# Patient Record
Sex: FEMALE | Race: WHITE | NOT HISPANIC OR LATINO | Employment: OTHER | URBAN - METROPOLITAN AREA
[De-identification: names, ages, dates, MRNs, and addresses within clinical notes are randomized per-mention and may not be internally consistent; named-entity substitution may affect disease eponyms.]

---

## 2023-03-17 ENCOUNTER — LAB (OUTPATIENT)
Dept: LAB | Facility: LAB | Age: 73
End: 2023-03-17
Payer: MEDICARE

## 2023-03-17 DIAGNOSIS — R39.9 URINARY SYMPTOM OR SIGN: ICD-10-CM

## 2023-03-17 DIAGNOSIS — N39.0 RECURRENT UTI: Primary | ICD-10-CM

## 2023-03-17 LAB
APPEARANCE, URINE: ABNORMAL
BILIRUBIN, URINE: NEGATIVE
BLOOD, URINE: ABNORMAL
COLOR, URINE: ABNORMAL
GLUCOSE, URINE: NEGATIVE MG/DL
KETONES, URINE: NEGATIVE MG/DL
LEUKOCYTE ESTERASE, URINE: ABNORMAL
NITRITE, URINE: NEGATIVE
PH, URINE: 6 (ref 5–8)
PROTEIN, URINE: NEGATIVE MG/DL
RBC, URINE: ABNORMAL /HPF (ref 0–5)
SPECIFIC GRAVITY, URINE: 1 (ref 1–1.03)
SQUAMOUS EPITHELIAL CELLS, URINE: ABNORMAL /HPF
UROBILINOGEN, URINE: <2 MG/DL (ref 0–1.9)
WBC CLUMPS, URINE: ABNORMAL /HPF
WBC, URINE: >100 /HPF (ref 0–5)

## 2023-03-17 PROCEDURE — 81001 URINALYSIS AUTO W/SCOPE: CPT

## 2023-03-17 RX ORDER — AMOXICILLIN AND CLAVULANATE POTASSIUM 875; 125 MG/1; MG/1
875 TABLET, FILM COATED ORAL 2 TIMES DAILY
Qty: 10 TABLET | Refills: 0 | Status: SHIPPED | OUTPATIENT
Start: 2023-03-17 | End: 2023-03-20 | Stop reason: SDUPTHER

## 2023-03-17 NOTE — PROGRESS NOTES
Pt called this afternoon with symptoms of a UTI and would like medication if possible.   She would also like to know why she keeps getting current UTIs?

## 2023-03-20 DIAGNOSIS — N39.0 RECURRENT UTI: ICD-10-CM

## 2023-03-20 RX ORDER — AMOXICILLIN AND CLAVULANATE POTASSIUM 875; 125 MG/1; MG/1
875 TABLET, FILM COATED ORAL 2 TIMES DAILY
Qty: 10 TABLET | Refills: 0 | Status: SHIPPED | OUTPATIENT
Start: 2023-03-20 | End: 2023-03-20 | Stop reason: SDUPTHER

## 2023-03-20 RX ORDER — AMOXICILLIN AND CLAVULANATE POTASSIUM 875; 125 MG/1; MG/1
875 TABLET, FILM COATED ORAL 2 TIMES DAILY
Qty: 10 TABLET | Refills: 0 | Status: SHIPPED | OUTPATIENT
Start: 2023-03-20 | End: 2023-03-25

## 2023-04-28 ENCOUNTER — LAB (OUTPATIENT)
Dept: LAB | Facility: LAB | Age: 73
End: 2023-04-28
Payer: MEDICARE

## 2023-04-28 DIAGNOSIS — N39.0 URINARY TRACT INFECTION WITHOUT HEMATURIA, SITE UNSPECIFIED: ICD-10-CM

## 2023-04-28 LAB
APPEARANCE, URINE: ABNORMAL
BILIRUBIN, URINE: NEGATIVE
BLOOD, URINE: ABNORMAL
COLOR, URINE: YELLOW
GLUCOSE, URINE: NEGATIVE MG/DL
KETONES, URINE: NEGATIVE MG/DL
LEUKOCYTE ESTERASE, URINE: ABNORMAL
NITRITE, URINE: NEGATIVE
PH, URINE: 8 (ref 5–8)
PROTEIN, URINE: NEGATIVE MG/DL
RBC, URINE: ABNORMAL /HPF (ref 0–5)
RENAL EPITHELIAL CELLS, URINE: ABNORMAL /HPF
SPECIFIC GRAVITY, URINE: 1 (ref 1–1.03)
SQUAMOUS EPITHELIAL CELLS, URINE: ABNORMAL /HPF
UROBILINOGEN, URINE: <2 MG/DL (ref 0–1.9)
WBC, URINE: >100 /HPF (ref 0–5)

## 2023-04-28 PROCEDURE — 81001 URINALYSIS AUTO W/SCOPE: CPT

## 2023-04-28 PROCEDURE — 87086 URINE CULTURE/COLONY COUNT: CPT

## 2023-04-30 LAB — URINE CULTURE: NORMAL

## 2023-05-01 DIAGNOSIS — N30.01 ACUTE CYSTITIS WITH HEMATURIA: Primary | ICD-10-CM

## 2023-05-01 RX ORDER — NITROFURANTOIN 25; 75 MG/1; MG/1
100 CAPSULE ORAL 2 TIMES DAILY
Qty: 10 CAPSULE | Refills: 0 | Status: SHIPPED | OUTPATIENT
Start: 2023-05-01 | End: 2023-05-06

## 2023-05-04 PROBLEM — R79.9 ABNORMAL BLOOD CHEMISTRY: Status: ACTIVE | Noted: 2023-05-04

## 2023-05-04 PROBLEM — R30.0 DYSURIA: Status: ACTIVE | Noted: 2023-05-04

## 2023-05-04 PROBLEM — M79.606 LEG PAIN: Status: ACTIVE | Noted: 2023-05-04

## 2023-05-04 PROBLEM — K59.09 CHRONIC CONSTIPATION: Status: ACTIVE | Noted: 2023-05-04

## 2023-05-04 PROBLEM — K21.9 GERD (GASTROESOPHAGEAL REFLUX DISEASE): Status: ACTIVE | Noted: 2023-05-04

## 2023-05-04 PROBLEM — I50.33 ACUTE ON CHRONIC DIASTOLIC CONGESTIVE HEART FAILURE (MULTI): Status: ACTIVE | Noted: 2021-05-05

## 2023-05-04 PROBLEM — R10.11 RIGHT UPPER QUADRANT ABDOMINAL PAIN: Status: ACTIVE | Noted: 2018-11-16

## 2023-05-04 PROBLEM — R10.9 ABDOMINAL PAIN: Status: ACTIVE | Noted: 2018-11-16

## 2023-05-04 PROBLEM — K13.79 ORAL MASS: Status: ACTIVE | Noted: 2023-05-04

## 2023-05-04 PROBLEM — B37.9 CANDIDIASIS: Status: ACTIVE | Noted: 2023-05-04

## 2023-05-04 PROBLEM — Z85.42 HISTORY OF UTERINE CANCER: Status: ACTIVE | Noted: 2023-05-04

## 2023-05-04 PROBLEM — B02.9 SHINGLES: Status: RESOLVED | Noted: 2023-05-04 | Resolved: 2023-05-04

## 2023-05-04 PROBLEM — T81.30XA ABDOMINAL WOUND DEHISCENCE: Status: RESOLVED | Noted: 2021-05-05 | Resolved: 2023-05-04

## 2023-05-04 PROBLEM — R19.8 PERFORATED VISCUS: Status: ACTIVE | Noted: 2023-05-04

## 2023-05-04 PROBLEM — Z85.41 HISTORY OF CERVICAL CANCER: Status: ACTIVE | Noted: 2023-05-04

## 2023-05-04 PROBLEM — M79.89 LEFT LEG SWELLING: Status: ACTIVE | Noted: 2023-05-04

## 2023-05-04 PROBLEM — E78.1 HYPERTRIGLYCERIDEMIA: Status: ACTIVE | Noted: 2023-05-04

## 2023-05-04 PROBLEM — I49.9 IRREGULAR HEART RHYTHM: Status: ACTIVE | Noted: 2023-05-04

## 2023-05-04 PROBLEM — T81.321A ABDOMINAL WOUND DEHISCENCE: Status: RESOLVED | Noted: 2021-05-05 | Resolved: 2023-05-04

## 2023-05-04 PROBLEM — I44.7 LBBB (LEFT BUNDLE BRANCH BLOCK): Status: ACTIVE | Noted: 2023-05-04

## 2023-05-04 PROBLEM — I77.810 ASCENDING AORTA DILATATION (CMS-HCC): Status: ACTIVE | Noted: 2023-05-04

## 2023-05-04 PROBLEM — R11.0 NAUSEA: Status: RESOLVED | Noted: 2018-11-16 | Resolved: 2023-05-04

## 2023-05-04 PROBLEM — D18.09 HEMANGIOMA OF OTHER SITES: Status: ACTIVE | Noted: 2023-05-04

## 2023-05-04 PROBLEM — R14.0 ABDOMINAL BLOATING: Status: RESOLVED | Noted: 2023-05-04 | Resolved: 2023-05-04

## 2023-05-04 PROBLEM — I71.40 AAA (ABDOMINAL AORTIC ANEURYSM) (CMS-HCC): Status: ACTIVE | Noted: 2023-05-04

## 2023-05-04 PROBLEM — N39.0 UTI (URINARY TRACT INFECTION): Status: ACTIVE | Noted: 2023-05-04

## 2023-05-04 PROBLEM — R13.10 DYSPHAGIA: Status: ACTIVE | Noted: 2023-05-04

## 2023-05-04 PROBLEM — H93.19 TINNITUS: Status: RESOLVED | Noted: 2023-05-04 | Resolved: 2023-05-04

## 2023-05-04 PROBLEM — E55.9 VITAMIN D DEFICIENCY: Status: ACTIVE | Noted: 2023-05-04

## 2023-05-04 PROBLEM — F41.9 ANXIETY: Status: ACTIVE | Noted: 2023-05-04

## 2023-05-04 PROBLEM — J45.909 ASTHMATIC BRONCHITIS (HHS-HCC): Status: ACTIVE | Noted: 2023-05-04

## 2023-05-04 PROBLEM — I50.9 CHF EXACERBATION (MULTI): Status: ACTIVE | Noted: 2021-05-01

## 2023-05-04 PROBLEM — R74.8 ELEVATED LIVER ENZYMES: Status: ACTIVE | Noted: 2018-11-16

## 2023-05-04 PROBLEM — I87.8 POOR VENOUS ACCESS: Status: ACTIVE | Noted: 2023-05-04

## 2023-05-04 PROBLEM — R07.9 CHEST PAIN: Status: ACTIVE | Noted: 2022-12-01

## 2023-05-04 PROBLEM — R73.09 ELEVATED GLUCOSE: Status: ACTIVE | Noted: 2023-05-04

## 2023-05-04 PROBLEM — J31.2 CHRONIC PHARYNGITIS: Status: ACTIVE | Noted: 2023-05-04

## 2023-05-05 DIAGNOSIS — B37.9 YEAST INFECTION: ICD-10-CM

## 2023-05-05 RX ORDER — FLUCONAZOLE 150 MG/1
150 TABLET ORAL ONCE
Qty: 1 TABLET | Refills: 1 | Status: SHIPPED | OUTPATIENT
Start: 2023-05-05 | End: 2023-05-05

## 2023-06-20 DIAGNOSIS — I10 HYPERTENSION, UNSPECIFIED TYPE: ICD-10-CM

## 2023-06-20 RX ORDER — HYDROXYZINE HYDROCHLORIDE 25 MG/1
25 TABLET, FILM COATED ORAL 3 TIMES DAILY
Qty: 90 TABLET | Refills: 1 | Status: SHIPPED | OUTPATIENT
Start: 2023-06-20 | End: 2023-06-22

## 2023-06-22 DIAGNOSIS — I10 HYPERTENSION, UNSPECIFIED TYPE: ICD-10-CM

## 2023-06-22 RX ORDER — HYDROXYZINE HYDROCHLORIDE 25 MG/1
TABLET, FILM COATED ORAL
Qty: 270 TABLET | Refills: 1 | Status: ON HOLD | OUTPATIENT
Start: 2023-06-22 | End: 2023-12-07 | Stop reason: ALTCHOICE

## 2023-07-31 DIAGNOSIS — F41.9 ANXIETY: ICD-10-CM

## 2023-07-31 RX ORDER — LORAZEPAM 0.5 MG/1
0.5 TABLET ORAL DAILY PRN
Qty: 30 TABLET | Refills: 3 | Status: ON HOLD | OUTPATIENT
Start: 2023-07-31 | End: 2023-12-08

## 2023-07-31 NOTE — TELEPHONE ENCOUNTER
Medication: Lorazepam  -CSA: 8/17/22   -UDS: NA  -Last appt: 2/15  -Next appt date:    
Statement Selected

## 2023-08-02 LAB
CREATININE (MG/DL) IN SER/PLAS: 1 MG/DL (ref 0.5–1.05)
GFR FEMALE: 59 ML/MIN/1.73M2
UREA NITROGEN (MG/DL) IN SER/PLAS: 17 MG/DL (ref 6–23)

## 2023-08-11 RX ORDER — ESTRADIOL 0.1 MG/G
1 CREAM VAGINAL 2 TIMES WEEKLY
COMMUNITY
Start: 2023-05-04 | End: 2023-12-19 | Stop reason: HOSPADM

## 2023-08-11 NOTE — PROGRESS NOTES
" Trinity Hernandez is a 73 y.o. female who presents for Medicare Annual Wellness Visit Subsequent (Herniated small bowel loop, doesn't think she could handle surgery, Shin Cuevas suggested laparoscopic surgery;).    Small bowel perforation, abdominal hernias, epiploic appendagitis: She has been recommended laparoscopic surgery but she is hoping to avoid surgery.      Anxiety: lorazepam prn is helpful. She is having a lot more anxiety lately because of her 's health. He has an aneurysm that is over 5cm and he is in renal failure and he recently had a severe GI bleed. She is taking hydroxyzine but that makes her sleepy so she can't take it during the day.      HTN, aortic aneurysm: Reasonable control on metoprolol, losartan, and lasix. Following with cardioloy. Recently had some discomfort and got a zio patch done. She is awaiting results. She is going to get a stress test soon.     HLD: Off meds, trying to watch her diet.      COPD/sarcoidosis: On Brovana, spiriva, and budesonide. Using oxygen and prednisone prn, she has an albuterol inhaler. her breathing is a little bit bad today. She has been more short of breath. She is seeing Dr. Ford for pulm. She is going in for testing in April.      Hypothyroidism: On replacement, no concerns.     GERD: Off pantoprazole, she is doing much better since taking the enzymes.     All other systems have been reviewed and are negative for complaint     Objective   Ht 1.702 m (5' 7\")   Wt 77.6 kg (171 lb)   BMI 26.78 kg/m²     Physical Exam  Gen: No acute distress, alert and oriented x3, pleasant   HEENT: moist mucous membranes, b/l external auditory canals are clear of debris, TMs within normal limits, no oropharyngeal lesions, eomi, perrla   Neck: thyroid within normal limits, no lymphadenopathy   CV: RRR, normal S1/S2, no murmur   Resp: Clear to auscultation bilaterally, no wheezes or rhonchi appreciated, breathing comfortably on supplemental oxygen  Abd: soft, " moderate RLQ tenderness noted, moderately distended, no guarding/rigidity, bowel sounds present  Extr: no edema, no calf tenderness  Derm: Skin is warm and dry, no rashes appreciated  Psych: mood is good, affect is congruent, good hygiene, normal speech and eye contact  Neuro: cranial nerves grossly intact, normal gait     Assessment/Plan     #Small bowel perforation  #Ventral hernia  Following with GI  Currently planning to avoid surgery     #Anxiety:  CSA signed  Using ativan prn, refilled     #Shingles:  she has valtrex rx at home  Hasn't needed it much     #Poor venous access  port in place     #HTN  Controlled on losartan, furosemide, and metoprolol  seeing cardiology ([place])     #HLD  Stable, not on meds, monitoring     #COPD/Sarcoidosis  Following with pulm, stable, on inhalers, supplemental O2, nebulizers  testing with pulm in April     #Hypothyroidism  Controlled on replacement     HCM:  s/p COVID vaccine  Mammogram Sept

## 2023-08-16 ENCOUNTER — OFFICE VISIT (OUTPATIENT)
Dept: PRIMARY CARE | Facility: CLINIC | Age: 73
End: 2023-08-16
Payer: MEDICARE

## 2023-08-16 VITALS
DIASTOLIC BLOOD PRESSURE: 80 MMHG | WEIGHT: 171 LBS | HEART RATE: 74 BPM | BODY MASS INDEX: 26.84 KG/M2 | SYSTOLIC BLOOD PRESSURE: 133 MMHG | HEIGHT: 67 IN

## 2023-08-16 DIAGNOSIS — R73.09 ELEVATED GLUCOSE: Primary | ICD-10-CM

## 2023-08-16 DIAGNOSIS — E03.9 HYPOTHYROIDISM, UNSPECIFIED TYPE: ICD-10-CM

## 2023-08-16 DIAGNOSIS — Z13.220 SCREENING FOR HYPERLIPIDEMIA: ICD-10-CM

## 2023-08-16 DIAGNOSIS — Z12.31 ENCOUNTER FOR SCREENING MAMMOGRAM FOR MALIGNANT NEOPLASM OF BREAST: ICD-10-CM

## 2023-08-16 PROCEDURE — 1036F TOBACCO NON-USER: CPT | Performed by: FAMILY MEDICINE

## 2023-08-16 PROCEDURE — 1170F FXNL STATUS ASSESSED: CPT | Performed by: FAMILY MEDICINE

## 2023-08-16 PROCEDURE — 3079F DIAST BP 80-89 MM HG: CPT | Performed by: FAMILY MEDICINE

## 2023-08-16 PROCEDURE — 3075F SYST BP GE 130 - 139MM HG: CPT | Performed by: FAMILY MEDICINE

## 2023-08-16 PROCEDURE — G0439 PPPS, SUBSEQ VISIT: HCPCS | Performed by: FAMILY MEDICINE

## 2023-08-16 ASSESSMENT — ACTIVITIES OF DAILY LIVING (ADL)
BATHING: INDEPENDENT
DRESSING: INDEPENDENT
TAKING_MEDICATION: INDEPENDENT
MANAGING_FINANCES: INDEPENDENT
DOING_HOUSEWORK: INDEPENDENT
GROCERY_SHOPPING: INDEPENDENT

## 2023-08-16 ASSESSMENT — PATIENT HEALTH QUESTIONNAIRE - PHQ9
SUM OF ALL RESPONSES TO PHQ9 QUESTIONS 1 AND 2: 2
2. FEELING DOWN, DEPRESSED OR HOPELESS: SEVERAL DAYS
10. IF YOU CHECKED OFF ANY PROBLEMS, HOW DIFFICULT HAVE THESE PROBLEMS MADE IT FOR YOU TO DO YOUR WORK, TAKE CARE OF THINGS AT HOME, OR GET ALONG WITH OTHER PEOPLE: NOT DIFFICULT AT ALL
1. LITTLE INTEREST OR PLEASURE IN DOING THINGS: SEVERAL DAYS

## 2023-10-02 ENCOUNTER — PATIENT OUTREACH (OUTPATIENT)
Dept: PRIMARY CARE | Facility: CLINIC | Age: 73
End: 2023-10-02
Payer: MEDICARE

## 2023-10-02 NOTE — PROGRESS NOTES
Discharge Facility:  [facility]  Discharge Diagnosis:  chest pain  Admission Date:  9/28/23  Discharge Date:  9/29/23    PCP Appointment Date:  10/9/23  Specialist Appointment Date: having PCI 10/4 at [facility] Encounter and Summary: Linked   See discharge assessment below for further details     Engagement  Call Start Time: 0952 (10/2/2023  9:53 AM)    Medications  Medications reviewed with patient/caregiver?: Yes (10/2/2023  9:53 AM)  Is the patient having any side effects they believe may be caused by any medication additions or changes?: No (10/2/2023  9:53 AM)  Does the patient have all medications ordered at discharge?: Yes (10/2/2023  9:53 AM)  Is the patient taking all medications as directed (includes completed medication regime)?: No (10/2/2023  9:53 AM)  What is preventing the patient from taking all medications as directed?: Other (Comment) (bruises easily.  has  plavix and zetia.  spoke with cardiology regarding concerns, per rocky, office is calling her back after talking with dr about concerns.) (10/2/2023  9:53 AM)  Medication Comments: reviewed new, changed, and discontinued meds.  plavix and zetia are new.  rocky waiting to hear back from cardiology before taking the new meds due to easily bruising. (10/2/2023  9:53 AM)    Appointments  Does the patient have a primary care provider?: Yes (10/2/2023  9:53 AM)  Care Management Interventions: -- (appt made 10/9) (10/2/2023  9:53 AM)  Has the patient kept scheduled appointments due by today?: Yes (10/2/2023  9:53 AM)  Care Management Interventions: Advised to schedule with specialist (10/2/2023  9:53 AM)    Self Management  Has home health visited the patient within 72 hours of discharge?: Not applicable (10/2/2023  9:53 AM)    Patient Teaching  Does the patient have access to their discharge instructions?: Yes (10/2/2023  9:53 AM)  Care Management Interventions: Reviewed instructions with patient (10/2/2023  9:53 AM)  What is the patient's  perception of their health status since discharge?: Same (10/2/2023  9:53 AM)  Is the patient/caregiver able to teach back the hierarchy of who to call/visit for symptoms/problems? PCP, Specialist, Home Health nurse, Urgent Care, ED, 911: Yes (10/2/2023  9:53 AM)  Patient/Caregiver Education Comments: spoke with rocky.  states feels the same.  waiting to hear from cardio regarding new meds.  she wants to be sure he is aware of her easily bruising. she is having a stent placed later this week.   left call back number with rocky to call this nurse with questions or concerns. (10/2/2023  9:53 AM)

## 2023-10-03 NOTE — PROGRESS NOTES
Trinity Hernandez is a 73 y.o. female who presents for No chief complaint on file.    Small bowel perforation, abdominal hernias, epiploic appendagitis: She has been recommended laparoscopic surgery but she is hoping to avoid surgery.      Anxiety: lorazepam prn is helpful. She is having a lot more anxiety lately because of her 's health. He has an aneurysm that is over 5cm and he is in renal failure and he recently had a severe GI bleed. She is taking hydroxyzine but that makes her sleepy so she can't take it during the day.      HTN, aortic aneurysm: Reasonable control on metoprolol, losartan, and lasix. Following with cardioloy. Recently had some discomfort and got a zio patch done. She is awaiting results. She is going to get a stress test soon.     HLD: Off meds, trying to watch her diet.      COPD/sarcoidosis: On Brovana, spiriva, and budesonide. Using oxygen and prednisone prn, she has an albuterol inhaler. her breathing is a little bit bad today. She has been more short of breath. She is seeing Dr. Ford for pulm. She is going in for testing in April.      Hypothyroidism: On replacement, no concerns.     GERD: Off pantoprazole, she is doing much better since taking the enzymes.     All other systems have been reviewed and are negative for complaint    Objective   There were no vitals taken for this visit.    Gen: No acute distress, alert and oriented x3, pleasant   HEENT: moist mucous membranes, b/l external auditory canals are clear of debris, TMs within normal limits, no oropharyngeal lesions, eomi, perrla   Neck: thyroid within normal limits, no lymphadenopathy   CV: RRR, normal S1/S2, no murmur   Resp: Clear to auscultation bilaterally, no wheezes or rhonchi appreciated  Abd: soft, nontender, non-distended, no guarding/rigidity, bowel sounds present  Extr: no edema, no calf tenderness  Derm: Skin is warm and dry, no rashes appreciated  Psych: mood is good, affect is congruent, good hygiene,  normal speech and eye contact  Neuro: cranial nerves grossly intact, normal gait    Assessment/Plan     #Small bowel perforation  #Ventral hernia  Following with GI  Currently planning to avoid surgery     #Anxiety:  CSA signed  Using ativan prn, refilled     #Shingles:  she has valtrex rx at home  Hasn't needed it much     #Poor venous access  port in place     #HTN  Controlled on losartan, furosemide, and metoprolol  seeing cardiology ([place])     #HLD  Stable, not on meds, monitoring     #COPD/Sarcoidosis  Following with pulm, stable, on inhalers, supplemental O2, nebulizers  testing with pulm in April     #Hypothyroidism  Controlled on replacement     HCM:  s/p COVID vaccine  Mammogram Sept

## 2023-10-05 ENCOUNTER — APPOINTMENT (OUTPATIENT)
Dept: RADIOLOGY | Facility: HOSPITAL | Age: 73
End: 2023-10-05
Payer: MEDICARE

## 2023-10-05 ENCOUNTER — HOSPITAL ENCOUNTER (EMERGENCY)
Facility: HOSPITAL | Age: 73
Discharge: ANOTHER HOSPITAL | End: 2023-10-07
Attending: EMERGENCY MEDICINE | Admitting: EMERGENCY MEDICINE
Payer: MEDICARE

## 2023-10-05 DIAGNOSIS — R61 DIAPHORESIS: ICD-10-CM

## 2023-10-05 DIAGNOSIS — R79.89 ELEVATED TROPONIN I LEVEL: Primary | ICD-10-CM

## 2023-10-05 LAB
ALBUMIN SERPL BCP-MCNC: 4.1 G/DL (ref 3.4–5)
ALP SERPL-CCNC: 66 U/L (ref 33–136)
ALT SERPL W P-5'-P-CCNC: 10 U/L (ref 7–45)
ANION GAP SERPL CALC-SCNC: 13 MMOL/L (ref 10–20)
AST SERPL W P-5'-P-CCNC: 16 U/L (ref 9–39)
BASOPHILS # BLD AUTO: 0.06 X10*3/UL (ref 0–0.1)
BASOPHILS NFR BLD AUTO: 0.6 %
BILIRUB SERPL-MCNC: 0.4 MG/DL (ref 0–1.2)
BUN SERPL-MCNC: 17 MG/DL (ref 6–23)
CALCIUM SERPL-MCNC: 9.2 MG/DL (ref 8.6–10.3)
CARDIAC TROPONIN I PNL SERPL HS: 48 NG/L (ref 0–13)
CARDIAC TROPONIN I PNL SERPL HS: 53 NG/L (ref 0–13)
CARDIAC TROPONIN I PNL SERPL HS: 55 NG/L (ref 0–13)
CHLORIDE SERPL-SCNC: 99 MMOL/L (ref 98–107)
CO2 SERPL-SCNC: 26 MMOL/L (ref 21–32)
CREAT SERPL-MCNC: 0.99 MG/DL (ref 0.5–1.05)
D DIMER PPP FEU-MCNC: 1183 NG/ML FEU
EOSINOPHIL # BLD AUTO: 0.36 X10*3/UL (ref 0–0.4)
EOSINOPHIL NFR BLD AUTO: 3.8 %
ERYTHROCYTE [DISTWIDTH] IN BLOOD BY AUTOMATED COUNT: 12.6 % (ref 11.5–14.5)
GFR SERPL CREATININE-BSD FRML MDRD: 60 ML/MIN/1.73M*2
GLUCOSE BLD MANUAL STRIP-MCNC: 198 MG/DL (ref 74–99)
GLUCOSE SERPL-MCNC: 121 MG/DL (ref 74–99)
HCT VFR BLD AUTO: 38.4 % (ref 36–46)
HGB BLD-MCNC: 12.5 G/DL (ref 12–16)
IMM GRANULOCYTES # BLD AUTO: 0.03 X10*3/UL (ref 0–0.5)
IMM GRANULOCYTES NFR BLD AUTO: 0.3 % (ref 0–0.9)
LYMPHOCYTES # BLD AUTO: 2.52 X10*3/UL (ref 0.8–3)
LYMPHOCYTES NFR BLD AUTO: 26.6 %
MCH RBC QN AUTO: 30.1 PG (ref 26–34)
MCHC RBC AUTO-ENTMCNC: 32.6 G/DL (ref 32–36)
MCV RBC AUTO: 93 FL (ref 80–100)
MONOCYTES # BLD AUTO: 0.73 X10*3/UL (ref 0.05–0.8)
MONOCYTES NFR BLD AUTO: 7.7 %
NEUTROPHILS # BLD AUTO: 5.78 X10*3/UL (ref 1.6–5.5)
NEUTROPHILS NFR BLD AUTO: 61 %
NRBC BLD-RTO: 0 /100 WBCS (ref 0–0)
PLATELET # BLD AUTO: 274 X10*3/UL (ref 150–450)
PMV BLD AUTO: 9.7 FL (ref 7.5–11.5)
POTASSIUM SERPL-SCNC: 4.4 MMOL/L (ref 3.5–5.3)
PROT SERPL-MCNC: 7.4 G/DL (ref 6.4–8.2)
RBC # BLD AUTO: 4.15 X10*6/UL (ref 4–5.2)
SARS-COV-2 RNA RESP QL NAA+PROBE: NOT DETECTED
SODIUM SERPL-SCNC: 134 MMOL/L (ref 136–145)
WBC # BLD AUTO: 9.5 X10*3/UL (ref 4.4–11.3)

## 2023-10-05 PROCEDURE — 2500000002 HC RX 250 W HCPCS SELF ADMINISTERED DRUGS (ALT 637 FOR MEDICARE OP, ALT 636 FOR OP/ED)

## 2023-10-05 PROCEDURE — 99285 EMERGENCY DEPT VISIT HI MDM: CPT | Mod: 25 | Performed by: EMERGENCY MEDICINE

## 2023-10-05 PROCEDURE — 94760 N-INVAS EAR/PLS OXIMETRY 1: CPT

## 2023-10-05 PROCEDURE — 87635 SARS-COV-2 COVID-19 AMP PRB: CPT | Performed by: EMERGENCY MEDICINE

## 2023-10-05 PROCEDURE — 94640 AIRWAY INHALATION TREATMENT: CPT

## 2023-10-05 PROCEDURE — 84484 ASSAY OF TROPONIN QUANT: CPT | Mod: 91 | Performed by: EMERGENCY MEDICINE

## 2023-10-05 PROCEDURE — 94664 DEMO&/EVAL PT USE INHALER: CPT

## 2023-10-05 PROCEDURE — 2500000001 HC RX 250 WO HCPCS SELF ADMINISTERED DRUGS (ALT 637 FOR MEDICARE OP): Performed by: EMERGENCY MEDICINE

## 2023-10-05 PROCEDURE — 71045 X-RAY EXAM CHEST 1 VIEW: CPT | Performed by: RADIOLOGY

## 2023-10-05 PROCEDURE — 80053 COMPREHEN METABOLIC PANEL: CPT | Performed by: EMERGENCY MEDICINE

## 2023-10-05 PROCEDURE — 84484 ASSAY OF TROPONIN QUANT: CPT | Performed by: EMERGENCY MEDICINE

## 2023-10-05 PROCEDURE — 82947 ASSAY GLUCOSE BLOOD QUANT: CPT

## 2023-10-05 PROCEDURE — 2500000002 HC RX 250 W HCPCS SELF ADMINISTERED DRUGS (ALT 637 FOR MEDICARE OP, ALT 636 FOR OP/ED): Performed by: EMERGENCY MEDICINE

## 2023-10-05 PROCEDURE — 71275 CT ANGIOGRAPHY CHEST: CPT | Mod: FR,MG

## 2023-10-05 PROCEDURE — 71045 X-RAY EXAM CHEST 1 VIEW: CPT | Mod: FY

## 2023-10-05 PROCEDURE — 85379 FIBRIN DEGRADATION QUANT: CPT | Performed by: EMERGENCY MEDICINE

## 2023-10-05 PROCEDURE — 85025 COMPLETE CBC W/AUTO DIFF WBC: CPT | Performed by: EMERGENCY MEDICINE

## 2023-10-05 PROCEDURE — 71275 CT ANGIOGRAPHY CHEST: CPT | Mod: FOREIGN READ | Performed by: RADIOLOGY

## 2023-10-05 RX ORDER — GLUCOSAMINE HCL 500 MG
1 TABLET ORAL DAILY
Status: ON HOLD | COMMUNITY
Start: 2023-02-15 | End: 2023-12-07 | Stop reason: ALTCHOICE

## 2023-10-05 RX ORDER — NITROGLYCERIN 0.4 MG/1
0.4 TABLET SUBLINGUAL EVERY 5 MIN PRN
COMMUNITY
Start: 2023-10-05

## 2023-10-05 RX ORDER — BUDESONIDE 0.5 MG/2ML
0.5 INHALANT ORAL
Status: DISCONTINUED | OUTPATIENT
Start: 2023-10-05 | End: 2023-10-08 | Stop reason: HOSPADM

## 2023-10-05 RX ORDER — IPRATROPIUM BROMIDE AND ALBUTEROL SULFATE 2.5; .5 MG/3ML; MG/3ML
3 SOLUTION RESPIRATORY (INHALATION)
Status: DISCONTINUED | OUTPATIENT
Start: 2023-10-05 | End: 2023-10-05

## 2023-10-05 RX ORDER — NAPROXEN SODIUM 220 MG/1
324 TABLET, FILM COATED ORAL ONCE
Status: DISCONTINUED | OUTPATIENT
Start: 2023-10-05 | End: 2023-10-05

## 2023-10-05 RX ORDER — BUDESONIDE 0.5 MG/2ML
0.5 INHALANT ORAL
Status: DISCONTINUED | OUTPATIENT
Start: 2023-10-06 | End: 2023-10-05

## 2023-10-05 RX ORDER — MELATONIN 10 MG
CAPSULE ORAL
COMMUNITY
End: 2023-12-24 | Stop reason: WASHOUT

## 2023-10-05 RX ORDER — CLOPIDOGREL BISULFATE 75 MG/1
75 TABLET ORAL
COMMUNITY
Start: 2023-09-29 | End: 2023-10-29

## 2023-10-05 RX ORDER — BUDESONIDE 0.5 MG/2ML
INHALANT ORAL
Status: COMPLETED
Start: 2023-10-05 | End: 2023-10-05

## 2023-10-05 RX ORDER — IPRATROPIUM BROMIDE 0.5 MG/2.5ML
SOLUTION RESPIRATORY (INHALATION)
COMMUNITY
End: 2023-12-19 | Stop reason: HOSPADM

## 2023-10-05 RX ADMIN — IPRATROPIUM BROMIDE AND ALBUTEROL SULFATE 3 ML: .5; 3 SOLUTION RESPIRATORY (INHALATION) at 21:30

## 2023-10-05 RX ADMIN — IOHEXOL 50 ML: 350 INJECTION, SOLUTION INTRAVENOUS at 23:55

## 2023-10-05 RX ADMIN — NITROGLYCERIN 0.5 INCH: 20 OINTMENT TOPICAL at 17:23

## 2023-10-05 RX ADMIN — BUDESONIDE 0.5 MG: 0.5 INHALANT ORAL at 21:31

## 2023-10-05 RX ADMIN — BUDESONIDE 0.5 MG: 0.5 INHALANT RESPIRATORY (INHALATION) at 21:31

## 2023-10-05 ASSESSMENT — COLUMBIA-SUICIDE SEVERITY RATING SCALE - C-SSRS
2. HAVE YOU ACTUALLY HAD ANY THOUGHTS OF KILLING YOURSELF?: NO
1. IN THE PAST MONTH, HAVE YOU WISHED YOU WERE DEAD OR WISHED YOU COULD GO TO SLEEP AND NOT WAKE UP?: NO
6. HAVE YOU EVER DONE ANYTHING, STARTED TO DO ANYTHING, OR PREPARED TO DO ANYTHING TO END YOUR LIFE?: NO

## 2023-10-05 ASSESSMENT — PAIN SCALES - GENERAL
PAINLEVEL_OUTOF10: 0 - NO PAIN

## 2023-10-05 ASSESSMENT — PAIN - FUNCTIONAL ASSESSMENT: PAIN_FUNCTIONAL_ASSESSMENT: 0-10

## 2023-10-05 NOTE — ED PROVIDER NOTES
Department of Emergency Medicine   ED  Provider Note  Admit Date/RoomTime: 10/5/2023  4:49 PM  ED Room: [address]              History of Present Illness:   Trinity Hernandez is a 73 y.o. female presenting to the ED for Generalized weakness, nausea and diaphoresis,beginning 1 hour after arriving home from [facility] where she had a cardiac stent placed 2 days ago..  The complaint has been intermittent, severe in severity, and worsened by nothing.  Patient had a 5-minute episode of diaphoresis and generalized weakness after arriving home.  She did not have chest pain or shortness of breath.  She did not actually vomit but was quite nauseous.  She had no pain in her neck or arms or jaw.  She had a cardiac stent done a few days ago at [facility].  She has a port in her right chest.  She has a history of DVT.      Review of Systems:   Pertinent positives and review of systems as noted above.  Remaining 10 review of systems is negative or noncontributory to today's episode of care.        --------------------------------------------- PAST HISTORY ---------------------------------------------  Past Medical History:  has a past medical history of Abdominal bloating (05/04/2023), Diverticulitis of small intestine without perforation or abscess without bleeding, Essential (primary) hypertension (02/08/2017), Influenza B (05/08/2015), Nausea (11/16/2018), Personal history of other endocrine, nutritional and metabolic disease (02/08/2017), and Personal history of other venous thrombosis and embolism.    Past Surgical History:  has a past surgical history that includes Hysterectomy (10/03/2013); Hernia repair (05/16/2017); Other surgical history (05/16/2017); Abdominal adhesion surgery (05/16/2017); Colectomy partial / total (05/16/2017); Cholecystectomy (05/16/2017); Cervical fusion (05/16/2017); Other surgical history (04/15/2019); MR angio head wo IV contrast (05/21/2020); MR angio neck wo IV contrast  (05/21/2020); CT angio neck w and wo IV contrast (05/21/2020); CT angio head w and wo IV contrast (05/21/2020); and Coronary angioplasty with stent.    Social History:  reports that she has never smoked. She has never been exposed to tobacco smoke. She has never used smokeless tobacco.    Family History: family history is not on file. Unless otherwise noted, family history is non contributory    The patient’s home medications have been reviewed.    Allergies: Hydrocodone-acetaminophen, Montelukast, Morphine, Nitrofurantoin monohyd/m-cryst, Sulfa (sulfonamide antibiotics), Atorvastatin, Dicyclomine, Fluticasone propion-salmeterol, Levofloxacin, Lisinopril, Nitroimidazoles, Omeprazole, Salmeterol, Simvastatin, Sucralfate, and Umeclidinium    -------------------------------------------------- RESULTS -------------------------------------------------  All laboratory and radiology results have been personally reviewed by myself   LABS:  Labs Reviewed   D-DIMER, VTE EXCLUSION - Abnormal       Result Value    D-Dimer, Quantitative VTE Exclusion 1,183 (*)     Narrative:     The VTE Exclusion D-Dimer assay is reported in ng/mL Fibrinogen Equivalent Units (FEU).    Per 's instructions for use, a value of less than 500 ng/mL (FEU) may help to exclude DVT or PE in outpatients when the assay is used with a clinical pretest probability assessment.([facility] must utilize and document eCalc 'Wells Score Deep Vein Thrombosis Risk' for DVT exclusion only. Emergency Department should utilize  Guidelines for Emergency Department Use of the VTE Exclusion D-Dimer and Clinical Pretest probability assessment model for DVT or PE exclusion.)   COMPREHENSIVE METABOLIC PANEL - Abnormal    Glucose 121 (*)     Sodium 134 (*)     Potassium 4.4      Chloride 99      Bicarbonate 26      Anion Gap 13      Urea Nitrogen 17      Creatinine 0.99      eGFR 60 (*)     Calcium 9.2      Albumin 4.1      Alkaline Phosphatase 66      Total Protein  7.4      AST 16      Bilirubin, Total 0.4      ALT 10     CBC WITH AUTO DIFFERENTIAL - Abnormal    WBC 9.5      nRBC 0.0      RBC 4.15      Hemoglobin 12.5      Hematocrit 38.4      MCV 93      MCH 30.1      MCHC 32.6      RDW 12.6      Platelets 274      MPV 9.7      Neutrophils % 61.0      Immature Granulocytes %, Automated 0.3      Lymphocytes % 26.6      Monocytes % 7.7      Eosinophils % 3.8      Basophils % 0.6      Neutrophils Absolute 5.78 (*)     Immature Granulocytes Absolute, Automated 0.03      Lymphocytes Absolute 2.52      Monocytes Absolute 0.73      Eosinophils Absolute 0.36      Basophils Absolute 0.06     SERIAL TROPONIN-INITIAL - Abnormal    Troponin I, High Sensitivity 53 (*)     Narrative:     Less than 99th percentile of normal range cutoff-  Female and children under 18 years old <14 ng/L; Male <21 ng/L: Negative  Repeat testing should be performed if clinically indicated.     Female and children under 18 years old 14-50 ng/L; Male 21-50 ng/L:  Consistent with possible cardiac damage and possible increased clinical   risk. Serial measurements may help to assess extent of myocardial damage.     >50 ng/L: Consistent with cardiac damage, increased clinical risk and  myocardial infarction. Serial measurements may help assess extent of   myocardial damage.      NOTE: Children less than 1 year old may have higher baseline troponin   levels and results should be interpreted in conjunction with the overall   clinical context.     NOTE: Troponin I testing is performed using a different   testing methodology at [facility] than at other   [facility] hospitals. Direct result comparisons should only   be made within the same method.   TROPONIN SERIES- (INITIAL, 1 HR)    Narrative:     The following orders were created for panel order Troponin I Series, High Sensitivity (0, 1 HR).  Procedure                               Abnormality         Status                     ---------                       "         -----------         ------                     Troponin I, High Sensiti...[[id number]]  Abnormal            Final result               Troponin, High Sensitivi...[[id number]]                      In process                   Please view results for these tests on the individual orders.   SERIAL TROPONIN, 1 HOUR   GRAY TOP         RADIOLOGY:  Interpreted by Radiologist.  XR chest 1 view   Final Result   Hyperinflated lungs with flattening of the diaphragms suggesting   COPD. Streaky bibasilar opacities suggestive of atelectasis in the   absence of clinical signs of pneumonia.        Signed by: Brant Garcia 10/5/2023 5:20 PM   Dictation workstation:   [id number]          1653 EKG shows a sinus rhythm with left bundle branch block pattern no acute ST elevations,  1804 EKG shows a sinus rhythm at 71 bpm, left bundle branch block is present, no significant changes from the previous EKG.    No results found for this or any previous visit (from the past 4464 hour(s)).  ------------------------- NURSING NOTES AND VITALS REVIEWED ---------------------------   The nursing notes within the ED encounter and vital signs as below have been reviewed.   Pulse 73   Temp 36.2 °C (97.2 °F) (Tympanic)   Resp 18   Ht 1.702 m (5' 7\")   Wt 77.5 kg (170 lb 13.7 oz)   SpO2 95%   BMI 26.76 kg/m²   Oxygen Saturation Interpretation: Normal      ---------------------------------------------------PHYSICAL EXAM--------------------------------------    Constitutional/General: Alert and oriented x3, well appearing, non toxic in NAD  Head: Normocephalic and atraumatic  Eyes: PERRL, EOMI, conjunctiva normal, sclera non icteric  Mouth: Oropharynx clear, handling secretions, no trismus, no asymmetry of the posterior oropharynx or uvular edema  Neck: Supple, full ROM, non tender to palpation in the midline, no stridor, no crepitus, no meningeal signs  Respiratory: Lungs clear to auscultation bilaterally, no wheezes, rales, or rhonchi. " Not in respiratory distress  Cardiovascular:  Regular rate. Regular rhythm. No murmurs, gallops, or rubs. 2+ distal pulses  Chest: No chest wall tenderness  GI:  Abdomen Soft, Non tender, Non distended.  +BS. No organomegaly, no palpable masses,  No rebound, guarding, or rigidity.   Musculoskeletal: Moves all extremities x 4. Warm and well perfused, no clubbing, cyanosis, or edema. Capillary refill <3 seconds  Integument: skin warm and dry. No rashes.   Lymphatic: no lymphadenopathy noted  Neurologic: GCS 15, no focal deficits, symmetric strength 5/5 in the upper and lower extremities bilaterally  Psychiatric: Normal Affect    Procedures    ------------------------------ ED COURSE/MEDICAL DECISION MAKING----------------------    Medical Decision Making:   Patient request care at [facility].  I discussed this case with Dr. Frank and .   [facility].  She is pending bed placement at this time. Patient was signed out to Dr. Matthew at change of shift.  Please refer to this doctor's note for final diagnosis and disposition.  She is pending bed placement.  Diagnoses as of 10/05/23 2038   Elevated troponin I level   Diaphoresis      Counseling:   The emergency provider has spoken with the patient and discussed today’s results, in addition to providing specific details for the plan of care and counseling regarding the diagnosis and prognosis.  Questions are answered at this time and they are agreeable with the plan.      --------------------------------- IMPRESSION AND DISPOSITION ---------------------------------        IMPRESSION  1. Elevated troponin I level    2. Diaphoresis        DISPOSITION  Disposition: Transfer to [facility] to request hospital  Patient condition is fair      Billing Provider Critical Care Time: 0 minutes     Joce Mejia MD  10/05/23 2040

## 2023-10-05 NOTE — ED TRIAGE NOTES
Pt arrived with c/o generalized weakness. Just had stent placed yesterday at [place]. Pt denies pain

## 2023-10-06 PROCEDURE — 2550000001 HC RX 255 CONTRASTS: Performed by: EMERGENCY MEDICINE

## 2023-10-06 PROCEDURE — 2500000004 HC RX 250 GENERAL PHARMACY W/ HCPCS (ALT 636 FOR OP/ED): Performed by: EMERGENCY MEDICINE

## 2023-10-06 PROCEDURE — 2500000001 HC RX 250 WO HCPCS SELF ADMINISTERED DRUGS (ALT 637 FOR MEDICARE OP)

## 2023-10-06 RX ORDER — HEPARIN 100 UNIT/ML
500 SYRINGE INTRAVENOUS AS NEEDED
Status: CANCELLED | OUTPATIENT
Start: 2023-10-11

## 2023-10-06 RX ORDER — ALBUTEROL SULFATE 0.83 MG/ML
2.5 SOLUTION RESPIRATORY (INHALATION) ONCE
Status: DISCONTINUED | OUTPATIENT
Start: 2023-10-06 | End: 2023-10-06

## 2023-10-06 RX ORDER — NAPROXEN SODIUM 220 MG/1
TABLET, FILM COATED ORAL
Status: COMPLETED
Start: 2023-10-06 | End: 2023-10-06

## 2023-10-06 RX ORDER — IPRATROPIUM BROMIDE AND ALBUTEROL SULFATE 2.5; .5 MG/3ML; MG/3ML
3 SOLUTION RESPIRATORY (INHALATION) ONCE
Status: DISCONTINUED | OUTPATIENT
Start: 2023-10-06 | End: 2023-10-08 | Stop reason: HOSPADM

## 2023-10-06 RX ORDER — IPRATROPIUM BROMIDE 0.5 MG/2.5ML
0.5 SOLUTION RESPIRATORY (INHALATION) ONCE
Status: DISCONTINUED | OUTPATIENT
Start: 2023-10-06 | End: 2023-10-06

## 2023-10-06 RX ORDER — HEPARIN SODIUM,PORCINE/PF 10 UNIT/ML
50 SYRINGE (ML) INTRAVENOUS AS NEEDED
Status: CANCELLED | OUTPATIENT
Start: 2023-10-11

## 2023-10-06 RX ORDER — POLYETHYLENE GLYCOL 3350 17 G/17G
17 POWDER, FOR SOLUTION ORAL
Status: DISCONTINUED | OUTPATIENT
Start: 2023-10-06 | End: 2023-10-08 | Stop reason: HOSPADM

## 2023-10-06 RX ORDER — IPRATROPIUM BROMIDE AND ALBUTEROL SULFATE 2.5; .5 MG/3ML; MG/3ML
SOLUTION RESPIRATORY (INHALATION)
Status: DISCONTINUED
Start: 2023-10-06 | End: 2023-10-06

## 2023-10-06 RX ORDER — CLOPIDOGREL BISULFATE 75 MG/1
TABLET ORAL
Status: COMPLETED
Start: 2023-10-06 | End: 2023-10-06

## 2023-10-06 RX ORDER — NAPROXEN SODIUM 220 MG/1
81 TABLET, FILM COATED ORAL ONCE
Status: COMPLETED | OUTPATIENT
Start: 2023-10-06 | End: 2023-10-06

## 2023-10-06 RX ORDER — CLOPIDOGREL BISULFATE 75 MG/1
75 TABLET ORAL ONCE
Status: COMPLETED | OUTPATIENT
Start: 2023-10-06 | End: 2023-10-06

## 2023-10-06 RX ADMIN — POLYETHYLENE GLYCOL 3350 17 G: 17 POWDER, FOR SOLUTION ORAL at 18:00

## 2023-10-06 RX ADMIN — CLOPIDOGREL BISULFATE 75 MG: 75 TABLET ORAL at 07:15

## 2023-10-06 RX ADMIN — ASPIRIN 81 MG CHEWABLE TABLET 81 MG: 81 TABLET CHEWABLE at 07:15

## 2023-10-06 RX ADMIN — NAPROXEN SODIUM 81 MG: 220 TABLET, FILM COATED ORAL at 07:15

## 2023-10-06 NOTE — PROGRESS NOTES
"Trinity Hernandez is a 73 y.o. female on day 0 of admission presenting with No Principal Problem: There is no principal problem currently on the Problem List. Please update the Problem List and refresh..    Subjective   Patient seen yesterday with chest pain.  Had mildly elevated troponin.  Troponins are trending down.  Patient has been accepted in transfer to [facility], hopefully today.  No chest pain.  Patient's home meds were restarted.  Patient is resting comfortably at this time.  Patient denies any chest pain or diaphoresis at this time.  No shortness of breath.       Objective     Physical Exam  Patient is alert and oriented x3.  Heart is regular rate and rhythm no murmur click gallop or rub.  Lungs are clear to auscultation no rales rhonchi or wheeze.  Abdomen is soft and nontender no masses guarding rebound.  No unilateral leg swelling or pain.    Last Recorded Vitals  Blood pressure 114/78, pulse 98, temperature 36.2 °C (97.2 °F), temperature source Tympanic, resp. rate 21, height 1.702 m (5' 7\"), weight 77.5 kg (170 lb 13.7 oz), SpO2 93 %.  Intake/Output last 3 Shifts:  No intake/output data recorded.    Relevant Results            Scheduled medications  budesonide, 0.5 mg, nebulization, Daily  nitroglycerin, 0.5 inch, transdermal, q6h during day  tiotropium, 2 puff, inhalation, Daily      Results for orders placed or performed during the hospital encounter of 10/05/23   D-Dimer, VTE Exclusion   Result Value Ref Range    D-Dimer, Quantitative VTE Exclusion 1,183 (H) <=500 ng/mL FEU   Comprehensive Metabolic Panel   Result Value Ref Range    Glucose 121 (H) 74 - 99 mg/dL    Sodium 134 (L) 136 - 145 mmol/L    Potassium 4.4 3.5 - 5.3 mmol/L    Chloride 99 98 - 107 mmol/L    Bicarbonate 26 21 - 32 mmol/L    Anion Gap 13 10 - 20 mmol/L    Urea Nitrogen 17 6 - 23 mg/dL    Creatinine 0.99 0.50 - 1.05 mg/dL    eGFR 60 (L) >60 mL/min/1.73m*2    Calcium 9.2 8.6 - 10.3 mg/dL    Albumin 4.1 3.4 - 5.0 g/dL    " Alkaline Phosphatase 66 33 - 136 U/L    Total Protein 7.4 6.4 - 8.2 g/dL    AST 16 9 - 39 U/L    Bilirubin, Total 0.4 0.0 - 1.2 mg/dL    ALT 10 7 - 45 U/L   CBC and Auto Differential   Result Value Ref Range    WBC 9.5 4.4 - 11.3 x10*3/uL    nRBC 0.0 0.0 - 0.0 /100 WBCs    RBC 4.15 4.00 - 5.20 x10*6/uL    Hemoglobin 12.5 12.0 - 16.0 g/dL    Hematocrit 38.4 36.0 - 46.0 %    MCV 93 80 - 100 fL    MCH 30.1 26.0 - 34.0 pg    MCHC 32.6 32.0 - 36.0 g/dL    RDW 12.6 11.5 - 14.5 %    Platelets 274 150 - 450 x10*3/uL    MPV 9.7 7.5 - 11.5 fL    Neutrophils % 61.0 40.0 - 80.0 %    Immature Granulocytes %, Automated 0.3 0.0 - 0.9 %    Lymphocytes % 26.6 13.0 - 44.0 %    Monocytes % 7.7 2.0 - 10.0 %    Eosinophils % 3.8 0.0 - 6.0 %    Basophils % 0.6 0.0 - 2.0 %    Neutrophils Absolute 5.78 (H) 1.60 - 5.50 x10*3/uL    Immature Granulocytes Absolute, Automated 0.03 0.00 - 0.50 x10*3/uL    Lymphocytes Absolute 2.52 0.80 - 3.00 x10*3/uL    Monocytes Absolute 0.73 0.05 - 0.80 x10*3/uL    Eosinophils Absolute 0.36 0.00 - 0.40 x10*3/uL    Basophils Absolute 0.06 0.00 - 0.10 x10*3/uL   Troponin I, High Sensitivity, Initial   Result Value Ref Range    Troponin I, High Sensitivity 53 (HH) 0 - 13 ng/L   Troponin, High Sensitivity, 1 Hour   Result Value Ref Range    Troponin I, High Sensitivity 55 (HH) 0 - 13 ng/L   Troponin I, High Sensitivity   Result Value Ref Range    Troponin I, High Sensitivity 48 (H) 0 - 13 ng/L   Sars-CoV-2 PCR, Symptomatic   Result Value Ref Range    Coronavirus 2019, PCR Not Detected Not Detected   POCT GLUCOSE   Result Value Ref Range    POCT Glucose 198 (H) 74 - 99 mg/dL                        Assessment/Plan   Active Problems:    Elevated troponin I level    Diaphoresis    Will continue to monitor closely.  Awaiting transfer to [facility]       I spent 10 minutes in the professional and overall care of this patient.      Yusuf Cherry, DO

## 2023-10-06 NOTE — PROGRESS NOTES
"Trinity Hernandez is a 73 y.o. female on day 0 of admission presenting with No Principal Problem: There is no principal problem currently on the Problem List. Please update the Problem List and refresh..    This patient was signed out to me at the end of my colleague shift at 8 PM.  At the time of signout, patient had already been accepted to be transferred to [facility] for further evaluation management of her current presentation complaining of being lightheaded, feeling like she is going to pass out as well as complaining of having some tingling to her fingers in light of her recent cardiac stenting.  History is limited given that she is a poor historian and has poor understanding of her recent admission as well as hospital course.      Patient troponins had downtrend at this point.  EKG read  Patient had a repeat EKG obtained at 2117 and read by me at 2117 shows normal sinus rhythm  Rhythm: Sinus rhythm  Axis: Within normal limits  Rate: 77  Intervals: Shows a left bundle branch block with a QRS of 138 ms, DC within normal limits, QTc slightly prolonged at 482 ms  ST segments: Nonspecific ST segment changes are noted along with a left bundle branch block to be expected but negative for Sgar[place]ossa.  T wave: Nonspecific T wave changes no signs of acute STEMI or ischemia.    Last Recorded Vitals  Blood pressure 121/63, pulse 62, temperature 36.2 °C (97.2 °F), temperature source Tympanic, resp. rate 17, height 1.702 m (5' 7\"), weight 77.5 kg (170 lb 13.7 oz), SpO2 97 %.  Intake/Output last 3 Shifts:  No intake/output data recorded.    Relevant Results              Patient did not have any acute events overnight.    The patient at this point still waiting to be accepted to [facility] [place] for further evaluation and management of her presentation.  Diaphoresis elevated troponin as well as complaining of feeling lightheaded and paresthesias.                 Assessment/Plan   Elevated troponin  Diaphoresis  3.   Near " syncope  4.   Paresthesias             Stephen Daniel, DO

## 2023-10-07 ENCOUNTER — HOSPITAL ENCOUNTER (OUTPATIENT)
Facility: HOSPITAL | Age: 73
Setting detail: OBSERVATION
End: 2023-10-07
Attending: STUDENT IN AN ORGANIZED HEALTH CARE EDUCATION/TRAINING PROGRAM | Admitting: STUDENT IN AN ORGANIZED HEALTH CARE EDUCATION/TRAINING PROGRAM
Payer: MEDICARE

## 2023-10-07 VITALS
HEART RATE: 78 BPM | BODY MASS INDEX: 26.82 KG/M2 | TEMPERATURE: 97.2 F | RESPIRATION RATE: 18 BRPM | HEIGHT: 67 IN | WEIGHT: 170.86 LBS | OXYGEN SATURATION: 96 % | SYSTOLIC BLOOD PRESSURE: 122 MMHG | DIASTOLIC BLOOD PRESSURE: 88 MMHG

## 2023-10-07 LAB
ALBUMIN SERPL BCP-MCNC: 4.4 G/DL (ref 3.4–5)
ALP SERPL-CCNC: 65 U/L (ref 33–136)
ALT SERPL W P-5'-P-CCNC: 11 U/L (ref 7–45)
ANION GAP SERPL CALC-SCNC: 11 MMOL/L (ref 10–20)
AST SERPL W P-5'-P-CCNC: 17 U/L (ref 9–39)
BILIRUB DIRECT SERPL-MCNC: 0.1 MG/DL (ref 0–0.3)
BILIRUB SERPL-MCNC: 0.4 MG/DL (ref 0–1.2)
BUN SERPL-MCNC: 11 MG/DL (ref 6–23)
CALCIUM SERPL-MCNC: 9.6 MG/DL (ref 8.6–10.3)
CARDIAC TROPONIN I PNL SERPL HS: 29 NG/L (ref 0–13)
CHLORIDE SERPL-SCNC: 100 MMOL/L (ref 98–107)
CO2 SERPL-SCNC: 30 MMOL/L (ref 21–32)
CREAT SERPL-MCNC: 0.95 MG/DL (ref 0.5–1.05)
ERYTHROCYTE [DISTWIDTH] IN BLOOD BY AUTOMATED COUNT: 12.9 % (ref 11.5–14.5)
GFR SERPL CREATININE-BSD FRML MDRD: 63 ML/MIN/1.73M*2
GLUCOSE SERPL-MCNC: 133 MG/DL (ref 74–99)
HCT VFR BLD AUTO: 40 % (ref 36–46)
HGB BLD-MCNC: 12.9 G/DL (ref 12–16)
MCH RBC QN AUTO: 30 PG (ref 26–34)
MCHC RBC AUTO-ENTMCNC: 32.3 G/DL (ref 32–36)
MCV RBC AUTO: 93 FL (ref 80–100)
NRBC BLD-RTO: 0 /100 WBCS (ref 0–0)
PLATELET # BLD AUTO: 260 X10*3/UL (ref 150–450)
PMV BLD AUTO: 9.4 FL (ref 7.5–11.5)
POTASSIUM SERPL-SCNC: 4.2 MMOL/L (ref 3.5–5.3)
PROT SERPL-MCNC: 7.8 G/DL (ref 6.4–8.2)
RBC # BLD AUTO: 4.3 X10*6/UL (ref 4–5.2)
SODIUM SERPL-SCNC: 137 MMOL/L (ref 136–145)
WBC # BLD AUTO: 8.5 X10*3/UL (ref 4.4–11.3)

## 2023-10-07 PROCEDURE — 2500000001 HC RX 250 WO HCPCS SELF ADMINISTERED DRUGS (ALT 637 FOR MEDICARE OP): Performed by: FAMILY MEDICINE

## 2023-10-07 PROCEDURE — 84075 ASSAY ALKALINE PHOSPHATASE: CPT | Performed by: FAMILY MEDICINE

## 2023-10-07 PROCEDURE — 2500000001 HC RX 250 WO HCPCS SELF ADMINISTERED DRUGS (ALT 637 FOR MEDICARE OP): Performed by: EMERGENCY MEDICINE

## 2023-10-07 PROCEDURE — 84484 ASSAY OF TROPONIN QUANT: CPT | Performed by: EMERGENCY MEDICINE

## 2023-10-07 PROCEDURE — 2500000004 HC RX 250 GENERAL PHARMACY W/ HCPCS (ALT 636 FOR OP/ED): Performed by: EMERGENCY MEDICINE

## 2023-10-07 PROCEDURE — 85027 COMPLETE CBC AUTOMATED: CPT | Mod: 59 | Performed by: EMERGENCY MEDICINE

## 2023-10-07 PROCEDURE — 80053 COMPREHEN METABOLIC PANEL: CPT | Performed by: EMERGENCY MEDICINE

## 2023-10-07 PROCEDURE — 82248 BILIRUBIN DIRECT: CPT | Performed by: FAMILY MEDICINE

## 2023-10-07 RX ORDER — LEVOTHYROXINE SODIUM 75 UG/1
75 TABLET ORAL
Status: DISCONTINUED | OUTPATIENT
Start: 2023-10-07 | End: 2023-10-08 | Stop reason: HOSPADM

## 2023-10-07 RX ORDER — CLOPIDOGREL BISULFATE 75 MG/1
75 TABLET ORAL ONCE
Status: COMPLETED | OUTPATIENT
Start: 2023-10-07 | End: 2023-10-07

## 2023-10-07 RX ADMIN — CLOPIDOGREL BISULFATE 75 MG: 75 TABLET ORAL at 15:24

## 2023-10-07 RX ADMIN — POLYETHYLENE GLYCOL 3350 17 G: 17 POWDER, FOR SOLUTION ORAL at 09:17

## 2023-10-07 RX ADMIN — LEVOTHYROXINE SODIUM 75 MCG: 75 TABLET ORAL at 07:57

## 2023-10-07 ASSESSMENT — PAIN - FUNCTIONAL ASSESSMENT: PAIN_FUNCTIONAL_ASSESSMENT: CRIES (CRYING REQUIRES OXYGEN INCREASED VITAL SIGNS EXPRESSION SLEEP)

## 2023-10-07 NOTE — ED PROVIDER NOTES
HPI   Chief Complaint   Patient presents with    Weakness, Gen     Pt states has generalized weakness. Denies cp despite emt states cp was pt's complaint. Pt denies any cp or other pain. Just had generalized weakness       HPI                    No data recorded                Patient History   Past Medical History:   Diagnosis Date    Abdominal bloating 05/04/2023    Diverticulitis of small intestine without perforation or abscess without bleeding     Diverticulitis, intestine, small    Essential (primary) hypertension 02/08/2017    HTN (hypertension), benign    Influenza B 05/08/2015    Formatting of this note might be different from the original. Completed Tamiflu Continue droplet precautions.    Nausea 11/16/2018    Personal history of other endocrine, nutritional and metabolic disease 02/08/2017    History of hypothyroidism    Personal history of other venous thrombosis and embolism     History of deep venous thrombosis     Past Surgical History:   Procedure Laterality Date    ABDOMINAL ADHESION SURGERY  05/16/2017    Laparoscopic Lysis Of Intestinal Adhesions    CERVICAL FUSION  05/16/2017    Cervical Vertebral Fusion    CHOLECYSTECTOMY  05/16/2017    Cholecystectomy    COLECTOMY PARTIAL / TOTAL  05/16/2017    Partial Colectomy - Sigmoid    CORONARY ANGIOPLASTY WITH STENT PLACEMENT      CT HEAD ANGIO W AND WO IV CONTRAST  05/21/2020    CT HEAD ANGIO W AND WO IV CONTRAST 5/21/2020 [facility]    CT NECK ANGIO W AND WO IV CONTRAST  05/21/2020    CT NECK ANGIO W AND WO IV CONTRAST 5/21/2020 [facility]    HERNIA REPAIR  05/16/2017    Hernia Repair    HYSTERECTOMY  10/03/2013    Hysterectomy    MR HEAD ANGIO WO IV CONTRAST  05/21/2020    MR HEAD ANGIO WO IV CONTRAST 5/21/2020 [facility]    MR NECK ANGIO WO IV CONTRAST  05/21/2020    MR NECK ANGIO WO IV CONTRAST 5/21/2020 [facility]    OTHER SURGICAL HISTORY  05/16/2017    Thoracoscopy Of Lungs And Pleural Space With Biopsy Of  Lung Nodules    OTHER SURGICAL HISTORY  04/15/2019    Esophagogastroduodenoscopy     No family history on file.  Social History     Tobacco Use    Smoking status: Never     Passive exposure: Never    Smokeless tobacco: Never   Substance Use Topics    Alcohol use: Not on file    Drug use: Not on file       Physical Exam   ED Triage Vitals   Temp Heart Rate Resp BP   10/05/23 1655 10/05/23 1655 10/05/23 1655 10/05/23 1815   36.2 °C (97.2 °F) 73 18 136/68      SpO2 Temp Source Heart Rate Source Patient Position   10/05/23 1655 10/05/23 1655 -- 10/05/23 1655   95 % Tympanic  Sitting      BP Location FiO2 (%)     10/05/23 1655 10/05/23 2130     Right arm 28 %       Physical Exam    ED Course & MDM   Diagnoses as of 10/07/23 1518   Elevated troponin I level   Diaphoresis       Medical Decision Making      Procedure  Procedures     Stephen Daniel DO  01/05/24 2090

## 2023-10-07 NOTE — ED NOTES
Patient took her own aerosol medications. BBS clear diminshed . Pt took Arformoterol and pulmicort aerosols.     Bella Espinosa, RRT  10/06/23 2006

## 2023-10-09 ENCOUNTER — APPOINTMENT (OUTPATIENT)
Dept: PRIMARY CARE | Facility: CLINIC | Age: 73
End: 2023-10-09
Payer: MEDICARE

## 2023-10-10 ENCOUNTER — PATIENT OUTREACH (OUTPATIENT)
Dept: PRIMARY CARE | Facility: CLINIC | Age: 73
End: 2023-10-10
Payer: MEDICARE

## 2023-10-12 ENCOUNTER — APPOINTMENT (OUTPATIENT)
Dept: HEMATOLOGY/ONCOLOGY | Facility: HOSPITAL | Age: 73
End: 2023-10-12
Payer: MEDICARE

## 2023-10-16 ENCOUNTER — PATIENT OUTREACH (OUTPATIENT)
Dept: PRIMARY CARE | Facility: CLINIC | Age: 73
End: 2023-10-16
Payer: MEDICARE

## 2023-10-16 NOTE — PROGRESS NOTES
Discharge Facility: [facility]   Discharge Diagnosis: Elevated Troponin   Admission Date: 10-8-23   Discharge Date: 10-13-23     PCP Appointment Date: 10-18-23  Specialist Appointment Date: n/a   See discharge assessment below for further details.  Engagement  Call Start Time: 1105 (10/16/2023 11:09 AM)    Medications  Medications reviewed with patient/caregiver?: Yes (10/16/2023 11:09 AM)  Is the patient having any side effects they believe may be caused by any medication additions or changes?: No (10/16/2023 11:09 AM)  Does the patient have all medications ordered at discharge?: Yes (10/16/2023 11:09 AM)  Care Management Interventions: No intervention needed (10/16/2023 11:09 AM)  Is the patient taking all medications as directed (includes completed medication regime)?: Yes (10/16/2023 11:09 AM)  What is preventing the patient from taking all medications as directed?: Other (Comment) (bruises easily.  has  plavix and zetia.  spoke with cardiology regarding concerns, per rocky, office is calling her back after talking with dr about concerns.) (10/2/2023  9:53 AM)  Medication Comments: reviewed new, changed, and discontinued meds.  plavix and zetia are new.  rocky waiting to hear back from cardiology before taking the new meds due to easily bruising. (10/2/2023  9:53 AM)    Appointments  Does the patient have a primary care provider?: Yes (10/16/2023 11:09 AM)  Care Management Interventions: Verified appointment date/time/provider (10/16/2023 11:09 AM)  Has the patient kept scheduled appointments due by today?: Yes (10/16/2023 11:09 AM)  Care Management Interventions: Advised patient to keep appointment (10/16/2023 11:09 AM)    Self Management  What is the home health agency?: Unsure (10/16/2023 11:09 AM)  Has home health visited the patient within 72 hours of discharge?: Yes (10/16/2023 11:09 AM)    Patient Teaching  Does the patient have access to their discharge instructions?: Yes (10/16/2023 11:09 AM)  Care  Management Interventions: Reviewed instructions with patient (10/16/2023 11:09 AM)  What is the patient's perception of their health status since discharge?: Improving (10/16/2023 11:09 AM)  Is the patient/caregiver able to teach back the hierarchy of who to call/visit for symptoms/problems? PCP, Specialist, Home Health nurse, Urgent Care, ED, 911: Yes (10/16/2023 11:09 AM)  Patient/Caregiver Education Comments: spoke with rocky.  states feels the same.  waiting to hear from cardio regarding new meds.  she wants to be sure he is aware of her easily bruising. she is having a stent placed later this week.   left call back number with rocky to call this nurse with questions or concerns. (10/2/2023  9:53 AM)      Molly Stuart LPN

## 2023-10-17 NOTE — PROGRESS NOTES
Trinity Hernandez is a 73 y.o. female who presents for Hospital Follow-up (Had stent placed on 10/7, went home the next day and was home for about an hour when she suddenly felt really sick to her stomach and felt weak, clammy and called 911 and was taken to [facility] and had to wait 2 days for a bed at [place] where she had the stent placed; new medications are very expensive, was only able to get half month supply)    Discharge Facility: [facility]   Discharge Diagnosis: Elevated Troponin   Admission Date: 10-8-23   Discharge Date: 10-13-23     CAD: She had a stent placed and the next day she got nausea, weak, and sweaty. She went to [facility] and then transferred to [facility] after 2 days. She went for a walk at the hospital and had the same symptoms. Dr. Romero is on vacation. Prior to this happening the cardiologist had set her up with a defibrillator vest. She has been home for about 5 days. Since getting home she is feeling depressed, frustrated, and angry.     Small bowel perforation, abdominal hernias, epiploic appendagitis: She has been recommended laparoscopic surgery but she is hoping to avoid surgery.      Anxiety: lorazepam prn is helpful. She is having a lot more anxiety lately because of her 's health. He has an aneurysm that is over 5cm and he is in renal failure and he recently had a severe GI bleed. She is taking hydroxyzine but that makes her sleepy so she can't take it during the day.      HTN, aortic aneurysm: Reasonable control on metoprolol, losartan, and lasix. Following with cardioloy. Recently had some discomfort and got a zio patch done. She is awaiting results. She is going to get a stress test soon.     HLD: Off meds, trying to watch her diet.      COPD/sarcoidosis: On Brovana, spiriva, and budesonide. Using oxygen and prednisone prn, she has an albuterol inhaler. her breathing is a little bit bad today. She has been more short of breath. She is seeing Dr. Ford  "for pulm. She is going in for testing in April.      Hypothyroidism: On replacement, no concerns.     GERD: Off pantoprazole, she is doing much better since taking the enzymes.     All other systems have been reviewed and are negative for complaint     Objective   Ht 1.702 m (5' 7\")   Wt 75.3 kg (166 lb)   BMI 26.00 kg/m²     Gen: No acute distress, alert and oriented x3, pleasant   HEENT: moist mucous membranes, b/l external auditory canals are clear of debris, TMs within normal limits, no oropharyngeal lesions, eomi, perrla   Neck: thyroid within normal limits, no lymphadenopathy   CV: RRR, normal S1/S2, no murmur   Resp: Clear to auscultation bilaterally, no wheezes or rhonchi appreciated  Abd: soft, nontender, non-distended, no guarding/rigidity, bowel sounds present  Extr: no edema, no calf tenderness  Derm: Skin is warm and dry, no rashes appreciated  Psych: mood is good, affect is congruent, good hygiene, normal speech and eye contact  Neuro: cranial nerves grossly intact, normal gait    Assessment/Plan     #CAD  S/p stent   On plavix and ezetimibe  Has followup with cardio  She has midodrine for prn use    #Small bowel perforation  #Ventral hernia  Following with GI  Currently planning to avoid surgery     #Anxiety:  CSA signed  Using ativan prn, refilled  Add lexapro     #Shingles:  she has valtrex rx at home  Hasn't needed it much     #Poor venous access  port in place     #HTN  Controlled on losartan, furosemide, and metoprolol  seeing cardiology ([place])     #HLD  Stable, not on meds, monitoring     #COPD/Sarcoidosis  Following with pulm, stable, on inhalers, supplemental O2, nebulizers  testing with pulm in April     #Hypothyroidism  Controlled on replacement     HCM:  s/p COVID vaccine  Mammogram Sept   "

## 2023-10-18 ENCOUNTER — OFFICE VISIT (OUTPATIENT)
Dept: PRIMARY CARE | Facility: CLINIC | Age: 73
End: 2023-10-18
Payer: MEDICARE

## 2023-10-18 VITALS
BODY MASS INDEX: 26.06 KG/M2 | DIASTOLIC BLOOD PRESSURE: 80 MMHG | HEART RATE: 89 BPM | SYSTOLIC BLOOD PRESSURE: 137 MMHG | WEIGHT: 166 LBS | HEIGHT: 67 IN

## 2023-10-18 DIAGNOSIS — D64.9 ANEMIA, UNSPECIFIED TYPE: ICD-10-CM

## 2023-10-18 DIAGNOSIS — E78.1 HYPERTRIGLYCERIDEMIA: ICD-10-CM

## 2023-10-18 DIAGNOSIS — F41.9 ANXIETY: ICD-10-CM

## 2023-10-18 DIAGNOSIS — R20.2 PARESTHESIA: Primary | ICD-10-CM

## 2023-10-18 PROCEDURE — 1036F TOBACCO NON-USER: CPT | Performed by: FAMILY MEDICINE

## 2023-10-18 PROCEDURE — 3079F DIAST BP 80-89 MM HG: CPT | Performed by: FAMILY MEDICINE

## 2023-10-18 PROCEDURE — 1159F MED LIST DOCD IN RCRD: CPT | Performed by: FAMILY MEDICINE

## 2023-10-18 PROCEDURE — 99214 OFFICE O/P EST MOD 30 MIN: CPT | Performed by: FAMILY MEDICINE

## 2023-10-18 PROCEDURE — 1126F AMNT PAIN NOTED NONE PRSNT: CPT | Performed by: FAMILY MEDICINE

## 2023-10-18 PROCEDURE — 3075F SYST BP GE 130 - 139MM HG: CPT | Performed by: FAMILY MEDICINE

## 2023-10-18 RX ORDER — MIDODRINE HYDROCHLORIDE 5 MG/1
5 TABLET ORAL 3 TIMES DAILY
Status: ON HOLD | COMMUNITY
End: 2023-12-06 | Stop reason: ALTCHOICE

## 2023-10-18 RX ORDER — EZETIMIBE 10 MG/1
10 TABLET ORAL
COMMUNITY
Start: 2023-09-29 | End: 2023-10-18 | Stop reason: SDUPTHER

## 2023-10-18 RX ORDER — ESCITALOPRAM OXALATE 10 MG/1
10 TABLET ORAL DAILY
Qty: 30 TABLET | Refills: 5 | Status: SHIPPED | OUTPATIENT
Start: 2023-10-18 | End: 2023-10-23

## 2023-10-18 RX ORDER — EZETIMIBE 10 MG/1
10 TABLET ORAL
Qty: 30 TABLET | Refills: 3 | Status: SHIPPED | OUTPATIENT
Start: 2023-10-18 | End: 2023-12-19 | Stop reason: HOSPADM

## 2023-10-23 DIAGNOSIS — F41.9 ANXIETY: ICD-10-CM

## 2023-10-23 RX ORDER — ESCITALOPRAM OXALATE 10 MG/1
10 TABLET ORAL DAILY
Qty: 90 TABLET | Refills: 2 | Status: ON HOLD | OUTPATIENT
Start: 2023-10-23 | End: 2023-12-07 | Stop reason: ALTCHOICE

## 2023-10-24 ENCOUNTER — LAB (OUTPATIENT)
Dept: HEMATOLOGY/ONCOLOGY | Facility: HOSPITAL | Age: 73
End: 2023-10-24
Payer: MEDICARE

## 2023-10-24 VITALS
RESPIRATION RATE: 18 BRPM | OXYGEN SATURATION: 97 % | HEART RATE: 88 BPM | BODY MASS INDEX: 25.88 KG/M2 | SYSTOLIC BLOOD PRESSURE: 149 MMHG | DIASTOLIC BLOOD PRESSURE: 82 MMHG | TEMPERATURE: 96.8 F | WEIGHT: 165.24 LBS

## 2023-10-24 DIAGNOSIS — I87.8 POOR VENOUS ACCESS: ICD-10-CM

## 2023-10-24 DIAGNOSIS — D64.9 ANEMIA, UNSPECIFIED TYPE: ICD-10-CM

## 2023-10-24 DIAGNOSIS — R20.2 PARESTHESIA: ICD-10-CM

## 2023-10-24 LAB
ALBUMIN SERPL BCP-MCNC: 4.3 G/DL (ref 3.4–5)
ALP SERPL-CCNC: 57 U/L (ref 33–136)
ALT SERPL W P-5'-P-CCNC: 11 U/L (ref 7–45)
ANION GAP SERPL CALC-SCNC: 9 MMOL/L (ref 10–20)
AST SERPL W P-5'-P-CCNC: 15 U/L (ref 9–39)
BASOPHILS # BLD AUTO: 0.07 X10*3/UL (ref 0–0.1)
BASOPHILS NFR BLD AUTO: 0.9 %
BILIRUB SERPL-MCNC: 0.4 MG/DL (ref 0–1.2)
BUN SERPL-MCNC: 16 MG/DL (ref 6–23)
CALCIUM SERPL-MCNC: 9.6 MG/DL (ref 8.6–10.3)
CHLORIDE SERPL-SCNC: 102 MMOL/L (ref 98–107)
CO2 SERPL-SCNC: 30 MMOL/L (ref 21–32)
CREAT SERPL-MCNC: 0.89 MG/DL (ref 0.5–1.05)
EOSINOPHIL # BLD AUTO: 0.39 X10*3/UL (ref 0–0.4)
EOSINOPHIL NFR BLD AUTO: 5.3 %
ERYTHROCYTE [DISTWIDTH] IN BLOOD BY AUTOMATED COUNT: 12.6 % (ref 11.5–14.5)
GFR SERPL CREATININE-BSD FRML MDRD: 68 ML/MIN/1.73M*2
GLUCOSE SERPL-MCNC: 97 MG/DL (ref 74–99)
HCT VFR BLD AUTO: 38.5 % (ref 36–46)
HGB BLD-MCNC: 12.2 G/DL (ref 12–16)
IMM GRANULOCYTES # BLD AUTO: 0.02 X10*3/UL (ref 0–0.5)
IMM GRANULOCYTES NFR BLD AUTO: 0.3 % (ref 0–0.9)
IRON SERPL-MCNC: 56 UG/DL (ref 35–150)
LYMPHOCYTES # BLD AUTO: 1.79 X10*3/UL (ref 0.8–3)
LYMPHOCYTES NFR BLD AUTO: 24.2 %
MAGNESIUM SERPL-MCNC: 2.09 MG/DL (ref 1.6–2.4)
MCH RBC QN AUTO: 29.8 PG (ref 26–34)
MCHC RBC AUTO-ENTMCNC: 31.7 G/DL (ref 32–36)
MCV RBC AUTO: 94 FL (ref 80–100)
MONOCYTES # BLD AUTO: 0.6 X10*3/UL (ref 0.05–0.8)
MONOCYTES NFR BLD AUTO: 8.1 %
NEUTROPHILS # BLD AUTO: 4.52 X10*3/UL (ref 1.6–5.5)
NEUTROPHILS NFR BLD AUTO: 61.2 %
NRBC BLD-RTO: 0 /100 WBCS (ref 0–0)
PLATELET # BLD AUTO: 242 X10*3/UL (ref 150–450)
PMV BLD AUTO: 9.5 FL (ref 7.5–11.5)
POTASSIUM SERPL-SCNC: 4.2 MMOL/L (ref 3.5–5.3)
PROT SERPL-MCNC: 7.6 G/DL (ref 6.4–8.2)
RBC # BLD AUTO: 4.1 X10*6/UL (ref 4–5.2)
SODIUM SERPL-SCNC: 137 MMOL/L (ref 136–145)
WBC # BLD AUTO: 7.4 X10*3/UL (ref 4.4–11.3)

## 2023-10-24 PROCEDURE — 83540 ASSAY OF IRON: CPT | Performed by: FAMILY MEDICINE

## 2023-10-24 PROCEDURE — 36591 DRAW BLOOD OFF VENOUS DEVICE: CPT

## 2023-10-24 PROCEDURE — 80053 COMPREHEN METABOLIC PANEL: CPT | Performed by: FAMILY MEDICINE

## 2023-10-24 PROCEDURE — 2500000004 HC RX 250 GENERAL PHARMACY W/ HCPCS (ALT 636 FOR OP/ED): Performed by: FAMILY MEDICINE

## 2023-10-24 PROCEDURE — 83735 ASSAY OF MAGNESIUM: CPT | Performed by: FAMILY MEDICINE

## 2023-10-24 PROCEDURE — 85025 COMPLETE CBC W/AUTO DIFF WBC: CPT | Performed by: FAMILY MEDICINE

## 2023-10-24 PROCEDURE — 82607 VITAMIN B-12: CPT | Mod: CONLAB | Performed by: FAMILY MEDICINE

## 2023-10-24 RX ORDER — HEPARIN 100 UNIT/ML
500 SYRINGE INTRAVENOUS AS NEEDED
Status: CANCELLED | OUTPATIENT
Start: 2023-10-24

## 2023-10-24 RX ORDER — SODIUM CHLORIDE 9 MG/ML
10 INJECTION, SOLUTION INTRAMUSCULAR; INTRAVENOUS; SUBCUTANEOUS EVERY 8 HOURS SCHEDULED
Qty: 900 ML | Refills: 0 | Status: SHIPPED | OUTPATIENT
Start: 2023-10-24 | End: 2023-11-16 | Stop reason: SDUPTHER

## 2023-10-24 RX ORDER — HEPARIN SODIUM,PORCINE/PF 10 UNIT/ML
50 SYRINGE (ML) INTRAVENOUS AS NEEDED
Status: CANCELLED | OUTPATIENT
Start: 2023-10-24

## 2023-10-24 RX ORDER — HEPARIN 100 UNIT/ML
500 SYRINGE INTRAVENOUS AS NEEDED
Status: DISCONTINUED | OUTPATIENT
Start: 2023-10-24 | End: 2023-10-24 | Stop reason: HOSPADM

## 2023-10-24 RX ADMIN — Medication 500 UNITS: at 13:00

## 2023-10-24 ASSESSMENT — PAIN SCALES - GENERAL: PAINLEVEL: 3

## 2023-10-25 LAB — VIT B12 SERPL-MCNC: 525 PG/ML (ref 211–911)

## 2023-10-30 ENCOUNTER — TELEPHONE (OUTPATIENT)
Dept: HEMATOLOGY/ONCOLOGY | Facility: HOSPITAL | Age: 73
End: 2023-10-30
Payer: MEDICARE

## 2023-10-30 NOTE — TELEPHONE ENCOUNTER
Patient called to confirm when her next [place] [facility] appointment was scheduled for. Confirmed with patient that her next [place] [facility] is scheduled for 11/30/2023. Patient verbalized and agreed.

## 2023-10-31 ENCOUNTER — PATIENT OUTREACH (OUTPATIENT)
Dept: PRIMARY CARE | Facility: CLINIC | Age: 73
End: 2023-10-31
Payer: MEDICARE

## 2023-10-31 NOTE — PROGRESS NOTES
Unable to reach patient for call back after patient's follow up appointment with PCP.   LVM with call back number for patient to call if needed   If no voicemail available call attempts x 2 were made to contact the patient to assist with any questions or concerns patient may have.    Molly Stuart LPN

## 2023-11-01 ENCOUNTER — APPOINTMENT (OUTPATIENT)
Dept: HEMATOLOGY/ONCOLOGY | Facility: HOSPITAL | Age: 73
End: 2023-11-01
Payer: MEDICARE

## 2023-11-06 DIAGNOSIS — E03.9 HYPOTHYROIDISM, UNSPECIFIED TYPE: Primary | ICD-10-CM

## 2023-11-06 RX ORDER — LEVOTHYROXINE SODIUM 50 UG/1
50 TABLET ORAL DAILY
Qty: 90 TABLET | Refills: 3 | Status: SHIPPED | OUTPATIENT
Start: 2023-11-06 | End: 2024-03-28 | Stop reason: HOSPADM

## 2023-11-10 PROBLEM — J96.11 CHRONIC RESPIRATORY FAILURE WITH HYPOXIA, ON HOME O2 THERAPY (MULTI): Status: ACTIVE | Noted: 2023-07-13

## 2023-11-10 PROBLEM — N39.0 UTI (URINARY TRACT INFECTION): Status: RESOLVED | Noted: 2023-05-04 | Resolved: 2023-11-10

## 2023-11-10 PROBLEM — M79.89 LEFT LEG SWELLING: Status: RESOLVED | Noted: 2023-05-04 | Resolved: 2023-11-10

## 2023-11-10 PROBLEM — I48.91 ATRIAL FIBRILLATION (MULTI): Status: RESOLVED | Noted: 2023-07-13 | Resolved: 2023-11-10

## 2023-11-10 PROBLEM — R10.9 ABDOMINAL PAIN: Status: RESOLVED | Noted: 2018-11-16 | Resolved: 2023-11-10

## 2023-11-10 PROBLEM — I47.29 NSVT (NONSUSTAINED VENTRICULAR TACHYCARDIA) (MULTI): Status: ACTIVE | Noted: 2023-10-09

## 2023-11-10 PROBLEM — R79.9 ABNORMAL BLOOD CHEMISTRY: Status: RESOLVED | Noted: 2023-05-04 | Resolved: 2023-11-10

## 2023-11-10 PROBLEM — R07.9 CHEST PAIN: Status: RESOLVED | Noted: 2022-12-01 | Resolved: 2023-11-10

## 2023-11-10 PROBLEM — R30.0 DYSURIA: Status: RESOLVED | Noted: 2023-05-04 | Resolved: 2023-11-10

## 2023-11-10 PROBLEM — M79.606 LEG PAIN: Status: RESOLVED | Noted: 2023-05-04 | Resolved: 2023-11-10

## 2023-11-10 PROBLEM — K43.2 RECURRENT VENTRAL INCISIONAL HERNIA: Status: ACTIVE | Noted: 2023-11-10

## 2023-11-10 PROBLEM — I50.33 ACUTE ON CHRONIC DIASTOLIC CONGESTIVE HEART FAILURE (MULTI): Status: RESOLVED | Noted: 2021-05-05 | Resolved: 2023-11-10

## 2023-11-10 PROBLEM — Z99.81 CHRONIC RESPIRATORY FAILURE WITH HYPOXIA, ON HOME O2 THERAPY (MULTI): Status: ACTIVE | Noted: 2023-07-13

## 2023-11-10 PROBLEM — B37.9 CANDIDIASIS: Status: RESOLVED | Noted: 2023-05-04 | Resolved: 2023-11-10

## 2023-11-10 NOTE — PROGRESS NOTES
Trinity Hernandez is a 73 y.o. female who presents for Follow-up (3 months; UTD for vaccines; current UTI, taking atb for it; woke up in the night the other night and felt like her whole body was tingling, not feeling well or getting much sleep, last night same thing happened and also felt her heart racing, spoke with cardiologist yesterday and he told her infection can cause her heart to race; he wants her to wear another heart monitor; she almost went to ER last night)    UTI: Not feeling well. A couple nights ago she woke up with more intense left sided tinnitus and her whole body was tingling. Very sick feeling. She checked her urine and it was cloudy. She completed a urine test and she started macrobid last night, took another this morning. Had another episode last night but with heart racing. She notified cardiologist and he told her that he feels it is likely heart racing from an infection although they may increase her metoprolol or check a zio patch. She is considering going to the ER for eval.     CAD, s/p stent: Following with Dr. Romero.     Small bowel perforation, abdominal hernias, epiploic appendagitis: She has been recommended laparoscopic surgery but she is hoping to avoid surgery.      Anxiety: lorazepam prn is helpful. She is having a lot more anxiety lately because of her 's health. He has an aneurysm that is over 5cm and he is in renal failure and he recently had a severe GI bleed. She is taking hydroxyzine but that makes her sleepy so she can't take it during the day.      HTN, aortic aneurysm: Reasonable control on metoprolol, losartan, and lasix. Following with cardioloy. Recently had some discomfort and got a zio patch done. She is awaiting results. She is going to get a stress test soon.     HLD: Off meds, trying to watch her diet.      COPD/sarcoidosis: On Brovana, spiriva, and budesonide. Using oxygen and prednisone prn, she has an albuterol inhaler. her breathing is a little  "bit bad today. She has been more short of breath. She is seeing Dr. Ford for pulm. She is going in for testing in April.      Hypothyroidism: On replacement, no concerns.     GERD: Off pantoprazole, she is doing much better since taking the enzymes.     All other systems have been reviewed and are negative for complaint     Objective   /84 (BP Location: Left arm, Patient Position: Sitting, BP Cuff Size: Adult)   Pulse 80   Ht 1.702 m (5' 7\")   Wt 74.8 kg (165 lb)   BMI 25.84 kg/m²     Gen: Mild to moderate distress, weak appearing/fatigued, pale. alert and oriented x3, pleasant   HEENT: moist mucous membranes, b/l external auditory canals are clear of debris, TMs within normal limits, no oropharyngeal lesions, eomi, perrla   Neck: thyroid within normal limits, no lymphadenopathy   CV: RRR, normal S1/S2, no murmur   Resp: Clear to auscultation bilaterally, no wheezes or rhonchi appreciated  Abd: soft, nontender, non-distended, no guarding/rigidity, bowel sounds present  Extr: no edema, no calf tenderness  Derm: Skin is warm and dry, no rashes appreciated  Psych: mood is good, affect is congruent, good hygiene, normal speech and eye contact  Neuro: cranial nerves grossly intact, normal gait    Assessment/Plan     #Weakness  #Fatigue  Likely UTI, on macrobid  Recent labs normal  Recommended ER eval  Recommend troponin and EKG    #CAD  S/p stent   On plavix and ezetimibe  Has followup with cardio  She has midodrine for prn use     #Small bowel perforation  #Ventral hernia  Following with GI  Currently planning to avoid surgery     #Anxiety:  CSA signed  Using ativan prn, refilled  Add lexapro     #Shingles:  she has valtrex rx at home  Hasn't needed it much     #Poor venous access  port in place     #HTN  Controlled on losartan, furosemide, and metoprolol  seeing cardiology ([place])     #HLD  Stable, not on meds, monitoring     #COPD/Sarcoidosis  Following with pulm, stable, on inhalers, supplemental O2, " nebulizers  testing with pulm in April     #Hypothyroidism  Controlled on replacement     HCM:  s/p COVID vaccine  Mammogram Sept

## 2023-11-16 ENCOUNTER — INFUSION (OUTPATIENT)
Dept: HEMATOLOGY/ONCOLOGY | Facility: HOSPITAL | Age: 73
End: 2023-11-16
Payer: MEDICARE

## 2023-11-16 ENCOUNTER — LAB (OUTPATIENT)
Dept: LAB | Facility: LAB | Age: 73
End: 2023-11-16
Payer: MEDICARE

## 2023-11-16 VITALS
HEART RATE: 77 BPM | SYSTOLIC BLOOD PRESSURE: 135 MMHG | DIASTOLIC BLOOD PRESSURE: 67 MMHG | OXYGEN SATURATION: 98 % | RESPIRATION RATE: 20 BRPM | TEMPERATURE: 97.5 F

## 2023-11-16 DIAGNOSIS — Z13.220 SCREENING FOR HYPERLIPIDEMIA: ICD-10-CM

## 2023-11-16 DIAGNOSIS — N39.0 URINARY TRACT INFECTION WITHOUT HEMATURIA, SITE UNSPECIFIED: ICD-10-CM

## 2023-11-16 DIAGNOSIS — R73.09 ELEVATED GLUCOSE: ICD-10-CM

## 2023-11-16 DIAGNOSIS — E03.9 HYPOTHYROIDISM, UNSPECIFIED TYPE: ICD-10-CM

## 2023-11-16 DIAGNOSIS — I87.8 POOR VENOUS ACCESS: ICD-10-CM

## 2023-11-16 LAB
ALBUMIN SERPL BCP-MCNC: 3.9 G/DL (ref 3.4–5)
ALP SERPL-CCNC: 66 U/L (ref 33–136)
ALT SERPL W P-5'-P-CCNC: 10 U/L (ref 7–45)
ANION GAP SERPL CALC-SCNC: 11 MMOL/L (ref 10–20)
APPEARANCE UR: ABNORMAL
AST SERPL W P-5'-P-CCNC: 14 U/L (ref 9–39)
BACTERIA #/AREA URNS AUTO: ABNORMAL /HPF
BASOPHILS # BLD AUTO: 0.06 X10*3/UL (ref 0–0.1)
BASOPHILS NFR BLD AUTO: 0.9 %
BILIRUB SERPL-MCNC: 0.4 MG/DL (ref 0–1.2)
BILIRUB UR STRIP.AUTO-MCNC: NEGATIVE MG/DL
BUN SERPL-MCNC: 16 MG/DL (ref 6–23)
CALCIUM SERPL-MCNC: 9.3 MG/DL (ref 8.6–10.3)
CHLORIDE SERPL-SCNC: 101 MMOL/L (ref 98–107)
CHOLEST SERPL-MCNC: 181 MG/DL (ref 0–199)
CHOLESTEROL/HDL RATIO: 4.9
CO2 SERPL-SCNC: 32 MMOL/L (ref 21–32)
COLOR UR: YELLOW
CREAT SERPL-MCNC: 0.9 MG/DL (ref 0.5–1.05)
EOSINOPHIL # BLD AUTO: 0.4 X10*3/UL (ref 0–0.4)
EOSINOPHIL NFR BLD AUTO: 5.7 %
ERYTHROCYTE [DISTWIDTH] IN BLOOD BY AUTOMATED COUNT: 12.2 % (ref 11.5–14.5)
GFR SERPL CREATININE-BSD FRML MDRD: 67 ML/MIN/1.73M*2
GLUCOSE SERPL-MCNC: 94 MG/DL (ref 74–99)
GLUCOSE UR STRIP.AUTO-MCNC: NEGATIVE MG/DL
HCT VFR BLD AUTO: 38.1 % (ref 36–46)
HDLC SERPL-MCNC: 36.7 MG/DL
HGB BLD-MCNC: 12.2 G/DL (ref 12–16)
IMM GRANULOCYTES # BLD AUTO: 0.01 X10*3/UL (ref 0–0.5)
IMM GRANULOCYTES NFR BLD AUTO: 0.1 % (ref 0–0.9)
KETONES UR STRIP.AUTO-MCNC: NEGATIVE MG/DL
LDLC SERPL CALC-MCNC: 110 MG/DL
LEUKOCYTE ESTERASE UR QL STRIP.AUTO: ABNORMAL
LYMPHOCYTES # BLD AUTO: 1.77 X10*3/UL (ref 0.8–3)
LYMPHOCYTES NFR BLD AUTO: 25.3 %
MCH RBC QN AUTO: 29.8 PG (ref 26–34)
MCHC RBC AUTO-ENTMCNC: 32 G/DL (ref 32–36)
MCV RBC AUTO: 93 FL (ref 80–100)
MONOCYTES # BLD AUTO: 0.61 X10*3/UL (ref 0.05–0.8)
MONOCYTES NFR BLD AUTO: 8.7 %
NEUTROPHILS # BLD AUTO: 4.14 X10*3/UL (ref 1.6–5.5)
NEUTROPHILS NFR BLD AUTO: 59.3 %
NITRITE UR QL STRIP.AUTO: NEGATIVE
NON HDL CHOLESTEROL: 144 MG/DL (ref 0–149)
NRBC BLD-RTO: 0 /100 WBCS (ref 0–0)
PH UR STRIP.AUTO: 8 [PH]
PLATELET # BLD AUTO: 225 X10*3/UL (ref 150–450)
POTASSIUM SERPL-SCNC: 4.1 MMOL/L (ref 3.5–5.3)
PROT SERPL-MCNC: 6.9 G/DL (ref 6.4–8.2)
PROT UR STRIP.AUTO-MCNC: NEGATIVE MG/DL
RBC # BLD AUTO: 4.09 X10*6/UL (ref 4–5.2)
RBC # UR STRIP.AUTO: NEGATIVE /UL
RBC #/AREA URNS AUTO: ABNORMAL /HPF
SODIUM SERPL-SCNC: 140 MMOL/L (ref 136–145)
SP GR UR STRIP.AUTO: 1.01
SQUAMOUS #/AREA URNS AUTO: ABNORMAL /HPF
TRIGL SERPL-MCNC: 174 MG/DL (ref 0–149)
TSH SERPL-ACNC: 1.84 MIU/L (ref 0.44–3.98)
UROBILINOGEN UR STRIP.AUTO-MCNC: <2 MG/DL
VLDL: 35 MG/DL (ref 0–40)
WBC # BLD AUTO: 7 X10*3/UL (ref 4.4–11.3)
WBC #/AREA URNS AUTO: >50 /HPF
WBC CLUMPS #/AREA URNS AUTO: ABNORMAL /HPF

## 2023-11-16 PROCEDURE — 81001 URINALYSIS AUTO W/SCOPE: CPT

## 2023-11-16 PROCEDURE — 80053 COMPREHEN METABOLIC PANEL: CPT

## 2023-11-16 PROCEDURE — 36591 DRAW BLOOD OFF VENOUS DEVICE: CPT

## 2023-11-16 PROCEDURE — 83036 HEMOGLOBIN GLYCOSYLATED A1C: CPT

## 2023-11-16 PROCEDURE — 2500000004 HC RX 250 GENERAL PHARMACY W/ HCPCS (ALT 636 FOR OP/ED): Performed by: FAMILY MEDICINE

## 2023-11-16 PROCEDURE — 87086 URINE CULTURE/COLONY COUNT: CPT

## 2023-11-16 PROCEDURE — 85025 COMPLETE CBC W/AUTO DIFF WBC: CPT

## 2023-11-16 PROCEDURE — 84443 ASSAY THYROID STIM HORMONE: CPT

## 2023-11-16 PROCEDURE — 80061 LIPID PANEL: CPT

## 2023-11-16 PROCEDURE — 87186 SC STD MICRODIL/AGAR DIL: CPT

## 2023-11-16 RX ORDER — HEPARIN SODIUM,PORCINE/PF 10 UNIT/ML
50 SYRINGE (ML) INTRAVENOUS AS NEEDED
Status: CANCELLED | OUTPATIENT
Start: 2023-11-16

## 2023-11-16 RX ORDER — HEPARIN 100 UNIT/ML
500 SYRINGE INTRAVENOUS AS NEEDED
Status: DISCONTINUED | OUTPATIENT
Start: 2023-11-16 | End: 2023-11-16 | Stop reason: HOSPADM

## 2023-11-16 RX ORDER — HEPARIN 100 UNIT/ML
500 SYRINGE INTRAVENOUS AS NEEDED
Status: CANCELLED | OUTPATIENT
Start: 2023-11-16

## 2023-11-16 RX ORDER — SODIUM CHLORIDE 9 MG/ML
10 INJECTION, SOLUTION INTRAMUSCULAR; INTRAVENOUS; SUBCUTANEOUS EVERY 8 HOURS SCHEDULED
Qty: 900 ML | Refills: 0 | Status: ON HOLD | OUTPATIENT
Start: 2023-11-16 | End: 2023-12-06 | Stop reason: ENTERED-IN-ERROR

## 2023-11-16 RX ADMIN — Medication 500 UNITS: at 10:46

## 2023-11-16 ASSESSMENT — PAIN SCALES - GENERAL: PAINLEVEL: 2

## 2023-11-17 ENCOUNTER — OFFICE VISIT (OUTPATIENT)
Dept: PRIMARY CARE | Facility: CLINIC | Age: 73
End: 2023-11-17
Payer: MEDICARE

## 2023-11-17 ENCOUNTER — HOSPITAL ENCOUNTER (EMERGENCY)
Facility: HOSPITAL | Age: 73
Discharge: ANOTHER HOSPITAL | End: 2023-11-17
Attending: FAMILY MEDICINE
Payer: MEDICARE

## 2023-11-17 ENCOUNTER — APPOINTMENT (OUTPATIENT)
Dept: RADIOLOGY | Facility: HOSPITAL | Age: 73
End: 2023-11-17
Payer: MEDICARE

## 2023-11-17 VITALS
WEIGHT: 165 LBS | HEART RATE: 80 BPM | SYSTOLIC BLOOD PRESSURE: 153 MMHG | BODY MASS INDEX: 25.9 KG/M2 | DIASTOLIC BLOOD PRESSURE: 84 MMHG | HEIGHT: 67 IN

## 2023-11-17 VITALS
WEIGHT: 166 LBS | OXYGEN SATURATION: 96 % | RESPIRATION RATE: 22 BRPM | HEART RATE: 68 BPM | SYSTOLIC BLOOD PRESSURE: 130 MMHG | BODY MASS INDEX: 26.06 KG/M2 | HEIGHT: 67 IN | TEMPERATURE: 97.5 F | DIASTOLIC BLOOD PRESSURE: 70 MMHG

## 2023-11-17 DIAGNOSIS — I47.29 NONSUSTAINED VENTRICULAR TACHYCARDIA (MULTI): Primary | ICD-10-CM

## 2023-11-17 DIAGNOSIS — N39.0 RECURRENT UTI: Primary | ICD-10-CM

## 2023-11-17 DIAGNOSIS — N39.0 URINARY TRACT INFECTION WITHOUT HEMATURIA, SITE UNSPECIFIED: ICD-10-CM

## 2023-11-17 LAB
CARDIAC TROPONIN I PNL SERPL HS: 7 NG/L (ref 0–13)
EST. AVERAGE GLUCOSE BLD GHB EST-MCNC: 103 MG/DL
HBA1C MFR BLD: 5.2 %
HOLD SPECIMEN: NORMAL
LACTATE SERPL-SCNC: 0.8 MMOL/L (ref 0.4–2)
TSH SERPL-ACNC: 1.35 MIU/L (ref 0.44–3.98)

## 2023-11-17 PROCEDURE — 1125F AMNT PAIN NOTED PAIN PRSNT: CPT | Performed by: FAMILY MEDICINE

## 2023-11-17 PROCEDURE — 99285 EMERGENCY DEPT VISIT HI MDM: CPT | Mod: 25 | Performed by: FAMILY MEDICINE

## 2023-11-17 PROCEDURE — 1036F TOBACCO NON-USER: CPT | Performed by: FAMILY MEDICINE

## 2023-11-17 PROCEDURE — 1159F MED LIST DOCD IN RCRD: CPT | Performed by: FAMILY MEDICINE

## 2023-11-17 PROCEDURE — 71045 X-RAY EXAM CHEST 1 VIEW: CPT | Performed by: RADIOLOGY

## 2023-11-17 PROCEDURE — 84443 ASSAY THYROID STIM HORMONE: CPT | Performed by: FAMILY MEDICINE

## 2023-11-17 PROCEDURE — 99283 EMERGENCY DEPT VISIT LOW MDM: CPT | Mod: 25

## 2023-11-17 PROCEDURE — 3077F SYST BP >= 140 MM HG: CPT | Performed by: FAMILY MEDICINE

## 2023-11-17 PROCEDURE — 71045 X-RAY EXAM CHEST 1 VIEW: CPT

## 2023-11-17 PROCEDURE — 3079F DIAST BP 80-89 MM HG: CPT | Performed by: FAMILY MEDICINE

## 2023-11-17 PROCEDURE — 94762 N-INVAS EAR/PLS OXIMTRY CONT: CPT

## 2023-11-17 PROCEDURE — 83605 ASSAY OF LACTIC ACID: CPT | Performed by: FAMILY MEDICINE

## 2023-11-17 PROCEDURE — 84484 ASSAY OF TROPONIN QUANT: CPT | Performed by: FAMILY MEDICINE

## 2023-11-17 PROCEDURE — 99214 OFFICE O/P EST MOD 30 MIN: CPT | Performed by: FAMILY MEDICINE

## 2023-11-17 RX ORDER — LANOLIN ALCOHOL/MO/W.PET/CERES
400 CREAM (GRAM) TOPICAL
Status: ON HOLD | COMMUNITY
Start: 2023-11-16 | End: 2024-03-28 | Stop reason: SDUPTHER

## 2023-11-17 RX ORDER — CLOPIDOGREL BISULFATE 75 MG/1
75 TABLET ORAL DAILY
COMMUNITY
Start: 2023-11-07 | End: 2024-03-28 | Stop reason: HOSPADM

## 2023-11-17 ASSESSMENT — PAIN SCALES - GENERAL
PAINLEVEL_OUTOF10: 0 - NO PAIN

## 2023-11-17 ASSESSMENT — PAIN - FUNCTIONAL ASSESSMENT
PAIN_FUNCTIONAL_ASSESSMENT: 0-10
PAIN_FUNCTIONAL_ASSESSMENT: 0-10

## 2023-11-17 NOTE — ED TRIAGE NOTES
Pt ambulatory to ED c/o recurrent episodes of racing heart rate.  Pt states she was seen at her PCP Dr Samaniego's office today and told to go to [facility] where her cardiologist Dr Orellana (sp?) is located.  Pt states they then told her to come here first.  Pt state she had a cardiac stent placed a month ago and is on antibiotics for a UTI.

## 2023-11-17 NOTE — ED PROVIDER NOTES
HPI   Chief Complaint   Patient presents with    Rapid Heart Rate       73-year-old female with history of CAD, CHF, COPD, cardiac stents, hypertension, palpitations, and hyperlipidemia comes to the ED from her primary care doctor's office after recently being diagnosed with a UTI and overall not feeling well.  Patient also reported that she had some bouts of intermittent tachycardia last night and earlier today which she contacted her cardiologist office and was advised to go to the ED for evaluation.  Patient is also advised by her primary care doctor to seek evaluation in the ED after being seen today in the office.  Patient reports that currently her heart rate feels like back to normal and does not have the sensation of palpitations but still feels rundown and has dysuria.  Patient reports he started her medications today for her UTI.  Patient in the ED is alert, cooperative, appears anxious, uncomfortable, but in no distress.  Patient currently denies headache, neck pain, chest pain, back pain, abdominal pain, shortness of breath, fevers, falls, recent travel, sick contacts, nausea/vomiting/diarrhea, hematuria, dizziness, and weakness.      History provided by:  Patient   used: No                        [place] Coma Scale Score: 15                  Patient History   Past Medical History:   Diagnosis Date    Abdominal bloating 05/04/2023    Diverticulitis of small intestine without perforation or abscess without bleeding     Diverticulitis, intestine, small    Essential (primary) hypertension 02/08/2017    HTN (hypertension), benign    Influenza B 05/08/2015    Formatting of this note might be different from the original. Completed Tamiflu Continue droplet precautions.    Nausea 11/16/2018    Personal history of other endocrine, nutritional and metabolic disease 02/08/2017    History of hypothyroidism    Personal history of other venous thrombosis and embolism     History of deep venous  thrombosis     Past Surgical History:   Procedure Laterality Date    ABDOMINAL ADHESION SURGERY  05/16/2017    Laparoscopic Lysis Of Intestinal Adhesions    CERVICAL FUSION  05/16/2017    Cervical Vertebral Fusion    CHOLECYSTECTOMY  05/16/2017    Cholecystectomy    COLECTOMY PARTIAL / TOTAL  05/16/2017    Partial Colectomy - Sigmoid    CORONARY ANGIOPLASTY WITH STENT PLACEMENT      CT ANGIO NECK  05/21/2020    CT NECK ANGIO W AND WO IV CONTRAST 5/21/2020 [facility]    CT HEAD ANGIO W AND WO IV CONTRAST  05/21/2020    CT HEAD ANGIO W AND WO IV CONTRAST 5/21/2020 [facility]    HERNIA REPAIR  05/16/2017    Hernia Repair    HYSTERECTOMY  10/03/2013    Hysterectomy    MR HEAD ANGIO WO IV CONTRAST  05/21/2020    MR HEAD ANGIO WO IV CONTRAST 5/21/2020 [facility]    MR NECK ANGIO WO IV CONTRAST  05/21/2020    MR NECK ANGIO WO IV CONTRAST 5/21/2020 [facility]    OTHER SURGICAL HISTORY  05/16/2017    Thoracoscopy Of Lungs And Pleural Space With Biopsy Of Lung Nodules    OTHER SURGICAL HISTORY  04/15/2019    Esophagogastroduodenoscopy     No family history on file.  Social History     Tobacco Use    Smoking status: Former     Types: Cigarettes     Passive exposure: Never    Smokeless tobacco: Never   Substance Use Topics    Alcohol use: Not on file    Drug use: Not on file       Physical Exam   ED Triage Vitals   Temp Heart Rate Resp BP   11/17/23 1349 11/17/23 1346 11/17/23 1346 11/17/23 1346   36.4 °C (97.5 °F) 68 20 132/79      SpO2 Temp Source Heart Rate Source Patient Position   11/17/23 1346 11/17/23 1349 -- 11/17/23 1346   94 % Skin  Lying      BP Location FiO2 (%)     11/17/23 1346 --     Left arm        Physical Exam  Vitals and nursing note reviewed.   Constitutional:       General: She is not in acute distress.     Appearance: She is well-developed.   HENT:      Head: Normocephalic and atraumatic.   Eyes:      Conjunctiva/sclera: Conjunctivae normal.   Cardiovascular:       Rate and Rhythm: Normal rate and regular rhythm.      Pulses: Normal pulses.      Heart sounds: Normal heart sounds, S1 normal and S2 normal. No murmur heard.  Pulmonary:      Effort: Pulmonary effort is normal. No respiratory distress.      Breath sounds: Normal breath sounds.   Abdominal:      Palpations: Abdomen is soft.      Tenderness: There is no abdominal tenderness.   Musculoskeletal:         General: No swelling.      Cervical back: Neck supple.   Skin:     General: Skin is warm and dry.      Capillary Refill: Capillary refill takes less than 2 seconds.   Neurological:      Mental Status: She is alert.   Psychiatric:         Mood and Affect: Mood normal.         ED Course & MDM   Diagnoses as of 11/17/23 1635   Nonsustained ventricular tachycardia (CMS/HCC)   Urinary tract infection without hematuria, site unspecified     Labs Reviewed   TROPONIN I, HIGH SENSITIVITY - Normal       Result Value    Troponin I, High Sensitivity 7      Narrative:     Less than 99th percentile of normal range cutoff-  Female and children under 18 years old <14 ng/L; Male <21 ng/L: Negative  Repeat testing should be performed if clinically indicated.     Female and children under 18 years old 14-50 ng/L; Male 21-50 ng/L:  Consistent with possible cardiac damage and possible increased clinical   risk. Serial measurements may help to assess extent of myocardial damage.     >50 ng/L: Consistent with cardiac damage, increased clinical risk and  myocardial infarction. Serial measurements may help assess extent of   myocardial damage.      NOTE: Children less than 1 year old may have higher baseline troponin   levels and results should be interpreted in conjunction with the overall   clinical context.     NOTE: Troponin I testing is performed using a different   testing methodology at [facility] than at other   [facility]. Direct result comparisons should only   be made within the same method.   TSH - Normal     Thyroid Stimulating Hormone 1.35      Narrative:     TSH testing is performed using different testing methodology at [facility] than at other [facility]. Direct result comparisons should only be made within the same method.     LACTATE - Normal    Lactate 0.8      Narrative:     Venipuncture immediately after or during the administration of Metamizole may lead to falsely low results. Testing should be performed immediately  prior to Metamizole dosing.     XR chest 1 view   Final Result   Stable changes.        MACRO:   None        Signed by: Michelle Ellis 11/17/2023 3:29 PM   Dictation workstation:   [id number]        1506 -- NSR rate 70.  Normal axis.  Normal intervals.  No ST-T changes or signs of ischemia.  Left bundle branch block.  Normal EKG with no findings of STEMI. Unchanged compared to old EKG      Medical Decision Making  Patient upon arrival to the ED appeared to be anxious and uncomfortable but in no distress stable vital signs.  Discussed with patient presenting complaints and clinical findings.  Reviewed patient her epic chart and counseled patient on palpitations and appropriate approach to management/treatments.  After assessment and evaluation IV fluids started, labs sent, imaging ordered, EKG reviewed, placed on cardiac monitor, and observed.  Patient's recent work-up done yesterday was evaluated and reviewed with the patient.  At this time call was placed to patient's primary care doctor as well as her on-call cardiologist.  Case discussed with both regarding their concerns and they advised at this time to have the patient mated for further observation and assessment for need for temporary ICD placement.  At this time patient's final results in the ED during today stay were reviewed/discussed with her and call was placed to transfer center.  Case discussed with on-call internal medicine at [facility] and after discussion it was agreed patient required mission/transfer to their  facility with consultation to cardiology.  Patient stable and transferred.    Amount and/or Complexity of Data Reviewed  External Data Reviewed: labs, radiology, ECG and notes.  Labs: ordered. Decision-making details documented in ED Course.  Radiology: ordered. Decision-making details documented in ED Course.  ECG/medicine tests: ordered and independent interpretation performed. Decision-making details documented in ED Course.    Risk  Decision regarding hospitalization.        Procedure  Procedures     Grant Jaime MD  11/17/23 2530

## 2023-11-19 LAB
BACTERIA UR CULT: ABNORMAL
BACTERIA UR CULT: ABNORMAL

## 2023-11-21 ENCOUNTER — PATIENT OUTREACH (OUTPATIENT)
Dept: PRIMARY CARE | Facility: CLINIC | Age: 73
End: 2023-11-21
Payer: MEDICARE

## 2023-11-24 ENCOUNTER — PATIENT OUTREACH (OUTPATIENT)
Dept: PRIMARY CARE | Facility: CLINIC | Age: 73
End: 2023-11-24
Payer: MEDICARE

## 2023-11-24 NOTE — PROGRESS NOTES
Discharge Facility: [facility]   Discharge Diagnosis: PALPITATIONS   Admission Date: 11-20-23   Discharge Date: 11-22-23     PCP Appointment Date: 12-1-23   Specialist Appointment Date:   Hospital Encounter and Summary:  not available at this time  See discharge assessment below for further details  Engagement  Call Start Time: 1415 (11/24/2023  2:17 PM)    Medications  Medications reviewed with patient/caregiver?: Yes (11/24/2023  2:17 PM)  Is the patient having any side effects they believe may be caused by any medication additions or changes?: No (11/24/2023  2:17 PM)  Does the patient have all medications ordered at discharge?: Yes (11/24/2023  2:17 PM)  Care Management Interventions: No intervention needed (11/24/2023  2:17 PM)  Is the patient taking all medications as directed (includes completed medication regime)?: No (11/24/2023  2:17 PM)  What is preventing the patient from taking all medications as directed?: Side effects (11/24/2023  2:17 PM)  Care Management Interventions: Provided patient education (11/24/2023  2:17 PM)    Appointments  Does the patient have a primary care provider?: Yes (11/24/2023  2:17 PM)  Care Management Interventions: Verified appointment date/time/provider (11/24/2023  2:17 PM)  Has the patient kept scheduled appointments due by today?: Yes (11/24/2023  2:17 PM)  Care Management Interventions: Advised patient to keep appointment (11/24/2023  2:17 PM)    Self Management  Has home health visited the patient within 72 hours of discharge?: Yes (11/24/2023  2:17 PM)    Patient Teaching  Does the patient have access to their discharge instructions?: Yes (11/24/2023  2:17 PM)  Care Management Interventions: Reviewed instructions with patient (11/24/2023  2:17 PM)  What is the patient's perception of their health status since discharge?: Improving (11/24/2023  2:17 PM)  Is the patient/caregiver able to teach back the hierarchy of who to call/visit for symptoms/problems? PCP, Specialist, Home  Health nurse, Urgent Care, ED, 911: Yes (11/24/2023  2:17 PM)      Molly Stuart LPN

## 2023-11-28 PROBLEM — I50.22 HEART FAILURE WITH MILDLY REDUCED EJECTION FRACTION (MULTI): Status: ACTIVE | Noted: 2023-11-18

## 2023-11-28 PROBLEM — R00.2 PALPITATIONS: Status: ACTIVE | Noted: 2023-11-17

## 2023-11-28 PROBLEM — R42 DIZZY: Status: RESOLVED | Noted: 2023-11-20 | Resolved: 2023-11-28

## 2023-11-28 RX ORDER — CEPHALEXIN 500 MG/1
CAPSULE ORAL
Status: ON HOLD | COMMUNITY
Start: 2023-11-22 | End: 2023-12-07 | Stop reason: ALTCHOICE

## 2023-11-28 NOTE — PROGRESS NOTES
Nurse Note:  - HFU, palpitations  - UTD flu, PNA, cov  - ACP    Trinity Hernandez is a 73 y.o. female who presents for No chief complaint on file.    UTI: Not feeling well. A couple nights ago she woke up with more intense left sided tinnitus and her whole body was tingling. Very sick feeling. She checked her urine and it was cloudy. She completed a urine test and she started macrobid last night, took another this morning. Had another episode last night but with heart racing. She notified cardiologist and he told her that he feels it is likely heart racing from an infection although they may increase her metoprolol or check a zio patch. She is considering going to the ER for eval.      CAD, s/p stent: Following with Dr. Romero.     Small bowel perforation, abdominal hernias, epiploic appendagitis: She has been recommended laparoscopic surgery but she is hoping to avoid surgery.      Anxiety: lorazepam prn is helpful. She is having a lot more anxiety lately because of her 's health. He has an aneurysm that is over 5cm and he is in renal failure and he recently had a severe GI bleed. She is taking hydroxyzine but that makes her sleepy so she can't take it during the day.      HTN, aortic aneurysm: Reasonable control on metoprolol, losartan, and lasix. Following with cardioloy. Recently had some discomfort and got a zio patch done. She is awaiting results. She is going to get a stress test soon.     HLD: Off meds, trying to watch her diet.      COPD/sarcoidosis: On Brovana, spiriva, and budesonide. Using oxygen and prednisone prn, she has an albuterol inhaler. her breathing is a little bit bad today. She has been more short of breath. She is seeing Dr. Ford for pulm. She is going in for testing in April.      Hypothyroidism: On replacement, no concerns.     GERD: Off pantoprazole, she is doing much better since taking the enzymes.     All other systems have been reviewed and are negative for complaint      Objective   There were no vitals taken for this visit.    Gen: No acute distress, alert and oriented x3, pleasant   HEENT: moist mucous membranes, b/l external auditory canals are clear of debris, TMs within normal limits, no oropharyngeal lesions, eomi, perrla   Neck: thyroid within normal limits, no lymphadenopathy   CV: RRR, normal S1/S2, no murmur   Resp: Clear to auscultation bilaterally, no wheezes or rhonchi appreciated  Abd: soft, nontender, non-distended, no guarding/rigidity, bowel sounds present  Extr: no edema, no calf tenderness  Derm: Skin is warm and dry, no rashes appreciated  Psych: mood is good, affect is congruent, good hygiene, normal speech and eye contact  Neuro: cranial nerves grossly intact, normal gait    Assessment/Plan     #Weakness  #Fatigue  Likely UTI, on macrobid  Recent labs normal  Recommended ER eval  Recommend troponin and EKG     #CAD  S/p stent   On plavix and ezetimibe  Has followup with cardio  She has midodrine for prn use     #Small bowel perforation  #Ventral hernia  Following with GI  Currently planning to avoid surgery     #Anxiety:  CSA signed  Using ativan prn, refilled  Add lexapro     #Shingles:  she has valtrex rx at home  Hasn't needed it much     #Poor venous access  port in place     #HTN  Controlled on losartan, furosemide, and metoprolol  seeing cardiology (Nick)     #HLD  Stable, not on meds, monitoring     #COPD/Sarcoidosis  Following with pulm, stable, on inhalers, supplemental O2, nebulizers  testing with pulm in April     #Hypothyroidism  Controlled on replacement     HCM:  s/p COVID vaccine  Mammogram Sept

## 2023-11-29 ENCOUNTER — TELEPHONE (OUTPATIENT)
Dept: PRIMARY CARE | Facility: CLINIC | Age: 73
End: 2023-11-29
Payer: MEDICARE

## 2023-11-29 DIAGNOSIS — J44.9 COPD, SEVERE (MULTI): Primary | ICD-10-CM

## 2023-11-29 DIAGNOSIS — J44.1 COPD EXACERBATION (MULTI): Primary | ICD-10-CM

## 2023-11-29 RX ORDER — AMOXICILLIN 500 MG/1
500 CAPSULE ORAL EVERY 12 HOURS SCHEDULED
Qty: 20 CAPSULE | Refills: 0 | Status: ON HOLD | OUTPATIENT
Start: 2023-11-29 | End: 2023-12-06

## 2023-11-29 NOTE — TELEPHONE ENCOUNTER
Pt states she does not believe she needs a chest xray and can not do a steroid but is okay with just simple amoxicillin if you are ok with you that.

## 2023-11-30 ENCOUNTER — HOSPITAL ENCOUNTER (OUTPATIENT)
Dept: CARDIOLOGY | Facility: HOSPITAL | Age: 73
Discharge: HOME | End: 2023-11-30
Payer: MEDICARE

## 2023-11-30 ENCOUNTER — APPOINTMENT (OUTPATIENT)
Dept: HEMATOLOGY/ONCOLOGY | Facility: HOSPITAL | Age: 73
End: 2023-11-30
Payer: MEDICARE

## 2023-11-30 PROCEDURE — 93005 ELECTROCARDIOGRAM TRACING: CPT | Mod: MUE

## 2023-11-30 PROCEDURE — 93005 ELECTROCARDIOGRAM TRACING: CPT

## 2023-12-01 ENCOUNTER — APPOINTMENT (OUTPATIENT)
Dept: PRIMARY CARE | Facility: CLINIC | Age: 73
End: 2023-12-01
Payer: MEDICARE

## 2023-12-04 ENCOUNTER — APPOINTMENT (OUTPATIENT)
Dept: PRIMARY CARE | Facility: CLINIC | Age: 73
End: 2023-12-04
Payer: MEDICARE

## 2023-12-05 ENCOUNTER — HOSPITAL ENCOUNTER (INPATIENT)
Facility: HOSPITAL | Age: 73
LOS: 3 days | Discharge: HOSPICE | DRG: 190 | End: 2023-12-08
Attending: EMERGENCY MEDICINE | Admitting: STUDENT IN AN ORGANIZED HEALTH CARE EDUCATION/TRAINING PROGRAM
Payer: MEDICARE

## 2023-12-05 ENCOUNTER — PATIENT OUTREACH (OUTPATIENT)
Dept: PRIMARY CARE | Facility: CLINIC | Age: 73
End: 2023-12-05
Payer: MEDICARE

## 2023-12-05 ENCOUNTER — TELEPHONE (OUTPATIENT)
Dept: PRIMARY CARE | Facility: CLINIC | Age: 73
End: 2023-12-05
Payer: MEDICARE

## 2023-12-05 ENCOUNTER — APPOINTMENT (OUTPATIENT)
Dept: CARDIOLOGY | Facility: HOSPITAL | Age: 73
DRG: 190 | End: 2023-12-05
Payer: MEDICARE

## 2023-12-05 ENCOUNTER — APPOINTMENT (OUTPATIENT)
Dept: RADIOLOGY | Facility: HOSPITAL | Age: 73
DRG: 190 | End: 2023-12-05
Payer: MEDICARE

## 2023-12-05 DIAGNOSIS — R09.02 HYPOXIA: ICD-10-CM

## 2023-12-05 DIAGNOSIS — J45.901 ACUTE EXACERBATION OF COPD WITH ASTHMA (MULTI): ICD-10-CM

## 2023-12-05 DIAGNOSIS — J44.0 COPD WITH ACUTE LOWER RESPIRATORY INFECTION (MULTI): Primary | ICD-10-CM

## 2023-12-05 DIAGNOSIS — J96.11 CHRONIC RESPIRATORY FAILURE WITH HYPOXIA, ON HOME O2 THERAPY (MULTI): ICD-10-CM

## 2023-12-05 DIAGNOSIS — Z99.81 CHRONIC RESPIRATORY FAILURE WITH HYPOXIA, ON HOME O2 THERAPY (MULTI): ICD-10-CM

## 2023-12-05 DIAGNOSIS — E86.0 DEHYDRATION: ICD-10-CM

## 2023-12-05 DIAGNOSIS — J18.9 COMMUNITY ACQUIRED PNEUMONIA, UNSPECIFIED LATERALITY: ICD-10-CM

## 2023-12-05 DIAGNOSIS — J44.1 ACUTE EXACERBATION OF COPD WITH ASTHMA (MULTI): ICD-10-CM

## 2023-12-05 LAB
ALBUMIN SERPL BCP-MCNC: 4.3 G/DL (ref 3.4–5)
ALP SERPL-CCNC: 49 U/L (ref 33–136)
ALT SERPL W P-5'-P-CCNC: 26 U/L (ref 7–45)
ANION GAP BLDV CALCULATED.4IONS-SCNC: 7 MMOL/L (ref 10–25)
ANION GAP SERPL CALC-SCNC: 13 MMOL/L (ref 10–20)
AST SERPL W P-5'-P-CCNC: 24 U/L (ref 9–39)
BASE EXCESS BLDV CALC-SCNC: 5.4 MMOL/L (ref -2–3)
BASOPHILS # BLD AUTO: 0.07 X10*3/UL (ref 0–0.1)
BASOPHILS NFR BLD AUTO: 0.5 %
BILIRUB DIRECT SERPL-MCNC: 0 MG/DL (ref 0–0.3)
BILIRUB SERPL-MCNC: 0.4 MG/DL (ref 0–1.2)
BNP SERPL-MCNC: 38 PG/ML (ref 0–99)
BODY TEMPERATURE: ABNORMAL
BUN SERPL-MCNC: 21 MG/DL (ref 6–23)
CA-I BLDV-SCNC: 1.21 MMOL/L (ref 1.1–1.33)
CALCIUM SERPL-MCNC: 10 MG/DL (ref 8.6–10.3)
CARDIAC TROPONIN I PNL SERPL HS: 8 NG/L (ref 0–13)
CHLORIDE BLDV-SCNC: 102 MMOL/L (ref 98–107)
CHLORIDE SERPL-SCNC: 100 MMOL/L (ref 98–107)
CO2 SERPL-SCNC: 29 MMOL/L (ref 21–32)
CREAT SERPL-MCNC: 0.94 MG/DL (ref 0.5–1.05)
EOSINOPHIL # BLD AUTO: 0.37 X10*3/UL (ref 0–0.4)
EOSINOPHIL NFR BLD AUTO: 2.8 %
ERYTHROCYTE [DISTWIDTH] IN BLOOD BY AUTOMATED COUNT: 12.6 % (ref 11.5–14.5)
FLUAV RNA RESP QL NAA+PROBE: NOT DETECTED
FLUBV RNA RESP QL NAA+PROBE: NOT DETECTED
GFR SERPL CREATININE-BSD FRML MDRD: 64 ML/MIN/1.73M*2
GLUCOSE BLDV-MCNC: 147 MG/DL (ref 74–99)
GLUCOSE SERPL-MCNC: 138 MG/DL (ref 74–99)
HCO3 BLDV-SCNC: 30.5 MMOL/L (ref 22–26)
HCT VFR BLD AUTO: 38.3 % (ref 36–46)
HCT VFR BLD EST: 37 % (ref 36–46)
HGB BLD-MCNC: 12 G/DL (ref 12–16)
HGB BLDV-MCNC: 12.2 G/DL (ref 12–16)
IMM GRANULOCYTES # BLD AUTO: 0.16 X10*3/UL (ref 0–0.5)
IMM GRANULOCYTES NFR BLD AUTO: 1.2 % (ref 0–0.9)
INHALED O2 CONCENTRATION: 28 %
INR PPP: 1 (ref 0.9–1.1)
LACTATE BLDV-SCNC: 1.8 MMOL/L (ref 0.4–2)
LACTATE SERPL-SCNC: 1.7 MMOL/L (ref 0.4–2)
LYMPHOCYTES # BLD AUTO: 1.21 X10*3/UL (ref 0.8–3)
LYMPHOCYTES NFR BLD AUTO: 9 %
MCH RBC QN AUTO: 29.5 PG (ref 26–34)
MCHC RBC AUTO-ENTMCNC: 31.3 G/DL (ref 32–36)
MCV RBC AUTO: 94 FL (ref 80–100)
MONOCYTES # BLD AUTO: 0.43 X10*3/UL (ref 0.05–0.8)
MONOCYTES NFR BLD AUTO: 3.2 %
NEUTROPHILS # BLD AUTO: 11.18 X10*3/UL (ref 1.6–5.5)
NEUTROPHILS NFR BLD AUTO: 83.3 %
NRBC BLD-RTO: 0 /100 WBCS (ref 0–0)
OXYHGB MFR BLDV: 71.4 % (ref 45–75)
PCO2 BLDV: 46 MM HG (ref 41–51)
PH BLDV: 7.43 PH (ref 7.33–7.43)
PLATELET # BLD AUTO: 255 X10*3/UL (ref 150–450)
PO2 BLDV: 42 MM HG (ref 35–45)
POTASSIUM BLDV-SCNC: 4.6 MMOL/L (ref 3.5–5.3)
POTASSIUM SERPL-SCNC: 4.1 MMOL/L (ref 3.5–5.3)
PROT SERPL-MCNC: 7.6 G/DL (ref 6.4–8.2)
PROTHROMBIN TIME: 11.3 SECONDS (ref 9.8–12.8)
RBC # BLD AUTO: 4.07 X10*6/UL (ref 4–5.2)
SAO2 % BLDV: 73 % (ref 45–75)
SARS-COV-2 RNA RESP QL NAA+PROBE: NOT DETECTED
SODIUM BLDV-SCNC: 135 MMOL/L (ref 136–145)
SODIUM SERPL-SCNC: 138 MMOL/L (ref 136–145)
WBC # BLD AUTO: 13.4 X10*3/UL (ref 4.4–11.3)

## 2023-12-05 PROCEDURE — 85025 COMPLETE CBC W/AUTO DIFF WBC: CPT | Performed by: EMERGENCY MEDICINE

## 2023-12-05 PROCEDURE — 9420000001 HC RT PATIENT EDUCATION 5 MIN

## 2023-12-05 PROCEDURE — 84443 ASSAY THYROID STIM HORMONE: CPT | Mod: IPSPLIT | Performed by: STUDENT IN AN ORGANIZED HEALTH CARE EDUCATION/TRAINING PROGRAM

## 2023-12-05 PROCEDURE — 94640 AIRWAY INHALATION TREATMENT: CPT | Mod: IPSPLIT

## 2023-12-05 PROCEDURE — 2500000001 HC RX 250 WO HCPCS SELF ADMINISTERED DRUGS (ALT 637 FOR MEDICARE OP): Mod: IPSPLIT | Performed by: STUDENT IN AN ORGANIZED HEALTH CARE EDUCATION/TRAINING PROGRAM

## 2023-12-05 PROCEDURE — 96375 TX/PRO/DX INJ NEW DRUG ADDON: CPT

## 2023-12-05 PROCEDURE — 93005 ELECTROCARDIOGRAM TRACING: CPT

## 2023-12-05 PROCEDURE — 94664 DEMO&/EVAL PT USE INHALER: CPT

## 2023-12-05 PROCEDURE — 82248 BILIRUBIN DIRECT: CPT | Performed by: EMERGENCY MEDICINE

## 2023-12-05 PROCEDURE — 96365 THER/PROPH/DIAG IV INF INIT: CPT

## 2023-12-05 PROCEDURE — 2500000004 HC RX 250 GENERAL PHARMACY W/ HCPCS (ALT 636 FOR OP/ED): Performed by: EMERGENCY MEDICINE

## 2023-12-05 PROCEDURE — 87636 SARSCOV2 & INF A&B AMP PRB: CPT | Performed by: EMERGENCY MEDICINE

## 2023-12-05 PROCEDURE — 96367 TX/PROPH/DG ADDL SEQ IV INF: CPT

## 2023-12-05 PROCEDURE — 9420000001 HC RT PATIENT EDUCATION 5 MIN: Mod: IPSPLIT

## 2023-12-05 PROCEDURE — 2500000004 HC RX 250 GENERAL PHARMACY W/ HCPCS (ALT 636 FOR OP/ED): Mod: IPSPLIT | Performed by: STUDENT IN AN ORGANIZED HEALTH CARE EDUCATION/TRAINING PROGRAM

## 2023-12-05 PROCEDURE — 83605 ASSAY OF LACTIC ACID: CPT | Performed by: EMERGENCY MEDICINE

## 2023-12-05 PROCEDURE — 71045 X-RAY EXAM CHEST 1 VIEW: CPT | Mod: FR,IPSPLIT

## 2023-12-05 PROCEDURE — 94640 AIRWAY INHALATION TREATMENT: CPT

## 2023-12-05 PROCEDURE — 96366 THER/PROPH/DIAG IV INF ADDON: CPT

## 2023-12-05 PROCEDURE — 2500000002 HC RX 250 W HCPCS SELF ADMINISTERED DRUGS (ALT 637 FOR MEDICARE OP, ALT 636 FOR OP/ED): Performed by: EMERGENCY MEDICINE

## 2023-12-05 PROCEDURE — 99285 EMERGENCY DEPT VISIT HI MDM: CPT | Performed by: EMERGENCY MEDICINE

## 2023-12-05 PROCEDURE — 85610 PROTHROMBIN TIME: CPT | Performed by: EMERGENCY MEDICINE

## 2023-12-05 PROCEDURE — 2500000002 HC RX 250 W HCPCS SELF ADMINISTERED DRUGS (ALT 637 FOR MEDICARE OP, ALT 636 FOR OP/ED): Mod: IPSPLIT | Performed by: STUDENT IN AN ORGANIZED HEALTH CARE EDUCATION/TRAINING PROGRAM

## 2023-12-05 PROCEDURE — 71045 X-RAY EXAM CHEST 1 VIEW: CPT | Mod: FOREIGN READ | Performed by: RADIOLOGY

## 2023-12-05 PROCEDURE — 84484 ASSAY OF TROPONIN QUANT: CPT | Performed by: EMERGENCY MEDICINE

## 2023-12-05 PROCEDURE — 94760 N-INVAS EAR/PLS OXIMETRY 1: CPT

## 2023-12-05 PROCEDURE — 80053 COMPREHEN METABOLIC PANEL: CPT | Performed by: EMERGENCY MEDICINE

## 2023-12-05 PROCEDURE — 83880 ASSAY OF NATRIURETIC PEPTIDE: CPT | Performed by: EMERGENCY MEDICINE

## 2023-12-05 PROCEDURE — 82947 ASSAY GLUCOSE BLOOD QUANT: CPT | Mod: 59 | Performed by: EMERGENCY MEDICINE

## 2023-12-05 PROCEDURE — 84439 ASSAY OF FREE THYROXINE: CPT | Mod: IPSPLIT | Performed by: STUDENT IN AN ORGANIZED HEALTH CARE EDUCATION/TRAINING PROGRAM

## 2023-12-05 PROCEDURE — 1200000002 HC GENERAL ROOM WITH TELEMETRY DAILY: Mod: IPSPLIT

## 2023-12-05 RX ORDER — CEFTRIAXONE 2 G/50ML
2 INJECTION, SOLUTION INTRAVENOUS EVERY 24 HOURS
Status: DISCONTINUED | OUTPATIENT
Start: 2023-12-06 | End: 2023-12-06

## 2023-12-05 RX ORDER — ARFORMOTEROL TARTRATE 15 UG/2ML
15 SOLUTION RESPIRATORY (INHALATION)
Status: DISCONTINUED | OUTPATIENT
Start: 2023-12-05 | End: 2023-12-08 | Stop reason: HOSPADM

## 2023-12-05 RX ORDER — IPRATROPIUM BROMIDE AND ALBUTEROL SULFATE 2.5; .5 MG/3ML; MG/3ML
3 SOLUTION RESPIRATORY (INHALATION) EVERY 2 HOUR PRN
Status: DISCONTINUED | OUTPATIENT
Start: 2023-12-05 | End: 2023-12-05

## 2023-12-05 RX ORDER — POLYETHYLENE GLYCOL 3350 17 G/17G
17 POWDER, FOR SOLUTION ORAL DAILY
Status: DISCONTINUED | OUTPATIENT
Start: 2023-12-05 | End: 2023-12-06

## 2023-12-05 RX ORDER — ENOXAPARIN SODIUM 100 MG/ML
40 INJECTION SUBCUTANEOUS EVERY 24 HOURS
Status: DISCONTINUED | OUTPATIENT
Start: 2023-12-06 | End: 2023-12-08 | Stop reason: HOSPADM

## 2023-12-05 RX ORDER — LORAZEPAM 0.5 MG/1
0.5 TABLET ORAL DAILY PRN
Status: DISCONTINUED | OUTPATIENT
Start: 2023-12-05 | End: 2023-12-08 | Stop reason: HOSPADM

## 2023-12-05 RX ORDER — BUDESONIDE 0.5 MG/2ML
0.5 INHALANT ORAL
Status: DISCONTINUED | OUTPATIENT
Start: 2023-12-05 | End: 2023-12-08 | Stop reason: HOSPADM

## 2023-12-05 RX ORDER — AZITHROMYCIN 250 MG/1
500 TABLET, FILM COATED ORAL
Status: DISCONTINUED | OUTPATIENT
Start: 2023-12-06 | End: 2023-12-06

## 2023-12-05 RX ORDER — MAGNESIUM SULFATE HEPTAHYDRATE 40 MG/ML
2 INJECTION, SOLUTION INTRAVENOUS ONCE
Status: COMPLETED | OUTPATIENT
Start: 2023-12-05 | End: 2023-12-05

## 2023-12-05 RX ORDER — IPRATROPIUM BROMIDE AND ALBUTEROL SULFATE 2.5; .5 MG/3ML; MG/3ML
3 SOLUTION RESPIRATORY (INHALATION)
Status: DISCONTINUED | OUTPATIENT
Start: 2023-12-05 | End: 2023-12-05

## 2023-12-05 RX ORDER — LOSARTAN POTASSIUM 50 MG/1
50 TABLET ORAL 2 TIMES DAILY
Status: DISCONTINUED | OUTPATIENT
Start: 2023-12-06 | End: 2023-12-08 | Stop reason: HOSPADM

## 2023-12-05 RX ORDER — IPRATROPIUM BROMIDE AND ALBUTEROL SULFATE 2.5; .5 MG/3ML; MG/3ML
3 SOLUTION RESPIRATORY (INHALATION) 3 TIMES DAILY
Status: DISCONTINUED | OUTPATIENT
Start: 2023-12-05 | End: 2023-12-05

## 2023-12-05 RX ORDER — LEVOTHYROXINE SODIUM 50 UG/1
50 TABLET ORAL
Status: DISCONTINUED | OUTPATIENT
Start: 2023-12-06 | End: 2023-12-08 | Stop reason: HOSPADM

## 2023-12-05 RX ORDER — ARFORMOTEROL TARTRATE 15 UG/2ML
15 SOLUTION RESPIRATORY (INHALATION) ONCE
Status: DISCONTINUED | OUTPATIENT
Start: 2023-12-05 | End: 2023-12-06

## 2023-12-05 RX ORDER — MAGNESIUM SULFATE HEPTAHYDRATE 40 MG/ML
2 INJECTION, SOLUTION INTRAVENOUS ONCE
Status: DISCONTINUED | OUTPATIENT
Start: 2023-12-05 | End: 2023-12-05

## 2023-12-05 RX ORDER — GUAIFENESIN 600 MG/1
600 TABLET, EXTENDED RELEASE ORAL 2 TIMES DAILY PRN
Status: DISCONTINUED | OUTPATIENT
Start: 2023-12-05 | End: 2023-12-08

## 2023-12-05 RX ORDER — LANOLIN ALCOHOL/MO/W.PET/CERES
400 CREAM (GRAM) TOPICAL
Status: DISCONTINUED | OUTPATIENT
Start: 2023-12-05 | End: 2023-12-07

## 2023-12-05 RX ORDER — ONDANSETRON HYDROCHLORIDE 2 MG/ML
4 INJECTION, SOLUTION INTRAVENOUS EVERY 8 HOURS PRN
Status: DISCONTINUED | OUTPATIENT
Start: 2023-12-05 | End: 2023-12-08 | Stop reason: HOSPADM

## 2023-12-05 RX ORDER — ASPIRIN 81 MG/1
81 TABLET ORAL
Status: DISCONTINUED | OUTPATIENT
Start: 2023-12-05 | End: 2023-12-07

## 2023-12-05 RX ORDER — PREDNISONE 20 MG/1
TABLET ORAL
Qty: 18 TABLET | Refills: 0 | Status: ON HOLD | OUTPATIENT
Start: 2023-12-05 | End: 2023-12-07 | Stop reason: ALTCHOICE

## 2023-12-05 RX ORDER — ACETAMINOPHEN 325 MG/1
650 TABLET ORAL EVERY 4 HOURS PRN
Status: DISCONTINUED | OUTPATIENT
Start: 2023-12-05 | End: 2023-12-08 | Stop reason: HOSPADM

## 2023-12-05 RX ORDER — CEFTRIAXONE 2 G/50ML
2 INJECTION, SOLUTION INTRAVENOUS ONCE
Status: COMPLETED | OUTPATIENT
Start: 2023-12-05 | End: 2023-12-05

## 2023-12-05 RX ORDER — AZITHROMYCIN 250 MG/1
500 TABLET, FILM COATED ORAL
Status: DISCONTINUED | OUTPATIENT
Start: 2023-12-05 | End: 2023-12-05

## 2023-12-05 RX ORDER — CLOPIDOGREL BISULFATE 75 MG/1
75 TABLET ORAL
Status: DISCONTINUED | OUTPATIENT
Start: 2023-12-05 | End: 2023-12-07

## 2023-12-05 RX ORDER — AZITHROMYCIN 100 MG/ML
INJECTION, POWDER, LYOPHILIZED, FOR SOLUTION INTRAVENOUS
Status: DISPENSED
Start: 2023-12-05 | End: 2023-12-06

## 2023-12-05 RX ORDER — TALC
6 POWDER (GRAM) TOPICAL NIGHTLY
Status: DISCONTINUED | OUTPATIENT
Start: 2023-12-05 | End: 2023-12-07

## 2023-12-05 RX ADMIN — BUDESONIDE INHALATION 0.5 MG: 0.5 SUSPENSION RESPIRATORY (INHALATION) at 19:00

## 2023-12-05 RX ADMIN — SODIUM CHLORIDE, POTASSIUM CHLORIDE, SODIUM LACTATE AND CALCIUM CHLORIDE 1000 ML: 600; 310; 30; 20 INJECTION, SOLUTION INTRAVENOUS at 15:46

## 2023-12-05 RX ADMIN — CEFTRIAXONE 2 G: 2 INJECTION, SOLUTION INTRAVENOUS at 17:14

## 2023-12-05 RX ADMIN — MAGNESIUM OXIDE TAB 400 MG (241.3 MG ELEMENTAL MG) 400 MG: 400 (241.3 MG) TAB at 21:03

## 2023-12-05 RX ADMIN — IPRATROPIUM BROMIDE AND ALBUTEROL SULFATE 3 ML: 2.5; .5 SOLUTION RESPIRATORY (INHALATION) at 14:13

## 2023-12-05 RX ADMIN — GUAIFENESIN 600 MG: 600 TABLET, EXTENDED RELEASE ORAL at 21:04

## 2023-12-05 RX ADMIN — AZITHROMYCIN MONOHYDRATE 500 MG: 500 INJECTION, POWDER, LYOPHILIZED, FOR SOLUTION INTRAVENOUS at 15:45

## 2023-12-05 RX ADMIN — MAGNESIUM SULFATE HEPTAHYDRATE 2 G: 40 INJECTION, SOLUTION INTRAVENOUS at 14:13

## 2023-12-05 RX ADMIN — METHYLPREDNISOLONE SODIUM SUCCINATE 40 MG: 40 INJECTION, POWDER, FOR SOLUTION INTRAMUSCULAR; INTRAVENOUS at 21:05

## 2023-12-05 RX ADMIN — POLYETHYLENE GLYCOL 3350 17 G: 17 POWDER, FOR SOLUTION ORAL at 21:03

## 2023-12-05 RX ADMIN — LORAZEPAM 0.5 MG: 0.5 TABLET ORAL at 21:08

## 2023-12-05 RX ADMIN — ARFORMOTEROL TARTRATE 15 MCG: 15 SOLUTION RESPIRATORY (INHALATION) at 19:49

## 2023-12-05 SDOH — SOCIAL STABILITY: SOCIAL INSECURITY: DO YOU FEEL UNSAFE GOING BACK TO THE PLACE WHERE YOU ARE LIVING?: NO

## 2023-12-05 SDOH — SOCIAL STABILITY: SOCIAL INSECURITY: DOES ANYONE TRY TO KEEP YOU FROM HAVING/CONTACTING OTHER FRIENDS OR DOING THINGS OUTSIDE YOUR HOME?: NO

## 2023-12-05 SDOH — SOCIAL STABILITY: SOCIAL INSECURITY: HAVE YOU HAD THOUGHTS OF HARMING ANYONE ELSE?: NO

## 2023-12-05 SDOH — SOCIAL STABILITY: SOCIAL INSECURITY: ABUSE: ADULT

## 2023-12-05 SDOH — SOCIAL STABILITY: SOCIAL INSECURITY: ARE YOU OR HAVE YOU BEEN THREATENED OR ABUSED PHYSICALLY, EMOTIONALLY, OR SEXUALLY BY ANYONE?: NO

## 2023-12-05 SDOH — SOCIAL STABILITY: SOCIAL INSECURITY: DO YOU FEEL ANYONE HAS EXPLOITED OR TAKEN ADVANTAGE OF YOU FINANCIALLY OR OF YOUR PERSONAL PROPERTY?: NO

## 2023-12-05 SDOH — SOCIAL STABILITY: SOCIAL INSECURITY: HAS ANYONE EVER THREATENED TO HURT YOUR FAMILY OR YOUR PETS?: NO

## 2023-12-05 SDOH — SOCIAL STABILITY: SOCIAL INSECURITY: ARE THERE ANY APPARENT SIGNS OF INJURIES/BEHAVIORS THAT COULD BE RELATED TO ABUSE/NEGLECT?: NO

## 2023-12-05 ASSESSMENT — ACTIVITIES OF DAILY LIVING (ADL)
TOILETING: NEEDS ASSISTANCE
BATHING: NEEDS ASSISTANCE
ADEQUATE_TO_COMPLETE_ADL: YES
DRESSING YOURSELF: INDEPENDENT
WALKS IN HOME: NEEDS ASSISTANCE
HEARING - LEFT EAR: FUNCTIONAL
LACK_OF_TRANSPORTATION: NO
JUDGMENT_ADEQUATE_SAFELY_COMPLETE_DAILY_ACTIVITIES: YES
FEEDING YOURSELF: INDEPENDENT
PATIENT'S MEMORY ADEQUATE TO SAFELY COMPLETE DAILY ACTIVITIES?: YES
GROOMING: INDEPENDENT
HEARING - RIGHT EAR: FUNCTIONAL

## 2023-12-05 ASSESSMENT — COGNITIVE AND FUNCTIONAL STATUS - GENERAL
MOBILITY SCORE: 20
WALKING IN HOSPITAL ROOM: A LITTLE
STANDING UP FROM CHAIR USING ARMS: A LITTLE
CLIMB 3 TO 5 STEPS WITH RAILING: A LITTLE
PATIENT BASELINE BEDBOUND: NO
MOVING TO AND FROM BED TO CHAIR: A LITTLE
DAILY ACTIVITIY SCORE: 22
HELP NEEDED FOR BATHING: A LITTLE
TOILETING: A LITTLE

## 2023-12-05 ASSESSMENT — PAIN SCALES - GENERAL
PAINLEVEL_OUTOF10: 0 - NO PAIN
PAINLEVEL_OUTOF10: 0 - NO PAIN

## 2023-12-05 ASSESSMENT — PATIENT HEALTH QUESTIONNAIRE - PHQ9
1. LITTLE INTEREST OR PLEASURE IN DOING THINGS: NOT AT ALL
2. FEELING DOWN, DEPRESSED OR HOPELESS: NOT AT ALL
SUM OF ALL RESPONSES TO PHQ9 QUESTIONS 1 & 2: 0

## 2023-12-05 ASSESSMENT — LIFESTYLE VARIABLES
HOW OFTEN DO YOU HAVE A DRINK CONTAINING ALCOHOL: NEVER
AUDIT-C TOTAL SCORE: 0
HOW OFTEN DO YOU HAVE 6 OR MORE DRINKS ON ONE OCCASION: NEVER
SKIP TO QUESTIONS 9-10: 1
AUDIT-C TOTAL SCORE: 0
HOW MANY STANDARD DRINKS CONTAINING ALCOHOL DO YOU HAVE ON A TYPICAL DAY: PATIENT DOES NOT DRINK

## 2023-12-05 ASSESSMENT — COLUMBIA-SUICIDE SEVERITY RATING SCALE - C-SSRS
1. IN THE PAST MONTH, HAVE YOU WISHED YOU WERE DEAD OR WISHED YOU COULD GO TO SLEEP AND NOT WAKE UP?: NO
2. HAVE YOU ACTUALLY HAD ANY THOUGHTS OF KILLING YOURSELF?: NO
6. HAVE YOU EVER DONE ANYTHING, STARTED TO DO ANYTHING, OR PREPARED TO DO ANYTHING TO END YOUR LIFE?: NO

## 2023-12-05 ASSESSMENT — PAIN - FUNCTIONAL ASSESSMENT: PAIN_FUNCTIONAL_ASSESSMENT: 0-10

## 2023-12-05 NOTE — PROGRESS NOTES
Discharge Facility: [facility]   Discharge Diagnosis: Pneumonia   Admission Date: 11-30-23   Discharge Date: 12-4-23     PCP Appointment Date: 12-14-23   Specialist Appointment Date:   Hospital Encounter and Summary: Linked  See discharge assessment below for further details    PtElaine Sounds SOB and is stating she believes she was discharged from the hospital too soon.    Engagement  Call Start Time: 1016 (12/5/2023 10:17 AM)    Medications  Medications reviewed with patient/caregiver?: Yes (12/5/2023 10:17 AM)  Is the patient having any side effects they believe may be caused by any medication additions or changes?: No (12/5/2023 10:17 AM)  Does the patient have all medications ordered at discharge?: Yes (12/5/2023 10:17 AM)  Care Management Interventions: No intervention needed (12/5/2023 10:17 AM)  Is the patient taking all medications as directed (includes completed medication regime)?: Yes (12/5/2023 10:17 AM)  What is preventing the patient from taking all medications as directed?: Side effects (11/24/2023  2:17 PM)  Care Management Interventions: Provided patient education (11/24/2023  2:17 PM)    Appointments  Does the patient have a primary care provider?: Yes (12/5/2023 10:17 AM)  Care Management Interventions: Advised patient to make appointment (12/5/2023 10:17 AM)  Has the patient kept scheduled appointments due by today?: Yes (11/24/2023  2:17 PM)  Care Management Interventions: Advised patient to keep appointment (11/24/2023  2:17 PM)    Self Management  Has home health visited the patient within 72 hours of discharge?: Yes (12/5/2023 10:17 AM)    Patient Teaching  Does the patient have access to their discharge instructions?: Yes (12/5/2023 10:17 AM)  Care Management Interventions: Reviewed instructions with patient (12/5/2023 10:17 AM)  What is the patient's perception of their health status since discharge?: Worsening (12/5/2023 10:17 AM)  Is the patient/caregiver able to teach back the hierarchy of who to  call/visit for symptoms/problems? PCP, Specialist, Home Health nurse, Urgent Care, ED, 911: Yes (12/5/2023 10:17 AM)      Molly Stuart LPN

## 2023-12-05 NOTE — TELEPHONE ENCOUNTER
[facility] home health called asking if you would by comfortable with order more prednisone for pt she was only sent home with 20 mg 2 tablets for 2 days. Sent home yesterday. And aid said she is not sounding good. She is having trouble getting the mucous up. It is very thick she is taking her breathing treatments. Her vital signs are doing well but she is having some drops with the coughing fits and they are concerned.

## 2023-12-05 NOTE — ED PROVIDER NOTES
Department of Emergency Medicine   ED  Provider Note  Admit Date/RoomTime: 12/5/2023 12:54 PM  ED Room: AC02/AC02                  History of Present Illness:   Trinity Hernandez is a 73 y.o. female presenting to the ED for having some fever productive cough shortness of breath progressively getting worse about a week duration.  Patient states she was just recently hospitalized discharged and she has gotten much more short of breath since her discharge.  Patient denies any chest pain or pressure.  Patient denies any nausea vomiting diarrhea.  Patient states she was a former smoker not smoking anymore.      Review of Systems:   Pertinent positives and review of systems as noted above.  Remaining 10 review of systems is negative or noncontributory to today's episode of care.        --------------------------------------------- PAST HISTORY ---------------------------------------------  Past Medical History:  has a past medical history of Abdominal bloating (05/04/2023), Diverticulitis of small intestine without perforation or abscess without bleeding, Dizzy (11/20/2023), Essential (primary) hypertension (02/08/2017), Influenza B (05/08/2015), Nausea (11/16/2018), Personal history of other endocrine, nutritional and metabolic disease (02/08/2017), and Personal history of other venous thrombosis and embolism.    Past Surgical History:  has a past surgical history that includes Hysterectomy (10/03/2013); Hernia repair (05/16/2017); Other surgical history (05/16/2017); Abdominal adhesion surgery (05/16/2017); Colectomy partial / total (05/16/2017); Cholecystectomy (05/16/2017); Cervical fusion (05/16/2017); Other surgical history (04/15/2019); MR angio head wo IV contrast (05/21/2020); MR angio neck wo IV contrast (05/21/2020); CT angio neck (05/21/2020); CT angio head w and wo IV contrast (05/21/2020); and Coronary angioplasty with stent.    Social History:  reports that she has quit smoking. Her smoking use included  cigarettes. She has never been exposed to tobacco smoke. She has never used smokeless tobacco.    Family History: family history is not on file. Unless otherwise noted, family history is non contributory    The patient’s home medications have been reviewed.    Allergies: Hydrocodone-acetaminophen, Montelukast, Morphine, Nitrofurantoin monohyd/m-cryst, Sulfa (sulfonamide antibiotics), Atorvastatin, Dicyclomine, Fluticasone propion-salmeterol, Levofloxacin, Lisinopril, Nitroimidazoles, Omeprazole, Salmeterol, Simvastatin, Sucralfate, and Umeclidinium    -------------------------------------------------- RESULTS -------------------------------------------------  All laboratory and radiology results have been personally reviewed by myself   LABS:  Labs Reviewed   CBC WITH AUTO DIFFERENTIAL - Abnormal       Result Value    WBC 13.4 (*)     nRBC 0.0      RBC 4.07      Hemoglobin 12.0      Hematocrit 38.3      MCV 94      MCH 29.5      MCHC 31.3 (*)     RDW 12.6      Platelets 255      Neutrophils % 83.3      Immature Granulocytes %, Automated 1.2 (*)     Lymphocytes % 9.0      Monocytes % 3.2      Eosinophils % 2.8      Basophils % 0.5      Neutrophils Absolute 11.18 (*)     Immature Granulocytes Absolute, Automated 0.16      Lymphocytes Absolute 1.21      Monocytes Absolute 0.43      Eosinophils Absolute 0.37      Basophils Absolute 0.07     BASIC METABOLIC PANEL - Abnormal    Glucose 138 (*)     Sodium 138      Potassium 4.1      Chloride 100      Bicarbonate 29      Anion Gap 13      Urea Nitrogen 21      Creatinine 0.94      eGFR 64      Calcium 10.0     BLOOD GAS VENOUS FULL PANEL - Abnormal    POCT pH, Venous 7.43      POCT pCO2, Venous 46      POCT pO2, Venous 42      POCT SO2, Venous 73      POCT Oxy Hemoglobin, Venous 71.4      POCT Hematocrit Calculated, Venous 37.0      POCT Sodium, Venous 135 (*)     POCT Potassium, Venous 4.6      POCT Chloride, Venous 102      POCT Ionized Calicum, Venous 1.21      POCT  Glucose, Venous 147 (*)     POCT Lactate, Venous 1.8      POCT Base Excess, Venous 5.4 (*)     POCT HCO3 Calculated, Venous 30.5 (*)     POCT Hemoglobin, Venous 12.2      POCT Anion Gap, Venous 7.0 (*)     Patient Temperature        FiO2 28     HEPATIC FUNCTION PANEL - Normal    Albumin 4.3      Bilirubin, Total 0.4      Bilirubin, Direct 0.0      Alkaline Phosphatase 49      ALT 26      AST 24      Total Protein 7.6     LACTATE - Normal    Lactate 1.7      Narrative:     Venipuncture immediately after or during the administration of Metamizole may lead to falsely low results. Testing should be performed immediately  prior to Metamizole dosing.   PROTIME-INR - Normal    Protime 11.3      INR 1.0     TROPONIN I, HIGH SENSITIVITY - Normal    Troponin I, High Sensitivity 8      Narrative:     Less than 99th percentile of normal range cutoff-  Female and children under 18 years old <14 ng/L; Male <21 ng/L: Negative  Repeat testing should be performed if clinically indicated.     Female and children under 18 years old 14-50 ng/L; Male 21-50 ng/L:  Consistent with possible cardiac damage and possible increased clinical   risk. Serial measurements may help to assess extent of myocardial damage.     >50 ng/L: Consistent with cardiac damage, increased clinical risk and  myocardial infarction. Serial measurements may help assess extent of   myocardial damage.      NOTE: Children less than 1 year old may have higher baseline troponin   levels and results should be interpreted in conjunction with the overall   clinical context.     NOTE: Troponin I testing is performed using a different   testing methodology at [facility] than at other   [facility]. Direct result comparisons should only   be made within the same method.   B-TYPE NATRIURETIC PEPTIDE - Normal    BNP 38      Narrative:        <100 pg/mL - Heart failure unlikely  100-299 pg/mL - Intermediate probability of acute heart                  failure  exacerbation. Correlate with clinical                  context and patient history.    >=300 pg/mL - Heart Failure likely. Correlate with clinical                  context and patient history.    BNP testing is performed using different testing methodology at [facility] than at other [facility]. Direct result comparisons should only be made within the same method.      SARS-COV-2 AND INFLUENZA A/B PCR - Normal    Flu A Result Not Detected      Flu B Result Not Detected      Coronavirus 2019, PCR Not Detected      Narrative:     This assay has received FDA Emergency Use Authorization (EUA) and  is only authorized for the duration of time that circumstances exist to justify the authorization of the emergency use of in vitro diagnostic tests for the detection of SARS-CoV-2 virus and/or diagnosis of COVID-19 infection under section 564(b)(1) of the Act, 21 U.S.C. 360bbb-3(b)(1). Testing for SARS-CoV-2 is only recommended for patients who meet current clinical and/or epidemiological criteria as defined by federal, state, or local public health directives. This assay is an in vitro diagnostic nucleic acid amplification test for the qualitative detection of SARS-CoV-2, Influenza A, and Influenza B from nasopharyngeal specimens and has been validated for use at [facility]. Negative results do not preclude COVID-19 infections or Influenza A/B infections, and should not be used as the sole basis for diagnosis, treatment, or other management decisions. If Influenza A/B and RSV PCR results are negative, testing for Parainfluenza virus, Adenovirus and Metapneumovirus is routinely performed for [facility] pediatric oncology and intensive care inpatients, and is available on other patients by placing an add-on request.          RADIOLOGY:  Interpreted by Radiologist.  XR chest 1 view   Final Result   Chronic blunting right costophrenic angle with hazy right lower lobe   airspace opacity likely  "represents chronic parenchymal changes.  No   definitive regions of acute airspace consolidation.   Dilatation of the ascending thoracic aorta is unchanged.   Signed by Duke Sneed MD          Encounter Date: 12/05/23   ECG 12 lead   Result Value    Ventricular Rate 84    Atrial Rate 84    WI Interval 130    QRS Duration 136    QT Interval 406    QTC Calculation(Bazett) 479    P Axis 55    R Axis 66    T Axis 98    QRS Count 14    Q Onset 214    P Onset 149    P Offset 194    T Offset 417    QTC Fredericia 454    Narrative    Normal sinus rhythm  Left bundle branch block  Abnormal ECG  When compared with ECG of 17-NOV-2023 15:06, (unconfirmed)  No significant change was found     ------------------------- NURSING NOTES AND VITALS REVIEWED ---------------------------   The nursing notes within the ED encounter and vital signs as below have been reviewed.   BP (!) 171/96 (BP Location: Right arm, Patient Position: Sitting)   Pulse 99   Temp 36.8 °C (98.3 °F) (Temporal)   Resp 18   Ht 1.702 m (5' 7\")   Wt 73.5 kg (162 lb)   SpO2 97%   BMI 25.37 kg/m²   Oxygen Saturation Interpretation: Normal      ---------------------------------------------------PHYSICAL EXAM--------------------------------------    Constitutional/General: Alert and oriented x3, well appearing, non toxic in moderate respite distress with a decreased bibasilar breath sounds  Head: Normocephalic and atraumatic  Eyes: PERRL, EOMI, conjunctiva normal, sclera non icteric  Mouth: Oropharynx clear, handling secretions, no trismus, no asymmetry of the posterior oropharynx or uvular edema  Neck: Supple, full ROM, non tender to palpation in the midline, no stridor, no crepitus, no meningeal signs  Respiratory: Diminished bibasilar breath sounds bilaterally, patient does not have any stridor, mild to moderate work of breathing, tachypneic with shallow rapid breathing  Cardiovascular:  Regular rate. Regular rhythm. No murmurs, gallops, or rubs. 2+ distal " pulses  Chest: No chest wall tenderness  GI:  Abdomen Soft, Non tender, Non distended.  +BS. No organomegaly, no palpable masses,  No rebound, guarding, or rigidity.   Musculoskeletal: Moves all extremities x 4. Warm and well perfused, no clubbing, cyanosis, or edema. Capillary refill <3 seconds  Integument: skin warm and dry. No rashes.   Lymphatic: no lymphadenopathy noted  Neurologic: GCS 15, no focal deficits, symmetric strength 5/5 in the upper and lower extremities bilaterally    Procedures  EKG read: Obtained at 1421 read by me shows normal sinus rhythm at a rate of 84  Axis: Within normal limits  Intervals:: Remarkable for a QRS duration of 136 oh and evidence of left bundle branch block, IN and QTc intervals are roughly within normal limits.  ST-T was within normal limits.  No signs of acute ischemia.  No signs of subendocardial infarct.  No arrhythmia    ------------------------------ ED COURSE/MEDICAL DECISION MAKING----------------------    Patient was seen and examined in the ER.  Patient vital signs otherwise within normal limits.  Patient was Very tachypneic as well as with exertion she does desat to 87%.  Patient also becomes more tachycardic when she is ambulating.  Patient was given treatments IV fluids she does clinically appears to be moderately dehydrated.  Lites within normal limits her BMP does not show signs of electrolytic derangements.  Creatinine slightly higher than her baseline however not significantly much different.  Outpatient LFTs within normal limits COVID influenza negative NT-proBNP of 38.  Lactate 1.8, troponin of 8.  PT/INR within normal limits.  CBC shows an elevated white count with a left shift otherwise her H&H is roughly within her normal baseline.  Patient chest x-ray does show some blunting of costophrenic angle right greater than the left and given her clinical presentation with having shortness of breath productive cough as well as hypoxia, is most consistent with  having pneumonia.  Patient was provided with antibiotic including Rocephin as well as azithromycin.  Patient is given pretreatment Solu-Medrol magnesium.  Patient at this point is being admitted for further evaluation management of her mild hypoxia associated with pneumonia white count left shift as well as a given that she does have multiple comorbidities as well as having some oxygen requirement, patient will benefit from inpatient admission.  Diagnoses as of 12/05/23 1641   COPD with acute lower respiratory infection (CMS/[facility])   Acute exacerbation of COPD with asthma (CMS/[facility])   Community acquired pneumonia, unspecified laterality   Dehydration   Hypoxia      Counseling:   The emergency provider has spoken with the patient and discussed today’s results, in addition to providing specific details for the plan of care and counseling regarding the diagnosis and prognosis.  Questions are answered at this time and they are agreeable with the plan.      --------------------------------- IMPRESSION AND DISPOSITION ---------------------------------    Diagnoses as of 12/05/23 1641   COPD with acute lower respiratory infection (CMS/[facility])   Acute exacerbation of COPD with asthma (CMS/[facility])   Community acquired pneumonia, unspecified laterality   Dehydration   Hypoxia        IMPRESSION  1. COPD with acute lower respiratory infection (CMS/[facility])    2. Acute exacerbation of COPD with asthma (CMS/[facility])    3. Community acquired pneumonia, unspecified laterality    4. Dehydration    5. Hypoxia        DISPOSITION  Disposition: Admit to [facility] telemetry  Patient condition is fair      Billing Provider Critical Care Time: NONE       Stephen Daniel DO  12/05/23 1641

## 2023-12-06 PROBLEM — J18.9 HCAP (HEALTHCARE-ASSOCIATED PNEUMONIA): Status: ACTIVE | Noted: 2023-11-30

## 2023-12-06 LAB
T4 FREE SERPL-MCNC: 1.12 NG/DL (ref 0.61–1.12)
TSH SERPL-ACNC: 0.5 MIU/L (ref 0.44–3.98)

## 2023-12-06 PROCEDURE — 94640 AIRWAY INHALATION TREATMENT: CPT | Mod: IPSPLIT

## 2023-12-06 PROCEDURE — 1200000002 HC GENERAL ROOM WITH TELEMETRY DAILY: Mod: IPSPLIT

## 2023-12-06 PROCEDURE — 99222 1ST HOSP IP/OBS MODERATE 55: CPT | Performed by: STUDENT IN AN ORGANIZED HEALTH CARE EDUCATION/TRAINING PROGRAM

## 2023-12-06 PROCEDURE — 2500000001 HC RX 250 WO HCPCS SELF ADMINISTERED DRUGS (ALT 637 FOR MEDICARE OP): Mod: IPSPLIT | Performed by: STUDENT IN AN ORGANIZED HEALTH CARE EDUCATION/TRAINING PROGRAM

## 2023-12-06 PROCEDURE — 2500000004 HC RX 250 GENERAL PHARMACY W/ HCPCS (ALT 636 FOR OP/ED): Mod: IPSPLIT | Performed by: STUDENT IN AN ORGANIZED HEALTH CARE EDUCATION/TRAINING PROGRAM

## 2023-12-06 PROCEDURE — 9420000001 HC RT PATIENT EDUCATION 5 MIN: Mod: IPSPLIT

## 2023-12-06 PROCEDURE — 2500000005 HC RX 250 GENERAL PHARMACY W/O HCPCS: Mod: IPSPLIT | Performed by: STUDENT IN AN ORGANIZED HEALTH CARE EDUCATION/TRAINING PROGRAM

## 2023-12-06 PROCEDURE — 2500000002 HC RX 250 W HCPCS SELF ADMINISTERED DRUGS (ALT 637 FOR MEDICARE OP, ALT 636 FOR OP/ED): Mod: IPSPLIT | Performed by: STUDENT IN AN ORGANIZED HEALTH CARE EDUCATION/TRAINING PROGRAM

## 2023-12-06 PROCEDURE — 94664 DEMO&/EVAL PT USE INHALER: CPT | Mod: IPSPLIT

## 2023-12-06 PROCEDURE — 94760 N-INVAS EAR/PLS OXIMETRY 1: CPT | Mod: IPSPLIT

## 2023-12-06 PROCEDURE — 96372 THER/PROPH/DIAG INJ SC/IM: CPT | Mod: IPSPLIT | Performed by: STUDENT IN AN ORGANIZED HEALTH CARE EDUCATION/TRAINING PROGRAM

## 2023-12-06 RX ORDER — ALBUTEROL SULFATE 90 UG/1
2 AEROSOL, METERED RESPIRATORY (INHALATION) EVERY 6 HOURS PRN
Status: DISCONTINUED | OUTPATIENT
Start: 2023-12-06 | End: 2023-12-07

## 2023-12-06 RX ORDER — IPRATROPIUM BROMIDE AND ALBUTEROL SULFATE 2.5; .5 MG/3ML; MG/3ML
3 SOLUTION RESPIRATORY (INHALATION) EVERY 2 HOUR PRN
Status: DISCONTINUED | OUTPATIENT
Start: 2023-12-06 | End: 2023-12-08 | Stop reason: HOSPADM

## 2023-12-06 RX ORDER — AMOXICILLIN AND CLAVULANATE POTASSIUM 875; 125 MG/1; MG/1
875 TABLET, FILM COATED ORAL EVERY 12 HOURS SCHEDULED
Status: DISCONTINUED | OUTPATIENT
Start: 2023-12-06 | End: 2023-12-07

## 2023-12-06 RX ORDER — PREDNISONE 20 MG/1
40 TABLET ORAL DAILY
Status: DISCONTINUED | OUTPATIENT
Start: 2023-12-07 | End: 2023-12-08 | Stop reason: HOSPADM

## 2023-12-06 RX ORDER — FUROSEMIDE 20 MG/1
20 TABLET ORAL
Qty: 15 TABLET | Refills: 0 | Status: CANCELLED | OUTPATIENT
Start: 2023-12-06 | End: 2024-01-05

## 2023-12-06 RX ORDER — POLYETHYLENE GLYCOL 3350 17 G/17G
17 POWDER, FOR SOLUTION ORAL DAILY
Status: DISCONTINUED | OUTPATIENT
Start: 2023-12-06 | End: 2023-12-08 | Stop reason: HOSPADM

## 2023-12-06 RX ADMIN — LOSARTAN POTASSIUM 50 MG: 50 TABLET, FILM COATED ORAL at 20:41

## 2023-12-06 RX ADMIN — METHYLPREDNISOLONE SODIUM SUCCINATE 40 MG: 40 INJECTION, POWDER, FOR SOLUTION INTRAMUSCULAR; INTRAVENOUS at 08:34

## 2023-12-06 RX ADMIN — BUDESONIDE INHALATION 0.5 MG: 0.5 SUSPENSION RESPIRATORY (INHALATION) at 07:00

## 2023-12-06 RX ADMIN — AMOXICILLIN AND CLAVULANATE POTASSIUM 875 MG: 875; 125 TABLET, FILM COATED ORAL at 20:41

## 2023-12-06 RX ADMIN — AZITHROMYCIN DIHYDRATE 500 MG: 250 TABLET ORAL at 08:34

## 2023-12-06 RX ADMIN — TIOTROPIUM BROMIDE INHALATION SPRAY 2 PUFF: 3.12 SPRAY, METERED RESPIRATORY (INHALATION) at 09:49

## 2023-12-06 RX ADMIN — ENOXAPARIN SODIUM 40 MG: 100 INJECTION SUBCUTANEOUS at 20:41

## 2023-12-06 RX ADMIN — POLYETHYLENE GLYCOL 3350 17 G: 17 POWDER, FOR SOLUTION ORAL at 21:00

## 2023-12-06 RX ADMIN — ARFORMOTEROL TARTRATE 15 MCG: 15 SOLUTION RESPIRATORY (INHALATION) at 19:00

## 2023-12-06 RX ADMIN — METOPROLOL SUCCINATE 75 MG: 50 TABLET, EXTENDED RELEASE ORAL at 16:42

## 2023-12-06 RX ADMIN — LEVOTHYROXINE SODIUM 50 MCG: 50 TABLET ORAL at 06:22

## 2023-12-06 RX ADMIN — BUDESONIDE INHALATION 0.5 MG: 0.5 SUSPENSION RESPIRATORY (INHALATION) at 16:02

## 2023-12-06 RX ADMIN — Medication 3 L/MIN: at 10:15

## 2023-12-06 RX ADMIN — ARFORMOTEROL TARTRATE 15 MCG: 15 SOLUTION RESPIRATORY (INHALATION) at 07:00

## 2023-12-06 RX ADMIN — LORAZEPAM 0.5 MG: 0.5 TABLET ORAL at 20:41

## 2023-12-06 RX ADMIN — LOSARTAN POTASSIUM 50 MG: 50 TABLET, FILM COATED ORAL at 08:35

## 2023-12-06 RX ADMIN — IPRATROPIUM BROMIDE AND ALBUTEROL SULFATE 3 ML: .5; 3 SOLUTION RESPIRATORY (INHALATION) at 16:24

## 2023-12-06 ASSESSMENT — COGNITIVE AND FUNCTIONAL STATUS - GENERAL
STANDING UP FROM CHAIR USING ARMS: A LITTLE
CLIMB 3 TO 5 STEPS WITH RAILING: A LITTLE
MOVING TO AND FROM BED TO CHAIR: A LITTLE
DAILY ACTIVITIY SCORE: 22
TOILETING: A LITTLE
WALKING IN HOSPITAL ROOM: A LITTLE
CLIMB 3 TO 5 STEPS WITH RAILING: A LITTLE
CLIMB 3 TO 5 STEPS WITH RAILING: A LITTLE
DAILY ACTIVITIY SCORE: 22
TOILETING: A LITTLE
MOVING TO AND FROM BED TO CHAIR: A LITTLE
HELP NEEDED FOR BATHING: A LITTLE
STANDING UP FROM CHAIR USING ARMS: A LITTLE
MOBILITY SCORE: 20
MOVING TO AND FROM BED TO CHAIR: A LITTLE
MOBILITY SCORE: 20
WALKING IN HOSPITAL ROOM: A LITTLE
TOILETING: A LITTLE
STANDING UP FROM CHAIR USING ARMS: A LITTLE
DAILY ACTIVITIY SCORE: 22
HELP NEEDED FOR BATHING: A LITTLE
HELP NEEDED FOR BATHING: A LITTLE
MOBILITY SCORE: 20
WALKING IN HOSPITAL ROOM: A LITTLE

## 2023-12-06 ASSESSMENT — PAIN SCALES - GENERAL
PAINLEVEL_OUTOF10: 0 - NO PAIN
PAINLEVEL_OUTOF10: 0 - NO PAIN
PAINLEVEL_OUTOF10: 3

## 2023-12-06 ASSESSMENT — ACTIVITIES OF DAILY LIVING (ADL): LACK_OF_TRANSPORTATION: NO

## 2023-12-06 ASSESSMENT — PAIN - FUNCTIONAL ASSESSMENT: PAIN_FUNCTIONAL_ASSESSMENT: 0-10

## 2023-12-06 NOTE — H&P
History of Present Illness  Trinity Hernandez is a 73 y.o. female  with PMHx significant for CAD, CHF, COPD, cardiac stents, HTN, palpitations, a-fib, AAA, asthma, hypothyroidism, migraines, hiatal hernia, diverticulosis,  pulmonary sarcoidosis, chronic respiratory failure, HLD who presented to [facility] due to increased shortness of breath. She was recently discharged from [facility] where she was treated with antibiotics for PNA with amoxicillin. She expressed that she did not feel ready for discharge and became increasingly short of breath during the brief period of time that she was at home and so came in the ED for further treatment. Denies abdominal pain, nausea, vomiting. Denies ill contacts, recent sickness, changes in medication, recent travel,  diet changes. Denies any other associated symptoms including CP, palpitations, fever, chills,  changes in vision/hearing, runny nose, sore throat, dysuria, hematuria, frequency/urge, changes in BM including blood per rectum, numbness/tingling/weakness in extremities. Trinity was seen in the ED and it was noted that she desaturated upper 80s with exertion with tachycardia as well. Her CXR showed blunting of the right costophrenic angle along with an elevated WBC with left shift that is consistent with PNA. She is given azithromycin and rocephin in the ED plus a steroid and transferred to the medical floor for continued observation and treatment.     12 Point ROS negative unless noted in above HPI     Past Medical History  Past Medical History:   Diagnosis Date    Abdominal bloating 05/04/2023    Diverticulitis of small intestine without perforation or abscess without bleeding     Diverticulitis, intestine, small    Dizzy 11/20/2023    Essential (primary) hypertension 02/08/2017    HTN (hypertension), benign    Influenza B 05/08/2015    Formatting of this note might be different from the original. Completed Tamiflu Continue droplet precautions.    Nausea 11/16/2018     Personal history of other endocrine, nutritional and metabolic disease 02/08/2017    History of hypothyroidism    Personal history of other venous thrombosis and embolism     History of deep venous thrombosis       Surgical History  Past Surgical History:   Procedure Laterality Date    ABDOMINAL ADHESION SURGERY  05/16/2017    Laparoscopic Lysis Of Intestinal Adhesions    CERVICAL FUSION  05/16/2017    Cervical Vertebral Fusion    CHOLECYSTECTOMY  05/16/2017    Cholecystectomy    COLECTOMY PARTIAL / TOTAL  05/16/2017    Partial Colectomy - Sigmoid    CORONARY ANGIOPLASTY WITH STENT PLACEMENT      CT ANGIO NECK  05/21/2020    CT NECK ANGIO W AND WO IV CONTRAST 5/21/2020 [facility]    CT HEAD ANGIO W AND WO IV CONTRAST  05/21/2020    CT HEAD ANGIO W AND WO IV CONTRAST 5/21/2020 [facility]    HERNIA REPAIR  05/16/2017    Hernia Repair    HYSTERECTOMY  10/03/2013    Hysterectomy    MR HEAD ANGIO WO IV CONTRAST  05/21/2020    MR HEAD ANGIO WO IV CONTRAST 5/21/2020 [facility]    MR NECK ANGIO WO IV CONTRAST  05/21/2020    MR NECK ANGIO WO IV CONTRAST 5/21/2020 [facility]    OTHER SURGICAL HISTORY  05/16/2017    Thoracoscopy Of Lungs And Pleural Space With Biopsy Of Lung Nodules    OTHER SURGICAL HISTORY  04/15/2019    Esophagogastroduodenoscopy        Social History  She reports that she has quit smoking. Her smoking use included cigarettes. She has never been exposed to tobacco smoke. She has never used smokeless tobacco. No history on file for alcohol use and drug use.    Allergies  Hydrocodone-acetaminophen, Montelukast, Morphine, Nitrofurantoin monohyd/m-cryst, Sulfa (sulfonamide antibiotics), Atorvastatin, Dicyclomine, Fluticasone propion-salmeterol, Levofloxacin, Lisinopril, Nitroimidazoles, Omeprazole, Salmeterol, Simvastatin, Sucralfate, and Umeclidinium     Physical Exam  Vitals reviewed.   Constitutional:       Appearance: Normal appearance. She is obese.   HENT:       "Head: Normocephalic.      Nose: Nose normal.      Mouth/Throat:      Mouth: Mucous membranes are moist.   Eyes:      Extraocular Movements: Extraocular movements intact.      Pupils: Pupils are equal, round, and reactive to light.   Cardiovascular:      Rate and Rhythm: Normal rate and regular rhythm.   Pulmonary:      Effort: Respiratory distress present.      Breath sounds: Wheezing and rhonchi present.   Abdominal:      General: Bowel sounds are normal. There is no distension.      Palpations: Abdomen is soft.      Tenderness: There is no abdominal tenderness.   Musculoskeletal:         General: Normal range of motion.      Cervical back: Normal range of motion.   Skin:     General: Skin is warm and dry.      Capillary Refill: Capillary refill takes less than 2 seconds.   Neurological:      General: No focal deficit present.      Mental Status: She is alert and oriented to person, place, and time.   Psychiatric:         Mood and Affect: Mood normal.         Behavior: Behavior normal.          Last Recorded Vitals  Blood pressure 119/65, pulse 78, temperature 36.2 °C (97.1 °F), temperature source Temporal, resp. rate 18, height 1.702 m (5' 7\"), weight 74.3 kg (163 lb 12.8 oz), SpO2 94 %.    Relevant Results  Scheduled medications  [START ON 12/7/2023] amoxicillin-pot clavulanate, 875 mg, oral, q12h NIKO  arformoterol, 15 mcg, nebulization, BID  arformoterol, 15 mcg, nebulization, Once  aspirin, 81 mg, oral, Daily  budesonide, 0.5 mg, nebulization, BID  clopidogrel, 75 mg, oral, Daily  enoxaparin, 40 mg, subcutaneous, q24h  levothyroxine, 50 mcg, oral, Daily  losartan, 50 mg, oral, BID  magnesium oxide, 400 mg, oral, Daily  melatonin, 6 mg, oral, Nightly  oxygen, , inhalation, Continuous - 02/gases  polyethylene glycol, 17 g, oral, Daily  [START ON 12/7/2023] predniSONE, 40 mg, oral, Daily  tiotropium, 2 Inhalation, inhalation, Daily      Continuous medications     PRN medications  PRN medications: acetaminophen, " guaiFENesin, LORazepam, ondansetron     Results for orders placed or performed during the hospital encounter of 12/05/23 (from the past 24 hour(s))   ECG 12 lead   Result Value Ref Range    Ventricular Rate 84 BPM    Atrial Rate 84 BPM    MS Interval 130 ms    QRS Duration 136 ms    QT Interval 406 ms    QTC Calculation(Bazett) 479 ms    P Axis 55 degrees    R Axis 66 degrees    T Axis 98 degrees    QRS Count 14 beats    Q Onset 214 ms    P Onset 149 ms    P Offset 194 ms    T Offset 417 ms    QTC Fredericia 454 ms        Imaging  XR chest 1 view    Result Date: 12/5/2023  STUDY: Chest Radiograph;  12/5/23 at 1:45 PM INDICATION: Shortness of breath. COMPARISON: Chest XR 11/17/23.  CT chest 10/5/2023. ACCESSION NUMBER(S): [id number] ORDERING CLINICIAN: FANY HARDWICK TECHNIQUE:  Frontal chest was obtained at 1345 hours. FINDINGS: CARDIOMEDIASTINAL SILHOUETTE: Cardiac size is normal.  Prominence of the ascending thoracic aorta. Right chest port with catheter tip in the SVC.  LUNGS: Left lung is clear.  Chronic blunting of the right costophrenic angle with hazy region of airspace opacity right lung base.  ABDOMEN: No remarkable upper abdominal findings.  BONES: Degenerative changes mid and lower thoracic spine.    Chronic blunting right costophrenic angle with hazy right lower lobe airspace opacity likely represents chronic parenchymal changes.  No definitive regions of acute airspace consolidation. Dilatation of the ascending thoracic aorta is unchanged. Signed by Duke Sneed MD    ECG 12 lead    Result Date: 12/5/2023  Normal sinus rhythm Left bundle branch block Abnormal ECG When compared with ECG of 17-NOV-2023 15:06, (unconfirmed) No significant change was found       Assessment/Plan    #RLL PNA  #COPD exacerbation   ~pulmonary sarcoidosis   ~chronic respiratory failure with hypoxia   ~asthma  ~treated and released at another facility~increased SOB post discharge  ~CXR with RLL blunting of the costophrenic  angle   ~WBC 13.4 with left shift  ~Rocephin and azithromax initiated. Steroid initiated.   ~transition to augmentin and oral steroid therapy.     #Non-hospital problems  ~CAD  ~CHF  ~cardiac stents  ~HTN  ~palpitations  ~a-fib  ~AAA  ~hypothyroidism  ~migraines  ~hiatel hernia  ~diverticulosis s/p colon resection  ~HLD    F: repleinsh PRN  E: replenish PRN  N: as tolerated  ABX: Augmentin  DVT: lovenox  GI: NA    Disposition: Admitted for acute on chronic respiratory failure 2/2 PNA and exac COPD EST LOS < 2 midnights.    CHANA Staples  Internal Medicine    Patient was seen in collaboration with the ALDAIR, Stephanie ZAYAS.  I was present for the pertinent components of the history, physical, and medical decision making and independently examined the patient.  We reviewed the medication reconciliation list and ancillary studies, including lab, imaging, and microbiology, as well as discussed the differential diagnoses. I agree with her plan as documented in the electronic medical record.    Assessment & Plan  Acute COPD Exacerbation  Acute on Chronic Respiratory Failure 2/2 Sarcoidosis  - CXR suggestive of chronic parenchymal changes. No acute airspace disease  - Currently on 3L o2, baseline is 2L o2 only at night -> continue to wean  - Transition to Prednisone, stop antibiotics for treatment of pneumonia. No apparent pneumonia on imaging  - Start azithromycin 250 mg 3x a week for her COPD  - Continue home aerosol treatments    Luis Neri, DO  Internal Medicine

## 2023-12-06 NOTE — PROGRESS NOTES
12/06/23 1427   Discharge Planning   Living Arrangements Spouse/significant other   Support Systems Spouse/significant other   Assistance Needed uses 2L oxygen at home   Type of Residence Private residence   Do you have animals or pets at home? No   Patient expects to be discharged to: home   Does the patient need discharge transport arranged? No   Financial Resource Strain   How hard is it for you to pay for the very basics like food, housing, medical care, and heating? Not hard   Housing Stability   In the last 12 months, was there a time when you were not able to pay the mortgage or rent on time? N   In the last 12 months, how many places have you lived? 1   In the last 12 months, was there a time when you did not have a steady place to sleep or slept in a shelter (including now)? N   Transportation Needs   In the past 12 months, has lack of transportation kept you from medical appointments or from getting medications? no   In the past 12 months, has lack of transportation kept you from meetings, work, or from getting things needed for daily living? No     Pt states she is able to manage her COPD at home with oxygen and breathing treatments until now. She is currently requiring more oxygen than baseline and is expected to need 1-2 days of treatment to stabilize for discharge. AMAC isw 22 today. Sw will follow for transitional support. SHAUN Alex

## 2023-12-06 NOTE — CONSULTS
"Nutrition Initial Assessment:  Reason for Assessment: Admission nursing screening (MST=2 on admission; case finding)    HPI: Pt came to ED for SOB after recent discharge from [facility] where she was treated for PNA. Pt admitted for treatment of COPD, pulmonary scarcoidosis, CRF with hypoxia.    PMHx significant for CAD, CHF, COPD, cardiac stents, HTN, palpitations, a-fib, AAA, asthma, hypothyroidism, migraines, hiatal hernia, diverticulosis,  pulmonary sarcoidosis, chronic respiratory failure, HLD     Nutrition History:  Food and Nutrient History: Pt visited in room, daughter at bedside. Pt states she ate well for lunch: beef stew, carrots, greek yogurt, salad, oatmeal cookies. Pt states she lives at home with her , but she typically cooks the meals. Pt states she has had frequent visits to the hospital. In October she had 1 cardiac stent placed, followed by hospitalization for UTI and pneumonia. Pt states her appetite has remained good throughout her illnesses. Per pt she only remembers 2 times in her life she suffered from poor appetite, none of those times recent.    Food Allergies/Intolerances:  None  Energy intake: Energy Intake: Good > 75 %  GI Symptoms: None     Oral Problems: None    Anthropometrics:  Height: 170.2 cm (5' 7.01\")  Weight: 74.3 kg (163 lb 12.8 oz)  BMI (Calculated): 25.65             Objective/Subjective Weight History:   Wt Readings from Last 10 Encounters:   12/06/23 74.3 kg (163 lb 12.8 oz)   11/17/23 75.3 kg (166 lb)   11/17/23 74.8 kg (165 lb)   10/24/23 75 kg (165 lb 3.8 oz)   10/18/23 75.3 kg (166 lb)   10/05/23 77.5 kg (170 lb 13.7 oz)   08/16/23 77.6 kg (171 lb)   03/13/23 78.8 kg (173 lb 11.6 oz)   02/15/23 78.5 kg (173 lb)   01/04/23 79 kg (174 lb 2.6 oz)       Weight Change %:  Weight History / % Weight Change: 4.5kg / 5.9% wt loss x 8 months  Significant Weight Loss: No        Nutrition Focused Physical Exam Findings:  Pt eating well without hx of significant wt loss; NFPE per " visual assessment only    Subcutaneous Fat Loss:   Orbital Fat Pads: Well nourshed (slightly bulging fat pads)   Buccal Fat Pads: Well nourished (full, rounded cheeks)       Muscle Wasting:  Temporalis: Well nourished (well-defined muscle)  Pectoralis (Clavicular Region): Well nourished (clavicle not visible)     Physical Findings:  Hair: Negative  Eyes: Negative      Objective Data:  Nutrition Significant Labs:  12/5/23:  Glucose 138^    Nutrition Specific Mediations:  Plavix  Synthroid  Losartan  Mag-ox  Methylprednisolone  Metoprolol    I/O:     Intake/Output Summary (Last 24 hours) at 12/6/2023 1526  Last data filed at 12/6/2023 0826  Gross per 24 hour   Intake 1020 ml   Output --   Net 1020 ml       Dietary Orders (From admission, onward)       Start     Ordered    12/05/23 1847  Adult diet Cardiac; 70 gm fat; 2 - 3 grams Sodium  Diet effective now        Question Answer Comment   Diet type Cardiac    Fat restriction: 70 gm fat    Sodium restriction: 2 - 3 grams Sodium        12/05/23 1848                     Estimated Needs:   Total Energy Estimated Needs (kCal): 1860 kCal   Method for Estimating Needs: 25 Kcal/kg    Total Protein Estimated Needs (g): 74 g   Method for Estimating Needs: 1.0 gm/kg    Total Fluid Estimated Needs (mL): 1860 mL   Method for Estimating Needs: 1 mL/Kcal       Nutrition Diagnosis:  Malnutrition Diagnosis  Patient has Malnutrition Diagnosis: No    Nutrition Diagnosis  Patient has Nutrition Diagnosis: No    Nutrition Interventions and Recommendations:        Nutrition Prescription:  Individualized Nutrition Prescription Provided for : diet, fluids        Nutrition Prescription Goals:   Food and/or Nutrient Delivery Interventions  Interventions: Meals and snacks  Meals and Snacks: Fat-modified diet, Mineral-modified diet  Goal: Continue cardiac diet as ordered         Nutrition Education:   Nutrition Counseling  Counseling Theoretical Approach: Other (Comment) (Pt states she is aware of  importance of sodium restriction; no education needed at this time)    Monitoring/Evaluation:   Food/Nutrient Related History Monitoring  Additional Plans: No nutrition risk identified; RDN will remain available and follow per policy         Time Spent/Follow-up Reminder:   Follow Up  Time Spent (min): 60 minutes  Follow up: Provided inpatient RDN contact information  Last Date of Nutrition Visit: 12/06/23  Nutrition Follow-Up Needed?: Dietitian to reassess per policy  Follow up Comment: No nutrition risk identified

## 2023-12-06 NOTE — CARE PLAN
The patient has had an uneventful shift, the patient has been resting in bed quiety throughout the shift. The patient has wore her bipap for the majority of the shift, the patient has no  complaints or requests at this time. The call bell is within reach, will continue to monitor and will continue the plan of care as previously stated.

## 2023-12-06 NOTE — CARE PLAN
Patient had uneventful shift. VSS.  Used call bell for all patient needs. Tolerating ordered medications this shift.

## 2023-12-07 LAB
ALBUMIN SERPL BCP-MCNC: 3.6 G/DL (ref 3.4–5)
ANION GAP SERPL CALC-SCNC: 10 MMOL/L (ref 10–20)
BUN SERPL-MCNC: 26 MG/DL (ref 6–23)
CALCIUM SERPL-MCNC: 8.9 MG/DL (ref 8.6–10.3)
CHLORIDE SERPL-SCNC: 105 MMOL/L (ref 98–107)
CO2 SERPL-SCNC: 29 MMOL/L (ref 21–32)
CREAT SERPL-MCNC: 0.87 MG/DL (ref 0.5–1.05)
ERYTHROCYTE [DISTWIDTH] IN BLOOD BY AUTOMATED COUNT: 12.8 % (ref 11.5–14.5)
GFR SERPL CREATININE-BSD FRML MDRD: 70 ML/MIN/1.73M*2
GLUCOSE SERPL-MCNC: 83 MG/DL (ref 74–99)
HCT VFR BLD AUTO: 35.1 % (ref 36–46)
HGB BLD-MCNC: 11 G/DL (ref 12–16)
MAGNESIUM SERPL-MCNC: 2 MG/DL (ref 1.6–2.4)
MCH RBC QN AUTO: 29.6 PG (ref 26–34)
MCHC RBC AUTO-ENTMCNC: 31.3 G/DL (ref 32–36)
MCV RBC AUTO: 95 FL (ref 80–100)
NRBC BLD-RTO: 0 /100 WBCS (ref 0–0)
PHOSPHATE SERPL-MCNC: 3.2 MG/DL (ref 2.5–4.9)
PLATELET # BLD AUTO: 240 X10*3/UL (ref 150–450)
POTASSIUM SERPL-SCNC: 3.9 MMOL/L (ref 3.5–5.3)
RBC # BLD AUTO: 3.71 X10*6/UL (ref 4–5.2)
SODIUM SERPL-SCNC: 140 MMOL/L (ref 136–145)
WBC # BLD AUTO: 12.4 X10*3/UL (ref 4.4–11.3)

## 2023-12-07 PROCEDURE — 2500000004 HC RX 250 GENERAL PHARMACY W/ HCPCS (ALT 636 FOR OP/ED): Mod: IPSPLIT | Performed by: STUDENT IN AN ORGANIZED HEALTH CARE EDUCATION/TRAINING PROGRAM

## 2023-12-07 PROCEDURE — 2500000002 HC RX 250 W HCPCS SELF ADMINISTERED DRUGS (ALT 637 FOR MEDICARE OP, ALT 636 FOR OP/ED): Mod: IPSPLIT | Performed by: STUDENT IN AN ORGANIZED HEALTH CARE EDUCATION/TRAINING PROGRAM

## 2023-12-07 PROCEDURE — 2500000001 HC RX 250 WO HCPCS SELF ADMINISTERED DRUGS (ALT 637 FOR MEDICARE OP): Mod: IPSPLIT | Performed by: STUDENT IN AN ORGANIZED HEALTH CARE EDUCATION/TRAINING PROGRAM

## 2023-12-07 PROCEDURE — 85027 COMPLETE CBC AUTOMATED: CPT | Mod: IPSPLIT | Performed by: STUDENT IN AN ORGANIZED HEALTH CARE EDUCATION/TRAINING PROGRAM

## 2023-12-07 PROCEDURE — 83735 ASSAY OF MAGNESIUM: CPT | Mod: IPSPLIT | Performed by: STUDENT IN AN ORGANIZED HEALTH CARE EDUCATION/TRAINING PROGRAM

## 2023-12-07 PROCEDURE — 1200000002 HC GENERAL ROOM WITH TELEMETRY DAILY: Mod: IPSPLIT

## 2023-12-07 PROCEDURE — 80069 RENAL FUNCTION PANEL: CPT | Mod: IPSPLIT | Performed by: STUDENT IN AN ORGANIZED HEALTH CARE EDUCATION/TRAINING PROGRAM

## 2023-12-07 PROCEDURE — 94640 AIRWAY INHALATION TREATMENT: CPT | Mod: IPSPLIT

## 2023-12-07 PROCEDURE — 99232 SBSQ HOSP IP/OBS MODERATE 35: CPT | Performed by: STUDENT IN AN ORGANIZED HEALTH CARE EDUCATION/TRAINING PROGRAM

## 2023-12-07 PROCEDURE — 96372 THER/PROPH/DIAG INJ SC/IM: CPT | Mod: IPSPLIT | Performed by: STUDENT IN AN ORGANIZED HEALTH CARE EDUCATION/TRAINING PROGRAM

## 2023-12-07 PROCEDURE — 9420000001 HC RT PATIENT EDUCATION 5 MIN: Mod: IPSPLIT

## 2023-12-07 RX ORDER — LANOLIN ALCOHOL/MO/W.PET/CERES
400 CREAM (GRAM) TOPICAL
Status: DISCONTINUED | OUTPATIENT
Start: 2023-12-07 | End: 2023-12-08 | Stop reason: HOSPADM

## 2023-12-07 RX ORDER — ASPIRIN 81 MG/1
81 TABLET ORAL
Status: DISCONTINUED | OUTPATIENT
Start: 2023-12-07 | End: 2023-12-08 | Stop reason: HOSPADM

## 2023-12-07 RX ORDER — BUDESONIDE 0.5 MG/2ML
0.5 INHALANT ORAL 2 TIMES DAILY
COMMUNITY
End: 2024-04-13 | Stop reason: HOSPADM

## 2023-12-07 RX ORDER — ALBUTEROL SULFATE 90 UG/1
2 AEROSOL, METERED RESPIRATORY (INHALATION) EVERY 2 HOUR PRN
Status: DISCONTINUED | OUTPATIENT
Start: 2023-12-07 | End: 2023-12-08

## 2023-12-07 RX ORDER — TALC
9 POWDER (GRAM) TOPICAL NIGHTLY
Status: DISCONTINUED | OUTPATIENT
Start: 2023-12-07 | End: 2023-12-08 | Stop reason: HOSPADM

## 2023-12-07 RX ORDER — CLOPIDOGREL BISULFATE 75 MG/1
75 TABLET ORAL
Status: DISCONTINUED | OUTPATIENT
Start: 2023-12-07 | End: 2023-12-08 | Stop reason: HOSPADM

## 2023-12-07 RX ADMIN — ARFORMOTEROL TARTRATE 15 MCG: 15 SOLUTION RESPIRATORY (INHALATION) at 20:10

## 2023-12-07 RX ADMIN — IPRATROPIUM BROMIDE AND ALBUTEROL SULFATE 3 ML: .5; 3 SOLUTION RESPIRATORY (INHALATION) at 10:43

## 2023-12-07 RX ADMIN — ASPIRIN 81 MG: 81 TABLET, COATED ORAL at 09:25

## 2023-12-07 RX ADMIN — LOSARTAN POTASSIUM 50 MG: 50 TABLET, FILM COATED ORAL at 20:28

## 2023-12-07 RX ADMIN — AMOXICILLIN AND CLAVULANATE POTASSIUM 875 MG: 875; 125 TABLET, FILM COATED ORAL at 20:28

## 2023-12-07 RX ADMIN — IPRATROPIUM BROMIDE AND ALBUTEROL SULFATE 3 ML: .5; 3 SOLUTION RESPIRATORY (INHALATION) at 03:20

## 2023-12-07 RX ADMIN — PREDNISONE 40 MG: 20 TABLET ORAL at 09:25

## 2023-12-07 RX ADMIN — IPRATROPIUM BROMIDE AND ALBUTEROL SULFATE 3 ML: .5; 3 SOLUTION RESPIRATORY (INHALATION) at 15:18

## 2023-12-07 RX ADMIN — LORAZEPAM 0.5 MG: 0.5 TABLET ORAL at 20:28

## 2023-12-07 RX ADMIN — BUDESONIDE INHALATION 0.5 MG: 0.5 SUSPENSION RESPIRATORY (INHALATION) at 08:01

## 2023-12-07 RX ADMIN — AMOXICILLIN AND CLAVULANATE POTASSIUM 875 MG: 875; 125 TABLET, FILM COATED ORAL at 09:24

## 2023-12-07 RX ADMIN — LEVOTHYROXINE SODIUM 50 MCG: 50 TABLET ORAL at 05:44

## 2023-12-07 RX ADMIN — MAGNESIUM OXIDE TAB 400 MG (241.3 MG ELEMENTAL MG) 400 MG: 400 (241.3 MG) TAB at 09:25

## 2023-12-07 RX ADMIN — CLOPIDOGREL BISULFATE 75 MG: 75 TABLET ORAL at 09:25

## 2023-12-07 RX ADMIN — METOPROLOL SUCCINATE 75 MG: 50 TABLET, EXTENDED RELEASE ORAL at 09:24

## 2023-12-07 RX ADMIN — BUDESONIDE INHALATION 0.5 MG: 0.5 SUSPENSION RESPIRATORY (INHALATION) at 20:10

## 2023-12-07 RX ADMIN — LOSARTAN POTASSIUM 50 MG: 50 TABLET, FILM COATED ORAL at 09:25

## 2023-12-07 RX ADMIN — METHYLPREDNISOLONE SODIUM SUCCINATE 40 MG: 40 INJECTION, POWDER, FOR SOLUTION INTRAMUSCULAR; INTRAVENOUS at 20:28

## 2023-12-07 RX ADMIN — TIOTROPIUM BROMIDE INHALATION SPRAY 2 PUFF: 3.12 SPRAY, METERED RESPIRATORY (INHALATION) at 08:15

## 2023-12-07 RX ADMIN — ENOXAPARIN SODIUM 40 MG: 100 INJECTION SUBCUTANEOUS at 20:28

## 2023-12-07 ASSESSMENT — COGNITIVE AND FUNCTIONAL STATUS - GENERAL
CLIMB 3 TO 5 STEPS WITH RAILING: A LITTLE
DAILY ACTIVITIY SCORE: 22
MOBILITY SCORE: 20
MOVING TO AND FROM BED TO CHAIR: A LITTLE
WALKING IN HOSPITAL ROOM: A LITTLE
MOBILITY SCORE: 22
TOILETING: A LITTLE
DAILY ACTIVITIY SCORE: 23
WALKING IN HOSPITAL ROOM: A LITTLE
STANDING UP FROM CHAIR USING ARMS: A LITTLE
TOILETING: A LITTLE
HELP NEEDED FOR BATHING: A LITTLE
CLIMB 3 TO 5 STEPS WITH RAILING: A LITTLE

## 2023-12-07 ASSESSMENT — PAIN SCALES - GENERAL
PAINLEVEL_OUTOF10: 0 - NO PAIN
PAINLEVEL_OUTOF10: 0 - NO PAIN

## 2023-12-07 NOTE — PROGRESS NOTES
12/07/23 1618   Discharge Planning   Living Arrangements Spouse/significant other   Support Systems Spouse/significant other   Assistance Needed Pt is independent at home with ADLS   Type of Residence Private residence   Who is requesting discharge planning? Patient   Home or Post Acute Services In home services   Type of Home Care Services Home OT;Home PT;Home nursing visits   Patient expects to be discharged to: home with [facility] services   Does the patient need discharge transport arranged? No   Patient Choice   Patient / Family choosing to utilize agency / facility established prior to hospitalization Yes     Pt has been open with [facility] for RN services. [facility] is 20 today. She would like her  services resumed at discharge. A preliminary referral was placed in [facility] for RN/PT/OT services. Physician was notified. SHAUN Alex

## 2023-12-07 NOTE — CARE PLAN
The patient's goals for the shift include      The clinical goals for the shift include Patient will have no c/o sob throughout shift 12/7/23 1900    Patient continues to wear 3L O2, throughout shift, vs stable, tolerated meals well, no c/o pain throughout shift, call bell within reach

## 2023-12-07 NOTE — CARE PLAN
The patient's goals for the shift include      The clinical goals for the shift include patient will have no complaints of SOB this shift    Patient in bed resting. No complaints of pain, n/v, chest pain. Complains of SOB and treated by respiratory therapy. Vitals wnl.

## 2023-12-08 ENCOUNTER — HOSPITAL ENCOUNTER (INPATIENT)
Facility: HOSPITAL | Age: 73
LOS: 1 days | Discharge: HOME | DRG: 190 | End: 2023-12-09
Attending: STUDENT IN AN ORGANIZED HEALTH CARE EDUCATION/TRAINING PROGRAM | Admitting: INTERNAL MEDICINE
Payer: MEDICARE

## 2023-12-08 VITALS
RESPIRATION RATE: 18 BRPM | OXYGEN SATURATION: 95 % | DIASTOLIC BLOOD PRESSURE: 61 MMHG | HEIGHT: 67 IN | BODY MASS INDEX: 25.71 KG/M2 | HEART RATE: 76 BPM | SYSTOLIC BLOOD PRESSURE: 116 MMHG | WEIGHT: 163.8 LBS | TEMPERATURE: 97.4 F

## 2023-12-08 DIAGNOSIS — J44.9 COPD (CHRONIC OBSTRUCTIVE PULMONARY DISEASE) (MULTI): Primary | ICD-10-CM

## 2023-12-08 LAB
ALBUMIN SERPL BCP-MCNC: 3.7 G/DL (ref 3.4–5)
ANION GAP SERPL CALC-SCNC: 11 MMOL/L (ref 10–20)
BUN SERPL-MCNC: 28 MG/DL (ref 6–23)
CALCIUM SERPL-MCNC: 9.1 MG/DL (ref 8.6–10.3)
CHLORIDE SERPL-SCNC: 102 MMOL/L (ref 98–107)
CO2 SERPL-SCNC: 27 MMOL/L (ref 21–32)
CREAT SERPL-MCNC: 0.88 MG/DL (ref 0.5–1.05)
ERYTHROCYTE [DISTWIDTH] IN BLOOD BY AUTOMATED COUNT: 12.5 % (ref 11.5–14.5)
GFR SERPL CREATININE-BSD FRML MDRD: 69 ML/MIN/1.73M*2
GLUCOSE SERPL-MCNC: 211 MG/DL (ref 74–99)
HCT VFR BLD AUTO: 35 % (ref 36–46)
HGB BLD-MCNC: 10.8 G/DL (ref 12–16)
MAGNESIUM SERPL-MCNC: 1.97 MG/DL (ref 1.6–2.4)
MCH RBC QN AUTO: 29.2 PG (ref 26–34)
MCHC RBC AUTO-ENTMCNC: 30.9 G/DL (ref 32–36)
MCV RBC AUTO: 95 FL (ref 80–100)
NRBC BLD-RTO: 0 /100 WBCS (ref 0–0)
PHOSPHATE SERPL-MCNC: 3.4 MG/DL (ref 2.5–4.9)
PLATELET # BLD AUTO: 253 X10*3/UL (ref 150–450)
POTASSIUM SERPL-SCNC: 4.3 MMOL/L (ref 3.5–5.3)
PROCALCITONIN SERPL-MCNC: 0.03 NG/ML
RBC # BLD AUTO: 3.7 X10*6/UL (ref 4–5.2)
SODIUM SERPL-SCNC: 136 MMOL/L (ref 136–145)
WBC # BLD AUTO: 13 X10*3/UL (ref 4.4–11.3)

## 2023-12-08 PROCEDURE — 83735 ASSAY OF MAGNESIUM: CPT | Mod: IPSPLIT | Performed by: STUDENT IN AN ORGANIZED HEALTH CARE EDUCATION/TRAINING PROGRAM

## 2023-12-08 PROCEDURE — 1210000001 HC SEMI-PRIVATE ROOM DAILY

## 2023-12-08 PROCEDURE — 2500000005 HC RX 250 GENERAL PHARMACY W/O HCPCS: Mod: IPSPLIT | Performed by: STUDENT IN AN ORGANIZED HEALTH CARE EDUCATION/TRAINING PROGRAM

## 2023-12-08 PROCEDURE — 80069 RENAL FUNCTION PANEL: CPT | Mod: IPSPLIT | Performed by: STUDENT IN AN ORGANIZED HEALTH CARE EDUCATION/TRAINING PROGRAM

## 2023-12-08 PROCEDURE — 2500000001 HC RX 250 WO HCPCS SELF ADMINISTERED DRUGS (ALT 637 FOR MEDICARE OP): Mod: IPSPLIT | Performed by: STUDENT IN AN ORGANIZED HEALTH CARE EDUCATION/TRAINING PROGRAM

## 2023-12-08 PROCEDURE — 2500000002 HC RX 250 W HCPCS SELF ADMINISTERED DRUGS (ALT 637 FOR MEDICARE OP, ALT 636 FOR OP/ED): Mod: IPSPLIT | Performed by: STUDENT IN AN ORGANIZED HEALTH CARE EDUCATION/TRAINING PROGRAM

## 2023-12-08 PROCEDURE — 2500000004 HC RX 250 GENERAL PHARMACY W/ HCPCS (ALT 636 FOR OP/ED): Mod: IPSPLIT | Performed by: STUDENT IN AN ORGANIZED HEALTH CARE EDUCATION/TRAINING PROGRAM

## 2023-12-08 PROCEDURE — 99239 HOSP IP/OBS DSCHRG MGMT >30: CPT | Performed by: STUDENT IN AN ORGANIZED HEALTH CARE EDUCATION/TRAINING PROGRAM

## 2023-12-08 PROCEDURE — 9420000001 HC RT PATIENT EDUCATION 5 MIN: Mod: IPSPLIT

## 2023-12-08 PROCEDURE — 99223 1ST HOSP IP/OBS HIGH 75: CPT | Performed by: NURSE PRACTITIONER

## 2023-12-08 PROCEDURE — 94640 AIRWAY INHALATION TREATMENT: CPT

## 2023-12-08 PROCEDURE — 85027 COMPLETE CBC AUTOMATED: CPT | Mod: IPSPLIT | Performed by: STUDENT IN AN ORGANIZED HEALTH CARE EDUCATION/TRAINING PROGRAM

## 2023-12-08 PROCEDURE — 94640 AIRWAY INHALATION TREATMENT: CPT | Mod: IPSPLIT

## 2023-12-08 PROCEDURE — 84145 PROCALCITONIN (PCT): CPT | Mod: CONLAB | Performed by: STUDENT IN AN ORGANIZED HEALTH CARE EDUCATION/TRAINING PROGRAM

## 2023-12-08 RX ORDER — EZETIMIBE 10 MG/1
10 TABLET ORAL DAILY
Status: DISCONTINUED | OUTPATIENT
Start: 2023-12-09 | End: 2023-12-09

## 2023-12-08 RX ORDER — GUAIFENESIN 600 MG/1
1200 TABLET, EXTENDED RELEASE ORAL 2 TIMES DAILY
Status: DISCONTINUED | OUTPATIENT
Start: 2023-12-09 | End: 2023-12-09 | Stop reason: HOSPADM

## 2023-12-08 RX ORDER — BUDESONIDE 0.5 MG/2ML
0.5 INHALANT ORAL 2 TIMES DAILY
Status: DISCONTINUED | OUTPATIENT
Start: 2023-12-08 | End: 2023-12-08

## 2023-12-08 RX ORDER — LEVOTHYROXINE SODIUM 50 UG/1
50 TABLET ORAL DAILY
Status: DISCONTINUED | OUTPATIENT
Start: 2023-12-09 | End: 2023-12-09

## 2023-12-08 RX ORDER — METOPROLOL SUCCINATE 25 MG/1
75 TABLET, EXTENDED RELEASE ORAL
Status: DISCONTINUED | OUTPATIENT
Start: 2023-12-09 | End: 2023-12-09 | Stop reason: HOSPADM

## 2023-12-08 RX ORDER — HEPARIN SODIUM,PORCINE/PF 10 UNIT/ML
3 SYRINGE (ML) INTRAVENOUS DAILY
Status: DISCONTINUED | OUTPATIENT
Start: 2023-12-09 | End: 2023-12-08

## 2023-12-08 RX ORDER — ONDANSETRON 4 MG/1
4 TABLET, ORALLY DISINTEGRATING ORAL EVERY 8 HOURS PRN
Status: DISCONTINUED | OUTPATIENT
Start: 2023-12-08 | End: 2023-12-08

## 2023-12-08 RX ORDER — POLYETHYLENE GLYCOL 3350 17 G/17G
17 POWDER, FOR SOLUTION ORAL DAILY
Status: DISCONTINUED | OUTPATIENT
Start: 2023-12-09 | End: 2023-12-08

## 2023-12-08 RX ORDER — SIMETHICONE 125 MG
125 CAPSULE ORAL EVERY 8 HOURS PRN
Status: DISCONTINUED | OUTPATIENT
Start: 2023-12-08 | End: 2023-12-08

## 2023-12-08 RX ORDER — LOSARTAN POTASSIUM 50 MG/1
50 TABLET ORAL 2 TIMES DAILY
Status: DISCONTINUED | OUTPATIENT
Start: 2023-12-09 | End: 2023-12-09 | Stop reason: HOSPADM

## 2023-12-08 RX ORDER — GUAIFENESIN 600 MG/1
1200 TABLET, EXTENDED RELEASE ORAL 2 TIMES DAILY PRN
Status: DISCONTINUED | OUTPATIENT
Start: 2023-12-08 | End: 2023-12-08 | Stop reason: HOSPADM

## 2023-12-08 RX ORDER — ALBUTEROL SULFATE 90 UG/1
2 AEROSOL, METERED RESPIRATORY (INHALATION) EVERY 6 HOURS PRN
Status: DISCONTINUED | OUTPATIENT
Start: 2023-12-08 | End: 2023-12-09 | Stop reason: HOSPADM

## 2023-12-08 RX ORDER — DIPHENHYDRAMINE HCL 50 MG
50 CAPSULE ORAL ONCE AS NEEDED
Status: DISCONTINUED | OUTPATIENT
Start: 2023-12-08 | End: 2023-12-08

## 2023-12-08 RX ORDER — FERROUS SULFATE 325(65) MG
65 TABLET ORAL DAILY
Status: DISCONTINUED | OUTPATIENT
Start: 2023-12-09 | End: 2023-12-09 | Stop reason: HOSPADM

## 2023-12-08 RX ORDER — FUROSEMIDE 20 MG/1
20 TABLET ORAL
Status: DISCONTINUED | OUTPATIENT
Start: 2023-12-09 | End: 2023-12-09 | Stop reason: HOSPADM

## 2023-12-08 RX ORDER — IPRATROPIUM BROMIDE 0.5 MG/2.5ML
0.5 SOLUTION RESPIRATORY (INHALATION) 4 TIMES DAILY
Status: DISCONTINUED | OUTPATIENT
Start: 2023-12-08 | End: 2023-12-09 | Stop reason: HOSPADM

## 2023-12-08 RX ORDER — SODIUM CHLORIDE 0.9 % (FLUSH) 0.9 %
10 SYRINGE (ML) INJECTION AS NEEDED
Status: DISCONTINUED | OUTPATIENT
Start: 2023-12-08 | End: 2023-12-09 | Stop reason: HOSPADM

## 2023-12-08 RX ORDER — ENOXAPARIN SODIUM 100 MG/ML
40 INJECTION SUBCUTANEOUS DAILY
Status: DISCONTINUED | OUTPATIENT
Start: 2023-12-09 | End: 2023-12-09 | Stop reason: HOSPADM

## 2023-12-08 RX ORDER — TALC
3 POWDER (GRAM) TOPICAL NIGHTLY PRN
Status: DISCONTINUED | OUTPATIENT
Start: 2023-12-08 | End: 2023-12-08

## 2023-12-08 RX ORDER — SODIUM CHLORIDE 0.9 % (FLUSH) 0.9 %
10 SYRINGE (ML) INJECTION DAILY
Status: DISCONTINUED | OUTPATIENT
Start: 2023-12-09 | End: 2023-12-09 | Stop reason: HOSPADM

## 2023-12-08 RX ORDER — ESTRADIOL 0.1 MG/G
1 CREAM VAGINAL 2 TIMES WEEKLY
Status: DISCONTINUED | OUTPATIENT
Start: 2023-12-11 | End: 2023-12-09 | Stop reason: HOSPADM

## 2023-12-08 RX ORDER — ARFORMOTEROL TARTRATE 15 UG/2ML
15 SOLUTION RESPIRATORY (INHALATION)
Status: DISCONTINUED | OUTPATIENT
Start: 2023-12-09 | End: 2023-12-09 | Stop reason: HOSPADM

## 2023-12-08 RX ORDER — ACETAMINOPHEN 500 MG
10 TABLET ORAL NIGHTLY PRN
Status: DISCONTINUED | OUTPATIENT
Start: 2023-12-08 | End: 2023-12-09 | Stop reason: HOSPADM

## 2023-12-08 RX ORDER — IPRATROPIUM BROMIDE AND ALBUTEROL SULFATE 2.5; .5 MG/3ML; MG/3ML
3 SOLUTION RESPIRATORY (INHALATION) ONCE
Status: COMPLETED | OUTPATIENT
Start: 2023-12-08 | End: 2023-12-08

## 2023-12-08 RX ORDER — CLOPIDOGREL BISULFATE 75 MG/1
75 TABLET ORAL DAILY
Status: DISCONTINUED | OUTPATIENT
Start: 2023-12-09 | End: 2023-12-09 | Stop reason: HOSPADM

## 2023-12-08 RX ORDER — NITROGLYCERIN 0.4 MG/1
0.4 TABLET SUBLINGUAL EVERY 5 MIN PRN
Status: DISCONTINUED | OUTPATIENT
Start: 2023-12-08 | End: 2023-12-09 | Stop reason: HOSPADM

## 2023-12-08 RX ORDER — HEPARIN SODIUM,PORCINE/PF 10 UNIT/ML
10 SYRINGE (ML) INTRAVENOUS AS NEEDED
Status: DISCONTINUED | OUTPATIENT
Start: 2023-12-08 | End: 2023-12-08

## 2023-12-08 RX ORDER — EPINEPHRINE 0.3 MG/.3ML
0.3 INJECTION SUBCUTANEOUS ONCE AS NEEDED
Status: DISCONTINUED | OUTPATIENT
Start: 2023-12-08 | End: 2023-12-08

## 2023-12-08 RX ORDER — ACETAMINOPHEN 325 MG/1
650 TABLET ORAL EVERY 4 HOURS PRN
Status: DISCONTINUED | OUTPATIENT
Start: 2023-12-08 | End: 2023-12-09 | Stop reason: HOSPADM

## 2023-12-08 RX ORDER — DOCUSATE SODIUM 100 MG/1
100 CAPSULE, LIQUID FILLED ORAL 2 TIMES DAILY PRN
Status: DISCONTINUED | OUTPATIENT
Start: 2023-12-08 | End: 2023-12-08

## 2023-12-08 RX ORDER — BISMUTH SUBSALICYLATE 262 MG
1 TABLET,CHEWABLE ORAL
Status: DISCONTINUED | OUTPATIENT
Start: 2023-12-09 | End: 2023-12-09 | Stop reason: HOSPADM

## 2023-12-08 RX ORDER — AZITHROMYCIN 250 MG/1
250 TABLET, FILM COATED ORAL 3 TIMES WEEKLY
Status: DISCONTINUED | OUTPATIENT
Start: 2023-12-11 | End: 2023-12-09 | Stop reason: HOSPADM

## 2023-12-08 RX ORDER — LANOLIN ALCOHOL/MO/W.PET/CERES
400 CREAM (GRAM) TOPICAL
Status: DISCONTINUED | OUTPATIENT
Start: 2023-12-09 | End: 2023-12-09 | Stop reason: HOSPADM

## 2023-12-08 RX ORDER — ASPIRIN 81 MG/1
81 TABLET ORAL DAILY
Status: DISCONTINUED | OUTPATIENT
Start: 2023-12-09 | End: 2023-12-09 | Stop reason: HOSPADM

## 2023-12-08 RX ORDER — AZITHROMYCIN 250 MG/1
250 TABLET, FILM COATED ORAL 3 TIMES WEEKLY
Qty: 12 TABLET | Refills: 0 | Status: ON HOLD | OUTPATIENT
Start: 2023-12-08 | End: 2023-12-08

## 2023-12-08 RX ORDER — AZITHROMYCIN 250 MG/1
250 TABLET, FILM COATED ORAL 3 TIMES WEEKLY
Status: DISCONTINUED | OUTPATIENT
Start: 2023-12-08 | End: 2023-12-08 | Stop reason: HOSPADM

## 2023-12-08 RX ADMIN — IPRATROPIUM BROMIDE AND ALBUTEROL SULFATE 3 ML: .5; 3 SOLUTION RESPIRATORY (INHALATION) at 14:41

## 2023-12-08 RX ADMIN — METHYLPREDNISOLONE SODIUM SUCCINATE 40 MG: 40 INJECTION, POWDER, FOR SOLUTION INTRAMUSCULAR; INTRAVENOUS at 10:25

## 2023-12-08 RX ADMIN — POLYETHYLENE GLYCOL 3350 17 G: 17 POWDER, FOR SOLUTION ORAL at 09:00

## 2023-12-08 RX ADMIN — TIOTROPIUM BROMIDE INHALATION SPRAY 2 PUFF: 3.12 SPRAY, METERED RESPIRATORY (INHALATION) at 08:17

## 2023-12-08 RX ADMIN — Medication 3 L/MIN: at 08:14

## 2023-12-08 RX ADMIN — BUDESONIDE INHALATION 0.5 MG: 0.5 SUSPENSION RESPIRATORY (INHALATION) at 08:16

## 2023-12-08 RX ADMIN — IPRATROPIUM BROMIDE AND ALBUTEROL SULFATE 3 ML: .5; 3 SOLUTION RESPIRATORY (INHALATION) at 03:28

## 2023-12-08 RX ADMIN — METOPROLOL SUCCINATE 75 MG: 50 TABLET, EXTENDED RELEASE ORAL at 08:03

## 2023-12-08 RX ADMIN — AZITHROMYCIN DIHYDRATE 250 MG: 250 TABLET ORAL at 19:58

## 2023-12-08 RX ADMIN — CLOPIDOGREL BISULFATE 75 MG: 75 TABLET ORAL at 08:03

## 2023-12-08 RX ADMIN — ARFORMOTEROL TARTRATE 15 MCG: 15 SOLUTION RESPIRATORY (INHALATION) at 20:22

## 2023-12-08 RX ADMIN — LOSARTAN POTASSIUM 50 MG: 50 TABLET, FILM COATED ORAL at 08:03

## 2023-12-08 RX ADMIN — IPRATROPIUM BROMIDE AND ALBUTEROL SULFATE 3 ML: .5; 3 SOLUTION RESPIRATORY (INHALATION) at 16:07

## 2023-12-08 RX ADMIN — LOSARTAN POTASSIUM 50 MG: 50 TABLET, FILM COATED ORAL at 20:08

## 2023-12-08 RX ADMIN — METHYLPREDNISOLONE SODIUM SUCCINATE 40 MG: 40 INJECTION, POWDER, FOR SOLUTION INTRAMUSCULAR; INTRAVENOUS at 02:59

## 2023-12-08 RX ADMIN — ASPIRIN 81 MG: 81 TABLET, COATED ORAL at 08:03

## 2023-12-08 RX ADMIN — IPRATROPIUM BROMIDE AND ALBUTEROL SULFATE 3 ML: .5; 3 SOLUTION RESPIRATORY (INHALATION) at 18:39

## 2023-12-08 RX ADMIN — MAGNESIUM OXIDE TAB 400 MG (241.3 MG ELEMENTAL MG) 400 MG: 400 (241.3 MG) TAB at 08:03

## 2023-12-08 RX ADMIN — GUAIFENESIN 1200 MG: 600 TABLET, EXTENDED RELEASE ORAL at 20:15

## 2023-12-08 RX ADMIN — LEVOTHYROXINE SODIUM 50 MCG: 50 TABLET ORAL at 06:28

## 2023-12-08 SDOH — HEALTH STABILITY: MENTAL HEALTH: HOW MANY STANDARD DRINKS CONTAINING ALCOHOL DO YOU HAVE ON A TYPICAL DAY?: PATIENT DOES NOT DRINK

## 2023-12-08 SDOH — HEALTH STABILITY: MENTAL HEALTH
STRESS IS WHEN SOMEONE FEELS TENSE, NERVOUS, ANXIOUS, OR CAN'T SLEEP AT NIGHT BECAUSE THEIR MIND IS TROUBLED. HOW STRESSED ARE YOU?: RATHER MUCH

## 2023-12-08 SDOH — SOCIAL STABILITY: SOCIAL INSECURITY: WITHIN THE LAST YEAR, HAVE YOU BEEN HUMILIATED OR EMOTIONALLY ABUSED IN OTHER WAYS BY YOUR PARTNER OR EX-PARTNER?: NO

## 2023-12-08 SDOH — ECONOMIC STABILITY: INCOME INSECURITY: IN THE PAST 12 MONTHS, HAS THE ELECTRIC, GAS, OIL, OR WATER COMPANY THREATENED TO SHUT OFF SERVICE IN YOUR HOME?: NO

## 2023-12-08 SDOH — ECONOMIC STABILITY: FOOD INSECURITY: WITHIN THE PAST 12 MONTHS, THE FOOD YOU BOUGHT JUST DIDN'T LAST AND YOU DIDN'T HAVE MONEY TO GET MORE.: NEVER TRUE

## 2023-12-08 SDOH — SOCIAL STABILITY: SOCIAL NETWORK: ARE YOU MARRIED, WIDOWED, DIVORCED, SEPARATED, NEVER MARRIED, OR LIVING WITH A PARTNER?: MARRIED

## 2023-12-08 SDOH — SOCIAL STABILITY: SOCIAL INSECURITY: HAS ANYONE EVER THREATENED TO HURT YOUR FAMILY OR YOUR PETS?: NO

## 2023-12-08 SDOH — ECONOMIC STABILITY: INCOME INSECURITY: HOW HARD IS IT FOR YOU TO PAY FOR THE VERY BASICS LIKE FOOD, HOUSING, MEDICAL CARE, AND HEATING?: NOT VERY HARD

## 2023-12-08 SDOH — ECONOMIC STABILITY: HOUSING INSECURITY
IN THE LAST 12 MONTHS, WAS THERE A TIME WHEN YOU DID NOT HAVE A STEADY PLACE TO SLEEP OR SLEPT IN A SHELTER (INCLUDING NOW)?: NO

## 2023-12-08 SDOH — SOCIAL STABILITY: SOCIAL INSECURITY: DO YOU FEEL UNSAFE GOING BACK TO THE PLACE WHERE YOU ARE LIVING?: NO

## 2023-12-08 SDOH — SOCIAL STABILITY: SOCIAL INSECURITY: WITHIN THE LAST YEAR, HAVE YOU BEEN AFRAID OF YOUR PARTNER OR EX-PARTNER?: NO

## 2023-12-08 SDOH — SOCIAL STABILITY: SOCIAL NETWORK
DO YOU BELONG TO ANY CLUBS OR ORGANIZATIONS SUCH AS CHURCH GROUPS UNIONS, FRATERNAL OR ATHLETIC GROUPS, OR SCHOOL GROUPS?: NO

## 2023-12-08 SDOH — ECONOMIC STABILITY: INCOME INSECURITY: IN THE LAST 12 MONTHS, WAS THERE A TIME WHEN YOU WERE NOT ABLE TO PAY THE MORTGAGE OR RENT ON TIME?: NO

## 2023-12-08 SDOH — SOCIAL STABILITY: SOCIAL NETWORK: HOW OFTEN DO YOU ATTEND CHURCH OR RELIGIOUS SERVICES?: NEVER

## 2023-12-08 SDOH — SOCIAL STABILITY: SOCIAL INSECURITY: ARE YOU OR HAVE YOU BEEN THREATENED OR ABUSED PHYSICALLY, EMOTIONALLY, OR SEXUALLY BY ANYONE?: NO

## 2023-12-08 SDOH — SOCIAL STABILITY: SOCIAL INSECURITY
WITHIN THE LAST YEAR, HAVE TO BEEN RAPED OR FORCED TO HAVE ANY KIND OF SEXUAL ACTIVITY BY YOUR PARTNER OR EX-PARTNER?: NO

## 2023-12-08 SDOH — HEALTH STABILITY: MENTAL HEALTH: HOW OFTEN DO YOU HAVE 6 OR MORE DRINKS ON ONE OCCASION?: NEVER

## 2023-12-08 SDOH — SOCIAL STABILITY: SOCIAL INSECURITY
WITHIN THE LAST YEAR, HAVE YOU BEEN KICKED, HIT, SLAPPED, OR OTHERWISE PHYSICALLY HURT BY YOUR PARTNER OR EX-PARTNER?: NO

## 2023-12-08 SDOH — ECONOMIC STABILITY: FOOD INSECURITY: WITHIN THE PAST 12 MONTHS, YOU WORRIED THAT YOUR FOOD WOULD RUN OUT BEFORE YOU GOT MONEY TO BUY MORE.: NEVER TRUE

## 2023-12-08 SDOH — ECONOMIC STABILITY: TRANSPORTATION INSECURITY
IN THE PAST 12 MONTHS, HAS LACK OF TRANSPORTATION KEPT YOU FROM MEETINGS, WORK, OR FROM GETTING THINGS NEEDED FOR DAILY LIVING?: NO

## 2023-12-08 SDOH — SOCIAL STABILITY: SOCIAL INSECURITY: HAVE YOU HAD THOUGHTS OF HARMING ANYONE ELSE?: NO

## 2023-12-08 SDOH — SOCIAL STABILITY: SOCIAL INSECURITY: DOES ANYONE TRY TO KEEP YOU FROM HAVING/CONTACTING OTHER FRIENDS OR DOING THINGS OUTSIDE YOUR HOME?: NO

## 2023-12-08 SDOH — HEALTH STABILITY: PHYSICAL HEALTH

## 2023-12-08 SDOH — ECONOMIC STABILITY: TRANSPORTATION INSECURITY
IN THE PAST 12 MONTHS, HAS THE LACK OF TRANSPORTATION KEPT YOU FROM MEDICAL APPOINTMENTS OR FROM GETTING MEDICATIONS?: NO

## 2023-12-08 SDOH — HEALTH STABILITY: MENTAL HEALTH: HOW OFTEN DO YOU HAVE A DRINK CONTAINING ALCOHOL?: NEVER

## 2023-12-08 SDOH — SOCIAL STABILITY: SOCIAL NETWORK
IN A TYPICAL WEEK, HOW MANY TIMES DO YOU TALK ON THE PHONE WITH FAMILY, FRIENDS, OR NEIGHBORS?: MORE THAN THREE TIMES A WEEK

## 2023-12-08 SDOH — SOCIAL STABILITY: SOCIAL NETWORK: HOW OFTEN DO YOU GET TOGETHER WITH FRIENDS OR RELATIVES?: MORE THAN THREE TIMES A WEEK

## 2023-12-08 SDOH — SOCIAL STABILITY: SOCIAL INSECURITY: ARE THERE ANY APPARENT SIGNS OF INJURIES/BEHAVIORS THAT COULD BE RELATED TO ABUSE/NEGLECT?: NO

## 2023-12-08 SDOH — SOCIAL STABILITY: SOCIAL NETWORK: HOW OFTEN DO YOU ATTENT MEETINGS OF THE CLUB OR ORGANIZATION YOU BELONG TO?: NEVER

## 2023-12-08 SDOH — SOCIAL STABILITY: SOCIAL INSECURITY: ABUSE: ADULT

## 2023-12-08 SDOH — SOCIAL STABILITY: SOCIAL INSECURITY: DO YOU FEEL ANYONE HAS EXPLOITED OR TAKEN ADVANTAGE OF YOU FINANCIALLY OR OF YOUR PERSONAL PROPERTY?: NO

## 2023-12-08 SDOH — HEALTH STABILITY: PHYSICAL HEALTH: ON AVERAGE, HOW MANY MINUTES DO YOU ENGAGE IN EXERCISE AT THIS LEVEL?: 0 MIN

## 2023-12-08 ASSESSMENT — ENCOUNTER SYMPTOMS
ADENOPATHY: 0
ABDOMINAL DISTENTION: 0
CHILLS: 0
CHEST TIGHTNESS: 0
ABDOMINAL PAIN: 0
PALPITATIONS: 0
FATIGUE: 0
CONSTIPATION: 0
COUGH: 1
SORE THROAT: 0
FEVER: 0
VOMITING: 0
APNEA: 0
DIAPHORESIS: 0
SLEEP DISTURBANCE: 0
LIGHT-HEADEDNESS: 0
HEADACHES: 0
BLOOD IN STOOL: 0
COLOR CHANGE: 0
NAUSEA: 0
TROUBLE SWALLOWING: 0
ACTIVITY CHANGE: 0
DIZZINESS: 0
ARTHRALGIAS: 0
SHORTNESS OF BREATH: 0
APPETITE CHANGE: 0
CHOKING: 0
UNEXPECTED WEIGHT CHANGE: 0
WHEEZING: 1
STRIDOR: 0
DIARRHEA: 0

## 2023-12-08 ASSESSMENT — LIFESTYLE VARIABLES
SKIP TO QUESTIONS 9-10: 1
SKIP TO QUESTIONS 9-10: 1
HOW MANY STANDARD DRINKS CONTAINING ALCOHOL DO YOU HAVE ON A TYPICAL DAY: PATIENT DOES NOT DRINK
PRESCIPTION_ABUSE_PAST_12_MONTHS: NO
HOW OFTEN DO YOU HAVE A DRINK CONTAINING ALCOHOL: NEVER
AUDIT-C TOTAL SCORE: 0
HOW OFTEN DO YOU HAVE 6 OR MORE DRINKS ON ONE OCCASION: NEVER
SUBSTANCE_ABUSE_PAST_12_MONTHS: NO
AUDIT-C TOTAL SCORE: 0
AUDIT-C TOTAL SCORE: 0

## 2023-12-08 ASSESSMENT — COGNITIVE AND FUNCTIONAL STATUS - GENERAL
MOBILITY SCORE: 23
DAILY ACTIVITIY SCORE: 24
CLIMB 3 TO 5 STEPS WITH RAILING: A LITTLE
PATIENT BASELINE BEDBOUND: NO

## 2023-12-08 ASSESSMENT — ACTIVITIES OF DAILY LIVING (ADL)
JUDGMENT_ADEQUATE_SAFELY_COMPLETE_DAILY_ACTIVITIES: NO
DRESSING YOURSELF: INDEPENDENT
PATIENT'S MEMORY ADEQUATE TO SAFELY COMPLETE DAILY ACTIVITIES?: NO
HEARING - RIGHT EAR: FUNCTIONAL
ADEQUATE_TO_COMPLETE_ADL: NO
TOILETING: INDEPENDENT
FEEDING YOURSELF: INDEPENDENT
BATHING: INDEPENDENT
LACK_OF_TRANSPORTATION: NO
GROOMING: INDEPENDENT
WALKS IN HOME: INDEPENDENT
HEARING - LEFT EAR: FUNCTIONAL

## 2023-12-08 ASSESSMENT — PAIN SCALES - GENERAL
PAINLEVEL_OUTOF10: 0 - NO PAIN

## 2023-12-08 ASSESSMENT — COLUMBIA-SUICIDE SEVERITY RATING SCALE - C-SSRS
2. HAVE YOU ACTUALLY HAD ANY THOUGHTS OF KILLING YOURSELF?: NO
5. HAVE YOU STARTED TO WORK OUT OR WORKED OUT THE DETAILS OF HOW TO KILL YOURSELF? DO YOU INTEND TO CARRY OUT THIS PLAN?: NO
1. IN THE PAST MONTH, HAVE YOU WISHED YOU WERE DEAD OR WISHED YOU COULD GO TO SLEEP AND NOT WAKE UP?: YES
4. HAVE YOU HAD THESE THOUGHTS AND HAD SOME INTENTION OF ACTING ON THEM?: NO
6. HAVE YOU EVER DONE ANYTHING, STARTED TO DO ANYTHING, OR PREPARED TO DO ANYTHING TO END YOUR LIFE?: NO

## 2023-12-08 ASSESSMENT — PATIENT HEALTH QUESTIONNAIRE - PHQ9
SUM OF ALL RESPONSES TO PHQ9 QUESTIONS 1 & 2: 0
1. LITTLE INTEREST OR PLEASURE IN DOING THINGS: NOT AT ALL
2. FEELING DOWN, DEPRESSED OR HOPELESS: NOT AT ALL

## 2023-12-08 ASSESSMENT — PAIN - FUNCTIONAL ASSESSMENT
PAIN_FUNCTIONAL_ASSESSMENT: 0-10
PAIN_FUNCTIONAL_ASSESSMENT: 0-10

## 2023-12-08 NOTE — CARE PLAN
The patient's goals for the shift include      The clinical goals for the shift include Patient will have no complaints of shortness of breath this shift    Patient in bed resting. No complaints of n/v, chest pain. Vitals wnl.

## 2023-12-08 NOTE — PROGRESS NOTES
Trinity Hernandez is a 73 y.o. female who presents for No chief complaint on file.    UTI: Not feeling well. A couple nights ago she woke up with more intense left sided tinnitus and her whole body was tingling. Very sick feeling. She checked her urine and it was cloudy. She completed a urine test and she started macrobid last night, took another this morning. Had another episode last night but with heart racing. She notified cardiologist and he told her that he feels it is likely heart racing from an infection although they may increase her metoprolol or check a zio patch. She is considering going to the ER for eval.      CAD, s/p stent: Following with Dr. Romero.     Small bowel perforation, abdominal hernias, epiploic appendagitis: She has been recommended laparoscopic surgery but she is hoping to avoid surgery.      Anxiety: lorazepam prn is helpful. She is having a lot more anxiety lately because of her 's health. He has an aneurysm that is over 5cm and he is in renal failure and he recently had a severe GI bleed. She is taking hydroxyzine but that makes her sleepy so she can't take it during the day.      HTN, aortic aneurysm: Reasonable control on metoprolol, losartan, and lasix. Following with cardioloy. Recently had some discomfort and got a zio patch done. She is awaiting results. She is going to get a stress test soon.     HLD: Off meds, trying to watch her diet.      COPD/sarcoidosis: On Brovana, spiriva, and budesonide. Using oxygen and prednisone prn, she has an albuterol inhaler. her breathing is a little bit bad today. She has been more short of breath. She is seeing Dr. Ford for pulm. She is going in for testing in April.      Hypothyroidism: On replacement, no concerns.     GERD: Off pantoprazole, she is doing much better since taking the enzymes.     All other systems have been reviewed and are negative for complaint     Objective   There were no vitals taken for this visit.    Gen: No  acute distress, alert and oriented x3, pleasant   HEENT: moist mucous membranes, b/l external auditory canals are clear of debris, TMs within normal limits, no oropharyngeal lesions, eomi, perrla   Neck: thyroid within normal limits, no lymphadenopathy   CV: RRR, normal S1/S2, no murmur   Resp: Clear to auscultation bilaterally, no wheezes or rhonchi appreciated  Abd: soft, nontender, non-distended, no guarding/rigidity, bowel sounds present  Extr: no edema, no calf tenderness  Derm: Skin is warm and dry, no rashes appreciated  Psych: mood is good, affect is congruent, good hygiene, normal speech and eye contact  Neuro: cranial nerves grossly intact, normal gait    Assessment/Plan     #Weakness  #Fatigue  Likely UTI, on macrobid  Recent labs normal  Recommended ER eval  Recommend troponin and EKG     #CAD  S/p stent   On plavix and ezetimibe  Has followup with cardio  She has midodrine for prn use     #Small bowel perforation  #Ventral hernia  Following with GI  Currently planning to avoid surgery     #Anxiety:  CSA signed  Using ativan prn, refilled  Add lexapro     #Shingles:  she has valtrex rx at home  Hasn't needed it much     #Poor venous access  port in place     #HTN  Controlled on losartan, furosemide, and metoprolol  seeing cardiology ([place])     #HLD  Stable, not on meds, monitoring     #COPD/Sarcoidosis  Following with pulm, stable, on inhalers, supplemental O2, nebulizers  testing with pulm in April     #Hypothyroidism  Controlled on replacement     HCM:  s/p COVID vaccine  Mammogram Sept

## 2023-12-08 NOTE — CARE PLAN
Patient discharging home with H@H. Patient SBA. Remains on 3L of oxygen per her normal. Tolerating oral atb, no s/s of adverse reactions noted thus far. Per Dr. Neri port will remain accessed for H@H use. Patient waiting for transportation home, call button in reach.

## 2023-12-08 NOTE — PROGRESS NOTES
Occupational Therapy                 Therapy Communication Note    Patient Name: Trinity Hernandez  MRN: [id number]  Today's Date: 12/8/2023     Discipline: Occupational Therapy    Missed Visit Reason: Missed Visit Reason: Other (Comment) (Nursing, TCC, and nurse manager reporing over IDT no acute OT needs)    Missed Time: Attempt    Comment: Over IDT nursing, TCC, and nurse  pt independent in room. OT evaluation not completed; no acute OT needs.     Attempt time: 1100

## 2023-12-08 NOTE — PROGRESS NOTES
Trinity Hernandez is a 73 y.o. female on day 3 of admission presenting with COPD with acute lower respiratory infection (CMS/HCC).    Met with pt this morning and gave her Im reminder. She stated that she is considering [facility] transition today but is a little worried about how safe she would be. SW encouraged her to discuss this with the Attending. Pt stated her spouse will be coming in soon to be her to talk with the doctor. Sw will fllow up with [facility] if she is going to go with AHH. They will have to resume HH orders at discharge.     SHAUN Alex

## 2023-12-09 ENCOUNTER — HOSPITAL ENCOUNTER (INPATIENT)
Facility: HOSPITAL | Age: 73
LOS: 9 days | Discharge: HOSPICE | DRG: 190 | End: 2023-12-19
Attending: EMERGENCY MEDICINE | Admitting: NURSE PRACTITIONER
Payer: MEDICARE

## 2023-12-09 ENCOUNTER — APPOINTMENT (OUTPATIENT)
Dept: RADIOLOGY | Facility: HOSPITAL | Age: 73
DRG: 190 | End: 2023-12-09
Payer: MEDICARE

## 2023-12-09 VITALS
HEART RATE: 90 BPM | BODY MASS INDEX: 25.57 KG/M2 | RESPIRATION RATE: 27 BRPM | OXYGEN SATURATION: 96 % | DIASTOLIC BLOOD PRESSURE: 78 MMHG | TEMPERATURE: 97.7 F | WEIGHT: 162.92 LBS | SYSTOLIC BLOOD PRESSURE: 132 MMHG | HEIGHT: 67 IN

## 2023-12-09 DIAGNOSIS — J44.9 CHRONIC OBSTRUCTIVE PULMONARY DISEASE, UNSPECIFIED COPD TYPE (MULTI): ICD-10-CM

## 2023-12-09 DIAGNOSIS — J44.1 ACUTE EXACERBATION OF CHRONIC OBSTRUCTIVE PULMONARY DISEASE (MULTI): Primary | ICD-10-CM

## 2023-12-09 DIAGNOSIS — R06.03 ACUTE RESPIRATORY DISTRESS: ICD-10-CM

## 2023-12-09 DIAGNOSIS — D72.829 LEUKOCYTOSIS, UNSPECIFIED TYPE: ICD-10-CM

## 2023-12-09 LAB
ALBUMIN SERPL BCP-MCNC: 4.1 G/DL (ref 3.4–5)
ALP SERPL-CCNC: 52 U/L (ref 33–136)
ALT SERPL W P-5'-P-CCNC: 19 U/L (ref 7–45)
ANION GAP BLDV CALCULATED.4IONS-SCNC: 10 MMOL/L (ref 10–25)
ANION GAP SERPL CALC-SCNC: 13 MMOL/L (ref 10–20)
AST SERPL W P-5'-P-CCNC: 12 U/L (ref 9–39)
BASE EXCESS BLDV CALC-SCNC: 7.2 MMOL/L (ref -2–3)
BASOPHILS # BLD AUTO: 0.05 X10*3/UL (ref 0–0.1)
BASOPHILS NFR BLD AUTO: 0.3 %
BILIRUB DIRECT SERPL-MCNC: 0 MG/DL (ref 0–0.3)
BILIRUB SERPL-MCNC: 0.4 MG/DL (ref 0–1.2)
BNP SERPL-MCNC: 30 PG/ML (ref 0–99)
BODY TEMPERATURE: ABNORMAL
BUN SERPL-MCNC: 28 MG/DL (ref 6–23)
CA-I BLDV-SCNC: 1.19 MMOL/L (ref 1.1–1.33)
CALCIUM SERPL-MCNC: 9.5 MG/DL (ref 8.6–10.3)
CHLORIDE BLDV-SCNC: 98 MMOL/L (ref 98–107)
CHLORIDE SERPL-SCNC: 98 MMOL/L (ref 98–107)
CO2 SERPL-SCNC: 30 MMOL/L (ref 21–32)
CREAT SERPL-MCNC: 0.99 MG/DL (ref 0.5–1.05)
EOSINOPHIL # BLD AUTO: 0.06 X10*3/UL (ref 0–0.4)
EOSINOPHIL NFR BLD AUTO: 0.4 %
ERYTHROCYTE [DISTWIDTH] IN BLOOD BY AUTOMATED COUNT: 12.9 % (ref 11.5–14.5)
GFR SERPL CREATININE-BSD FRML MDRD: 60 ML/MIN/1.73M*2
GLUCOSE BLDV-MCNC: 165 MG/DL (ref 74–99)
GLUCOSE SERPL-MCNC: 156 MG/DL (ref 74–99)
HCO3 BLDV-SCNC: 32 MMOL/L (ref 22–26)
HCT VFR BLD AUTO: 38.1 % (ref 36–46)
HCT VFR BLD EST: 40 % (ref 36–46)
HGB BLD-MCNC: 12.3 G/DL (ref 12–16)
HGB BLDV-MCNC: 13.3 G/DL (ref 12–16)
IMM GRANULOCYTES # BLD AUTO: 0.3 X10*3/UL (ref 0–0.5)
IMM GRANULOCYTES NFR BLD AUTO: 1.9 % (ref 0–0.9)
INHALED O2 CONCENTRATION: 32 %
INR PPP: 1 (ref 0.9–1.1)
LACTATE BLDV-SCNC: 2.2 MMOL/L (ref 0.4–2)
LACTATE SERPL-SCNC: 2 MMOL/L (ref 0.4–2)
LYMPHOCYTES # BLD AUTO: 1.03 X10*3/UL (ref 0.8–3)
LYMPHOCYTES NFR BLD AUTO: 6.5 %
MAGNESIUM SERPL-MCNC: 2.04 MG/DL (ref 1.6–2.4)
MCH RBC QN AUTO: 30 PG (ref 26–34)
MCHC RBC AUTO-ENTMCNC: 32.3 G/DL (ref 32–36)
MCV RBC AUTO: 93 FL (ref 80–100)
MONOCYTES # BLD AUTO: 0.32 X10*3/UL (ref 0.05–0.8)
MONOCYTES NFR BLD AUTO: 2 %
NEUTROPHILS # BLD AUTO: 14.05 X10*3/UL (ref 1.6–5.5)
NEUTROPHILS NFR BLD AUTO: 88.9 %
NRBC BLD-RTO: 0 /100 WBCS (ref 0–0)
OXYHGB MFR BLDV: 65 % (ref 45–75)
PCO2 BLDV: 45 MM HG (ref 41–51)
PH BLDV: 7.46 PH (ref 7.33–7.43)
PLATELET # BLD AUTO: 284 X10*3/UL (ref 150–450)
PO2 BLDV: 40 MM HG (ref 35–45)
POTASSIUM BLDV-SCNC: 4.7 MMOL/L (ref 3.5–5.3)
POTASSIUM SERPL-SCNC: 4.5 MMOL/L (ref 3.5–5.3)
PROT SERPL-MCNC: 7.5 G/DL (ref 6.4–8.2)
PROTHROMBIN TIME: 11.8 SECONDS (ref 9.8–12.8)
RBC # BLD AUTO: 4.1 X10*6/UL (ref 4–5.2)
SAO2 % BLDV: 66 % (ref 45–75)
SODIUM BLDV-SCNC: 135 MMOL/L (ref 136–145)
SODIUM SERPL-SCNC: 136 MMOL/L (ref 136–145)
WBC # BLD AUTO: 15.8 X10*3/UL (ref 4.4–11.3)

## 2023-12-09 PROCEDURE — 71045 X-RAY EXAM CHEST 1 VIEW: CPT | Mod: IPSPLIT

## 2023-12-09 PROCEDURE — 84132 ASSAY OF SERUM POTASSIUM: CPT | Performed by: EMERGENCY MEDICINE

## 2023-12-09 PROCEDURE — 9420000001 HC RT PATIENT EDUCATION 5 MIN

## 2023-12-09 PROCEDURE — 2500000002 HC RX 250 W HCPCS SELF ADMINISTERED DRUGS (ALT 637 FOR MEDICARE OP, ALT 636 FOR OP/ED): Performed by: NURSE PRACTITIONER

## 2023-12-09 PROCEDURE — 2500000004 HC RX 250 GENERAL PHARMACY W/ HCPCS (ALT 636 FOR OP/ED): Performed by: EMERGENCY MEDICINE

## 2023-12-09 PROCEDURE — 94761 N-INVAS EAR/PLS OXIMETRY MLT: CPT

## 2023-12-09 PROCEDURE — 96365 THER/PROPH/DIAG IV INF INIT: CPT

## 2023-12-09 PROCEDURE — 85610 PROTHROMBIN TIME: CPT | Performed by: EMERGENCY MEDICINE

## 2023-12-09 PROCEDURE — 36415 COLL VENOUS BLD VENIPUNCTURE: CPT | Performed by: EMERGENCY MEDICINE

## 2023-12-09 PROCEDURE — 2500000001 HC RX 250 WO HCPCS SELF ADMINISTERED DRUGS (ALT 637 FOR MEDICARE OP): Performed by: NURSE PRACTITIONER

## 2023-12-09 PROCEDURE — 2500000004 HC RX 250 GENERAL PHARMACY W/ HCPCS (ALT 636 FOR OP/ED): Performed by: NURSE PRACTITIONER

## 2023-12-09 PROCEDURE — 83880 ASSAY OF NATRIURETIC PEPTIDE: CPT | Performed by: EMERGENCY MEDICINE

## 2023-12-09 PROCEDURE — 82248 BILIRUBIN DIRECT: CPT | Performed by: EMERGENCY MEDICINE

## 2023-12-09 PROCEDURE — 85025 COMPLETE CBC W/AUTO DIFF WBC: CPT | Performed by: EMERGENCY MEDICINE

## 2023-12-09 PROCEDURE — 99232 SBSQ HOSP IP/OBS MODERATE 35: CPT | Performed by: INTERNAL MEDICINE

## 2023-12-09 PROCEDURE — 99285 EMERGENCY DEPT VISIT HI MDM: CPT | Performed by: EMERGENCY MEDICINE

## 2023-12-09 PROCEDURE — 71045 X-RAY EXAM CHEST 1 VIEW: CPT | Performed by: STUDENT IN AN ORGANIZED HEALTH CARE EDUCATION/TRAINING PROGRAM

## 2023-12-09 PROCEDURE — 2500000005 HC RX 250 GENERAL PHARMACY W/O HCPCS: Performed by: NURSE PRACTITIONER

## 2023-12-09 PROCEDURE — 94664 DEMO&/EVAL PT USE INHALER: CPT

## 2023-12-09 PROCEDURE — 83605 ASSAY OF LACTIC ACID: CPT | Mod: 91 | Performed by: EMERGENCY MEDICINE

## 2023-12-09 PROCEDURE — 2500000001 HC RX 250 WO HCPCS SELF ADMINISTERED DRUGS (ALT 637 FOR MEDICARE OP)

## 2023-12-09 PROCEDURE — 83735 ASSAY OF MAGNESIUM: CPT | Performed by: EMERGENCY MEDICINE

## 2023-12-09 PROCEDURE — 94640 AIRWAY INHALATION TREATMENT: CPT

## 2023-12-09 PROCEDURE — 96375 TX/PRO/DX INJ NEW DRUG ADDON: CPT

## 2023-12-09 PROCEDURE — 96372 THER/PROPH/DIAG INJ SC/IM: CPT | Performed by: NURSE PRACTITIONER

## 2023-12-09 PROCEDURE — 2500000002 HC RX 250 W HCPCS SELF ADMINISTERED DRUGS (ALT 637 FOR MEDICARE OP, ALT 636 FOR OP/ED): Performed by: EMERGENCY MEDICINE

## 2023-12-09 RX ORDER — IPRATROPIUM BROMIDE AND ALBUTEROL SULFATE 2.5; .5 MG/3ML; MG/3ML
6 SOLUTION RESPIRATORY (INHALATION) ONCE
Status: COMPLETED | OUTPATIENT
Start: 2023-12-09 | End: 2023-12-09

## 2023-12-09 RX ORDER — MAGNESIUM SULFATE HEPTAHYDRATE 40 MG/ML
2 INJECTION, SOLUTION INTRAVENOUS ONCE
Status: COMPLETED | OUTPATIENT
Start: 2023-12-09 | End: 2023-12-09

## 2023-12-09 RX ORDER — IPRATROPIUM BROMIDE AND ALBUTEROL SULFATE 2.5; .5 MG/3ML; MG/3ML
3 SOLUTION RESPIRATORY (INHALATION)
Status: DISCONTINUED | OUTPATIENT
Start: 2023-12-10 | End: 2023-12-09

## 2023-12-09 RX ORDER — LEVOTHYROXINE SODIUM 50 UG/1
50 TABLET ORAL 2 TIMES WEEKLY
Status: DISCONTINUED | OUTPATIENT
Start: 2023-12-11 | End: 2023-12-09 | Stop reason: HOSPADM

## 2023-12-09 RX ORDER — LEVOTHYROXINE SODIUM 25 UG/1
25 TABLET ORAL
Status: DISCONTINUED | OUTPATIENT
Start: 2023-12-09 | End: 2023-12-09 | Stop reason: HOSPADM

## 2023-12-09 RX ADMIN — IPRATROPIUM BROMIDE 0.5 MG: 0.5 SOLUTION RESPIRATORY (INHALATION) at 07:00

## 2023-12-09 RX ADMIN — GUAIFENESIN 1200 MG: 600 TABLET ORAL at 12:00

## 2023-12-09 RX ADMIN — ARFORMOTEROL TARTRATE 15 MCG: 15 SOLUTION RESPIRATORY (INHALATION) at 07:00

## 2023-12-09 RX ADMIN — TIOTROPIUM BROMIDE INHALATION SPRAY 2 PUFF: 3.12 SPRAY, METERED RESPIRATORY (INHALATION) at 07:00

## 2023-12-09 RX ADMIN — IPRATROPIUM BROMIDE 0.5 MG: 0.5 SOLUTION RESPIRATORY (INHALATION) at 13:00

## 2023-12-09 RX ADMIN — GUAIFENESIN 1200 MG: 600 TABLET ORAL at 21:00

## 2023-12-09 RX ADMIN — MAGNESIUM SULFATE HEPTAHYDRATE 2 G: 40 INJECTION, SOLUTION INTRAVENOUS at 22:33

## 2023-12-09 RX ADMIN — METHYLPREDNISOLONE SODIUM SUCCINATE 40 MG: 40 INJECTION, POWDER, FOR SOLUTION INTRAMUSCULAR; INTRAVENOUS at 18:00

## 2023-12-09 RX ADMIN — IPRATROPIUM BROMIDE AND ALBUTEROL SULFATE 6 ML: 2.5; .5 SOLUTION RESPIRATORY (INHALATION) at 22:28

## 2023-12-09 RX ADMIN — LOSARTAN POTASSIUM 50 MG: 50 TABLET, FILM COATED ORAL at 12:00

## 2023-12-09 RX ADMIN — MULTIVITAMIN TABLET 1 TABLET: TABLET at 07:00

## 2023-12-09 RX ADMIN — IPRATROPIUM BROMIDE 0.5 MG: 0.5 SOLUTION RESPIRATORY (INHALATION) at 17:00

## 2023-12-09 RX ADMIN — METOPROLOL SUCCINATE 75 MG: 25 TABLET, EXTENDED RELEASE ORAL at 07:00

## 2023-12-09 RX ADMIN — Medication: at 08:00

## 2023-12-09 RX ADMIN — FUROSEMIDE 20 MG: 20 TABLET ORAL at 09:00

## 2023-12-09 RX ADMIN — ARFORMOTEROL TARTRATE 15 MCG: 15 SOLUTION RESPIRATORY (INHALATION) at 19:00

## 2023-12-09 RX ADMIN — IPRATROPIUM BROMIDE 0.5 MG: 0.5 SOLUTION RESPIRATORY (INHALATION) at 21:00

## 2023-12-09 RX ADMIN — CLOPIDOGREL BISULFATE 75 MG: 75 TABLET ORAL at 12:00

## 2023-12-09 RX ADMIN — LOSARTAN POTASSIUM 50 MG: 50 TABLET, FILM COATED ORAL at 21:00

## 2023-12-09 RX ADMIN — MAGNESIUM OXIDE TAB 400 MG (241.3 MG ELEMENTAL MG) 400 MG: 400 (241.3 MG) TAB at 07:00

## 2023-12-09 RX ADMIN — METHYLPREDNISOLONE SODIUM SUCCINATE 125 MG: 125 INJECTION, POWDER, FOR SOLUTION INTRAMUSCULAR; INTRAVENOUS at 22:32

## 2023-12-09 RX ADMIN — ASPIRIN 81 MG: 81 TABLET, COATED ORAL at 12:00

## 2023-12-09 RX ADMIN — METHYLPREDNISOLONE SODIUM SUCCINATE 40 MG: 40 INJECTION, POWDER, FOR SOLUTION INTRAMUSCULAR; INTRAVENOUS at 12:00

## 2023-12-09 RX ADMIN — LEVOTHYROXINE SODIUM 25 MCG: 0.03 TABLET ORAL at 12:15

## 2023-12-09 RX ADMIN — FERROUS SULFATE TAB 325 MG (65 MG ELEMENTAL FE) 1 TABLET: 325 (65 FE) TAB at 12:00

## 2023-12-09 ASSESSMENT — COGNITIVE AND FUNCTIONAL STATUS - GENERAL
STANDING UP FROM CHAIR USING ARMS: TOTAL
CLIMB 3 TO 5 STEPS WITH RAILING: A LITTLE
DAILY ACTIVITIY SCORE: 6
PERSONAL GROOMING: TOTAL
HELP NEEDED FOR BATHING: TOTAL
DRESSING REGULAR LOWER BODY CLOTHING: TOTAL
WALKING IN HOSPITAL ROOM: TOTAL
TOILETING: TOTAL
EATING MEALS: TOTAL
DRESSING REGULAR UPPER BODY CLOTHING: TOTAL
MOVING TO AND FROM BED TO CHAIR: TOTAL
MOVING FROM LYING ON BACK TO SITTING ON SIDE OF FLAT BED WITH BEDRAILS: TOTAL

## 2023-12-09 ASSESSMENT — PAIN SCALES - GENERAL
PAINLEVEL_OUTOF10: 0 - NO PAIN

## 2023-12-09 ASSESSMENT — COLUMBIA-SUICIDE SEVERITY RATING SCALE - C-SSRS
5. HAVE YOU STARTED TO WORK OUT OR WORKED OUT THE DETAILS OF HOW TO KILL YOURSELF? DO YOU INTEND TO CARRY OUT THIS PLAN?: NO
2. HAVE YOU ACTUALLY HAD ANY THOUGHTS OF KILLING YOURSELF?: NO
1. IN THE PAST MONTH, HAVE YOU WISHED YOU WERE DEAD OR WISHED YOU COULD GO TO SLEEP AND NOT WAKE UP?: NO
4. HAVE YOU HAD THESE THOUGHTS AND HAD SOME INTENTION OF ACTING ON THEM?: NO
6. HAVE YOU EVER DONE ANYTHING, STARTED TO DO ANYTHING, OR PREPARED TO DO ANYTHING TO END YOUR LIFE?: NO

## 2023-12-09 ASSESSMENT — PAIN - FUNCTIONAL ASSESSMENT
PAIN_FUNCTIONAL_ASSESSMENT: 0-10
PAIN_FUNCTIONAL_ASSESSMENT: 0-10

## 2023-12-09 ASSESSMENT — ACTIVITIES OF DAILY LIVING (ADL)
LACK_OF_TRANSPORTATION: NO
LACK_OF_TRANSPORTATION: NO

## 2023-12-09 NOTE — H&P
History Of Present Illness  Trinity Hernandez is a 73 y.o. female presenting with a PMHx significant for CAD (s/p stent), NSVT, Paroxysmal Atrial Fibrillation, Chronic Diastolic heart Failure, HTN, AAA, HLD, COPD, Chronic Hypoxic Respiratory Failure 2/2 Pulmonary Sarcoidosis (on 2L at night), Hypothyroidism, Migraines, Hiatal Hernia, and Diverticulosis, who presented to Counts include [facility] beds at the [facility] due to increased shortness of breath. Admitted for management of acute on chronic respiratory failure. Of note, patient had recently been discharged from [facility] where she was treated with antibiotics for PNA and discharged on amoxicillin.      Patiently initially treated for presumed pneumonia with IV Rocephin & Azithromycin but antibiotics were stopped based on CXR suggestive of chronic changes likely due to sarcoidosis. IV steroids were started for treatment of acute COPD exacerbation due to persistent end-expiratory wheezing. Wheezing did improve but did not tolerate weaning from the 3L O2. Patient briefly transitioned to prednisone but wheezing again worsened acutely. She was placed back on IV Solumedrol and frequency was adjusted to q8h with good improvement. Patient has remained on 3L but tolerated Solumedrol 40 mg tapered to q12h prior to DC from the hospital.      Close follow-up with pulmonology and cardiology (recently had cardiac monitor, evaluation for ICD). Transition to [facility] to further optimize her COPD management and assist her with medication costs.    12/8/23 2140 patient transitioned to the acute hospital at home     Past Medical History  Past Medical History:   Diagnosis Date    Abdominal bloating 05/04/2023    Diverticulitis of small intestine without perforation or abscess without bleeding     Diverticulitis, intestine, small    Dizzy 11/20/2023    Essential (primary) hypertension 02/08/2017    HTN (hypertension), benign    Influenza B 05/08/2015    Formatting of this note might be different from the original. Completed  Tamiflu Continue droplet precautions.    Nausea 11/16/2018    Personal history of other endocrine, nutritional and metabolic disease 02/08/2017    History of hypothyroidism    Personal history of other venous thrombosis and embolism     History of deep venous thrombosis       Surgical History  Past Surgical History:   Procedure Laterality Date    ABDOMINAL ADHESION SURGERY  05/16/2017    Laparoscopic Lysis Of Intestinal Adhesions    CERVICAL FUSION  05/16/2017    Cervical Vertebral Fusion    CHOLECYSTECTOMY  05/16/2017    Cholecystectomy    COLECTOMY PARTIAL / TOTAL  05/16/2017    Partial Colectomy - Sigmoid    CORONARY ANGIOPLASTY WITH STENT PLACEMENT      CT ANGIO NECK  05/21/2020    CT NECK ANGIO W AND WO IV CONTRAST 5/21/2020 [facility]    CT HEAD ANGIO W AND WO IV CONTRAST  05/21/2020    CT HEAD ANGIO W AND WO IV CONTRAST 5/21/2020 [facility]    HERNIA REPAIR  05/16/2017    Hernia Repair    HYSTERECTOMY  10/03/2013    Hysterectomy    MR HEAD ANGIO WO IV CONTRAST  05/21/2020    MR HEAD ANGIO WO IV CONTRAST 5/21/2020 [facility]    MR NECK ANGIO WO IV CONTRAST  05/21/2020    MR NECK ANGIO WO IV CONTRAST 5/21/2020 [facility]    OTHER SURGICAL HISTORY  05/16/2017    Thoracoscopy Of Lungs And Pleural Space With Biopsy Of Lung Nodules    OTHER SURGICAL HISTORY  04/15/2019    Esophagogastroduodenoscopy        Social History  She reports that she has quit smoking. Her smoking use included cigarettes. She has never been exposed to tobacco smoke. She has never used smokeless tobacco. No history on file for alcohol use and drug use.    Family History  No family history on file.     Allergies  Hydrocodone-acetaminophen, Montelukast, Morphine, Nitrofurantoin monohyd/m-cryst, Sulfa (sulfonamide antibiotics), Atorvastatin, Dicyclomine, Fluticasone propion-salmeterol, Levofloxacin, Lisinopril, Nitroimidazoles, Omeprazole, Salmeterol, Simvastatin, Sucralfate, and Umeclidinium    Review of  Systems   Constitutional:  Negative for activity change, appetite change, chills, diaphoresis, fatigue, fever and unexpected weight change.   HENT:  Negative for congestion, sore throat and trouble swallowing.    Eyes:  Negative for visual disturbance.   Respiratory:  Positive for cough and wheezing. Negative for apnea, choking, chest tightness, shortness of breath and stridor.    Cardiovascular:  Negative for chest pain, palpitations and leg swelling.   Gastrointestinal:  Negative for abdominal distention, abdominal pain, blood in stool, constipation, diarrhea, nausea and vomiting.   Endocrine: Negative for cold intolerance and heat intolerance.   Genitourinary:  Negative for urgency.   Musculoskeletal:  Negative for arthralgias.   Skin:  Negative for color change.   Neurological:  Negative for dizziness, light-headedness and headaches.   Hematological:  Negative for adenopathy.   Psychiatric/Behavioral:  Negative for behavioral problems and sleep disturbance.      Exam completed with the assistance of [facility] medic during live virtual visit zoom    Physical Exam  Vitals reviewed.   Constitutional:       Appearance: Normal appearance.   HENT:      Head: Normocephalic and atraumatic.      Nose: Nose normal.      Mouth/Throat:      Mouth: Mucous membranes are moist.   Eyes:      Conjunctiva/sclera: Conjunctivae normal.   Cardiovascular:      Rate and Rhythm: Normal rate and regular rhythm.      Pulses: Normal pulses.      Heart sounds: Normal heart sounds.   Pulmonary:      Effort: Pulmonary effort is normal.      Breath sounds: Wheezing present.   Abdominal:      General: Bowel sounds are normal.      Palpations: Abdomen is soft.   Musculoskeletal:         General: Normal range of motion.      Cervical back: Normal range of motion and neck supple.   Skin:     General: Skin is warm and dry.   Neurological:      General: No focal deficit present.      Mental Status: She is alert.   Psychiatric:         Mood  and Affect: Mood normal.         Speech: Speech normal.         Behavior: Behavior normal.          Last Recorded Vitals  There were no vitals taken for this visit.    Relevant Results  Results for orders placed or performed during the hospital encounter of 12/05/23 (from the past 24 hour(s))   Renal Function Panel   Result Value Ref Range    Glucose 211 (H) 74 - 99 mg/dL    Sodium 136 136 - 145 mmol/L    Potassium 4.3 3.5 - 5.3 mmol/L    Chloride 102 98 - 107 mmol/L    Bicarbonate 27 21 - 32 mmol/L    Anion Gap 11 10 - 20 mmol/L    Urea Nitrogen 28 (H) 6 - 23 mg/dL    Creatinine 0.88 0.50 - 1.05 mg/dL    eGFR 69 >60 mL/min/1.73m*2    Calcium 9.1 8.6 - 10.3 mg/dL    Phosphorus 3.4 2.5 - 4.9 mg/dL    Albumin 3.7 3.4 - 5.0 g/dL   Magnesium   Result Value Ref Range    Magnesium 1.97 1.60 - 2.40 mg/dL   CBC   Result Value Ref Range    WBC 13.0 (H) 4.4 - 11.3 x10*3/uL    nRBC 0.0 0.0 - 0.0 /100 WBCs    RBC 3.70 (L) 4.00 - 5.20 x10*6/uL    Hemoglobin 10.8 (L) 12.0 - 16.0 g/dL    Hematocrit 35.0 (L) 36.0 - 46.0 %    MCV 95 80 - 100 fL    MCH 29.2 26.0 - 34.0 pg    MCHC 30.9 (L) 32.0 - 36.0 g/dL    RDW 12.5 11.5 - 14.5 %    Platelets 253 150 - 450 x10*3/uL   Procalcitonin   Result Value Ref Range    Procalcitonin 0.03 <=0.07 ng/mL         Assessment/Plan   Principal Problem:    COPD (chronic obstructive pulmonary disease) (CMS/HCC)  Acute COPD Exacerbation  Acute on Chronic Hypoxic Respiratory Failure   Pulmonary Sarcoidosis  - CXR suggestive of chronic parenchymal changes. No acute airspace disease  - Chronically on prednisone 20 mg daily for sarcoidosis. She has been instructed by pulmonology to take 40 mg for 3-4 days PRN COPD exacerbations  - Currently on 3L o2, baseline is 2L o2 only at night -> continue to wean  - Did not tolerate transition to oral steroids -> IV Solumedrol 40 mg q12h  - Stop antibiotics for treatment of pneumonia. No imaging findings suggestive of pneumonia on imaging  - Continue azithromycin 250 mg  3x a week for her COPD  - Continue home aerosol treatments     Chronic Medical Conditions  Nonsustained VT: Per cardiologist note 11/16/23 needs ICD defibrillator. Metoprolol succinate increased to 75 mg. Zio 14 day monitor placed 11/24/23  CAD s/p stent: Aspirin 81 mg, Plavix 75 mg  Thoracic Aortic Aneurysm  Chronic Diastolic Heart Failure  Essential HTN: Losartan 50 mg BID  Hypothyroidism: Levothyroxine 50 mcg   HLD: Not on therapy  Former Smoker: Quit 11/26/2008 (30 pack year)     F: None  E: Replenish as needed  N: Cardiac  GI Ppx: None  DVT Lovenox 40 mg     Disposition: she will need a prolonged steroid taper and slow weaning of supplemental oxygen. [facility] will continue management of acute on chronic hypoxic respiratory failure 2/2 COPD exacerbation. She is to be continued on IV Solumedrol q12h (or prednisone 40 mg q12h) for an additional 2-4 days while she undergoes weaning of her supplemental oxygen. Once this is achieved continue with once daily prednisone 40 mg and slowly taper to her chronic dose of prednisone 20 mg.     Close follow-up with pulmonology and cardiology (recently had cardiac monitor, evaluation for ICD). Transition to [facility] to further optimize her COPD management and assist her with medication costs.    Pricila Stroud, APRN-CNP

## 2023-12-09 NOTE — NURSING NOTE
2100: Nursing Admission Note:    Trinity Hernandez is a 73 y.o. female admitted to [facility] diagnoses: COPD/acute COPD exacerbation, acute on chronic hypoxic respiratory failure, pulmonary sarcoidosis.  Alert and oriented x 3 and engaged in her care.  She noted she is tired of being ill and motivated to heal and engage in [facility] post-discharge from [facility] with goals to manage her COPD and symptomology and to explore options to better afford medications.  She noted that she cannot have blood drawn unless it is through the port, as she notes her veins are not accessible/has right side port that would need access for blood draw  O2 at 3 L/Min NC  IV Solumedrol IM Q12 hr   Azithromycin three times a week  Nebulizer treatment  See admission documentation per Pricila Stroud NP   Patient with no complaint of pain  No pedal edema per medic  Breathing calm, no distress, wheezing reported per medic/clear, thick phlegm  She declined nebulizer or inhaler administration this evening  She is aware of medication and dosages  Education: safety and comfort  Lives with spouse  Safety: using nonskid footwear, well lit room, clear path, sitting and range of motion of feet at ankles to promote circulation if lightheaded or dizzy, before arises  Comfort: report pain and describe pain and use as needed analgesic and non-pharmacological pain and stress relief techniques  Admission team: Medic: Saul BARKSDALE/Pharmacist: Kevyn Dubois/APN: Pricila Stroud/RN: June Prado)

## 2023-12-09 NOTE — CARE PLAN
The patient's goals for the shift include  go home safely    The clinical goals for the shift include Patient will have no respiratory distress

## 2023-12-09 NOTE — PROGRESS NOTES
Subjective   Trinity Hernandez is a 73 y.o. female on hospital day 1     Concerned about her breathing and not receiving IV antibiotics.  Still feels pretty short of breath, cough productive of some mucus.  Took her aerosol treatment earlier today.  States that she is not on daily prednisone and only takes it during acute flareups.    Objective     Exam     Vitals:    12/09/23 0546 12/09/23 0723 12/09/23 1101 12/09/23 1138   BP:    150/82   Pulse: 74 106 98 105   Resp: 25 23 (!) 27 21   Temp:    36.5 °C (97.7 °F)   SpO2: 97% 95% 96% 94%   Weight:       Height:          Intake/Output last 3 shifts:  No intake/output data recorded.    Physical Exam   Done by candi Mckeon via Zoom.   sitting in chair, able to speak in short sentences.  Intermittent cough noticed.  No distress.  Alert awake oriented.  Lungs with diffuse scattered rhonchi, no wheezes.  Regular rate and rhythm  No lower extremity edema.      Medications   arformoterol, 15 mcg, nebulization, BID  aspirin, 81 mg, oral, Daily  [START ON 12/11/2023] azithromycin, 250 mg, oral, Once per day on Mon Wed Fri  clopidogrel, 75 mg, oral, Daily  enoxaparin, 40 mg, subcutaneous, Daily  [START ON 12/11/2023] estradiol, 1 g, vaginal, Once per day on Mon Thu  ezetimibe, 10 mg, oral, Daily  ferrous sulfate (325 mg ferrous sulfate), 65 mg of iron, oral, Daily  furosemide, 20 mg, oral, q48h  guaiFENesin, 1,200 mg, oral, BID  ipratropium, 0.5 mg, nebulization, 4x daily  levothyroxine, 50 mcg, oral, Daily  losartan, 50 mg, oral, BID  magnesium oxide, 400 mg, oral, Daily  methylPREDNISolone sodium succinate (PF), 40 mg, intramuscular, BID  metoprolol succinate XL, 75 mg, oral, Daily  multivitamin, 1 tablet, oral, Daily  oxygen, , inhalation, Continuous - 02/gases  sodium chloride 0.9%, 10 mL, intravenous, Daily  tiotropium, 2 Inhalation, inhalation, Daily       PRN medications: acetaminophen, albuterol, melatonin, nitroglycerin, sodium chloride 0.9%       Labs     All new  "labs reviewed:  some of the basic labs as follows -     Results from last 7 days   Lab Units 12/08/23  0626 12/07/23  0611 12/05/23  1404   WBC AUTO x10*3/uL 13.0* 12.4* 13.4*   HEMOGLOBIN g/dL 10.8* 11.0* 12.0   HEMATOCRIT % 35.0* 35.1* 38.3   PLATELETS AUTO x10*3/uL 253 240 255   NEUTROS PCT AUTO %  --   --  83.3   LYMPHS PCT AUTO %  --   --  9.0   MONOS PCT AUTO %  --   --  3.2   EOS PCT AUTO %  --   --  2.8          Results from last 72 hours   Lab Units 12/08/23  0625 12/07/23  0611   SODIUM mmol/L 136 140   POTASSIUM mmol/L 4.3 3.9   CHLORIDE mmol/L 102 105   CO2 mmol/L 27 29   BUN mg/dL 28* 26*   CREATININE mg/dL 0.88 0.87     Results from last 72 hours   Lab Units 12/08/23  0625 12/07/23  0611   ALBUMIN g/dL 3.7 3.6     Results from last 72 hours   Lab Units 12/08/23  0625 12/07/23  0611   GLUCOSE mg/dL 211* 83         No results found for: \"TR1\"  Lab Results   Component Value Date    URINECULTURE >100,000 Escherichia coli (A) 11/16/2023    URINECULTURE >100,000 Escherichia coli (A) 11/16/2023            Imaging   XR chest 1 view  Narrative: STUDY:  Chest Radiograph;  12/5/23 at 1:45 PM  INDICATION:  Shortness of breath.  COMPARISON:  Chest XR 11/17/23.  CT chest 10/5/2023.  ACCESSION NUMBER(S):  [id number]  ORDERING CLINICIAN:  FANY HARDWICK  TECHNIQUE:  Frontal chest was obtained at 1345 hours.  FINDINGS:  CARDIOMEDIASTINAL SILHOUETTE:  Cardiac size is normal.  Prominence of the ascending thoracic aorta.   Right chest port with catheter tip in the SVC.     LUNGS:  Left lung is clear.  Chronic blunting of the right costophrenic angle  with hazy region of airspace opacity right lung base.     ABDOMEN:  No remarkable upper abdominal findings.     BONES:  Degenerative changes mid and lower thoracic spine.  Impression: Chronic blunting right costophrenic angle with hazy right lower lobe  airspace opacity likely represents chronic parenchymal changes.  No  definitive regions of acute airspace " consolidation.  Dilatation of the ascending thoracic aorta is unchanged.  Signed by Duke Sneed MD  ECG 12 lead  Normal sinus rhythm  Left bundle branch block  Abnormal ECG  When compared with ECG of 17-NOV-2023 15:06, (unconfirmed)  No significant change was found     No results found for this or any previous visit from the past 1095 days.     Encounter Date: 12/05/23   ECG 12 lead   Result Value    Ventricular Rate 84    Atrial Rate 84    CT Interval 130    QRS Duration 136    QT Interval 406    QTC Calculation(Bazett) 479    P Axis 55    R Axis 66    T Axis 98    QRS Count 14    Q Onset 214    P Onset 149    P Offset 194    T Offset 417    QTC Fredericia 454    Narrative    Normal sinus rhythm  Left bundle branch block  Abnormal ECG  When compared with ECG of 17-NOV-2023 15:06, (unconfirmed)  No significant change was found          Assessment and Plan   Trinity Hernandez is a 73 y.o. female with h/o  NSVT, ?Paroxysmal Atrial Fibrillation, Chronic Diastolic heart Failure, HTN, AAA, HLD, COPD, sarcoidosis, chronic Hypoxic Respiratory Failure 2/2 Pulmonary Sarcoidosis (on 2L at night), Hypothyroidism and other medical problems with multiple recent hospitalizations for worsening shortness of breath, treated for pneumonia and discharged home most recently on 12/4/2023 with a course of Augmentin was readmitted to [facility] with concerns for worsening shortness of breath.  She was started on ceftriaxone, Solu-Medrol and rest of her home medications.  Given negative Pro-Adrien antibiotics were discontinued and she was planned for slow steroid taper and admitted to hospital at home.      Acute on chronic hypoxic respiratory failure, secondary to COPD exacerbation/sarcoidosis.  Patient with multiple recent admissions for the same, has been on different antibiotic courses.  Chest x-ray with chronic parenchymal changes no definite evidence of an infiltrate.  She has been afebrile, leukocytosis likely secondary to steroids,  Pro-Adrien negative at 0.03  Influenza/COVID-negative.  Currently requiring 2 to 3 L nasal cannula oxygen, usually only uses it nightly.  Continue Solu-Medrol IM 40 mg twice daily, patient with right chest wall port but medics unable to access port so patient is on IM injections.  Will taper to oral prednisone within the next 1 to 2 days based on her clinical improvement.  Continue aerosol, Mucinex, pulmonary hygiene with incentive spirometry and EZ Pap devices.  Discussed with patient that if she continued to have productive cough we will trial acetylcysteine inhalation.  Will need outpatient follow-up with pulmonary.  Will likely need prolonged steroid taper, patient denies being on any chronic steroids at home.  She however is on azithromycin 3 times a week, likely suppressive antibiotic therapy.     H/o Nonsustained VT: Heart rate fairly controlled, intermittently tacky.  Likely also precipitated due to aerosol use.  She was discharged home with a 14-day Zio patch and recommendations for LifeVest in November 2023.    Outpatient follow-up with cardiology next week for ICD evaluation.  Metoprolol succinate increased to 75 mg on that admission, continue.      CAD s/p stent: Aspirin 81 mg, Plavix 75 mg, h/o Thoracic AAA< no acute issues.    Chronic Diastolic Heart Failure: Compensated, no acute issues.  Is on Lasix every other day.    Essential HTN: Continue losartan.  Hypothyroidism: Levothyroxine 50 mcg   Former Smoker: Quit 11/26/2008 (30 pack year)  GI prophylaxis: Start PPI as patient is on steroids.  DVT Lovenox 40 mg subcutaneous daily     Disposition: Will likely need few more days given her symptoms, ideal candidate to be transition to [facility] to help control her symptoms and ensure medication compliance.    Mckinley Tripp MD MPH     Of note the above was done with Dragon dictation system.  Note was proofread to minimize errors.

## 2023-12-09 NOTE — DISCHARGE SUMMARY
Discharge Diagnosis  COPD with acute lower respiratory infection (CMS/[facility])    Issues Requiring Follow-Up  COPD and pulmonary fibrosis follow-up with her pulmonologist  [facility] for medication cost assistance    Test Results Pending At Discharge  Pending Labs       No current pending labs.            Hospital Course  Trinity Hernandez is a 73 y.o. female with PMHx significant for CAD (s/p stent), NSVT, Paroxysmal Atrial Fibrillation, Chronic Diastolic heart Failure, HTN, AAA, HLD, COPD, Chronic Hypoxic Respiratory Failure 2/2 Pulmonary Sarcoidosis (on 2L at night), Hypothyroidism, Migraines, Hiatal Hernia, and Diverticulosis, who presented to [facility] due to increased shortness of breath. Admitted for management of acute on chronic respiratory failure. Of note, patient had recently been discharged from [facility] where she was treated with antibiotics for PNA and discharged on amoxicillin.     Patiently initially treated for presumed pneumonia with IV Rocephin & Azithromycin but antibiotics were stopped based on CXR suggestive of chronic changes likely due to sarcoidosis. IV steroids were started for treatment of acute COPD exacerbation due to persistent end-expiratory wheezing. Wheezing did improve but did not tolerate weaning from the 3L O2. Patient briefly transitioned to prednisone but wheezing again worsened acutely. She was placed back on IV Solumedrol and frequency was adjusted to q8h with good improvement. Patient has remained on 3L but tolerated Solumedrol 40 mg tapered to q12h.     I suspect she will need a prolonged steroid taper and slow weaning of supplemental oxygen. Hospital at home will continue management of acute on chronic hypoxic respiratory failure 2/2 COPD exacerbation. She is to be continued on IV Solumedrol q12h (or prednisone 40 mg q12h) for an additional 2-4 days while she undergoes weaning of her supplemental oxygen. Once this is achieved continue with once daily prednisone 40 mg and slowly taper  to her chronic dose of prednisone 20 mg.     Close follow-up with pulmonology and cardiology (recently had cardiac monitor, evaluation for ICD). Transition to [facility] to further optimize her COPD management and assist her with medication costs.    Pertinent Physical Exam At Time of Discharge  Physical Exam  Vitals reviewed.   Constitutional:       Appearance: Normal appearance.   HENT:      Head: Normocephalic.      Nose: Nose normal.      Mouth/Throat:      Mouth: Mucous membranes are moist.   Eyes:      Extraocular Movements: Extraocular movements intact.      Pupils: Pupils are equal, round, and reactive to light.   Cardiovascular:      Rate and Rhythm: Normal rate and regular rhythm.   Pulmonary:      Effort: Respiratory distress present.      Breath sounds: Mild Wheezing and rhonchi present. On 3.5 L o2  Abdominal:      General: Bowel sounds are normal. There is no distension.      Palpations: Abdomen is soft.      Tenderness: There is no abdominal tenderness.   Musculoskeletal:         General: Normal range of motion.      Cervical back: Normal range of motion.   Skin:     General: Skin is warm and dry.      Capillary Refill: Capillary refill takes less than 2 seconds.   Neurological:      General: No focal deficit present.      Mental Status: She is alert and oriented to person, place, and time.   Psychiatric:         Mood and Affect: Mood normal.         Behavior: Behavior normal.     Home Medications     Medication List      START taking these medications     azithromycin 250 mg tablet; Commonly known as: Zithromax; Take 1 tablet   (250 mg) by mouth 3 times a week.     CONTINUE taking these medications     acetaminophen 325 mg tablet; Commonly known as: Tylenol   albuterol 90 mcg/actuation inhaler   arformoterol 15 mcg/2 mL nebulizer solution; Commonly known as: Brovana   aspirin 81 mg EC tablet   budesonide 0.5 mg/2 mL nebulizer solution; Commonly known as: Pulmicort   clopidogrel 75 mg tablet; Commonly known  as: Plavix   estradiol 0.01 % (0.1 mg/gram) vaginal cream; Commonly known as: Estrace   ezetimibe 10 mg tablet; Commonly known as: Zetia; Take 1 tablet (10 mg)   by mouth once daily.   ferrous sulfate (325 mg ferrous sulfate) tablet   furosemide 20 mg tablet; Commonly known as: Lasix   ipratropium 0.02 % nebulizer solution; Commonly known as: Atrovent   levothyroxine 50 mcg tablet; Commonly known as: Synthroid, Levoxyl; Take   1 tablet (50 mcg) by mouth once daily.   LORazepam 0.5 mg tablet; Commonly known as: Ativan; Take 1 tablet (0.5   mg) by mouth once daily as needed for anxiety.   losartan 25 mg tablet; Commonly known as: Cozaar   magnesium oxide 400 mg (241.3 mg magnesium) tablet; Commonly known as:   Mag-Ox   melatonin 10 mg capsule   metoprolol succinate XL 50 mg 24 hr tablet; Commonly known as: Toprol-XL   multivitamin tablet   nitroglycerin 0.4 mg SL tablet; Commonly known as: Nitrostat   simethicone 125 mg capsule; Commonly known as: Mylicon,Gas-X   Spiriva Respimat 2.5 mcg/actuation inhaler; Generic drug: tiotropium       Outpatient Follow-Up  Future Appointments   Date Time Provider Department Center   12/14/2023 11:30 AM Lisette Samaniego DO [id number]   12/18/2023  1:00 PM INF 01 [facility]       Luis Neri DO

## 2023-12-09 NOTE — CARE PLAN
The patient's goals for the shift include O2 sat above 92%    The clinical goals for the shift include better breathing and be more active    Over the shift, the patient did make progress toward the following goals: O2 sat remained above 92%/breathing unlabored/no report of shortness of breath/tolerate O2 as ordered/adhere to medication and treatment regimen.    Barriers to progression include acute on chronic conditions/medications are expensive and she looks forward to healthy at home program to address this SDH.     Recommendations to address these barriers include educate about symptom management with teach back technique/encourage her to be part of her health care team/include spouse in education, as he is her support person.

## 2023-12-09 NOTE — CARE PLAN
-new H@H admission that came late last evening for COPD exacerbation     -plan from last night was to continue her home breathing treatments/inhaler regimens and then solu-medrol BID   -she only has a port access currently and since medics are not able to administer through the port I did confirm that the solu-medrol could be given IM instead and pt was agreeable to this too     -verified all new orders and address to send new medications to     -new medications that were sent to patient's house this morning:  Lovenox 40mg daily --> #3 syringes  Mucinex ER 1,200mg BID --> #12 600mg tabs   Solu-medrol 40mg/ml BID --> #4 vials with #4 1 ml syringes and 50ml SW for dilution   Azithromycin 250mg MWF --> #2 tabs   Sharps container  Saline flushes --> #8  Alcohol swabs     -new medications were picked up from [facility] and delivered to patient's home via [facility] @1038 and signed for by Trinity.

## 2023-12-09 NOTE — NURSING NOTE
AM Zoom complete.  Pt with out IV access.  Has Mediport which is not accessed.  Pt refusing peripheral IV or IV lab draw.  MD aware.

## 2023-12-10 LAB
FLUAV RNA RESP QL NAA+PROBE: NOT DETECTED
FLUBV RNA RESP QL NAA+PROBE: NOT DETECTED
SARS-COV-2 RNA RESP QL NAA+PROBE: NOT DETECTED

## 2023-12-10 PROCEDURE — 94660 CPAP INITIATION&MGMT: CPT

## 2023-12-10 PROCEDURE — 2500000002 HC RX 250 W HCPCS SELF ADMINISTERED DRUGS (ALT 637 FOR MEDICARE OP, ALT 636 FOR OP/ED)

## 2023-12-10 PROCEDURE — 2500000005 HC RX 250 GENERAL PHARMACY W/O HCPCS: Performed by: EMERGENCY MEDICINE

## 2023-12-10 PROCEDURE — 1200000002 HC GENERAL ROOM WITH TELEMETRY DAILY: Mod: IPSPLIT

## 2023-12-10 PROCEDURE — 2500000004 HC RX 250 GENERAL PHARMACY W/ HCPCS (ALT 636 FOR OP/ED): Mod: IPSPLIT | Performed by: NURSE PRACTITIONER

## 2023-12-10 PROCEDURE — 94762 N-INVAS EAR/PLS OXIMTRY CONT: CPT | Mod: IPSPLIT

## 2023-12-10 PROCEDURE — 2500000004 HC RX 250 GENERAL PHARMACY W/ HCPCS (ALT 636 FOR OP/ED): Performed by: EMERGENCY MEDICINE

## 2023-12-10 PROCEDURE — 2500000002 HC RX 250 W HCPCS SELF ADMINISTERED DRUGS (ALT 637 FOR MEDICARE OP, ALT 636 FOR OP/ED): Mod: IPSPLIT

## 2023-12-10 PROCEDURE — 94761 N-INVAS EAR/PLS OXIMETRY MLT: CPT

## 2023-12-10 PROCEDURE — 9420000001 HC RT PATIENT EDUCATION 5 MIN

## 2023-12-10 PROCEDURE — 94640 AIRWAY INHALATION TREATMENT: CPT

## 2023-12-10 PROCEDURE — 94664 DEMO&/EVAL PT USE INHALER: CPT | Mod: IPSPLIT

## 2023-12-10 PROCEDURE — 2500000002 HC RX 250 W HCPCS SELF ADMINISTERED DRUGS (ALT 637 FOR MEDICARE OP, ALT 636 FOR OP/ED): Mod: IPSPLIT | Performed by: NURSE PRACTITIONER

## 2023-12-10 PROCEDURE — 94640 AIRWAY INHALATION TREATMENT: CPT | Mod: IPSPLIT

## 2023-12-10 PROCEDURE — 2500000005 HC RX 250 GENERAL PHARMACY W/O HCPCS: Performed by: NURSE PRACTITIONER

## 2023-12-10 PROCEDURE — 99222 1ST HOSP IP/OBS MODERATE 55: CPT | Performed by: NURSE PRACTITIONER

## 2023-12-10 PROCEDURE — 87636 SARSCOV2 & INF A&B AMP PRB: CPT | Performed by: EMERGENCY MEDICINE

## 2023-12-10 PROCEDURE — 2500000002 HC RX 250 W HCPCS SELF ADMINISTERED DRUGS (ALT 637 FOR MEDICARE OP, ALT 636 FOR OP/ED): Mod: IPSPLIT | Performed by: INTERNAL MEDICINE

## 2023-12-10 PROCEDURE — 96372 THER/PROPH/DIAG INJ SC/IM: CPT | Mod: IPSPLIT | Performed by: NURSE PRACTITIONER

## 2023-12-10 PROCEDURE — 2500000001 HC RX 250 WO HCPCS SELF ADMINISTERED DRUGS (ALT 637 FOR MEDICARE OP): Mod: IPSPLIT | Performed by: NURSE PRACTITIONER

## 2023-12-10 PROCEDURE — 96367 TX/PROPH/DG ADDL SEQ IV INF: CPT

## 2023-12-10 PROCEDURE — 5A09357 ASSISTANCE WITH RESPIRATORY VENTILATION, LESS THAN 24 CONSECUTIVE HOURS, CONTINUOUS POSITIVE AIRWAY PRESSURE: ICD-10-PCS | Performed by: INTERNAL MEDICINE

## 2023-12-10 RX ORDER — ALBUTEROL SULFATE 90 UG/1
2 AEROSOL, METERED RESPIRATORY (INHALATION) EVERY 6 HOURS PRN
Status: DISCONTINUED | OUTPATIENT
Start: 2023-12-10 | End: 2023-12-11

## 2023-12-10 RX ORDER — BUDESONIDE 0.5 MG/2ML
INHALANT ORAL
Status: COMPLETED
Start: 2023-12-10 | End: 2023-12-10

## 2023-12-10 RX ORDER — POLYETHYLENE GLYCOL 3350 17 G/17G
17 POWDER, FOR SOLUTION ORAL DAILY
Status: DISCONTINUED | OUTPATIENT
Start: 2023-12-10 | End: 2023-12-19 | Stop reason: HOSPADM

## 2023-12-10 RX ORDER — ASPIRIN 81 MG/1
81 TABLET ORAL DAILY
Status: DISCONTINUED | OUTPATIENT
Start: 2023-12-10 | End: 2023-12-10

## 2023-12-10 RX ORDER — GUAIFENESIN/DEXTROMETHORPHAN 100-10MG/5
5 SYRUP ORAL EVERY 4 HOURS PRN
Status: DISCONTINUED | OUTPATIENT
Start: 2023-12-10 | End: 2023-12-19 | Stop reason: HOSPADM

## 2023-12-10 RX ORDER — BISMUTH SUBSALICYLATE 262 MG
1 TABLET,CHEWABLE ORAL
Status: DISCONTINUED | OUTPATIENT
Start: 2023-12-10 | End: 2023-12-10

## 2023-12-10 RX ORDER — IPRATROPIUM BROMIDE AND ALBUTEROL SULFATE 2.5; .5 MG/3ML; MG/3ML
3 SOLUTION RESPIRATORY (INHALATION) EVERY 2 HOUR PRN
Status: DISCONTINUED | OUTPATIENT
Start: 2023-12-10 | End: 2023-12-19 | Stop reason: HOSPADM

## 2023-12-10 RX ORDER — LORAZEPAM 2 MG/ML
1 INJECTION INTRAMUSCULAR ONCE
Status: COMPLETED | OUTPATIENT
Start: 2023-12-10 | End: 2023-12-10

## 2023-12-10 RX ORDER — ALBUTEROL SULFATE 2.5 MG/.5ML
2.5 SOLUTION RESPIRATORY (INHALATION) ONCE
Status: DISCONTINUED | OUTPATIENT
Start: 2023-12-10 | End: 2023-12-10

## 2023-12-10 RX ORDER — LEVOTHYROXINE SODIUM 50 UG/1
50 TABLET ORAL DAILY
Status: DISCONTINUED | OUTPATIENT
Start: 2023-12-10 | End: 2023-12-19 | Stop reason: HOSPADM

## 2023-12-10 RX ORDER — CLOPIDOGREL BISULFATE 75 MG/1
75 TABLET ORAL DAILY
Status: DISCONTINUED | OUTPATIENT
Start: 2023-12-10 | End: 2023-12-19 | Stop reason: HOSPADM

## 2023-12-10 RX ORDER — CEFTRIAXONE 2 G/50ML
2 INJECTION, SOLUTION INTRAVENOUS ONCE
Status: COMPLETED | OUTPATIENT
Start: 2023-12-10 | End: 2023-12-10

## 2023-12-10 RX ORDER — IPRATROPIUM BROMIDE AND ALBUTEROL SULFATE 2.5; .5 MG/3ML; MG/3ML
SOLUTION RESPIRATORY (INHALATION)
Status: COMPLETED
Start: 2023-12-10 | End: 2023-12-10

## 2023-12-10 RX ORDER — NITROGLYCERIN 0.4 MG/1
0.4 TABLET SUBLINGUAL EVERY 5 MIN PRN
Status: DISCONTINUED | OUTPATIENT
Start: 2023-12-10 | End: 2023-12-19 | Stop reason: HOSPADM

## 2023-12-10 RX ORDER — LOSARTAN POTASSIUM 50 MG/1
50 TABLET ORAL 2 TIMES DAILY
Status: DISCONTINUED | OUTPATIENT
Start: 2023-12-10 | End: 2023-12-19 | Stop reason: HOSPADM

## 2023-12-10 RX ORDER — FERROUS SULFATE 325(65) MG
65 TABLET ORAL DAILY
Status: DISCONTINUED | OUTPATIENT
Start: 2023-12-10 | End: 2023-12-19 | Stop reason: HOSPADM

## 2023-12-10 RX ORDER — MULTIVIT-MIN/IRON FUM/FOLIC AC 7.5 MG-4
1 TABLET ORAL DAILY
Status: DISCONTINUED | OUTPATIENT
Start: 2023-12-10 | End: 2023-12-19 | Stop reason: HOSPADM

## 2023-12-10 RX ORDER — ENOXAPARIN SODIUM 100 MG/ML
40 INJECTION SUBCUTANEOUS DAILY
Status: DISCONTINUED | OUTPATIENT
Start: 2023-12-10 | End: 2023-12-19 | Stop reason: HOSPADM

## 2023-12-10 RX ORDER — CLOPIDOGREL BISULFATE 75 MG/1
75 TABLET ORAL DAILY
Status: DISCONTINUED | OUTPATIENT
Start: 2023-12-10 | End: 2023-12-10

## 2023-12-10 RX ORDER — GUAIFENESIN 600 MG/1
1200 TABLET, EXTENDED RELEASE ORAL 2 TIMES DAILY
Status: DISCONTINUED | OUTPATIENT
Start: 2023-12-10 | End: 2023-12-19 | Stop reason: HOSPADM

## 2023-12-10 RX ORDER — ACETAMINOPHEN 500 MG
10 TABLET ORAL NIGHTLY PRN
Status: DISCONTINUED | OUTPATIENT
Start: 2023-12-10 | End: 2023-12-19 | Stop reason: HOSPADM

## 2023-12-10 RX ORDER — FUROSEMIDE 20 MG/1
20 TABLET ORAL
Status: DISCONTINUED | OUTPATIENT
Start: 2023-12-11 | End: 2023-12-16

## 2023-12-10 RX ORDER — SIMETHICONE 80 MG
80 TABLET,CHEWABLE ORAL 4 TIMES DAILY PRN
Status: DISCONTINUED | OUTPATIENT
Start: 2023-12-10 | End: 2023-12-19 | Stop reason: HOSPADM

## 2023-12-10 RX ORDER — AZITHROMYCIN 250 MG/1
250 TABLET, FILM COATED ORAL 3 TIMES WEEKLY
Status: DISCONTINUED | OUTPATIENT
Start: 2023-12-11 | End: 2023-12-19 | Stop reason: HOSPADM

## 2023-12-10 RX ORDER — FERROUS SULFATE 325(65) MG
65 TABLET ORAL DAILY
Status: DISCONTINUED | OUTPATIENT
Start: 2023-12-10 | End: 2023-12-10

## 2023-12-10 RX ORDER — ACETAMINOPHEN 325 MG/1
650 TABLET ORAL EVERY 4 HOURS PRN
Status: DISCONTINUED | OUTPATIENT
Start: 2023-12-10 | End: 2023-12-19 | Stop reason: HOSPADM

## 2023-12-10 RX ORDER — IPRATROPIUM BROMIDE AND ALBUTEROL SULFATE 2.5; .5 MG/3ML; MG/3ML
6 SOLUTION RESPIRATORY (INHALATION) ONCE
Status: COMPLETED | OUTPATIENT
Start: 2023-12-10 | End: 2023-12-10

## 2023-12-10 RX ORDER — IPRATROPIUM BROMIDE AND ALBUTEROL SULFATE 2.5; .5 MG/3ML; MG/3ML
3 SOLUTION RESPIRATORY (INHALATION) 4 TIMES DAILY PRN
Status: DISCONTINUED | OUTPATIENT
Start: 2023-12-10 | End: 2023-12-10

## 2023-12-10 RX ORDER — LEVOTHYROXINE SODIUM 50 UG/1
50 TABLET ORAL DAILY
Status: DISCONTINUED | OUTPATIENT
Start: 2023-12-10 | End: 2023-12-10

## 2023-12-10 RX ORDER — ASPIRIN 81 MG/1
81 TABLET ORAL DAILY
Status: DISCONTINUED | OUTPATIENT
Start: 2023-12-10 | End: 2023-12-19 | Stop reason: HOSPADM

## 2023-12-10 RX ORDER — LANOLIN ALCOHOL/MO/W.PET/CERES
400 CREAM (GRAM) TOPICAL DAILY
Status: DISCONTINUED | OUTPATIENT
Start: 2023-12-11 | End: 2023-12-11 | Stop reason: RX

## 2023-12-10 RX ORDER — FORMOTEROL FUMARATE DIHYDRATE 20 UG/2ML
20 SOLUTION RESPIRATORY (INHALATION)
Status: DISCONTINUED | OUTPATIENT
Start: 2023-12-10 | End: 2023-12-11

## 2023-12-10 RX ORDER — ALBUTEROL SULFATE 0.83 MG/ML
SOLUTION RESPIRATORY (INHALATION)
Status: COMPLETED
Start: 2023-12-10 | End: 2023-12-10

## 2023-12-10 RX ORDER — BUDESONIDE 0.5 MG/2ML
0.5 INHALANT ORAL 2 TIMES DAILY
Status: DISCONTINUED | OUTPATIENT
Start: 2023-12-10 | End: 2023-12-19 | Stop reason: HOSPADM

## 2023-12-10 RX ORDER — IPRATROPIUM BROMIDE 0.5 MG/2.5ML
0.5 SOLUTION RESPIRATORY (INHALATION) 4 TIMES DAILY
Status: DISCONTINUED | OUTPATIENT
Start: 2023-12-10 | End: 2023-12-10

## 2023-12-10 RX ORDER — LOSARTAN POTASSIUM 50 MG/1
50 TABLET ORAL 2 TIMES DAILY
Status: DISCONTINUED | OUTPATIENT
Start: 2023-12-10 | End: 2023-12-10

## 2023-12-10 RX ORDER — LEVOTHYROXINE SODIUM 25 UG/1
25 TABLET ORAL
Status: DISCONTINUED | OUTPATIENT
Start: 2023-12-10 | End: 2023-12-10

## 2023-12-10 RX ADMIN — FERROUS SULFATE TAB 325 MG (65 MG ELEMENTAL FE) 1 TABLET: 325 (65 FE) TAB at 11:57

## 2023-12-10 RX ADMIN — IPRATROPIUM BROMIDE AND ALBUTEROL SULFATE 6 ML: .5; 3 SOLUTION RESPIRATORY (INHALATION) at 01:08

## 2023-12-10 RX ADMIN — BUDESONIDE INHALATION 0.5 MG: 0.5 SUSPENSION RESPIRATORY (INHALATION) at 21:07

## 2023-12-10 RX ADMIN — GUAIFENESIN 1200 MG: 600 TABLET, EXTENDED RELEASE ORAL at 20:42

## 2023-12-10 RX ADMIN — METHYLPREDNISOLONE SODIUM SUCCINATE 40 MG: 40 INJECTION, POWDER, FOR SOLUTION INTRAMUSCULAR; INTRAVENOUS at 21:43

## 2023-12-10 RX ADMIN — ALBUTEROL SULFATE 2.5 MG: 2.5 SOLUTION RESPIRATORY (INHALATION) at 01:09

## 2023-12-10 RX ADMIN — LORAZEPAM 1 MG: 2 INJECTION INTRAMUSCULAR; INTRAVENOUS at 21:41

## 2023-12-10 RX ADMIN — IPRATROPIUM BROMIDE AND ALBUTEROL SULFATE 6 ML: 2.5; .5 SOLUTION RESPIRATORY (INHALATION) at 01:08

## 2023-12-10 RX ADMIN — IPRATROPIUM BROMIDE AND ALBUTEROL SULFATE 3 ML: .5; 3 SOLUTION RESPIRATORY (INHALATION) at 21:09

## 2023-12-10 RX ADMIN — IPRATROPIUM BROMIDE AND ALBUTEROL SULFATE 3 ML: .5; 3 SOLUTION RESPIRATORY (INHALATION) at 15:36

## 2023-12-10 RX ADMIN — ASPIRIN 81 MG: 81 TABLET, COATED ORAL at 11:55

## 2023-12-10 RX ADMIN — CLOPIDOGREL BISULFATE 75 MG: 75 TABLET ORAL at 11:56

## 2023-12-10 RX ADMIN — Medication 40 PERCENT: at 01:21

## 2023-12-10 RX ADMIN — METHYLPREDNISOLONE SODIUM SUCCINATE 40 MG: 40 INJECTION, POWDER, FOR SOLUTION INTRAMUSCULAR; INTRAVENOUS at 10:49

## 2023-12-10 RX ADMIN — ENOXAPARIN SODIUM 40 MG: 100 INJECTION SUBCUTANEOUS at 10:49

## 2023-12-10 RX ADMIN — BUDESONIDE INHALATION 0.5 MG: 0.5 SUSPENSION RESPIRATORY (INHALATION) at 09:55

## 2023-12-10 RX ADMIN — LEVOTHYROXINE SODIUM 50 MCG: 50 TABLET ORAL at 11:58

## 2023-12-10 RX ADMIN — Medication 4 L/MIN: at 08:00

## 2023-12-10 RX ADMIN — POLYETHYLENE GLYCOL 3350 17 G: 17 POWDER, FOR SOLUTION ORAL at 12:10

## 2023-12-10 RX ADMIN — AZITHROMYCIN MONOHYDRATE 500 MG: 500 INJECTION, POWDER, LYOPHILIZED, FOR SOLUTION INTRAVENOUS at 01:00

## 2023-12-10 RX ADMIN — LOSARTAN POTASSIUM 50 MG: 50 TABLET, FILM COATED ORAL at 20:42

## 2023-12-10 RX ADMIN — GUAIFENESIN 1200 MG: 600 TABLET, EXTENDED RELEASE ORAL at 10:49

## 2023-12-10 RX ADMIN — LOSARTAN POTASSIUM 50 MG: 50 TABLET, FILM COATED ORAL at 11:55

## 2023-12-10 RX ADMIN — METOPROLOL SUCCINATE 75 MG: 50 TABLET, EXTENDED RELEASE ORAL at 11:54

## 2023-12-10 RX ADMIN — TIOTROPIUM BROMIDE INHALATION SPRAY 2 PUFF: 3.12 SPRAY, METERED RESPIRATORY (INHALATION) at 10:30

## 2023-12-10 RX ADMIN — CEFTRIAXONE 2 G: 2 INJECTION, SOLUTION INTRAVENOUS at 01:32

## 2023-12-10 RX ADMIN — MULTIPLE VITAMINS W/ MINERALS TAB 1 TABLET: TAB at 10:49

## 2023-12-10 RX ADMIN — SODIUM CHLORIDE 1000 ML: 9 INJECTION, SOLUTION INTRAVENOUS at 01:26

## 2023-12-10 SDOH — SOCIAL STABILITY: SOCIAL INSECURITY: ARE THERE ANY APPARENT SIGNS OF INJURIES/BEHAVIORS THAT COULD BE RELATED TO ABUSE/NEGLECT?: NO

## 2023-12-10 SDOH — SOCIAL STABILITY: SOCIAL INSECURITY: DO YOU FEEL UNSAFE GOING BACK TO THE PLACE WHERE YOU ARE LIVING?: NO

## 2023-12-10 SDOH — SOCIAL STABILITY: SOCIAL INSECURITY: ABUSE: ADULT

## 2023-12-10 SDOH — SOCIAL STABILITY: SOCIAL INSECURITY: DOES ANYONE TRY TO KEEP YOU FROM HAVING/CONTACTING OTHER FRIENDS OR DOING THINGS OUTSIDE YOUR HOME?: NO

## 2023-12-10 SDOH — SOCIAL STABILITY: SOCIAL INSECURITY: DO YOU FEEL ANYONE HAS EXPLOITED OR TAKEN ADVANTAGE OF YOU FINANCIALLY OR OF YOUR PERSONAL PROPERTY?: NO

## 2023-12-10 SDOH — SOCIAL STABILITY: SOCIAL INSECURITY: WERE YOU ABLE TO COMPLETE ALL THE BEHAVIORAL HEALTH SCREENINGS?: YES

## 2023-12-10 SDOH — SOCIAL STABILITY: SOCIAL INSECURITY: ARE YOU OR HAVE YOU BEEN THREATENED OR ABUSED PHYSICALLY, EMOTIONALLY, OR SEXUALLY BY ANYONE?: NO

## 2023-12-10 SDOH — SOCIAL STABILITY: SOCIAL INSECURITY: HAS ANYONE EVER THREATENED TO HURT YOUR FAMILY OR YOUR PETS?: NO

## 2023-12-10 SDOH — SOCIAL STABILITY: SOCIAL INSECURITY: HAVE YOU HAD THOUGHTS OF HARMING ANYONE ELSE?: NO

## 2023-12-10 ASSESSMENT — COGNITIVE AND FUNCTIONAL STATUS - GENERAL
CLIMB 3 TO 5 STEPS WITH RAILING: A LITTLE
DAILY ACTIVITIY SCORE: 22
PATIENT BASELINE BEDBOUND: NO
HELP NEEDED FOR BATHING: A LITTLE
TOILETING: A LITTLE
MOBILITY SCORE: 23

## 2023-12-10 ASSESSMENT — PATIENT HEALTH QUESTIONNAIRE - PHQ9
SUM OF ALL RESPONSES TO PHQ9 QUESTIONS 1 & 2: 0
2. FEELING DOWN, DEPRESSED OR HOPELESS: NOT AT ALL
1. LITTLE INTEREST OR PLEASURE IN DOING THINGS: NOT AT ALL

## 2023-12-10 ASSESSMENT — ACTIVITIES OF DAILY LIVING (ADL)
GROOMING: INDEPENDENT
HEARING - RIGHT EAR: FUNCTIONAL
BATHING: NEEDS ASSISTANCE
JUDGMENT_ADEQUATE_SAFELY_COMPLETE_DAILY_ACTIVITIES: YES
LACK_OF_TRANSPORTATION: NO
WALKS IN HOME: INDEPENDENT
TOILETING: NEEDS ASSISTANCE
HEARING - LEFT EAR: FUNCTIONAL
ADEQUATE_TO_COMPLETE_ADL: YES
PATIENT'S MEMORY ADEQUATE TO SAFELY COMPLETE DAILY ACTIVITIES?: YES
DRESSING YOURSELF: INDEPENDENT
FEEDING YOURSELF: INDEPENDENT

## 2023-12-10 ASSESSMENT — LIFESTYLE VARIABLES
HOW OFTEN DO YOU HAVE 6 OR MORE DRINKS ON ONE OCCASION: NEVER
AUDIT-C TOTAL SCORE: 0
SKIP TO QUESTIONS 9-10: 1
HOW OFTEN DO YOU HAVE A DRINK CONTAINING ALCOHOL: NEVER
HOW MANY STANDARD DRINKS CONTAINING ALCOHOL DO YOU HAVE ON A TYPICAL DAY: PATIENT DOES NOT DRINK
AUDIT-C TOTAL SCORE: 0

## 2023-12-10 ASSESSMENT — PAIN SCALES - GENERAL
PAINLEVEL_OUTOF10: 0 - NO PAIN

## 2023-12-10 ASSESSMENT — PAIN - FUNCTIONAL ASSESSMENT: PAIN_FUNCTIONAL_ASSESSMENT: 0-10

## 2023-12-10 NOTE — NURSING NOTE
Talked with pt. Pt SOB at rest, sounds very congested and wet over the phone. I called 911 for them to check pt out and see if she needs to go back to the hospital, Pts pulse ox is 95-96% on 3.5 liters but pt appears in distress by the way she sounds over the phone, Pt in in agreement. Will continue to monitor pt. JOSH Borjas RN

## 2023-12-10 NOTE — CARE PLAN
Spoke with Dr Gunn on telephone and he said he would discontinue perforomist because patient does not want to take that. She brought in from home her Arfomoterol  she takes twice a day and Dr Gunn said he would put in an order she can use her home medication.

## 2023-12-10 NOTE — H&P
History of Present Illness  Trinity Hernandez is a 73 y.o. female  with PMHx significant for CAD, CHF, COPD, cardiac stents, HTN, palpitations, a-fib, AAA, asthma, hypothyroidism, migraines, hiatal hernia, diverticulosis,  pulmonary sarcoidosis, chronic respiratory failure, HLD who presented to [facility] due to increased shortness of breath. She was recently discharged from [facility] where she was treated for PNA with amoxicillin. She was subsequently discharged from [place] as well and went home on the hospital at home program where she had ongoing treatment with azithromycin three times per week and a slow steroid taper. Trinity expressed that she had received a call yesterday evening from the program and because of her lung sounds, was told to go to the emergency room for treatment. Denies associated symptoms including CP, palpitations, fever, chills, changes in vision/hearing, runny nose, sore throat. She was seen in the ED, initiated on Bipap, administered duoneb, solumedrol, azithromycin and rocephin in the ED and subsequently transferred to the medical floor for continued observation and treatment.    12 Point ROS negative unless noted in above HPI    ED Course:     Past Medical History  Past Medical History:   Diagnosis Date    Abdominal bloating 05/04/2023    Diverticulitis of small intestine without perforation or abscess without bleeding     Diverticulitis, intestine, small    Dizzy 11/20/2023    Essential (primary) hypertension 02/08/2017    HTN (hypertension), benign    Influenza B 05/08/2015    Formatting of this note might be different from the original. Completed Tamiflu Continue droplet precautions.    Nausea 11/16/2018    Personal history of other endocrine, nutritional and metabolic disease 02/08/2017    History of hypothyroidism    Personal history of other venous thrombosis and embolism     History of deep venous thrombosis       Surgical History  Past Surgical History:   Procedure Laterality Date     ABDOMINAL ADHESION SURGERY  05/16/2017    Laparoscopic Lysis Of Intestinal Adhesions    CERVICAL FUSION  05/16/2017    Cervical Vertebral Fusion    CHOLECYSTECTOMY  05/16/2017    Cholecystectomy    COLECTOMY PARTIAL / TOTAL  05/16/2017    Partial Colectomy - Sigmoid    CORONARY ANGIOPLASTY WITH STENT PLACEMENT      CT ANGIO NECK  05/21/2020    CT NECK ANGIO W AND WO IV CONTRAST 5/21/2020 [facility]    CT HEAD ANGIO W AND WO IV CONTRAST  05/21/2020    CT HEAD ANGIO W AND WO IV CONTRAST 5/21/2020 [facility]    HERNIA REPAIR  05/16/2017    Hernia Repair    HYSTERECTOMY  10/03/2013    Hysterectomy    MR HEAD ANGIO WO IV CONTRAST  05/21/2020    MR HEAD ANGIO WO IV CONTRAST 5/21/2020 [facility]    MR NECK ANGIO WO IV CONTRAST  05/21/2020    MR NECK ANGIO WO IV CONTRAST 5/21/2020 [facility]    OTHER SURGICAL HISTORY  05/16/2017    Thoracoscopy Of Lungs And Pleural Space With Biopsy Of Lung Nodules    OTHER SURGICAL HISTORY  04/15/2019    Esophagogastroduodenoscopy        Social History  She reports that she has quit smoking. Her smoking use included cigarettes. She has never been exposed to tobacco smoke. She has never used smokeless tobacco. No history on file for alcohol use and drug use.    Allergies  Hydrocodone-acetaminophen, Montelukast, Morphine, Nitrofurantoin monohyd/m-cryst, Sulfa (sulfonamide antibiotics), Atorvastatin, Dicyclomine, Fluticasone propion-salmeterol, Levofloxacin, Lisinopril, Nitroimidazoles, Omeprazole, Salmeterol, Simvastatin, Sucralfate, and Umeclidinium     Physical Exam  Constitutional:       Appearance: Normal appearance.   HENT:      Head: Normocephalic.      Nose: Nose normal.      Mouth/Throat:      Mouth: Mucous membranes are moist.   Eyes:      Extraocular Movements: Extraocular movements intact.      Pupils: Pupils are equal, round, and reactive to light.   Cardiovascular:      Rate and Rhythm: Normal rate and regular rhythm.      Pulses:  "Normal pulses.   Pulmonary:      Breath sounds: Wheezing and rhonchi present.      Comments: Conversational dyspnea  Abdominal:      General: Bowel sounds are normal.      Palpations: Abdomen is soft.   Musculoskeletal:      Cervical back: Normal range of motion.      Right lower leg: Edema present.      Left lower leg: Edema present.   Skin:     General: Skin is warm and dry.      Capillary Refill: Capillary refill takes less than 2 seconds.   Neurological:      General: No focal deficit present.      Mental Status: She is alert and oriented to person, place, and time. Mental status is at baseline.   Psychiatric:         Mood and Affect: Mood normal.         Behavior: Behavior normal.          Last Recorded Vitals  Blood pressure 138/74, pulse 84, temperature 36.4 °C (97.6 °F), temperature source Temporal, resp. rate 20, height 1.702 m (5' 7\"), weight 75 kg (165 lb 5.5 oz), SpO2 95 %.    Relevant Results  Scheduled medications  aspirin, 81 mg, oral, Daily  [START ON 12/11/2023] azithromycin, 250 mg, oral, Once per day on Mon Wed Fri  budesonide, 0.5 mg, nebulization, BID  clopidogrel, 75 mg, oral, Daily  enoxaparin, 40 mg, subcutaneous, Daily  ferrous sulfate (325 mg ferrous sulfate), 65 mg of iron, oral, Daily  formoterol, 20 mcg, nebulization, q12h  [START ON 12/11/2023] furosemide, 20 mg, oral, q48h  guaiFENesin, 1,200 mg, oral, BID  [START ON 12/11/2023] levothyroxine, 50 mcg, oral, Once per day on Mon Thu  losartan, 50 mg, oral, BID  [START ON 12/11/2023] magnesium oxide, 400 mg, oral, Daily  methylPREDNISolone sodium succinate (PF), 40 mg, intravenous, q12h  metoprolol succinate XL, 75 mg, oral, Daily  multivitamin with minerals, 1 tablet, oral, Daily  oxygen, , inhalation, Continuous - 02/gases  polyethylene glycol, 17 g, oral, Daily  tiotropium, 2 Inhalation, inhalation, Daily      Continuous medications     PRN medications  PRN medications: acetaminophen, albuterol, ipratropium-albuteroL, melatonin, " nitroglycerin, simethicone     Results for orders placed or performed during the hospital encounter of 12/09/23 (from the past 24 hour(s))   CBC and Auto Differential   Result Value Ref Range    WBC 15.8 (H) 4.4 - 11.3 x10*3/uL    nRBC 0.0 0.0 - 0.0 /100 WBCs    RBC 4.10 4.00 - 5.20 x10*6/uL    Hemoglobin 12.3 12.0 - 16.0 g/dL    Hematocrit 38.1 36.0 - 46.0 %    MCV 93 80 - 100 fL    MCH 30.0 26.0 - 34.0 pg    MCHC 32.3 32.0 - 36.0 g/dL    RDW 12.9 11.5 - 14.5 %    Platelets 284 150 - 450 x10*3/uL    Neutrophils % 88.9 40.0 - 80.0 %    Immature Granulocytes %, Automated 1.9 (H) 0.0 - 0.9 %    Lymphocytes % 6.5 13.0 - 44.0 %    Monocytes % 2.0 2.0 - 10.0 %    Eosinophils % 0.4 0.0 - 6.0 %    Basophils % 0.3 0.0 - 2.0 %    Neutrophils Absolute 14.05 (H) 1.60 - 5.50 x10*3/uL    Immature Granulocytes Absolute, Automated 0.30 0.00 - 0.50 x10*3/uL    Lymphocytes Absolute 1.03 0.80 - 3.00 x10*3/uL    Monocytes Absolute 0.32 0.05 - 0.80 x10*3/uL    Eosinophils Absolute 0.06 0.00 - 0.40 x10*3/uL    Basophils Absolute 0.05 0.00 - 0.10 x10*3/uL   Basic metabolic panel   Result Value Ref Range    Glucose 156 (H) 74 - 99 mg/dL    Sodium 136 136 - 145 mmol/L    Potassium 4.5 3.5 - 5.3 mmol/L    Chloride 98 98 - 107 mmol/L    Bicarbonate 30 21 - 32 mmol/L    Anion Gap 13 10 - 20 mmol/L    Urea Nitrogen 28 (H) 6 - 23 mg/dL    Creatinine 0.99 0.50 - 1.05 mg/dL    eGFR 60 (L) >60 mL/min/1.73m*2    Calcium 9.5 8.6 - 10.3 mg/dL   Magnesium   Result Value Ref Range    Magnesium 2.04 1.60 - 2.40 mg/dL   Protime-INR   Result Value Ref Range    Protime 11.8 9.8 - 12.8 seconds    INR 1.0 0.9 - 1.1   Hepatic function panel   Result Value Ref Range    Albumin 4.1 3.4 - 5.0 g/dL    Bilirubin, Total 0.4 0.0 - 1.2 mg/dL    Bilirubin, Direct 0.0 0.0 - 0.3 mg/dL    Alkaline Phosphatase 52 33 - 136 U/L    ALT 19 7 - 45 U/L    AST 12 9 - 39 U/L    Total Protein 7.5 6.4 - 8.2 g/dL   B-Type Natriuretic Peptide   Result Value Ref Range    BNP 30 0 - 99  pg/mL   Lactate   Result Value Ref Range    Lactate 2.0 0.4 - 2.0 mmol/L   BLOOD GAS VENOUS FULL PANEL   Result Value Ref Range    POCT pH, Venous 7.46 (H) 7.33 - 7.43 pH    POCT pCO2, Venous 45 41 - 51 mm Hg    POCT pO2, Venous 40 35 - 45 mm Hg    POCT SO2, Venous 66 45 - 75 %    POCT Oxy Hemoglobin, Venous 65.0 45.0 - 75.0 %    POCT Hematocrit Calculated, Venous 40.0 36.0 - 46.0 %    POCT Sodium, Venous 135 (L) 136 - 145 mmol/L    POCT Potassium, Venous 4.7 3.5 - 5.3 mmol/L    POCT Chloride, Venous 98 98 - 107 mmol/L    POCT Ionized Calicum, Venous 1.19 1.10 - 1.33 mmol/L    POCT Glucose, Venous 165 (H) 74 - 99 mg/dL    POCT Lactate, Venous 2.2 (H) 0.4 - 2.0 mmol/L    POCT Base Excess, Venous 7.2 (H) -2.0 - 3.0 mmol/L    POCT HCO3 Calculated, Venous 32.0 (H) 22.0 - 26.0 mmol/L    POCT Hemoglobin, Venous 13.3 12.0 - 16.0 g/dL    POCT Anion Gap, Venous 10.0 10.0 - 25.0 mmol/L    Patient Temperature      FiO2 32 %   Sars-CoV-2 and Influenza A/B PCR   Result Value Ref Range    Flu A Result Not Detected Not Detected    Flu B Result Not Detected Not Detected    Coronavirus 2019, PCR Not Detected Not Detected        Imaging  XR chest 1 view    Result Date: 12/9/2023  Interpreted By:  Renuka Garcia, STUDY: XR CHEST 1 VIEW;  12/9/2023 10:35 pm   INDICATION: Signs/Symptoms:dyspnea cough.   COMPARISON: Radiographs of the chest dated 12/05/2023   ACCESSION NUMBER(S): [id number]   ORDERING CLINICIAN: FANY HARDWICK   FINDINGS: AP radiograph of the chest was provided.   Right jugular MediPort tip overlies the low SVC.   CARDIOMEDIASTINAL SILHOUETTE: Cardiomediastinal silhouette is normal in size and configuration.   LUNGS: Lungs are well aerated without evidence of new consolidation, pleural effusion or pneumothorax. Chronic broad teen with hazy airspace opacity in the right lung base is unchanged to prior studies.   ABDOMEN: No remarkable upper abdominal findings.   BONES: No acute osseous changes.       1.  Similar  lung aeration to prior exam without evidence of new consolidation or pleural effusion. Chronic airspace opacity in the right lung base is unchanged to prior study.       MACRO: None   Signed by: Renuka Garcia 12/9/2023 11:04 PM Dictation workstation:   [id number]       Assessment/Plan  #COPD exacerbation   ~pulmonary sarcoidosis   ~chronic respiratory failure with hypoxia   ~asthma  ~treated and released X 2 and was on hospital at home program.   ~recent CXR with RLL blunting of the costophrenic angle, CXR this admission unchanged from previous.   ~WBC 15.8 with left shift~steroid vs infection induced.  ~Rocephin, zithromax, solumedrol duoneb in the ED.  Respiratory status assisted with bipap.  ~continue zithromax the days per week  ~solumedrol 40 mg BID  ~bipap 14/6-14-40% intermittently  ~would plan discharge on oral steroid in the next day or two.      #Non-hospital problems  ~CAD~asa, plavix     ~cardiac stents  ~CHF~furosemide 20 mg Q48H  ~HTN~losartan 50 mg, metoprolol 75mg      ~AAA  ~palpitations  ~a-fib~metoprolol 75 mg QD  ~hypothyroidism-synthroid 50 mcg QD  ~migraines  ~hiatel hernia  ~diverticulosis s/p colon resection  ~HLD     F: repleinsh PRN  E: replenish PRN  N: as tolerated  ABX: azithromycin  DVT: lovenox  GI: NA     Disposition: Admitted for acute on chronic respiratory failure 2/2 PNA and exac COPD EST LOS < 2 midnights.  CHANA Staples  Internal Medicine  Patient was seen in collaboration with the Stephanie QUINTEROS.  I was present for the pertinent components of the history, physical, and medical decision making and independently examined the patient.  We reviewed the medication reconciliation list and ancillary studies, including lab, imaging, and microbiology, as well as discussed the differential diagnoses; which includes, but is not limited to Principal Problem:    Acute exacerbation of chronic obstructive pulmonary disease (CMS/HCC)  .      I agree with her plan  as documented in the electronic medical record.    Erick Gunn MD

## 2023-12-10 NOTE — DISCHARGE SUMMARY
Discharge Diagnosis  COPD (chronic obstructive pulmonary disease) (CMS/[facility])  Acute on chronic hypoxic respiratory failure/respiratory distress  CHF  Issues Requiring Follow-Up  Respiratory distress    Test Results Pending At Discharge  Pending Labs       No current pending labs.            Hospital Course  Trinity Hernandez is a 73 y.o. female presenting with a PMHx significant for CAD (s/p stent), NSVT, Paroxysmal Atrial Fibrillation, Chronic Diastolic heart Failure, HTN, AAA, HLD, COPD, Chronic Hypoxic Respiratory Failure 2/2 Pulmonary Sarcoidosis (on 2L at night), Hypothyroidism, Migraines, Hiatal Hernia, and Diverticulosis, who presented to [facility] due to increased shortness of breath. Admitted for management of acute on chronic respiratory failure. Of note, patient had recently been discharged from [facility] where she was treated with antibiotics for PNA and discharged on amoxicillin.      Patiently initially treated for presumed pneumonia with IV Rocephin & Azithromycin but antibiotics were stopped based on CXR suggestive of chronic changes likely due to sarcoidosis. IV steroids were started for treatment of acute COPD exacerbation due to persistent end-expiratory wheezing. Wheezing did improve but did not tolerate weaning from the 3L O2. Patient briefly transitioned to prednisone but wheezing again worsened acutely. She was placed back on IV Solumedrol and frequency was adjusted to q8h with good improvement. Patient has remained on 3L but tolerated Solumedrol 40 mg tapered to q12h prior to DC from the hospital.      Crackles follow-up with pulmonology and cardiology (recently had cardiac monitor, evaluation for ICD). Transition to [facility] to further optimize her COPD management and assist her with medication costs.     12/8/23 2140 patient transitioned to the acute hospital at home, who had admission to return and she continued to receive medical care and she continued to receive nebulizer, and her steroid was  also switch to IV for Solu-Medrol 40 mg twice daily, received azithromycin, should continue taking it.  Respiratory discomfort throughout the day and upon the night nurse assessment she found the patient to be in respiratory distress, maintaining his O2 sat but with bilateral crackles and difficulty breathing, she called 911, the squad arrived and the patient was evaluated and was advised to be transferred to the nearest hospital facility.       Pertinent Physical Exam At Time of Discharge  Physical Exam  Vitals and nursing note reviewed.   Constitutional:       Appearance: She is normal weight. She is ill-appearing and toxic-appearing.   HENT:      Head: Normocephalic and atraumatic.      Right Ear: Tympanic membrane normal.      Nose: Nose normal.      Mouth/Throat:      Mouth: Mucous membranes are moist.   Eyes:      Extraocular Movements: Extraocular movements intact.   Cardiovascular:      Rate and Rhythm: Tachycardia present.      Pulses: Normal pulses.      Heart sounds: Normal heart sounds.   Pulmonary:      Effort: Respiratory distress present.      Breath sounds: Wheezing and rales present.   Abdominal:      General: Abdomen is flat. Bowel sounds are normal.      Palpations: Abdomen is soft.   Musculoskeletal:         General: Normal range of motion.   Skin:     General: Skin is warm and dry.      Capillary Refill: Capillary refill takes less than 2 seconds.   Neurological:      General: No focal deficit present.      Mental Status: She is alert and oriented to person, place, and time.   Psychiatric:         Mood and Affect: Mood normal.         Behavior: Behavior normal.         Thought Content: Thought content normal.         Judgment: Judgment normal.         Home Medications     Medication List      ASK your doctor about these medications     acetaminophen 325 mg tablet; Commonly known as: Tylenol   albuterol 90 mcg/actuation inhaler   arformoterol 15 mcg/2 mL nebulizer solution; Commonly known as:  Brovana   aspirin 81 mg EC tablet   budesonide 0.5 mg/2 mL nebulizer solution; Commonly known as: Pulmicort   clopidogrel 75 mg tablet; Commonly known as: Plavix   estradiol 0.01 % (0.1 mg/gram) vaginal cream; Commonly known as: Estrace   ezetimibe 10 mg tablet; Commonly known as: Zetia; Take 1 tablet (10 mg)   by mouth once daily.   ferrous sulfate (325 mg ferrous sulfate) tablet   furosemide 20 mg tablet; Commonly known as: Lasix   ipratropium 0.02 % nebulizer solution; Commonly known as: Atrovent   levothyroxine 50 mcg tablet; Commonly known as: Synthroid, Levoxyl; Take   1 tablet (50 mcg) by mouth once daily.   losartan 25 mg tablet; Commonly known as: Cozaar   magnesium oxide 400 mg (241.3 mg magnesium) tablet; Commonly known as:   Mag-Ox   melatonin 10 mg capsule   metoprolol succinate XL 50 mg 24 hr tablet; Commonly known as: Toprol-XL   multivitamin tablet   nitroglycerin 0.4 mg SL tablet; Commonly known as: Nitrostat   simethicone 125 mg capsule; Commonly known as: Mylicon,Gas-X   Spiriva Respimat 2.5 mcg/actuation inhaler; Generic drug: tiotropium       Outpatient Follow-Up  Future Appointments   Date Time Provider Department Center   12/14/2023 11:30 AM Lisette Samaniego DO [id number] [facility]   12/18/2023  1:00 PM INF 01 [facility] CONS[facility]       Kaylan Epps MD

## 2023-12-10 NOTE — CARE PLAN
The patient's goals for the shift include  less SOB/wheezing.    The clinical goals for the shift include remain hemodynamically stable, improved brething/decreased wheezing and SOB.    Over the shift, the patient did not make progress toward the following goals. Barriers to progression include ***. Recommendations to address these barriers include ***.

## 2023-12-10 NOTE — HOSPITAL COURSE
Trinity Hernandez is a 73 y.o. female presenting with a PMHx significant for CAD (s/p stent), NSVT, Paroxysmal Atrial Fibrillation, Chronic Diastolic heart Failure, HTN, AAA, HLD, COPD, Chronic Hypoxic Respiratory Failure 2/2 Pulmonary Sarcoidosis (on 2L at night), Hypothyroidism, Migraines, Hiatal Hernia, and Diverticulosis, who presented to [facility] due to increased shortness of breath. Admitted for management of acute on chronic respiratory failure. Of note, patient had recently been discharged from [facility] where she was treated with antibiotics for PNA and discharged on amoxicillin.      Patiently initially treated for presumed pneumonia with IV Rocephin & Azithromycin but antibiotics were stopped based on CXR suggestive of chronic changes likely due to sarcoidosis. IV steroids were started for treatment of acute COPD exacerbation due to persistent end-expiratory wheezing. Wheezing did improve but did not tolerate weaning from the 3L O2. Patient briefly transitioned to prednisone but wheezing again worsened acutely. She was placed back on IV Solumedrol and frequency was adjusted to q8h with good improvement. Patient has remained on 3L but tolerated Solumedrol 40 mg tapered to q12h prior to DC from the hospital.      Crackles follow-up with pulmonology and cardiology (recently had cardiac monitor, evaluation for ICD). Transition to [facility] to further optimize her COPD management and assist her with medication costs.     12/8/23 2140 patient transitioned to the acute hospital at home, who had admission to return and she continued to receive medical care and she continued to receive nebulizer, and her steroid was also switch to IV for Solu-Medrol 40 mg twice daily, received azithromycin, should continue taking it.  Respiratory discomfort throughout the day and upon the night nurse assessment she found the patient to be in respiratory distress, maintaining his O2 sat but with bilateral crackles and difficulty  breathing, she called 911, the squad arrived and the patient was evaluated and was advised to be transferred to the nearest hospital facility.

## 2023-12-10 NOTE — ED PROVIDER NOTES
Department of Emergency Medicine   ED  Provider Note  Admit Date/RoomTime: 12/9/2023  9:53 PM  ED Room: 09/09                  History of Present Illness:   Trinity Hernandez is a 73 y.o. female presenting to the ED for having continued shortness of breath.  Patient was recently admitted for having acute exacerbation of COPD who received breathing treatments as well as respiratory support medication was discharged with at home hospitalization program spoke to the nurse through this program over the phone who noticed that she does have significant work of breathing and I recommend her to come to the ER to be evaluated.  Initial evaluation she does sound tight with significant wheezing on respiratory expiratory phase.  Patient denies any fever she does have a wet cough associated with this.  Patient denies being on any any recent positive pressure include CPAP or BiPAP at home.  Patient has required more oxygen than her baseline however her oxygenation status is normal with the new required oxygen level.  Patient denies any chest pain or chest pressure.      Review of Systems:   Pertinent positives and review of systems as noted above.  Remaining 10 review of systems is negative or noncontributory to today's episode of care.        --------------------------------------------- PAST HISTORY ---------------------------------------------  Past Medical History:  has a past medical history of Abdominal bloating (05/04/2023), Diverticulitis of small intestine without perforation or abscess without bleeding, Dizzy (11/20/2023), Essential (primary) hypertension (02/08/2017), Influenza B (05/08/2015), Nausea (11/16/2018), Personal history of other endocrine, nutritional and metabolic disease (02/08/2017), and Personal history of other venous thrombosis and embolism.    Past Surgical History:  has a past surgical history that includes Hysterectomy (10/03/2013); Hernia repair (05/16/2017); Other surgical history  (05/16/2017); Abdominal adhesion surgery (05/16/2017); Colectomy partial / total (05/16/2017); Cholecystectomy (05/16/2017); Cervical fusion (05/16/2017); Other surgical history (04/15/2019); MR angio head wo IV contrast (05/21/2020); MR angio neck wo IV contrast (05/21/2020); CT angio neck (05/21/2020); CT angio head w and wo IV contrast (05/21/2020); and Coronary angioplasty with stent.    Social History:  reports that she has quit smoking. Her smoking use included cigarettes. She has never been exposed to tobacco smoke. She has never used smokeless tobacco.    Family History: family history is not on file. Unless otherwise noted, family history is non contributory    The patient’s home medications have been reviewed.    Allergies: Hydrocodone-acetaminophen, Montelukast, Morphine, Nitrofurantoin monohyd/m-cryst, Sulfa (sulfonamide antibiotics), Atorvastatin, Dicyclomine, Fluticasone propion-salmeterol, Levofloxacin, Lisinopril, Nitroimidazoles, Omeprazole, Salmeterol, Simvastatin, Sucralfate, and Umeclidinium    -------------------------------------------------- RESULTS -------------------------------------------------  All laboratory and radiology results have been personally reviewed by myself   LABS:  Labs Reviewed   CBC WITH AUTO DIFFERENTIAL - Abnormal       Result Value    WBC 15.8 (*)     nRBC 0.0      RBC 4.10      Hemoglobin 12.3      Hematocrit 38.1      MCV 93      MCH 30.0      MCHC 32.3      RDW 12.9      Platelets 284      Neutrophils % 88.9      Immature Granulocytes %, Automated 1.9 (*)     Lymphocytes % 6.5      Monocytes % 2.0      Eosinophils % 0.4      Basophils % 0.3      Neutrophils Absolute 14.05 (*)     Immature Granulocytes Absolute, Automated 0.30      Lymphocytes Absolute 1.03      Monocytes Absolute 0.32      Eosinophils Absolute 0.06      Basophils Absolute 0.05     BASIC METABOLIC PANEL - Abnormal    Glucose 156 (*)     Sodium 136      Potassium 4.5      Chloride 98      Bicarbonate  30      Anion Gap 13      Urea Nitrogen 28 (*)     Creatinine 0.99      eGFR 60 (*)     Calcium 9.5     BLOOD GAS VENOUS FULL PANEL - Abnormal    POCT pH, Venous 7.46 (*)     POCT pCO2, Venous 45      POCT pO2, Venous 40      POCT SO2, Venous 66      POCT Oxy Hemoglobin, Venous 65.0      POCT Hematocrit Calculated, Venous 40.0      POCT Sodium, Venous 135 (*)     POCT Potassium, Venous 4.7      POCT Chloride, Venous 98      POCT Ionized Calicum, Venous 1.19      POCT Glucose, Venous 165 (*)     POCT Lactate, Venous 2.2 (*)     POCT Base Excess, Venous 7.2 (*)     POCT HCO3 Calculated, Venous 32.0 (*)     POCT Hemoglobin, Venous 13.3      POCT Anion Gap, Venous 10.0      Patient Temperature        FiO2 32     MAGNESIUM - Normal    Magnesium 2.04     B-TYPE NATRIURETIC PEPTIDE - Normal    BNP 30      Narrative:        <100 pg/mL - Heart failure unlikely  100-299 pg/mL - Intermediate probability of acute heart                  failure exacerbation. Correlate with clinical                  context and patient history.    >=300 pg/mL - Heart Failure likely. Correlate with clinical                  context and patient history.    BNP testing is performed using different testing methodology at [facility] than at other [facility]. Direct result comparisons should only be made within the same method.      LACTATE - Normal    Lactate 2.0      Narrative:     Venipuncture immediately after or during the administration of Metamizole may lead to falsely low results. Testing should be performed immediately  prior to Metamizole dosing.   PROTIME-INR - Normal    Protime 11.8      INR 1.0     HEPATIC FUNCTION PANEL - Normal    Albumin 4.1      Bilirubin, Total 0.4      Bilirubin, Direct 0.0      Alkaline Phosphatase 52      ALT 19      AST 12      Total Protein 7.5     SARS-COV-2 AND INFLUENZA A/B PCR - Normal    Flu A Result Not Detected      Flu B Result Not Detected      Coronavirus 2019, PCR Not Detected       Narrative:     This assay has received FDA Emergency Use Authorization (EUA) and  is only authorized for the duration of time that circumstances exist to justify the authorization of the emergency use of in vitro diagnostic tests for the detection of SARS-CoV-2 virus and/or diagnosis of COVID-19 infection under section 564(b)(1) of the Act, 21 U.S.C. 360bbb-3(b)(1). Testing for SARS-CoV-2 is only recommended for patients who meet current clinical and/or epidemiological criteria as defined by federal, state, or local public health directives. This assay is an in vitro diagnostic nucleic acid amplification test for the qualitative detection of SARS-CoV-2, Influenza A, and Influenza B from nasopharyngeal specimens and has been validated for use at [facility]. Negative results do not preclude COVID-19 infections or Influenza A/B infections, and should not be used as the sole basis for diagnosis, treatment, or other management decisions. If Influenza A/B and RSV PCR results are negative, testing for Parainfluenza virus, Adenovirus and Metapneumovirus is routinely performed for [facility] and [facility] inpatients, and is available on other patients by placing an add-on request.    BLOOD GAS LACTIC ACID, VENOUS         RADIOLOGY:  Interpreted by Radiologist.  XR chest 1 view   Final Result   1.  Similar lung aeration to prior exam without evidence of new   consolidation or pleural effusion. Chronic airspace opacity in the   right lung base is unchanged to prior study.                  MACRO:   None        Signed by: Renuka Garcia 12/9/2023 11:04 PM   Dictation workstation:   [id number]          Encounter Date: 12/05/23   ECG 12 lead   Result Value    Ventricular Rate 84    Atrial Rate 84    KS Interval 130    QRS Duration 136    QT Interval 406    QTC Calculation(Bazett) 479    P Axis 55    R Axis 66    T Axis 98    QRS Count 14    Q Onset 214    P Onset 149    P Offset 194  "   T Offset 417    QTC Fredericia 454    Narrative    Normal sinus rhythm  Left bundle branch block  Abnormal ECG  When compared with ECG of 17-NOV-2023 15:06, (unconfirmed)  No significant change was found     ------------------------- NURSING NOTES AND VITALS REVIEWED ---------------------------   The nursing notes within the ED encounter and vital signs as below have been reviewed.   /75 (BP Location: Left arm, Patient Position: Lying)   Pulse 86   Temp 36.4 °C (97.6 °F) (Tympanic)   Resp 24   Ht 1.676 m (5' 6\")   Wt 74.8 kg (165 lb)   SpO2 98%   BMI 26.63 kg/m²   Oxygen Saturation Interpretation: Normal      ---------------------------------------------------PHYSICAL EXAM--------------------------------------    Constitutional/General: Alert and oriented x3, well appearing, non toxic in moderate respiratory distress accessory muscle use  Head: Normocephalic and atraumatic  Eyes: PERRL, EOMI, conjunctiva normal, sclera non icteric  Mouth: Oropharynx clear, handling secretions, no trismus, no asymmetry of the posterior oropharynx or uvular edema  Neck: Supple, full ROM, non tender to palpation in the midline, no stridor, no crepitus, no meningeal signs  Respiratory: Moderate severe wheezing noted with significant diminishment of the chest wall excursion especially limitation in the inspiratory as well as expiratory phase bilaterally.  Cardiovascular:  Regular rate. Regular rhythm. No murmurs, gallops, or rubs. 2+ distal pulses  Chest: No chest wall tenderness  GI:  Abdomen Soft, Non tender, Non distended.  +BS. No organomegaly, no palpable masses,  No rebound, guarding, or rigidity.   Musculoskeletal: Moves all extremities x 4. Warm and well perfused, no clubbing, cyanosis, or edema. Capillary refill <3 seconds  Integument: skin warm and dry. No rashes.   Lymphatic: no lymphadenopathy noted  Neurologic: GCS 15, no focal deficits, symmetric strength 5/5 in the upper and lower extremities " bilaterally      Procedures  EKG read: Obtained at 2206 and read by me shows normal sinus rhythm rate 96 with left bundle branch block with normal NJ, QRS as to be expected prolonged at 124 ms was QTc of 482 ms.  Patient does have some nonspecific ST and T wave flipped to go along with a left bundle branch block no signs of positive Sgarbossa.      ------------------------------ ED COURSE/MEDICAL DECISION MAKING----------------------  Patient was seen and examined in the ER.  Patient does have moderate severe work of breathing with inspiratory and expiratory wheezing noted no focality to be limited to unilateral presentation of the lung.  Both lungs are affected equally.  Patient does not appear to be septic.  Patient does have significant  muscle use however.  Patient does have a wet cough at bedside as well.  COVID influenza testing were negative.  Patient laboratory workup obtained and includes a normal pH of 7.46 however to maintain appropriate oxygenation ventilation with a new oxygen requirement, she does have to work fairly hard.  Patient does not appear to be fatiguing out at this point.  After instituting several different breathing treatments DuoNeb's in addition to Solu-Medrol magnesium, patient continues to have very minimal improvement in her air exchange.  With the use of BiPAP, patient states she is feeling much better and her breathing has improved and she is able to rest.  Patient was placed on BiPAP initially and she had improvement.  When it was taken off, she was asking for the BiPAP to go back on as she did have significant inflammation as well as wheezing noted biphasic after being taken off of BiPAP as well.  Patient at this point will require continuous or near continuous BiPAP for her acute COPD exacerbation which appears to be more bronchitis in addition to asthmatic in nature given the significant wheezing noted biphasic.  Patient is awaiting to be admitted to an ICU for further  management of her care.    Diagnoses as of 12/10/23 0547   Acute exacerbation of chronic obstructive pulmonary disease (CMS/HCC)   Acute respiratory distress   Leukocytosis, unspecified type      Counseling:   The emergency provider has spoken with the patient and discussed today’s results, in addition to providing specific details for the plan of care and counseling regarding the diagnosis and prognosis.  Questions are answered at this time and they are agreeable with the plan.      --------------------------------- IMPRESSION AND DISPOSITION ---------------------------------    Diagnoses as of 12/10/23 0547   Acute exacerbation of chronic obstructive pulmonary disease (CMS/HCC)   Acute respiratory distress   Leukocytosis, unspecified type        IMPRESSION  1. Acute exacerbation of chronic obstructive pulmonary disease (CMS/HCC)    2. Acute respiratory distress    3. Leukocytosis, unspecified type        DISPOSITION  Disposition: Awaiting admission to ICU  Patient condition is fair      Billing Provider Critical Care Time: none     Stephen Daniel DO  12/10/23 0547

## 2023-12-11 ENCOUNTER — APPOINTMENT (OUTPATIENT)
Dept: CARDIOLOGY | Facility: HOSPITAL | Age: 73
DRG: 190 | End: 2023-12-11
Payer: MEDICARE

## 2023-12-11 LAB
ALBUMIN SERPL BCP-MCNC: 3.7 G/DL (ref 3.4–5)
ANION GAP SERPL CALC-SCNC: 10 MMOL/L (ref 10–20)
BUN SERPL-MCNC: 22 MG/DL (ref 6–23)
CALCIUM SERPL-MCNC: 9.4 MG/DL (ref 8.6–10.3)
CHLORIDE SERPL-SCNC: 102 MMOL/L (ref 98–107)
CO2 SERPL-SCNC: 30 MMOL/L (ref 21–32)
CREAT SERPL-MCNC: 0.85 MG/DL (ref 0.5–1.05)
ERYTHROCYTE [DISTWIDTH] IN BLOOD BY AUTOMATED COUNT: 12.9 % (ref 11.5–14.5)
GFR SERPL CREATININE-BSD FRML MDRD: 72 ML/MIN/1.73M*2
GLUCOSE SERPL-MCNC: 144 MG/DL (ref 74–99)
HCT VFR BLD AUTO: 35.3 % (ref 36–46)
HGB BLD-MCNC: 11.1 G/DL (ref 12–16)
MAGNESIUM SERPL-MCNC: 2.3 MG/DL (ref 1.6–2.4)
MCH RBC QN AUTO: 29.6 PG (ref 26–34)
MCHC RBC AUTO-ENTMCNC: 31.4 G/DL (ref 32–36)
MCV RBC AUTO: 94 FL (ref 80–100)
NRBC BLD-RTO: 0 /100 WBCS (ref 0–0)
PHOSPHATE SERPL-MCNC: 3.2 MG/DL (ref 2.5–4.9)
PLATELET # BLD AUTO: 241 X10*3/UL (ref 150–450)
POTASSIUM SERPL-SCNC: 4.7 MMOL/L (ref 3.5–5.3)
RBC # BLD AUTO: 3.75 X10*6/UL (ref 4–5.2)
SODIUM SERPL-SCNC: 137 MMOL/L (ref 136–145)
WBC # BLD AUTO: 16.8 X10*3/UL (ref 4.4–11.3)

## 2023-12-11 PROCEDURE — 93005 ELECTROCARDIOGRAM TRACING: CPT | Mod: IPSPLIT

## 2023-12-11 PROCEDURE — 2500000002 HC RX 250 W HCPCS SELF ADMINISTERED DRUGS (ALT 637 FOR MEDICARE OP, ALT 636 FOR OP/ED): Mod: IPSPLIT | Performed by: INTERNAL MEDICINE

## 2023-12-11 PROCEDURE — 99232 SBSQ HOSP IP/OBS MODERATE 35: CPT | Performed by: INTERNAL MEDICINE

## 2023-12-11 PROCEDURE — 2500000005 HC RX 250 GENERAL PHARMACY W/O HCPCS: Mod: IPSPLIT | Performed by: NURSE PRACTITIONER

## 2023-12-11 PROCEDURE — 37799 UNLISTED PX VASCULAR SURGERY: CPT | Mod: IPSPLIT | Performed by: NURSE PRACTITIONER

## 2023-12-11 PROCEDURE — 94640 AIRWAY INHALATION TREATMENT: CPT

## 2023-12-11 PROCEDURE — 99232 SBSQ HOSP IP/OBS MODERATE 35: CPT | Performed by: NURSE PRACTITIONER

## 2023-12-11 PROCEDURE — 2500000001 HC RX 250 WO HCPCS SELF ADMINISTERED DRUGS (ALT 637 FOR MEDICARE OP): Mod: IPSPLIT | Performed by: NURSE PRACTITIONER

## 2023-12-11 PROCEDURE — 2500000004 HC RX 250 GENERAL PHARMACY W/ HCPCS (ALT 636 FOR OP/ED): Mod: IPSPLIT | Performed by: NURSE PRACTITIONER

## 2023-12-11 PROCEDURE — 94660 CPAP INITIATION&MGMT: CPT | Mod: IPSPLIT

## 2023-12-11 PROCEDURE — 96372 THER/PROPH/DIAG INJ SC/IM: CPT | Mod: IPSPLIT | Performed by: NURSE PRACTITIONER

## 2023-12-11 PROCEDURE — 2500000004 HC RX 250 GENERAL PHARMACY W/ HCPCS (ALT 636 FOR OP/ED): Mod: IPSPLIT | Performed by: INTERNAL MEDICINE

## 2023-12-11 PROCEDURE — 83735 ASSAY OF MAGNESIUM: CPT | Mod: IPSPLIT | Performed by: NURSE PRACTITIONER

## 2023-12-11 PROCEDURE — 2500000004 HC RX 250 GENERAL PHARMACY W/ HCPCS (ALT 636 FOR OP/ED): Mod: IPSPLIT

## 2023-12-11 PROCEDURE — 2500000002 HC RX 250 W HCPCS SELF ADMINISTERED DRUGS (ALT 637 FOR MEDICARE OP, ALT 636 FOR OP/ED): Mod: IPSPLIT | Performed by: NURSE PRACTITIONER

## 2023-12-11 PROCEDURE — 1200000002 HC GENERAL ROOM WITH TELEMETRY DAILY: Mod: IPSPLIT

## 2023-12-11 PROCEDURE — 94640 AIRWAY INHALATION TREATMENT: CPT | Mod: IPSPLIT

## 2023-12-11 PROCEDURE — 85027 COMPLETE CBC AUTOMATED: CPT | Mod: IPSPLIT | Performed by: NURSE PRACTITIONER

## 2023-12-11 PROCEDURE — 80069 RENAL FUNCTION PANEL: CPT | Mod: IPSPLIT | Performed by: NURSE PRACTITIONER

## 2023-12-11 RX ORDER — PREDNISONE 20 MG/1
40 TABLET ORAL DAILY
Status: COMPLETED | OUTPATIENT
Start: 2023-12-15 | End: 2023-12-17

## 2023-12-11 RX ORDER — PREDNISONE 20 MG/1
20 TABLET ORAL DAILY
Status: DISCONTINUED | OUTPATIENT
Start: 2023-12-21 | End: 2023-12-19 | Stop reason: HOSPADM

## 2023-12-11 RX ORDER — LORAZEPAM 0.5 MG/1
0.5 TABLET ORAL EVERY 4 HOURS PRN
Status: DISCONTINUED | OUTPATIENT
Start: 2023-12-11 | End: 2023-12-19 | Stop reason: HOSPADM

## 2023-12-11 RX ORDER — PREDNISONE 10 MG/1
10 TABLET ORAL DAILY
Status: DISCONTINUED | OUTPATIENT
Start: 2023-12-24 | End: 2023-12-19 | Stop reason: HOSPADM

## 2023-12-11 RX ORDER — ARFORMOTEROL TARTRATE 15 UG/2ML
15 SOLUTION RESPIRATORY (INHALATION)
Status: DISCONTINUED | OUTPATIENT
Start: 2023-12-11 | End: 2023-12-19 | Stop reason: HOSPADM

## 2023-12-11 RX ORDER — LANOLIN ALCOHOL/MO/W.PET/CERES
400 CREAM (GRAM) TOPICAL DAILY
Status: DISCONTINUED | OUTPATIENT
Start: 2023-12-11 | End: 2023-12-19 | Stop reason: HOSPADM

## 2023-12-11 RX ADMIN — Medication 4 L/MIN: at 08:00

## 2023-12-11 RX ADMIN — GUAIFENESIN 1200 MG: 600 TABLET, EXTENDED RELEASE ORAL at 08:53

## 2023-12-11 RX ADMIN — LOSARTAN POTASSIUM 50 MG: 50 TABLET, FILM COATED ORAL at 20:59

## 2023-12-11 RX ADMIN — MAGNESIUM OXIDE TAB 400 MG (241.3 MG ELEMENTAL MG) 400 MG: 400 (241.3 MG) TAB at 12:00

## 2023-12-11 RX ADMIN — POLYETHYLENE GLYCOL 3350 17 G: 17 POWDER, FOR SOLUTION ORAL at 08:52

## 2023-12-11 RX ADMIN — LORAZEPAM 0.5 MG: 0.5 TABLET ORAL at 21:46

## 2023-12-11 RX ADMIN — TIOTROPIUM BROMIDE INHALATION SPRAY 2 PUFF: 3.12 SPRAY, METERED RESPIRATORY (INHALATION) at 09:01

## 2023-12-11 RX ADMIN — FORMOTEROL FUMARATE DIHYDRATE 20 MCG: 20 SOLUTION RESPIRATORY (INHALATION) at 09:07

## 2023-12-11 RX ADMIN — AZITHROMYCIN DIHYDRATE 250 MG: 250 TABLET ORAL at 08:53

## 2023-12-11 RX ADMIN — METOPROLOL SUCCINATE 75 MG: 50 TABLET, EXTENDED RELEASE ORAL at 08:52

## 2023-12-11 RX ADMIN — BUDESONIDE INHALATION 0.5 MG: 0.5 SUSPENSION RESPIRATORY (INHALATION) at 09:08

## 2023-12-11 RX ADMIN — ARFORMOTEROL TARTRATE 15 MCG: 15 SOLUTION RESPIRATORY (INHALATION) at 20:08

## 2023-12-11 RX ADMIN — IPRATROPIUM BROMIDE AND ALBUTEROL SULFATE 3 ML: .5; 3 SOLUTION RESPIRATORY (INHALATION) at 15:29

## 2023-12-11 RX ADMIN — FUROSEMIDE 20 MG: 20 TABLET ORAL at 13:13

## 2023-12-11 RX ADMIN — LEVOTHYROXINE SODIUM 50 MCG: 50 TABLET ORAL at 05:26

## 2023-12-11 RX ADMIN — ENOXAPARIN SODIUM 40 MG: 100 INJECTION SUBCUTANEOUS at 08:52

## 2023-12-11 RX ADMIN — GUAIFENESIN 1200 MG: 600 TABLET, EXTENDED RELEASE ORAL at 20:59

## 2023-12-11 RX ADMIN — LOSARTAN POTASSIUM 50 MG: 50 TABLET, FILM COATED ORAL at 08:53

## 2023-12-11 RX ADMIN — BUDESONIDE INHALATION 0.5 MG: 0.5 SUSPENSION RESPIRATORY (INHALATION) at 20:08

## 2023-12-11 RX ADMIN — METHYLPREDNISOLONE SODIUM SUCCINATE 40 MG: 40 INJECTION, POWDER, FOR SOLUTION INTRAMUSCULAR; INTRAVENOUS at 21:00

## 2023-12-11 RX ADMIN — METHYLPREDNISOLONE SODIUM SUCCINATE 40 MG: 40 INJECTION, POWDER, FOR SOLUTION INTRAMUSCULAR; INTRAVENOUS at 10:40

## 2023-12-11 RX ADMIN — ASPIRIN 81 MG: 81 TABLET, COATED ORAL at 08:53

## 2023-12-11 RX ADMIN — IPRATROPIUM BROMIDE AND ALBUTEROL SULFATE 3 ML: .5; 3 SOLUTION RESPIRATORY (INHALATION) at 05:13

## 2023-12-11 RX ADMIN — FERROUS SULFATE TAB 325 MG (65 MG ELEMENTAL FE) 1 TABLET: 325 (65 FE) TAB at 08:53

## 2023-12-11 RX ADMIN — CLOPIDOGREL BISULFATE 75 MG: 75 TABLET ORAL at 08:53

## 2023-12-11 RX ADMIN — MULTIPLE VITAMINS W/ MINERALS TAB 1 TABLET: TAB at 08:52

## 2023-12-11 ASSESSMENT — ACTIVITIES OF DAILY LIVING (ADL): LACK_OF_TRANSPORTATION: NO

## 2023-12-11 ASSESSMENT — PAIN SCALES - GENERAL
PAINLEVEL_OUTOF10: 0 - NO PAIN
PAINLEVEL_OUTOF10: 0 - NO PAIN

## 2023-12-11 NOTE — PROGRESS NOTES
Trinity Hernandez is a 73 y.o. female on day 1 of admission presenting with Acute exacerbation of chronic obstructive pulmonary disease (CMS/HCC).      Subjective   Found in her room in no acute distress. Requesting her inhalers as she takes them at home. Orders reviewed and are ordered. Did not have her bipap on overnight. She did express that the bipap seems to help her breathing feel better.       Objective   Physical Exam  Constitutional:       Appearance: Normal appearance.   HENT:      Head: Normocephalic.      Nose: Nose normal.      Mouth/Throat:      Mouth: Mucous membranes are moist.   Eyes:      Extraocular Movements: Extraocular movements intact.      Pupils: Pupils are equal, round, and reactive to light.   Cardiovascular:      Rate and Rhythm: Normal rate and regular rhythm. Difficult to auscultate 2/2 wheezing.     Pulses: Normal pulses.   Pulmonary:      Breath sounds: Expiratory Wheezing and scattered rhonchi present. Tight.     Comments: congested cough.   Abdominal:      General: Bowel sounds are normal.      Palpations: Abdomen is soft.   Musculoskeletal:      Cervical back: Normal range of motion.      Right lower leg:No edema.      Left lower leg: No edema.   Skin:     General: Skin is warm and dry.      Capillary Refill: Capillary refill takes less than 2 seconds.   Neurological:      General: No focal deficit present.      Mental Status: She is alert and oriented to person, place, and time. Mental status is at baseline.   Psychiatric:         Mood and Affect: Mood normal.         Behavior: Behavior normal.   Last Recorded Vitals  /60 (BP Location: Left arm, Patient Position: Lying)   Pulse 79   Temp 36.4 °C (97.6 °F) (Temporal)   Resp 23   Wt 75 kg (165 lb 5.5 oz)   SpO2 96%   Intake/Output last 3 Shifts:    Intake/Output Summary (Last 24 hours) at 12/11/2023 0818  Last data filed at 12/10/2023 7699  Gross per 24 hour   Intake 530 ml   Output 600 ml   Net -70 ml       Admission  Weight  Weight: 74.8 kg (165 lb) (12/09/23 2150)    Daily Weight  12/10/23 : 75 kg (165 lb 5.5 oz)    Image Results  XR chest 1 view  Narrative: Interpreted By:  Renuka Garcia,   STUDY:  XR CHEST 1 VIEW;  12/9/2023 10:35 pm      INDICATION:  Signs/Symptoms:dyspnea cough.      COMPARISON:  Radiographs of the chest dated 12/05/2023      ACCESSION NUMBER(S):  [id number]      ORDERING CLINICIAN:  FANY HARDWICK      FINDINGS:  AP radiograph of the chest was provided.      Right jugular MediPort tip overlies the low SVC.      CARDIOMEDIASTINAL SILHOUETTE:  Cardiomediastinal silhouette is normal in size and configuration.      LUNGS:  Lungs are well aerated without evidence of new consolidation, pleural  effusion or pneumothorax. Chronic broad teen with hazy airspace  opacity in the right lung base is unchanged to prior studies.      ABDOMEN:  No remarkable upper abdominal findings.      BONES:  No acute osseous changes.      Impression: 1.  Similar lung aeration to prior exam without evidence of new  consolidation or pleural effusion. Chronic airspace opacity in the  right lung base is unchanged to prior study.              MACRO:  None      Signed by: Renuka Garcia 12/9/2023 11:04 PM  Dictation workstation:   [id number]    Results for orders placed or performed during the hospital encounter of 12/09/23 (from the past 24 hour(s))   CBC   Result Value Ref Range    WBC 16.8 (H) 4.4 - 11.3 x10*3/uL    nRBC 0.0 0.0 - 0.0 /100 WBCs    RBC 3.75 (L) 4.00 - 5.20 x10*6/uL    Hemoglobin 11.1 (L) 12.0 - 16.0 g/dL    Hematocrit 35.3 (L) 36.0 - 46.0 %    MCV 94 80 - 100 fL    MCH 29.6 26.0 - 34.0 pg    MCHC 31.4 (L) 32.0 - 36.0 g/dL    RDW 12.9 11.5 - 14.5 %    Platelets 241 150 - 450 x10*3/uL   Renal Function Panel   Result Value Ref Range    Glucose 144 (H) 74 - 99 mg/dL    Sodium 137 136 - 145 mmol/L    Potassium 4.7 3.5 - 5.3 mmol/L    Chloride 102 98 - 107 mmol/L    Bicarbonate 30 21 - 32 mmol/L    Anion Gap 10  10 - 20 mmol/L    Urea Nitrogen 22 6 - 23 mg/dL    Creatinine 0.85 0.50 - 1.05 mg/dL    eGFR 72 >60 mL/min/1.73m*2    Calcium 9.4 8.6 - 10.3 mg/dL    Phosphorus 3.2 2.5 - 4.9 mg/dL    Albumin 3.7 3.4 - 5.0 g/dL   Magnesium   Result Value Ref Range    Magnesium 2.30 1.60 - 2.40 mg/dL    Scheduled medications  aspirin, 81 mg, oral, Daily  azithromycin, 250 mg, oral, Once per day on Mon Wed Fri  budesonide, 0.5 mg, nebulization, BID  clopidogrel, 75 mg, oral, Daily  enoxaparin, 40 mg, subcutaneous, Daily  ferrous sulfate (325 mg ferrous sulfate), 65 mg of iron, oral, Daily  formoterol, 20 mcg, nebulization, q12h  furosemide, 20 mg, oral, q48h  guaiFENesin, 1,200 mg, oral, BID  levothyroxine, 50 mcg, oral, Daily  losartan, 50 mg, oral, BID  magnesium oxide, 400 mg, oral, Daily  methylPREDNISolone sodium succinate (PF), 40 mg, intravenous, q12h  metoprolol succinate XL, 75 mg, oral, Daily  multivitamin with minerals, 1 tablet, oral, Daily  oxygen, , inhalation, Continuous - 02/gases  polyethylene glycol, 17 g, oral, Daily  tiotropium, 2 Inhalation, inhalation, Daily      Continuous medications     PRN medications  PRN medications: acetaminophen, albuterol, dextromethorphan-guaifenesin, ipratropium-albuteroL, melatonin, nitroglycerin, simethicone    Assessment/Plan   #COPD exacerbation   ~pulmonary sarcoidosis   ~chronic respiratory failure with hypoxia   ~asthma  ~treated and released X 2 and was on hospital at home program.   ~recent CXR with RLL blunting of the costophrenic angle, CXR this admission unchanged from previous.   ~WBC 15.8 with left shift~steroid vs infection induced.  ~Rocephin, zithromax, solumedrol duoneb in the ED.  Respiratory status assisted with bipap.  ~continue zithromax three days per week  ~solumedrol 40 mg BID~plan to transition to oral starting 12/12/23  ~bipap 14/6-14-40% intermittently  ~would plan discharge on oral steroid in the next day or two.      #Non-hospital problems  ~CAD~asa,  plavix     ~cardiac stents  ~CHF~furosemide 20 mg Q48H  ~HTN~losartan 50 mg, metoprolol 75mg      ~AAA  ~palpitations  ~a-fib~metoprolol 75 mg QD  ~hypothyroidism-synthroid 50 mcg QD  ~migraines  ~hiatel hernia  ~diverticulosis s/p colon resection  ~HLD     F: repleinsh PRN  E: replenish PRN  N: as tolerated  ABX: azithromycin  DVT: lovenox  GI: NA     Disposition: Admitted for acute on chronic respiratory failure 2/2 PNA and exac COPD EST LOS < 2 midnights.    CHANA Staples    Patient was seen in collaboration with the ALDAIR, Stephanie ZAYAS.  I was present for the pertinent components of the history, physical, and medical decision making and independently examined the patient.  We reviewed the medication reconciliation list and ancillary studies, including lab, imaging, and microbiology, as well as discussed the differential diagnoses; which includes, but is not limited to Principal Problem:    Acute exacerbation of chronic obstructive pulmonary disease (CMS/HCC)  .      I agree with her plan as documented in the electronic medical record.    Erick Gunn MD

## 2023-12-11 NOTE — CARE PLAN
The patient continued to receive her breathing treatments throughout the shift. She had no complaints of pain but a lot of anxiety at the beginning of the shift. She did get some sleep but was up most of the night.

## 2023-12-11 NOTE — PROGRESS NOTES
12/11/23 1231   Discharge Planning   Living Arrangements Spouse/significant other   Support Systems Spouse/significant other   Assistance Needed oxygen and walker at home and open with [facility]   Type of Residence Private residence   Home or Post Acute Services In home services   Type of Home Care Services Home nursing visits   Patient expects to be discharged to: home with [facility] servcies sesumed   Does the patient need discharge transport arranged? No   Financial Resource Strain   How hard is it for you to pay for the very basics like food, housing, medical care, and heating? Not hard   Housing Stability   In the last 12 months, was there a time when you were not able to pay the mortgage or rent on time? N   In the last 12 months, how many places have you lived? 1   In the last 12 months, was there a time when you did not have a steady place to sleep or slept in a shelter (including now)? N   Transportation Needs   In the past 12 months, has lack of transportation kept you from medical appointments or from getting medications? no   In the past 12 months, has lack of transportation kept you from meetings, work, or from getting things needed for daily living? No   Patient Choice   Patient / Family choosing to utilize agency / facility established prior to hospitalization Yes     Pt was discharged 12/8 to [facility] and returned to ED 12/10. She is being treated for COPD and is expected to require another 2-3 days to stabilize. She was open with [facility] and SW contacted [facility] to notified them that pt is back in the hospital. SHAUN Alex

## 2023-12-12 LAB
ALBUMIN SERPL BCP-MCNC: 3.8 G/DL (ref 3.4–5)
ANION GAP SERPL CALC-SCNC: 11 MMOL/L (ref 10–20)
BUN SERPL-MCNC: 24 MG/DL (ref 6–23)
CALCIUM SERPL-MCNC: 9.3 MG/DL (ref 8.6–10.3)
CHLORIDE SERPL-SCNC: 97 MMOL/L (ref 98–107)
CO2 SERPL-SCNC: 31 MMOL/L (ref 21–32)
CREAT SERPL-MCNC: 0.84 MG/DL (ref 0.5–1.05)
ERYTHROCYTE [DISTWIDTH] IN BLOOD BY AUTOMATED COUNT: 12.9 % (ref 11.5–14.5)
GFR SERPL CREATININE-BSD FRML MDRD: 73 ML/MIN/1.73M*2
GLUCOSE SERPL-MCNC: 154 MG/DL (ref 74–99)
HCT VFR BLD AUTO: 38.2 % (ref 36–46)
HGB BLD-MCNC: 11.9 G/DL (ref 12–16)
MAGNESIUM SERPL-MCNC: 2.1 MG/DL (ref 1.6–2.4)
MCH RBC QN AUTO: 29.5 PG (ref 26–34)
MCHC RBC AUTO-ENTMCNC: 31.2 G/DL (ref 32–36)
MCV RBC AUTO: 95 FL (ref 80–100)
NRBC BLD-RTO: 0 /100 WBCS (ref 0–0)
PHOSPHATE SERPL-MCNC: 3.2 MG/DL (ref 2.5–4.9)
PLATELET # BLD AUTO: 286 X10*3/UL (ref 150–450)
POTASSIUM SERPL-SCNC: 4.5 MMOL/L (ref 3.5–5.3)
RBC # BLD AUTO: 4.04 X10*6/UL (ref 4–5.2)
SODIUM SERPL-SCNC: 134 MMOL/L (ref 136–145)
WBC # BLD AUTO: 18.7 X10*3/UL (ref 4.4–11.3)

## 2023-12-12 PROCEDURE — 37799 UNLISTED PX VASCULAR SURGERY: CPT | Mod: IPSPLIT | Performed by: NURSE PRACTITIONER

## 2023-12-12 PROCEDURE — 80069 RENAL FUNCTION PANEL: CPT | Mod: IPSPLIT | Performed by: NURSE PRACTITIONER

## 2023-12-12 PROCEDURE — 2500000002 HC RX 250 W HCPCS SELF ADMINISTERED DRUGS (ALT 637 FOR MEDICARE OP, ALT 636 FOR OP/ED): Mod: IPSPLIT | Performed by: INTERNAL MEDICINE

## 2023-12-12 PROCEDURE — 2500000004 HC RX 250 GENERAL PHARMACY W/ HCPCS (ALT 636 FOR OP/ED): Mod: IPSPLIT | Performed by: NURSE PRACTITIONER

## 2023-12-12 PROCEDURE — 2500000002 HC RX 250 W HCPCS SELF ADMINISTERED DRUGS (ALT 637 FOR MEDICARE OP, ALT 636 FOR OP/ED): Mod: IPSPLIT | Performed by: NURSE PRACTITIONER

## 2023-12-12 PROCEDURE — 96372 THER/PROPH/DIAG INJ SC/IM: CPT | Mod: IPSPLIT | Performed by: NURSE PRACTITIONER

## 2023-12-12 PROCEDURE — 94668 MNPJ CHEST WALL SBSQ: CPT | Mod: IPSPLIT

## 2023-12-12 PROCEDURE — 99232 SBSQ HOSP IP/OBS MODERATE 35: CPT | Performed by: INTERNAL MEDICINE

## 2023-12-12 PROCEDURE — 94667 MNPJ CHEST WALL 1ST: CPT | Mod: IPSPLIT

## 2023-12-12 PROCEDURE — 85027 COMPLETE CBC AUTOMATED: CPT | Mod: IPSPLIT | Performed by: NURSE PRACTITIONER

## 2023-12-12 PROCEDURE — 1200000002 HC GENERAL ROOM WITH TELEMETRY DAILY: Mod: IPSPLIT

## 2023-12-12 PROCEDURE — 94640 AIRWAY INHALATION TREATMENT: CPT | Mod: IPSPLIT

## 2023-12-12 PROCEDURE — 83735 ASSAY OF MAGNESIUM: CPT | Mod: IPSPLIT | Performed by: NURSE PRACTITIONER

## 2023-12-12 PROCEDURE — 2500000004 HC RX 250 GENERAL PHARMACY W/ HCPCS (ALT 636 FOR OP/ED): Mod: IPSPLIT | Performed by: INTERNAL MEDICINE

## 2023-12-12 PROCEDURE — 2500000001 HC RX 250 WO HCPCS SELF ADMINISTERED DRUGS (ALT 637 FOR MEDICARE OP): Mod: IPSPLIT | Performed by: NURSE PRACTITIONER

## 2023-12-12 PROCEDURE — 9420000001 HC RT PATIENT EDUCATION 5 MIN: Mod: IPSPLIT

## 2023-12-12 RX ADMIN — ENOXAPARIN SODIUM 40 MG: 100 INJECTION SUBCUTANEOUS at 08:25

## 2023-12-12 RX ADMIN — LOSARTAN POTASSIUM 50 MG: 50 TABLET, FILM COATED ORAL at 20:52

## 2023-12-12 RX ADMIN — GUAIFENESIN 1200 MG: 600 TABLET, EXTENDED RELEASE ORAL at 08:25

## 2023-12-12 RX ADMIN — GUAIFENESIN 1200 MG: 600 TABLET, EXTENDED RELEASE ORAL at 20:52

## 2023-12-12 RX ADMIN — LEVOTHYROXINE SODIUM 50 MCG: 50 TABLET ORAL at 05:58

## 2023-12-12 RX ADMIN — ARFORMOTEROL TARTRATE 15 MCG: 15 SOLUTION RESPIRATORY (INHALATION) at 19:22

## 2023-12-12 RX ADMIN — LORAZEPAM 0.5 MG: 0.5 TABLET ORAL at 20:52

## 2023-12-12 RX ADMIN — LOSARTAN POTASSIUM 50 MG: 50 TABLET, FILM COATED ORAL at 08:25

## 2023-12-12 RX ADMIN — FERROUS SULFATE TAB 325 MG (65 MG ELEMENTAL FE) 1 TABLET: 325 (65 FE) TAB at 08:25

## 2023-12-12 RX ADMIN — PREDNISONE 50 MG: 20 TABLET ORAL at 08:25

## 2023-12-12 RX ADMIN — IPRATROPIUM BROMIDE AND ALBUTEROL SULFATE 3 ML: .5; 3 SOLUTION RESPIRATORY (INHALATION) at 05:11

## 2023-12-12 RX ADMIN — MAGNESIUM OXIDE TAB 400 MG (241.3 MG ELEMENTAL MG) 400 MG: 400 (241.3 MG) TAB at 08:25

## 2023-12-12 RX ADMIN — BUDESONIDE INHALATION 0.5 MG: 0.5 SUSPENSION RESPIRATORY (INHALATION) at 19:22

## 2023-12-12 RX ADMIN — MULTIPLE VITAMINS W/ MINERALS TAB 1 TABLET: TAB at 08:25

## 2023-12-12 RX ADMIN — CLOPIDOGREL BISULFATE 75 MG: 75 TABLET ORAL at 08:25

## 2023-12-12 RX ADMIN — IPRATROPIUM BROMIDE AND ALBUTEROL SULFATE 3 ML: .5; 3 SOLUTION RESPIRATORY (INHALATION) at 14:03

## 2023-12-12 RX ADMIN — BUDESONIDE INHALATION 0.5 MG: 0.5 SUSPENSION RESPIRATORY (INHALATION) at 08:42

## 2023-12-12 RX ADMIN — METOPROLOL SUCCINATE 75 MG: 50 TABLET, EXTENDED RELEASE ORAL at 08:25

## 2023-12-12 RX ADMIN — ASPIRIN 81 MG: 81 TABLET, COATED ORAL at 08:25

## 2023-12-12 RX ADMIN — POLYETHYLENE GLYCOL 3350 17 G: 17 POWDER, FOR SOLUTION ORAL at 08:25

## 2023-12-12 ASSESSMENT — PAIN SCALES - GENERAL
PAINLEVEL_OUTOF10: 0 - NO PAIN
PAINLEVEL_OUTOF10: 0 - NO PAIN

## 2023-12-12 ASSESSMENT — PAIN - FUNCTIONAL ASSESSMENT: PAIN_FUNCTIONAL_ASSESSMENT: 0-10

## 2023-12-12 NOTE — CARE PLAN
The clinical goals for the shift include Pt Spo2 will remain above 92% with supplemental oxygen throughout shift    Pt ambulated in the halls multiple times, verbalized that she feels her respiratory status is improved, maintained on baseline O2

## 2023-12-12 NOTE — DISCHARGE INSTRUCTIONS
You were referred to [facility] for RN/PT services. [phone]. They will contact you directly to set up appointment within 2 days after discharge. SHAUN Alex

## 2023-12-12 NOTE — PROGRESS NOTES
"Trinity Hernandez is a 73 y.o. female on day 2 of admission presenting with Acute exacerbation of chronic obstructive pulmonary disease (CMS/HCC).    Subjective   Trinity continues to slowly improve.  She seems to be coughing more today, but moving more air.  She notes that she feels better today vs. yesterday       Objective     Physical Exam    Constitutional:       Appearance: Normal appearance.   HENT:      Head: Normocephalic.      Nose: Nose normal.      Mouth/Throat:      Mouth: Mucous membranes are moist.   Eyes:      Extraocular Movements: Extraocular movements intact.      Pupils: Pupils are equal, round, and reactive to light.   Cardiovascular:      Rate and Rhythm: Normal rate and regular rhythm. Difficult to auscultate 2/2 wheezing.     Pulses: Normal pulses.   Pulmonary:      Breath sounds: Expiratory Wheezing and scattered rhonchi present throughout all lung fields     Comments: congested cough.   Abdominal:      General: Bowel sounds are normal.      Palpations: Abdomen is soft.   Musculoskeletal:      Cervical back: Normal range of motion.      Right lower leg:No edema.      Left lower leg: No edema.   Skin:     General: Skin is warm and dry.      Capillary Refill: Capillary refill takes less than 2 seconds.   Neurological:      General: No focal deficit present.      Mental Status: She is alert and oriented to person, place, and time. Mental status is at baseline.   Psychiatric:         Mood and Affect: Mood normal.         Behavior: Behavior normal.   Last Recorded Vitals  /60 (BP Location: Left arm, Patient Position: Lying)   Pulse 79   Temp 36.4 °C (97.6 °F) (Temporal)   Resp 23   Wt 75 kg (165 lb 5.5 oz)   SpO2 96%   Intake/Output last 3 Shifts:    Last Recorded Vitals  Blood pressure 146/81, pulse 105, temperature 36.4 °C (97.6 °F), temperature source Temporal, resp. rate 21, height 1.702 m (5' 7\"), weight 75 kg (165 lb 5.5 oz), SpO2 94 %.  Intake/Output last 3 Shifts:  I/O last 3 " completed shifts:  In: 1250 (16.7 mL/kg) [P.O.:1200; I.V.:50 (0.7 mL/kg)]  Out: - (0 mL/kg)   Weight: 75 kg     Relevant Results  Scheduled medications  arformoterol, 15 mcg, nebulization, BID  aspirin, 81 mg, oral, Daily  azithromycin, 250 mg, oral, Once per day on Mon Wed Fri  budesonide, 0.5 mg, nebulization, BID  clopidogrel, 75 mg, oral, Daily  enoxaparin, 40 mg, subcutaneous, Daily  ferrous sulfate (325 mg ferrous sulfate), 65 mg of iron, oral, Daily  furosemide, 20 mg, oral, q48h  guaiFENesin, 1,200 mg, oral, BID  levothyroxine, 50 mcg, oral, Daily  losartan, 50 mg, oral, BID  magnesium oxide, 400 mg, oral, Daily  metoprolol succinate XL, 75 mg, oral, Daily  multivitamin with minerals, 1 tablet, oral, Daily  oxygen, , inhalation, Continuous - 02/gases  polyethylene glycol, 17 g, oral, Daily  [START ON 12/15/2023] predniSONE, 40 mg, oral, Daily   Followed by  [START ON 12/18/2023] predniSONE, 30 mg, oral, Daily   Followed by  [START ON 12/21/2023] predniSONE, 20 mg, oral, Daily   Followed by  [START ON 12/24/2023] predniSONE, 10 mg, oral, Daily  predniSONE, 50 mg, oral, Daily  tiotropium, 2 Inhalation, inhalation, Daily      Continuous medications     PRN medications  PRN medications: acetaminophen, dextromethorphan-guaifenesin, ipratropium-albuteroL, LORazepam, melatonin, nitroglycerin, oxygen, simethicone    Results for orders placed or performed during the hospital encounter of 12/09/23 (from the past 24 hour(s))   ECG 12 lead   Result Value Ref Range    Ventricular Rate 96 BPM    Atrial Rate 96 BPM    WA Interval 132 ms    QRS Duration 124 ms    QT Interval 382 ms    QTC Calculation(Bazett) 482 ms    P Axis 72 degrees    R Axis 61 degrees    T Axis 63 degrees    QRS Count 16 beats    Q Onset 205 ms    P Onset 139 ms    P Offset 186 ms    T Offset 396 ms    QTC Fredericia 446 ms   Renal Function Panel   Result Value Ref Range    Glucose 154 (H) 74 - 99 mg/dL    Sodium 134 (L) 136 - 145 mmol/L    Potassium  4.5 3.5 - 5.3 mmol/L    Chloride 97 (L) 98 - 107 mmol/L    Bicarbonate 31 21 - 32 mmol/L    Anion Gap 11 10 - 20 mmol/L    Urea Nitrogen 24 (H) 6 - 23 mg/dL    Creatinine 0.84 0.50 - 1.05 mg/dL    eGFR 73 >60 mL/min/1.73m*2    Calcium 9.3 8.6 - 10.3 mg/dL    Phosphorus 3.2 2.5 - 4.9 mg/dL    Albumin 3.8 3.4 - 5.0 g/dL   Magnesium   Result Value Ref Range    Magnesium 2.10 1.60 - 2.40 mg/dL   CBC   Result Value Ref Range    WBC 18.7 (H) 4.4 - 11.3 x10*3/uL    nRBC 0.0 0.0 - 0.0 /100 WBCs    RBC 4.04 4.00 - 5.20 x10*6/uL    Hemoglobin 11.9 (L) 12.0 - 16.0 g/dL    Hematocrit 38.2 36.0 - 46.0 %    MCV 95 80 - 100 fL    MCH 29.5 26.0 - 34.0 pg    MCHC 31.2 (L) 32.0 - 36.0 g/dL    RDW 12.9 11.5 - 14.5 %    Platelets 286 150 - 450 x10*3/uL          ECG 12 lead    Result Date: 12/11/2023  Normal sinus rhythm Left bundle branch block Abnormal ECG When compared with ECG of 05-DEC-2023 14:21, (unconfirmed) Nonspecific T wave abnormality now evident in Inferior leads             Assessment/Plan   Principal Problem:    Acute exacerbation of chronic obstructive pulmonary disease (CMS/[facility])    #COPD exacerbation   ~pulmonary sarcoidosis   ~chronic respiratory failure with hypoxia   ~asthma  ~treated and released X 2 and was on hospital at home program.   ~recent CXR with RLL blunting of the costophrenic angle, CXR this admission unchanged from previous.   ~WBC 15.8 with left shift~steroid vs infection induced.  ~Rocephin, zithromax, solumedrol duoneb in the ED.  Respiratory status assisted with bipap.  ~continue zithromax three days per week  ~solumedrol 40 mg BID~plan to transition to oral starting 12/12/23  ~bipap 14/6-14-40% intermittently       #Non-hospital problems  ~CAD~asa, plavix     ~cardiac stents  ~CHF~furosemide 20 mg Q48H  ~HTN~losartan 50 mg, metoprolol 75mg      ~AAA  ~palpitations  ~a-fib~metoprolol 75 mg QD  ~hypothyroidism-synthroid 50 mcg QD  ~migraines  ~hiatal hernia  ~diverticulosis s/p colon  resection  ~HLD     F: repleinsh PRN  E: replenish PRN  N: as tolerated  ABX: azithromycin  DVT: lovenox  GI: NA     Disposition: Admitted for acute on chronic respiratory failure 2/2 PNA and exac COPD EST LOS < 2 midnights.           Erick Gunn MD

## 2023-12-12 NOTE — CARE PLAN
Patient had an uneventful night, she slept for a little while last night before she was up asking for a breathing treatment. The ativan did seem to help her anxiety last night.

## 2023-12-13 LAB
ALBUMIN SERPL BCP-MCNC: 3.5 G/DL (ref 3.4–5)
ANION GAP SERPL CALC-SCNC: 7 MMOL/L (ref 10–20)
BUN SERPL-MCNC: 24 MG/DL (ref 6–23)
CALCIUM SERPL-MCNC: 9 MG/DL (ref 8.6–10.3)
CHLORIDE SERPL-SCNC: 102 MMOL/L (ref 98–107)
CO2 SERPL-SCNC: 32 MMOL/L (ref 21–32)
CREAT SERPL-MCNC: 0.89 MG/DL (ref 0.5–1.05)
ERYTHROCYTE [DISTWIDTH] IN BLOOD BY AUTOMATED COUNT: 13 % (ref 11.5–14.5)
GFR SERPL CREATININE-BSD FRML MDRD: 68 ML/MIN/1.73M*2
GLUCOSE SERPL-MCNC: 87 MG/DL (ref 74–99)
HCT VFR BLD AUTO: 36.5 % (ref 36–46)
HGB BLD-MCNC: 11.3 G/DL (ref 12–16)
HOLD SPECIMEN: NORMAL
HOLD SPECIMEN: NORMAL
MAGNESIUM SERPL-MCNC: 1.95 MG/DL (ref 1.6–2.4)
MCH RBC QN AUTO: 29.3 PG (ref 26–34)
MCHC RBC AUTO-ENTMCNC: 31 G/DL (ref 32–36)
MCV RBC AUTO: 95 FL (ref 80–100)
NRBC BLD-RTO: 0 /100 WBCS (ref 0–0)
PHOSPHATE SERPL-MCNC: 2.7 MG/DL (ref 2.5–4.9)
PLATELET # BLD AUTO: 257 X10*3/UL (ref 150–450)
POTASSIUM SERPL-SCNC: 4 MMOL/L (ref 3.5–5.3)
RBC # BLD AUTO: 3.86 X10*6/UL (ref 4–5.2)
SODIUM SERPL-SCNC: 137 MMOL/L (ref 136–145)
WBC # BLD AUTO: 16.1 X10*3/UL (ref 4.4–11.3)

## 2023-12-13 PROCEDURE — 99232 SBSQ HOSP IP/OBS MODERATE 35: CPT | Performed by: NURSE PRACTITIONER

## 2023-12-13 PROCEDURE — 94762 N-INVAS EAR/PLS OXIMTRY CONT: CPT | Mod: IPSPLIT

## 2023-12-13 PROCEDURE — 2500000005 HC RX 250 GENERAL PHARMACY W/O HCPCS: Mod: IPSPLIT | Performed by: INTERNAL MEDICINE

## 2023-12-13 PROCEDURE — 94667 MNPJ CHEST WALL 1ST: CPT | Mod: IPSPLIT

## 2023-12-13 PROCEDURE — 2500000004 HC RX 250 GENERAL PHARMACY W/ HCPCS (ALT 636 FOR OP/ED): Mod: IPSPLIT | Performed by: INTERNAL MEDICINE

## 2023-12-13 PROCEDURE — 94640 AIRWAY INHALATION TREATMENT: CPT | Mod: IPSPLIT

## 2023-12-13 PROCEDURE — 2500000004 HC RX 250 GENERAL PHARMACY W/ HCPCS (ALT 636 FOR OP/ED): Mod: IPSPLIT | Performed by: NURSE PRACTITIONER

## 2023-12-13 PROCEDURE — 37799 UNLISTED PX VASCULAR SURGERY: CPT | Mod: IPSPLIT | Performed by: NURSE PRACTITIONER

## 2023-12-13 PROCEDURE — 83735 ASSAY OF MAGNESIUM: CPT | Mod: IPSPLIT | Performed by: NURSE PRACTITIONER

## 2023-12-13 PROCEDURE — 99232 SBSQ HOSP IP/OBS MODERATE 35: CPT | Performed by: INTERNAL MEDICINE

## 2023-12-13 PROCEDURE — 1200000002 HC GENERAL ROOM WITH TELEMETRY DAILY: Mod: IPSPLIT

## 2023-12-13 PROCEDURE — 2500000001 HC RX 250 WO HCPCS SELF ADMINISTERED DRUGS (ALT 637 FOR MEDICARE OP): Mod: IPSPLIT | Performed by: NURSE PRACTITIONER

## 2023-12-13 PROCEDURE — 9420000001 HC RT PATIENT EDUCATION 5 MIN: Mod: IPSPLIT

## 2023-12-13 PROCEDURE — 96372 THER/PROPH/DIAG INJ SC/IM: CPT | Mod: IPSPLIT | Performed by: NURSE PRACTITIONER

## 2023-12-13 PROCEDURE — 2500000002 HC RX 250 W HCPCS SELF ADMINISTERED DRUGS (ALT 637 FOR MEDICARE OP, ALT 636 FOR OP/ED): Mod: IPSPLIT | Performed by: INTERNAL MEDICINE

## 2023-12-13 PROCEDURE — 2500000002 HC RX 250 W HCPCS SELF ADMINISTERED DRUGS (ALT 637 FOR MEDICARE OP, ALT 636 FOR OP/ED): Mod: IPSPLIT | Performed by: NURSE PRACTITIONER

## 2023-12-13 PROCEDURE — 94668 MNPJ CHEST WALL SBSQ: CPT | Mod: IPSPLIT

## 2023-12-13 PROCEDURE — 85027 COMPLETE CBC AUTOMATED: CPT | Mod: IPSPLIT | Performed by: NURSE PRACTITIONER

## 2023-12-13 PROCEDURE — 80069 RENAL FUNCTION PANEL: CPT | Mod: IPSPLIT | Performed by: NURSE PRACTITIONER

## 2023-12-13 RX ADMIN — MAGNESIUM OXIDE TAB 400 MG (241.3 MG ELEMENTAL MG) 400 MG: 400 (241.3 MG) TAB at 08:28

## 2023-12-13 RX ADMIN — LORAZEPAM 0.5 MG: 0.5 TABLET ORAL at 20:37

## 2023-12-13 RX ADMIN — BUDESONIDE INHALATION 0.5 MG: 0.5 SUSPENSION RESPIRATORY (INHALATION) at 20:32

## 2023-12-13 RX ADMIN — PREDNISONE 50 MG: 20 TABLET ORAL at 08:27

## 2023-12-13 RX ADMIN — ASPIRIN 81 MG: 81 TABLET, COATED ORAL at 08:27

## 2023-12-13 RX ADMIN — LOSARTAN POTASSIUM 50 MG: 50 TABLET, FILM COATED ORAL at 20:37

## 2023-12-13 RX ADMIN — LEVOTHYROXINE SODIUM 50 MCG: 50 TABLET ORAL at 06:31

## 2023-12-13 RX ADMIN — AZITHROMYCIN DIHYDRATE 250 MG: 250 TABLET ORAL at 08:28

## 2023-12-13 RX ADMIN — CLOPIDOGREL BISULFATE 75 MG: 75 TABLET ORAL at 08:27

## 2023-12-13 RX ADMIN — POLYETHYLENE GLYCOL 3350 17 G: 17 POWDER, FOR SOLUTION ORAL at 08:33

## 2023-12-13 RX ADMIN — LOSARTAN POTASSIUM 50 MG: 50 TABLET, FILM COATED ORAL at 08:28

## 2023-12-13 RX ADMIN — MULTIPLE VITAMINS W/ MINERALS TAB 1 TABLET: TAB at 08:27

## 2023-12-13 RX ADMIN — BUDESONIDE INHALATION 0.5 MG: 0.5 SUSPENSION RESPIRATORY (INHALATION) at 08:52

## 2023-12-13 RX ADMIN — FUROSEMIDE 20 MG: 20 TABLET ORAL at 12:44

## 2023-12-13 RX ADMIN — Medication 3 L/MIN: at 08:00

## 2023-12-13 RX ADMIN — IPRATROPIUM BROMIDE AND ALBUTEROL SULFATE 3 ML: .5; 3 SOLUTION RESPIRATORY (INHALATION) at 13:28

## 2023-12-13 RX ADMIN — ARFORMOTEROL TARTRATE 15 MCG: 15 SOLUTION RESPIRATORY (INHALATION) at 20:32

## 2023-12-13 RX ADMIN — ENOXAPARIN SODIUM 40 MG: 100 INJECTION SUBCUTANEOUS at 08:27

## 2023-12-13 RX ADMIN — IPRATROPIUM BROMIDE AND ALBUTEROL SULFATE 3 ML: .5; 3 SOLUTION RESPIRATORY (INHALATION) at 15:58

## 2023-12-13 RX ADMIN — METOPROLOL SUCCINATE 75 MG: 50 TABLET, EXTENDED RELEASE ORAL at 08:28

## 2023-12-13 RX ADMIN — FERROUS SULFATE TAB 325 MG (65 MG ELEMENTAL FE) 1 TABLET: 325 (65 FE) TAB at 08:27

## 2023-12-13 RX ADMIN — GUAIFENESIN 1200 MG: 600 TABLET, EXTENDED RELEASE ORAL at 20:37

## 2023-12-13 RX ADMIN — GUAIFENESIN 1200 MG: 600 TABLET, EXTENDED RELEASE ORAL at 08:27

## 2023-12-13 RX ADMIN — TIOTROPIUM BROMIDE INHALATION SPRAY 2 PUFF: 3.12 SPRAY, METERED RESPIRATORY (INHALATION) at 08:57

## 2023-12-13 RX ADMIN — IPRATROPIUM BROMIDE AND ALBUTEROL SULFATE 3 ML: .5; 3 SOLUTION RESPIRATORY (INHALATION) at 02:17

## 2023-12-13 ASSESSMENT — PAIN - FUNCTIONAL ASSESSMENT: PAIN_FUNCTIONAL_ASSESSMENT: 0-10

## 2023-12-13 ASSESSMENT — PAIN SCALES - GENERAL
PAINLEVEL_OUTOF10: 0 - NO PAIN
PAINLEVEL_OUTOF10: 0 - NO PAIN

## 2023-12-13 NOTE — PROGRESS NOTES
Trinity Hernandez is a 73 y.o. female on day 3 of admission presenting with Acute exacerbation of chronic obstructive pulmonary disease (CMS/HCC).      Subjective   Trinity is seen in her room. She is eating her breakfast without difficulty. She expressed that she still does not feel 100% but is coughing up phlegm with taking the mucinex. Expressed that she ambulated in the hallway yesterday and that went well.       Objective   Physical Exam     Constitutional:       Appearance: Normal appearance.   HENT:      Head: Normocephalic.      Nose: Nose normal.      Mouth/Throat:      Mouth: Mucous membranes are moist.   Eyes:      Extraocular Movements: Extraocular movements intact.      Pupils: Pupils are equal, round, and reactive to light.   Cardiovascular:      Rate and Rhythm: Normal rate and regular rhythm. Difficult to auscultate 2/2 wheezing.     Pulses: Normal pulses.   Pulmonary:      Breath sounds: Coarse but improved over all.      Comments: congested cough. without dyspnea.     Oxygen aturation 98% 3 liters with her baseline oxygen needs of 2 liters at home.   Abdominal:      General: Bowel sounds are normal.      Palpations: Abdomen is soft.   Musculoskeletal:      Cervical back: Normal range of motion.      Right lower leg:No edema.      Left lower leg: No edema.   Skin:     General: Skin is warm and dry.      Capillary Refill: Capillary refill takes less than 2 seconds.   Neurological:      General: No focal deficit present.      Mental Status: She is alert and oriented to person, place, and time. Mental status is at baseline.   Psychiatric:         Mood and Affect: Mood normal.         Behavior: Behavior normal.   Last Recorded Vitals  /53 (BP Location: Left arm, Patient Position: Lying)   Pulse 80   Temp 36.3 °C (97.3 °F) (Temporal)   Resp 22   Wt 75 kg (165 lb 5.5 oz)   SpO2 98%   Intake/Output last 3 Shifts:    Intake/Output Summary (Last 24 hours) at 12/13/2023 0818  Last data filed at  12/12/2023 1700  Gross per 24 hour   Intake 840 ml   Output --   Net 840 ml       Admission Weight  Weight: 74.8 kg (165 lb) (12/09/23 2150)    Daily Weight  12/11/23 : 75 kg (165 lb 5.5 oz)    Image Results  ECG 12 lead  Normal sinus rhythm  Left bundle branch block  Abnormal ECG  When compared with ECG of 05-DEC-2023 14:21, (unconfirmed)  Nonspecific T wave abnormality now evident in Inferior leads    Results for orders placed or performed during the hospital encounter of 12/09/23 (from the past 24 hour(s))   CBC   Result Value Ref Range    WBC 16.1 (H) 4.4 - 11.3 x10*3/uL    nRBC 0.0 0.0 - 0.0 /100 WBCs    RBC 3.86 (L) 4.00 - 5.20 x10*6/uL    Hemoglobin 11.3 (L) 12.0 - 16.0 g/dL    Hematocrit 36.5 36.0 - 46.0 %    MCV 95 80 - 100 fL    MCH 29.3 26.0 - 34.0 pg    MCHC 31.0 (L) 32.0 - 36.0 g/dL    RDW 13.0 11.5 - 14.5 %    Platelets 257 150 - 450 x10*3/uL   Renal Function Panel   Result Value Ref Range    Glucose 87 74 - 99 mg/dL    Sodium 137 136 - 145 mmol/L    Potassium 4.0 3.5 - 5.3 mmol/L    Chloride 102 98 - 107 mmol/L    Bicarbonate 32 21 - 32 mmol/L    Anion Gap 7 (L) 10 - 20 mmol/L    Urea Nitrogen 24 (H) 6 - 23 mg/dL    Creatinine 0.89 0.50 - 1.05 mg/dL    eGFR 68 >60 mL/min/1.73m*2    Calcium 9.0 8.6 - 10.3 mg/dL    Phosphorus 2.7 2.5 - 4.9 mg/dL    Albumin 3.5 3.4 - 5.0 g/dL   Magnesium   Result Value Ref Range    Magnesium 1.95 1.60 - 2.40 mg/dL    Scheduled medications  arformoterol, 15 mcg, nebulization, BID  aspirin, 81 mg, oral, Daily  azithromycin, 250 mg, oral, Once per day on Mon Wed Fri  budesonide, 0.5 mg, nebulization, BID  clopidogrel, 75 mg, oral, Daily  enoxaparin, 40 mg, subcutaneous, Daily  ferrous sulfate (325 mg ferrous sulfate), 65 mg of iron, oral, Daily  furosemide, 20 mg, oral, q48h  guaiFENesin, 1,200 mg, oral, BID  levothyroxine, 50 mcg, oral, Daily  losartan, 50 mg, oral, BID  magnesium oxide, 400 mg, oral, Daily  metoprolol succinate XL, 75 mg, oral, Daily  multivitamin with  minerals, 1 tablet, oral, Daily  oxygen, , inhalation, Continuous - 02/gases  polyethylene glycol, 17 g, oral, Daily  [START ON 12/15/2023] predniSONE, 40 mg, oral, Daily   Followed by  [START ON 12/18/2023] predniSONE, 30 mg, oral, Daily   Followed by  [START ON 12/21/2023] predniSONE, 20 mg, oral, Daily   Followed by  [START ON 12/24/2023] predniSONE, 10 mg, oral, Daily  predniSONE, 50 mg, oral, Daily  tiotropium, 2 Inhalation, inhalation, Daily      Continuous medications     PRN medications  PRN medications: acetaminophen, dextromethorphan-guaifenesin, ipratropium-albuteroL, LORazepam, melatonin, nitroglycerin, oxygen, simethicone         Assessment/Plan   #COPD exacerbation   ~pulmonary sarcoidosis   ~chronic respiratory failure with hypoxia   ~asthma  ~treated and released X 2 and was on hospital at home program.   ~recent CXR with RLL blunting of the costophrenic angle, CXR this admission unchanged from previous.   ~WBC 15.8 with left shift~steroid vs infection induced.  ~Rocephin, zithromax, solumedrol duoneb in the ED.  Respiratory status assisted with bipap.  ~continue zithromax three days per week  ~solumedrol 40 mg BID~plan to transition to oral starting 12/12/23    ~subsequent decrease in overall WBC with reduction of the steroid  ~bipap 14/6-14-40% intermittently ~discontinued  ~oxygen is 3 liters nasal cannula with baseline 2 liters at home saturation 98%        #Non-hospital problems  ~CAD~asa, plavix     ~cardiac stents  ~CHF~furosemide 20 mg Q48H  ~HTN~losartan 50 mg, metoprolol 75mg      ~AAA  ~palpitations  ~a-fib~metoprolol 75 mg QD  ~hypothyroidism-synthroid 50 mcg QD  ~migraines  ~hiatal hernia  ~diverticulosis s/p colon resection  ~HLD     F: repleinsh PRN  E: replenish PRN  N: as tolerated  ABX: azithromycin  DVT: lovenox  GI: NA     Disposition: Admitted for acute on chronic respiratory failure 2/2 PNA and exac COPD EST LOS > 2 midnights.    Stephanie Eldridge, APRN-CNP    Patient was seen in  collaboration with the ALDAIR, Stephanie ZAYAS.  I was present for the pertinent components of the history, physical, and medical decision making and independently examined the patient.  We reviewed the medication reconciliation list and ancillary studies, including lab, imaging, and microbiology, as well as discussed the differential diagnoses; which includes, but is not limited to Principal Problem:    Acute exacerbation of chronic obstructive pulmonary disease (CMS/HCC)  .      I agree with her plan as documented in the electronic medical record.    Erick Gunn MD

## 2023-12-13 NOTE — CARE PLAN
The patient's goals for the shift include      The clinical goals for the shift include Patient will ambulate safely and maintain SpO2 >92% through 1900    Over the shift, the patient did ambulate the halls and did well. Patient maintained SpO2 >90%. Patient denies pain at this time, call light in reach.

## 2023-12-13 NOTE — CARE PLAN
The patient's goals for the shift include      The clinical goals for the shift include Patients 02 saturation will remain equal or greater than 92%    Patient had a quiet night. She remains on 3L NC. Continues to cough up thick yellow sputum. SA TX PRN SOB. VSS, afebrile.

## 2023-12-14 ENCOUNTER — APPOINTMENT (OUTPATIENT)
Dept: PRIMARY CARE | Facility: CLINIC | Age: 73
End: 2023-12-14
Payer: MEDICARE

## 2023-12-14 LAB
ALBUMIN SERPL BCP-MCNC: 3.5 G/DL (ref 3.4–5)
ANION GAP SERPL CALC-SCNC: 9 MMOL/L (ref 10–20)
BUN SERPL-MCNC: 28 MG/DL (ref 6–23)
CALCIUM SERPL-MCNC: 9.2 MG/DL (ref 8.6–10.3)
CHLORIDE SERPL-SCNC: 101 MMOL/L (ref 98–107)
CO2 SERPL-SCNC: 32 MMOL/L (ref 21–32)
CREAT SERPL-MCNC: 0.88 MG/DL (ref 0.5–1.05)
ERYTHROCYTE [DISTWIDTH] IN BLOOD BY AUTOMATED COUNT: 13 % (ref 11.5–14.5)
GFR SERPL CREATININE-BSD FRML MDRD: 69 ML/MIN/1.73M*2
GLUCOSE SERPL-MCNC: 120 MG/DL (ref 74–99)
HCT VFR BLD AUTO: 35.2 % (ref 36–46)
HGB BLD-MCNC: 11 G/DL (ref 12–16)
MAGNESIUM SERPL-MCNC: 1.87 MG/DL (ref 1.6–2.4)
MCH RBC QN AUTO: 29.6 PG (ref 26–34)
MCHC RBC AUTO-ENTMCNC: 31.3 G/DL (ref 32–36)
MCV RBC AUTO: 95 FL (ref 80–100)
NRBC BLD-RTO: 0 /100 WBCS (ref 0–0)
PHOSPHATE SERPL-MCNC: 3.1 MG/DL (ref 2.5–4.9)
PLATELET # BLD AUTO: 225 X10*3/UL (ref 150–450)
POTASSIUM SERPL-SCNC: 4.1 MMOL/L (ref 3.5–5.3)
RBC # BLD AUTO: 3.72 X10*6/UL (ref 4–5.2)
SODIUM SERPL-SCNC: 138 MMOL/L (ref 136–145)
WBC # BLD AUTO: 14.5 X10*3/UL (ref 4.4–11.3)

## 2023-12-14 PROCEDURE — 1200000002 HC GENERAL ROOM WITH TELEMETRY DAILY: Mod: IPSPLIT

## 2023-12-14 PROCEDURE — 2500000002 HC RX 250 W HCPCS SELF ADMINISTERED DRUGS (ALT 637 FOR MEDICARE OP, ALT 636 FOR OP/ED): Mod: IPSPLIT | Performed by: INTERNAL MEDICINE

## 2023-12-14 PROCEDURE — 2500000001 HC RX 250 WO HCPCS SELF ADMINISTERED DRUGS (ALT 637 FOR MEDICARE OP): Mod: IPSPLIT | Performed by: NURSE PRACTITIONER

## 2023-12-14 PROCEDURE — 94668 MNPJ CHEST WALL SBSQ: CPT | Mod: IPSPLIT

## 2023-12-14 PROCEDURE — 83735 ASSAY OF MAGNESIUM: CPT | Mod: IPSPLIT | Performed by: NURSE PRACTITIONER

## 2023-12-14 PROCEDURE — 2500000004 HC RX 250 GENERAL PHARMACY W/ HCPCS (ALT 636 FOR OP/ED): Mod: IPSPLIT | Performed by: INTERNAL MEDICINE

## 2023-12-14 PROCEDURE — 36415 COLL VENOUS BLD VENIPUNCTURE: CPT | Mod: IPSPLIT | Performed by: NURSE PRACTITIONER

## 2023-12-14 PROCEDURE — 2500000005 HC RX 250 GENERAL PHARMACY W/O HCPCS: Mod: IPSPLIT | Performed by: INTERNAL MEDICINE

## 2023-12-14 PROCEDURE — 80069 RENAL FUNCTION PANEL: CPT | Mod: IPSPLIT | Performed by: NURSE PRACTITIONER

## 2023-12-14 PROCEDURE — 96372 THER/PROPH/DIAG INJ SC/IM: CPT | Mod: IPSPLIT | Performed by: NURSE PRACTITIONER

## 2023-12-14 PROCEDURE — 99232 SBSQ HOSP IP/OBS MODERATE 35: CPT | Performed by: INTERNAL MEDICINE

## 2023-12-14 PROCEDURE — 85027 COMPLETE CBC AUTOMATED: CPT | Mod: IPSPLIT | Performed by: NURSE PRACTITIONER

## 2023-12-14 PROCEDURE — 2500000002 HC RX 250 W HCPCS SELF ADMINISTERED DRUGS (ALT 637 FOR MEDICARE OP, ALT 636 FOR OP/ED): Mod: IPSPLIT | Performed by: NURSE PRACTITIONER

## 2023-12-14 PROCEDURE — 2500000004 HC RX 250 GENERAL PHARMACY W/ HCPCS (ALT 636 FOR OP/ED): Mod: IPSPLIT | Performed by: NURSE PRACTITIONER

## 2023-12-14 PROCEDURE — 94640 AIRWAY INHALATION TREATMENT: CPT | Mod: IPSPLIT

## 2023-12-14 RX ADMIN — METOPROLOL SUCCINATE 75 MG: 50 TABLET, EXTENDED RELEASE ORAL at 08:53

## 2023-12-14 RX ADMIN — MAGNESIUM OXIDE TAB 400 MG (241.3 MG ELEMENTAL MG) 400 MG: 400 (241.3 MG) TAB at 08:54

## 2023-12-14 RX ADMIN — PREDNISONE 50 MG: 20 TABLET ORAL at 08:54

## 2023-12-14 RX ADMIN — IPRATROPIUM BROMIDE AND ALBUTEROL SULFATE 3 ML: .5; 3 SOLUTION RESPIRATORY (INHALATION) at 02:46

## 2023-12-14 RX ADMIN — LOSARTAN POTASSIUM 50 MG: 50 TABLET, FILM COATED ORAL at 09:48

## 2023-12-14 RX ADMIN — POLYETHYLENE GLYCOL 3350 17 G: 17 POWDER, FOR SOLUTION ORAL at 08:55

## 2023-12-14 RX ADMIN — Medication 3 L/MIN: at 08:00

## 2023-12-14 RX ADMIN — LOSARTAN POTASSIUM 50 MG: 50 TABLET, FILM COATED ORAL at 21:31

## 2023-12-14 RX ADMIN — BUDESONIDE INHALATION 0.5 MG: 0.5 SUSPENSION RESPIRATORY (INHALATION) at 20:14

## 2023-12-14 RX ADMIN — GUAIFENESIN 1200 MG: 600 TABLET, EXTENDED RELEASE ORAL at 08:52

## 2023-12-14 RX ADMIN — LEVOTHYROXINE SODIUM 50 MCG: 50 TABLET ORAL at 05:17

## 2023-12-14 RX ADMIN — ENOXAPARIN SODIUM 40 MG: 100 INJECTION SUBCUTANEOUS at 08:54

## 2023-12-14 RX ADMIN — GUAIFENESIN 1200 MG: 600 TABLET, EXTENDED RELEASE ORAL at 21:31

## 2023-12-14 RX ADMIN — TIOTROPIUM BROMIDE INHALATION SPRAY 2 PUFF: 3.12 SPRAY, METERED RESPIRATORY (INHALATION) at 09:39

## 2023-12-14 RX ADMIN — ARFORMOTEROL TARTRATE 15 MCG: 15 SOLUTION RESPIRATORY (INHALATION) at 09:00

## 2023-12-14 RX ADMIN — LORAZEPAM 0.5 MG: 0.5 TABLET ORAL at 21:31

## 2023-12-14 RX ADMIN — BUDESONIDE INHALATION 0.5 MG: 0.5 SUSPENSION RESPIRATORY (INHALATION) at 09:11

## 2023-12-14 RX ADMIN — IPRATROPIUM BROMIDE AND ALBUTEROL SULFATE 3 ML: .5; 3 SOLUTION RESPIRATORY (INHALATION) at 17:36

## 2023-12-14 RX ADMIN — FERROUS SULFATE TAB 325 MG (65 MG ELEMENTAL FE) 1 TABLET: 325 (65 FE) TAB at 08:52

## 2023-12-14 RX ADMIN — ARFORMOTEROL TARTRATE 15 MCG: 15 SOLUTION RESPIRATORY (INHALATION) at 20:14

## 2023-12-14 RX ADMIN — MULTIPLE VITAMINS W/ MINERALS TAB 1 TABLET: TAB at 08:52

## 2023-12-14 RX ADMIN — ASPIRIN 81 MG: 81 TABLET, COATED ORAL at 08:52

## 2023-12-14 RX ADMIN — CLOPIDOGREL BISULFATE 75 MG: 75 TABLET ORAL at 08:52

## 2023-12-14 ASSESSMENT — PAIN SCALES - GENERAL
PAINLEVEL_OUTOF10: 0 - NO PAIN

## 2023-12-14 ASSESSMENT — PAIN - FUNCTIONAL ASSESSMENT
PAIN_FUNCTIONAL_ASSESSMENT: 0-10

## 2023-12-14 NOTE — CARE PLAN
The patient's goals for the shift include      The clinical goals for the shift include Patient will ambulate safely, maintain SpO2 > 94%, and tolerate medications through the shift.    Patient met goals and overall had an uneventful shift. Patient did need several breathing treatments and the percussion vest was tried with respiratory. Patient tolerated well.

## 2023-12-14 NOTE — PROGRESS NOTES
Trinity Hernandez is a 73 y.o. female on day 4 of admission presenting with Acute exacerbation of chronic obstructive pulmonary disease (CMS/HCC).    Subjective   No new issues.  Still with coarse breath sounds.  Doesn't seem appreciably better than yesterday.  Continue steroids and aerosols.  If she does not start turning the corner soon, we may need to transfer to where there is a pulmonologist available.         Objective     Physical Exam  Constitutional:       Appearance: Normal appearance.   HENT:      Head: Normocephalic.      Right Ear: Tympanic membrane normal.      Left Ear: Tympanic membrane normal.      Nose: Nose normal.      Mouth/Throat:      Mouth: Mucous membranes are dry.   Eyes:      Extraocular Movements: Extraocular movements intact.      Pupils: Pupils are equal, round, and reactive to light.   Neck:      Vascular: No carotid bruit.   Cardiovascular:      Rate and Rhythm: Regular rhythm. Tachycardia present.      Pulses: Normal pulses.      Heart sounds: No murmur heard.     No friction rub. No gallop.   Pulmonary:      Effort: No respiratory distress.      Breath sounds: No stridor. No wheezing, rhonchi or rales.   Chest:      Chest wall: No tenderness.   Abdominal:      General: There is no distension.      Palpations: There is no mass.      Tenderness: There is no abdominal tenderness. There is no right CVA tenderness, left CVA tenderness, guarding or rebound.      Hernia: No hernia is present.   Musculoskeletal:         General: No swelling, tenderness, deformity or signs of injury. Normal range of motion.      Cervical back: Normal range of motion and neck supple. No rigidity or tenderness.      Right lower leg: No edema.   Lymphadenopathy:      Cervical: No cervical adenopathy.   Skin:     Capillary Refill: Capillary refill takes less than 2 seconds.      Coloration: Skin is not jaundiced or pale.      Findings: No bruising, erythema, lesion or rash.   Neurological:      General: No focal  "deficit present.      Mental Status: She is alert and oriented to person, place, and time. Mental status is at baseline.      Cranial Nerves: No cranial nerve deficit.      Motor: No weakness.      Gait: Gait normal.   Psychiatric:         Mood and Affect: Mood normal.         Behavior: Behavior normal.         Thought Content: Thought content normal.         Judgment: Judgment normal.         Last Recorded Vitals  Blood pressure 117/61, pulse 86, temperature 36.5 °C (97.7 °F), temperature source Temporal, resp. rate 23, height 1.702 m (5' 7\"), weight 77.9 kg (171 lb 11.8 oz), SpO2 96 %.  Intake/Output last 3 Shifts:  I/O last 3 completed shifts:  In: 600 (7.7 mL/kg) [P.O.:600]  Out: - (0 mL/kg)   Weight: 77.9 kg     Relevant Results  Scheduled medications  arformoterol, 15 mcg, nebulization, BID  aspirin, 81 mg, oral, Daily  azithromycin, 250 mg, oral, Once per day on Mon Wed Fri  budesonide, 0.5 mg, nebulization, BID  clopidogrel, 75 mg, oral, Daily  enoxaparin, 40 mg, subcutaneous, Daily  ferrous sulfate (325 mg ferrous sulfate), 65 mg of iron, oral, Daily  furosemide, 20 mg, oral, q48h  guaiFENesin, 1,200 mg, oral, BID  levothyroxine, 50 mcg, oral, Daily  losartan, 50 mg, oral, BID  magnesium oxide, 400 mg, oral, Daily  metoprolol succinate XL, 75 mg, oral, Daily  multivitamin with minerals, 1 tablet, oral, Daily  oxygen, , inhalation, Continuous - 02/gases  polyethylene glycol, 17 g, oral, Daily  [START ON 12/15/2023] predniSONE, 40 mg, oral, Daily   Followed by  [START ON 12/18/2023] predniSONE, 30 mg, oral, Daily   Followed by  [START ON 12/21/2023] predniSONE, 20 mg, oral, Daily   Followed by  [START ON 12/24/2023] predniSONE, 10 mg, oral, Daily  tiotropium, 2 Inhalation, inhalation, Daily      Continuous medications     PRN medications  PRN medications: acetaminophen, dextromethorphan-guaifenesin, ipratropium-albuteroL, LORazepam, melatonin, nitroglycerin, oxygen, simethicone    Results for orders placed or " performed during the hospital encounter of 12/09/23 (from the past 24 hour(s))   CBC   Result Value Ref Range    WBC 14.5 (H) 4.4 - 11.3 x10*3/uL    nRBC 0.0 0.0 - 0.0 /100 WBCs    RBC 3.72 (L) 4.00 - 5.20 x10*6/uL    Hemoglobin 11.0 (L) 12.0 - 16.0 g/dL    Hematocrit 35.2 (L) 36.0 - 46.0 %    MCV 95 80 - 100 fL    MCH 29.6 26.0 - 34.0 pg    MCHC 31.3 (L) 32.0 - 36.0 g/dL    RDW 13.0 11.5 - 14.5 %    Platelets 225 150 - 450 x10*3/uL   Renal Function Panel   Result Value Ref Range    Glucose 120 (H) 74 - 99 mg/dL    Sodium 138 136 - 145 mmol/L    Potassium 4.1 3.5 - 5.3 mmol/L    Chloride 101 98 - 107 mmol/L    Bicarbonate 32 21 - 32 mmol/L    Anion Gap 9 (L) 10 - 20 mmol/L    Urea Nitrogen 28 (H) 6 - 23 mg/dL    Creatinine 0.88 0.50 - 1.05 mg/dL    eGFR 69 >60 mL/min/1.73m*2    Calcium 9.2 8.6 - 10.3 mg/dL    Phosphorus 3.1 2.5 - 4.9 mg/dL    Albumin 3.5 3.4 - 5.0 g/dL   Magnesium   Result Value Ref Range    Magnesium 1.87 1.60 - 2.40 mg/dL        No results found.                   Assessment/Plan   Principal Problem:    Acute exacerbation of chronic obstructive pulmonary disease (CMS/[facility])    Assessment/Plan   #COPD exacerbation   ~pulmonary sarcoidosis   ~chronic respiratory failure with hypoxia   ~asthma  ~treated and released X 2 and was on hospital at home program.   ~recent CXR with RLL blunting of the costophrenic angle, CXR this admission unchanged from previous.   ~WBC 15.8 with left shift~steroid vs infection induced.  ~Rocephin, zithromax, solumedrol duoneb in the ED.  Respiratory status assisted with bipap.  ~continue zithromax three days per week  ~solumedrol 40 mg BID~plan to transition to oral starting 12/12/23    ~subsequent decrease in overall WBC with reduction of the steroid  ~bipap 14/6-14-40% intermittently ~discontinued  ~oxygen is 3 liters nasal cannula with baseline 2 liters at home saturation 98%        #Non-hospital problems  ~CAD~asa, plavix     ~cardiac stents  ~CHF~furosemide 20 mg  Q48H  ~HTN~losartan 50 mg, metoprolol 75mg      ~AAA  ~palpitations  ~a-fib~metoprolol 75 mg QD  ~hypothyroidism-synthroid 50 mcg QD  ~migraines  ~hiatal hernia  ~diverticulosis s/p colon resection  ~HLD     F: repleinsh PRN  E: replenish PRN  N: as tolerated  ABX: azithromycin  DVT: lovenox  GI: NA     Disposition: Admitted for acute on chronic respiratory failure 2/2 PNA and exac COPD EST LOS > 2 midnights.        Erick Gunn MD

## 2023-12-14 NOTE — CARE PLAN
Pt will have decreased shortness of breath and no injury throughout shift. Family at the bedside.

## 2023-12-15 LAB
ALBUMIN SERPL BCP-MCNC: 3.7 G/DL (ref 3.4–5)
ANION GAP SERPL CALC-SCNC: 7 MMOL/L (ref 10–20)
BUN SERPL-MCNC: 25 MG/DL (ref 6–23)
CALCIUM SERPL-MCNC: 9.3 MG/DL (ref 8.6–10.3)
CHLORIDE SERPL-SCNC: 98 MMOL/L (ref 98–107)
CO2 SERPL-SCNC: 33 MMOL/L (ref 21–32)
CREAT SERPL-MCNC: 0.85 MG/DL (ref 0.5–1.05)
ERYTHROCYTE [DISTWIDTH] IN BLOOD BY AUTOMATED COUNT: 13 % (ref 11.5–14.5)
GFR SERPL CREATININE-BSD FRML MDRD: 72 ML/MIN/1.73M*2
GLUCOSE BLD MANUAL STRIP-MCNC: 132 MG/DL (ref 74–99)
GLUCOSE SERPL-MCNC: 90 MG/DL (ref 74–99)
HCT VFR BLD AUTO: 36.3 % (ref 36–46)
HGB BLD-MCNC: 11.3 G/DL (ref 12–16)
MCH RBC QN AUTO: 29.3 PG (ref 26–34)
MCHC RBC AUTO-ENTMCNC: 31.1 G/DL (ref 32–36)
MCV RBC AUTO: 94 FL (ref 80–100)
NRBC BLD-RTO: 0 /100 WBCS (ref 0–0)
PHOSPHATE SERPL-MCNC: 2.9 MG/DL (ref 2.5–4.9)
PLATELET # BLD AUTO: 251 X10*3/UL (ref 150–450)
POTASSIUM SERPL-SCNC: 4 MMOL/L (ref 3.5–5.3)
RBC # BLD AUTO: 3.86 X10*6/UL (ref 4–5.2)
SODIUM SERPL-SCNC: 134 MMOL/L (ref 136–145)
WBC # BLD AUTO: 16 X10*3/UL (ref 4.4–11.3)

## 2023-12-15 PROCEDURE — 96372 THER/PROPH/DIAG INJ SC/IM: CPT | Mod: IPSPLIT | Performed by: NURSE PRACTITIONER

## 2023-12-15 PROCEDURE — 99232 SBSQ HOSP IP/OBS MODERATE 35: CPT | Performed by: NURSE PRACTITIONER

## 2023-12-15 PROCEDURE — 94668 MNPJ CHEST WALL SBSQ: CPT | Mod: IPSPLIT

## 2023-12-15 PROCEDURE — 2500000001 HC RX 250 WO HCPCS SELF ADMINISTERED DRUGS (ALT 637 FOR MEDICARE OP): Mod: IPSPLIT | Performed by: NURSE PRACTITIONER

## 2023-12-15 PROCEDURE — 85027 COMPLETE CBC AUTOMATED: CPT | Mod: IPSPLIT | Performed by: NURSE PRACTITIONER

## 2023-12-15 PROCEDURE — 2500000002 HC RX 250 W HCPCS SELF ADMINISTERED DRUGS (ALT 637 FOR MEDICARE OP, ALT 636 FOR OP/ED): Mod: IPSPLIT | Performed by: NURSE PRACTITIONER

## 2023-12-15 PROCEDURE — 1200000002 HC GENERAL ROOM WITH TELEMETRY DAILY: Mod: IPSPLIT

## 2023-12-15 PROCEDURE — 82947 ASSAY GLUCOSE BLOOD QUANT: CPT | Mod: IPSPLIT

## 2023-12-15 PROCEDURE — 94640 AIRWAY INHALATION TREATMENT: CPT | Mod: IPSPLIT

## 2023-12-15 PROCEDURE — 99232 SBSQ HOSP IP/OBS MODERATE 35: CPT | Performed by: INTERNAL MEDICINE

## 2023-12-15 PROCEDURE — 2500000002 HC RX 250 W HCPCS SELF ADMINISTERED DRUGS (ALT 637 FOR MEDICARE OP, ALT 636 FOR OP/ED): Mod: IPSPLIT | Performed by: INTERNAL MEDICINE

## 2023-12-15 PROCEDURE — 2500000004 HC RX 250 GENERAL PHARMACY W/ HCPCS (ALT 636 FOR OP/ED): Mod: IPSPLIT | Performed by: INTERNAL MEDICINE

## 2023-12-15 PROCEDURE — 2500000004 HC RX 250 GENERAL PHARMACY W/ HCPCS (ALT 636 FOR OP/ED): Mod: IPSPLIT | Performed by: NURSE PRACTITIONER

## 2023-12-15 PROCEDURE — 80069 RENAL FUNCTION PANEL: CPT | Mod: IPSPLIT | Performed by: NURSE PRACTITIONER

## 2023-12-15 PROCEDURE — 2500000005 HC RX 250 GENERAL PHARMACY W/O HCPCS: Mod: IPSPLIT | Performed by: INTERNAL MEDICINE

## 2023-12-15 RX ADMIN — CLOPIDOGREL BISULFATE 75 MG: 75 TABLET ORAL at 08:57

## 2023-12-15 RX ADMIN — MAGNESIUM OXIDE TAB 400 MG (241.3 MG ELEMENTAL MG) 400 MG: 400 (241.3 MG) TAB at 08:57

## 2023-12-15 RX ADMIN — Medication 3 L/MIN: at 08:00

## 2023-12-15 RX ADMIN — TIOTROPIUM BROMIDE INHALATION SPRAY 2 PUFF: 3.12 SPRAY, METERED RESPIRATORY (INHALATION) at 10:00

## 2023-12-15 RX ADMIN — POLYETHYLENE GLYCOL 3350 17 G: 17 POWDER, FOR SOLUTION ORAL at 08:55

## 2023-12-15 RX ADMIN — MULTIPLE VITAMINS W/ MINERALS TAB 1 TABLET: TAB at 08:57

## 2023-12-15 RX ADMIN — PREDNISONE 40 MG: 20 TABLET ORAL at 08:55

## 2023-12-15 RX ADMIN — BUDESONIDE INHALATION 0.5 MG: 0.5 SUSPENSION RESPIRATORY (INHALATION) at 20:11

## 2023-12-15 RX ADMIN — ARFORMOTEROL TARTRATE 15 MCG: 15 SOLUTION RESPIRATORY (INHALATION) at 08:27

## 2023-12-15 RX ADMIN — IPRATROPIUM BROMIDE AND ALBUTEROL SULFATE 3 ML: .5; 3 SOLUTION RESPIRATORY (INHALATION) at 14:58

## 2023-12-15 RX ADMIN — METOPROLOL SUCCINATE 75 MG: 50 TABLET, EXTENDED RELEASE ORAL at 09:01

## 2023-12-15 RX ADMIN — ARFORMOTEROL TARTRATE 15 MCG: 15 SOLUTION RESPIRATORY (INHALATION) at 20:11

## 2023-12-15 RX ADMIN — BUDESONIDE INHALATION 0.5 MG: 0.5 SUSPENSION RESPIRATORY (INHALATION) at 08:27

## 2023-12-15 RX ADMIN — IPRATROPIUM BROMIDE AND ALBUTEROL SULFATE 3 ML: .5; 3 SOLUTION RESPIRATORY (INHALATION) at 03:53

## 2023-12-15 RX ADMIN — AZITHROMYCIN DIHYDRATE 250 MG: 250 TABLET ORAL at 08:56

## 2023-12-15 RX ADMIN — LORAZEPAM 0.5 MG: 0.5 TABLET ORAL at 20:46

## 2023-12-15 RX ADMIN — FUROSEMIDE 20 MG: 20 TABLET ORAL at 13:18

## 2023-12-15 RX ADMIN — FERROUS SULFATE TAB 325 MG (65 MG ELEMENTAL FE) 1 TABLET: 325 (65 FE) TAB at 08:56

## 2023-12-15 RX ADMIN — ASPIRIN 81 MG: 81 TABLET, COATED ORAL at 08:57

## 2023-12-15 RX ADMIN — ENOXAPARIN SODIUM 40 MG: 100 INJECTION SUBCUTANEOUS at 08:55

## 2023-12-15 RX ADMIN — GUAIFENESIN 1200 MG: 600 TABLET, EXTENDED RELEASE ORAL at 20:22

## 2023-12-15 RX ADMIN — LOSARTAN POTASSIUM 50 MG: 50 TABLET, FILM COATED ORAL at 08:57

## 2023-12-15 RX ADMIN — LEVOTHYROXINE SODIUM 50 MCG: 50 TABLET ORAL at 06:12

## 2023-12-15 RX ADMIN — LOSARTAN POTASSIUM 50 MG: 50 TABLET, FILM COATED ORAL at 20:22

## 2023-12-15 RX ADMIN — GUAIFENESIN 1200 MG: 600 TABLET, EXTENDED RELEASE ORAL at 08:57

## 2023-12-15 ASSESSMENT — COGNITIVE AND FUNCTIONAL STATUS - GENERAL
MOBILITY SCORE: 23
CLIMB 3 TO 5 STEPS WITH RAILING: A LITTLE

## 2023-12-15 ASSESSMENT — PAIN - FUNCTIONAL ASSESSMENT
PAIN_FUNCTIONAL_ASSESSMENT: 0-10

## 2023-12-15 ASSESSMENT — PAIN SCALES - GENERAL
PAINLEVEL_OUTOF10: 0 - NO PAIN

## 2023-12-15 NOTE — CARE PLAN
The patient's goals for the shift include      The clinical goals for the shift include Patient will ambulate safely, maintain SpO2 >94%, and tolerate medications given.    Patient met goals and had an uneventful shift. Patient walked the halls several times today which was tolerated well.

## 2023-12-15 NOTE — CARE PLAN
The patient's goals for the shift include  Less coughing    The clinical goals for the shift include Patient will ambulate safely, maintain SpO2 > 94%, and tolerate medications through the shift.    Remain free of injury throughout shift

## 2023-12-15 NOTE — PROGRESS NOTES
Trinity Hernandez is a 73 y.o. female on day 5 of admission presenting with Acute exacerbation of chronic obstructive pulmonary disease (CMS/HCC).      Subjective   Expresses that she feels a little bit better today than she has the previous days. Continues with a congested cough.       Objective   Physical Exam  Constitutional:       Appearance: Normal appearance.   HENT:      Head: Normocephalic.      Right Ear: Tympanic membrane normal.      Left Ear: Tympanic membrane normal.      Nose: Nose normal.      Mouth/Throat:      Mouth: Mucous membranes are moist.   Eyes:      Extraocular Movements: Extraocular movements intact.      Pupils: Pupils are equal, round, and reactive to light.   Neck:      Vascular: No carotid bruit.   Cardiovascular:      Rate and Rhythm: Regular rhythm.       Pulses: Normal pulses. No edema.      Heart sounds: No murmur heard.     No friction rub. No gallop.   Pulmonary:      Effort: No respiratory distress.      Breath sounds: No stridor. Expiratory wheezes. Coarse lung sounds and congested cough.  Chest:      Chest wall: No tenderness.   Abdominal:      General: There is no distension.      Palpations: There is no mass.      Tenderness: There is no abdominal tenderness. There is no right CVA tenderness, left CVA tenderness, guarding or rebound.      Hernia: No hernia is present.   Musculoskeletal:         General: No swelling, tenderness, deformity or signs of injury. Normal range of motion.      Cervical back: Normal range of motion and neck supple. No rigidity or tenderness.      Right lower leg: No edema.   Lymphadenopathy:      Cervical: No cervical adenopathy.   Skin:     Capillary Refill: Capillary refill takes less than 2 seconds.      Coloration: Skin is not jaundiced or pale.      Findings: No bruising, erythema, lesion or rash.   Neurological:      General: No focal deficit present.      Mental Status: She is alert and oriented to person, place, and time. Mental status is at  baseline.      Cranial Nerves: No cranial nerve deficit.      Motor: No weakness.      Gait: Gait normal.   Psychiatric:         Mood and Affect: Mood normal.         Behavior: Behavior normal.         Thought Content: Thought content normal.         Judgment: Judgment normal.   Last Recorded Vitals  /58 (BP Location: Left arm, Patient Position: Sitting)   Pulse 89   Temp 36.2 °C (97.2 °F) (Temporal)   Resp 20   Wt 77.9 kg (171 lb 11.8 oz)   SpO2 97%   Intake/Output last 3 Shifts:    Intake/Output Summary (Last 24 hours) at 12/15/2023 0814  Last data filed at 12/14/2023 1700  Gross per 24 hour   Intake 720 ml   Output --   Net 720 ml       Admission Weight  Weight: 74.8 kg (165 lb) (12/09/23 2150)    Daily Weight  12/14/23 : 77.9 kg (171 lb 11.8 oz)    Image Results  ECG 12 lead  Normal sinus rhythm  Left bundle branch block  Abnormal ECG  When compared with ECG of 05-DEC-2023 14:21, (unconfirmed)  Nonspecific T wave abnormality now evident in Inferior leads    Scheduled medications  arformoterol, 15 mcg, nebulization, BID  aspirin, 81 mg, oral, Daily  azithromycin, 250 mg, oral, Once per day on Mon Wed Fri  budesonide, 0.5 mg, nebulization, BID  clopidogrel, 75 mg, oral, Daily  enoxaparin, 40 mg, subcutaneous, Daily  ferrous sulfate (325 mg ferrous sulfate), 65 mg of iron, oral, Daily  furosemide, 20 mg, oral, q48h  guaiFENesin, 1,200 mg, oral, BID  levothyroxine, 50 mcg, oral, Daily  losartan, 50 mg, oral, BID  magnesium oxide, 400 mg, oral, Daily  metoprolol succinate XL, 75 mg, oral, Daily  multivitamin with minerals, 1 tablet, oral, Daily  oxygen, , inhalation, Continuous - 02/gases  polyethylene glycol, 17 g, oral, Daily  predniSONE, 40 mg, oral, Daily   Followed by  [START ON 12/18/2023] predniSONE, 30 mg, oral, Daily   Followed by  [START ON 12/21/2023] predniSONE, 20 mg, oral, Daily   Followed by  [START ON 12/24/2023] predniSONE, 10 mg, oral, Daily  tiotropium, 2 Inhalation, inhalation,  Daily      Continuous medications     PRN medications  PRN medications: acetaminophen, dextromethorphan-guaifenesin, ipratropium-albuteroL, LORazepam, melatonin, nitroglycerin, oxygen, simethicone    Assessment/Plan   #COPD exacerbation   ~pulmonary sarcoidosis   ~chronic respiratory failure with hypoxia   ~asthma  ~treated and released X 2 and was on hospital at home program.   ~recent CXR with RLL blunting of the costophrenic angle, CXR this admission unchanged from previous.   ~WBC 15.8 with left shift~steroid vs infection induced.  ~Rocephin, zithromax, solumedrol duoneb in the ED.  Respiratory status assisted with bipap.  ~continue zithromax three days per week  ~solumedrol 40 mg BID~plan to transition to oral starting 12/12/23    ~subsequent decrease in overall WBC with reduction of the steroid  ~bipap 14/6-14-40% intermittently ~discontinued  ~oxygen is 3 liters nasal cannula with baseline 2 liters at home saturation 98%        #Non-hospital problems  ~CAD~asa, plavix     ~cardiac stents  ~CHF~furosemide 20 mg Q48H  ~HTN~losartan 50 mg, metoprolol 75mg      ~AAA  ~palpitations  ~a-fib~metoprolol 75 mg QD  ~hypothyroidism-synthroid 50 mcg QD  ~migraines  ~hiatal hernia  ~diverticulosis s/p colon resection  ~HLD     F: repleinsh PRN  E: replenish PRN  N: as tolerated  ABX: azithromycin  DVT: lovenox  GI: NA     Disposition: Admitted for acute on chronic respiratory failure 2/2 PNA and exac COPD EST LOS > 2 midnights.      CHANA Staples    Patient was seen in collaboration with the ALDAIRStephanie.  I was present for the pertinent components of the history, physical, and medical decision making and independently examined the patient.  We reviewed the medication reconciliation list and ancillary studies, including lab, imaging, and microbiology, as well as discussed the differential diagnoses; which includes, but is not limited to Principal Problem:    Acute exacerbation of chronic obstructive  pulmonary disease (CMS/HCC)  .      I agree with her plan as documented in the electronic medical record.    Erick Gunn MD

## 2023-12-16 ENCOUNTER — APPOINTMENT (OUTPATIENT)
Dept: RADIOLOGY | Facility: HOSPITAL | Age: 73
DRG: 190 | End: 2023-12-16
Payer: MEDICARE

## 2023-12-16 LAB
ALBUMIN SERPL BCP-MCNC: 3.6 G/DL (ref 3.4–5)
ANION GAP SERPL CALC-SCNC: 8 MMOL/L (ref 10–20)
BUN SERPL-MCNC: 28 MG/DL (ref 6–23)
CALCIUM SERPL-MCNC: 9.2 MG/DL (ref 8.6–10.3)
CHLORIDE SERPL-SCNC: 100 MMOL/L (ref 98–107)
CO2 SERPL-SCNC: 33 MMOL/L (ref 21–32)
CREAT SERPL-MCNC: 0.87 MG/DL (ref 0.5–1.05)
ERYTHROCYTE [DISTWIDTH] IN BLOOD BY AUTOMATED COUNT: 13 % (ref 11.5–14.5)
GFR SERPL CREATININE-BSD FRML MDRD: 70 ML/MIN/1.73M*2
GLUCOSE SERPL-MCNC: 92 MG/DL (ref 74–99)
HCT VFR BLD AUTO: 36.7 % (ref 36–46)
HGB BLD-MCNC: 11.4 G/DL (ref 12–16)
MCH RBC QN AUTO: 29.2 PG (ref 26–34)
MCHC RBC AUTO-ENTMCNC: 31.1 G/DL (ref 32–36)
MCV RBC AUTO: 94 FL (ref 80–100)
NRBC BLD-RTO: 0 /100 WBCS (ref 0–0)
PHOSPHATE SERPL-MCNC: 2.8 MG/DL (ref 2.5–4.9)
PLATELET # BLD AUTO: 264 X10*3/UL (ref 150–450)
POTASSIUM SERPL-SCNC: 4 MMOL/L (ref 3.5–5.3)
RBC # BLD AUTO: 3.91 X10*6/UL (ref 4–5.2)
SODIUM SERPL-SCNC: 137 MMOL/L (ref 136–145)
WBC # BLD AUTO: 16.9 X10*3/UL (ref 4.4–11.3)

## 2023-12-16 PROCEDURE — 2500000001 HC RX 250 WO HCPCS SELF ADMINISTERED DRUGS (ALT 637 FOR MEDICARE OP): Mod: IPSPLIT | Performed by: NURSE PRACTITIONER

## 2023-12-16 PROCEDURE — 94668 MNPJ CHEST WALL SBSQ: CPT | Mod: IPSPLIT

## 2023-12-16 PROCEDURE — 2500000004 HC RX 250 GENERAL PHARMACY W/ HCPCS (ALT 636 FOR OP/ED): Mod: IPSPLIT | Performed by: INTERNAL MEDICINE

## 2023-12-16 PROCEDURE — 94762 N-INVAS EAR/PLS OXIMTRY CONT: CPT | Mod: IPSPLIT

## 2023-12-16 PROCEDURE — 2500000005 HC RX 250 GENERAL PHARMACY W/O HCPCS: Mod: IPSPLIT | Performed by: INTERNAL MEDICINE

## 2023-12-16 PROCEDURE — 71045 X-RAY EXAM CHEST 1 VIEW: CPT

## 2023-12-16 PROCEDURE — 2500000002 HC RX 250 W HCPCS SELF ADMINISTERED DRUGS (ALT 637 FOR MEDICARE OP, ALT 636 FOR OP/ED): Mod: IPSPLIT | Performed by: NURSE PRACTITIONER

## 2023-12-16 PROCEDURE — 80069 RENAL FUNCTION PANEL: CPT | Mod: IPSPLIT | Performed by: NURSE PRACTITIONER

## 2023-12-16 PROCEDURE — 1200000002 HC GENERAL ROOM WITH TELEMETRY DAILY: Mod: IPSPLIT

## 2023-12-16 PROCEDURE — 96372 THER/PROPH/DIAG INJ SC/IM: CPT | Mod: IPSPLIT | Performed by: NURSE PRACTITIONER

## 2023-12-16 PROCEDURE — 2500000002 HC RX 250 W HCPCS SELF ADMINISTERED DRUGS (ALT 637 FOR MEDICARE OP, ALT 636 FOR OP/ED): Mod: IPSPLIT | Performed by: INTERNAL MEDICINE

## 2023-12-16 PROCEDURE — 93010 ELECTROCARDIOGRAM REPORT: CPT | Performed by: INTERNAL MEDICINE

## 2023-12-16 PROCEDURE — 2500000004 HC RX 250 GENERAL PHARMACY W/ HCPCS (ALT 636 FOR OP/ED): Mod: IPSPLIT | Performed by: NURSE PRACTITIONER

## 2023-12-16 PROCEDURE — 37799 UNLISTED PX VASCULAR SURGERY: CPT | Mod: IPSPLIT | Performed by: NURSE PRACTITIONER

## 2023-12-16 PROCEDURE — 94640 AIRWAY INHALATION TREATMENT: CPT | Mod: IPSPLIT

## 2023-12-16 PROCEDURE — 94760 N-INVAS EAR/PLS OXIMETRY 1: CPT | Mod: IPSPLIT

## 2023-12-16 PROCEDURE — 71045 X-RAY EXAM CHEST 1 VIEW: CPT | Mod: FY,IPSPLIT

## 2023-12-16 PROCEDURE — 85027 COMPLETE CBC AUTOMATED: CPT | Mod: IPSPLIT | Performed by: NURSE PRACTITIONER

## 2023-12-16 PROCEDURE — 99232 SBSQ HOSP IP/OBS MODERATE 35: CPT | Performed by: INTERNAL MEDICINE

## 2023-12-16 RX ORDER — FUROSEMIDE 20 MG/1
20 TABLET ORAL DAILY
Status: DISCONTINUED | OUTPATIENT
Start: 2023-12-17 | End: 2023-12-19 | Stop reason: HOSPADM

## 2023-12-16 RX ADMIN — POLYETHYLENE GLYCOL 3350 17 G: 17 POWDER, FOR SOLUTION ORAL at 08:30

## 2023-12-16 RX ADMIN — ARFORMOTEROL TARTRATE 15 MCG: 15 SOLUTION RESPIRATORY (INHALATION) at 08:00

## 2023-12-16 RX ADMIN — IPRATROPIUM BROMIDE AND ALBUTEROL SULFATE 3 ML: .5; 3 SOLUTION RESPIRATORY (INHALATION) at 15:34

## 2023-12-16 RX ADMIN — TIOTROPIUM BROMIDE INHALATION SPRAY 2 PUFF: 3.12 SPRAY, METERED RESPIRATORY (INHALATION) at 08:38

## 2023-12-16 RX ADMIN — LEVOTHYROXINE SODIUM 50 MCG: 50 TABLET ORAL at 05:27

## 2023-12-16 RX ADMIN — ENOXAPARIN SODIUM 40 MG: 100 INJECTION SUBCUTANEOUS at 08:31

## 2023-12-16 RX ADMIN — PREDNISONE 40 MG: 20 TABLET ORAL at 08:31

## 2023-12-16 RX ADMIN — FERROUS SULFATE TAB 325 MG (65 MG ELEMENTAL FE) 1 TABLET: 325 (65 FE) TAB at 08:32

## 2023-12-16 RX ADMIN — BUDESONIDE INHALATION 0.5 MG: 0.5 SUSPENSION RESPIRATORY (INHALATION) at 08:40

## 2023-12-16 RX ADMIN — MULTIPLE VITAMINS W/ MINERALS TAB 1 TABLET: TAB at 08:31

## 2023-12-16 RX ADMIN — IPRATROPIUM BROMIDE AND ALBUTEROL SULFATE 3 ML: .5; 3 SOLUTION RESPIRATORY (INHALATION) at 17:53

## 2023-12-16 RX ADMIN — LORAZEPAM 0.5 MG: 0.5 TABLET ORAL at 22:07

## 2023-12-16 RX ADMIN — MAGNESIUM OXIDE TAB 400 MG (241.3 MG ELEMENTAL MG) 400 MG: 400 (241.3 MG) TAB at 08:31

## 2023-12-16 RX ADMIN — ASPIRIN 81 MG: 81 TABLET, COATED ORAL at 08:31

## 2023-12-16 RX ADMIN — Medication 3 L/MIN: at 08:00

## 2023-12-16 RX ADMIN — ARFORMOTEROL TARTRATE 15 MCG: 15 SOLUTION RESPIRATORY (INHALATION) at 20:02

## 2023-12-16 RX ADMIN — CLOPIDOGREL BISULFATE 75 MG: 75 TABLET ORAL at 08:31

## 2023-12-16 RX ADMIN — FUROSEMIDE 20 MG: 20 TABLET ORAL at 18:08

## 2023-12-16 RX ADMIN — GUAIFENESIN 1200 MG: 600 TABLET, EXTENDED RELEASE ORAL at 20:35

## 2023-12-16 RX ADMIN — IPRATROPIUM BROMIDE AND ALBUTEROL SULFATE 3 ML: .5; 3 SOLUTION RESPIRATORY (INHALATION) at 03:47

## 2023-12-16 RX ADMIN — BUDESONIDE INHALATION 0.5 MG: 0.5 SUSPENSION RESPIRATORY (INHALATION) at 20:02

## 2023-12-16 RX ADMIN — LORAZEPAM 0.5 MG: 0.5 TABLET ORAL at 18:15

## 2023-12-16 RX ADMIN — METOPROLOL SUCCINATE 75 MG: 50 TABLET, EXTENDED RELEASE ORAL at 08:31

## 2023-12-16 RX ADMIN — LOSARTAN POTASSIUM 50 MG: 50 TABLET, FILM COATED ORAL at 20:35

## 2023-12-16 RX ADMIN — LOSARTAN POTASSIUM 50 MG: 50 TABLET, FILM COATED ORAL at 08:31

## 2023-12-16 RX ADMIN — GUAIFENESIN 1200 MG: 600 TABLET, EXTENDED RELEASE ORAL at 08:31

## 2023-12-16 ASSESSMENT — PAIN SCALES - GENERAL
PAINLEVEL_OUTOF10: 0 - NO PAIN
PAINLEVEL_OUTOF10: 0 - NO PAIN

## 2023-12-16 ASSESSMENT — PAIN - FUNCTIONAL ASSESSMENT: PAIN_FUNCTIONAL_ASSESSMENT: 0-10

## 2023-12-16 NOTE — CARE PLAN
The patient's goals for the shift include      The clinical goals for the shift include Pt SPO2 will remain > 94% on supplemental oxygen this shift    Over the shift, the patient had an uneventful shift  VSS  Tolerated ambulation with supplemental oxygen well this shift  States slept well  Hair wash this a.m.  Call light in reach  Safety measures maintained

## 2023-12-16 NOTE — PROGRESS NOTES
Trinity Hernandez is a 73 y.o. female on day 6 of admission presenting with Acute exacerbation of chronic obstructive pulmonary disease (CMS/HCC).    Subjective   No new changes.  Still requiring three liters.  Still diffusely coarse       Objective     Physical Exam  Constitutional:       General: She is not in acute distress.     Appearance: She is normal weight. She is not ill-appearing or toxic-appearing.   HENT:      Head: Normocephalic and atraumatic.      Nose: Nose normal.      Mouth/Throat:      Mouth: Mucous membranes are moist.      Pharynx: Oropharynx is clear.   Eyes:      Extraocular Movements: Extraocular movements intact.      Pupils: Pupils are equal, round, and reactive to light.   Neck:      Vascular: No carotid bruit.   Cardiovascular:      Rate and Rhythm: Normal rate and regular rhythm.      Pulses: Normal pulses.      Heart sounds: No murmur heard.     Friction rub present. No gallop.   Pulmonary:      Effort: No respiratory distress.      Breath sounds: No stridor. Wheezing present. No rhonchi or rales.   Chest:      Chest wall: No tenderness.   Abdominal:      General: Abdomen is flat. There is no distension.      Palpations: Abdomen is soft. There is no mass.      Tenderness: There is no abdominal tenderness. There is no right CVA tenderness, left CVA tenderness, guarding or rebound.      Hernia: No hernia is present.   Musculoskeletal:         General: No swelling, tenderness, deformity or signs of injury. Normal range of motion.      Cervical back: No rigidity or tenderness.      Right lower leg: No edema.      Left lower leg: No edema.   Lymphadenopathy:      Cervical: No cervical adenopathy.   Skin:     Capillary Refill: Capillary refill takes less than 2 seconds.      Coloration: Skin is not jaundiced or pale.      Findings: No bruising, erythema, lesion or rash.   Neurological:      General: No focal deficit present.      Mental Status: She is alert and oriented to person, place, and  "time.      Cranial Nerves: No cranial nerve deficit.      Sensory: No sensory deficit.      Motor: No weakness.   Psychiatric:         Mood and Affect: Mood normal.         Behavior: Behavior normal.         Thought Content: Thought content normal.         Judgment: Judgment normal.         Last Recorded Vitals  Blood pressure 116/55, pulse 93, temperature 36.2 °C (97.1 °F), temperature source Temporal, resp. rate 20, height 1.702 m (5' 7\"), weight 74.4 kg (164 lb 0.4 oz), SpO2 97 %.  Intake/Output last 3 Shifts:  I/O last 3 completed shifts:  In: 240 (3.1 mL/kg) [P.O.:240]  Out: - (0 mL/kg)   Weight: 77.9 kg     Relevant Results  Scheduled medications  arformoterol, 15 mcg, nebulization, BID  aspirin, 81 mg, oral, Daily  azithromycin, 250 mg, oral, Once per day on Mon Wed Fri  budesonide, 0.5 mg, nebulization, BID  clopidogrel, 75 mg, oral, Daily  enoxaparin, 40 mg, subcutaneous, Daily  ferrous sulfate (325 mg ferrous sulfate), 65 mg of iron, oral, Daily  furosemide, 20 mg, oral, q48h  guaiFENesin, 1,200 mg, oral, BID  levothyroxine, 50 mcg, oral, Daily  losartan, 50 mg, oral, BID  magnesium oxide, 400 mg, oral, Daily  metoprolol succinate XL, 75 mg, oral, Daily  multivitamin with minerals, 1 tablet, oral, Daily  oxygen, , inhalation, Continuous - 02/gases  polyethylene glycol, 17 g, oral, Daily  predniSONE, 40 mg, oral, Daily   Followed by  [START ON 12/18/2023] predniSONE, 30 mg, oral, Daily   Followed by  [START ON 12/21/2023] predniSONE, 20 mg, oral, Daily   Followed by  [START ON 12/24/2023] predniSONE, 10 mg, oral, Daily  tiotropium, 2 Inhalation, inhalation, Daily      Results for orders placed or performed during the hospital encounter of 12/09/23 (from the past 24 hour(s))   POCT GLUCOSE   Result Value Ref Range    POCT Glucose 132 (H) 74 - 99 mg/dL   CBC   Result Value Ref Range    WBC 16.9 (H) 4.4 - 11.3 x10*3/uL    nRBC 0.0 0.0 - 0.0 /100 WBCs    RBC 3.91 (L) 4.00 - 5.20 x10*6/uL    Hemoglobin 11.4 (L) " 12.0 - 16.0 g/dL    Hematocrit 36.7 36.0 - 46.0 %    MCV 94 80 - 100 fL    MCH 29.2 26.0 - 34.0 pg    MCHC 31.1 (L) 32.0 - 36.0 g/dL    RDW 13.0 11.5 - 14.5 %    Platelets 264 150 - 450 x10*3/uL   Renal Function Panel   Result Value Ref Range    Glucose 92 74 - 99 mg/dL    Sodium 137 136 - 145 mmol/L    Potassium 4.0 3.5 - 5.3 mmol/L    Chloride 100 98 - 107 mmol/L    Bicarbonate 33 (H) 21 - 32 mmol/L    Anion Gap 8 (L) 10 - 20 mmol/L    Urea Nitrogen 28 (H) 6 - 23 mg/dL    Creatinine 0.87 0.50 - 1.05 mg/dL    eGFR 70 >60 mL/min/1.73m*2    Calcium 9.2 8.6 - 10.3 mg/dL    Phosphorus 2.8 2.5 - 4.9 mg/dL    Albumin 3.6 3.4 - 5.0 g/dL       Continuous medications     No results found.                Assessment/Plan   Principal Problem:    Acute exacerbation of chronic obstructive pulmonary disease (CMS/[facility])    Assessment/Plan   #COPD exacerbation   ~pulmonary sarcoidosis   ~chronic respiratory failure with hypoxia   ~asthma  ~treated and released X 2 and was on hospital at home program.   ~recent CXR with RLL blunting of the costophrenic angle, CXR this admission unchanged from previous.   ~WBC 15.8 with left shift~steroid vs infection induced.  ~Rocephin, zithromax, solumedrol duoneb in the ED.  Respiratory status assisted with bipap.  ~continue zithromax three days per week  ~solumedrol 40 mg BID~plan to transition to oral starting 12/12/23    ~subsequent decrease in overall WBC with reduction of the steroid  ~bipap 14/6-14-40% intermittently ~discontinued  ~oxygen is 3 liters nasal cannula with baseline 2 liters at home saturation 98%        #Non-hospital problems  ~CAD~asa, plavix     ~cardiac stents  ~CHF~furosemide 20 mg Q48H  ~HTN~losartan 50 mg, metoprolol 75mg      ~AAA  ~palpitations  ~a-fib~metoprolol 75 mg QD  ~hypothyroidism-synthroid 50 mcg QD  ~migraines  ~hiatal hernia  ~diverticulosis s/p colon resection  ~HLD     F: repleinsh PRN  E: replenish PRN  N: as tolerated  ABX: azithromycin  DVT: lovenox  GI:  NA    Patient seems to have largely plateaued---unclear how much of this is a component of sarcoid and how much is COPD- may need to transfer if no improvement.        Erick Gunn MD

## 2023-12-17 LAB
ALBUMIN SERPL BCP-MCNC: 3.5 G/DL (ref 3.4–5)
ANION GAP SERPL CALC-SCNC: 10 MMOL/L (ref 10–20)
BUN SERPL-MCNC: 28 MG/DL (ref 6–23)
CALCIUM SERPL-MCNC: 8.9 MG/DL (ref 8.6–10.3)
CHLORIDE SERPL-SCNC: 102 MMOL/L (ref 98–107)
CO2 SERPL-SCNC: 31 MMOL/L (ref 21–32)
CREAT SERPL-MCNC: 0.84 MG/DL (ref 0.5–1.05)
ERYTHROCYTE [DISTWIDTH] IN BLOOD BY AUTOMATED COUNT: 13.2 % (ref 11.5–14.5)
GFR SERPL CREATININE-BSD FRML MDRD: 73 ML/MIN/1.73M*2
GLUCOSE SERPL-MCNC: 98 MG/DL (ref 74–99)
HCT VFR BLD AUTO: 35 % (ref 36–46)
HGB BLD-MCNC: 11 G/DL (ref 12–16)
MCH RBC QN AUTO: 29.5 PG (ref 26–34)
MCHC RBC AUTO-ENTMCNC: 31.4 G/DL (ref 32–36)
MCV RBC AUTO: 94 FL (ref 80–100)
NRBC BLD-RTO: 0 /100 WBCS (ref 0–0)
PHOSPHATE SERPL-MCNC: 3.2 MG/DL (ref 2.5–4.9)
PLATELET # BLD AUTO: 240 X10*3/UL (ref 150–450)
POTASSIUM SERPL-SCNC: 4 MMOL/L (ref 3.5–5.3)
RBC # BLD AUTO: 3.73 X10*6/UL (ref 4–5.2)
SODIUM SERPL-SCNC: 139 MMOL/L (ref 136–145)
WBC # BLD AUTO: 15.9 X10*3/UL (ref 4.4–11.3)

## 2023-12-17 PROCEDURE — 9420000001 HC RT PATIENT EDUCATION 5 MIN: Mod: IPSPLIT

## 2023-12-17 PROCEDURE — 94640 AIRWAY INHALATION TREATMENT: CPT | Mod: IPSPLIT

## 2023-12-17 PROCEDURE — 2500000004 HC RX 250 GENERAL PHARMACY W/ HCPCS (ALT 636 FOR OP/ED): Mod: IPSPLIT | Performed by: NURSE PRACTITIONER

## 2023-12-17 PROCEDURE — 1200000002 HC GENERAL ROOM WITH TELEMETRY DAILY: Mod: IPSPLIT

## 2023-12-17 PROCEDURE — 99232 SBSQ HOSP IP/OBS MODERATE 35: CPT | Performed by: INTERNAL MEDICINE

## 2023-12-17 PROCEDURE — 96372 THER/PROPH/DIAG INJ SC/IM: CPT | Mod: IPSPLIT | Performed by: NURSE PRACTITIONER

## 2023-12-17 PROCEDURE — 2500000001 HC RX 250 WO HCPCS SELF ADMINISTERED DRUGS (ALT 637 FOR MEDICARE OP): Mod: IPSPLIT | Performed by: NURSE PRACTITIONER

## 2023-12-17 PROCEDURE — 85027 COMPLETE CBC AUTOMATED: CPT | Mod: IPSPLIT | Performed by: NURSE PRACTITIONER

## 2023-12-17 PROCEDURE — 94668 MNPJ CHEST WALL SBSQ: CPT | Mod: IPSPLIT

## 2023-12-17 PROCEDURE — 2500000001 HC RX 250 WO HCPCS SELF ADMINISTERED DRUGS (ALT 637 FOR MEDICARE OP): Mod: IPSPLIT | Performed by: INTERNAL MEDICINE

## 2023-12-17 PROCEDURE — 2500000002 HC RX 250 W HCPCS SELF ADMINISTERED DRUGS (ALT 637 FOR MEDICARE OP, ALT 636 FOR OP/ED): Mod: IPSPLIT | Performed by: INTERNAL MEDICINE

## 2023-12-17 PROCEDURE — 2500000004 HC RX 250 GENERAL PHARMACY W/ HCPCS (ALT 636 FOR OP/ED): Mod: IPSPLIT | Performed by: INTERNAL MEDICINE

## 2023-12-17 PROCEDURE — 2500000005 HC RX 250 GENERAL PHARMACY W/O HCPCS: Mod: IPSPLIT | Performed by: INTERNAL MEDICINE

## 2023-12-17 PROCEDURE — 2500000002 HC RX 250 W HCPCS SELF ADMINISTERED DRUGS (ALT 637 FOR MEDICARE OP, ALT 636 FOR OP/ED): Mod: IPSPLIT | Performed by: NURSE PRACTITIONER

## 2023-12-17 PROCEDURE — 80069 RENAL FUNCTION PANEL: CPT | Mod: IPSPLIT | Performed by: NURSE PRACTITIONER

## 2023-12-17 RX ADMIN — POLYETHYLENE GLYCOL 3350 17 G: 17 POWDER, FOR SOLUTION ORAL at 09:47

## 2023-12-17 RX ADMIN — ENOXAPARIN SODIUM 40 MG: 100 INJECTION SUBCUTANEOUS at 09:48

## 2023-12-17 RX ADMIN — LORAZEPAM 0.5 MG: 0.5 TABLET ORAL at 12:51

## 2023-12-17 RX ADMIN — LORAZEPAM 0.5 MG: 0.5 TABLET ORAL at 21:21

## 2023-12-17 RX ADMIN — IPRATROPIUM BROMIDE AND ALBUTEROL SULFATE 3 ML: .5; 3 SOLUTION RESPIRATORY (INHALATION) at 17:53

## 2023-12-17 RX ADMIN — BUDESONIDE INHALATION 0.5 MG: 0.5 SUSPENSION RESPIRATORY (INHALATION) at 08:08

## 2023-12-17 RX ADMIN — ARFORMOTEROL TARTRATE 15 MCG: 15 SOLUTION RESPIRATORY (INHALATION) at 20:04

## 2023-12-17 RX ADMIN — LOSARTAN POTASSIUM 50 MG: 50 TABLET, FILM COATED ORAL at 21:20

## 2023-12-17 RX ADMIN — IPRATROPIUM BROMIDE AND ALBUTEROL SULFATE 3 ML: .5; 3 SOLUTION RESPIRATORY (INHALATION) at 13:56

## 2023-12-17 RX ADMIN — Medication 3 L/MIN: at 08:00

## 2023-12-17 RX ADMIN — CLOPIDOGREL BISULFATE 75 MG: 75 TABLET ORAL at 09:48

## 2023-12-17 RX ADMIN — ASPIRIN 81 MG: 81 TABLET, COATED ORAL at 09:47

## 2023-12-17 RX ADMIN — METOPROLOL SUCCINATE 75 MG: 50 TABLET, EXTENDED RELEASE ORAL at 09:47

## 2023-12-17 RX ADMIN — LEVOTHYROXINE SODIUM 50 MCG: 50 TABLET ORAL at 06:32

## 2023-12-17 RX ADMIN — GUAIFENESIN 1200 MG: 600 TABLET, EXTENDED RELEASE ORAL at 09:48

## 2023-12-17 RX ADMIN — GUAIFENESIN 1200 MG: 600 TABLET, EXTENDED RELEASE ORAL at 21:20

## 2023-12-17 RX ADMIN — MAGNESIUM OXIDE TAB 400 MG (241.3 MG ELEMENTAL MG) 400 MG: 400 (241.3 MG) TAB at 09:49

## 2023-12-17 RX ADMIN — FUROSEMIDE 20 MG: 20 TABLET ORAL at 09:47

## 2023-12-17 RX ADMIN — MULTIPLE VITAMINS W/ MINERALS TAB 1 TABLET: TAB at 09:48

## 2023-12-17 RX ADMIN — BUDESONIDE INHALATION 0.5 MG: 0.5 SUSPENSION RESPIRATORY (INHALATION) at 20:04

## 2023-12-17 RX ADMIN — PREDNISONE 40 MG: 20 TABLET ORAL at 09:48

## 2023-12-17 RX ADMIN — LORAZEPAM 0.5 MG: 0.5 TABLET ORAL at 04:39

## 2023-12-17 RX ADMIN — FERROUS SULFATE TAB 325 MG (65 MG ELEMENTAL FE) 1 TABLET: 325 (65 FE) TAB at 09:48

## 2023-12-17 RX ADMIN — IPRATROPIUM BROMIDE AND ALBUTEROL SULFATE 3 ML: .5; 3 SOLUTION RESPIRATORY (INHALATION) at 04:28

## 2023-12-17 RX ADMIN — LOSARTAN POTASSIUM 50 MG: 50 TABLET, FILM COATED ORAL at 09:47

## 2023-12-17 ASSESSMENT — COGNITIVE AND FUNCTIONAL STATUS - GENERAL
DAILY ACTIVITIY SCORE: 24
MOBILITY SCORE: 22
CLIMB 3 TO 5 STEPS WITH RAILING: A LOT

## 2023-12-17 ASSESSMENT — PAIN SCALES - GENERAL
PAINLEVEL_OUTOF10: 0 - NO PAIN

## 2023-12-17 ASSESSMENT — PAIN - FUNCTIONAL ASSESSMENT
PAIN_FUNCTIONAL_ASSESSMENT: 0-10
PAIN_FUNCTIONAL_ASSESSMENT: 0-10

## 2023-12-17 NOTE — CARE PLAN
The patient's goals for the shift include  Less coughing    The clinical goals for the shift include Pt spo2 will remain above 92% with supplemental oxygen throughout shift

## 2023-12-17 NOTE — PROGRESS NOTES
Trinity Hernandez is a 73 y.o. female on day 7 of admission presenting with Acute exacerbation of chronic obstructive pulmonary disease (CMS/HCC).    Subjective   No new issues.  Patient was changed to daily lasix.  She does not feel that she is improving further.  She notes that she in the past has required a month for recovery.       Objective     Physical Exam  Constitutional:       General: She is not in acute distress.     Appearance: Normal appearance. She is normal weight. She is not ill-appearing, toxic-appearing or diaphoretic.   HENT:      Head: Normocephalic and atraumatic.      Right Ear: Tympanic membrane normal.      Left Ear: Tympanic membrane normal.      Nose: Nose normal.      Mouth/Throat:      Mouth: Mucous membranes are moist.      Pharynx: Oropharynx is clear.   Eyes:      General:         Right eye: No discharge.         Left eye: No discharge.      Extraocular Movements: Extraocular movements intact.      Pupils: Pupils are equal, round, and reactive to light.   Neck:      Vascular: No carotid bruit.   Cardiovascular:      Rate and Rhythm: Regular rhythm. Tachycardia present.      Heart sounds: No murmur heard.     No friction rub. No gallop.   Pulmonary:      Effort: No respiratory distress.      Breath sounds: No stridor. Wheezing and rhonchi present. No rales.   Chest:      Chest wall: No tenderness.   Abdominal:      General: Abdomen is flat. Bowel sounds are normal. There is no distension.      Palpations: Abdomen is soft. There is no mass.      Tenderness: There is no abdominal tenderness. There is no right CVA tenderness, left CVA tenderness, guarding or rebound.      Hernia: No hernia is present.   Musculoskeletal:         General: No swelling, tenderness, deformity or signs of injury. Normal range of motion.      Cervical back: Normal range of motion and neck supple. No rigidity or tenderness.      Right lower leg: No edema.      Left lower leg: No edema.   Lymphadenopathy:       "Cervical: No cervical adenopathy.   Skin:     General: Skin is warm and dry.      Capillary Refill: Capillary refill takes less than 2 seconds.      Coloration: Skin is not jaundiced or pale.      Findings: No bruising, erythema, lesion or rash.   Neurological:      General: No focal deficit present.      Mental Status: She is alert and oriented to person, place, and time.   Psychiatric:         Mood and Affect: Mood normal.         Behavior: Behavior normal.         Thought Content: Thought content normal.         Judgment: Judgment normal.         Last Recorded Vitals  Blood pressure 104/55, pulse 96, temperature 36.4 °C (97.6 °F), temperature source Temporal, resp. rate 19, height 1.702 m (5' 7\"), weight 75.5 kg (166 lb 7.2 oz), SpO2 98 %.  Intake/Output last 3 Shifts:  I/O last 3 completed shifts:  In: 960 (12.7 mL/kg) [P.O.:960]  Out: - (0 mL/kg)   Weight: 75.5 kg     Relevant Results  Scheduled medications  arformoterol, 15 mcg, nebulization, BID  aspirin, 81 mg, oral, Daily  azithromycin, 250 mg, oral, Once per day on Mon Wed Fri  budesonide, 0.5 mg, nebulization, BID  clopidogrel, 75 mg, oral, Daily  enoxaparin, 40 mg, subcutaneous, Daily  ferrous sulfate (325 mg ferrous sulfate), 65 mg of iron, oral, Daily  furosemide, 20 mg, oral, Daily  guaiFENesin, 1,200 mg, oral, BID  levothyroxine, 50 mcg, oral, Daily  losartan, 50 mg, oral, BID  magnesium oxide, 400 mg, oral, Daily  metoprolol succinate XL, 75 mg, oral, Daily  multivitamin with minerals, 1 tablet, oral, Daily  oxygen, , inhalation, Continuous - 02/gases  polyethylene glycol, 17 g, oral, Daily  predniSONE, 40 mg, oral, Daily   Followed by  [START ON 12/18/2023] predniSONE, 30 mg, oral, Daily   Followed by  [START ON 12/21/2023] predniSONE, 20 mg, oral, Daily   Followed by  [START ON 12/24/2023] predniSONE, 10 mg, oral, Daily  tiotropium, 2 Inhalation, inhalation, Daily      Continuous medications     PRN medications  PRN medications: acetaminophen, " dextromethorphan-guaifenesin, ipratropium-albuteroL, LORazepam, melatonin, nitroglycerin, oxygen, simethicone    Results for orders placed or performed during the hospital encounter of 12/09/23 (from the past 24 hour(s))   CBC   Result Value Ref Range    WBC 15.9 (H) 4.4 - 11.3 x10*3/uL    nRBC 0.0 0.0 - 0.0 /100 WBCs    RBC 3.73 (L) 4.00 - 5.20 x10*6/uL    Hemoglobin 11.0 (L) 12.0 - 16.0 g/dL    Hematocrit 35.0 (L) 36.0 - 46.0 %    MCV 94 80 - 100 fL    MCH 29.5 26.0 - 34.0 pg    MCHC 31.4 (L) 32.0 - 36.0 g/dL    RDW 13.2 11.5 - 14.5 %    Platelets 240 150 - 450 x10*3/uL   Renal Function Panel   Result Value Ref Range    Glucose 98 74 - 99 mg/dL    Sodium 139 136 - 145 mmol/L    Potassium 4.0 3.5 - 5.3 mmol/L    Chloride 102 98 - 107 mmol/L    Bicarbonate 31 21 - 32 mmol/L    Anion Gap 10 10 - 20 mmol/L    Urea Nitrogen 28 (H) 6 - 23 mg/dL    Creatinine 0.84 0.50 - 1.05 mg/dL    eGFR 73 >60 mL/min/1.73m*2    Calcium 8.9 8.6 - 10.3 mg/dL    Phosphorus 3.2 2.5 - 4.9 mg/dL    Albumin 3.5 3.4 - 5.0 g/dL     No results found.                Assessment/Plan   Principal Problem:    Acute exacerbation of chronic obstructive pulmonary disease (CMS/HCC)    Principal Problem:    Acute exacerbation of chronic obstructive pulmonary disease (CMS/[facility])     Assessment/Plan   #COPD exacerbation   ~pulmonary sarcoidosis   ~chronic respiratory failure with hypoxia   ~asthma  ~treated and released X 2 and was on hospital at home program.   ~recent CXR with RLL blunting of the costophrenic angle, CXR this admission unchanged from previous.   ~WBC 15.8 with left shift~steroid vs infection induced.  ~Rocephin, zithromax, solumedrol duoneb in the ED.  Respiratory status assisted with bipap.  ~continue zithromax three days per week  ~solumedrol 40 mg BID~plan to transition to oral starting 12/12/23    ~subsequent decrease in overall WBC with reduction of the steroid  ~bipap 14/6-14-40% intermittently ~discontinued  ~oxygen is 3 liters nasal  cannula with baseline 2 liters at home saturation 98%        #Non-hospital problems  ~CAD~asa, plavix     ~cardiac stents  ~CHF~furosemide 20 mg Q48H  ~HTN~losartan 50 mg, metoprolol 75mg      ~AAA  ~palpitations  ~a-fib~metoprolol 75 mg QD  ~hypothyroidism-synthroid 50 mcg QD  ~migraines  ~hiatal hernia  ~diverticulosis s/p colon resection  ~HLD     F: repleinsh PRN  E: replenish PRN  N: as tolerated  ABX: azithromycin  DVT: lovenox  GI: NA     Patient seems to have largely plateaued---unclear how much of this is a component of sarcoid and how much is COPD- may need to transfer if no improvement.        Erick Gunn MD

## 2023-12-18 ENCOUNTER — APPOINTMENT (OUTPATIENT)
Dept: HEMATOLOGY/ONCOLOGY | Facility: HOSPITAL | Age: 73
End: 2023-12-18
Payer: MEDICARE

## 2023-12-18 ENCOUNTER — APPOINTMENT (OUTPATIENT)
Dept: CARDIOLOGY | Facility: HOSPITAL | Age: 73
DRG: 190 | End: 2023-12-18
Payer: MEDICARE

## 2023-12-18 LAB
ALBUMIN SERPL BCP-MCNC: 3.5 G/DL (ref 3.4–5)
ANION GAP BLDA CALCULATED.4IONS-SCNC: 9 MMO/L (ref 10–25)
ANION GAP SERPL CALC-SCNC: 9 MMOL/L (ref 10–20)
ARTERIAL PATENCY WRIST A: POSITIVE
BASE EXCESS BLDA CALC-SCNC: 4.7 MMOL/L (ref -2–3)
BODY TEMPERATURE: ABNORMAL
BUN SERPL-MCNC: 23 MG/DL (ref 6–23)
CA-I BLDA-SCNC: 1.2 MMOL/L (ref 1.1–1.33)
CALCIUM SERPL-MCNC: 9 MG/DL (ref 8.6–10.3)
CHLORIDE BLDA-SCNC: 99 MMOL/L (ref 98–107)
CHLORIDE SERPL-SCNC: 101 MMOL/L (ref 98–107)
CO2 SERPL-SCNC: 32 MMOL/L (ref 21–32)
CREAT SERPL-MCNC: 0.79 MG/DL (ref 0.5–1.05)
ERYTHROCYTE [DISTWIDTH] IN BLOOD BY AUTOMATED COUNT: 13.2 % (ref 11.5–14.5)
GFR SERPL CREATININE-BSD FRML MDRD: 79 ML/MIN/1.73M*2
GLUCOSE BLDA-MCNC: 156 MG/DL (ref 74–99)
GLUCOSE SERPL-MCNC: 100 MG/DL (ref 74–99)
HCO3 BLDA-SCNC: 29.2 MMOL/L (ref 22–26)
HCT VFR BLD AUTO: 34.7 % (ref 36–46)
HCT VFR BLD EST: 39 % (ref 36–46)
HGB BLD-MCNC: 10.7 G/DL (ref 12–16)
HGB BLDA-MCNC: 13 G/DL (ref 12–16)
INHALED O2 CONCENTRATION: 32 %
LACTATE BLDA-SCNC: 1.9 MMOL/L (ref 0.4–2)
MCH RBC QN AUTO: 29.1 PG (ref 26–34)
MCHC RBC AUTO-ENTMCNC: 30.8 G/DL (ref 32–36)
MCV RBC AUTO: 94 FL (ref 80–100)
NRBC BLD-RTO: 0 /100 WBCS (ref 0–0)
OXYHGB MFR BLDA: 96.6 % (ref 94–98)
PCO2 BLDA: 42 MM HG (ref 38–42)
PH BLDA: 7.45 PH (ref 7.38–7.42)
PHOSPHATE SERPL-MCNC: 2.9 MG/DL (ref 2.5–4.9)
PLATELET # BLD AUTO: 230 X10*3/UL (ref 150–450)
PO2 BLDA: 112 MM HG (ref 85–95)
POTASSIUM BLDA-SCNC: 4.6 MMOL/L (ref 3.5–5.3)
POTASSIUM SERPL-SCNC: 4.1 MMOL/L (ref 3.5–5.3)
RBC # BLD AUTO: 3.68 X10*6/UL (ref 4–5.2)
SAO2 % BLDA: 99 % (ref 94–100)
SODIUM BLDA-SCNC: 133 MMOL/L (ref 136–145)
SODIUM SERPL-SCNC: 138 MMOL/L (ref 136–145)
SPECIMEN DRAWN FROM PATIENT: ABNORMAL
WBC # BLD AUTO: 14.5 X10*3/UL (ref 4.4–11.3)

## 2023-12-18 PROCEDURE — 1200000002 HC GENERAL ROOM WITH TELEMETRY DAILY: Mod: IPSPLIT

## 2023-12-18 PROCEDURE — 93005 ELECTROCARDIOGRAM TRACING: CPT | Mod: IPSPLIT

## 2023-12-18 PROCEDURE — 84132 ASSAY OF SERUM POTASSIUM: CPT | Mod: IPSPLIT | Performed by: NURSE PRACTITIONER

## 2023-12-18 PROCEDURE — 2500000004 HC RX 250 GENERAL PHARMACY W/ HCPCS (ALT 636 FOR OP/ED): Mod: IPSPLIT | Performed by: NURSE PRACTITIONER

## 2023-12-18 PROCEDURE — 2500000001 HC RX 250 WO HCPCS SELF ADMINISTERED DRUGS (ALT 637 FOR MEDICARE OP): Mod: IPSPLIT | Performed by: NURSE PRACTITIONER

## 2023-12-18 PROCEDURE — 94668 MNPJ CHEST WALL SBSQ: CPT | Mod: IPSPLIT

## 2023-12-18 PROCEDURE — 2500000002 HC RX 250 W HCPCS SELF ADMINISTERED DRUGS (ALT 637 FOR MEDICARE OP, ALT 636 FOR OP/ED): Mod: IPSPLIT | Performed by: INTERNAL MEDICINE

## 2023-12-18 PROCEDURE — 2500000004 HC RX 250 GENERAL PHARMACY W/ HCPCS (ALT 636 FOR OP/ED): Mod: IPSPLIT | Performed by: INTERNAL MEDICINE

## 2023-12-18 PROCEDURE — 36600 WITHDRAWAL OF ARTERIAL BLOOD: CPT | Mod: IPSPLIT

## 2023-12-18 PROCEDURE — 2500000002 HC RX 250 W HCPCS SELF ADMINISTERED DRUGS (ALT 637 FOR MEDICARE OP, ALT 636 FOR OP/ED): Mod: IPSPLIT | Performed by: NURSE PRACTITIONER

## 2023-12-18 PROCEDURE — 2500000001 HC RX 250 WO HCPCS SELF ADMINISTERED DRUGS (ALT 637 FOR MEDICARE OP): Mod: IPSPLIT

## 2023-12-18 PROCEDURE — 85027 COMPLETE CBC AUTOMATED: CPT | Mod: IPSPLIT | Performed by: NURSE PRACTITIONER

## 2023-12-18 PROCEDURE — 2500000001 HC RX 250 WO HCPCS SELF ADMINISTERED DRUGS (ALT 637 FOR MEDICARE OP): Mod: IPSPLIT | Performed by: INTERNAL MEDICINE

## 2023-12-18 PROCEDURE — 2500000005 HC RX 250 GENERAL PHARMACY W/O HCPCS: Mod: IPSPLIT | Performed by: INTERNAL MEDICINE

## 2023-12-18 PROCEDURE — 99238 HOSP IP/OBS DSCHRG MGMT 30/<: CPT | Performed by: INTERNAL MEDICINE

## 2023-12-18 PROCEDURE — 37799 UNLISTED PX VASCULAR SURGERY: CPT | Mod: IPSPLIT | Performed by: NURSE PRACTITIONER

## 2023-12-18 PROCEDURE — 80069 RENAL FUNCTION PANEL: CPT | Mod: IPSPLIT | Performed by: NURSE PRACTITIONER

## 2023-12-18 PROCEDURE — 9420000001 HC RT PATIENT EDUCATION 5 MIN: Mod: IPSPLIT

## 2023-12-18 PROCEDURE — 96372 THER/PROPH/DIAG INJ SC/IM: CPT | Mod: IPSPLIT | Performed by: NURSE PRACTITIONER

## 2023-12-18 PROCEDURE — 94640 AIRWAY INHALATION TREATMENT: CPT | Mod: IPSPLIT

## 2023-12-18 RX ORDER — GUAIFENESIN/DEXTROMETHORPHAN 100-10MG/5
SYRUP ORAL
Status: COMPLETED
Start: 2023-12-18 | End: 2023-12-18

## 2023-12-18 RX ADMIN — GUAIFENESIN 1200 MG: 600 TABLET, EXTENDED RELEASE ORAL at 08:19

## 2023-12-18 RX ADMIN — GUAIFENESIN 1200 MG: 600 TABLET, EXTENDED RELEASE ORAL at 20:41

## 2023-12-18 RX ADMIN — Medication 5 ML: at 20:51

## 2023-12-18 RX ADMIN — MAGNESIUM OXIDE TAB 400 MG (241.3 MG ELEMENTAL MG) 400 MG: 400 (241.3 MG) TAB at 08:19

## 2023-12-18 RX ADMIN — AZITHROMYCIN DIHYDRATE 250 MG: 250 TABLET ORAL at 08:19

## 2023-12-18 RX ADMIN — BUDESONIDE INHALATION 0.5 MG: 0.5 SUSPENSION RESPIRATORY (INHALATION) at 08:56

## 2023-12-18 RX ADMIN — LOSARTAN POTASSIUM 50 MG: 50 TABLET, FILM COATED ORAL at 08:20

## 2023-12-18 RX ADMIN — POLYETHYLENE GLYCOL 3350 17 G: 17 POWDER, FOR SOLUTION ORAL at 08:19

## 2023-12-18 RX ADMIN — LORAZEPAM 0.5 MG: 0.5 TABLET ORAL at 08:23

## 2023-12-18 RX ADMIN — LORAZEPAM 0.5 MG: 0.5 TABLET ORAL at 16:25

## 2023-12-18 RX ADMIN — IPRATROPIUM BROMIDE AND ALBUTEROL SULFATE 3 ML: .5; 3 SOLUTION RESPIRATORY (INHALATION) at 17:44

## 2023-12-18 RX ADMIN — FUROSEMIDE 20 MG: 20 TABLET ORAL at 08:19

## 2023-12-18 RX ADMIN — METOPROLOL SUCCINATE 75 MG: 50 TABLET, EXTENDED RELEASE ORAL at 08:19

## 2023-12-18 RX ADMIN — PREDNISONE 30 MG: 20 TABLET ORAL at 08:19

## 2023-12-18 RX ADMIN — GUAIFENESIN AND DEXTROMETHORPHAN 5 ML: 100; 10 SYRUP ORAL at 20:51

## 2023-12-18 RX ADMIN — ARFORMOTEROL TARTRATE 15 MCG: 15 SOLUTION RESPIRATORY (INHALATION) at 20:16

## 2023-12-18 RX ADMIN — LORAZEPAM 0.5 MG: 0.5 TABLET ORAL at 03:34

## 2023-12-18 RX ADMIN — ASPIRIN 81 MG: 81 TABLET, COATED ORAL at 08:19

## 2023-12-18 RX ADMIN — LORAZEPAM 0.5 MG: 0.5 TABLET ORAL at 20:41

## 2023-12-18 RX ADMIN — Medication 3 L/MIN: at 08:00

## 2023-12-18 RX ADMIN — TIOTROPIUM BROMIDE INHALATION SPRAY 2 PUFF: 3.12 SPRAY, METERED RESPIRATORY (INHALATION) at 09:20

## 2023-12-18 RX ADMIN — LEVOTHYROXINE SODIUM 50 MCG: 50 TABLET ORAL at 06:13

## 2023-12-18 RX ADMIN — IPRATROPIUM BROMIDE AND ALBUTEROL SULFATE 3 ML: .5; 3 SOLUTION RESPIRATORY (INHALATION) at 03:34

## 2023-12-18 RX ADMIN — IPRATROPIUM BROMIDE AND ALBUTEROL SULFATE 3 ML: .5; 3 SOLUTION RESPIRATORY (INHALATION) at 14:10

## 2023-12-18 RX ADMIN — BUDESONIDE INHALATION 0.5 MG: 0.5 SUSPENSION RESPIRATORY (INHALATION) at 20:16

## 2023-12-18 RX ADMIN — ARFORMOTEROL TARTRATE 15 MCG: 15 SOLUTION RESPIRATORY (INHALATION) at 08:56

## 2023-12-18 RX ADMIN — MULTIPLE VITAMINS W/ MINERALS TAB 1 TABLET: TAB at 08:19

## 2023-12-18 RX ADMIN — CLOPIDOGREL BISULFATE 75 MG: 75 TABLET ORAL at 08:19

## 2023-12-18 RX ADMIN — ENOXAPARIN SODIUM 40 MG: 100 INJECTION SUBCUTANEOUS at 08:19

## 2023-12-18 RX ADMIN — FERROUS SULFATE TAB 325 MG (65 MG ELEMENTAL FE) 1 TABLET: 325 (65 FE) TAB at 08:19

## 2023-12-18 ASSESSMENT — PAIN - FUNCTIONAL ASSESSMENT: PAIN_FUNCTIONAL_ASSESSMENT: 0-10

## 2023-12-18 ASSESSMENT — PAIN SCALES - GENERAL
PAINLEVEL_OUTOF10: 0 - NO PAIN
PAINLEVEL_OUTOF10: 0 - NO PAIN

## 2023-12-18 NOTE — PROGRESS NOTES
"Trinity Hernandez is a 73 y.o. female on day 8 of admission presenting with Acute exacerbation of chronic obstructive pulmonary disease (CMS/HCC).  Author did discuss and requested a transfer to higher level of care. Spoke with Dr. Greene regarding her current status, her significant history, and her current care. She is mostly close to baseline regarding her oxygen needs as compared to previous needs before this illness. This may even be her new baseline given her history of pulmonary sarcoidosis. Suggestion for \"healthy at home\" and arrange for outpatient pulmonary rehab as this facility is completing the appropriate interventions at this time.       Subjective   This patient continues to express that she just doesn't feel ready for a discharge because her lung status has not improved completely to her baseline.     Objective   Physical Exam  Constitutional:       General: She is not in acute distress.     Appearance: Normal appearance. She is normal weight. She is not ill-appearing, toxic-appearing or diaphoretic.   HENT:      Head: Normocephalic and atraumatic.      Right Ear: Tympanic membrane normal.      Left Ear: Tympanic membrane normal.      Nose: Nose normal.      Mouth/Throat:      Mouth: Mucous membranes are moist.      Pharynx: Oropharynx is clear.   Eyes:      General:         Right eye: No discharge.         Left eye: No discharge.      Extraocular Movements: Extraocular movements intact.      Pupils: Pupils are equal, round, and reactive to light.   Neck:      Vascular: No carotid bruit.   Cardiovascular:      Rate and Rhythm: Regular rhythm. Rate is WNL/     Heart sounds: No murmur heard.     No friction rub. No gallop.   Pulmonary:      Effort: No respiratory distress.      Breath sounds: No stridor. Coarse wheeze is present. No rales. Congested cough.  Chest:      Chest wall: No tenderness.   Abdominal:      General: Abdomen is flat. Bowel sounds are normal. There is no distension.      Palpations: " Abdomen is soft. There is no mass.      Tenderness: There is no abdominal tenderness. There is no right CVA tenderness, left CVA tenderness, guarding or rebound.      Hernia: No hernia is present.   Musculoskeletal:         General: No swelling, tenderness, deformity or signs of injury. Normal range of motion.      Cervical back: Normal range of motion and neck supple. No rigidity or tenderness.      Right lower leg: No edema.      Left lower leg: No edema.   Lymphadenopathy:      Cervical: No cervical adenopathy.   Skin:     General: Skin is warm and dry.      Capillary Refill: Capillary refill takes less than 2 seconds.      Coloration: Skin is not jaundiced or pale.      Findings: No bruising, erythema, lesion or rash.   Neurological:      General: No focal deficit present.      Mental Status: She is alert and oriented to person, place, and time.   Psychiatric:         Mood and Affect: Mood normal.         Behavior: Behavior normal.         Thought Content: Thought content normal.         Judgment: Judgment normal.   Last Recorded Vitals  /58 (BP Location: Right arm, Patient Position: Sitting)   Pulse 94   Temp 36.2 °C (97.2 °F) (Temporal)   Resp 18   Wt 75.5 kg (166 lb 7.2 oz)   SpO2 97%   Intake/Output last 3 Shifts:    Intake/Output Summary (Last 24 hours) at 12/18/2023 1033  Last data filed at 12/18/2023 0853  Gross per 24 hour   Intake 960 ml   Output --   Net 960 ml       Admission Weight  Weight: 74.8 kg (165 lb) (12/09/23 2150)    Daily Weight  12/17/23 : 75.5 kg (166 lb 7.2 oz)    Image Results  XR chest 1 view  Narrative: Interpreted By:  Kanchan Chaudhry,   STUDY:  XR CHEST 1 VIEW;  12/16/2023 10:40 am      INDICATION:  Signs/Symptoms:copd; respiratory distress.      COMPARISON:  12/09/2023      ACCESSION NUMBER(S):  [id number]      ORDERING CLINICIAN:  MICHELLE CRUZ      TECHNIQUE:  Portable AP upright      FINDINGS:  Right internal jugular port catheter tip projects at mid superior  vena  cava. Fusion plate of the lower cervical spine is partially  visualized.      The heart is normal in size. Aorta is atherosclerotic. Lungs are  hyperinflated. Minimal atelectasis is noted at the right lung base.  No pleural effusion is noted. No interval changes are noted in the  osseous structures.      Impression: Minimal atelectasis right lung base      MACRO:  None.      Signed by: Kanchan Chaudhry 12/17/2023 4:17 PM  Dictation workstation:   [id number]        Assessment/Plan   Assessment/Plan   #COPD exacerbation   ~pulmonary sarcoidosis   ~chronic respiratory failure with hypoxia   ~asthma  ~treated and released X 2 and was on hospital at home program.   ~recent CXR with RLL blunting of the costophrenic angle, CXR this admission unchanged from previous.   ~WBC 15.8 with left shift~steroid vs infection induced.  ~Rocephin, zithromax, solumedrol duoneb in the ED.  Respiratory status assisted with bipap.  ~continue zithromax three days per week  ~solumedrol 40 mg BID~plan to transition to oral starting 12/12/23    ~subsequent decrease in overall WBC with reduction of the steroid  ~bipap 14/6-14-40% intermittently ~discontinued  ~oxygen is 3 liters nasal cannula with baseline 2 liters at home saturation 98%        #Non-hospital problems  ~CAD~asa, plavix     ~cardiac stents  ~CHF~furosemide 20 mg Q48H  ~HTN~losartan 50 mg, metoprolol 75mg      ~AAA  ~palpitations  ~a-fib~metoprolol 75 mg QD  ~hypothyroidism-synthroid 50 mcg QD  ~migraines  ~hiatal hernia  ~diverticulosis s/p colon resection  ~HLD     F: repleinsh PRN  E: replenish PRN  N: as tolerated  ABX: azithromycin  DVT: lovenox  GI: NA     Disposition:  Author did discuss and requested a transfer to higher level of care. Spoke with Dr. Greene regarding her current status, her significant history, and her current care. She is mostly close to baseline regarding her oxygen needs as compared to previous needs before this illness. This may even be her new baseline  "given her history of pulmonary sarcoidosis. Suggestion for \"healthy at home\" and arrange for outpatient pulmonary rehab as this facility is completing the appropriate interventions at this time.     CHANA Staples    Patient was seen in collaboration with the ALDAIR, Stephanie ZAYAS.  I was present for the pertinent components of the history, physical, and medical decision making and independently examined the patient.  We reviewed the medication reconciliation list and ancillary studies, including lab, imaging, and microbiology, as well as discussed the differential diagnoses; which includes, but is not limited to Principal Problem:    Acute exacerbation of chronic obstructive pulmonary disease (CMS/HCC)  .    I do not agree with the receiving doctor.  While this may be a new baseline, I think it would be appropriate, given that the patient has a standard diagnosis which is not improving with standard treatment, that she would benefit from a pulmonology specialty consult.      We will seek other care than [facility].    I agree with her plan as documented in the electronic medical record.    Erick Gunn MD   "

## 2023-12-18 NOTE — PROGRESS NOTES
Trinity Hernandez is a 73 y.o. female on day 8 of admission presenting with Acute exacerbation of chronic obstructive pulmonary disease (CMS/HCC).    Plan is for pt to transfer to [facility] for higher level of medical care. Pt was given IM reminder this morning and she signed with understanding.     SHAUN Alex

## 2023-12-18 NOTE — DISCHARGE SUMMARY
"Discharge Diagnosis  Acute exacerbation of chronic obstructive pulmonary disease (CMS/HCC)    Issues Requiring Follow-Up  Persistent chronic respiratory failure.    Test Results Pending At Discharge  Pending Labs       No current pending labs.            Hospital Course   Trinity Hernandez is a 73 y.o. female  with PMHx significant for CAD, CHF, COPD, cardiac stents, HTN, palpitations, a-fib, AAA, asthma, hypothyroidism, migraines, hiatal hernia, diverticulosis, pulmonary sarcoidosis, chronic respiratory failure, HLD who presented to [facility] due to increased shortness of breath. She was recently discharged from [facility] where she was treated for PNA with amoxicillin. She was subsequently discharged from [place] as well and went home on the hospital at home program where she had ongoing treatment with azithromycin three times per week and a slow steroid taper. She has had multiple readmissions for this.   Trinity expressed that she had received a call the day prior to admission from the [facility] hospital at home nurses and because of her \"lung sounds?\", was told to go to the emergency room for treatment. Denies associated symptoms including CP, palpitations, fever, chills, changes in vision/hearing, runny nose, sore throat. She was seen in the ED, initiated on Bipap, administered duoneb, solumedrol, azithromycin and rocephin in the ED and subsequently transferred to the medical floor for continued observation and treatment.     12 Point ROS negative unless noted in above HPI    Patient was admitted to the medicine service for further workup and management.  Over her 8 day course, she received antibiotics, duonebs, and solumedrol.  At about Day 5-6, it became clear that she was plateauing and was minimally waxing and waning.  Her lungs remain intermittently coarse, she continue to not be able to weaned on her o2, which is higher than normal, and I think we have reached the point where she would benefit from more " intensive workup with pulmonary consult.  It is possible there could be a component of her sarcoid that is dominating this.  Her xray does not reveal anything of consequence.     Discussed with Dr. Ambrosio and he agrees to accept the patient in transfer to [facility]    Pertinent Physical Exam At Time of Discharge  Physical Exam  Constitutional:       General: She is not in acute distress.     Appearance: Normal appearance. She is normal weight. She is not ill-appearing, toxic-appearing or diaphoretic.   HEENT:      Head: Normocephalic and atraumatic.      Right Ear: Tympanic membrane normal.      Left Ear: Tympanic membrane normal.      Nose: Nose normal.      Mouth/Throat:      Mouth: Mucous membranes are moist.      Pharynx: Oropharynx is clear.   Eyes:      General:         Right eye: No discharge.         Left eye: No discharge.      Extraocular Movements: Extraocular movements intact.      Pupils: Pupils are equal, round, and reactive to light.   Neck:      Vascular: No carotid bruit.   Cardiovascular:      Rate and Rhythm: Regular rhythm. Rate is WNL/     Heart sounds: No murmur heard.     No friction rub. No gallop.   Pulmonary:      Effort: No respiratory distress.      Breath sounds: No stridor. Coarse wheeze is present throughout all lung fields. No rales. Congested cough.  Chest:      Chest wall: No tenderness.   Abdominal:      General: Abdomen is flat. Bowel sounds are normal. There is no distension.      Palpations: Abdomen is soft. There is no mass.      Tenderness: There is no abdominal tenderness. There is no right CVA tenderness, left CVA tenderness, guarding or rebound.      Hernia: No hernia is present.   Musculoskeletal:         General: No swelling, tenderness, deformity or signs of injury. Normal range of motion.      Cervical back: Normal range of motion and neck supple. No rigidity or tenderness.      Right lower leg: No edema.      Left lower leg: No edema.   Lymphadenopathy:      Cervical: No  cervical adenopathy.   Skin:     General: Skin is warm and dry.      Capillary Refill: Capillary refill takes less than 2 seconds.      Coloration: Skin is not jaundiced or pale.      Findings: No bruising, erythema, lesion or rash.   Neurological:      General: No focal deficit present.      Mental Status: She is alert and oriented to person, place, and time.   Psychiatric:         Mood and Affect: Mood normal.         Behavior: Behavior normal.         Thought Content: Thought content normal.         Judgment: Judgment normal.     Home Medications     Medication List      ASK your doctor about these medications     acetaminophen 325 mg tablet; Commonly known as: Tylenol   albuterol 90 mcg/actuation inhaler   arformoterol 15 mcg/2 mL nebulizer solution; Commonly known as: Brovana   aspirin 81 mg EC tablet   budesonide 0.5 mg/2 mL nebulizer solution; Commonly known as: Pulmicort   clopidogrel 75 mg tablet; Commonly known as: Plavix   estradiol 0.01 % (0.1 mg/gram) vaginal cream; Commonly known as: Estrace   ezetimibe 10 mg tablet; Commonly known as: Zetia; Take 1 tablet (10 mg)   by mouth once daily.   ferrous sulfate (325 mg ferrous sulfate) tablet   furosemide 20 mg tablet; Commonly known as: Lasix   ipratropium 0.02 % nebulizer solution; Commonly known as: Atrovent   levothyroxine 50 mcg tablet; Commonly known as: Synthroid, Levoxyl; Take   1 tablet (50 mcg) by mouth once daily.   losartan 25 mg tablet; Commonly known as: Cozaar   magnesium oxide 400 mg (241.3 mg magnesium) tablet; Commonly known as:   Mag-Ox   melatonin 10 mg capsule   metoprolol succinate XL 50 mg 24 hr tablet; Commonly known as: Toprol-XL   multivitamin tablet   nitroglycerin 0.4 mg SL tablet; Commonly known as: Nitrostat   simethicone 125 mg capsule; Commonly known as: Mylicon,Gas-X   Spiriva Respimat 2.5 mcg/actuation inhaler; Generic drug: tiotropium       Outpatient Follow-Up  Future Appointments   Date Time Provider Department Center    1/15/2024  1:00 PM INF 01 [facility]     Assessment/Plan   #COPD exacerbation   ~pulmonary sarcoidosis   ~chronic respiratory failure with hypoxia   ~asthma  ~treated and released X 2 and was on hospital at home program.   ~recent CXR with RLL blunting of the costophrenic angle, CXR this admission unchanged from previous.   ~WBC 14.5 with left shift~steroid vs infection induced.  ~Rocephin, zithromax, solumedrol duoneb in the ED.  Respiratory status assisted with bipap.  ~continue zithromax three days per week  ~solumedrol 40 mg BID~plan to transition to oral     ~subsequent decrease in overall WBC with reduction of the steroid  ~bipap 14/6-14-40% intermittently ~discontinued  ~oxygen is 3 liters nasal cannula with baseline 2 liters at home saturation 98%        #Non-hospital problems  ~CAD~asa, plavix     ~cardiac stents  ~acute compensated CHF~furosemide 20 mg Q24H  ~HTN~losartan 50 mg, metoprolol 75mg      ~AAA  ~palpitations  ~a-fib~metoprolol 75 mg QD  ~hypothyroidism-synthroid 50 mcg QD  ~migraines  ~hiatal hernia  ~diverticulosis s/p colon resection  ~HLD    Erick Gunn MD

## 2023-12-18 NOTE — CARE PLAN
Really qualify for Ajovy at this time because she is not getting enough headaches per month. We can up her Topamax. Also, her lab testing was abnormal.  I sent a prescription for an antibiotic for urinary tract infection as well as Lipitor for an elevated cholesterol. Let me know if she is agreeable to increasing the Topamax. She may want to just wait until the urinary tract infection clears because it is a pretty bad 1. The patient's goals for the shift include      The clinical goals for the shift include Patient will tolerate walking in halls through 1900    Patient has been doing well ambulating in halls and room. Patient has had an uneventful shift, is A+Ox3, denies pain at this time. Call light in reach.

## 2023-12-18 NOTE — CONSULTS
Patient requesting another aerosol treatment at this time, Sp02 97% on 3L at this time, breath sounds Inspiratory Expiratory wheezing throughout worse than earlier today, with very congested moist productive cough with whitish/clear thick sputum. Patient appeared tired at her 2pm prn aerosol treatment request, more so at this time. Patient states she is feeling worse at this time, notified nursing after this aerosol was completed. Duoneb was given at this time, breath sounds improving some with only expiratory wheezing.

## 2023-12-19 ENCOUNTER — PATIENT OUTREACH (OUTPATIENT)
Dept: PRIMARY CARE | Facility: CLINIC | Age: 73
End: 2023-12-19

## 2023-12-19 ENCOUNTER — HOSPITAL ENCOUNTER (INPATIENT)
Facility: HOSPITAL | Age: 73
LOS: 3 days | Discharge: HOME | DRG: 190 | End: 2023-12-22
Attending: INTERNAL MEDICINE | Admitting: HOSPITALIST
Payer: MEDICARE

## 2023-12-19 VITALS
BODY MASS INDEX: 26.12 KG/M2 | RESPIRATION RATE: 20 BRPM | DIASTOLIC BLOOD PRESSURE: 73 MMHG | HEART RATE: 97 BPM | HEIGHT: 67 IN | WEIGHT: 166.45 LBS | SYSTOLIC BLOOD PRESSURE: 159 MMHG | OXYGEN SATURATION: 97 % | TEMPERATURE: 97.7 F

## 2023-12-19 DIAGNOSIS — J44.0 COPD WITH ACUTE LOWER RESPIRATORY INFECTION (MULTI): ICD-10-CM

## 2023-12-19 DIAGNOSIS — J44.9 COPD (CHRONIC OBSTRUCTIVE PULMONARY DISEASE) (MULTI): Primary | ICD-10-CM

## 2023-12-19 DIAGNOSIS — J18.9 HCAP (HEALTHCARE-ASSOCIATED PNEUMONIA): ICD-10-CM

## 2023-12-19 LAB
ALBUMIN SERPL BCP-MCNC: 3.4 G/DL (ref 3.4–5)
ANION GAP SERPL CALC-SCNC: 8 MMOL/L (ref 10–20)
ATRIAL RATE: 71 BPM
ATRIAL RATE: 71 BPM
ATRIAL RATE: 72 BPM
ATRIAL RATE: 77 BPM
BUN SERPL-MCNC: 24 MG/DL (ref 6–23)
CALCIUM SERPL-MCNC: 9 MG/DL (ref 8.6–10.3)
CHLORIDE SERPL-SCNC: 101 MMOL/L (ref 98–107)
CO2 SERPL-SCNC: 33 MMOL/L (ref 21–32)
CREAT SERPL-MCNC: 0.85 MG/DL (ref 0.5–1.05)
ERYTHROCYTE [DISTWIDTH] IN BLOOD BY AUTOMATED COUNT: 13.2 % (ref 11.5–14.5)
GFR SERPL CREATININE-BSD FRML MDRD: 72 ML/MIN/1.73M*2
GLUCOSE SERPL-MCNC: 104 MG/DL (ref 74–99)
HCT VFR BLD AUTO: 35.5 % (ref 36–46)
HGB BLD-MCNC: 11.1 G/DL (ref 12–16)
MCH RBC QN AUTO: 29.5 PG (ref 26–34)
MCHC RBC AUTO-ENTMCNC: 31.3 G/DL (ref 32–36)
MCV RBC AUTO: 94 FL (ref 80–100)
NRBC BLD-RTO: 0 /100 WBCS (ref 0–0)
P AXIS: 32 DEGREES
P AXIS: 35 DEGREES
P AXIS: 43 DEGREES
P AXIS: 46 DEGREES
P OFFSET: 182 MS
P OFFSET: 189 MS
P OFFSET: 189 MS
P OFFSET: 198 MS
P ONSET: 138 MS
P ONSET: 138 MS
P ONSET: 141 MS
P ONSET: 149 MS
PHOSPHATE SERPL-MCNC: 3.1 MG/DL (ref 2.5–4.9)
PLATELET # BLD AUTO: 237 X10*3/UL (ref 150–450)
POTASSIUM SERPL-SCNC: 4.3 MMOL/L (ref 3.5–5.3)
PR INTERVAL: 134 MS
PR INTERVAL: 136 MS
PR INTERVAL: 140 MS
PR INTERVAL: 150 MS
Q ONSET: 206 MS
Q ONSET: 211 MS
Q ONSET: 213 MS
Q ONSET: 216 MS
QRS COUNT: 12 BEATS
QRS DURATION: 128 MS
QRS DURATION: 134 MS
QRS DURATION: 138 MS
QRS DURATION: 140 MS
QT INTERVAL: 420 MS
QT INTERVAL: 426 MS
QT INTERVAL: 442 MS
QT INTERVAL: 450 MS
QTC CALCULATION(BAZETT): 459 MS
QTC CALCULATION(BAZETT): 480 MS
QTC CALCULATION(BAZETT): 482 MS
QTC CALCULATION(BAZETT): 489 MS
QTC FREDERICIA: 446 MS
QTC FREDERICIA: 462 MS
QTC FREDERICIA: 467 MS
QTC FREDERICIA: 476 MS
R AXIS: 42 DEGREES
R AXIS: 43 DEGREES
R AXIS: 46 DEGREES
R AXIS: 50 DEGREES
RBC # BLD AUTO: 3.76 X10*6/UL (ref 4–5.2)
SODIUM SERPL-SCNC: 138 MMOL/L (ref 136–145)
T AXIS: 54 DEGREES
T AXIS: 66 DEGREES
T AXIS: 74 DEGREES
T AXIS: 80 DEGREES
T OFFSET: 416 MS
T OFFSET: 424 MS
T OFFSET: 437 MS
T OFFSET: 438 MS
VENTRICULAR RATE: 71 BPM
VENTRICULAR RATE: 71 BPM
VENTRICULAR RATE: 72 BPM
VENTRICULAR RATE: 77 BPM
WBC # BLD AUTO: 15 X10*3/UL (ref 4.4–11.3)

## 2023-12-19 PROCEDURE — 2500000002 HC RX 250 W HCPCS SELF ADMINISTERED DRUGS (ALT 637 FOR MEDICARE OP, ALT 636 FOR OP/ED): Mod: IPSPLIT | Performed by: INTERNAL MEDICINE

## 2023-12-19 PROCEDURE — 94762 N-INVAS EAR/PLS OXIMTRY CONT: CPT

## 2023-12-19 PROCEDURE — 2500000001 HC RX 250 WO HCPCS SELF ADMINISTERED DRUGS (ALT 637 FOR MEDICARE OP): Performed by: NURSE PRACTITIONER

## 2023-12-19 PROCEDURE — 2500000001 HC RX 250 WO HCPCS SELF ADMINISTERED DRUGS (ALT 637 FOR MEDICARE OP): Mod: IPSPLIT | Performed by: INTERNAL MEDICINE

## 2023-12-19 PROCEDURE — 2500000001 HC RX 250 WO HCPCS SELF ADMINISTERED DRUGS (ALT 637 FOR MEDICARE OP): Performed by: HOSPITALIST

## 2023-12-19 PROCEDURE — 85027 COMPLETE CBC AUTOMATED: CPT | Mod: IPSPLIT | Performed by: NURSE PRACTITIONER

## 2023-12-19 PROCEDURE — 94640 AIRWAY INHALATION TREATMENT: CPT | Mod: IPSPLIT

## 2023-12-19 PROCEDURE — 2500000002 HC RX 250 W HCPCS SELF ADMINISTERED DRUGS (ALT 637 FOR MEDICARE OP, ALT 636 FOR OP/ED): Mod: IPSPLIT | Performed by: NURSE PRACTITIONER

## 2023-12-19 PROCEDURE — 9420000001 HC RT PATIENT EDUCATION 5 MIN: Mod: IPSPLIT

## 2023-12-19 PROCEDURE — 94640 AIRWAY INHALATION TREATMENT: CPT

## 2023-12-19 PROCEDURE — 96372 THER/PROPH/DIAG INJ SC/IM: CPT | Mod: IPSPLIT | Performed by: NURSE PRACTITIONER

## 2023-12-19 PROCEDURE — 2500000001 HC RX 250 WO HCPCS SELF ADMINISTERED DRUGS (ALT 637 FOR MEDICARE OP): Mod: IPSPLIT | Performed by: NURSE PRACTITIONER

## 2023-12-19 PROCEDURE — 94668 MNPJ CHEST WALL SBSQ: CPT | Mod: IPSPLIT

## 2023-12-19 PROCEDURE — 2500000002 HC RX 250 W HCPCS SELF ADMINISTERED DRUGS (ALT 637 FOR MEDICARE OP, ALT 636 FOR OP/ED): Performed by: NURSE PRACTITIONER

## 2023-12-19 PROCEDURE — 99231 SBSQ HOSP IP/OBS SF/LOW 25: CPT | Performed by: INTERNAL MEDICINE

## 2023-12-19 PROCEDURE — 80069 RENAL FUNCTION PANEL: CPT | Mod: IPSPLIT | Performed by: NURSE PRACTITIONER

## 2023-12-19 PROCEDURE — 94667 MNPJ CHEST WALL 1ST: CPT

## 2023-12-19 PROCEDURE — 2500000004 HC RX 250 GENERAL PHARMACY W/ HCPCS (ALT 636 FOR OP/ED): Mod: IPSPLIT | Performed by: INTERNAL MEDICINE

## 2023-12-19 PROCEDURE — 2500000005 HC RX 250 GENERAL PHARMACY W/O HCPCS: Mod: IPSPLIT | Performed by: INTERNAL MEDICINE

## 2023-12-19 PROCEDURE — 2500000002 HC RX 250 W HCPCS SELF ADMINISTERED DRUGS (ALT 637 FOR MEDICARE OP, ALT 636 FOR OP/ED): Performed by: HOSPITALIST

## 2023-12-19 PROCEDURE — 2500000004 HC RX 250 GENERAL PHARMACY W/ HCPCS (ALT 636 FOR OP/ED): Mod: IPSPLIT | Performed by: NURSE PRACTITIONER

## 2023-12-19 PROCEDURE — 2060000001 HC INTERMEDIATE ICU ROOM DAILY

## 2023-12-19 PROCEDURE — 99223 1ST HOSP IP/OBS HIGH 75: CPT | Performed by: HOSPITALIST

## 2023-12-19 RX ORDER — NITROGLYCERIN 0.4 MG/1
0.4 TABLET SUBLINGUAL EVERY 5 MIN PRN
Status: DISCONTINUED | OUTPATIENT
Start: 2023-12-19 | End: 2023-12-22 | Stop reason: HOSPADM

## 2023-12-19 RX ORDER — PREDNISONE 10 MG/1
10 TABLET ORAL DAILY
Status: DISCONTINUED | OUTPATIENT
Start: 2023-12-24 | End: 2023-12-22 | Stop reason: HOSPADM

## 2023-12-19 RX ORDER — PREDNISONE 10 MG/1
30 TABLET ORAL DAILY
Qty: 3 TABLET | Refills: 0 | Status: SHIPPED | OUTPATIENT
Start: 2023-12-20 | End: 2023-12-22 | Stop reason: HOSPADM

## 2023-12-19 RX ORDER — LOSARTAN POTASSIUM 50 MG/1
50 TABLET ORAL 2 TIMES DAILY
Status: DISCONTINUED | OUTPATIENT
Start: 2023-12-19 | End: 2023-12-22 | Stop reason: HOSPADM

## 2023-12-19 RX ORDER — PREDNISONE 10 MG/1
10 TABLET ORAL DAILY
Status: DISCONTINUED | OUTPATIENT
Start: 2023-12-24 | End: 2023-12-19

## 2023-12-19 RX ORDER — PREDNISONE 20 MG/1
20 TABLET ORAL DAILY
Qty: 3 TABLET | Refills: 0 | Status: SHIPPED | OUTPATIENT
Start: 2023-12-21 | End: 2023-12-22 | Stop reason: HOSPADM

## 2023-12-19 RX ORDER — LEVOTHYROXINE SODIUM 50 UG/1
50 TABLET ORAL DAILY
Status: DISCONTINUED | OUTPATIENT
Start: 2023-12-20 | End: 2023-12-22 | Stop reason: HOSPADM

## 2023-12-19 RX ORDER — FUROSEMIDE 20 MG/1
20 TABLET ORAL DAILY
Status: DISCONTINUED | OUTPATIENT
Start: 2023-12-20 | End: 2023-12-22 | Stop reason: HOSPADM

## 2023-12-19 RX ORDER — CLOPIDOGREL BISULFATE 75 MG/1
75 TABLET ORAL DAILY
Status: DISCONTINUED | OUTPATIENT
Start: 2023-12-20 | End: 2023-12-22 | Stop reason: HOSPADM

## 2023-12-19 RX ORDER — FERROUS SULFATE 325(65) MG
65 TABLET ORAL DAILY
Status: DISCONTINUED | OUTPATIENT
Start: 2023-12-20 | End: 2023-12-22 | Stop reason: HOSPADM

## 2023-12-19 RX ORDER — LORAZEPAM 0.5 MG/1
0.5 TABLET ORAL EVERY 8 HOURS PRN
Status: DISCONTINUED | OUTPATIENT
Start: 2023-12-19 | End: 2023-12-22 | Stop reason: HOSPADM

## 2023-12-19 RX ORDER — SIMETHICONE 80 MG
120 TABLET,CHEWABLE ORAL 4 TIMES DAILY PRN
Status: DISCONTINUED | OUTPATIENT
Start: 2023-12-19 | End: 2023-12-22 | Stop reason: HOSPADM

## 2023-12-19 RX ORDER — LORAZEPAM 0.5 MG/1
0.5 TABLET ORAL EVERY 6 HOURS PRN
COMMUNITY
End: 2023-12-27 | Stop reason: SDUPTHER

## 2023-12-19 RX ORDER — IPRATROPIUM BROMIDE AND ALBUTEROL SULFATE 2.5; .5 MG/3ML; MG/3ML
3 SOLUTION RESPIRATORY (INHALATION)
Status: DISCONTINUED | OUTPATIENT
Start: 2023-12-19 | End: 2023-12-22 | Stop reason: HOSPADM

## 2023-12-19 RX ORDER — PREDNISONE 20 MG/1
20 TABLET ORAL DAILY
Status: DISCONTINUED | OUTPATIENT
Start: 2023-12-21 | End: 2023-12-19

## 2023-12-19 RX ORDER — BUDESONIDE 0.5 MG/2ML
0.5 INHALANT ORAL
Status: DISCONTINUED | OUTPATIENT
Start: 2023-12-19 | End: 2023-12-22 | Stop reason: HOSPADM

## 2023-12-19 RX ORDER — TALC
6 POWDER (GRAM) TOPICAL NIGHTLY PRN
Status: DISCONTINUED | OUTPATIENT
Start: 2023-12-19 | End: 2023-12-22 | Stop reason: HOSPADM

## 2023-12-19 RX ORDER — PREDNISONE 20 MG/1
40 TABLET ORAL DAILY
Status: DISCONTINUED | OUTPATIENT
Start: 2023-12-19 | End: 2023-12-19

## 2023-12-19 RX ORDER — PREDNISONE 10 MG/1
10 TABLET ORAL DAILY
Qty: 3 TABLET | Refills: 0 | Status: SHIPPED | OUTPATIENT
Start: 2023-12-24 | End: 2023-12-22 | Stop reason: HOSPADM

## 2023-12-19 RX ORDER — ACETAMINOPHEN 325 MG/1
325 TABLET ORAL EVERY 6 HOURS PRN
Status: DISCONTINUED | OUTPATIENT
Start: 2023-12-19 | End: 2023-12-22 | Stop reason: HOSPADM

## 2023-12-19 RX ORDER — LANOLIN ALCOHOL/MO/W.PET/CERES
400 CREAM (GRAM) TOPICAL DAILY
Status: DISCONTINUED | OUTPATIENT
Start: 2023-12-20 | End: 2023-12-22 | Stop reason: HOSPADM

## 2023-12-19 RX ORDER — AZITHROMYCIN 250 MG/1
250 TABLET, FILM COATED ORAL 3 TIMES WEEKLY
Qty: 1 TABLET | Refills: 0 | Status: SHIPPED | OUTPATIENT
Start: 2023-12-20 | End: 2024-01-05 | Stop reason: SDUPTHER

## 2023-12-19 RX ORDER — AZITHROMYCIN 500 MG/1
250 TABLET, FILM COATED ORAL 3 TIMES WEEKLY
Status: DISCONTINUED | OUTPATIENT
Start: 2023-12-20 | End: 2023-12-22 | Stop reason: HOSPADM

## 2023-12-19 RX ORDER — METOPROLOL SUCCINATE 25 MG/1
75 TABLET, EXTENDED RELEASE ORAL DAILY
Status: DISCONTINUED | OUTPATIENT
Start: 2023-12-20 | End: 2023-12-22 | Stop reason: HOSPADM

## 2023-12-19 RX ORDER — PREDNISONE 20 MG/1
20 TABLET ORAL DAILY
Status: DISCONTINUED | OUTPATIENT
Start: 2023-12-21 | End: 2023-12-22 | Stop reason: HOSPADM

## 2023-12-19 RX ORDER — IPRATROPIUM BROMIDE AND ALBUTEROL SULFATE 2.5; .5 MG/3ML; MG/3ML
3 SOLUTION RESPIRATORY (INHALATION) EVERY 2 HOUR PRN
Status: DISCONTINUED | OUTPATIENT
Start: 2023-12-19 | End: 2023-12-22 | Stop reason: HOSPADM

## 2023-12-19 RX ORDER — PREDNISONE 20 MG/1
20 TABLET ORAL DAILY
Status: DISCONTINUED | OUTPATIENT
Start: 2023-12-25 | End: 2023-12-19

## 2023-12-19 RX ORDER — GUAIFENESIN 600 MG/1
1200 TABLET, EXTENDED RELEASE ORAL 2 TIMES DAILY PRN
COMMUNITY
End: 2024-01-05 | Stop reason: SDUPTHER

## 2023-12-19 RX ORDER — PREDNISONE 10 MG/1
30 TABLET ORAL DAILY
Status: COMPLETED | OUTPATIENT
Start: 2023-12-20 | End: 2023-12-20

## 2023-12-19 RX ORDER — ASPIRIN 81 MG/1
81 TABLET ORAL DAILY
Status: DISCONTINUED | OUTPATIENT
Start: 2023-12-20 | End: 2023-12-22 | Stop reason: HOSPADM

## 2023-12-19 RX ORDER — PREDNISONE 10 MG/1
10 TABLET ORAL DAILY
Status: DISCONTINUED | OUTPATIENT
Start: 2023-12-28 | End: 2023-12-19

## 2023-12-19 RX ORDER — CLOPIDOGREL BISULFATE 75 MG/1
75 TABLET ORAL DAILY
Status: DISCONTINUED | OUTPATIENT
Start: 2023-12-19 | End: 2023-12-19

## 2023-12-19 RX ORDER — ASPIRIN 81 MG/1
81 TABLET ORAL ONCE
Status: DISCONTINUED | OUTPATIENT
Start: 2023-12-19 | End: 2023-12-19

## 2023-12-19 RX ORDER — FORMOTEROL FUMARATE DIHYDRATE 20 UG/2ML
20 SOLUTION RESPIRATORY (INHALATION)
Status: DISCONTINUED | OUTPATIENT
Start: 2023-12-19 | End: 2023-12-22 | Stop reason: HOSPADM

## 2023-12-19 RX ADMIN — POLYETHYLENE GLYCOL 3350 17 G: 17 POWDER, FOR SOLUTION ORAL at 08:31

## 2023-12-19 RX ADMIN — LORAZEPAM 0.5 MG: 0.5 TABLET ORAL at 21:02

## 2023-12-19 RX ADMIN — IPRATROPIUM BROMIDE AND ALBUTEROL SULFATE 3 ML: .5; 3 SOLUTION RESPIRATORY (INHALATION) at 15:22

## 2023-12-19 RX ADMIN — METOPROLOL SUCCINATE 75 MG: 50 TABLET, EXTENDED RELEASE ORAL at 08:31

## 2023-12-19 RX ADMIN — TIOTROPIUM BROMIDE INHALATION SPRAY 2 PUFF: 3.12 SPRAY, METERED RESPIRATORY (INHALATION) at 09:00

## 2023-12-19 RX ADMIN — LORAZEPAM 0.5 MG: 0.5 TABLET ORAL at 00:51

## 2023-12-19 RX ADMIN — IPRATROPIUM BROMIDE AND ALBUTEROL SULFATE 3 ML: .5; 3 SOLUTION RESPIRATORY (INHALATION) at 13:02

## 2023-12-19 RX ADMIN — MAGNESIUM OXIDE TAB 400 MG (241.3 MG ELEMENTAL MG) 400 MG: 400 (241.3 MG) TAB at 08:31

## 2023-12-19 RX ADMIN — LOSARTAN POTASSIUM 50 MG: 50 TABLET, FILM COATED ORAL at 21:03

## 2023-12-19 RX ADMIN — BUDESONIDE INHALATION 0.5 MG: 0.5 SUSPENSION RESPIRATORY (INHALATION) at 19:41

## 2023-12-19 RX ADMIN — FUROSEMIDE 20 MG: 20 TABLET ORAL at 08:32

## 2023-12-19 RX ADMIN — GUAIFENESIN 1200 MG: 600 TABLET, EXTENDED RELEASE ORAL at 08:32

## 2023-12-19 RX ADMIN — MULTIPLE VITAMINS W/ MINERALS TAB 1 TABLET: TAB at 08:31

## 2023-12-19 RX ADMIN — IPRATROPIUM BROMIDE AND ALBUTEROL SULFATE 3 ML: 2.5; .5 SOLUTION RESPIRATORY (INHALATION) at 22:24

## 2023-12-19 RX ADMIN — LOSARTAN POTASSIUM 50 MG: 50 TABLET, FILM COATED ORAL at 08:32

## 2023-12-19 RX ADMIN — FERROUS SULFATE TAB 325 MG (65 MG ELEMENTAL FE) 1 TABLET: 325 (65 FE) TAB at 08:31

## 2023-12-19 RX ADMIN — IPRATROPIUM BROMIDE AND ALBUTEROL SULFATE 3 ML: .5; 3 SOLUTION RESPIRATORY (INHALATION) at 05:05

## 2023-12-19 RX ADMIN — CLOPIDOGREL BISULFATE 75 MG: 75 TABLET ORAL at 08:31

## 2023-12-19 RX ADMIN — LORAZEPAM 0.5 MG: 0.5 TABLET ORAL at 15:20

## 2023-12-19 RX ADMIN — IPRATROPIUM BROMIDE AND ALBUTEROL SULFATE 3 ML: .5; 3 SOLUTION RESPIRATORY (INHALATION) at 00:55

## 2023-12-19 RX ADMIN — LEVOTHYROXINE SODIUM 50 MCG: 50 TABLET ORAL at 05:04

## 2023-12-19 RX ADMIN — ASPIRIN 81 MG: 81 TABLET, COATED ORAL at 08:31

## 2023-12-19 RX ADMIN — Medication 3 L/MIN: at 08:15

## 2023-12-19 RX ADMIN — PREDNISONE 30 MG: 20 TABLET ORAL at 08:31

## 2023-12-19 RX ADMIN — BUDESONIDE INHALATION 0.5 MG: 0.5 SUSPENSION RESPIRATORY (INHALATION) at 08:22

## 2023-12-19 RX ADMIN — FORMOTEROL FUMARATE DIHYDRATE 20 MCG: 20 SOLUTION RESPIRATORY (INHALATION) at 19:41

## 2023-12-19 RX ADMIN — ENOXAPARIN SODIUM 40 MG: 100 INJECTION SUBCUTANEOUS at 08:30

## 2023-12-19 SDOH — SOCIAL STABILITY: SOCIAL INSECURITY: ARE YOU OR HAVE YOU BEEN THREATENED OR ABUSED PHYSICALLY, EMOTIONALLY, OR SEXUALLY BY ANYONE?: NO

## 2023-12-19 SDOH — SOCIAL STABILITY: SOCIAL INSECURITY: ARE THERE ANY APPARENT SIGNS OF INJURIES/BEHAVIORS THAT COULD BE RELATED TO ABUSE/NEGLECT?: NO

## 2023-12-19 SDOH — SOCIAL STABILITY: SOCIAL INSECURITY: HAS ANYONE EVER THREATENED TO HURT YOUR FAMILY OR YOUR PETS?: NO

## 2023-12-19 SDOH — SOCIAL STABILITY: SOCIAL INSECURITY: WERE YOU ABLE TO COMPLETE ALL THE BEHAVIORAL HEALTH SCREENINGS?: YES

## 2023-12-19 SDOH — SOCIAL STABILITY: SOCIAL INSECURITY: ABUSE: ADULT

## 2023-12-19 SDOH — SOCIAL STABILITY: SOCIAL INSECURITY: DOES ANYONE TRY TO KEEP YOU FROM HAVING/CONTACTING OTHER FRIENDS OR DOING THINGS OUTSIDE YOUR HOME?: NO

## 2023-12-19 SDOH — SOCIAL STABILITY: SOCIAL INSECURITY: DO YOU FEEL ANYONE HAS EXPLOITED OR TAKEN ADVANTAGE OF YOU FINANCIALLY OR OF YOUR PERSONAL PROPERTY?: NO

## 2023-12-19 SDOH — SOCIAL STABILITY: SOCIAL INSECURITY: DO YOU FEEL UNSAFE GOING BACK TO THE PLACE WHERE YOU ARE LIVING?: YES

## 2023-12-19 ASSESSMENT — COLUMBIA-SUICIDE SEVERITY RATING SCALE - C-SSRS
5. HAVE YOU STARTED TO WORK OUT OR WORKED OUT THE DETAILS OF HOW TO KILL YOURSELF? DO YOU INTEND TO CARRY OUT THIS PLAN?: NO
1. IN THE PAST MONTH, HAVE YOU WISHED YOU WERE DEAD OR WISHED YOU COULD GO TO SLEEP AND NOT WAKE UP?: NO
4. HAVE YOU HAD THESE THOUGHTS AND HAD SOME INTENTION OF ACTING ON THEM?: NO
6. HAVE YOU EVER DONE ANYTHING, STARTED TO DO ANYTHING, OR PREPARED TO DO ANYTHING TO END YOUR LIFE?: NO
2. HAVE YOU ACTUALLY HAD ANY THOUGHTS OF KILLING YOURSELF?: NO

## 2023-12-19 ASSESSMENT — PAIN - FUNCTIONAL ASSESSMENT
PAIN_FUNCTIONAL_ASSESSMENT: 0-10
PAIN_FUNCTIONAL_ASSESSMENT: 0-10

## 2023-12-19 ASSESSMENT — COGNITIVE AND FUNCTIONAL STATUS - GENERAL
MOBILITY SCORE: 22
PATIENT BASELINE BEDBOUND: NO
HELP NEEDED FOR BATHING: A LITTLE
MOBILITY SCORE: 22
HELP NEEDED FOR BATHING: A LITTLE
CLIMB 3 TO 5 STEPS WITH RAILING: A LOT
DAILY ACTIVITIY SCORE: 23
DAILY ACTIVITIY SCORE: 23
CLIMB 3 TO 5 STEPS WITH RAILING: A LOT

## 2023-12-19 ASSESSMENT — PAIN SCALES - GENERAL
PAINLEVEL_OUTOF10: 0 - NO PAIN

## 2023-12-19 ASSESSMENT — ENCOUNTER SYMPTOMS
CHILLS: 0
MUSCULOSKELETAL NEGATIVE: 1
GASTROINTESTINAL NEGATIVE: 1
COUGH: 1
ALLERGIC/IMMUNOLOGIC NEGATIVE: 1
EYES NEGATIVE: 1
PSYCHIATRIC NEGATIVE: 1
CHEST TIGHTNESS: 1
SHORTNESS OF BREATH: 1
FEVER: 0
NEUROLOGICAL NEGATIVE: 1
WHEEZING: 1
HEMATOLOGIC/LYMPHATIC NEGATIVE: 1
FATIGUE: 1

## 2023-12-19 ASSESSMENT — ACTIVITIES OF DAILY LIVING (ADL)
LACK_OF_TRANSPORTATION: NO
PATIENT'S MEMORY ADEQUATE TO SAFELY COMPLETE DAILY ACTIVITIES?: YES
TOILETING: NEEDS ASSISTANCE
BATHING: NEEDS ASSISTANCE
JUDGMENT_ADEQUATE_SAFELY_COMPLETE_DAILY_ACTIVITIES: YES
FEEDING YOURSELF: INDEPENDENT
GROOMING: INDEPENDENT
DRESSING YOURSELF: INDEPENDENT
ADEQUATE_TO_COMPLETE_ADL: YES
HEARING - RIGHT EAR: FUNCTIONAL
WALKS IN HOME: INDEPENDENT
HEARING - LEFT EAR: FUNCTIONAL

## 2023-12-19 NOTE — PROGRESS NOTES
Trinity Hernandez is a 73 y.o. female on day 9 of admission presenting with Acute exacerbation of chronic obstructive pulmonary disease (CMS/HCC).      Subjective   Trinity is seen sitting at the side of her bed in no acute distress. She expressed that she had a bad night last night but did get some sleep and is feeling better this morning.   She expressed concern over her prednisone dosing being 30 mg and feels that she would need a longer course of a higher dosing of steroid for her recovery. She expressed that she is prescribed 20 mg of prednisone daily at home. Trinity further admitted that she does not take it regularly but only once a week or so when she feels like she needs it on account of her heart. Author did take a teaching opportunity and discussed the importance of taking her medications as prescribed.   Continue to await a bed a [facility] for further work up with pulmonology.       Objective   Physical Exam  Constitutional:       General: She is not in acute distress.     Appearance: Normal appearance. She is normal weight. She is not ill-appearing, toxic-appearing or diaphoretic.   HENT:      Head: Normocephalic and atraumatic.      Right Ear: Tympanic membrane normal.      Left Ear: Tympanic membrane normal.      Nose: Nose normal.      Mouth/Throat:      Mouth: Mucous membranes are moist.      Pharynx: Oropharynx is clear.   Eyes:      General:         Right eye: No discharge.         Left eye: No discharge.      Extraocular Movements: Extraocular movements intact.      Pupils: Pupils are equal, round, and reactive to light.   Neck:      Vascular: No carotid bruit.   Cardiovascular:      Rate and Rhythm: Regular rhythm. Rate is WNL/     Heart sounds: No murmur heard.     No friction rub. No gallop.   Pulmonary:      Effort: No respiratory distress.      Breath sounds: No stridor. Coarse wheeze is present. No rales. Congested cough.  Chest:      Chest wall: No tenderness.   Abdominal:      General:  Abdomen is flat. Bowel sounds are normal. There is no distension.      Palpations: Abdomen is soft. There is no mass.      Tenderness: There is no abdominal tenderness. There is no right CVA tenderness, left CVA tenderness, guarding or rebound.      Hernia: No hernia is present.   Musculoskeletal:         General: No swelling, tenderness, deformity or signs of injury. Normal range of motion.      Cervical back: Normal range of motion and neck supple. No rigidity or tenderness.      Right lower leg: No edema.      Left lower leg: No edema.   Lymphadenopathy:      Cervical: No cervical adenopathy.   Skin:     General: Skin is warm and dry.      Capillary Refill: Capillary refill takes less than 2 seconds.      Coloration: Skin is not jaundiced or pale.      Findings: No bruising, erythema, lesion or rash.   Neurological:      General: No focal deficit present.      Mental Status: She is alert and oriented to person, place, and time.   Psychiatric:         Mood and Affect: Mood normal.         Behavior: Behavior normal.         Thought Content: Thought content normal.         Judgment: Judgment normal.   Last Recorded Vitals  /73   Pulse 97   Temp 36.5 °C (97.7 °F) (Temporal)   Resp 20   Wt 75.5 kg (166 lb 7.2 oz)   SpO2 98%   Intake/Output last 3 Shifts:    Intake/Output Summary (Last 24 hours) at 12/19/2023 0853  Last data filed at 12/18/2023 1732  Gross per 24 hour   Intake 480 ml   Output --   Net 480 ml       Admission Weight  Weight: 74.8 kg (165 lb) (12/09/23 2150)    Daily Weight  12/18/23 : 75.5 kg (166 lb 7.2 oz)    Image Results  Electrocardiogram, 12-lead PRN ACS symptoms  Sinus rhythm with occasional Premature ventricular complexes  Left bundle branch block  Abnormal ECG  When compared with ECG of 09-DEC-2023 22:06, (unconfirmed)  Premature ventricular complexes are now Present  Nonspecific T wave abnormality, improved in Inferior leads    Results for orders placed or performed during the  hospital encounter of 12/09/23 (from the past 96 hour(s))   POCT GLUCOSE   Result Value Ref Range    POCT Glucose 132 (H) 74 - 99 mg/dL   CBC   Result Value Ref Range    WBC 16.9 (H) 4.4 - 11.3 x10*3/uL    nRBC 0.0 0.0 - 0.0 /100 WBCs    RBC 3.91 (L) 4.00 - 5.20 x10*6/uL    Hemoglobin 11.4 (L) 12.0 - 16.0 g/dL    Hematocrit 36.7 36.0 - 46.0 %    MCV 94 80 - 100 fL    MCH 29.2 26.0 - 34.0 pg    MCHC 31.1 (L) 32.0 - 36.0 g/dL    RDW 13.0 11.5 - 14.5 %    Platelets 264 150 - 450 x10*3/uL   Renal Function Panel   Result Value Ref Range    Glucose 92 74 - 99 mg/dL    Sodium 137 136 - 145 mmol/L    Potassium 4.0 3.5 - 5.3 mmol/L    Chloride 100 98 - 107 mmol/L    Bicarbonate 33 (H) 21 - 32 mmol/L    Anion Gap 8 (L) 10 - 20 mmol/L    Urea Nitrogen 28 (H) 6 - 23 mg/dL    Creatinine 0.87 0.50 - 1.05 mg/dL    eGFR 70 >60 mL/min/1.73m*2    Calcium 9.2 8.6 - 10.3 mg/dL    Phosphorus 2.8 2.5 - 4.9 mg/dL    Albumin 3.6 3.4 - 5.0 g/dL   CBC   Result Value Ref Range    WBC 15.9 (H) 4.4 - 11.3 x10*3/uL    nRBC 0.0 0.0 - 0.0 /100 WBCs    RBC 3.73 (L) 4.00 - 5.20 x10*6/uL    Hemoglobin 11.0 (L) 12.0 - 16.0 g/dL    Hematocrit 35.0 (L) 36.0 - 46.0 %    MCV 94 80 - 100 fL    MCH 29.5 26.0 - 34.0 pg    MCHC 31.4 (L) 32.0 - 36.0 g/dL    RDW 13.2 11.5 - 14.5 %    Platelets 240 150 - 450 x10*3/uL   Renal Function Panel   Result Value Ref Range    Glucose 98 74 - 99 mg/dL    Sodium 139 136 - 145 mmol/L    Potassium 4.0 3.5 - 5.3 mmol/L    Chloride 102 98 - 107 mmol/L    Bicarbonate 31 21 - 32 mmol/L    Anion Gap 10 10 - 20 mmol/L    Urea Nitrogen 28 (H) 6 - 23 mg/dL    Creatinine 0.84 0.50 - 1.05 mg/dL    eGFR 73 >60 mL/min/1.73m*2    Calcium 8.9 8.6 - 10.3 mg/dL    Phosphorus 3.2 2.5 - 4.9 mg/dL    Albumin 3.5 3.4 - 5.0 g/dL   CBC   Result Value Ref Range    WBC 14.5 (H) 4.4 - 11.3 x10*3/uL    nRBC 0.0 0.0 - 0.0 /100 WBCs    RBC 3.68 (L) 4.00 - 5.20 x10*6/uL    Hemoglobin 10.7 (L) 12.0 - 16.0 g/dL    Hematocrit 34.7 (L) 36.0 - 46.0 %    MCV  94 80 - 100 fL    MCH 29.1 26.0 - 34.0 pg    MCHC 30.8 (L) 32.0 - 36.0 g/dL    RDW 13.2 11.5 - 14.5 %    Platelets 230 150 - 450 x10*3/uL   Renal Function Panel   Result Value Ref Range    Glucose 100 (H) 74 - 99 mg/dL    Sodium 138 136 - 145 mmol/L    Potassium 4.1 3.5 - 5.3 mmol/L    Chloride 101 98 - 107 mmol/L    Bicarbonate 32 21 - 32 mmol/L    Anion Gap 9 (L) 10 - 20 mmol/L    Urea Nitrogen 23 6 - 23 mg/dL    Creatinine 0.79 0.50 - 1.05 mg/dL    eGFR 79 >60 mL/min/1.73m*2    Calcium 9.0 8.6 - 10.3 mg/dL    Phosphorus 2.9 2.5 - 4.9 mg/dL    Albumin 3.5 3.4 - 5.0 g/dL   Electrocardiogram, 12-lead PRN ACS symptoms   Result Value Ref Range    Ventricular Rate 88 BPM    Atrial Rate 88 BPM    PA Interval 144 ms    QRS Duration 130 ms    QT Interval 382 ms    QTC Calculation(Bazett) 462 ms    P Axis 75 degrees    R Axis 55 degrees    T Axis 59 degrees    QRS Count 14 beats    Q Onset 211 ms    P Onset 139 ms    P Offset 193 ms    T Offset 402 ms    QTC Fredericia 434 ms   Blood Gas Arterial Full Panel   Result Value Ref Range    POCT pH, Arterial 7.45 (H) 7.38 - 7.42 pH    POCT pCO2, Arterial 42 38 - 42 mm Hg    POCT pO2, Arterial 112 (H) 85 - 95 mm Hg    POCT SO2, Arterial 99 94 - 100 %    POCT Oxy Hemoglobin, Arterial 96.6 94.0 - 98.0 %    POCT Hematocrit Calculated, Arterial 39.0 36.0 - 46.0 %    POCT Sodium, Arterial 133 (L) 136 - 145 mmol/L    POCT Potassium, Arterial 4.6 3.5 - 5.3 mmol/L    POCT Chloride, Arterial 99 98 - 107 mmol/L    POCT Ionized Calcium, Arterial 1.20 1.10 - 1.33 mmol/L    POCT Glucose, Arterial 156 (H) 74 - 99 mg/dL    POCT Lactate, Arterial 1.9 0.4 - 2.0 mmol/L    POCT Base Excess, Arterial 4.7 (H) -2.0 - 3.0 mmol/L    POCT HCO3 Calculated, Arterial 29.2 (H) 22.0 - 26.0 mmol/L    POCT Hemoglobin, Arterial 13.0 12.0 - 16.0 g/dL    POCT Anion Gap, Arterial 9 (L) 10 - 25 mmo/L    Patient Temperature      FiO2 32 %    Site of Arterial Puncture Radial Right     Babak's Test Positive    CBC    Result Value Ref Range    WBC 15.0 (H) 4.4 - 11.3 x10*3/uL    nRBC 0.0 0.0 - 0.0 /100 WBCs    RBC 3.76 (L) 4.00 - 5.20 x10*6/uL    Hemoglobin 11.1 (L) 12.0 - 16.0 g/dL    Hematocrit 35.5 (L) 36.0 - 46.0 %    MCV 94 80 - 100 fL    MCH 29.5 26.0 - 34.0 pg    MCHC 31.3 (L) 32.0 - 36.0 g/dL    RDW 13.2 11.5 - 14.5 %    Platelets 237 150 - 450 x10*3/uL   Renal Function Panel   Result Value Ref Range    Glucose 104 (H) 74 - 99 mg/dL    Sodium 138 136 - 145 mmol/L    Potassium 4.3 3.5 - 5.3 mmol/L    Chloride 101 98 - 107 mmol/L    Bicarbonate 33 (H) 21 - 32 mmol/L    Anion Gap 8 (L) 10 - 20 mmol/L    Urea Nitrogen 24 (H) 6 - 23 mg/dL    Creatinine 0.85 0.50 - 1.05 mg/dL    eGFR 72 >60 mL/min/1.73m*2    Calcium 9.0 8.6 - 10.3 mg/dL    Phosphorus 3.1 2.5 - 4.9 mg/dL    Albumin 3.4 3.4 - 5.0 g/dL     Scheduled medications  arformoterol, 15 mcg, nebulization, BID  aspirin, 81 mg, oral, Daily  azithromycin, 250 mg, oral, Once per day on Mon Wed Fri  budesonide, 0.5 mg, nebulization, BID  clopidogrel, 75 mg, oral, Daily  enoxaparin, 40 mg, subcutaneous, Daily  ferrous sulfate (325 mg ferrous sulfate), 65 mg of iron, oral, Daily  furosemide, 20 mg, oral, Daily  guaiFENesin, 1,200 mg, oral, BID  levothyroxine, 50 mcg, oral, Daily  losartan, 50 mg, oral, BID  magnesium oxide, 400 mg, oral, Daily  metoprolol succinate XL, 75 mg, oral, Daily  multivitamin with minerals, 1 tablet, oral, Daily  oxygen, , inhalation, Continuous - 02/gases  polyethylene glycol, 17 g, oral, Daily  predniSONE, 30 mg, oral, Daily   Followed by  [START ON 12/21/2023] predniSONE, 20 mg, oral, Daily   Followed by  [START ON 12/24/2023] predniSONE, 10 mg, oral, Daily  tiotropium, 2 Inhalation, inhalation, Daily      Continuous medications     PRN medications  PRN medications: acetaminophen, dextromethorphan-guaifenesin, ipratropium-albuteroL, LORazepam, melatonin, nitroglycerin, oxygen, simethicone      Assessment/Plan   #COPD exacerbation   ~pulmonary  sarcoidosis   ~chronic respiratory failure with hypoxia   ~asthma  ~treated and released X 2 and was on hospital at home program.   ~recent CXR with RLL blunting of the costophrenic angle, CXR this admission unchanged from previous.   ~WBC 15.8 with left shift~steroid vs infection induced.  ~Rocephin, zithromax, solumedrol duoneb in the ED.  Respiratory status assisted with bipap.  ~continue zithromax three days per week  ~solumedrol 40 mg BID~plan to transition to oral starting 12/12/23    ~subsequent decrease in overall WBC with reduction of the steroid  ~bipap 14/6-14-40% intermittently ~discontinued  ~oxygen is 3 liters nasal cannula with baseline 2 liters at home saturation 98%        #Non-hospital problems  ~CAD~asa, plavix     ~cardiac stents  ~CHF~furosemide 20 mg Q48H  ~HTN~losartan 50 mg, metoprolol 75mg      ~AAA  ~palpitations  ~a-fib~metoprolol 75 mg QD  ~hypothyroidism-synthroid 50 mcg QD  ~migraines  ~hiatal hernia  ~diverticulosis s/p colon resection  ~HLD     F: repleinsh PRN  E: replenish PRN  N: as tolerated  ABX: azithromycin  DVT: lovenox  GI: NA     Disposition: Awaiting transfer to a higher level of care.    CHANA Staples    Patient was seen in collaboration with the ALDAIR, Stephanie ZAYAS.  I was present for the pertinent components of the history, physical, and medical decision making and independently examined the patient.  We reviewed the medication reconciliation list and ancillary studies, including lab, imaging, and microbiology, as well as discussed the differential diagnoses; which includes, but is not limited to Principal Problem:    Acute exacerbation of chronic obstructive pulmonary disease (CMS/HCC)  .      I agree with her plan as documented in the electronic medical record.    Erick Gunn MD

## 2023-12-19 NOTE — CARE PLAN
The patient's goals for the shift include  Getting rest    The clinical goals for the shift include No exertion with breathing.

## 2023-12-19 NOTE — NURSING NOTE
Upon assessment of pt @ 1945, Pt states she feels worse today than she has the past few days. Pt is asking for a steroid. Daughter at the bedside, spoke with both pt and daughter and explained that she did receive Prednisone 30mg this morning and she was due for a respiratory treatment. I reassured her that I would reach out to the Dr on call. Spoke with Erick Anthony NP and an order for an ABG was given, results were received and no new orders received. Pt was given her respiratory treatment, night medications and Ativan. Also pt was asking about her transfer to [facility]. Spoke with the transfer center and no bed is available at this time. Pt is now resting with her eyes closed. Will monitor closely throughout shift for any change in respiratory status.

## 2023-12-19 NOTE — NURSING NOTE
Crystal from the transfer center called with room number 513 at [facility] for patient.  time of 1540. Patient and daughter made aware.

## 2023-12-19 NOTE — CARE PLAN
The patient's goals for the shift include      The clinical goals for the shift include Patient will participate in ADL's and walking the halls through 1900    Over the shift, the patient did not make progress toward the following goals. Barriers to progression include ***. Recommendations to address these barriers include ***.    Problem: Respiratory  Goal: Clear secretions with interventions this shift  12/19/2023 1518 by Lillian Valverde RN  Outcome: Met  12/19/2023 1517 by Lillian Valverde RN  Outcome: Not Progressing  12/19/2023 0949 by Lillian Valverde, RN  Outcome: Progressing

## 2023-12-19 NOTE — NURSING NOTE
transport here to take patient to [facility]. All personal belongings packed and sent with patient.

## 2023-12-20 LAB
ANION GAP SERPL CALC-SCNC: 9 MMOL/L (ref 10–20)
BUN SERPL-MCNC: 23 MG/DL (ref 6–23)
CALCIUM SERPL-MCNC: 8.9 MG/DL (ref 8.6–10.3)
CHLORIDE SERPL-SCNC: 99 MMOL/L (ref 98–107)
CO2 SERPL-SCNC: 34 MMOL/L (ref 21–32)
CREAT SERPL-MCNC: 0.84 MG/DL (ref 0.5–1.05)
ERYTHROCYTE [DISTWIDTH] IN BLOOD BY AUTOMATED COUNT: 13.2 % (ref 11.5–14.5)
GFR SERPL CREATININE-BSD FRML MDRD: 73 ML/MIN/1.73M*2
GLUCOSE SERPL-MCNC: 110 MG/DL (ref 74–99)
HCT VFR BLD AUTO: 34.8 % (ref 36–46)
HGB BLD-MCNC: 10.7 G/DL (ref 12–16)
HOLD SPECIMEN: NORMAL
MAGNESIUM SERPL-MCNC: 1.9 MG/DL (ref 1.6–2.4)
MCH RBC QN AUTO: 29.3 PG (ref 26–34)
MCHC RBC AUTO-ENTMCNC: 30.7 G/DL (ref 32–36)
MCV RBC AUTO: 95 FL (ref 80–100)
NRBC BLD-RTO: 0 /100 WBCS (ref 0–0)
PLATELET # BLD AUTO: 234 X10*3/UL (ref 150–450)
POTASSIUM SERPL-SCNC: 4 MMOL/L (ref 3.5–5.3)
PROCALCITONIN SERPL-MCNC: 0.03 NG/ML
RBC # BLD AUTO: 3.65 X10*6/UL (ref 4–5.2)
SODIUM SERPL-SCNC: 138 MMOL/L (ref 136–145)
WBC # BLD AUTO: 13.2 X10*3/UL (ref 4.4–11.3)

## 2023-12-20 PROCEDURE — 2500000001 HC RX 250 WO HCPCS SELF ADMINISTERED DRUGS (ALT 637 FOR MEDICARE OP): Performed by: NURSE PRACTITIONER

## 2023-12-20 PROCEDURE — 94762 N-INVAS EAR/PLS OXIMTRY CONT: CPT

## 2023-12-20 PROCEDURE — 99233 SBSQ HOSP IP/OBS HIGH 50: CPT | Performed by: INTERNAL MEDICINE

## 2023-12-20 PROCEDURE — 83735 ASSAY OF MAGNESIUM: CPT | Performed by: NURSE PRACTITIONER

## 2023-12-20 PROCEDURE — 99223 1ST HOSP IP/OBS HIGH 75: CPT | Performed by: INTERNAL MEDICINE

## 2023-12-20 PROCEDURE — 2500000002 HC RX 250 W HCPCS SELF ADMINISTERED DRUGS (ALT 637 FOR MEDICARE OP, ALT 636 FOR OP/ED): Performed by: INTERNAL MEDICINE

## 2023-12-20 PROCEDURE — 94640 AIRWAY INHALATION TREATMENT: CPT

## 2023-12-20 PROCEDURE — 2500000002 HC RX 250 W HCPCS SELF ADMINISTERED DRUGS (ALT 637 FOR MEDICARE OP, ALT 636 FOR OP/ED): Performed by: HOSPITALIST

## 2023-12-20 PROCEDURE — 2500000004 HC RX 250 GENERAL PHARMACY W/ HCPCS (ALT 636 FOR OP/ED): Performed by: NURSE PRACTITIONER

## 2023-12-20 PROCEDURE — 2500000002 HC RX 250 W HCPCS SELF ADMINISTERED DRUGS (ALT 637 FOR MEDICARE OP, ALT 636 FOR OP/ED): Performed by: NURSE PRACTITIONER

## 2023-12-20 PROCEDURE — 80048 BASIC METABOLIC PNL TOTAL CA: CPT | Performed by: NURSE PRACTITIONER

## 2023-12-20 PROCEDURE — 84145 PROCALCITONIN (PCT): CPT | Mod: AHULAB | Performed by: NURSE PRACTITIONER

## 2023-12-20 PROCEDURE — 2500000001 HC RX 250 WO HCPCS SELF ADMINISTERED DRUGS (ALT 637 FOR MEDICARE OP): Performed by: HOSPITALIST

## 2023-12-20 PROCEDURE — 85027 COMPLETE CBC AUTOMATED: CPT | Performed by: NURSE PRACTITIONER

## 2023-12-20 PROCEDURE — 94668 MNPJ CHEST WALL SBSQ: CPT

## 2023-12-20 PROCEDURE — 2500000004 HC RX 250 GENERAL PHARMACY W/ HCPCS (ALT 636 FOR OP/ED): Performed by: INTERNAL MEDICINE

## 2023-12-20 PROCEDURE — 2060000001 HC INTERMEDIATE ICU ROOM DAILY

## 2023-12-20 RX ORDER — POLYETHYLENE GLYCOL 3350 17 G/17G
17 POWDER, FOR SOLUTION ORAL DAILY
Status: DISCONTINUED | OUTPATIENT
Start: 2023-12-20 | End: 2023-12-22 | Stop reason: HOSPADM

## 2023-12-20 RX ORDER — GUAIFENESIN 600 MG/1
600 TABLET, EXTENDED RELEASE ORAL 2 TIMES DAILY PRN
Status: DISCONTINUED | OUTPATIENT
Start: 2023-12-20 | End: 2023-12-22 | Stop reason: HOSPADM

## 2023-12-20 RX ADMIN — IPRATROPIUM BROMIDE AND ALBUTEROL SULFATE 3 ML: 2.5; .5 SOLUTION RESPIRATORY (INHALATION) at 02:46

## 2023-12-20 RX ADMIN — IPRATROPIUM BROMIDE AND ALBUTEROL SULFATE 3 ML: 2.5; .5 SOLUTION RESPIRATORY (INHALATION) at 18:57

## 2023-12-20 RX ADMIN — BUDESONIDE INHALATION 0.5 MG: 0.5 SUSPENSION RESPIRATORY (INHALATION) at 18:57

## 2023-12-20 RX ADMIN — BUDESONIDE INHALATION 0.5 MG: 0.5 SUSPENSION RESPIRATORY (INHALATION) at 07:23

## 2023-12-20 RX ADMIN — IPRATROPIUM BROMIDE AND ALBUTEROL SULFATE 3 ML: 2.5; .5 SOLUTION RESPIRATORY (INHALATION) at 14:50

## 2023-12-20 RX ADMIN — FERROUS SULFATE TAB 325 MG (65 MG ELEMENTAL FE) 1 TABLET: 325 (65 FE) TAB at 08:57

## 2023-12-20 RX ADMIN — IPRATROPIUM BROMIDE AND ALBUTEROL SULFATE 3 ML: 2.5; .5 SOLUTION RESPIRATORY (INHALATION) at 23:02

## 2023-12-20 RX ADMIN — PREDNISONE 30 MG: 10 TABLET ORAL at 08:56

## 2023-12-20 RX ADMIN — TIOTROPIUM BROMIDE INHALATION SPRAY 2 PUFF: 3.12 SPRAY, METERED RESPIRATORY (INHALATION) at 07:28

## 2023-12-20 RX ADMIN — IPRATROPIUM BROMIDE AND ALBUTEROL SULFATE 3 ML: 2.5; .5 SOLUTION RESPIRATORY (INHALATION) at 05:59

## 2023-12-20 RX ADMIN — LEVOTHYROXINE SODIUM 50 MCG: 50 TABLET ORAL at 08:57

## 2023-12-20 RX ADMIN — FORMOTEROL FUMARATE DIHYDRATE 20 MCG: 20 SOLUTION RESPIRATORY (INHALATION) at 07:24

## 2023-12-20 RX ADMIN — Medication 400 MG: at 08:57

## 2023-12-20 RX ADMIN — LORAZEPAM 0.5 MG: 0.5 TABLET ORAL at 20:28

## 2023-12-20 RX ADMIN — IPRATROPIUM BROMIDE AND ALBUTEROL SULFATE 3 ML: 2.5; .5 SOLUTION RESPIRATORY (INHALATION) at 07:13

## 2023-12-20 RX ADMIN — LOSARTAN POTASSIUM 50 MG: 50 TABLET, FILM COATED ORAL at 08:57

## 2023-12-20 RX ADMIN — POLYETHYLENE GLYCOL 3350 17 G: 17 POWDER, FOR SOLUTION ORAL at 12:35

## 2023-12-20 RX ADMIN — METOPROLOL SUCCINATE 75 MG: 25 TABLET, EXTENDED RELEASE ORAL at 08:56

## 2023-12-20 RX ADMIN — FORMOTEROL FUMARATE DIHYDRATE 20 MCG: 20 SOLUTION RESPIRATORY (INHALATION) at 18:57

## 2023-12-20 RX ADMIN — ASPIRIN 81 MG: 81 TABLET, COATED ORAL at 08:57

## 2023-12-20 RX ADMIN — GUAIFENESIN 600 MG: 600 TABLET, EXTENDED RELEASE ORAL at 21:06

## 2023-12-20 RX ADMIN — CLOPIDOGREL 75 MG: 75 TABLET ORAL at 08:57

## 2023-12-20 RX ADMIN — FUROSEMIDE 20 MG: 20 TABLET ORAL at 08:57

## 2023-12-20 RX ADMIN — IPRATROPIUM BROMIDE AND ALBUTEROL SULFATE 3 ML: 2.5; .5 SOLUTION RESPIRATORY (INHALATION) at 11:03

## 2023-12-20 RX ADMIN — AZITHROMYCIN 250 MG: 500 TABLET, FILM COATED ORAL at 08:57

## 2023-12-20 ASSESSMENT — PAIN SCALES - GENERAL
PAINLEVEL_OUTOF10: 0 - NO PAIN

## 2023-12-20 ASSESSMENT — PAIN - FUNCTIONAL ASSESSMENT
PAIN_FUNCTIONAL_ASSESSMENT: 0-10

## 2023-12-20 ASSESSMENT — ACTIVITIES OF DAILY LIVING (ADL): LACK_OF_TRANSPORTATION: NO

## 2023-12-20 NOTE — PROGRESS NOTES
Trinity Hernandez is a 73 y.o. female on day 1 of admission presenting with COPD (chronic obstructive pulmonary disease) (CMS/[facility]).    Subjective   States at baseline on 2l/nc oxygen currently on 4l/nc feels short of breath       Objective     Physical Exam  Constitutional:       Appearance: Normal appearance.   HENT:      Head: Normocephalic.      Nose: Nose normal.      Mouth/Throat:      Mouth: Mucous membranes are moist.   Cardiovascular:      Rate and Rhythm: Normal rate and regular rhythm.      Pulses: Normal pulses.      Heart sounds: Normal heart sounds.   Pulmonary:      Effort: Pulmonary effort is normal.      Breath sounds: Normal breath sounds.   Abdominal:      General: Abdomen is flat. Bowel sounds are normal.      Palpations: Abdomen is soft.   Musculoskeletal:      Cervical back: Normal range of motion.   Skin:     General: Skin is warm.      Capillary Refill: Capillary refill takes less than 2 seconds.   Neurological:      Mental Status: She is alert.         Last Recorded Vitals  Blood pressure 114/69, pulse 68, temperature 36.2 °C (97.2 °F), temperature source Temporal, resp. rate 18, SpO2 99 %.  Intake/Output last 3 Shifts:  No intake/output data recorded.    Relevant Results  Results for orders placed or performed during the hospital encounter of 12/19/23 (from the past 24 hour(s))   CBC   Result Value Ref Range    WBC 13.2 (H) 4.4 - 11.3 x10*3/uL    nRBC 0.0 0.0 - 0.0 /100 WBCs    RBC 3.65 (L) 4.00 - 5.20 x10*6/uL    Hemoglobin 10.7 (L) 12.0 - 16.0 g/dL    Hematocrit 34.8 (L) 36.0 - 46.0 %    MCV 95 80 - 100 fL    MCH 29.3 26.0 - 34.0 pg    MCHC 30.7 (L) 32.0 - 36.0 g/dL    RDW 13.2 11.5 - 14.5 %    Platelets 234 150 - 450 x10*3/uL   Magnesium   Result Value Ref Range    Magnesium 1.90 1.60 - 2.40 mg/dL   SST TOP   Result Value Ref Range    Extra Tube Hold for add-ons.    Basic metabolic panel   Result Value Ref Range    Glucose 110 (H) 74 - 99 mg/dL    Sodium 138 136 - 145 mmol/L    Potassium  4.0 3.5 - 5.3 mmol/L    Chloride 99 98 - 107 mmol/L    Bicarbonate 34 (H) 21 - 32 mmol/L    Anion Gap 9 (L) 10 - 20 mmol/L    Urea Nitrogen 23 6 - 23 mg/dL    Creatinine 0.84 0.50 - 1.05 mg/dL    eGFR 73 >60 mL/min/1.73m*2    Calcium 8.9 8.6 - 10.3 mg/dL       Imaging Results  Cxr:  IMPRESSION:  Minimal atelectasis right lung base    Medications:  aspirin, 81 mg, oral, Daily  azithromycin, 250 mg, oral, Once per day on Mon Wed Fri  budesonide, 0.5 mg, nebulization, BID  clopidogrel, 75 mg, oral, Daily  ferrous sulfate (325 mg ferrous sulfate), 65 mg of iron, oral, Daily  formoterol, 20 mcg, nebulization, q12h  furosemide, 20 mg, oral, Daily  ipratropium-albuteroL, 3 mL, nebulization, q4h  levothyroxine, 50 mcg, oral, Daily  losartan, 50 mg, oral, BID  magnesium oxide, 400 mg, oral, Daily  metoprolol succinate XL, 75 mg, oral, Daily  polyethylene glycol, 17 g, oral, Daily  [START ON 12/21/2023] predniSONE, 20 mg, oral, Daily   Followed by  [START ON 12/24/2023] predniSONE, 10 mg, oral, Daily  tiotropium, 2 Inhalation, inhalation, Daily       PRN medications: acetaminophen, guaiFENesin, ipratropium-albuteroL, LORazepam, melatonin, nitroglycerin, oxygen, simethicone     Assessment/Plan   Acute exacerbation of COPD with underlying sarcoidosis, acute on chronic hypoxic respiratory failure  -Patient was transferred from [facility] for a pulmonary consultation which is pending, on a steroid taper, on azithromycin  At baseline is on 2 L currently requiring 4 L continue inhalers.  Patient states she is slightly feeling better    2.Hypothyroidism  -levothyroxine    3. HTN  -losartan    4. CAD s/p stenting  -continue plavix, lasixs, metoprolol,asa    DVT Prophylaxis:  Lovenox subq      Nelda Forman MD  [facility]

## 2023-12-20 NOTE — PROGRESS NOTES
Pharmacy Medication History Review    Trinity Hernandez is a 73 y.o. female admitted for COPD (chronic obstructive pulmonary disease) (CMS/[facility]). Pharmacy reviewed the patient's [id number] medications and allergies for accuracy.    The list below reflectives the updated PTA list. Please review each medication in order reconciliation for additional clarification and justification.  Medications Prior to Admission   Medication Sig Dispense Refill Last Dose    acetaminophen (Tylenol) 325 mg tablet TAKE 1.5 TABLETS BY MOUTH EVERY 6 HOURS AS NEEDED FOR PAIN.   Past Week    arformoterol (Brovana) 15 mcg/2 mL nebulizer solution INHALE 2ML VIA NEBULIZER AS INSTRUCTED TWICE A DAY (EVERY 12 HOURS)   12/19/2023    aspirin 81 mg EC tablet TAKE 1 TABLET BY MOUTH EVERY 24 HRS   12/19/2023    azithromycin (Zithromax) 250 mg tablet Take 1 tablet (250 mg) by mouth 3 times a week. 1 tablet 0 Unknown    budesonide (Pulmicort) 0.5 mg/2 mL nebulizer solution Take 2 mL (0.5 mg) by nebulization 2 times a day. Rinse mouth with water after use to reduce aftertaste and incidence of candidiasis. Do not swallow.   Unknown    clopidogrel (Plavix) 75 mg tablet Take 1 tablet (75 mg) by mouth once daily.   12/19/2023    ferrous sulfate 325 (65 Fe) MG tablet Take 1 tablet by mouth.   12/19/2023    furosemide (Lasix) 20 mg tablet Take 1 tablet (20 mg) by mouth every other day.   12/19/2023    guaiFENesin (Mucinex) 600 mg 12 hr tablet Take 2 tablets (1,200 mg) by mouth 2 times a day. Do not crush, chew, or split.   12/19/2023    levothyroxine (Synthroid, Levoxyl) 50 mcg tablet Take 1 tablet (50 mcg) by mouth once daily. 90 tablet 3 12/19/2023    LORazepam (Ativan) 0.5 mg tablet Take 1 tablet (0.5 mg) by mouth every 6 hours if needed for anxiety.   12/19/2023    losartan (Cozaar) 25 mg tablet Take 2 tablets (50 mg) by mouth 2 times a day.   12/19/2023    magnesium oxide (Mag-Ox) 400 mg (241.3 mg magnesium) tablet Take 1 tablet (400 mg) by mouth  once daily.   12/19/2023    melatonin 10 mg capsule Take by mouth.   12/18/2023    metoprolol succinate XL (Toprol-XL) 50 mg 24 hr tablet Take 1.5 tablets (75 mg) by mouth once daily.   12/19/2023    nitroglycerin (Nitrostat) 0.4 mg SL tablet Place 1 tablet (0.4 mg) under the tongue.   Unknown    predniSONE (Deltasone) 10 mg tablet Take 3 tablets (30 mg) by mouth once daily for 1 dose. Do not start before December 20, 2023. 3 tablet 0     [START ON 12/24/2023] predniSONE (Deltasone) 10 mg tablet Take 1 tablet (10 mg) by mouth once daily for 3 doses. Do not start before December 24, 2023. 3 tablet 0     [START ON 12/21/2023] predniSONE (Deltasone) 20 mg tablet Take 1 tablet (20 mg) by mouth once daily for 3 doses. Do not start before December 21, 2023. 3 tablet 0     simethicone (Mylicon,Gas-X) 125 mg capsule Take 1 capsule (125 mg) by mouth every 8 hours if needed.   Unknown    tiotropium (Spiriva Respimat) 2.5 mcg/actuation inhaler Inhale 2 puffs once daily.          Spoke to the patient spouse.         The list below reflectives the updated allergy list. Please review each documented allergy for additional clarification and justification.  Allergies  Reviewed by Milka Watkins MD on 12/19/2023        Severity Reactions Comments    Hydrocodone-acetaminophen High Shortness of breath     Montelukast Medium Unknown Lungs felt as if there was a hole in them    Morphine Medium Unknown Body shakes    Nitrofurantoin Monohyd/m-cryst Not Specified Unknown     Sulfa (sulfonamide Antibiotics) Not Specified Nausea Only, Other     Atorvastatin Low Itching, Swelling, Unknown     Dicyclomine Low Hallucinations Nightmares    Fluticasone Propion-salmeterol Low Unknown     Levofloxacin Low Rash     Lisinopril Low Cough     Nitroimidazoles Low Nausea Only, Unknown     Omeprazole Low Diarrhea, Unknown     Salmeterol Low Unknown     Simvastatin Low Swelling, Unknown     Sucralfate Low Unknown     Umeclidinium Low Other Urinary  retention, blurred vision, constipation            Below are additional concerns with the patient's PTA list.      Serene Lamar, [id number]

## 2023-12-20 NOTE — H&P
History Of Present Illness  Trinity Hernandez is a 73 y.o. female  with a a PMH of sarcoidosis, COPD, chronic hypoxic respiratory failure (on 2 LPM oxyen prn  and continuous at night), CAD s/p stent 10/23,HTN, atrial fibrillation, non-sustained V-tach, HLD, AAA, transferred from [facility] for Pulmonary consultation.    Ms Hernandez and family members present with her in the room provide history.   Ms Hernandez had been discharged from [facility] on December 4, where she had been admitted for pneumonia. She states she went home against her will, felt like she had not recovered from pneumonia. One of the family members in the room tells me that she needs more than 7 days of antibiotics to treat pneumonia, that it typically takes 2-3 weeks for her to recover.   Approximately 2 days later she was admitted to [facility], where she was continued on antibiotics for pneumonia. She was then discharged to be under [facility] service. Was under that service for only one night. States she was contacted by a nurse that was monitoring her, asked how her breathing was. When she informed them that she was short of breath, the paramedics were called and she was taken back to [facility].   She was admitted to [facility] again for a COPD exacerbation, where she received antibiotics, duonebs and solumedrol. It appears on transfer she was on a Prednisone taper, receiving 30mg daily.     She was on 3 LPM there, which is higher than her baseline of 2 LPM at home. There was some discussion that possibly this is her new baseline, however, Dr Gunn felt that a Pulmonary consult would be appropriate given her history of sarcoid. Ms Hernandez was therefore transferred to [facility].      Past Medical History  Past Medical History:   Diagnosis Date    Abdominal bloating 05/04/2023    Diverticulitis of small intestine without perforation or abscess without bleeding     Diverticulitis, intestine, small    Dizzy  11/20/2023    Essential (primary) hypertension 02/08/2017    HTN (hypertension), benign    Influenza B 05/08/2015    Formatting of this note might be different from the original. Completed Tamiflu Continue droplet precautions.    Nausea 11/16/2018    Personal history of other endocrine, nutritional and metabolic disease 02/08/2017    History of hypothyroidism    Personal history of other venous thrombosis and embolism     History of deep venous thrombosis       Surgical History  Past Surgical History:   Procedure Laterality Date    ABDOMINAL ADHESION SURGERY  05/16/2017    Laparoscopic Lysis Of Intestinal Adhesions    CERVICAL FUSION  05/16/2017    Cervical Vertebral Fusion    CHOLECYSTECTOMY  05/16/2017    Cholecystectomy    COLECTOMY PARTIAL / TOTAL  05/16/2017    Partial Colectomy - Sigmoid    CORONARY ANGIOPLASTY WITH STENT PLACEMENT      CT ANGIO NECK  05/21/2020    CT NECK ANGIO W AND WO IV CONTRAST 5/21/2020 [facility]    CT HEAD ANGIO W AND WO IV CONTRAST  05/21/2020    CT HEAD ANGIO W AND WO IV CONTRAST 5/21/2020 [facility]    HERNIA REPAIR  05/16/2017    Hernia Repair    HYSTERECTOMY  10/03/2013    Hysterectomy    MR HEAD ANGIO WO IV CONTRAST  05/21/2020    MR HEAD ANGIO WO IV CONTRAST 5/21/2020 [facility]    MR NECK ANGIO WO IV CONTRAST  05/21/2020    MR NECK ANGIO WO IV CONTRAST 5/21/2020 [facility]    OTHER SURGICAL HISTORY  05/16/2017    Thoracoscopy Of Lungs And Pleural Space With Biopsy Of Lung Nodules    OTHER SURGICAL HISTORY  04/15/2019    Esophagogastroduodenoscopy        Social History  She reports that she has quit smoking. Her smoking use included cigarettes. She has never been exposed to tobacco smoke. She has never used smokeless tobacco. No history on file for alcohol use and drug use.    Family History  No family history on file.     Allergies  Hydrocodone-acetaminophen, Montelukast, Morphine, Nitrofurantoin monohyd/m-cryst, Sulfa (sulfonamide  antibiotics), Atorvastatin, Dicyclomine, Fluticasone propion-salmeterol, Levofloxacin, Lisinopril, Nitroimidazoles, Omeprazole, Salmeterol, Simvastatin, Sucralfate, and Umeclidinium    Review of Systems   Constitutional:  Positive for fatigue. Negative for chills and fever.   HENT: Negative.     Eyes: Negative.    Respiratory:  Positive for cough, chest tightness, shortness of breath and wheezing.         Cough occasionally productive of light yellow sputum, no blood streaking.    Cardiovascular:  Positive for leg swelling.        Intermittent leg swelling    Gastrointestinal: Negative.    Genitourinary: Negative.    Musculoskeletal: Negative.    Skin: Negative.    Allergic/Immunologic: Negative.    Neurological: Negative.    Hematological: Negative.    Psychiatric/Behavioral: Negative.          Physical Exam  Vitals reviewed.   Constitutional:       Appearance: Normal appearance.      Comments: Elderly white female, appears SOB with talking. Three family members in the room with her.    HENT:      Head: Normocephalic and atraumatic.      Mouth/Throat:      Mouth: Mucous membranes are moist.   Eyes:      Extraocular Movements: Extraocular movements intact.      Conjunctiva/sclera: Conjunctivae normal.      Pupils: Pupils are equal, round, and reactive to light.   Cardiovascular:      Rate and Rhythm: Normal rate and regular rhythm.      Pulses: Normal pulses.      Heart sounds: Normal heart sounds.   Pulmonary:      Effort: Pulmonary effort is normal.      Breath sounds: Wheezing present.      Comments: Diffuse expiratory wheezes, good air movement bilaterally.   Frequently coughs during the time I am interviewing her; rattling cough, but does not produce sputum.   Abdominal:      General: Abdomen is flat. Bowel sounds are normal.      Palpations: Abdomen is soft.   Musculoskeletal:         General: Normal range of motion.      Comments: Pt states her feet have been swollen, however, do not appreciate this on exam  tonight.    Skin:     General: Skin is warm and dry.   Neurological:      General: No focal deficit present.      Mental Status: She is alert and oriented to person, place, and time.   Psychiatric:         Mood and Affect: Mood normal.         Behavior: Behavior normal.         Thought Content: Thought content normal.         Judgment: Judgment normal.          Last Recorded Vitals  Blood pressure 121/55, pulse 96, temperature 36.2 °C (97.2 °F), temperature source Oral, resp. rate 18, SpO2 92 %.    Relevant Results      Results for orders placed or performed during the hospital encounter of 12/09/23 (from the past 24 hour(s))   CBC   Result Value Ref Range    WBC 15.0 (H) 4.4 - 11.3 x10*3/uL    nRBC 0.0 0.0 - 0.0 /100 WBCs    RBC 3.76 (L) 4.00 - 5.20 x10*6/uL    Hemoglobin 11.1 (L) 12.0 - 16.0 g/dL    Hematocrit 35.5 (L) 36.0 - 46.0 %    MCV 94 80 - 100 fL    MCH 29.5 26.0 - 34.0 pg    MCHC 31.3 (L) 32.0 - 36.0 g/dL    RDW 13.2 11.5 - 14.5 %    Platelets 237 150 - 450 x10*3/uL   Renal Function Panel   Result Value Ref Range    Glucose 104 (H) 74 - 99 mg/dL    Sodium 138 136 - 145 mmol/L    Potassium 4.3 3.5 - 5.3 mmol/L    Chloride 101 98 - 107 mmol/L    Bicarbonate 33 (H) 21 - 32 mmol/L    Anion Gap 8 (L) 10 - 20 mmol/L    Urea Nitrogen 24 (H) 6 - 23 mg/dL    Creatinine 0.85 0.50 - 1.05 mg/dL    eGFR 72 >60 mL/min/1.73m*2    Calcium 9.0 8.6 - 10.3 mg/dL    Phosphorus 3.1 2.5 - 4.9 mg/dL    Albumin 3.4 3.4 - 5.0 g/dL     ECG 12 Lead    Result Date: 12/19/2023  Normal sinus rhythm Left bundle branch block Abnormal ECG When compared with ECG of 05-OCT-2023 20:01, (unconfirmed) Nonspecific T wave abnormality no longer evident in Inferior leads See ED provider note for full interpretation and clinical correlation Confirmed by Edson Johns ([id number]) on 12/19/2023 1:28:20 PM    EXR chest 1 view    Result Date: 12/17/2023  Interpreted By:  Kanchan Chaudhry, STUDY: XR CHEST 1 VIEW;  12/16/2023 10:40 am   INDICATION:  Detail Level: Zone Signs/Symptoms:copd; respiratory distress.   COMPARISON: 12/09/2023   ACCESSION NUMBER(S): [id number]   ORDERING CLINICIAN: MICHELLE CRUZ   TECHNIQUE: Portable AP upright   FINDINGS: Right internal jugular port catheter tip projects at mid superior vena cava. Fusion plate of the lower cervical spine is partially visualized.   The heart is normal in size. Aorta is atherosclerotic. Lungs are hyperinflated. Minimal atelectasis is noted at the right lung base. No pleural effusion is noted. No interval changes are noted in the osseous structures.       Minimal atelectasis right lung base   MACRO: None.   Signed by: Kanchan Chaudhry 12/17/2023 4:17 PM Dictation workstation:   [id number]         Assessment/Plan   Principal Problem:    COPD (chronic obstructive pulmonary disease) (CMS/[facility])    - scheduled duonebs  - vest therapy  - continue Prednisone, pt on transfer from [place] was on 30mg daily.   - Continue Azithromycin  - Pulmonary consult    CAD s/p stent, October 2023  - ASA  - Plavix  - metoprolol  - not on statin    CHF  - continue furosemide    Hypothyroidism  - continue synthroid  - check TSH    Atrial fibrillation, NSVT  - telemetry  - metoprolol for rate control  - not on AC    Pulmonary sarcoidosis  - Pulmonary consult    Code status  - FULL       I spent 70 minutes in the professional and overall care of this patient.      Milka Watkins MD     Plan: Apply Sunscreen 35 SPF or greater daily to sun exposed areas.

## 2023-12-20 NOTE — PROGRESS NOTES
12/20/23 0800   Discharge Planning   Living Arrangements Spouse/significant other   Support Systems Spouse/significant other   Assistance Needed Independent, drives   Type of Residence Private residence   Number of Stairs to Enter Residence 3   Number of Stairs Within Residence 0   Do you have animals or pets at home? No   Who is requesting discharge planning? Provider   Home or Post Acute Services In home services   Type of Home Care Services Home nursing visits;Home OT;Home PT   Patient expects to be discharged to: Home with [facility]   Does the patient need discharge transport arranged? Yes   RoundTrip coordination needed? Yes   Has discharge transport been arranged? No   Financial Resource Strain   How hard is it for you to pay for the very basics like food, housing, medical care, and heating? Not hard   Housing Stability   In the last 12 months, was there a time when you were not able to pay the mortgage or rent on time? N   In the last 12 months, how many places have you lived? 1   In the last 12 months, was there a time when you did not have a steady place to sleep or slept in a shelter (including now)? N   Transportation Needs   In the past 12 months, has lack of transportation kept you from medical appointments or from getting medications? no   In the past 12 months, has lack of transportation kept you from meetings, work, or from getting things needed for daily living? No   Patient Choice   Provider Choice list and CMS website (https://medicare.gov/care-compare#search) for post-acute Quality and Resource Measure Data were provided and reviewed with: Patient   Patient / Family choosing to utilize agency / facility established prior to hospitalization Yes          Met with patient at bedside and explained my role as care coordinator. Patient lives in the house with her  Augustus ( [phone] ). She is independent with her care at home. She drives. Patient denies use of any ambulatory  devices. She does have oxygen at home, 2L NC and her supplier is [employer]. No HD. Her PCP is Dr. Lisette Samaniego and she seen her couple weeks ago. Pharmacy she uses is [facility] in [place]. Patient is able to afford medications and to make to her doctors appointments. Patient is being in and out of hospital for past month with recent PNA. She has pulmo consult.Patient is also active with [facility]. List printed according to geographic location and insurance acceptance and given to patient for choices. Patient would like to use [facility] again. Referral built and send to [facility] for reviewing. Will follow for acceptance.

## 2023-12-21 ENCOUNTER — APPOINTMENT (OUTPATIENT)
Dept: RADIOLOGY | Facility: HOSPITAL | Age: 73
DRG: 190 | End: 2023-12-21
Payer: MEDICARE

## 2023-12-21 ENCOUNTER — APPOINTMENT (OUTPATIENT)
Dept: CARDIOLOGY | Facility: HOSPITAL | Age: 73
DRG: 190 | End: 2023-12-21
Payer: MEDICARE

## 2023-12-21 LAB
HOLD SPECIMEN: NORMAL

## 2023-12-21 PROCEDURE — 2500000001 HC RX 250 WO HCPCS SELF ADMINISTERED DRUGS (ALT 637 FOR MEDICARE OP): Performed by: NURSE PRACTITIONER

## 2023-12-21 PROCEDURE — 2500000004 HC RX 250 GENERAL PHARMACY W/ HCPCS (ALT 636 FOR OP/ED): Performed by: NURSE PRACTITIONER

## 2023-12-21 PROCEDURE — 93005 ELECTROCARDIOGRAM TRACING: CPT

## 2023-12-21 PROCEDURE — 94640 AIRWAY INHALATION TREATMENT: CPT

## 2023-12-21 PROCEDURE — 2500000002 HC RX 250 W HCPCS SELF ADMINISTERED DRUGS (ALT 637 FOR MEDICARE OP, ALT 636 FOR OP/ED): Performed by: HOSPITALIST

## 2023-12-21 PROCEDURE — 99233 SBSQ HOSP IP/OBS HIGH 50: CPT | Performed by: INTERNAL MEDICINE

## 2023-12-21 PROCEDURE — 94762 N-INVAS EAR/PLS OXIMTRY CONT: CPT

## 2023-12-21 PROCEDURE — 94668 MNPJ CHEST WALL SBSQ: CPT

## 2023-12-21 PROCEDURE — 2500000002 HC RX 250 W HCPCS SELF ADMINISTERED DRUGS (ALT 637 FOR MEDICARE OP, ALT 636 FOR OP/ED): Performed by: NURSE PRACTITIONER

## 2023-12-21 PROCEDURE — 2500000002 HC RX 250 W HCPCS SELF ADMINISTERED DRUGS (ALT 637 FOR MEDICARE OP, ALT 636 FOR OP/ED): Performed by: INTERNAL MEDICINE

## 2023-12-21 PROCEDURE — 1100000001 HC PRIVATE ROOM DAILY

## 2023-12-21 PROCEDURE — 71045 X-RAY EXAM CHEST 1 VIEW: CPT

## 2023-12-21 PROCEDURE — 2500000004 HC RX 250 GENERAL PHARMACY W/ HCPCS (ALT 636 FOR OP/ED): Performed by: INTERNAL MEDICINE

## 2023-12-21 PROCEDURE — 2500000001 HC RX 250 WO HCPCS SELF ADMINISTERED DRUGS (ALT 637 FOR MEDICARE OP): Performed by: HOSPITALIST

## 2023-12-21 PROCEDURE — 71045 X-RAY EXAM CHEST 1 VIEW: CPT | Performed by: STUDENT IN AN ORGANIZED HEALTH CARE EDUCATION/TRAINING PROGRAM

## 2023-12-21 RX ADMIN — LEVOTHYROXINE SODIUM 50 MCG: 50 TABLET ORAL at 10:36

## 2023-12-21 RX ADMIN — IPRATROPIUM BROMIDE AND ALBUTEROL SULFATE 3 ML: 2.5; .5 SOLUTION RESPIRATORY (INHALATION) at 18:57

## 2023-12-21 RX ADMIN — POLYETHYLENE GLYCOL 3350 17 G: 17 POWDER, FOR SOLUTION ORAL at 10:20

## 2023-12-21 RX ADMIN — Medication 400 MG: at 10:20

## 2023-12-21 RX ADMIN — IPRATROPIUM BROMIDE AND ALBUTEROL SULFATE 3 ML: 2.5; .5 SOLUTION RESPIRATORY (INHALATION) at 03:32

## 2023-12-21 RX ADMIN — IPRATROPIUM BROMIDE AND ALBUTEROL SULFATE 3 ML: 2.5; .5 SOLUTION RESPIRATORY (INHALATION) at 01:23

## 2023-12-21 RX ADMIN — FORMOTEROL FUMARATE DIHYDRATE 20 MCG: 20 SOLUTION RESPIRATORY (INHALATION) at 18:57

## 2023-12-21 RX ADMIN — IPRATROPIUM BROMIDE AND ALBUTEROL SULFATE 3 ML: 2.5; .5 SOLUTION RESPIRATORY (INHALATION) at 22:52

## 2023-12-21 RX ADMIN — METOPROLOL SUCCINATE 75 MG: 25 TABLET, EXTENDED RELEASE ORAL at 10:20

## 2023-12-21 RX ADMIN — IPRATROPIUM BROMIDE AND ALBUTEROL SULFATE 3 ML: 2.5; .5 SOLUTION RESPIRATORY (INHALATION) at 11:21

## 2023-12-21 RX ADMIN — FUROSEMIDE 20 MG: 20 TABLET ORAL at 10:37

## 2023-12-21 RX ADMIN — FERROUS SULFATE TAB 325 MG (65 MG ELEMENTAL FE) 1 TABLET: 325 (65 FE) TAB at 10:40

## 2023-12-21 RX ADMIN — GUAIFENESIN 600 MG: 600 TABLET, EXTENDED RELEASE ORAL at 10:31

## 2023-12-21 RX ADMIN — ASPIRIN 81 MG: 81 TABLET, COATED ORAL at 09:00

## 2023-12-21 RX ADMIN — LOSARTAN POTASSIUM 50 MG: 50 TABLET, FILM COATED ORAL at 21:40

## 2023-12-21 RX ADMIN — IPRATROPIUM BROMIDE AND ALBUTEROL SULFATE 3 ML: 2.5; .5 SOLUTION RESPIRATORY (INHALATION) at 07:12

## 2023-12-21 RX ADMIN — CLOPIDOGREL 75 MG: 75 TABLET ORAL at 10:20

## 2023-12-21 RX ADMIN — BUDESONIDE INHALATION 0.5 MG: 0.5 SUSPENSION RESPIRATORY (INHALATION) at 18:57

## 2023-12-21 RX ADMIN — BUDESONIDE INHALATION 0.5 MG: 0.5 SUSPENSION RESPIRATORY (INHALATION) at 07:22

## 2023-12-21 RX ADMIN — LOSARTAN POTASSIUM 50 MG: 50 TABLET, FILM COATED ORAL at 10:19

## 2023-12-21 RX ADMIN — TIOTROPIUM BROMIDE INHALATION SPRAY 2 PUFF: 3.12 SPRAY, METERED RESPIRATORY (INHALATION) at 07:25

## 2023-12-21 RX ADMIN — PREDNISONE 20 MG: 20 TABLET ORAL at 10:36

## 2023-12-21 RX ADMIN — FORMOTEROL FUMARATE DIHYDRATE 20 MCG: 20 SOLUTION RESPIRATORY (INHALATION) at 07:12

## 2023-12-21 RX ADMIN — LORAZEPAM 0.5 MG: 0.5 TABLET ORAL at 13:17

## 2023-12-21 RX ADMIN — IPRATROPIUM BROMIDE AND ALBUTEROL SULFATE 3 ML: 2.5; .5 SOLUTION RESPIRATORY (INHALATION) at 13:58

## 2023-12-21 RX ADMIN — LORAZEPAM 0.5 MG: 0.5 TABLET ORAL at 21:42

## 2023-12-21 ASSESSMENT — ENCOUNTER SYMPTOMS
HEMATURIA: 0
DIARRHEA: 0
BACK PAIN: 0
CHEST TIGHTNESS: 1
CONSTIPATION: 1
NECK PAIN: 0
PALPITATIONS: 0
FATIGUE: 1
NERVOUS/ANXIOUS: 1
WOUND: 0
EYE PAIN: 0
DIZZINESS: 0
LIGHT-HEADEDNESS: 0
SINUS PAIN: 0
EYE REDNESS: 0
RHINORRHEA: 1
WHEEZING: 1
UNEXPECTED WEIGHT CHANGE: 0
NAUSEA: 0
FEVER: 1
DYSPHORIC MOOD: 1
DYSURIA: 0
SORE THROAT: 0
ARTHRALGIAS: 0
CHILLS: 1
SINUS PRESSURE: 0
CONFUSION: 0
MYALGIAS: 0
HEADACHES: 0
SEIZURES: 0
SHORTNESS OF BREATH: 1
APPETITE CHANGE: 0
COUGH: 1
VOMITING: 0
FREQUENCY: 0
ABDOMINAL PAIN: 0
EYE ITCHING: 0

## 2023-12-21 ASSESSMENT — COGNITIVE AND FUNCTIONAL STATUS - GENERAL
DAILY ACTIVITIY SCORE: 23
HELP NEEDED FOR BATHING: A LITTLE
MOBILITY SCORE: 22
HELP NEEDED FOR BATHING: A LITTLE
MOBILITY SCORE: 23
MOBILITY SCORE: 22
CLIMB 3 TO 5 STEPS WITH RAILING: A LOT
CLIMB 3 TO 5 STEPS WITH RAILING: A LOT
DAILY ACTIVITIY SCORE: 24
DAILY ACTIVITIY SCORE: 23
CLIMB 3 TO 5 STEPS WITH RAILING: A LITTLE

## 2023-12-21 ASSESSMENT — PAIN SCALES - GENERAL
PAINLEVEL_OUTOF10: 0 - NO PAIN

## 2023-12-21 ASSESSMENT — PAIN - FUNCTIONAL ASSESSMENT
PAIN_FUNCTIONAL_ASSESSMENT: 0-10

## 2023-12-21 NOTE — PROGRESS NOTES
Trinity Hernandez is a 73 y.o. female on day 2 of admission presenting with COPD (chronic obstructive pulmonary disease) (CMS/[facility]).    Subjective   States at baseline on 2l/nc oxygen currently on 3l/nc still coughing no sputum production feels stuff is losening       Objective     Physical Exam  Constitutional:       Appearance: Normal appearance.   HENT:      Head: Normocephalic.      Nose: Nose normal.      Mouth/Throat:      Mouth: Mucous membranes are moist.   Cardiovascular:      Rate and Rhythm: Normal rate and regular rhythm.      Pulses: Normal pulses.      Heart sounds: Normal heart sounds.   Pulmonary:      Effort: Pulmonary effort is normal.      Breath sounds: Normal breath sounds.   Abdominal:      General: Abdomen is flat. Bowel sounds are normal.      Palpations: Abdomen is soft.   Musculoskeletal:      Cervical back: Normal range of motion.   Skin:     General: Skin is warm.      Capillary Refill: Capillary refill takes less than 2 seconds.   Neurological:      Mental Status: She is alert.         Last Recorded Vitals  Blood pressure 133/81, pulse 98, temperature 36.2 °C (97.2 °F), temperature source Temporal, resp. rate 18, SpO2 93 %.  Intake/Output last 3 Shifts:  I/O last 3 completed shifts:  In: 240 [P.O.:240]  Out: -     Relevant Results  Results for orders placed or performed during the hospital encounter of 12/19/23 (from the past 24 hour(s))   Green Top   Result Value Ref Range    Extra Tube Hold for add-ons.    Lavender Top   Result Value Ref Range    Extra Tube Hold for add-ons.    SST TOP   Result Value Ref Range    Extra Tube Hold for add-ons.        Imaging Results  Cxr:  IMPRESSION:  Minimal atelectasis right lung base    Medications:  aspirin, 81 mg, oral, Daily  azithromycin, 250 mg, oral, Once per day on Mon Wed Fri  budesonide, 0.5 mg, nebulization, BID  clopidogrel, 75 mg, oral, Daily  ferrous sulfate (325 mg ferrous sulfate), 65 mg of iron, oral, Daily  formoterol, 20 mcg,  nebulization, q12h  furosemide, 20 mg, oral, Daily  ipratropium-albuteroL, 3 mL, nebulization, q4h  levothyroxine, 50 mcg, oral, Daily  losartan, 50 mg, oral, BID  magnesium oxide, 400 mg, oral, Daily  metoprolol succinate XL, 75 mg, oral, Daily  polyethylene glycol, 17 g, oral, Daily  predniSONE, 20 mg, oral, Daily   Followed by  [START ON 12/24/2023] predniSONE, 10 mg, oral, Daily  tiotropium, 2 Inhalation, inhalation, Daily       PRN medications: acetaminophen, guaiFENesin, ipratropium-albuteroL, LORazepam, melatonin, nitroglycerin, oxygen, simethicone     Assessment/Plan   Acute exacerbation of COPD with underlying sarcoidosis, acute on chronic hypoxic respiratory failure  -Patient was transferred from [place] for a pulmonary evaluated patient on a steroid taper, on azithromycin  At baseline is on 2 L currently requiring 3 L continue inhalers.  Patient states she is slightly feeling better    2.Hypothyroidism  -levothyroxine    3. HTN  -losartan    4. CAD s/p stenting  -continue plavix, lasixs, metoprolol,asa    Updated daughter    DVT Prophylaxis:  Lovenox subq      Nelda Forman MD  [facility]

## 2023-12-21 NOTE — CARE PLAN
The patient's goals for the shift include      The clinical goals for the shift include Pt will remainsafe throughout shift

## 2023-12-21 NOTE — PROGRESS NOTES
Trinity Hernandez is a 73 y.o. female on day 2 of admission presenting with COPD (chronic obstructive pulmonary disease) (CMS/[facility]).          Patient is on oxygen, IV abx. Called [facility] ,no answer, left VM to call me back to see if they need any paper work from the hospital.

## 2023-12-21 NOTE — CONSULTS
Inpatient consult to Pulmonology  Consult performed by: Crystal Gonzales MD  Consult ordered by: Roma Vences, APRN-CNP      Reason For Consult  COPD exacerbation and ILD    History Of Present Illness  Trinity Hernandez is a 73 y.o. female with h/o CAD s/p stents placement, CHF, HTN, Afib, AAA, asthma, COPD, pulmonary sarcoidosis, chronic respiratory failure, hypothyroidism, DLP, diverticulosis, hiatal hernia and migraines who initially p/w increased SOB  to OSH. No associated CP or palpitations. In the ED found to be in respiratory distress, requiring BiPAP. Also was treated with Solu-Medrol, Duoneb and CAP coverage. [place] [facility] [place].  Her oxygenation improved and currently is on 3L NC. Of note recent admissions in [facility] for pneumonia, and COPD exacerbation.     States her SOB started 3 weeks ago, gradual  onset, progressive, associated with wheezing and at times CP. SOB currently both at rest and with activity. GRAY after  feet. At baseline SOB only with activity. GRAY  after one city block. +ve orthopnea and LE edema, but no PND.  She also complains of a cough productive of light yellow sputum. No hemoptysis. States her SOB, wheezing and cough all have improved after [place] [facility]  [facility].  Full ROS as below.   Sarcoidosis diagnosed in 2007,  with lung biopsy. Currently not on any medications  for it.     Past Medical History  Past Medical History:   Diagnosis Date    Abdominal bloating 05/04/2023    Diverticulitis of small intestine without perforation or abscess without bleeding     Diverticulitis, intestine, small    Dizzy 11/20/2023    Essential (primary) hypertension 02/08/2017    HTN (hypertension), benign    Influenza B 05/08/2015    Formatting of this note might be different from the original. Completed Tamiflu Continue droplet precautions.    Nausea 11/16/2018    Personal history of other endocrine, nutritional and metabolic disease 02/08/2017     History of hypothyroidism    Personal history of other venous thrombosis and embolism     History of deep venous thrombosis     Surgical History  Past Surgical History:   Procedure Laterality Date    ABDOMINAL ADHESION SURGERY  05/16/2017    Laparoscopic Lysis Of Intestinal Adhesions    CERVICAL FUSION  05/16/2017    Cervical Vertebral Fusion    CHOLECYSTECTOMY  05/16/2017    Cholecystectomy    COLECTOMY PARTIAL / TOTAL  05/16/2017    Partial Colectomy - Sigmoid    CORONARY ANGIOPLASTY WITH STENT PLACEMENT      CT ANGIO NECK  05/21/2020    CT NECK ANGIO W AND WO IV CONTRAST 5/21/2020 [facility]    CT HEAD ANGIO W AND WO IV CONTRAST  05/21/2020    CT HEAD ANGIO W AND WO IV CONTRAST 5/21/2020 [facility]    HERNIA REPAIR  05/16/2017    Hernia Repair    HYSTERECTOMY  10/03/2013    Hysterectomy    MR HEAD ANGIO WO IV CONTRAST  05/21/2020    MR HEAD ANGIO WO IV CONTRAST 5/21/2020 [facility]    MR NECK ANGIO WO IV CONTRAST  05/21/2020    MR NECK ANGIO WO IV CONTRAST 5/21/2020 [facility]    OTHER SURGICAL HISTORY  05/16/2017    Thoracoscopy Of Lungs And Pleural Space With Biopsy Of Lung Nodules    OTHER SURGICAL HISTORY  04/15/2019    Esophagogastroduodenoscopy      Social History  Social History     Tobacco Use    Smoking status: Former     Types: Cigarettes     Passive exposure: Never    Smokeless tobacco: Never   Quit smoking 10 years ago. 1/2 PPD x 50 years h/o smoking. Worked in plastic factory,  wood work. Unclear if any  exposure.     Family History  No family history on file.    Current Outpatient Medications   Medication Instructions    acetaminophen (Tylenol) 325 mg tablet TAKE 1.5 TABLETS BY MOUTH EVERY 6 HOURS AS NEEDED FOR PAIN.    arformoterol (Brovana) 15 mcg/2 mL nebulizer solution INHALE 2ML VIA NEBULIZER AS INSTRUCTED TWICE A DAY (EVERY 12 HOURS)    aspirin 81 mg EC tablet TAKE 1 TABLET BY MOUTH EVERY 24 HRS    azithromycin (ZITHROMAX) 250 mg, oral, 3 times weekly     budesonide (PULMICORT) 0.5 mg, nebulization, 2 times daily, Rinse mouth with water after use to reduce aftertaste and incidence of candidiasis. Do not swallow.    clopidogrel (PLAVIX) 75 mg, oral, Daily    ferrous sulfate (325 mg ferrous sulfate) 325 mg, oral    furosemide (Lasix) 20 mg tablet Take 1 tablet (20 mg) by mouth every other day.    guaiFENesin (MUCINEX) 1,200 mg, oral, 2 times daily, Do not crush, chew, or split.    levothyroxine (SYNTHROID, LEVOXYL) 50 mcg, oral, Daily    LORazepam (ATIVAN) 0.5 mg, oral, Every 6 hours PRN    losartan (Cozaar) 25 mg tablet Take 2 tablets (50 mg) by mouth 2 times a day.    magnesium oxide (MAG-OX) 400 mg, oral, Daily RT    melatonin 10 mg capsule oral    metoprolol succinate XL (TOPROL-XL) 75 mg, oral, Daily RT    nitroglycerin (NITROSTAT) 0.4 mg, sublingual    predniSONE (DELTASONE) 30 mg, oral, Daily    [START ON 12/21/2023] predniSONE (DELTASONE) 20 mg, oral, Daily    [START ON 12/24/2023] predniSONE (DELTASONE) 10 mg, oral, Daily    simethicone (Mylicon,Gas-X) 125 mg capsule 1 capsule, oral, Every 8 hours PRN    tiotropium (Spiriva Respimat) 2.5 mcg/actuation inhaler 2 puffs, inhalation, Daily RT      Allergies  Hydrocodone-acetaminophen, Montelukast, Morphine, Nitrofurantoin monohyd/m-cryst, Sulfa (sulfonamide antibiotics), Atorvastatin, Dicyclomine, Fluticasone propion-salmeterol, Levofloxacin, Lisinopril, Nitroimidazoles, Omeprazole, Salmeterol, Simvastatin, Sucralfate, and Umeclidinium    Review of Systems   Constitutional:  Positive for chills, fatigue and fever. Negative for appetite change and unexpected weight change.   HENT:  Positive for congestion and rhinorrhea. Negative for postnasal drip, sinus pressure, sinus pain and sore throat.    Eyes:  Positive for visual disturbance. Negative for pain, redness and itching.   Respiratory:  Positive for cough, chest tightness, shortness of breath and wheezing.    Cardiovascular:  Positive for chest pain and leg  swelling. Negative for palpitations.   Gastrointestinal:  Positive for constipation. Negative for abdominal pain, diarrhea, nausea and vomiting.   Genitourinary:  Negative for dysuria, frequency and hematuria.   Musculoskeletal:  Negative for arthralgias, back pain, myalgias and neck pain.   Skin:  Negative for pallor, rash and wound.   Neurological:  Negative for dizziness, seizures, syncope, light-headedness and headaches.   Psychiatric/Behavioral:  Positive for dysphoric mood. Negative for confusion. The patient is nervous/anxious.       Scheduled medications  aspirin, 81 mg, oral, Daily  azithromycin, 250 mg, oral, Once per day on Mon Wed Fri  budesonide, 0.5 mg, nebulization, BID  clopidogrel, 75 mg, oral, Daily  ferrous sulfate (325 mg ferrous sulfate), 65 mg of iron, oral, Daily  formoterol, 20 mcg, nebulization, q12h  furosemide, 20 mg, oral, Daily  ipratropium-albuteroL, 3 mL, nebulization, q4h  levothyroxine, 50 mcg, oral, Daily  losartan, 50 mg, oral, BID  magnesium oxide, 400 mg, oral, Daily  metoprolol succinate XL, 75 mg, oral, Daily  polyethylene glycol, 17 g, oral, Daily  [START ON 12/21/2023] predniSONE, 20 mg, oral, Daily   Followed by  [START ON 12/24/2023] predniSONE, 10 mg, oral, Daily  tiotropium, 2 Inhalation, inhalation, Daily    Continuous medications     PRN medications  PRN medications: acetaminophen, guaiFENesin, ipratropium-albuteroL, LORazepam, melatonin, nitroglycerin, oxygen, simethicone   Physical Exam  Constitutional:       General: She is not in acute distress.     Appearance: Normal appearance. She is normal weight. She is not toxic-appearing.   HENT:      Head: Normocephalic and atraumatic.      Nose:      Comments: On 4L NC     Mouth/Throat:      Mouth: Mucous membranes are moist.   Eyes:      General: No scleral icterus.     Extraocular Movements: Extraocular movements intact.      Pupils: Pupils are equal, round, and reactive to light.   Cardiovascular:      Rate and Rhythm:  Regular rhythm. Tachycardia present.      Heart sounds: Normal heart sounds. No murmur heard.     No friction rub. No gallop.   Pulmonary:      Effort: No respiratory distress.      Breath sounds: Wheezing and rhonchi present. No rales.   Abdominal:      General: There is no distension.      Palpations: Abdomen is soft.      Tenderness: There is no abdominal tenderness.   Musculoskeletal:         General: Normal range of motion.      Cervical back: Normal range of motion and neck supple. No rigidity.      Right lower leg: No edema.      Left lower leg: No edema.   Lymphadenopathy:      Cervical: No cervical adenopathy.   Skin:     General: Skin is warm and dry.      Coloration: Skin is not jaundiced.      Findings: No bruising.   Neurological:      General: No focal deficit present.      Mental Status: She is alert and oriented to person, place, and time.      Cranial Nerves: No cranial nerve deficit.      Motor: No weakness.   Psychiatric:         Mood and Affect: Mood normal.         Behavior: Behavior normal.     Vital Signs  Blood pressure 106/61, pulse 104, temperature 36.7 °C (98 °F), temperature source Temporal, resp. rate 18, SpO2 96 %.  Oxygen Therapy  SpO2: 96 %  Medical Gas Therapy: Supplemental oxygen  O2 Delivery Method: Nasal cannula     Relevant Results  XR chest 1 view 12/16/2023    Narrative  Interpreted By:  Kanchan Chaudhry,  STUDY:  XR CHEST 1 VIEW;  12/16/2023 10:40 am    INDICATION:  Signs/Symptoms:copd; respiratory distress.    COMPARISON:  12/09/2023    ACCESSION NUMBER(S):  [id number]    ORDERING CLINICIAN:  MICHELLE CRUZ    TECHNIQUE:  Portable AP upright    FINDINGS:  Right internal jugular port catheter tip projects at mid superior  vena cava. Fusion plate of the lower cervical spine is partially  visualized.    The heart is normal in size. Aorta is atherosclerotic. Lungs are  hyperinflated. Minimal atelectasis is noted at the right lung base.  No pleural effusion is noted. No interval  changes are noted in the  osseous structures.    Impression  Minimal atelectasis right lung base    MACRO:  None.    Signed by: Kanchan Chaudhry 12/17/2023 4:17 PM  Dictation workstation:   [id number]    Results for orders placed or performed during the hospital encounter of 12/19/23 (from the past 24 hour(s))   CBC   Result Value Ref Range    WBC 13.2 (H) 4.4 - 11.3 x10*3/uL    nRBC 0.0 0.0 - 0.0 /100 WBCs    RBC 3.65 (L) 4.00 - 5.20 x10*6/uL    Hemoglobin 10.7 (L) 12.0 - 16.0 g/dL    Hematocrit 34.8 (L) 36.0 - 46.0 %    MCV 95 80 - 100 fL    MCH 29.3 26.0 - 34.0 pg    MCHC 30.7 (L) 32.0 - 36.0 g/dL    RDW 13.2 11.5 - 14.5 %    Platelets 234 150 - 450 x10*3/uL   Magnesium   Result Value Ref Range    Magnesium 1.90 1.60 - 2.40 mg/dL   Procalcitonin   Result Value Ref Range    Procalcitonin 0.03 <=0.07 ng/mL   SST TOP   Result Value Ref Range    Extra Tube Hold for add-ons.    Basic metabolic panel   Result Value Ref Range    Glucose 110 (H) 74 - 99 mg/dL    Sodium 138 136 - 145 mmol/L    Potassium 4.0 3.5 - 5.3 mmol/L    Chloride 99 98 - 107 mmol/L    Bicarbonate 34 (H) 21 - 32 mmol/L    Anion Gap 9 (L) 10 - 20 mmol/L    Urea Nitrogen 23 6 - 23 mg/dL    Creatinine 0.84 0.50 - 1.05 mg/dL    eGFR 73 >60 mL/min/1.73m*2    Calcium 8.9 8.6 - 10.3 mg/dL         Assessment/Plan   73 YOF with h/o CAD s/p stents placement, CHF, HTN, Afib, AAA, asthma, COPD, pulmonary sarcoidosis, chronic respiratory failure, hypothyroidism, DLP, diverticulosis, hiatal hernia and migraines who initially p/w increased SOB  to OSH. No associated CP or palpitations. In the ED found to be in respiratory distress, requiring BiPAP. Also was treated with Solu-Medrol, Duoneb and CAP coverage. Now is transferred to [facility] for [place] evaluation.  Her oxygenation improved and currently is on 3L NC. Of note recent admissions in [place] [facility] for pneumonia, and COPD exacerbation.     Sarcoidosis: diagnosed in 2007 with biopsy. Currently on no  medications as outpatient. Does not seem to be active. CT from 10/2023 showed stable upper lobes irregular opacities, emphysema and reticular opacities RLL, and RLL focal herniation stable and mosaic attenuation.      Patient to follow up with Dr. De Dios ( pulmonary) as outpatient     Will need HRCT with supine and prone images as outpatient      Will need FU with cardiology, nephrology, rheumatology, ophthalmology     Last echo in 2021, showed HFpEF, normal RV, RVSF and RVSP 23. Would benefit from repeat echo     Will need twice yearly CMP    Asthma/COPD: last PFT in 4/2022 showed: 4/2022 FEV1/FVC 53%, FEV1 37%, FVC 48%, TLC 85%, DLCO 56-->105%, MIP -53,  (above normal), no bronchodilator response. Consistent with severe COPD. Currently might be in acute exacerbation. On triple therapy as outpatient     Continue Budesonide, Formoterol, Tiotropium     Continue Duo-Neb     Continue steroids taper     FU with pulmonary after DC    Acute on chronic respiratory failure with hypoxia: multifactorial, COPD exacerbation, and likely CHF. Patient with chronic hypoxia due to COPD and pulmonary sarcoidosis.      Continue supplemental O2, wean off as tolerates     Home O2 evaluation before DC     Treatment of the specific causes as above and below    CHF: echo in 2022 HFpEF, now with concern for CHF exacerbation.       Consider repeat echo      Volume optimization as per primary team +/- cardiology    DVT  prophylaxis: would benefit from chemical DVT prophylaxis.     Thank you for the consult.  Pulmonary will FU while in house.     Crystal Gonzales MD

## 2023-12-22 ENCOUNTER — APPOINTMENT (OUTPATIENT)
Dept: CARDIOLOGY | Facility: HOSPITAL | Age: 73
DRG: 190 | End: 2023-12-22
Payer: MEDICARE

## 2023-12-22 VITALS
SYSTOLIC BLOOD PRESSURE: 115 MMHG | OXYGEN SATURATION: 93 % | TEMPERATURE: 98 F | DIASTOLIC BLOOD PRESSURE: 67 MMHG | RESPIRATION RATE: 18 BRPM | HEART RATE: 101 BPM

## 2023-12-22 LAB
ANION GAP SERPL CALC-SCNC: 9 MMOL/L (ref 10–20)
BACTERIA SPEC RESP CULT: ABNORMAL
BUN SERPL-MCNC: 24 MG/DL (ref 6–23)
CALCIUM SERPL-MCNC: 8.9 MG/DL (ref 8.6–10.3)
CHLORIDE SERPL-SCNC: 101 MMOL/L (ref 98–107)
CO2 SERPL-SCNC: 32 MMOL/L (ref 21–32)
CREAT SERPL-MCNC: 0.87 MG/DL (ref 0.5–1.05)
ERYTHROCYTE [DISTWIDTH] IN BLOOD BY AUTOMATED COUNT: 13.4 % (ref 11.5–14.5)
GFR SERPL CREATININE-BSD FRML MDRD: 70 ML/MIN/1.73M*2
GLUCOSE SERPL-MCNC: 90 MG/DL (ref 74–99)
GRAM STN SPEC: ABNORMAL
HCT VFR BLD AUTO: 34.9 % (ref 36–46)
HGB BLD-MCNC: 10.8 G/DL (ref 12–16)
MAGNESIUM SERPL-MCNC: 2 MG/DL (ref 1.6–2.4)
MCH RBC QN AUTO: 29 PG (ref 26–34)
MCHC RBC AUTO-ENTMCNC: 30.9 G/DL (ref 32–36)
MCV RBC AUTO: 94 FL (ref 80–100)
NRBC BLD-RTO: 0 /100 WBCS (ref 0–0)
PLATELET # BLD AUTO: 243 X10*3/UL (ref 150–450)
POTASSIUM SERPL-SCNC: 4.1 MMOL/L (ref 3.5–5.3)
PROCALCITONIN SERPL-MCNC: 0.04 NG/ML
RBC # BLD AUTO: 3.72 X10*6/UL (ref 4–5.2)
SODIUM SERPL-SCNC: 138 MMOL/L (ref 136–145)
WBC # BLD AUTO: 14.4 X10*3/UL (ref 4.4–11.3)

## 2023-12-22 PROCEDURE — 87205 SMEAR GRAM STAIN: CPT | Mod: AHULAB | Performed by: INTERNAL MEDICINE

## 2023-12-22 PROCEDURE — 94640 AIRWAY INHALATION TREATMENT: CPT

## 2023-12-22 PROCEDURE — 83735 ASSAY OF MAGNESIUM: CPT | Performed by: NURSE PRACTITIONER

## 2023-12-22 PROCEDURE — 99239 HOSP IP/OBS DSCHRG MGMT >30: CPT | Performed by: INTERNAL MEDICINE

## 2023-12-22 PROCEDURE — 99233 SBSQ HOSP IP/OBS HIGH 50: CPT | Performed by: INTERNAL MEDICINE

## 2023-12-22 PROCEDURE — 2500000002 HC RX 250 W HCPCS SELF ADMINISTERED DRUGS (ALT 637 FOR MEDICARE OP, ALT 636 FOR OP/ED): Performed by: NURSE PRACTITIONER

## 2023-12-22 PROCEDURE — 80048 BASIC METABOLIC PNL TOTAL CA: CPT | Performed by: NURSE PRACTITIONER

## 2023-12-22 PROCEDURE — 2500000004 HC RX 250 GENERAL PHARMACY W/ HCPCS (ALT 636 FOR OP/ED): Performed by: INTERNAL MEDICINE

## 2023-12-22 PROCEDURE — 94668 MNPJ CHEST WALL SBSQ: CPT

## 2023-12-22 PROCEDURE — 2500000001 HC RX 250 WO HCPCS SELF ADMINISTERED DRUGS (ALT 637 FOR MEDICARE OP): Performed by: NURSE PRACTITIONER

## 2023-12-22 PROCEDURE — 93005 ELECTROCARDIOGRAM TRACING: CPT

## 2023-12-22 PROCEDURE — 85027 COMPLETE CBC AUTOMATED: CPT | Performed by: NURSE PRACTITIONER

## 2023-12-22 PROCEDURE — 2500000002 HC RX 250 W HCPCS SELF ADMINISTERED DRUGS (ALT 637 FOR MEDICARE OP, ALT 636 FOR OP/ED): Performed by: HOSPITALIST

## 2023-12-22 PROCEDURE — 2500000004 HC RX 250 GENERAL PHARMACY W/ HCPCS (ALT 636 FOR OP/ED): Performed by: NURSE PRACTITIONER

## 2023-12-22 PROCEDURE — 84145 PROCALCITONIN (PCT): CPT | Mod: AHULAB | Performed by: NURSE PRACTITIONER

## 2023-12-22 PROCEDURE — 2500000001 HC RX 250 WO HCPCS SELF ADMINISTERED DRUGS (ALT 637 FOR MEDICARE OP): Performed by: HOSPITALIST

## 2023-12-22 RX ORDER — PREDNISONE 10 MG/1
10 TABLET ORAL DAILY
Qty: 3 TABLET | Refills: 0 | Status: SHIPPED | OUTPATIENT
Start: 2023-12-24 | End: 2023-12-27

## 2023-12-22 RX ORDER — HEPARIN 100 UNIT/ML
500 SYRINGE INTRAVENOUS ONCE AS NEEDED
Status: DISCONTINUED | OUTPATIENT
Start: 2023-12-22 | End: 2023-12-22 | Stop reason: HOSPADM

## 2023-12-22 RX ORDER — PREDNISONE 20 MG/1
20 TABLET ORAL DAILY
Qty: 1 TABLET | Refills: 0 | Status: SHIPPED | OUTPATIENT
Start: 2023-12-23 | End: 2023-12-27 | Stop reason: ALTCHOICE

## 2023-12-22 RX ADMIN — FUROSEMIDE 20 MG: 20 TABLET ORAL at 08:25

## 2023-12-22 RX ADMIN — AZITHROMYCIN 250 MG: 500 TABLET, FILM COATED ORAL at 08:24

## 2023-12-22 RX ADMIN — IPRATROPIUM BROMIDE AND ALBUTEROL SULFATE 3 ML: 2.5; .5 SOLUTION RESPIRATORY (INHALATION) at 03:04

## 2023-12-22 RX ADMIN — FERROUS SULFATE TAB 325 MG (65 MG ELEMENTAL FE) 1 TABLET: 325 (65 FE) TAB at 08:24

## 2023-12-22 RX ADMIN — BUDESONIDE INHALATION 0.5 MG: 0.5 SUSPENSION RESPIRATORY (INHALATION) at 06:00

## 2023-12-22 RX ADMIN — POLYETHYLENE GLYCOL 3350 17 G: 17 POWDER, FOR SOLUTION ORAL at 08:23

## 2023-12-22 RX ADMIN — METOPROLOL SUCCINATE 75 MG: 25 TABLET, EXTENDED RELEASE ORAL at 08:25

## 2023-12-22 RX ADMIN — LORAZEPAM 0.5 MG: 0.5 TABLET ORAL at 08:28

## 2023-12-22 RX ADMIN — FORMOTEROL FUMARATE DIHYDRATE 20 MCG: 20 SOLUTION RESPIRATORY (INHALATION) at 06:00

## 2023-12-22 RX ADMIN — IPRATROPIUM BROMIDE AND ALBUTEROL SULFATE 3 ML: 2.5; .5 SOLUTION RESPIRATORY (INHALATION) at 11:09

## 2023-12-22 RX ADMIN — ASPIRIN 81 MG: 81 TABLET, COATED ORAL at 08:24

## 2023-12-22 RX ADMIN — LEVOTHYROXINE SODIUM 50 MCG: 50 TABLET ORAL at 08:24

## 2023-12-22 RX ADMIN — LOSARTAN POTASSIUM 50 MG: 50 TABLET, FILM COATED ORAL at 08:24

## 2023-12-22 RX ADMIN — IPRATROPIUM BROMIDE AND ALBUTEROL SULFATE 3 ML: 2.5; .5 SOLUTION RESPIRATORY (INHALATION) at 06:00

## 2023-12-22 RX ADMIN — Medication 400 MG: at 08:24

## 2023-12-22 RX ADMIN — CLOPIDOGREL 75 MG: 75 TABLET ORAL at 08:23

## 2023-12-22 RX ADMIN — TIOTROPIUM BROMIDE INHALATION SPRAY 2 PUFF: 3.12 SPRAY, METERED RESPIRATORY (INHALATION) at 06:19

## 2023-12-22 RX ADMIN — PREDNISONE 20 MG: 20 TABLET ORAL at 08:25

## 2023-12-22 ASSESSMENT — PAIN SCALES - GENERAL: PAINLEVEL_OUTOF10: 0 - NO PAIN

## 2023-12-22 NOTE — PROGRESS NOTES
12/22/23 1258  Patient to discharge home today.  No response from [facility] in [place].  Call placed to [facility] and spoke with Alena.  She is requesting clinical to be faxed to [phone].  Waiting on completed home care orders to be done by Dr Forman.  Isabel ALVARADO    12/22/23 1304  [facility] [place]ed to [facility].  Isabel Cardoza RN TCC

## 2023-12-22 NOTE — PROGRESS NOTES
Trinity Hernandez is a 73 y.o. female on day 3 of admission presenting with COPD (chronic obstructive pulmonary disease) (CMS/[facility]).    73 y.o. female with h/o CAD s/p stents placement, CHF, HTN, Afib, AAA, asthma, COPD, pulmonary sarcoidosis, chronic respiratory failure, hypothyroidism, DLP, diverticulosis, hiatal hernia and migraines who initially p/w increased SOB  to OSH. No associated CP or palpitations. In the ED found to be in respiratory distress, requiring BiPAP. Also was treated with Solu-Medrol, Duoneb and CAP coverage. Now is transferred to [facility] [place] [facility] for pulmonary evaluation.  Her oxygenation improved and currently is on 3L NC. Of note recent admissions in [place] and [place] for pneumonia, and COPD exacerbation.   Subjective   No acute overnight events. O2 requirements improved to 2L.    Overall feels better than yesterday. SOB improved. Cough, sputum and wheezing are all stable. No other complaints. States she is not back to her baseline yet.     Objective   Scheduled medications  aspirin, 81 mg, oral, Daily  azithromycin, 250 mg, oral, Once per day on Mon Wed Fri  budesonide, 0.5 mg, nebulization, BID  clopidogrel, 75 mg, oral, Daily  ferrous sulfate (325 mg ferrous sulfate), 65 mg of iron, oral, Daily  formoterol, 20 mcg, nebulization, q12h  furosemide, 20 mg, oral, Daily  ipratropium-albuteroL, 3 mL, nebulization, q4h  levothyroxine, 50 mcg, oral, Daily  losartan, 50 mg, oral, BID  magnesium oxide, 400 mg, oral, Daily  metoprolol succinate XL, 75 mg, oral, Daily  polyethylene glycol, 17 g, oral, Daily  predniSONE, 20 mg, oral, Daily   Followed by  [START ON 12/24/2023] predniSONE, 10 mg, oral, Daily  tiotropium, 2 Inhalation, inhalation, Daily    Continuous medications     PRN medications  PRN medications: acetaminophen, guaiFENesin, heparin flush, ipratropium-albuteroL, LORazepam, melatonin, nitroglycerin, oxygen, simethicone   Physical Exam  Constitutional:       General: She is not in acute  distress.     Appearance: Normal appearance. She is normal weight. She is not toxic-appearing.   HENT:      Head: Normocephalic and atraumatic.      Nose:      Comments: On 2L NC.     Mouth/Throat:      Mouth: Mucous membranes are moist.   Eyes:      General: No scleral icterus.     Extraocular Movements: Extraocular movements intact.      Pupils: Pupils are equal, round, and reactive to light.   Cardiovascular:      Rate and Rhythm: Normal rate and regular rhythm.      Heart sounds: No murmur heard.     No friction rub. No gallop.   Pulmonary:      Effort: No respiratory distress.      Breath sounds: Rales present. No wheezing or rhonchi.   Abdominal:      General: There is no distension.      Palpations: Abdomen is soft.      Tenderness: There is no abdominal tenderness.   Musculoskeletal:         General: No tenderness. Normal range of motion.      Cervical back: Normal range of motion and neck supple. No rigidity.      Right lower leg: No edema.      Left lower leg: No edema.   Lymphadenopathy:      Cervical: No cervical adenopathy.   Skin:     General: Skin is warm and dry.      Coloration: Skin is not jaundiced.   Neurological:      General: No focal deficit present.      Mental Status: She is alert and oriented to person, place, and time.      Cranial Nerves: No cranial nerve deficit.      Motor: No weakness.   Psychiatric:         Mood and Affect: Mood normal.         Behavior: Behavior normal.     Last Recorded Vitals  Blood pressure 115/67, pulse 101, temperature 36.7 °C (98 °F), temperature source Temporal, resp. rate 18, SpO2 93 %.  Intake/Output last 3 Shifts:  I/O last 3 completed shifts:  In: 120 [P.O.:120]  Out: -     Relevant Results  Results for orders placed or performed during the hospital encounter of 12/19/23 (from the past 24 hour(s))   Basic metabolic panel   Result Value Ref Range    Glucose 90 74 - 99 mg/dL    Sodium 138 136 - 145 mmol/L    Potassium 4.1 3.5 - 5.3 mmol/L    Chloride 101 98  - 107 mmol/L    Bicarbonate 32 21 - 32 mmol/L    Anion Gap 9 (L) 10 - 20 mmol/L    Urea Nitrogen 24 (H) 6 - 23 mg/dL    Creatinine 0.87 0.50 - 1.05 mg/dL    eGFR 70 >60 mL/min/1.73m*2    Calcium 8.9 8.6 - 10.3 mg/dL   CBC   Result Value Ref Range    WBC 14.4 (H) 4.4 - 11.3 x10*3/uL    nRBC 0.0 0.0 - 0.0 /100 WBCs    RBC 3.72 (L) 4.00 - 5.20 x10*6/uL    Hemoglobin 10.8 (L) 12.0 - 16.0 g/dL    Hematocrit 34.9 (L) 36.0 - 46.0 %    MCV 94 80 - 100 fL    MCH 29.0 26.0 - 34.0 pg    MCHC 30.9 (L) 32.0 - 36.0 g/dL    RDW 13.4 11.5 - 14.5 %    Platelets 243 150 - 450 x10*3/uL   Magnesium   Result Value Ref Range    Magnesium 2.00 1.60 - 2.40 mg/dL   Procalcitonin   Result Value Ref Range    Procalcitonin 0.04 <=0.07 ng/mL   Respiratory Culture/Smear    Specimen: SPUTUM; Fluid   Result Value Ref Range    Respiratory Culture/Smear (A)      Culture not performed. See Gram stain findings. Recollect if clinically indicated.    Gram Stain (A)      Gram stain indicates specimen contains significant salivary contamination.      No results found.   Assessment/Plan   Principal Problem:    COPD (chronic obstructive pulmonary disease) (CMS/[facility])    73 YOF with h/o CAD s/p stents placement, CHF, HTN, Afib, AAA, asthma, COPD, pulmonary sarcoidosis, chronic respiratory failure, hypothyroidism, DLP, diverticulosis, hiatal hernia and migraines who initially p/w increased SOB  to OSH. No associated CP or palpitations. In the ED found to be in respiratory distress, requiring BiPAP. Also was treated with Solu-Medrol, Duoneb and CAP coverage. Now is transferred to [facility] [place] [facility] for pulmonary evaluation.  Her oxygenation improved and currently is on 3L NC. Of note recent admissions in [place] and [place] for pneumonia, and COPD exacerbation.      Sarcoidosis: diagnosed in 2007 with biopsy. Currently on no medications as outpatient. Does not seem to be active. CT from 10/2023 showed stable upper lobes irregular opacities, emphysema and reticular opacities  RLL, and RLL focal herniation stable and mosaic attenuation.      Patient to follow up with Dr. De Dios ( pulmonary) as outpatient     Will need HRCT with supine and prone images as outpatient      Will need FU with cardiology, nephrology, rheumatology, ophthalmology     Last echo in 2021, showed HFpEF, normal RV, RVSF and RVSP 23. Would benefit from repeat echo     Will need twice yearly CMP     Asthma/COPD: last PFT in 4/2022 showed: 4/2022 FEV1/FVC 53%, FEV1 37%, FVC 48%, TLC 85%, DLCO 56-->105%, MIP -53,  (above normal), no bronchodilator response. Consistent with severe COPD. Currently might be in acute exacerbation. On triple therapy as outpatient     Continue Budesonide, Formoterol, Tiotropium     Continue Duo-Neb     Continue Azithromycin     Sputum culture     DC on home inhalers/nebulizer     Continue steroids taper     FU with pulmonary after DC     Acute on chronic respiratory failure with hypoxia: multifactorial, COPD exacerbation, and likely CHF. Patient with chronic hypoxia due to COPD and pulmonary sarcoidosis. O2 requirements are not at baseline      Continue supplemental O2, wean off as tolerates     Home O2 evaluation before DC     Treatment of the specific causes as above and below      CXR 12/21 stable, no infiltrates.      CHF: echo in 2022 HFpEF, now with concern for CHF exacerbation.       Consider repeat echo      Volume optimization as per primary team +/- cardiology     DVT  prophylaxis: would benefit from chemical DVT prophylaxis.        Pulmonary will FU while in house.   Crystal Gonzales MD

## 2023-12-22 NOTE — DISCHARGE SUMMARY
Discharge Diagnosis  Acute exacerbation of COPD with acute on chronic hypoxic respiratory failure  sarcodosis    Issues Requiring Follow-Up  Pcp and pulmonary    Discharge Meds     Your medication list        CHANGE how you take these medications        Instructions Last Dose Given Next Dose Due   predniSONE 20 mg tablet  Commonly known as: Deltasone  Start taking on: December 23, 2023  What changed:   Another medication with the same name was changed. Make sure you understand how and when to take each.  Another medication with the same name was removed. Continue taking this medication, and follow the directions you see here.      Take 1 tablet (20 mg) by mouth once daily for 1 dose. Do not start before December 23, 2023.       predniSONE 10 mg tablet  Commonly known as: Deltasone  Start taking on: December 24, 2023  What changed:   how much to take  These instructions start on December 24, 2023. If you are unsure what to do until then, ask your doctor or other care provider.  Another medication with the same name was removed. Continue taking this medication, and follow the directions you see here.      Take 1 tablet (10 mg) by mouth once daily for 3 doses. Do not start before December 24, 2023.              CONTINUE taking these medications        Instructions Last Dose Given Next Dose Due   acetaminophen 325 mg tablet  Commonly known as: Tylenol           arformoterol 15 mcg/2 mL nebulizer solution  Commonly known as: Brovana           aspirin 81 mg EC tablet           azithromycin 250 mg tablet  Commonly known as: Zithromax      Take 1 tablet (250 mg) by mouth 3 times a week.       budesonide 0.5 mg/2 mL nebulizer solution  Commonly known as: Pulmicort           clopidogrel 75 mg tablet  Commonly known as: Plavix           ferrous sulfate (325 mg ferrous sulfate) tablet           furosemide 20 mg tablet  Commonly known as: Lasix           guaiFENesin 600 mg 12 hr tablet  Commonly known as: Mucinex            levothyroxine 50 mcg tablet  Commonly known as: Synthroid, Levoxyl      Take 1 tablet (50 mcg) by mouth once daily.       LORazepam 0.5 mg tablet  Commonly known as: Ativan           losartan 25 mg tablet  Commonly known as: Cozaar           magnesium oxide 400 mg (241.3 mg magnesium) tablet  Commonly known as: Mag-Ox           melatonin 10 mg capsule           metoprolol succinate XL 50 mg 24 hr tablet  Commonly known as: Toprol-XL           nitroglycerin 0.4 mg SL tablet  Commonly known as: Nitrostat           simethicone 125 mg capsule  Commonly known as: Mylicon,Gas-X           Spiriva Respimat 2.5 mcg/actuation inhaler  Generic drug: tiotropium                     Where to Get Your Medications        These medications were sent to [id number] - [place], [place] - [address] [id number]      Phone: [phone]   predniSONE 10 mg tablet  predniSONE 20 mg tablet         Test Results Pending At Discharge  Pending Labs       No current pending labs.            Hospital Course   Patient is a very pleasant 74-year-old female presented to the hospital from [place], patient was found to have COPD exacerbation.  Patient was treated with a prednisone taper, initially she was requiring up to 4 L of nasal cannula oxygen which was weaned back down to her baseline of 2 L.  Patient has few more doses of prednisone to complete she did have an antibiotic course at [place] prior to transfer.  Was seen by pulmonary she will need to follow-up as an outpatient patient was stable at the time of discharge.    Pertinent Physical Exam At Time of Discharge  Physical Exam  Constitutional:       Appearance: Normal appearance.   HENT:      Head: Normocephalic.      Mouth/Throat:      Mouth: Mucous membranes are moist.   Cardiovascular:      Rate and Rhythm: Normal rate and regular rhythm.      Pulses: Normal pulses.      Heart sounds: Normal heart sounds.   Pulmonary:      Comments: Noe cooper  bilaterally    Musculoskeletal:      Cervical back: Normal range of motion.   Skin:     General: Skin is warm.      Capillary Refill: Capillary refill takes less than 2 seconds.   Neurological:      General: No focal deficit present.      Mental Status: She is alert.         Outpatient Follow-Up  Future Appointments   Date Time Provider Department Center   1/15/2024  1:00 PM INF 01 [place] [facility]         Nelda Forman MD

## 2023-12-22 NOTE — PROGRESS NOTES
Mary called back from [facility] requesting a refill of prn norco and scheduled norco.  Last office visit with Nellie was 05/04/2017 in which he had 2 pre-ops done one for dental and one for Dr. Ball.  Patient has a very extensive health history and he is wheel chair bound.  Last fill 03/09/2017 #120 with 0 refills.   Trinity Hernandez is a 73 y.o. female on day 2 of admission presenting with COPD (chronic obstructive pulmonary disease) (CMS/[facility]).    73 y.o. female with h/o CAD s/p stents placement, CHF, HTN, Afib, AAA, asthma, COPD, pulmonary sarcoidosis, chronic respiratory failure, hypothyroidism, DLP, diverticulosis, hiatal hernia and migraines who initially p/w increased SOB  to OSH. No associated CP or palpitations. In the ED found to be in respiratory distress, requiring BiPAP. Also was treated with Solu-Medrol, Duoneb and CAP coverage. Now is transferred to [facility] for pulmonary evaluation.  Her oxygenation improved and currently is on 3L NC. Of note recent admissions in [place] and [place] for pneumonia, and COPD exacerbation.   Subjective   No acute overnight events. O2 requirements improved to 3L.    Overall feels the same as yesterday. SOB, cough and sputum all stable. Is concerned about having pneumonia.     Objective   Scheduled medications  aspirin, 81 mg, oral, Daily  azithromycin, 250 mg, oral, Once per day on Mon Wed Fri  budesonide, 0.5 mg, nebulization, BID  clopidogrel, 75 mg, oral, Daily  ferrous sulfate (325 mg ferrous sulfate), 65 mg of iron, oral, Daily  formoterol, 20 mcg, nebulization, q12h  furosemide, 20 mg, oral, Daily  ipratropium-albuteroL, 3 mL, nebulization, q4h  levothyroxine, 50 mcg, oral, Daily  losartan, 50 mg, oral, BID  magnesium oxide, 400 mg, oral, Daily  metoprolol succinate XL, 75 mg, oral, Daily  polyethylene glycol, 17 g, oral, Daily  predniSONE, 20 mg, oral, Daily   Followed by  [START ON 12/24/2023] predniSONE, 10 mg, oral, Daily  tiotropium, 2 Inhalation, inhalation, Daily    Continuous medications     PRN medications  PRN medications: acetaminophen, guaiFENesin, ipratropium-albuteroL, LORazepam, melatonin, nitroglycerin, oxygen, simethicone   Physical Exam  Constitutional:       General: She is not in acute distress.     Appearance: Normal appearance. She is normal weight. She is  not toxic-appearing.   HENT:      Head: Normocephalic and atraumatic.      Nose:      Comments: On 3L NC.     Mouth/Throat:      Mouth: Mucous membranes are moist.   Eyes:      General: No scleral icterus.     Extraocular Movements: Extraocular movements intact.      Pupils: Pupils are equal, round, and reactive to light.   Cardiovascular:      Rate and Rhythm: Normal rate and regular rhythm.      Heart sounds: No murmur heard.     No friction rub. No gallop.   Pulmonary:      Effort: No respiratory distress.      Breath sounds: Rales present. No wheezing or rhonchi.   Abdominal:      General: There is no distension.      Palpations: Abdomen is soft.      Tenderness: There is no abdominal tenderness.   Musculoskeletal:         General: No tenderness. Normal range of motion.      Cervical back: Normal range of motion and neck supple. No rigidity.      Right lower leg: No edema.      Left lower leg: No edema.   Lymphadenopathy:      Cervical: No cervical adenopathy.   Skin:     General: Skin is warm and dry.      Coloration: Skin is not jaundiced.   Neurological:      General: No focal deficit present.      Mental Status: She is alert and oriented to person, place, and time.      Cranial Nerves: No cranial nerve deficit.      Motor: No weakness.   Psychiatric:         Mood and Affect: Mood normal.         Behavior: Behavior normal.     Last Recorded Vitals  Blood pressure 115/66, pulse 96, temperature 36.2 °C (97.1 °F), temperature source Temporal, resp. rate 18, SpO2 96 %.  Intake/Output last 3 Shifts:  I/O last 3 completed shifts:  In: 360 [P.O.:360]  Out: -     Relevant Results  Results for orders placed or performed during the hospital encounter of 12/19/23 (from the past 24 hour(s))   Green Top   Result Value Ref Range    Extra Tube Hold for add-ons.    Lavender Top   Result Value Ref Range    Extra Tube Hold for add-ons.    SST TOP   Result Value Ref Range    Extra Tube Hold for add-ons.       No results found.    Assessment/Plan   Principal Problem:    COPD (chronic obstructive pulmonary disease) (CMS/HCC)    73 YOF with h/o CAD s/p stents placement, CHF, HTN, Afib, AAA, asthma, COPD, pulmonary sarcoidosis, chronic respiratory failure, hypothyroidism, DLP, diverticulosis, hiatal hernia and migraines who initially p/w increased SOB  to OSH. No associated CP or palpitations. In the ED found to be in respiratory distress, requiring BiPAP. Also was treated with Solu-Medrol, Duoneb and CAP coverage. Now is transferred to [facility] for pulmonary evaluation.  Her oxygenation improved and currently is on 3L NC. Of note recent admissions in [facility] for pneumonia, and COPD exacerbation.      Sarcoidosis: diagnosed in 2007 with biopsy. Currently on no medications as outpatient. Does not seem to be active. CT from 10/2023 showed stable upper lobes irregular opacities, emphysema and reticular opacities RLL, and RLL focal herniation stable and mosaic attenuation.      Patient to follow up with Dr. De Dios ( pulmonary) as outpatient     Will need HRCT with supine and prone images as outpatient      Will need FU with cardiology, nephrology, rheumatology, ophthalmology     Last echo in 2021, showed HFpEF, normal RV, RVSF and RVSP 23. Would benefit from repeat echo     Will need twice yearly CMP     Asthma/COPD: last PFT in 4/2022 showed: 4/2022 FEV1/FVC 53%, FEV1 37%, FVC 48%, TLC 85%, DLCO 56-->105%, MIP -53,  (above normal), no bronchodilator response. Consistent with severe COPD. Currently might be in acute exacerbation. On triple therapy as outpatient     Continue Budesonide, Formoterol, Tiotropium     Continue Duo-Neb     Continue Azithromycin     Sputum culture     Continue steroids taper     FU with pulmonary after DC     Acute on chronic respiratory failure with hypoxia: multifactorial, COPD exacerbation, and likely CHF. Patient with chronic hypoxia due to COPD and pulmonary sarcoidosis.      Continue supplemental  O2, wean off as tolerates     Home O2 evaluation before DC     Treatment of the specific causes as above and below      CXR for tomorrow     CHF: echo in 2022 HFpEF, now with concern for CHF exacerbation.       Consider repeat echo      Volume optimization as per primary team +/- cardiology     DVT  prophylaxis: would benefit from chemical DVT prophylaxis.        Pulmonary will FU while in house.   Crystal Gonzales MD

## 2023-12-23 ENCOUNTER — PATIENT OUTREACH (OUTPATIENT)
Dept: HOME HEALTH SERVICES | Age: 73
End: 2023-12-23
Payer: MEDICARE

## 2023-12-23 DIAGNOSIS — J44.9 CHRONIC OBSTRUCTIVE PULMONARY DISEASE, UNSPECIFIED COPD TYPE (MULTI): Primary | ICD-10-CM

## 2023-12-23 SDOH — SOCIAL STABILITY: SOCIAL INSECURITY: WITHIN THE LAST YEAR, HAVE YOU BEEN AFRAID OF YOUR PARTNER OR EX-PARTNER?: NO

## 2023-12-23 SDOH — ECONOMIC STABILITY: INCOME INSECURITY: IN THE PAST 12 MONTHS, HAS THE ELECTRIC, GAS, OIL, OR WATER COMPANY THREATENED TO SHUT OFF SERVICE IN YOUR HOME?: NO

## 2023-12-23 SDOH — SOCIAL STABILITY: SOCIAL INSECURITY: WITHIN THE LAST YEAR, HAVE YOU BEEN HUMILIATED OR EMOTIONALLY ABUSED IN OTHER WAYS BY YOUR PARTNER OR EX-PARTNER?: NO

## 2023-12-23 SDOH — HEALTH STABILITY: MENTAL HEALTH: HOW OFTEN DO YOU HAVE 6 OR MORE DRINKS ON ONE OCCASION?: NEVER

## 2023-12-23 SDOH — ECONOMIC STABILITY: FOOD INSECURITY: WITHIN THE PAST 12 MONTHS, THE FOOD YOU BOUGHT JUST DIDN'T LAST AND YOU DIDN'T HAVE MONEY TO GET MORE.: NEVER TRUE

## 2023-12-23 SDOH — ECONOMIC STABILITY: FOOD INSECURITY: WITHIN THE PAST 12 MONTHS, YOU WORRIED THAT YOUR FOOD WOULD RUN OUT BEFORE YOU GOT MONEY TO BUY MORE.: NEVER TRUE

## 2023-12-23 SDOH — HEALTH STABILITY: MENTAL HEALTH: HOW OFTEN DO YOU HAVE A DRINK CONTAINING ALCOHOL?: NEVER

## 2023-12-23 SDOH — HEALTH STABILITY: PHYSICAL HEALTH: ON AVERAGE, HOW MANY DAYS PER WEEK DO YOU ENGAGE IN MODERATE TO STRENUOUS EXERCISE (LIKE A BRISK WALK)?: 0 DAYS

## 2023-12-23 SDOH — HEALTH STABILITY: PHYSICAL HEALTH: ON AVERAGE, HOW MANY MINUTES DO YOU ENGAGE IN EXERCISE AT THIS LEVEL?: 10 MIN

## 2023-12-23 SDOH — SOCIAL STABILITY: SOCIAL NETWORK: ARE YOU MARRIED, WIDOWED, DIVORCED, SEPARATED, NEVER MARRIED, OR LIVING WITH A PARTNER?: MARRIED

## 2023-12-23 SDOH — ECONOMIC STABILITY: HOUSING INSECURITY: IN THE LAST 12 MONTHS, HOW MANY PLACES HAVE YOU LIVED?: 1

## 2023-12-23 SDOH — ECONOMIC STABILITY: INCOME INSECURITY: IN THE LAST 12 MONTHS, WAS THERE A TIME WHEN YOU WERE NOT ABLE TO PAY THE MORTGAGE OR RENT ON TIME?: NO

## 2023-12-23 SDOH — SOCIAL STABILITY: SOCIAL NETWORK: HOW OFTEN DO YOU ATTEND CHURCH OR RELIGIOUS SERVICES?: NEVER

## 2023-12-23 SDOH — HEALTH STABILITY: MENTAL HEALTH: HOW MANY STANDARD DRINKS CONTAINING ALCOHOL DO YOU HAVE ON A TYPICAL DAY?: PATIENT DOES NOT DRINK

## 2023-12-23 SDOH — ECONOMIC STABILITY: INCOME INSECURITY: HOW HARD IS IT FOR YOU TO PAY FOR THE VERY BASICS LIKE FOOD, HOUSING, MEDICAL CARE, AND HEATING?: NOT HARD AT ALL

## 2023-12-23 SDOH — SOCIAL STABILITY: SOCIAL NETWORK: HOW OFTEN DO YOU ATTENT MEETINGS OF THE CLUB OR ORGANIZATION YOU BELONG TO?: NEVER

## 2023-12-23 SDOH — SOCIAL STABILITY: SOCIAL NETWORK: HOW OFTEN DO YOU GET TOGETHER WITH FRIENDS OR RELATIVES?: MORE THAN THREE TIMES A WEEK

## 2023-12-23 ASSESSMENT — PATIENT HEALTH QUESTIONNAIRE - PHQ9
SUM OF ALL RESPONSES TO PHQ QUESTIONS 1-9: 12
1. LITTLE INTEREST OR PLEASURE IN DOING THINGS: NEARLY EVERY DAY
8. MOVING OR SPEAKING SO SLOWLY THAT OTHER PEOPLE COULD HAVE NOTICED. OR THE OPPOSITE, BEING SO FIGETY OR RESTLESS THAT YOU HAVE BEEN MOVING AROUND A LOT MORE THAN USUAL: NOT AT ALL
3. TROUBLE FALLING OR STAYING ASLEEP: SEVERAL DAYS
4. FEELING TIRED OR HAVING LITTLE ENERGY: MORE THAN HALF THE DAYS
5. POOR APPETITE OR OVEREATING: NOT AT ALL
9. THOUGHTS THAT YOU WOULD BE BETTER OFF DEAD, OR OF HURTING YOURSELF: NOT AT ALL
2. FEELING DOWN, DEPRESSED OR HOPELESS: NEARLY EVERY DAY
7. TROUBLE CONCENTRATING ON THINGS, SUCH AS READING THE NEWSPAPER OR WATCHING TELEVISION: SEVERAL DAYS
SUM OF ALL RESPONSES TO PHQ9 QUESTIONS 1 & 2: 6
6. FEELING BAD ABOUT YOURSELF - OR THAT YOU ARE A FAILURE OR HAVE LET YOURSELF OR YOUR FAMILY DOWN: MORE THAN HALF THE DAYS
10. IF YOU CHECKED OFF ANY PROBLEMS, HOW DIFFICULT HAVE THESE PROBLEMS MADE IT FOR YOU TO DO YOUR WORK, TAKE CARE OF THINGS AT HOME, OR GET ALONG WITH OTHER PEOPLE: SOMEWHAT DIFFICULT

## 2023-12-23 ASSESSMENT — LIFESTYLE VARIABLES
SKIP TO QUESTIONS 9-10: 1
AUDIT-C TOTAL SCORE: 0

## 2023-12-24 ENCOUNTER — PATIENT OUTREACH (OUTPATIENT)
Dept: HOME HEALTH SERVICES | Age: 73
End: 2023-12-24

## 2023-12-24 NOTE — PROGRESS NOTES
Daily Call Note: Initial [facility] call completed with Dr Smiley and Kevyn from pharmacy recent hospitalization reviewed and history patient is currently on a steroid taper patient Dr Smiley is going to add another 11 days of steroids to complete 2 weeks patient has IS, Flutter and Accapela device she uses them daily has her nebulizer has a good routine patient is currently on 2l nc she has a pulmonologist she sees in [place] she is going to see if she can get in sooner she would like to see is we can find a sarcoidosis specialist closer to home patient may benefit from having a sleep study done she has had PFT done in the past she will discus a referral with her PCP medication list reviewed no needs at this time questions and concerns addressed     Pt Education: meds nebs   Barriers: none  Topics for Daily Review: medications   Pt demonstrates clear understanding: Yes    Daily Weight:  There were no vitals filed for this visit.   Last 3 Weights:  Wt Readings from Last 7 Encounters:   12/19/23 75.5 kg (166 lb 7.2 oz)   12/08/23 73.9 kg (162 lb 14.7 oz)   12/06/23 74.3 kg (163 lb 12.8 oz)   11/17/23 75.3 kg (166 lb)   11/17/23 74.8 kg (165 lb)   10/24/23 75 kg (165 lb 3.8 oz)   10/18/23 75.3 kg (166 lb)       Masimo Device: Yes   Masimo Clinical Impression: monitor post dc     Virtual Visits--Scheduled (Most Recent Date at Top)  Follow up Appointments  Recent Visits  No visits were found meeting these conditions.  Showing recent visits within past 30 days and meeting all other requirements  Future Appointments  No visits were found meeting these conditions.  Showing future appointments within next 90 days and meeting all other requirements       Frequency of RN Calls & Virtual Visits per Team Agreement: Healthy at Home Frequency: Daily    Medication issues Addressed (what was done): reviewed    Follow up appointments scheduled by [facility] Staff: PCP  Referrals made by [facility] staff: Pulm         Patient's Address:   [address] [place]  [facility] [address]  **  If this is not the address patient will receive services - alert team and address in EMR**       Patient Contacts:  Extended Emergency Contact Information  Primary Emergency Contact: Augustus Hernandez  Home Phone: [phone]  Relation: Spouse                                Patient's Preferred Phone: [phone]  Patient's E-mail: No e-mail address on record

## 2023-12-26 ENCOUNTER — PATIENT OUTREACH (OUTPATIENT)
Dept: HOME HEALTH SERVICES | Age: 73
End: 2023-12-26
Payer: MEDICARE

## 2023-12-26 ENCOUNTER — PATIENT OUTREACH (OUTPATIENT)
Dept: PRIMARY CARE | Facility: CLINIC | Age: 73
End: 2023-12-26
Payer: MEDICARE

## 2023-12-26 NOTE — PROGRESS NOTES
Daily Call Note:   Daily call.  States she is ok, still coughing, but normal for her. Continues nebs, IS, and steriods. Pt to call PCP back tomorrow to make apt, closed today. Pulm scheduled for JAN 19th. I told her not showing in Epic. She said it is made. She is calling  for appts,, not waiting for others to call. Scheduled next [facility].    Pt Education: na  Barriers: na  Topics for Daily Review: na  Pt demonstrates clear understanding: Yes    Daily Weight:  There were no vitals filed for this visit.   Last 3 Weights:  Wt Readings from Last 7 Encounters:   12/19/23 75.5 kg (166 lb 7.2 oz)   12/08/23 73.9 kg (162 lb 14.7 oz)   12/06/23 74.3 kg (163 lb 12.8 oz)   11/17/23 75.3 kg (166 lb)   11/17/23 74.8 kg (165 lb)   10/24/23 75 kg (165 lb 3.8 oz)   10/18/23 75.3 kg (166 lb)       Masimo Device: Yes   Masimo Clinical Impression: na    Virtual Visits--Scheduled (Most Recent Date at Top)  Follow up Appointments  Recent Visits  No visits were found meeting these conditions.  Showing recent visits within past 30 days and meeting all other requirements  Future Appointments  No visits were found meeting these conditions.  Showing future appointments within next 90 days and meeting all other requirements       Frequency of RN Calls & Virtual Visits per Team Agreement: Healthy at Home Frequency: Daily    Medication issues Addressed (what was done): na    Follow up appointments scheduled by [facility] Staff: na  Referrals made by [facility] staff: na      [facility] Care Transitions Assessment    Patient's Address:   [address] [place] [id number]  **  If this is not the address patient will receive services - alert team and address in EMR**       Patient Contacts:  Extended Emergency Contact Information  Primary Emergency Contact: DavidSharifozzie  Home Phone: [phone]  Relation: Spouse                                Patient's Preferred Phone: [phone]  Patient's E-mail: No e-mail address on record

## 2023-12-27 ENCOUNTER — TELEPHONE (OUTPATIENT)
Dept: HEMATOLOGY/ONCOLOGY | Facility: HOSPITAL | Age: 73
End: 2023-12-27

## 2023-12-27 ENCOUNTER — PATIENT OUTREACH (OUTPATIENT)
Dept: HOME HEALTH SERVICES | Age: 73
End: 2023-12-27

## 2023-12-27 DIAGNOSIS — F41.9 ANXIETY: ICD-10-CM

## 2023-12-27 RX ORDER — LORAZEPAM 0.5 MG/1
0.5 TABLET ORAL EVERY 6 HOURS PRN
Qty: 30 TABLET | Refills: 1 | Status: SHIPPED | OUTPATIENT
Start: 2023-12-27 | End: 2024-03-28 | Stop reason: HOSPADM

## 2023-12-27 NOTE — PROGRESS NOTES
"Daily Call Note: spoke with patient ,she states she is feeling \"slightly better\", states \"not quite as tired\" taking medications as prescribed, remains on O2 at 2L n/c. Pulmonary and Pcp appts  scheduled for 1/4 and 1/5. Also scheduled for [facility] follow up on 1/5/24 @1700.  Patient requests an order for lung percussion vest , referred to speak with pulmonologist at appt  on 1/4/24. Spoke to [facility] to determine if they could provide this equipment if physician orders, [facility]i says they can provide.    Pt Education: educated on the need to continue Prednisone as ordered  Barriers: no  Topics for Daily Review: meds, O2 sats  Pt demonstrates clear understanding: Yes    Daily Weight:  There were no vitals filed for this visit.   Last 3 Weights:  Wt Readings from Last 7 Encounters:   12/19/23 75.5 kg (166 lb 7.2 oz)   12/08/23 73.9 kg (162 lb 14.7 oz)   12/06/23 74.3 kg (163 lb 12.8 oz)   11/17/23 75.3 kg (166 lb)   11/17/23 74.8 kg (165 lb)   10/24/23 75 kg (165 lb 3.8 oz)   10/18/23 75.3 kg (166 lb)       Masimo Device: Yes   Masimo Clinical Impression: copd    Virtual Visits--Scheduled (Most Recent Date at Top)  Follow up Appointments  Recent Visits  No visits were found meeting these conditions.  Showing recent visits within past 30 days and meeting all other requirements  Future Appointments  Date Type Provider Dept   01/05/24 Appointment [facility]   Showing future appointments within next 90 days and meeting all other requirements       Frequency of RN Calls & Virtual Visits per Team Agreement: Healthy at Home Frequency: Daily    Medication issues Addressed (what was done): confirmed need to cont. prednisone    Follow up appointments scheduled by [facility] Staff: 1/4/24 pulmonary  Referrals made by [facility] staff: no    no  [facility] Care Transitions Assessment    Patient's Address:   [address] [place] [address]  **  If this is not the address patient will receive services - " alert team and address in EMR**       Patient Contacts:  Extended Emergency Contact Information  Primary Emergency Contact: Augustus Hernandez  Home Phone: [phone]  Relation: Spouse                                Patient's Preferred Phone: [phone]  Patient's E-mail: No e-mail address on record

## 2023-12-27 NOTE — PROGRESS NOTES
Discharge Facility: [facility]   Discharge Diagnosis: Acute exacerbation of COPD with acute on chronic hypoxic respiratory failure  sarcodosis  Admission Date: 12-19-23   Discharge Date: 12-22-23     PCP Appointment Date: 1-5-23   Specialist Appointment Date:  1-4-23 Pulmonology   [facility] Encounter and Summary: Linked   See discharge assessment below for further details    Engagement  Call Start Time: 1140 (12/27/2023 11:43 AM)    Medications  Medications reviewed with patient/caregiver?: Yes (12/27/2023 11:43 AM)  Is the patient having any side effects they believe may be caused by any medication additions or changes?: No (12/27/2023 11:43 AM)  Does the patient have all medications ordered at discharge?: Yes (12/27/2023 11:43 AM)  Care Management Interventions: No intervention needed (12/27/2023 11:43 AM)  Is the patient taking all medications as directed (includes completed medication regime)?: Yes (12/27/2023 11:43 AM)    Appointments  Does the patient have a primary care provider?: Yes (12/27/2023 11:43 AM)  Care Management Interventions: Verified appointment date/time/provider (12/27/2023 11:43 AM)  Care Management Interventions: Advised patient to keep appointment (12/27/2023 11:43 AM)    Self Management  What is the home health agency?:  [facility] (12/27/2023 11:43 AM)  Has home health visited the patient within 72 hours of discharge?: Yes (12/27/2023 11:43 AM)    Patient Teaching  Does the patient have access to their discharge instructions?: Yes (12/27/2023 11:43 AM)  Care Management Interventions: Reviewed instructions with patient (12/27/2023 11:43 AM)  What is the patient's perception of their health status since discharge?: Same (12/27/2023 11:43 AM)  Is the patient/caregiver able to teach back the hierarchy of who to call/visit for symptoms/problems? PCP, Specialist, Home Health nurse, Urgent Care, ED, 911: Yes (12/27/2023 11:43 AM)      Molly Stuart LPN

## 2023-12-27 NOTE — TELEPHONE ENCOUNTER
Patient called to set up her next [place] appointment. Scheduled patient for Friday January 19th, 2024 @ 1:00 pm. Patient verbalized and agreed to appointment.

## 2023-12-28 ENCOUNTER — PATIENT OUTREACH (OUTPATIENT)
Dept: HOME HEALTH SERVICES | Age: 73
End: 2023-12-28
Payer: MEDICARE

## 2023-12-28 NOTE — PROGRESS NOTES
Daily call completed patient id doing OK  picked up the steroids today patient informed luann would be able to provide with an order and insurance approval she will discus during her pulmonologist appointment patient has no other medication needs questions and concerns addressed     Patient's Address:   [address] [place] [address]  **  If this is not the address patient will receive services - alert team and address in EMR**       Patient Contacts:  Extended Emergency Contact Information  Primary Emergency Contact: Augustus Hernandez  Home Phone: [phone]  Relation: Spouse                                Patient's Preferred Phone: [phone]  Patient's E-mail: No e-mail address on record

## 2023-12-29 ENCOUNTER — PATIENT OUTREACH (OUTPATIENT)
Dept: HOME HEALTH SERVICES | Age: 73
End: 2023-12-29
Payer: MEDICARE

## 2023-12-29 VITALS — HEART RATE: 82 BPM | RESPIRATION RATE: 17 BRPM | OXYGEN SATURATION: 95 %

## 2023-12-29 LAB
ATRIAL RATE: 70 BPM
P AXIS: 62 DEGREES
P OFFSET: 189 MS
P ONSET: 136 MS
PR INTERVAL: 152 MS
Q ONSET: 212 MS
QRS COUNT: 11 BEATS
QRS DURATION: 136 MS
QT INTERVAL: 444 MS
QTC CALCULATION(BAZETT): 479 MS
QTC FREDERICIA: 467 MS
R AXIS: 59 DEGREES
T AXIS: 69 DEGREES
T OFFSET: 434 MS
VENTRICULAR RATE: 70 BPM

## 2023-12-29 NOTE — PROGRESS NOTES
Daily Call Note:   Daily call. Pt in good spirits. Denies concerns, sob, or pain. States she had a bit of swelling last night but took an extra half water pill ans its gone today. She will ask Pulm for order for percussion vest on 1/4.   Masimo stable, batteries ordered.  Still on 2L NC.  Pt Education: na  Barriers: na  Topics for Daily Review: na  Pt demonstrates clear understanding: Yes    Daily Weight:  There were no vitals filed for this visit.   Last 3 Weights:  Wt Readings from Last 7 Encounters:   12/19/23 75.5 kg (166 lb 7.2 oz)   12/08/23 73.9 kg (162 lb 14.7 oz)   12/06/23 74.3 kg (163 lb 12.8 oz)   11/17/23 75.3 kg (166 lb)   11/17/23 74.8 kg (165 lb)   10/24/23 75 kg (165 lb 3.8 oz)   10/18/23 75.3 kg (166 lb)       Masimo Device: Yes   Masimo Clinical Impression: stable    Virtual Visits--Scheduled (Most Recent Date at Top)  Follow up Appointments  Recent Visits  No visits were found meeting these conditions.  Showing recent visits within past 30 days and meeting all other requirements  Future Appointments  Date Type Provider Dept   01/05/24 Appointment [facility]go [facility]   Showing future appointments within next 90 days and meeting all other requirements       Frequency of RN Calls & Virtual Visits per Team Agreement: Healthy at Home Frequency: Daily    Medication issues Addressed (what was done): na    Follow up appointments scheduled by [facility] Staff: na  Referrals made by [facility] staff: na      [facility] Care Transitions Assessment    Patient's Address:   [address] [place] [address]  **  If this is not the address patient will receive services - alert team and address in EMR**       Patient Contacts:  Extended Emergency Contact Information  Primary Emergency Contact: DavidAugustus  Home Phone: [phone]  Relation: Spouse                                Patient's Preferred Phone: [phone]  Patient's E-mail: No e-mail address on record

## 2023-12-30 ENCOUNTER — PATIENT OUTREACH (OUTPATIENT)
Dept: HOME HEALTH SERVICES | Age: 73
End: 2023-12-30
Payer: MEDICARE

## 2023-12-30 NOTE — PROGRESS NOTES
Daily Call Note: Daily call complete.  Pt reports dry cough, reports breathing is slightly improved.  Verified upcoming appointments.  All questions/ concerns answered.      Pt Education:   Barriers:   Topics for Daily Review:   Pt demonstrates clear understanding: Yes    Daily Weight:  There were no vitals filed for this visit.   Last 3 Weights:  Wt Readings from Last 7 Encounters:   12/19/23 75.5 kg (166 lb 7.2 oz)   12/08/23 73.9 kg (162 lb 14.7 oz)   12/06/23 74.3 kg (163 lb 12.8 oz)   11/17/23 75.3 kg (166 lb)   11/17/23 74.8 kg (165 lb)   10/24/23 75 kg (165 lb 3.8 oz)   10/18/23 75.3 kg (166 lb)       Masimo Device:  yes   Masimo Clinical Impression:     Virtual Visits--Scheduled (Most Recent Date at Top)  Follow up Appointments  Recent Visits  No visits were found meeting these conditions.  Showing recent visits within past 30 days and meeting all other requirements  Future Appointments  Date Type Provider Dept   01/05/24 Appointment Lisette Samaniego DO [facility]   Showing future appointments within next 90 days and meeting all other requirements       Frequency of RN Calls & Virtual Visits per Team Agreement: Healthy at Home Frequency: Daily    Medication issues Addressed (what was done):     Follow up appointments scheduled by [facility] Staff:   Referrals made by [facility] staff:       Acute [facility] Care Transitions Assessment    Patient's Address:   [address] [place] [id number]  **  If this is not the address patient will receive services - alert team and address in EMR**       Patient Contacts:  Extended Emergency Contact Information  Primary Emergency Contact: Augustus Hernandez  Home Phone: [phone]  Relation: Spouse                                Patient's Preferred Phone: [phone]  Patient's E-mail: No e-mail address on record

## 2023-12-31 ENCOUNTER — PATIENT OUTREACH (OUTPATIENT)
Dept: HOME HEALTH SERVICES | Age: 73
End: 2023-12-31
Payer: MEDICARE

## 2023-12-31 NOTE — PROGRESS NOTES
Pt Al not working.  New batteries ordered.  Pt has pulse ox at home and will monitor her oxygen sats.

## 2024-01-02 ENCOUNTER — PATIENT OUTREACH (OUTPATIENT)
Dept: HOME HEALTH SERVICES | Age: 74
End: 2024-01-02
Payer: MEDICARE

## 2024-01-02 VITALS — OXYGEN SATURATION: 95 % | HEART RATE: 79 BPM

## 2024-01-02 NOTE — PROGRESS NOTES
Daily call completed patient is doing ok sound horse she is on the monitor vitals and O2 have been stable reviewed upcomming appointments no medication needs at this time questions and concerns addressed     Patient's Address:   [address] [place] [address]  **  If this is not the address patient will receive services - alert team and address in EMR**       Patient Contacts:  Extended Emergency Contact Information  Primary Emergency Contact: ExelandAugustus guillen  Home Phone: [phone]  Relation: Spouse                                Patient's Preferred Phone: [phone]  Patient's E-mail: No e-mail address on record

## 2024-01-03 ENCOUNTER — PATIENT OUTREACH (OUTPATIENT)
Dept: HOME HEALTH SERVICES | Age: 74
End: 2024-01-03
Payer: MEDICARE

## 2024-01-03 LAB
ATRIAL RATE: 88 BPM
P AXIS: 75 DEGREES
P OFFSET: 193 MS
P ONSET: 139 MS
PR INTERVAL: 144 MS
Q ONSET: 211 MS
QRS COUNT: 14 BEATS
QRS DURATION: 130 MS
QT INTERVAL: 382 MS
QTC CALCULATION(BAZETT): 462 MS
QTC FREDERICIA: 434 MS
R AXIS: 55 DEGREES
T AXIS: 59 DEGREES
T OFFSET: 402 MS
VENTRICULAR RATE: 88 BPM

## 2024-01-03 NOTE — PROGRESS NOTES
Acute [facility] Care Transitions Assessment Daily call complete.  Pt reports she is feeling well.  No concerns voiced.  Verified upcoming [facility] appointment.  All questions/ concerns answered.        Patient's Address:   [address] [place] [address]  **  If this is not the address patient will receive services - alert team and address in EMR**       Patient Contacts:  Extended Emergency Contact Information  Primary Emergency Contact: Augustus Hernandez  Home Phone: [phone]  Relation: Spouse                                Patient's Preferred Phone: [phone]  Patient's E-mail: No e-mail address on record

## 2024-01-03 NOTE — PROGRESS NOTES
Nursing Note:  - HFU, multiple admissions  - ACP    Trinity Hernandez is a 73 y.o. female who presents for No chief complaint on file.    UTI: Not feeling well. A couple nights ago she woke up with more intense left sided tinnitus and her whole body was tingling. Very sick feeling. She checked her urine and it was cloudy. She completed a urine test and she started macrobid last night, took another this morning. Had another episode last night but with heart racing. She notified cardiologist and he told her that he feels it is likely heart racing from an infection although they may increase her metoprolol or check a zio patch. She is considering going to the ER for eval.      CAD, s/p stent: Following with Dr. Romero.     Small bowel perforation, abdominal hernias, epiploic appendagitis: She has been recommended laparoscopic surgery but she is hoping to avoid surgery.      Anxiety: lorazepam prn is helpful. She is having a lot more anxiety lately because of her 's health. He has an aneurysm that is over 5cm and he is in renal failure and he recently had a severe GI bleed. She is taking hydroxyzine but that makes her sleepy so she can't take it during the day.      HTN, aortic aneurysm: Reasonable control on metoprolol, losartan, and lasix. Following with cardioloy. Recently had some discomfort and got a zio patch done. She is awaiting results. She is going to get a stress test soon.     HLD: Off meds, trying to watch her diet.      COPD/sarcoidosis: On Brovana, spiriva, and budesonide. Using oxygen and prednisone prn, she has an albuterol inhaler. her breathing is a little bit bad today. She has been more short of breath. She is seeing Dr. Ford for pulm. She is going in for testing in April.      Hypothyroidism: On replacement, no concerns.     GERD: Off pantoprazole, she is doing much better since taking the enzymes.     All other systems have been reviewed and are negative for complaint  Objective    There were no vitals taken for this visit.    Gen: No acute distress, alert and oriented x3, pleasant   HEENT: moist mucous membranes, b/l external auditory canals are clear of debris, TMs within normal limits, no oropharyngeal lesions, eomi, perrla   Neck: thyroid within normal limits, no lymphadenopathy   CV: RRR, normal S1/S2, no murmur   Resp: Clear to auscultation bilaterally, no wheezes or rhonchi appreciated  Abd: soft, nontender, non-distended, no guarding/rigidity, bowel sounds present  Extr: no edema, no calf tenderness  Derm: Skin is warm and dry, no rashes appreciated  Psych: mood is good, affect is congruent, good hygiene, normal speech and eye contact  Neuro: cranial nerves grossly intact, normal gait    Assessment/Plan     #Weakness  #Fatigue  Likely UTI, on macrobid  Recent labs normal  Recommended ER eval  Recommend troponin and EKG     #CAD  S/p stent   On plavix and ezetimibe  Has followup with cardio  She has midodrine for prn use     #Small bowel perforation  #Ventral hernia  Following with GI  Currently planning to avoid surgery     #Anxiety:  CSA signed  Using ativan prn, refilled  Add lexapro     #Shingles:  she has valtrex rx at home  Hasn't needed it much     #Poor venous access  port in place     #HTN  Controlled on losartan, furosemide, and metoprolol  seeing cardiology (Nick)     #HLD  Stable, not on meds, monitoring     #COPD/Sarcoidosis  Following with pulm, stable, on inhalers, supplemental O2, nebulizers  testing with pulm in April     #Hypothyroidism  Controlled on replacement     HCM:  s/p COVID vaccine  Mammogram Sept

## 2024-01-04 ENCOUNTER — OFFICE VISIT (OUTPATIENT)
Dept: PULMONOLOGY | Facility: CLINIC | Age: 74
End: 2024-01-04
Payer: MEDICARE

## 2024-01-04 VITALS
WEIGHT: 164.6 LBS | SYSTOLIC BLOOD PRESSURE: 121 MMHG | BODY MASS INDEX: 25.78 KG/M2 | DIASTOLIC BLOOD PRESSURE: 73 MMHG | OXYGEN SATURATION: 91 % | HEART RATE: 87 BPM

## 2024-01-04 DIAGNOSIS — R09.02 HYPOXIA: ICD-10-CM

## 2024-01-04 DIAGNOSIS — J44.9 CHRONIC OBSTRUCTIVE PULMONARY DISEASE, UNSPECIFIED COPD TYPE (MULTI): ICD-10-CM

## 2024-01-04 DIAGNOSIS — J40 BRONCHITIS: Primary | ICD-10-CM

## 2024-01-04 PROCEDURE — 99215 OFFICE O/P EST HI 40 MIN: CPT | Performed by: PEDIATRICS

## 2024-01-04 PROCEDURE — 1125F AMNT PAIN NOTED PAIN PRSNT: CPT | Performed by: PEDIATRICS

## 2024-01-04 PROCEDURE — 1160F RVW MEDS BY RX/DR IN RCRD: CPT | Performed by: PEDIATRICS

## 2024-01-04 PROCEDURE — 1036F TOBACCO NON-USER: CPT | Performed by: PEDIATRICS

## 2024-01-04 PROCEDURE — 3074F SYST BP LT 130 MM HG: CPT | Performed by: PEDIATRICS

## 2024-01-04 PROCEDURE — 3078F DIAST BP <80 MM HG: CPT | Performed by: PEDIATRICS

## 2024-01-04 PROCEDURE — 1111F DSCHRG MED/CURRENT MED MERGE: CPT | Performed by: PEDIATRICS

## 2024-01-04 PROCEDURE — 1159F MED LIST DOCD IN RCRD: CPT | Performed by: PEDIATRICS

## 2024-01-04 RX ORDER — PREDNISONE 10 MG/1
TABLET ORAL
Qty: 38 TABLET | Refills: 0 | Status: SHIPPED | OUTPATIENT
Start: 2024-01-04 | End: 2024-02-02 | Stop reason: ALTCHOICE

## 2024-01-04 RX ORDER — AZITHROMYCIN 250 MG/1
250 TABLET, FILM COATED ORAL 3 TIMES WEEKLY
Qty: 9 TABLET | Refills: 0 | Status: SHIPPED | OUTPATIENT
Start: 2024-01-05 | End: 2024-01-20 | Stop reason: DRUGHIGH

## 2024-01-05 ENCOUNTER — APPOINTMENT (OUTPATIENT)
Dept: PRIMARY CARE | Facility: CLINIC | Age: 74
End: 2024-01-05
Payer: MEDICARE

## 2024-01-05 ENCOUNTER — PATIENT OUTREACH (OUTPATIENT)
Dept: HOME HEALTH SERVICES | Age: 74
End: 2024-01-05

## 2024-01-05 NOTE — PROGRESS NOTES
Daily Call Note: [facility], Kevyn Grey, PharmD, and this RN. She had pulmonology visit yesterday. Started on prednisone taper and antibiotic. She wants a theravest like she had in the hospital to break up mucous. She states she is not getting worse, but not getting better. Kevyn suggested acetylcysteine to help break up mucous, but patient states she is afraid of trying new medications and would like to think about it. She will notify the nurse on daily call tomorrow or Monday, 1/8. Patient denies chest pain. States she does have productive cough. States she is doing breathing exercises and compliance with medications as ordered. Reviewed upcoming appointments and scheduled next [facility] 1/12/24 at 1700, verbalized understanding. No other questions or concerns at this time.    Pt Education:   Barriers: none  Topics for Daily Review:   Pt demonstrates clear understanding: Yes    Daily Weight:  There were no vitals filed for this visit.   Last 3 Weights:  Wt Readings from Last 7 Encounters:   01/04/24 74.7 kg (164 lb 9.6 oz)   12/19/23 75.5 kg (166 lb 7.2 oz)   12/08/23 73.9 kg (162 lb 14.7 oz)   12/06/23 74.3 kg (163 lb 12.8 oz)   11/17/23 75.3 kg (166 lb)   11/17/23 74.8 kg (165 lb)   10/24/23 75 kg (165 lb 3.8 oz)       Masimo Device: No   Masimo Clinical Impression:     Virtual Visits--Scheduled (Most Recent Date at Top)  Follow up Appointments  Recent Visits  No visits were found meeting these conditions.  Showing recent visits within past 30 days and meeting all other requirements  Future Appointments  No visits were found meeting these conditions.  Showing future appointments within next 90 days and meeting all other requirements       Frequency of RN Calls & Virtual Visits per Team Agreement: Healthy at Home Frequency: Daily    Medication issues Addressed (what was done): see note    Follow up appointments scheduled by [facility] Staff: none  Referrals made by [facility] staff: none      Acute  Hospital At Home Care Transitions Assessment    Patient's Address:   [address] [place] [address]  **  If this is not the address patient will receive services - alert team and address in EMR**       Patient Contacts:  Extended Emergency Contact Information  Primary Emergency Contact: Augustus Hernandez  Home Phone: [phone]  Relation: Spouse                                Patient's Preferred Phone: [phone]  Patient's E-mail: No e-mail address on record

## 2024-01-06 ENCOUNTER — PATIENT OUTREACH (OUTPATIENT)
Dept: HOME HEALTH SERVICES | Age: 74
End: 2024-01-06
Payer: MEDICARE

## 2024-01-06 NOTE — PROGRESS NOTES
Daily Call Note:   Ms. Hernandez states she received a letter today stating her appointment on January 19, 2024 with Dr. Bolton [facility] was cancelled.  The appointment was rescheduled for February 19, 2024 @ 14:25.  Ms. Hernandez states she is using her nebulizer less.  She would still like to hold off on taking Acetylcysteine.  She states she will let us know on 1/8/24 if she changes her mind.     Pt Education:   Barriers:   Topics for Daily Review:   Pt demonstrates clear understanding:     Daily Weight:  There were no vitals filed for this visit.   Last 3 Weights:  Wt Readings from Last 7 Encounters:   01/04/24 74.7 kg (164 lb 9.6 oz)   12/19/23 75.5 kg (166 lb 7.2 oz)   12/08/23 73.9 kg (162 lb 14.7 oz)   12/06/23 74.3 kg (163 lb 12.8 oz)   11/17/23 75.3 kg (166 lb)   11/17/23 74.8 kg (165 lb)   10/24/23 75 kg (165 lb 3.8 oz)       Masimo Device:    Masimo Clinical Impression:     Virtual Visits--Scheduled (Most Recent Date at Top)  Follow up Appointments  Recent Visits  No visits were found meeting these conditions.  Showing recent visits within past 30 days and meeting all other requirements  Future Appointments  No visits were found meeting these conditions.  Showing future appointments within next 90 days and meeting all other requirements       Frequency of RN Calls & Virtual Visits per Team Agreement: Daily    Medication issues Addressed (what was done): Yes    Follow up appointments scheduled by [facility] Staff:   Referrals made by [facility] staff:       [facility] Care Transitions Assessment    Patient's Address:   [address] [place] [address]  **  If this is not the address patient will receive services - alert team and address in EMR**       Patient Contacts:  Extended Emergency Contact Information  Primary Emergency Contact: Augustus Hernandez  Home Phone: [phone]  Relation: Spouse                                Patient's Preferred Phone: [phone]  Patient's E-mail: No e-mail  address on record

## 2024-01-07 LAB
ATRIAL RATE: 84 BPM
P AXIS: 55 DEGREES
P OFFSET: 194 MS
P ONSET: 149 MS
PR INTERVAL: 130 MS
Q ONSET: 214 MS
QRS COUNT: 14 BEATS
QRS DURATION: 136 MS
QT INTERVAL: 406 MS
QTC CALCULATION(BAZETT): 479 MS
QTC FREDERICIA: 454 MS
R AXIS: 66 DEGREES
T AXIS: 98 DEGREES
T OFFSET: 417 MS
VENTRICULAR RATE: 84 BPM

## 2024-01-07 NOTE — DOCUMENTATION CLARIFICATION NOTE
"    PATIENT:               BABAR STOKES  ACCT #:                  [id number]  MRN:                       [id number]  :                       1950  ADMIT DATE:       2023 9:53 PM  DISCH DATE:        2023 4:42 PM  RESPONDING PROVIDER #:        [id number]          PROVIDER RESPONSE TEXT:    Patient treated for acute on chronic hypoxic respiratory failure during this admission    CDI QUERY TEXT:    [facility]_Respiratory Failure Acute      Instruction:    Based on your assessment of the patient and the clinical information, please provide the requested documentation by clicking on the appropriate radio button and enter any additional information if prompted.    Question: Based on the clinical information, please clarify the respiratory failure status such as:    When answering this query, please exercise your independent professional judgment. The fact that a question is being asked, does not imply that any particular answer is desired or expected.    The patient's clinical indicators include:  Clinical Information: Patient admitted for COPD exacerbation    Clinical Indicators:  Vitals on 23 at 21:50: Temp-36.4, HR-87, RR-19, BP-139/70, SpO2-96 percent  and placed on BiPAP in the ED    ED note: \"Patient has required more oxygen than her baseline however her oxygenation status is normal with the new required oxygen level.\"    ED physical exam: \"Moderate severe wheezing noted with significant diminishment of the chest wall excursion especially limitation in the inspiratory as well as expiratory phase bilaterally.\"    History and physical assessment: \"chronic respiratory failure with hypoxia\"    History and physical disposition: \"Admitted for acute on chronic respiratory failure 2/2 PNA and exac COPD\"    Home health referral note  23 \"Currently on 3L o2, baseline is 2L o2 only at night\"    Respiratory therapy flowsheet 12/10/23 at 01:21- on 40 percent of O2 (5L), 4L on 12/10/23 at 08:00, reduced to 3L of " O2  via NC on 12/12/23-12/19/23    Treatment: 3-5L of O2 during admission and BiPAP in ED    Risk Factors: COPD exacerbation, prior PNA, advanced age, pulmonary sarcoidosis  Options provided:  -- Patient treated for acute on chronic hypoxic respiratory failure during this admission  -- No acute respiratory failure, chronic hypoxic respiratory failure only  -- Other - I will add my own diagnosis  -- Refer to Clinical Documentation Reviewer    Query created by: Mckenzie Harrington on 12/29/2023 2:35 PM      Electronically signed by:  MICHELLE CRUZ MD 1/7/2024 8:46 AM

## 2024-01-07 NOTE — DOCUMENTATION CLARIFICATION NOTE
"    PATIENT:               BABAR STOKES  ACCT #:                  [id number]  MRN:                       [id number]  :                       1950  ADMIT DATE:       2023 9:53 PM  DISCH DATE:        2023 4:42 PM  RESPONDING PROVIDER #:        [id number]          PROVIDER RESPONSE TEXT:    Pneumonia ruled in for this admission    CDI QUERY TEXT:    UH_Diagnosis Ruled Out In      Instruction:    Based on your assessment of the patient and the clinical information, please provide the requested documentation by clicking on the appropriate radio button and enter any additional information if prompted.    Question: Please further clarify the diagnosis of Pneumonia for this admission as    When answering this query, please exercise your independent professional judgment. The fact that a question is being asked, does not imply that any particular answer is desired or expected.    The patient's clinical indicators include:  Clinical Information: Patient admitted for COPD exacerbation    Clinical Indicators:  History and physical and subsequent notes: Disposition: Admitted for acute on chronic respiratory failure 2/2 PNA and exac COPD\"    23 CXR: \"Similar lung aeration to prior exam without evidence of new consolidation or pleural effusion. Chronic airspace opacity in the right lung base is unchanged to prior study.\"    Discharge Summary: \"She was recently discharged from [facility] where she was treated for PNA with amoxicillin. She was subsequently discharged from [facility] as well and went home on the hospital at home program where she had ongoing treatment with azithromycin three times per week and a slow steroid taper\"    Treatment: Ceftriaxone 12/10/23, oral Azithromycin , , 12/15, 23    Risk Factors: Advanced age, prior PNA, COPD  Options provided:  -- Pneumonia ruled out for this admission after workup  -- Pneumonia ruled in for this admission  -- Other - I will add my own diagnosis  -- " Refer to Clinical Documentation Reviewer    Query created by: Mckenzie Harrington on 12/29/2023 2:08 PM      Electronically signed by:  MICHELLE CRUZ MD 1/7/2024 8:46 AM

## 2024-01-08 ENCOUNTER — PATIENT OUTREACH (OUTPATIENT)
Dept: HOME HEALTH SERVICES | Age: 74
End: 2024-01-08
Payer: MEDICARE

## 2024-01-08 VITALS — BODY MASS INDEX: 25.69 KG/M2 | OXYGEN SATURATION: 91 % | WEIGHT: 164 LBS

## 2024-01-08 LAB
ATRIAL RATE: 96 BPM
P AXIS: 72 DEGREES
P OFFSET: 186 MS
P ONSET: 139 MS
PR INTERVAL: 132 MS
Q ONSET: 205 MS
QRS COUNT: 16 BEATS
QRS DURATION: 124 MS
QT INTERVAL: 382 MS
QTC CALCULATION(BAZETT): 482 MS
QTC FREDERICIA: 446 MS
R AXIS: 61 DEGREES
T AXIS: 63 DEGREES
T OFFSET: 396 MS
VENTRICULAR RATE: 96 BPM

## 2024-01-08 NOTE — PROGRESS NOTES
Daily call completed. Patient is not feeling well today. Pt states she is on her 2L NC and taking her steroids. Pt is resting but is having trouble sleeping. Endorses coughing, congestion, BLE swelling. Taking mucinex, steroid taper, nebulizer treatments and lasix. Pt encouraged to use flutter device as she has audible rattling over the phone. 164 lb. Pt stated she was attempting to walk on treadmill to get some exercise and O2 went to 88% while on 2L. Pt states O2 is around 91% at rest on oxygen. Pt unsure of what HR was. Future appts discussed. Team to reach out to pulm office to attempt to get pt an earlier appt to get percussion vest for patient. Pt to be updated ASAP. Pt denies any further questions/concerns. Aware of next [facility] appt 1/12/24 at 1700.     Patient's Address:   [address] [place] [address]  **  If this is not the address patient will receive services - alert team and address in EMR**       Patient Contacts:  Extended Emergency Contact Information  Primary Emergency Contact: Augustus Hernandez  Home Phone: [phone]  Relation: Spouse                                Patient's Preferred Phone: [phone]  Patient's E-mail: No e-mail address on record

## 2024-01-09 ENCOUNTER — PATIENT OUTREACH (OUTPATIENT)
Dept: HOME HEALTH SERVICES | Age: 74
End: 2024-01-09
Payer: MEDICARE

## 2024-01-09 NOTE — PROGRESS NOTES
Daily Call Note:     Daily call complete. Patient reporting she has had no improvement in the way she feels. Does endorse SOB is not worsened from baseline and remains on 2L.  Endorses that she is still taking all of her medications including mucinex, duonebs and steroid taper. Was able to bring up clear sputum which she reports hopes to continue. Per patients home nurse currently visiting, no BLE edema. Patient with many complaints regarding pulmonologist at [facility], specifically ability to be seen, current appointment is in February. Will follow up on getting appointment rescheduled.     1140- Per [facility] pulmonology office, Dr Mendoza, soonest appointment is February 19th, 2024. Patient is on a cancellation list and should expect a call from the office if sooner appointment becomes available. Patient informed.    Pt Education: respiratory   Barriers: none  Topics for Daily Review: oxygen  Pt demonstrates clear understanding: Yes    Daily Weight:  There were no vitals filed for this visit.   Last 3 Weights:  Wt Readings from Last 7 Encounters:   01/08/24 74.4 kg (164 lb)   01/04/24 74.7 kg (164 lb 9.6 oz)   12/19/23 75.5 kg (166 lb 7.2 oz)   12/08/23 73.9 kg (162 lb 14.7 oz)   12/06/23 74.3 kg (163 lb 12.8 oz)   11/17/23 75.3 kg (166 lb)   11/17/23 74.8 kg (165 lb)       Masimo Device: No   Masimo Clinical Impression: Awaiting battery delivery    Virtual Visits--Scheduled (Most Recent Date at Top)  Follow up Appointments  Recent Visits  No visits were found meeting these conditions.  Showing recent visits within past 30 days and meeting all other requirements  Future Appointments  Date Type Provider Dept   01/10/24 Appointment Lisette Samaniego DO [facility]   Showing future appointments within next 90 days and meeting all other requirements       Frequency of RN Calls & Virtual Visits per Team Agreement: Healthy at Home Frequency: Daily, until masimo battery delivery    Medication issues Addressed (what was  done):     Follow up appointments scheduled by [facility] Staff:   Referrals made by [facility] staff:       Acute Hospital At Home Care Transitions Assessment    Patient's Address:   [address] [place] [address]  **  If this is not the address patient will receive services - alert team and address in EMR**       Patient Contacts:  Extended Emergency Contact Information  Primary Emergency Contact: Augustus Hernandez  [place] Phone: [phone]  Relation: Spouse                                Patient's Preferred Phone: [phone]  Patient's E-mail: No e-mail address on record

## 2024-01-10 ENCOUNTER — PATIENT OUTREACH (OUTPATIENT)
Dept: CARE COORDINATION | Facility: CLINIC | Age: 74
End: 2024-01-10

## 2024-01-10 ENCOUNTER — PATIENT OUTREACH (OUTPATIENT)
Dept: HOME HEALTH SERVICES | Age: 74
End: 2024-01-10

## 2024-01-10 ENCOUNTER — OFFICE VISIT (OUTPATIENT)
Dept: PRIMARY CARE | Facility: CLINIC | Age: 74
End: 2024-01-10
Payer: MEDICARE

## 2024-01-10 VITALS
WEIGHT: 166 LBS | HEART RATE: 86 BPM | BODY MASS INDEX: 26.06 KG/M2 | HEIGHT: 67 IN | SYSTOLIC BLOOD PRESSURE: 129 MMHG | DIASTOLIC BLOOD PRESSURE: 62 MMHG

## 2024-01-10 VITALS — OXYGEN SATURATION: 97 %

## 2024-01-10 DIAGNOSIS — Z99.81 CHRONIC RESPIRATORY FAILURE WITH HYPOXIA, ON HOME O2 THERAPY (MULTI): ICD-10-CM

## 2024-01-10 DIAGNOSIS — J96.11 CHRONIC RESPIRATORY FAILURE WITH HYPOXIA, ON HOME O2 THERAPY (MULTI): ICD-10-CM

## 2024-01-10 DIAGNOSIS — J44.1 ACUTE EXACERBATION OF CHRONIC OBSTRUCTIVE PULMONARY DISEASE (MULTI): Primary | ICD-10-CM

## 2024-01-10 PROCEDURE — 3074F SYST BP LT 130 MM HG: CPT | Performed by: FAMILY MEDICINE

## 2024-01-10 PROCEDURE — 99214 OFFICE O/P EST MOD 30 MIN: CPT | Performed by: FAMILY MEDICINE

## 2024-01-10 PROCEDURE — 1159F MED LIST DOCD IN RCRD: CPT | Performed by: FAMILY MEDICINE

## 2024-01-10 PROCEDURE — 3078F DIAST BP <80 MM HG: CPT | Performed by: FAMILY MEDICINE

## 2024-01-10 PROCEDURE — 1111F DSCHRG MED/CURRENT MED MERGE: CPT | Performed by: FAMILY MEDICINE

## 2024-01-10 PROCEDURE — 1036F TOBACCO NON-USER: CPT | Performed by: FAMILY MEDICINE

## 2024-01-10 PROCEDURE — 1125F AMNT PAIN NOTED PAIN PRSNT: CPT | Performed by: FAMILY MEDICINE

## 2024-01-10 NOTE — PROGRESS NOTES
Call regarding appt. with PCP on (n/a) after hospitalization.  At time of outreach call the patient feels as if their condition has (improved) since last visit.  Reviewed the PCP appointment with the pt and addressed any questions or concerns.  Molly Stuart LPN

## 2024-01-10 NOTE — PROGRESS NOTES
Daily call completed patient is doing ok she is still horse she did not sleep well so she is tired pox 97% on 2l nc she is aware that she is on the wait list to have her Pulmonology appointment moved up no medication needs at this time still on the steroids and antibiotic questions and concerns addressed     Patient's Address:   [address] [place] [address]  **  If this is not the address patient will receive services - alert team and address in EMR**       Patient Contacts:  Extended Emergency Contact Information  Primary Emergency Contact: Augustus Hernandez  Home Phone: [phone]  Relation: Spouse                                Patient's Preferred Phone: [phone]  Patient's E-mail: No e-mail address on record

## 2024-01-10 NOTE — PROGRESS NOTES
"Trinity Hernandez is a 73 y.o. female who presents for Hospital Follow-up (Multiple hospital admissions; difficulty getting in with her pulmo DrElaine, wants the vest they used for her in hospital, was told Dr. Ford's office had to do PA for it)    HFU: She was here last for UTI. She ended up having to get admitted for UTI after she left. She ended up going home and then back in the hospital for pneumonia. She states that that hospitalization was very bad, she had her breathing medication withheld because it wasn't on her list. She states that \"something happened to her lungs\" as a result of this, she has been having a severe attack with the breathing afterward. She states that they pushed her to get discharged from the hospital. She ended up having to go back the next day for shortness of breath. She did a hospital to home program for 4 or 5 days. She was told that they don't know what else they can do for her, she ended up getting sent to [facility] for 3 or 4 days and then released her home. She states that she is in need of pulmonary vest that she has used in the hospital TID and it had made a big difference for her. Can't get back in with pulm for about a month. She was seen by Dr. Javy Raya to [facility] for sarcoidosis but they didn't see evidence of this. She is currently on steroids until 1/19/24 but that got rescheduled to 2/5. She is short of breath with cough and mucous production. She is on 2L oxygen. Weakness and fatigue. Appetite is good. She is on 30mg steroids. She is on antibiotics every other day.     CAD, s/p stent: Following with Dr. Romero.     Small bowel perforation, abdominal hernias, epiploic appendagitis: She has been recommended laparoscopic surgery but she is hoping to avoid surgery.      Anxiety: lorazepam prn is helpful. She is having a lot more anxiety lately because of her 's health. He has an aneurysm that is over 5cm and he is in renal failure and he recently had a severe GI " "bleed. She is taking hydroxyzine but that makes her sleepy so she can't take it during the day.      HTN, aortic aneurysm: Reasonable control on metoprolol, losartan, and lasix. Following with cardioloy. Recently had some discomfort and got a zio patch done. She is awaiting results. She is going to get a stress test soon.     HLD: Off meds, trying to watch her diet.      COPD/sarcoidosis: On Brovana, spiriva, and budesonide. Using oxygen and prednisone prn, she has an albuterol inhaler. her breathing is a little bit bad today. She has been more short of breath. She is seeing Dr. Ford for pulm. She is going in for testing in April.      Hypothyroidism: On replacement, no concerns.     GERD: Off pantoprazole, she is doing much better since taking the enzymes.     All other systems have been reviewed and are negative for complaint     Objective   /62 (BP Location: Left arm, Patient Position: Sitting, BP Cuff Size: Adult)   Pulse 86   Ht 1.702 m (5' 7\")   Wt 75.3 kg (166 lb)   BMI 26.00 kg/m²     Gen: No acute distress, alert and oriented x3, pleasant   HEENT: moist mucous membranes, b/l external auditory canals are clear of debris, TMs within normal limits, no oropharyngeal lesions, eomi, perrla   Neck: thyroid within normal limits, no lymphadenopathy   CV: RRR, normal S1/S2, no murmur   Resp: decreased breath sounds B/L, scattered wheezes noted, conversational dyspnea on 2L nasal cannula  Abd: soft, nontender, non-distended, no guarding/rigidity, bowel sounds present  Extr: no edema, no calf tenderness  Derm: Skin is warm and dry, no rashes appreciated  Psych: mood is good, affect is congruent, good hygiene, normal speech and eye contact  Neuro: cranial nerves grossly intact, normal gait    Assessment/Plan     #COPD exacerbation  #COPD  #Respiratory failure  On oxygen  On azithromycin three times per week  On 30mg prednisone daily  On mucinex  Ordering HFCWO today     #CAD  S/p stent   On plavix and " ezetimibe  Has followup with cardio  She has midodrine for prn use     #Small bowel perforation  #Ventral hernia  Following with GI  Currently planning to avoid surgery     #Anxiety:  CSA signed  Using ativan prn, refilled  Add lexapro     #Shingles:  she has valtrex rx at home  Hasn't needed it much     #Poor venous access  port in place     #HTN  Controlled on losartan, furosemide, and metoprolol  seeing cardiology ([place])     #HLD  Stable, not on meds, monitoring     #COPD/Sarcoidosis  Following with pulm, stable, on inhalers, supplemental O2, nebulizers  testing with pulm in April     #Hypothyroidism  Controlled on replacement     HCM:  s/p COVID vaccine  Mammogram Sept

## 2024-01-11 ENCOUNTER — PATIENT OUTREACH (OUTPATIENT)
Dept: HOME HEALTH SERVICES | Age: 74
End: 2024-01-11
Payer: MEDICARE

## 2024-01-11 NOTE — PROGRESS NOTES
Daily Call Note: Daily call complete.  Pt reports feeling ok.  Reports cough is improving.  Had PCP appt yesterday, states that went well.  Pt unable to afford Mucinex.  Will reach out to pharmacy to inquire if there is a cheaper option.  Call frequency changed to Tues/. Thurs, pt agreeable.  All questions/ concerns answered. Still awaiyting Masimo equipment.  Pt aware .      Pt Education:   Barriers:   Topics for Daily Review:   Pt demonstrates clear understanding: Yes    Daily Weight:  There were no vitals filed for this visit.   Last 3 Weights:  Wt Readings from Last 7 Encounters:   01/10/24 75.3 kg (166 lb)   01/08/24 74.4 kg (164 lb)   01/04/24 74.7 kg (164 lb 9.6 oz)   12/19/23 75.5 kg (166 lb 7.2 oz)   12/08/23 73.9 kg (162 lb 14.7 oz)   12/06/23 74.3 kg (163 lb 12.8 oz)   11/17/23 75.3 kg (166 lb)       Masimo Device: No   Masimo Clinical Impression:     Virtual Visits--Scheduled (Most Recent Date at Top)  Follow up Appointments  Recent Visits  Date Type Provider Dept   01/10/24 Office Visit Lisette Samaniego DO [facility]   Showing recent visits within past 30 days and meeting all other requirements  Future Appointments  Date Type Provider Dept   02/12/24 Appointment Lisette Samaniego DO [facility]   Showing future appointments within next 90 days and meeting all other requirements       Frequency of RN Calls & Virtual Visits per Team Agreement: Healthy at Home Frequency: Bi-Weekly    Medication issues Addressed (what was done):     Follow up appointments scheduled by [facility] Staff:   Referrals made by [facility] staff:       Acute Hospital At Home Care Transitions Assessment    Patient's Address:   [address] [place] [address]  **  If this is not the address patient will receive services - alert team and address in EMR**       Patient Contacts:  Extended Emergency Contact Information  Primary Emergency Contact: Augustus Hernandez  Home Phone: [phone]  Relation: Spouse                                 Patient's Preferred Phone: [phone]  Patient's E-mail: No e-mail address on record

## 2024-01-15 ENCOUNTER — APPOINTMENT (OUTPATIENT)
Dept: PRIMARY CARE | Facility: CLINIC | Age: 74
End: 2024-01-15
Payer: MEDICARE

## 2024-01-15 ENCOUNTER — PATIENT OUTREACH (OUTPATIENT)
Dept: HOME HEALTH SERVICES | Age: 74
End: 2024-01-15

## 2024-01-15 ENCOUNTER — APPOINTMENT (OUTPATIENT)
Dept: HEMATOLOGY/ONCOLOGY | Facility: HOSPITAL | Age: 74
End: 2024-01-15
Payer: MEDICARE

## 2024-01-15 NOTE — PROGRESS NOTES
Daily Call Note: Daily call complete.  Masimo battery changed, Masimo now connected.    Pt had Cardiology appt today, no new orders.  Pt to see Cardiology in one month.    Pt had Pulmonary appt today, to see Pulmonary again in 4 mos.  Pulmonary does not feel pt needs vibrating mask.  Pt started on antibiotic which she is to take every other day,  All questions/ concerns addressed.  Verified upcoming [facility] appt.      Pt Education:   Barriers:   Topics for Daily Review:   Pt demonstrates clear understanding: Yes    Daily Weight:  There were no vitals filed for this visit.   Last 3 Weights:  Wt Readings from Last 7 Encounters:   01/10/24 75.3 kg (166 lb)   01/08/24 74.4 kg (164 lb)   01/04/24 74.7 kg (164 lb 9.6 oz)   12/19/23 75.5 kg (166 lb 7.2 oz)   12/08/23 73.9 kg (162 lb 14.7 oz)   12/06/23 74.3 kg (163 lb 12.8 oz)   11/17/23 75.3 kg (166 lb)       Masimo Device: Yes   Masimo Clinical Impression:     Virtual Visits--Scheduled (Most Recent Date at Top)  Follow up Appointments  Recent Visits  Date Type Provider Dept   01/10/24 Office Visit Lisette Samaniego DO [facility]   Showing recent visits within past 30 days and meeting all other requirements  Future Appointments  Date Type Provider Dept   02/12/24 Appointment Lisette Samaniego DO [facility]   Showing future appointments within next 90 days and meeting all other requirements       Frequency of RN Calls & Virtual Visits per Team Agreement: Healthy at Home Frequency: Bi-Weekly  Medication issues Addressed (what was done):     Follow up appointments scheduled by [facility] Staff:   Referrals made by [facility] staff:       Acute [facility] Care Transitions Assessment    Patient's Address:   [address] [place] [id number]  **  If this is not the address patient will receive services - alert team and address in EMR**       Patient Contacts:  Extended Emergency Contact Information  Primary Emergency Contact: Augustus Hernandez  Home Phone:  [phone]  Relation: Spouse                                Patient's Preferred Phone: [phone]  Patient's E-mail: No e-mail address on record

## 2024-01-16 ENCOUNTER — PATIENT OUTREACH (OUTPATIENT)
Dept: HOME HEALTH SERVICES | Age: 74
End: 2024-01-16
Payer: MEDICARE

## 2024-01-16 VITALS — HEART RATE: 79 BPM | RESPIRATION RATE: 21 BRPM | OXYGEN SATURATION: 97 %

## 2024-01-16 NOTE — PROGRESS NOTES
Daily Call Note:   Daily call completed. Patient states feeling ok today. Denies CP, states SOB, congestion, productive cough (clear) and generalized swelling. Patient states saw Pulmonary yesterday and was advised to continue taking her antibiotic but changes it to every other day. Patient states using her home supply but did receive a new prescription that she has not picked up yet. Patient states wearing TEDs and is elevating her legs when  possible. Patient has no concerns at this time, VSS stable and documented. Patient reminded of next [facility] appointment 1/20 @ 1300  Pt Education: Yes  Barriers: None  Topics for Daily Review: TEDs and elevation  Pt demonstrates clear understanding: Yes    Daily Weight:  There were no vitals filed for this visit.   Last 3 Weights:  Wt Readings from Last 7 Encounters:   01/10/24 75.3 kg (166 lb)   01/08/24 74.4 kg (164 lb)   01/04/24 74.7 kg (164 lb 9.6 oz)   12/19/23 75.5 kg (166 lb 7.2 oz)   12/08/23 73.9 kg (162 lb 14.7 oz)   12/06/23 74.3 kg (163 lb 12.8 oz)   11/17/23 75.3 kg (166 lb)       Masimo Device: Yes   Masimo Clinical Impression: VSS    Virtual Visits--Scheduled (Most Recent Date at Top)  Follow up Appointments  Recent Visits  Date Type Provider Dept   01/10/24 Office Visit Lisette Samaniego DO [facility]   Showing recent visits within past 30 days and meeting all other requirements  Future Appointments  Date Type Provider Dept   02/12/24 Appointment Lisette Samaniego DO [facility]   Showing future appointments within next 90 days and meeting all other requirements       Frequency of RN Calls & Virtual Visits per Team Agreement: Healthy at Home Frequency: Bi-Weekly    Medication issues Addressed (what was done): None    Follow up appointments scheduled by [facility] Staff: None  Referrals made by [facility] staff: None      Acute Hospital At Home Care Transitions Assessment    Patient's Address:   [address] [place] [id number]  **  If this is not the  address patient will receive services - alert team and address in EMR**       Patient Contacts:  Extended Emergency Contact Information  Primary Emergency Contact: Augustus Hernandez  Home Phone: [phone]  Relation: Spouse                                Patient's Preferred Phone: [phone]  Patient's E-mail: No e-mail address on record

## 2024-01-18 ENCOUNTER — PATIENT OUTREACH (OUTPATIENT)
Dept: HOME HEALTH SERVICES | Age: 74
End: 2024-01-18
Payer: MEDICARE

## 2024-01-18 ENCOUNTER — TELEPHONE (OUTPATIENT)
Dept: HEMATOLOGY/ONCOLOGY | Facility: HOSPITAL | Age: 74
End: 2024-01-18
Payer: MEDICARE

## 2024-01-18 NOTE — TELEPHONE ENCOUNTER
Patient called to cancel her port flush appointment for tomorrow. Patient wants to cancel due to weather being bad, rescheduled patient for Wednesday January 24th, 2024 @ 12:00.

## 2024-01-18 NOTE — PROGRESS NOTES
Daily Call Note:  Daily call complete.  Pt reports she is feeling ok,, continues w productive cough.  Has Cardiology appt next month.  Denies edema.  Reviewed importance of elevating legs, pt verbalized understanding.        Pt Education:   Barriers:   Topics for Daily Review:   Pt demonstrates clear understanding: Yes    Daily Weight:  There were no vitals filed for this visit.   Last 3 Weights:  Wt Readings from Last 7 Encounters:   01/10/24 75.3 kg (166 lb)   01/08/24 74.4 kg (164 lb)   01/04/24 74.7 kg (164 lb 9.6 oz)   12/19/23 75.5 kg (166 lb 7.2 oz)   12/08/23 73.9 kg (162 lb 14.7 oz)   12/06/23 74.3 kg (163 lb 12.8 oz)   11/17/23 75.3 kg (166 lb)       Masimo Device: No   Masimo Clinical Impression:   Daily Call Note:     Pt Education:   Barriers:   Topics for Daily Review:   Pt demonstrates clear understanding: Yes    Masimo Device: No   Masimo Clinical Impression:     Virtual Visits--Scheduled (Most Recent Date at Top)  Follow up Appointments  Recent Visits  Date Type Provider Dept   01/10/24 Office Visit [facility]   Showing recent visits within past 30 days and meeting all other requirements  Future Appointments  Date Type Provider Dept   02/12/24 Appointment Lisette Samaniego DO [facility]   Showing future appointments within next 90 days and meeting all other requirements       Frequency of RN Calls & Virtual Visits per Team Agreement: Healthy at Home Frequency: Daily    Medication issues Addressed (what was done):     Follow up appointments scheduled by [facility] Staff:   Referrals made by [facility] staff:         Virtual Visits--Scheduled (Most Recent Date at Top)  Follow up Appointments  Recent Visits  Date Type Provider Dept   01/10/24 Office Visit Lisette Samaniego DO [facility]   Showing recent visits within past 30 days and meeting all other requirements  Future Appointments  Date Type Provider Dept   02/12/24 Appointment DO [facility]   Showing future appointments within next 90 days and meeting all other requirements                    Patient's Preferred Phone: [phone]  Patient's E-mail: No e-mail address on record

## 2024-01-19 ENCOUNTER — APPOINTMENT (OUTPATIENT)
Dept: HEMATOLOGY/ONCOLOGY | Facility: HOSPITAL | Age: 74
End: 2024-01-19
Payer: MEDICARE

## 2024-01-19 NOTE — PROGRESS NOTES
Patient called to inquire about Masimo device not working, Tablet giving message [facility] guest not in range.  Patient reports some SOB with exertion but no distress.      Patient's Address:   [address] [place] [address]  **  If this is not the address patient will receive services - alert team and address in EMR**       Patient Contacts:  Extended Emergency Contact Information  Primary Emergency Contact: Augustus Hernandez  Home Phone: [phone]  Relation: Spouse          Patient's Preferred Phone: [phone]  Patient's E-mail: No e-mail address on record

## 2024-01-20 ENCOUNTER — PATIENT OUTREACH (OUTPATIENT)
Dept: HOME HEALTH SERVICES | Age: 74
End: 2024-01-20

## 2024-01-20 DIAGNOSIS — J44.9 COPD, SEVERE (MULTI): Primary | ICD-10-CM

## 2024-01-20 RX ORDER — PREDNISONE 10 MG/1
10 TABLET ORAL DAILY
Qty: 30 TABLET | Refills: 0 | Status: SHIPPED | OUTPATIENT
Start: 2024-01-20 | End: 2024-02-02 | Stop reason: ALTCHOICE

## 2024-01-24 ENCOUNTER — INFUSION (OUTPATIENT)
Dept: HEMATOLOGY/ONCOLOGY | Facility: HOSPITAL | Age: 74
End: 2024-01-24
Payer: MEDICARE

## 2024-01-24 VITALS
SYSTOLIC BLOOD PRESSURE: 160 MMHG | HEART RATE: 75 BPM | DIASTOLIC BLOOD PRESSURE: 76 MMHG | OXYGEN SATURATION: 95 % | TEMPERATURE: 97 F | RESPIRATION RATE: 20 BRPM

## 2024-01-24 DIAGNOSIS — I87.8 POOR VENOUS ACCESS: ICD-10-CM

## 2024-01-24 PROCEDURE — 2500000004 HC RX 250 GENERAL PHARMACY W/ HCPCS (ALT 636 FOR OP/ED): Performed by: FAMILY MEDICINE

## 2024-01-24 PROCEDURE — 96523 IRRIG DRUG DELIVERY DEVICE: CPT

## 2024-01-24 RX ORDER — HEPARIN 100 UNIT/ML
500 SYRINGE INTRAVENOUS AS NEEDED
Status: DISCONTINUED | OUTPATIENT
Start: 2024-01-24 | End: 2024-01-24 | Stop reason: HOSPADM

## 2024-01-24 RX ORDER — HEPARIN 100 UNIT/ML
500 SYRINGE INTRAVENOUS AS NEEDED
Status: CANCELLED | OUTPATIENT
Start: 2024-01-24

## 2024-01-24 RX ORDER — SODIUM CHLORIDE 9 MG/ML
10 INJECTION, SOLUTION INTRAMUSCULAR; INTRAVENOUS; SUBCUTANEOUS EVERY 8 HOURS SCHEDULED
Qty: 900 ML | Refills: 0 | Status: SHIPPED | OUTPATIENT
Start: 2024-01-24 | End: 2024-01-26

## 2024-01-24 RX ORDER — HEPARIN SODIUM,PORCINE/PF 10 UNIT/ML
50 SYRINGE (ML) INTRAVENOUS AS NEEDED
Status: CANCELLED | OUTPATIENT
Start: 2024-01-24

## 2024-01-24 RX ADMIN — HEPARIN 500 UNITS: 100 SYRINGE at 13:10

## 2024-01-24 ASSESSMENT — PAIN SCALES - GENERAL: PAINLEVEL: 4

## 2024-01-25 DIAGNOSIS — J44.9 COPD, SEVERE (MULTI): Primary | ICD-10-CM

## 2024-01-25 DIAGNOSIS — I50.22 HEART FAILURE WITH MILDLY REDUCED EJECTION FRACTION (MULTI): ICD-10-CM

## 2024-01-26 DIAGNOSIS — M79.89 ARM SWELLING: Primary | ICD-10-CM

## 2024-01-26 DIAGNOSIS — J44.9 COPD, SEVERE (MULTI): ICD-10-CM

## 2024-01-26 RX ORDER — SODIUM CHLORIDE 9 MG/ML
10 INJECTION, SOLUTION INTRAMUSCULAR; INTRAVENOUS; SUBCUTANEOUS EVERY 8 HOURS SCHEDULED
Qty: 900 ML | Refills: 0 | Status: SHIPPED | OUTPATIENT
Start: 2024-01-26 | End: 2024-02-12 | Stop reason: ALTCHOICE

## 2024-01-26 RX ORDER — PREDNISONE 10 MG/1
TABLET ORAL
Qty: 30 TABLET | Refills: 0 | OUTPATIENT
Start: 2024-01-26

## 2024-01-30 ENCOUNTER — HOSPITAL ENCOUNTER (OUTPATIENT)
Dept: RADIOLOGY | Facility: HOSPITAL | Age: 74
Discharge: HOME | End: 2024-01-30
Payer: MEDICARE

## 2024-01-30 DIAGNOSIS — J44.1 ACUTE EXACERBATION OF CHRONIC OBSTRUCTIVE PULMONARY DISEASE (MULTI): ICD-10-CM

## 2024-01-30 PROCEDURE — 71046 X-RAY EXAM CHEST 2 VIEWS: CPT | Performed by: RADIOLOGY

## 2024-01-30 PROCEDURE — 71046 X-RAY EXAM CHEST 2 VIEWS: CPT

## 2024-01-31 ENCOUNTER — TELEPHONE (OUTPATIENT)
Dept: PRIMARY CARE | Facility: CLINIC | Age: 74
End: 2024-01-31
Payer: MEDICARE

## 2024-01-31 NOTE — TELEPHONE ENCOUNTER
Trinity woke up this morning with B/L foot swelling and right hand swollen, inflamed, sore from finger tips to wrist. Weight gain of 3 lbs from yesterday. She is taking lasix 20 mg daily.

## 2024-02-01 NOTE — PROGRESS NOTES
Trinity Hernandez is a 74 y.o. female who presents for a problem visit:    Hand swelling: She finished steroids and a couple days later she was trying to exercise but couldn't even walk for 2 months without stopping due to shortness of breath. Then she was dealing with normal ankle swelling and then she went to bed and woke up with one hand swollen and painful. No specific injury. She does have carpal tunnel. Mild dizziness. No abdominal pain. Ankle swelling is not going away overnight. Just the right hand. Slowly getting better except for her thumb. Ankle swelling on a daily basis. Completed recent xray. She has [facility] coming out.      CAD, s/p stent: Following with Dr. Romero.     Small bowel perforation, abdominal hernias, epiploic appendagitis: She has been recommended laparoscopic surgery but she is hoping to avoid surgery.      Anxiety: lorazepam prn is helpful. She is having a lot more anxiety lately because of her 's health. He has an aneurysm that is over 5cm and he is in renal failure and he recently had a severe GI bleed. She is taking hydroxyzine but that makes her sleepy so she can't take it during the day.      HTN, aortic aneurysm: Reasonable control on metoprolol, losartan, and lasix. Following with cardioloy. Recently had some discomfort and got a zio patch done. She is awaiting results. She is going to get a stress test soon.     HLD: Off meds, trying to watch her diet.      COPD/sarcoidosis: On Brovana, spiriva, and budesonide. Using oxygen and prednisone prn, she has an albuterol inhaler. her breathing is a little bit bad today. She has been more short of breath. She is seeing Dr. Ford for pulm. She is going in for testing in April.      Hypothyroidism: On replacement, no concerns.     GERD: Off pantoprazole, she is doing much better since taking the enzymes.     All other systems have been reviewed and are negative for complaint     Objective   There were no vitals  taken for this visit.\    Assessment/Plan     #B/L LE edema  #Joint swelling  CXR shows possible infiltrate with signs of fluid overload  Check labs  Trial lasix 2 tab daily for the next 4 days    #COPD exacerbation  #COPD  #Respiratory failure  On oxygen  On azithromycin three times per week  Completed prednisone     #CAD  S/p stent   On plavix and ezetimibe  Has followup with cardio  She has midodrine for prn use     #Small bowel perforation  #Ventral hernia  Following with GI  Currently planning to avoid surgery     #Anxiety:  CSA signed  Using ativan prn, refilled  Add lexapro     #Shingles:  she has valtrex rx at home  Hasn't needed it much     #Poor venous access  port in place     #HTN  Controlled on losartan, furosemide, and metoprolol  seeing cardiology ([place])     #HLD  Stable, not on meds, monitoring     #COPD/Sarcoidosis  Following with pulm, stable, on inhalers, supplemental O2, nebulizers  testing with pulm in April     #Hypothyroidism  Controlled on replacement     HCM:  s/p COVID vaccine  Mammogram Sept

## 2024-02-02 ENCOUNTER — APPOINTMENT (OUTPATIENT)
Dept: RADIOLOGY | Facility: HOSPITAL | Age: 74
End: 2024-02-02
Payer: MEDICARE

## 2024-02-02 ENCOUNTER — HOSPITAL ENCOUNTER (OUTPATIENT)
Facility: HOSPITAL | Age: 74
Setting detail: OBSERVATION
Discharge: HOME | End: 2024-02-03
Attending: EMERGENCY MEDICINE | Admitting: STUDENT IN AN ORGANIZED HEALTH CARE EDUCATION/TRAINING PROGRAM
Payer: MEDICARE

## 2024-02-02 ENCOUNTER — APPOINTMENT (OUTPATIENT)
Dept: CARDIOLOGY | Facility: HOSPITAL | Age: 74
End: 2024-02-02
Payer: MEDICARE

## 2024-02-02 ENCOUNTER — TELEMEDICINE (OUTPATIENT)
Dept: PRIMARY CARE | Facility: CLINIC | Age: 74
End: 2024-02-02
Payer: MEDICARE

## 2024-02-02 DIAGNOSIS — J18.9 PNEUMONIA DUE TO INFECTIOUS ORGANISM, UNSPECIFIED LATERALITY, UNSPECIFIED PART OF LUNG: ICD-10-CM

## 2024-02-02 DIAGNOSIS — J18.9 PNEUMONIA DUE TO INFECTIOUS ORGANISM, UNSPECIFIED LATERALITY, UNSPECIFIED PART OF LUNG: Primary | ICD-10-CM

## 2024-02-02 DIAGNOSIS — E03.9 HYPOTHYROIDISM, UNSPECIFIED TYPE: ICD-10-CM

## 2024-02-02 DIAGNOSIS — I11.0 HYPERTENSIVE HEART DISEASE WITH HEART FAILURE (MULTI): ICD-10-CM

## 2024-02-02 DIAGNOSIS — R55 SYNCOPE AND COLLAPSE: Primary | ICD-10-CM

## 2024-02-02 DIAGNOSIS — M25.40 JOINT SWELLING: Primary | ICD-10-CM

## 2024-02-02 PROBLEM — I71.40 AAA (ABDOMINAL AORTIC ANEURYSM) (CMS-HCC): Status: RESOLVED | Noted: 2023-05-04 | Resolved: 2024-02-02

## 2024-02-02 LAB
ALBUMIN SERPL BCP-MCNC: 3.8 G/DL (ref 3.4–5)
ALP SERPL-CCNC: 49 U/L (ref 33–136)
ALT SERPL W P-5'-P-CCNC: 10 U/L (ref 7–45)
ANION GAP SERPL CALC-SCNC: 11 MMOL/L (ref 10–20)
APTT PPP: 35 SECONDS (ref 27–38)
AST SERPL W P-5'-P-CCNC: 12 U/L (ref 9–39)
BASOPHILS # BLD AUTO: 0.06 X10*3/UL (ref 0–0.1)
BASOPHILS NFR BLD AUTO: 0.7 %
BILIRUB SERPL-MCNC: 0.5 MG/DL (ref 0–1.2)
BUN SERPL-MCNC: 13 MG/DL (ref 6–23)
CALCIUM SERPL-MCNC: 9 MG/DL (ref 8.6–10.3)
CARDIAC TROPONIN I PNL SERPL HS: 5 NG/L (ref 0–13)
CARDIAC TROPONIN I PNL SERPL HS: 6 NG/L (ref 0–13)
CHLORIDE SERPL-SCNC: 99 MMOL/L (ref 98–107)
CO2 SERPL-SCNC: 30 MMOL/L (ref 21–32)
CREAT SERPL-MCNC: 0.85 MG/DL (ref 0.5–1.05)
EGFRCR SERPLBLD CKD-EPI 2021: 72 ML/MIN/1.73M*2
EOSINOPHIL # BLD AUTO: 0.18 X10*3/UL (ref 0–0.4)
EOSINOPHIL NFR BLD AUTO: 2.2 %
ERYTHROCYTE [DISTWIDTH] IN BLOOD BY AUTOMATED COUNT: 14.2 % (ref 11.5–14.5)
GLUCOSE SERPL-MCNC: 105 MG/DL (ref 74–99)
HCT VFR BLD AUTO: 35.7 % (ref 36–46)
HGB BLD-MCNC: 11.2 G/DL (ref 12–16)
HOLD SPECIMEN: NORMAL
HOLD SPECIMEN: NORMAL
IMM GRANULOCYTES # BLD AUTO: 0.05 X10*3/UL (ref 0–0.5)
IMM GRANULOCYTES NFR BLD AUTO: 0.6 % (ref 0–0.9)
INR PPP: 1.1 (ref 0.9–1.1)
LYMPHOCYTES # BLD AUTO: 1.7 X10*3/UL (ref 0.8–3)
LYMPHOCYTES NFR BLD AUTO: 20.7 %
MCH RBC QN AUTO: 29.3 PG (ref 26–34)
MCHC RBC AUTO-ENTMCNC: 31.4 G/DL (ref 32–36)
MCV RBC AUTO: 94 FL (ref 80–100)
MONOCYTES # BLD AUTO: 0.76 X10*3/UL (ref 0.05–0.8)
MONOCYTES NFR BLD AUTO: 9.3 %
NEUTROPHILS # BLD AUTO: 5.45 X10*3/UL (ref 1.6–5.5)
NEUTROPHILS NFR BLD AUTO: 66.5 %
NRBC BLD-RTO: 0 /100 WBCS (ref 0–0)
PLATELET # BLD AUTO: 273 X10*3/UL (ref 150–450)
POTASSIUM SERPL-SCNC: 4.1 MMOL/L (ref 3.5–5.3)
PROT SERPL-MCNC: 6.3 G/DL (ref 6.4–8.2)
PROTHROMBIN TIME: 12.1 SECONDS (ref 9.8–12.8)
RBC # BLD AUTO: 3.82 X10*6/UL (ref 4–5.2)
SODIUM SERPL-SCNC: 136 MMOL/L (ref 136–145)
WBC # BLD AUTO: 8.2 X10*3/UL (ref 4.4–11.3)

## 2024-02-02 PROCEDURE — 94664 DEMO&/EVAL PT USE INHALER: CPT | Mod: 59

## 2024-02-02 PROCEDURE — 85025 COMPLETE CBC W/AUTO DIFF WBC: CPT | Performed by: EMERGENCY MEDICINE

## 2024-02-02 PROCEDURE — 93005 ELECTROCARDIOGRAM TRACING: CPT

## 2024-02-02 PROCEDURE — 80053 COMPREHEN METABOLIC PANEL: CPT | Performed by: EMERGENCY MEDICINE

## 2024-02-02 PROCEDURE — 2500000001 HC RX 250 WO HCPCS SELF ADMINISTERED DRUGS (ALT 637 FOR MEDICARE OP): Performed by: STUDENT IN AN ORGANIZED HEALTH CARE EDUCATION/TRAINING PROGRAM

## 2024-02-02 PROCEDURE — G0378 HOSPITAL OBSERVATION PER HR: HCPCS

## 2024-02-02 PROCEDURE — 70450 CT HEAD/BRAIN W/O DYE: CPT

## 2024-02-02 PROCEDURE — 2500000004 HC RX 250 GENERAL PHARMACY W/ HCPCS (ALT 636 FOR OP/ED): Mod: MUE | Performed by: STUDENT IN AN ORGANIZED HEALTH CARE EDUCATION/TRAINING PROGRAM

## 2024-02-02 PROCEDURE — 99285 EMERGENCY DEPT VISIT HI MDM: CPT | Performed by: EMERGENCY MEDICINE

## 2024-02-02 PROCEDURE — 84484 ASSAY OF TROPONIN QUANT: CPT | Performed by: EMERGENCY MEDICINE

## 2024-02-02 PROCEDURE — 85610 PROTHROMBIN TIME: CPT | Performed by: EMERGENCY MEDICINE

## 2024-02-02 PROCEDURE — 93970 EXTREMITY STUDY: CPT

## 2024-02-02 PROCEDURE — 94760 N-INVAS EAR/PLS OXIMETRY 1: CPT | Mod: CCI

## 2024-02-02 PROCEDURE — 93970 EXTREMITY STUDY: CPT | Performed by: RADIOLOGY

## 2024-02-02 PROCEDURE — 94640 AIRWAY INHALATION TREATMENT: CPT | Mod: MUE

## 2024-02-02 PROCEDURE — 9420000001 HC RT PATIENT EDUCATION 5 MIN

## 2024-02-02 PROCEDURE — 94667 MNPJ CHEST WALL 1ST: CPT

## 2024-02-02 PROCEDURE — 99442 PR PHYS/QHP TELEPHONE EVALUATION 11-20 MIN: CPT | Performed by: FAMILY MEDICINE

## 2024-02-02 PROCEDURE — 2500000002 HC RX 250 W HCPCS SELF ADMINISTERED DRUGS (ALT 637 FOR MEDICARE OP, ALT 636 FOR OP/ED): Mod: MUE | Performed by: STUDENT IN AN ORGANIZED HEALTH CARE EDUCATION/TRAINING PROGRAM

## 2024-02-02 PROCEDURE — 85730 THROMBOPLASTIN TIME PARTIAL: CPT | Performed by: EMERGENCY MEDICINE

## 2024-02-02 RX ORDER — ALBUTEROL SULFATE 0.83 MG/ML
2.5 SOLUTION RESPIRATORY (INHALATION) EVERY 2 HOUR PRN
Status: DISCONTINUED | OUTPATIENT
Start: 2024-02-02 | End: 2024-02-03 | Stop reason: HOSPADM

## 2024-02-02 RX ORDER — LORAZEPAM 0.5 MG/1
0.5 TABLET ORAL EVERY 6 HOURS PRN
Status: DISCONTINUED | OUTPATIENT
Start: 2024-02-02 | End: 2024-02-03 | Stop reason: HOSPADM

## 2024-02-02 RX ORDER — ASPIRIN 81 MG/1
81 TABLET ORAL DAILY
Status: DISCONTINUED | OUTPATIENT
Start: 2024-02-02 | End: 2024-02-03 | Stop reason: HOSPADM

## 2024-02-02 RX ORDER — LANOLIN ALCOHOL/MO/W.PET/CERES
400 CREAM (GRAM) TOPICAL
Status: DISCONTINUED | OUTPATIENT
Start: 2024-02-02 | End: 2024-02-03 | Stop reason: HOSPADM

## 2024-02-02 RX ORDER — FUROSEMIDE 20 MG/1
20 TABLET ORAL DAILY
Status: DISCONTINUED | OUTPATIENT
Start: 2024-02-02 | End: 2024-02-03 | Stop reason: HOSPADM

## 2024-02-02 RX ORDER — AMOXICILLIN AND CLAVULANATE POTASSIUM 875; 125 MG/1; MG/1
875 TABLET, FILM COATED ORAL 2 TIMES DAILY
Qty: 20 TABLET | Refills: 0 | Status: SHIPPED | OUTPATIENT
Start: 2024-02-02 | End: 2024-02-12

## 2024-02-02 RX ORDER — TALC
3 POWDER (GRAM) TOPICAL DAILY
Status: DISCONTINUED | OUTPATIENT
Start: 2024-02-02 | End: 2024-02-03 | Stop reason: HOSPADM

## 2024-02-02 RX ORDER — ACETAMINOPHEN 325 MG/1
650 TABLET ORAL EVERY 4 HOURS PRN
Status: DISCONTINUED | OUTPATIENT
Start: 2024-02-02 | End: 2024-02-03 | Stop reason: HOSPADM

## 2024-02-02 RX ORDER — ALBUTEROL SULFATE 0.83 MG/ML
2.5 SOLUTION RESPIRATORY (INHALATION) EVERY 4 HOURS PRN
Status: DISCONTINUED | OUTPATIENT
Start: 2024-02-02 | End: 2024-02-02

## 2024-02-02 RX ORDER — BUDESONIDE 0.5 MG/2ML
0.5 INHALANT ORAL 2 TIMES DAILY
Status: DISCONTINUED | OUTPATIENT
Start: 2024-02-02 | End: 2024-02-03 | Stop reason: HOSPADM

## 2024-02-02 RX ORDER — LEVOTHYROXINE SODIUM 50 UG/1
50 TABLET ORAL DAILY
Status: DISCONTINUED | OUTPATIENT
Start: 2024-02-02 | End: 2024-02-03 | Stop reason: HOSPADM

## 2024-02-02 RX ORDER — ARFORMOTEROL TARTRATE 15 UG/2ML
15 SOLUTION RESPIRATORY (INHALATION)
Status: DISCONTINUED | OUTPATIENT
Start: 2024-02-02 | End: 2024-02-03 | Stop reason: HOSPADM

## 2024-02-02 RX ORDER — FERROUS SULFATE 325(65) MG
1 TABLET ORAL DAILY
Status: DISCONTINUED | OUTPATIENT
Start: 2024-02-02 | End: 2024-02-03 | Stop reason: HOSPADM

## 2024-02-02 RX ORDER — POLYETHYLENE GLYCOL 3350 17 G/17G
17 POWDER, FOR SOLUTION ORAL DAILY
Status: DISCONTINUED | OUTPATIENT
Start: 2024-02-02 | End: 2024-02-03 | Stop reason: HOSPADM

## 2024-02-02 RX ORDER — ONDANSETRON HYDROCHLORIDE 2 MG/ML
4 INJECTION, SOLUTION INTRAVENOUS EVERY 8 HOURS PRN
Status: DISCONTINUED | OUTPATIENT
Start: 2024-02-02 | End: 2024-02-03 | Stop reason: HOSPADM

## 2024-02-02 RX ORDER — NITROGLYCERIN 0.4 MG/1
0.4 TABLET SUBLINGUAL EVERY 5 MIN PRN
Status: DISCONTINUED | OUTPATIENT
Start: 2024-02-02 | End: 2024-02-03 | Stop reason: HOSPADM

## 2024-02-02 RX ORDER — ENOXAPARIN SODIUM 100 MG/ML
40 INJECTION SUBCUTANEOUS EVERY 24 HOURS
Status: DISCONTINUED | OUTPATIENT
Start: 2024-02-02 | End: 2024-02-03 | Stop reason: HOSPADM

## 2024-02-02 RX ORDER — CLOPIDOGREL BISULFATE 75 MG/1
75 TABLET ORAL DAILY
Status: DISCONTINUED | OUTPATIENT
Start: 2024-02-02 | End: 2024-02-03 | Stop reason: HOSPADM

## 2024-02-02 RX ORDER — LOSARTAN POTASSIUM 50 MG/1
50 TABLET ORAL 2 TIMES DAILY
Status: DISCONTINUED | OUTPATIENT
Start: 2024-02-02 | End: 2024-02-03 | Stop reason: HOSPADM

## 2024-02-02 RX ADMIN — BUDESONIDE INHALATION 0.5 MG: 0.5 SUSPENSION RESPIRATORY (INHALATION) at 20:15

## 2024-02-02 RX ADMIN — LORAZEPAM 0.5 MG: 0.5 TABLET ORAL at 21:06

## 2024-02-02 RX ADMIN — MELATONIN TAB 3 MG 3 MG: 3 TAB at 21:06

## 2024-02-02 RX ADMIN — ARFORMOTEROL TARTRATE 15 MCG: 15 SOLUTION RESPIRATORY (INHALATION) at 20:09

## 2024-02-02 RX ADMIN — LOSARTAN POTASSIUM 50 MG: 50 TABLET, FILM COATED ORAL at 21:06

## 2024-02-02 SDOH — SOCIAL STABILITY: SOCIAL INSECURITY: DO YOU FEEL UNSAFE GOING BACK TO THE PLACE WHERE YOU ARE LIVING?: NO

## 2024-02-02 SDOH — SOCIAL STABILITY: SOCIAL INSECURITY: ARE THERE ANY APPARENT SIGNS OF INJURIES/BEHAVIORS THAT COULD BE RELATED TO ABUSE/NEGLECT?: NO

## 2024-02-02 SDOH — SOCIAL STABILITY: SOCIAL INSECURITY: DOES ANYONE TRY TO KEEP YOU FROM HAVING/CONTACTING OTHER FRIENDS OR DOING THINGS OUTSIDE YOUR HOME?: NO

## 2024-02-02 SDOH — SOCIAL STABILITY: SOCIAL INSECURITY: ABUSE: ADULT

## 2024-02-02 SDOH — SOCIAL STABILITY: SOCIAL INSECURITY: DO YOU FEEL ANYONE HAS EXPLOITED OR TAKEN ADVANTAGE OF YOU FINANCIALLY OR OF YOUR PERSONAL PROPERTY?: NO

## 2024-02-02 SDOH — SOCIAL STABILITY: SOCIAL INSECURITY: ARE YOU OR HAVE YOU BEEN THREATENED OR ABUSED PHYSICALLY, EMOTIONALLY, OR SEXUALLY BY ANYONE?: NO

## 2024-02-02 SDOH — SOCIAL STABILITY: SOCIAL INSECURITY: HAVE YOU HAD THOUGHTS OF HARMING ANYONE ELSE?: NO

## 2024-02-02 SDOH — SOCIAL STABILITY: SOCIAL INSECURITY: HAS ANYONE EVER THREATENED TO HURT YOUR FAMILY OR YOUR PETS?: NO

## 2024-02-02 ASSESSMENT — COGNITIVE AND FUNCTIONAL STATUS - GENERAL
MOBILITY SCORE: 24
DAILY ACTIVITIY SCORE: 24
DAILY ACTIVITIY SCORE: 24
MOBILITY SCORE: 24
PATIENT BASELINE BEDBOUND: NO

## 2024-02-02 ASSESSMENT — ACTIVITIES OF DAILY LIVING (ADL)
DRESSING YOURSELF: INDEPENDENT
WALKS IN HOME: INDEPENDENT
BATHING: INDEPENDENT
JUDGMENT_ADEQUATE_SAFELY_COMPLETE_DAILY_ACTIVITIES: YES
HEARING - LEFT EAR: FUNCTIONAL
HEARING - RIGHT EAR: FUNCTIONAL
LACK_OF_TRANSPORTATION: NO
PATIENT'S MEMORY ADEQUATE TO SAFELY COMPLETE DAILY ACTIVITIES?: YES
TOILETING: INDEPENDENT
FEEDING YOURSELF: INDEPENDENT
ADEQUATE_TO_COMPLETE_ADL: YES
GROOMING: INDEPENDENT

## 2024-02-02 ASSESSMENT — PAIN SCALES - GENERAL
PAINLEVEL_OUTOF10: 2
PAINLEVEL_OUTOF10: 0 - NO PAIN
PAINLEVEL_OUTOF10: 0 - NO PAIN

## 2024-02-02 ASSESSMENT — LIFESTYLE VARIABLES
SKIP TO QUESTIONS 9-10: 1
AUDIT-C TOTAL SCORE: 0
HOW OFTEN DO YOU HAVE A DRINK CONTAINING ALCOHOL: NEVER
AUDIT-C TOTAL SCORE: 0
HOW MANY STANDARD DRINKS CONTAINING ALCOHOL DO YOU HAVE ON A TYPICAL DAY: PATIENT DOES NOT DRINK
HOW OFTEN DO YOU HAVE 6 OR MORE DRINKS ON ONE OCCASION: NEVER

## 2024-02-02 ASSESSMENT — PAIN DESCRIPTION - LOCATION: LOCATION: HEAD

## 2024-02-02 ASSESSMENT — PAIN DESCRIPTION - PROGRESSION: CLINICAL_PROGRESSION: GRADUALLY IMPROVING

## 2024-02-02 ASSESSMENT — PAIN - FUNCTIONAL ASSESSMENT
PAIN_FUNCTIONAL_ASSESSMENT: 0-10
PAIN_FUNCTIONAL_ASSESSMENT: 0-10

## 2024-02-02 NOTE — ED PROVIDER NOTES
History/Exam limitations: none.   Additional history was obtained from  EMS personnel .    HPI:       Trinity Hernandez is a 74 y.o. with a history of prior TIA, COPD, pulmonary emphysema, anxiety, coronary artery disease, cardiomyopathy, CHF, elevated liver enzymes, GERD, hypothyroidism, left bundle branch block, sarcoidosis, chronic shortness of breath, generalized weakness, please see EMR.  Patient was at home.  She had seen her primary care earlier today.  She was complaining of some pain and swelling in her right upper extremity and they told her to take an extra dose of Lasix for her swelling.  The doctor's office called her and told her they wanted her to have an ultrasound to rule out blood clot in the arm.  Patient denies any weakness of the arms or legs.  She states she was ambulating in the kitchen and she got really lightheaded and felt like something was wrong.  She called her  who rushed to her aid.  She felt like she was going to pass out.  She may be passed out briefly she is uncertain.  She is feeling better now on arrival by EMS.  She denies having chest pain or palpitations.  She feels back to baseline now.  She did not have any unilateral weakness.     Last Known Well Time: 1445        ------------------------------------------------------------------------------------------------------------------------------------------     Physical Exam:    ED Triage Vitals [02/02/24 1520]   Temp Heart Rate Respirations BP   -- 92 15 (!) 146/92      SpO2 Temp src Heart Rate Source Patient Position   100 % -- -- --      BP Location FiO2 (%)     -- --          Gen: Not in acute distress  Head/Neck: NCAT  Eyes: Anicteric sclerae, noninjected conjunctivae  Nose: No rhinorrhea  Mouth:  MMM  Heart: Regular rate and rhythm w/ no murmurs, rubs, gallops  Lungs: CTAB w/o wheezes, rales, rhonchi, no increased work of breathing  Abdomen: Soft, NT/ND  Musculoskeletal: No deformities  Extremities: No  edema.  Neurologic: Please see below for NIHSS  Skin: No rashes noted  Psychological: Calm        Interval: Baseline  Time: 12:01 PM  Person Administering Scale: Yusuf Cherry DO     1a  Level of consciousness: 0=alert; keenly responsive   1b. LOC questions:  0=Performs both tasks correctly   1c. LOC commands: 0=Performs both tasks correctly   2.  Best Gaze: 0=normal   3. Visual: 0=No visual loss   4. Facial Palsy: 0=Normal symmetric movement   5a. Motor left arm: 0=No drift, limb holds 90 (or 45) degrees for full 10 seconds   5b.  Motor right arm: 0=No drift, limb holds 90 (or 45) degrees for full 10 seconds   6a. motor left le=No drift, limb holds 90 (or 45) degrees for full 10 seconds   6b  Motor right le=No drift, limb holds 90 (or 45) degrees for full 10 seconds   7. Limb Ataxia: 0=Absent   8.  Sensory: 0=Normal; no sensory loss   9. Best Language:  0=No aphasia, normal   10. Dysarthria: 0=Normal   11. Extinction and Inattention: 0=No abnormality     Total:   0         VAN: VAN: Negative     ------------------------------------------------------------------------------------------------------------------------------------------     Medical Decision Making:     ED Course as of 24 1201   Fri  EKG at 1530 interpreted by me.  Normal sinus rhythm 83 bpm.  Normal axis.  CT 10 42 ms  ms  ms.  Patient has RSR prime in lead V5 and V6 consistent with left bundle branch block.  No ischemia.  No STEMI. [EC]   1713 CBC and Auto Differential(!)  CBC is unremarkable. [EC]   1714 Comprehensive metabolic panel(!)  Comprehensive metabolic panel is unremarkable. [EC]   1714 Troponin Series, (0, 1 HR)  First troponin is 5, second troponin is 6. [EC]   1714 Protime-INR  INR is normal at 1.1. [EC]   1714 CT of the brain reveals  IMPRESSION:  No evidence of acute cortical infarct or intracranial hemorrhage.      No evidence of intracranial hemorrhage or displaced skull fracture.    [EC]   1715 Venous duplex scan bilateral upper extremities is  IMPRESSION:  Negative study.  No thrombus in the visualized portion of the central  veins of the  bilateral upper extremities.   [EC]      ED Course User Index  [EC] Yusuf Cherry,          Diagnoses as of 02/03/24 1201   Syncope and collapse     All labs above reviewed.  Imaging reviewed.  Discussed findings with the patient and her family.  I discussed findings with the on-call hospitalist Dr. WILLIS Neri and will bring in for tele / observation     EKG interpreted by myself: EKG at 1530 interpreted by me at 1532.  Normal sinus rhythm 83 bpm.  Normal axis.   ms  ms  ms.  Patient has RSR prime in lead V5 V6 consistent with left bundle branch block.  No acute ischemia.  No STEMI.     Independent Interpretation of Studies: I independently interpreted the CT head and see No obvious evidence of intracranial hemorrhage    Chronic Medical Conditions Significantly Affecting Care: Please see EMR, please see HPI    Social Determinants of Health Significantly Affecting Care:  No significant issues identified     External Records Reviewed: I reviewed recent and relevant outside records including: EMR,     Discussion of Management with Other Providers:   I discussed the patient/results with: The patient and her family.        IV Thrombolysis IV Thrombolysis Checklist        IV Thrombolysis Given: No; Thrombolysis contraindication reason: Working diagnosis is NOT a suspected ischemic stroke, Neurologic signs are mild and judged to be non-disabling, and Neurologic signs have spontaneously resolved         Procedure  Procedures          Yusuf Cherry,   02/02/24 1716       Yusuf Cherry,   02/02/24 1753       Yusuf Cherry,   02/03/24 1201

## 2024-02-03 VITALS
RESPIRATION RATE: 16 BRPM | WEIGHT: 164.24 LBS | HEIGHT: 66 IN | OXYGEN SATURATION: 96 % | HEART RATE: 92 BPM | DIASTOLIC BLOOD PRESSURE: 54 MMHG | SYSTOLIC BLOOD PRESSURE: 105 MMHG | TEMPERATURE: 96.9 F | BODY MASS INDEX: 26.4 KG/M2

## 2024-02-03 PROCEDURE — 99236 HOSP IP/OBS SAME DATE HI 85: CPT | Performed by: STUDENT IN AN ORGANIZED HEALTH CARE EDUCATION/TRAINING PROGRAM

## 2024-02-03 PROCEDURE — 9420000001 HC RT PATIENT EDUCATION 5 MIN

## 2024-02-03 PROCEDURE — 2500000001 HC RX 250 WO HCPCS SELF ADMINISTERED DRUGS (ALT 637 FOR MEDICARE OP): Performed by: STUDENT IN AN ORGANIZED HEALTH CARE EDUCATION/TRAINING PROGRAM

## 2024-02-03 PROCEDURE — G0378 HOSPITAL OBSERVATION PER HR: HCPCS

## 2024-02-03 PROCEDURE — 94668 MNPJ CHEST WALL SBSQ: CPT

## 2024-02-03 PROCEDURE — 94640 AIRWAY INHALATION TREATMENT: CPT

## 2024-02-03 PROCEDURE — 2500000005 HC RX 250 GENERAL PHARMACY W/O HCPCS: Performed by: STUDENT IN AN ORGANIZED HEALTH CARE EDUCATION/TRAINING PROGRAM

## 2024-02-03 PROCEDURE — 94760 N-INVAS EAR/PLS OXIMETRY 1: CPT

## 2024-02-03 PROCEDURE — 2500000002 HC RX 250 W HCPCS SELF ADMINISTERED DRUGS (ALT 637 FOR MEDICARE OP, ALT 636 FOR OP/ED): Mod: MUE | Performed by: STUDENT IN AN ORGANIZED HEALTH CARE EDUCATION/TRAINING PROGRAM

## 2024-02-03 PROCEDURE — 2500000004 HC RX 250 GENERAL PHARMACY W/ HCPCS (ALT 636 FOR OP/ED): Mod: MUE | Performed by: STUDENT IN AN ORGANIZED HEALTH CARE EDUCATION/TRAINING PROGRAM

## 2024-02-03 RX ORDER — HEPARIN 100 UNIT/ML
5 SYRINGE INTRAVENOUS AS NEEDED
Status: DISCONTINUED | OUTPATIENT
Start: 2024-02-03 | End: 2024-02-03 | Stop reason: HOSPADM

## 2024-02-03 RX ADMIN — ALBUTEROL SULFATE 2.5 MG: 2.5 SOLUTION RESPIRATORY (INHALATION) at 01:21

## 2024-02-03 RX ADMIN — BUDESONIDE INHALATION 0.5 MG: 0.5 SUSPENSION RESPIRATORY (INHALATION) at 07:32

## 2024-02-03 RX ADMIN — MAGNESIUM OXIDE TAB 400 MG (241.3 MG ELEMENTAL MG) 400 MG: 400 (241.3 MG) TAB at 06:40

## 2024-02-03 RX ADMIN — Medication 2 L/MIN: at 08:00

## 2024-02-03 RX ADMIN — CLOPIDOGREL BISULFATE 75 MG: 75 TABLET ORAL at 08:29

## 2024-02-03 RX ADMIN — ASPIRIN 81 MG: 81 TABLET, COATED ORAL at 08:28

## 2024-02-03 RX ADMIN — FERROUS SULFATE TAB 325 MG (65 MG ELEMENTAL FE) 1 TABLET: 325 (65 FE) TAB at 08:29

## 2024-02-03 RX ADMIN — ARFORMOTEROL TARTRATE 15 MCG: 15 SOLUTION RESPIRATORY (INHALATION) at 07:00

## 2024-02-03 RX ADMIN — FUROSEMIDE 20 MG: 20 TABLET ORAL at 08:29

## 2024-02-03 RX ADMIN — HEPARIN 500 UNITS: 100 SYRINGE at 09:42

## 2024-02-03 RX ADMIN — POLYETHYLENE GLYCOL 3350 17 G: 17 POWDER, FOR SOLUTION ORAL at 08:35

## 2024-02-03 RX ADMIN — LEVOTHYROXINE SODIUM 50 MCG: 50 TABLET ORAL at 08:35

## 2024-02-03 ASSESSMENT — COGNITIVE AND FUNCTIONAL STATUS - GENERAL
MOBILITY SCORE: 24
DAILY ACTIVITIY SCORE: 24

## 2024-02-03 ASSESSMENT — PAIN SCALES - GENERAL: PAINLEVEL_OUTOF10: 0 - NO PAIN

## 2024-02-03 NOTE — CARE PLAN
The patient has had an uneventful shift, the patient has had no complaints or requests at this time. The call bell is within reach. Patient has no pain or shortness of breath throughout the shift, all safety needs met at this time. Will continue the plan of care as previously stated.

## 2024-02-03 NOTE — CARE PLAN
The patient's goals for the shift include      The clinical goals for the shift include Patient will remain hemodynamically stable throughout the shift    Patient will discharge this shift. Patient denies any dizziness and pain at this time. Discharge instructions given and patient verbalized understanding.

## 2024-02-03 NOTE — DISCHARGE SUMMARY
Discharge Diagnosis  Pre-syncope    Issues Requiring Follow-Up  BP re-evaluation (losartan stopped)    Test Results Pending At Discharge  Pending Labs       No current pending labs.            Hospital Course  Trinity Hernandez is a 74 y.o. female with PMHx significant for CAD (s/p stent), NSVT, Paroxysmal Atrial Fibrillation, Chronic Diastolic heart Failure, HTN, AAA, HLD, COPD, Chronic Hypoxic Respiratory Failure 2/2 Pulmonary Sarcoidosis (on 2L at night), Hypothyroidism, Migraines, Hiatal Hernia, and Diverticulosis, who presented to [facility] ED due to pre-syncope. DVT of right arm rule out during ED work-up.    Presyncope vs Hypotension  - Blood pressure remained on the lower end of normal, with one instance of low BP during the night  - No arrhythmias noted on telemetry monitor  - Will have patient stop Losartan until BP reevaluated by her PCP    Less than 30 minutes were spent in coordinating patient discharge.      Pertinent Physical Exam At Time of Discharge  Physical Exam  Constitutional:       Appearance: Normal appearance.   HENT:      Head: Normocephalic and atraumatic.      Mouth/Throat:      Mouth: Mucous membranes are moist.   Eyes:      Extraocular Movements: Extraocular movements intact.      Conjunctiva/sclera: Conjunctivae normal.   Cardiovascular:      Rate and Rhythm: Normal rate and regular rhythm.      Pulses: Normal pulses.      Heart sounds: Normal heart sounds.   Pulmonary:      Effort: Pulmonary effort is normal.      Breath sounds: Normal breath sounds.      Comments: On 2L o2  Abdominal:      General: Abdomen is flat. Bowel sounds are normal.      Palpations: Abdomen is soft.   Musculoskeletal:         General: Normal range of motion.   Skin:     General: Skin is warm.   Neurological:      General: No focal deficit present.      Mental Status: She is alert and oriented to person, place, and time.   Psychiatric:         Mood and Affect: Mood normal.         Behavior: Behavior normal.          Thought Content: Thought content normal.        Home Medications     Medication List      CONTINUE taking these medications     * albuterol 2.5 mg /3 mL (0.083 %) nebulizer solution   * albuterol 90 mcg/actuation inhaler   amoxicillin-pot clavulanate 875-125 mg tablet; Commonly known as:   Augmentin; Take 1 tablet (875 mg) by mouth 2 times a day for 10 days.   arformoterol 15 mcg/2 mL nebulizer solution; Commonly known as: Brovana   aspirin 81 mg EC tablet   budesonide 0.5 mg/2 mL nebulizer solution; Commonly known as: Pulmicort   clopidogrel 75 mg tablet; Commonly known as: Plavix   ferrous sulfate (325 mg ferrous sulfate) tablet   furosemide 20 mg tablet; Commonly known as: Lasix   levothyroxine 50 mcg tablet; Commonly known as: Synthroid, Levoxyl; Take   1 tablet (50 mcg) by mouth once daily.   LORazepam 0.5 mg tablet; Commonly known as: Ativan; Take 1 tablet (0.5   mg) by mouth every 6 hours if needed for anxiety.   magnesium oxide 400 mg (241.3 mg magnesium) tablet; Commonly known as:   Mag-Ox   metoprolol succinate XL 50 mg 24 hr tablet; Commonly known as: Toprol-XL   nitroglycerin 0.4 mg SL tablet; Commonly known as: Nitrostat   sodium chloride (PF) 0.9% injection; INFUSE 10 ML INTO A VENOUS CATHETER   EVERY 8 HOURS.   Spiriva Respimat 2.5 mcg/actuation inhaler; Generic drug: tiotropium  * This list has 2 medication(s) that are the same as other medications   prescribed for you. Read the directions carefully, and ask your doctor or   other care provider to review them with you.     STOP taking these medications     losartan 25 mg tablet; Commonly known as: Cozaar       Outpatient Follow-Up  Future Appointments   Date Time Provider Department [place]   2/5/2024  1:00 PM INF 01 [facility]   2/12/2024  1:30 PM DO IVET Gomes[address]   2/21/2024  1:00 PM INF 01 [facility]   6/3/2024  2:00 PM DO IVET [address]       Luis Neri DO

## 2024-02-03 NOTE — H&P
HPI    Trinity Hernandez is a 74 y.o. female with PMHx significant for CAD (s/p stent), NSVT, Paroxysmal Atrial Fibrillation, Chronic Diastolic heart Failure, HTN, AAA, HLD, COPD, Chronic Hypoxic Respiratory Failure 2/2 Pulmonary Sarcoidosis (on 2L at night), Hypothyroidism, Migraines, Hiatal Hernia, and Diverticulosis, who presented to [facility] ED due to pre-syncope. Patient states that she was at home walking in her kitchen when she suddenly felt very light-headed. When this occurred, she called out for her  and told him she did not feel right. Per patient,  caught her fall. She did not have loss of consciousness and did not sustain a fall or head injury. Of note, earlier in the day she was instructed to go to the ED to obtain an ultrasound of her right arm due to patient being concerned of hand swelling. Currently she feels her normal self, has not had palpitations, chest pain, vision changes, dizziness, SOB, new weakness.     12 Point ROS negative unless noted in above [facility]    ED Course   Vitals - /54 (BP Location: Right arm, Patient Position: Lying)   Pulse 92   Temp 36.1 °C (96.9 °F) (Temporal)   Resp 16   Wt 74.5 kg (164 lb 3.9 oz)   SpO2 96%   Labs - No results found for this or any previous visit (from the past 24 hour(s)).  Interventions - Medications - No data to display    Past Medical History     Past Medical History:   Diagnosis Date    Abdominal bloating 05/04/2023    Diverticulitis of small intestine without perforation or abscess without bleeding     Diverticulitis, intestine, small    Dizzy 11/20/2023    Essential (primary) hypertension 02/08/2017    HTN (hypertension), benign    Influenza B 05/08/2015    Formatting of this note might be different from the original. Completed Tamiflu Continue droplet precautions.    Nausea 11/16/2018    Personal history of other endocrine, nutritional and metabolic disease 02/08/2017    History of hypothyroidism    Personal history of  other venous thrombosis and embolism     History of deep venous thrombosis        Surgical History     Past Surgical History:   Procedure Laterality Date    ABDOMINAL ADHESION SURGERY  05/16/2017    Laparoscopic Lysis Of Intestinal Adhesions    CERVICAL FUSION  05/16/2017    Cervical Vertebral Fusion    CHOLECYSTECTOMY  05/16/2017    Cholecystectomy    COLECTOMY PARTIAL / TOTAL  05/16/2017    Partial Colectomy - Sigmoid    CORONARY ANGIOPLASTY WITH STENT PLACEMENT      CT ANGIO NECK  05/21/2020    CT NECK ANGIO W AND WO IV CONTRAST 5/21/2020 [facility]    CT HEAD ANGIO W AND WO IV CONTRAST  05/21/2020    CT HEAD ANGIO W AND WO IV CONTRAST 5/21/2020 [facility]    HERNIA REPAIR  05/16/2017    Hernia Repair    HYSTERECTOMY  10/03/2013    Hysterectomy    MR HEAD ANGIO WO IV CONTRAST  05/21/2020    MR HEAD ANGIO WO IV CONTRAST 5/21/2020 [facility]    MR NECK ANGIO WO IV CONTRAST  05/21/2020    MR NECK ANGIO WO IV CONTRAST 5/21/2020 [facility]    OTHER SURGICAL HISTORY  05/16/2017    Thoracoscopy Of Lungs And Pleural Space With Biopsy Of Lung Nodules    OTHER SURGICAL HISTORY  04/15/2019    Esophagogastroduodenoscopy       Family History   No family history on file.    Social History   She reports that she has quit smoking. Her smoking use included cigarettes. She has never been exposed to tobacco smoke. She has never used smokeless tobacco. She reports that she does not currently use alcohol. She reports that she does not use drugs.    Allergies   Hydrocodone-acetaminophen, Montelukast, Morphine, Nitrofurantoin monohyd/m-cryst, Sulfa (sulfonamide antibiotics), Atorvastatin, Dicyclomine, Fluticasone propion-salmeterol, Levofloxacin, Lisinopril, Nitroimidazoles, Omeprazole, Salmeterol, Simvastatin, Sucralfate, and Umeclidinium    Medications   No current facility-administered medications for this encounter.    Current Outpatient Medications:     0.9 % sodium chloride (sodium  chloride, PF, 0.9%) injection, INFUSE 10 ML INTO A VENOUS CATHETER EVERY 8 HOURS., Disp: 900 mL, Rfl: 0    albuterol 2.5 mg /3 mL (0.083 %) nebulizer solution, Take 3 mL (2.5 mg) by nebulization every 4 hours if needed for shortness of breath., Disp: , Rfl:     albuterol 90 mcg/actuation inhaler, Inhale 2 puffs every 6 hours if needed for shortness of breath., Disp: , Rfl:     amoxicillin-pot clavulanate (Augmentin) 875-125 mg tablet, Take 1 tablet (875 mg) by mouth 2 times a day for 10 days., Disp: 20 tablet, Rfl: 0    arformoterol (Brovana) 15 mcg/2 mL nebulizer solution, INHALE 2ML VIA NEBULIZER AS INSTRUCTED TWICE A DAY (EVERY 12 HOURS), Disp: , Rfl:     aspirin 81 mg EC tablet, TAKE 1 TABLET BY MOUTH EVERY 24 HRS, Disp: , Rfl:     budesonide (Pulmicort) 0.5 mg/2 mL nebulizer solution, Take 2 mL (0.5 mg) by nebulization 2 times a day. Rinse mouth with water after use to reduce aftertaste and incidence of candidiasis. Do not swallow., Disp: , Rfl:     clopidogrel (Plavix) 75 mg tablet, Take 1 tablet (75 mg) by mouth once daily., Disp: , Rfl:     ferrous sulfate 325 (65 Fe) MG tablet, Take 1 tablet by mouth once daily., Disp: , Rfl:     furosemide (Lasix) 20 mg tablet, Take 1 tablet (20 mg) by mouth once daily., Disp: , Rfl:     levothyroxine (Synthroid, Levoxyl) 50 mcg tablet, Take 1 tablet (50 mcg) by mouth once daily., Disp: 90 tablet, Rfl: 3    LORazepam (Ativan) 0.5 mg tablet, Take 1 tablet (0.5 mg) by mouth every 6 hours if needed for anxiety., Disp: 30 tablet, Rfl: 1    magnesium oxide (Mag-Ox) 400 mg (241.3 mg magnesium) tablet, Take 1 tablet (400 mg) by mouth once daily., Disp: , Rfl:     metoprolol succinate XL (Toprol-XL) 50 mg 24 hr tablet, Take 1.5 tablets (75 mg) by mouth once daily., Disp: , Rfl:     nitroglycerin (Nitrostat) 0.4 mg SL tablet, Place 1 tablet (0.4 mg) under the tongue., Disp: , Rfl:     tiotropium (Spiriva Respimat) 2.5 mcg/actuation inhaler, Inhale 2 puffs once daily., Disp: , Rfl:  "    Objective   Vital Signs:  Blood pressure 105/54, pulse 92, temperature 36.1 °C (96.9 °F), temperature source Temporal, resp. rate 16, height 1.676 m (5' 5.98\"), weight 74.5 kg (164 lb 3.9 oz), SpO2 96 %.    Physical Exam  Constitutional:       Appearance: Normal appearance.   HENT:      Head: Normocephalic and atraumatic.      Mouth/Throat:      Mouth: Mucous membranes are moist.   Eyes:      Extraocular Movements: Extraocular movements intact.      Conjunctiva/sclera: Conjunctivae normal.   Cardiovascular:      Rate and Rhythm: Normal rate and regular rhythm.      Pulses: Normal pulses.      Heart sounds: Normal heart sounds.   Pulmonary:      Effort: Pulmonary effort is normal.      Breath sounds: Normal breath sounds.      Comments: On 2L o2  Abdominal:      General: Abdomen is flat. Bowel sounds are normal.      Palpations: Abdomen is soft.   Musculoskeletal:         General: Normal range of motion.   Skin:     General: Skin is warm.   Neurological:      General: No focal deficit present.      Mental Status: She is alert and oriented to person, place, and time.   Psychiatric:         Mood and Affect: Mood normal.         Behavior: Behavior normal.         Thought Content: Thought content normal.         Wt Readings from Last 6 Encounters:   02/02/24 74.5 kg (164 lb 3.9 oz)   01/10/24 75.3 kg (166 lb)   01/08/24 74.4 kg (164 lb)   01/04/24 74.7 kg (164 lb 9.6 oz)   12/19/23 75.5 kg (166 lb 7.2 oz)   12/08/23 73.9 kg (162 lb 14.7 oz)       I/Os:    Intake/Output Summary (Last 24 hours) at 2/3/2024 1832  Last data filed at 2/3/2024 0733  Gross per 24 hour   Intake 240 ml   Output --   Net 240 ml       Labs:   No results found for this or any previous visit (from the past 24 hour(s)).    Imaging:  Vascular US upper extremity venous duplex bilateral    Result Date: 2/2/2024  Interpreted By:  Alexis Peck, STUDY: [facility] US UPPER EXTREMITY VENOUS DUPLEX BILATERAL  2/2/2024 4:35 pm   INDICATION: 75 y/o   F with  " Signs/Symptoms:arm swelling.   COMPARISON: October 5, 2023 chest CT.   ACCESSION NUMBER(S): [id number]   ORDERING CLINICIAN: FRED CRUZ   TECHNIQUE: Routine ultrasound of the bilateral upper extremity was performed with duplex Doppler (color and spectral) evaluation.   Static images were obtained for remote interpretation.   FINDINGS: UPPER EXTREMITY VEINS:  Examination was performed with color and duplex Doppler.   The internal jugular, subclavian, and axillary veins are patent and free of thrombus.  The visualized brachiocephalic veins are patent. The brachial, basilic, and cephalic veins are patent and compressible.       Negative study.  No thrombus in the visualized portion of the central veins of the  bilateral upper extremities.   MACRO: None   Signed by: Alexis Peck 2/2/2024 4:40 PM Dictation workstation:   [id number]    CT brain attack head wo IV contrast    Result Date: 2/2/2024  Interpreted By:  Michelle Ellis, STUDY: CT BRAIN ATTACK HEAD WO IV CONTRAST;  2/2/2024 3:11 pm   INDICATION: Signs/Symptoms:brain attack.   COMPARISON: None.   ACCESSION NUMBER(S): [id number]   ORDERING CLINICIAN: FRED CRUZ   TECHNIQUE: Noncontrast axial CT scan of head was performed. Angled reformats in brain and bone windows were generated. The images were reviewed in bone, brain, blood and soft tissue windows.   FINDINGS: CSF Spaces: The ventricles, sulci and basal cisterns are within normal limits. There is no extraaxial fluid collection.   Parenchyma:  The grey-white differentiation is intact. There is no mass effect or midline shift.  There is no intracranial hemorrhage.   Calvarium: The calvarium is unremarkable.   Paranasal sinuses and mastoids: Visualized paranasal sinuses and mastoids are clear.       No evidence of acute cortical infarct or intracranial hemorrhage.   No evidence of intracranial hemorrhage or displaced skull fracture.   MACRO: Michelle Ellis discussed the significance and urgency of this  critical finding by secure chat with  ER physician on 2/2/2024 at 3:18 pm. (**-RCF-**) Findings:  See findings.     Signed by: Michelle Ellis 2/2/2024 3:19 PM Dictation workstation:   [id number]      Assessment & Plan    Trinity Hernandez is a 74 year old female who was admitted for observation on telemetry due to presyncopal episode at home.    Presyncope vs Hypotension  - Blood pressure remained on the lower end of normal, with one instance of low BP during the night  - No arrhythmias noted on telemetry monitor  - Will have patient stop Losartan until BP reevaluated by her PCP    Disposition: Remained hemodynamically stable overnight with no cardiac events noted on telemetry. Discharge home this morning.    Low level of MDM based upon above medical issues.    Luis Neri, DO  Internal Medicine

## 2024-02-05 ENCOUNTER — INFUSION (OUTPATIENT)
Dept: HEMATOLOGY/ONCOLOGY | Facility: HOSPITAL | Age: 74
End: 2024-02-05
Payer: MEDICARE

## 2024-02-05 ENCOUNTER — PATIENT OUTREACH (OUTPATIENT)
Dept: PRIMARY CARE | Facility: CLINIC | Age: 74
End: 2024-02-05
Payer: MEDICARE

## 2024-02-05 VITALS
OXYGEN SATURATION: 93 % | HEART RATE: 92 BPM | RESPIRATION RATE: 20 BRPM | SYSTOLIC BLOOD PRESSURE: 148 MMHG | TEMPERATURE: 97.3 F | DIASTOLIC BLOOD PRESSURE: 74 MMHG

## 2024-02-05 DIAGNOSIS — I87.8 POOR VENOUS ACCESS: ICD-10-CM

## 2024-02-05 DIAGNOSIS — I11.0 HYPERTENSIVE HEART DISEASE WITH HEART FAILURE (MULTI): ICD-10-CM

## 2024-02-05 DIAGNOSIS — M25.40 JOINT SWELLING: ICD-10-CM

## 2024-02-05 DIAGNOSIS — E03.9 HYPOTHYROIDISM, UNSPECIFIED TYPE: ICD-10-CM

## 2024-02-05 DIAGNOSIS — J18.9 PNEUMONIA DUE TO INFECTIOUS ORGANISM, UNSPECIFIED LATERALITY, UNSPECIFIED PART OF LUNG: ICD-10-CM

## 2024-02-05 DIAGNOSIS — J44.9 COPD, SEVERE (MULTI): Primary | ICD-10-CM

## 2024-02-05 DIAGNOSIS — I50.22 HEART FAILURE WITH MILDLY REDUCED EJECTION FRACTION (MULTI): ICD-10-CM

## 2024-02-05 LAB
ALBUMIN SERPL BCP-MCNC: 3.9 G/DL (ref 3.4–5)
ALP SERPL-CCNC: 52 U/L (ref 33–136)
ALT SERPL W P-5'-P-CCNC: 10 U/L (ref 7–45)
ANION GAP SERPL CALC-SCNC: 11 MMOL/L (ref 10–20)
AST SERPL W P-5'-P-CCNC: 14 U/L (ref 9–39)
BASOPHILS # BLD AUTO: 0.08 X10*3/UL (ref 0–0.1)
BASOPHILS NFR BLD AUTO: 0.7 %
BILIRUB SERPL-MCNC: 0.4 MG/DL (ref 0–1.2)
BNP SERPL-MCNC: 75 PG/ML (ref 0–99)
BUN SERPL-MCNC: 11 MG/DL (ref 6–23)
CALCIUM SERPL-MCNC: 9.2 MG/DL (ref 8.6–10.3)
CHLORIDE SERPL-SCNC: 100 MMOL/L (ref 98–107)
CO2 SERPL-SCNC: 31 MMOL/L (ref 21–32)
CREAT SERPL-MCNC: 0.93 MG/DL (ref 0.5–1.05)
EGFRCR SERPLBLD CKD-EPI 2021: 64 ML/MIN/1.73M*2
EOSINOPHIL # BLD AUTO: 0.27 X10*3/UL (ref 0–0.4)
EOSINOPHIL NFR BLD AUTO: 2.4 %
ERYTHROCYTE [DISTWIDTH] IN BLOOD BY AUTOMATED COUNT: 14.1 % (ref 11.5–14.5)
GLUCOSE SERPL-MCNC: 100 MG/DL (ref 74–99)
HCT VFR BLD AUTO: 36.5 % (ref 36–46)
HGB BLD-MCNC: 11.4 G/DL (ref 12–16)
HOLD SPECIMEN: NORMAL
IMM GRANULOCYTES # BLD AUTO: 0.05 X10*3/UL (ref 0–0.5)
IMM GRANULOCYTES NFR BLD AUTO: 0.4 % (ref 0–0.9)
LYMPHOCYTES # BLD AUTO: 1.92 X10*3/UL (ref 0.8–3)
LYMPHOCYTES NFR BLD AUTO: 16.9 %
MCH RBC QN AUTO: 29 PG (ref 26–34)
MCHC RBC AUTO-ENTMCNC: 31.2 G/DL (ref 32–36)
MCV RBC AUTO: 93 FL (ref 80–100)
MONOCYTES # BLD AUTO: 0.81 X10*3/UL (ref 0.05–0.8)
MONOCYTES NFR BLD AUTO: 7.1 %
NEUTROPHILS # BLD AUTO: 8.24 X10*3/UL (ref 1.6–5.5)
NEUTROPHILS NFR BLD AUTO: 72.5 %
NRBC BLD-RTO: 0 /100 WBCS (ref 0–0)
PLATELET # BLD AUTO: 358 X10*3/UL (ref 150–450)
POTASSIUM SERPL-SCNC: 4 MMOL/L (ref 3.5–5.3)
PROT SERPL-MCNC: 6.5 G/DL (ref 6.4–8.2)
RBC # BLD AUTO: 3.93 X10*6/UL (ref 4–5.2)
SODIUM SERPL-SCNC: 138 MMOL/L (ref 136–145)
TSH SERPL-ACNC: 0.93 MIU/L (ref 0.44–3.98)
URATE SERPL-MCNC: 5.3 MG/DL (ref 2.3–6.7)
WBC # BLD AUTO: 11.4 X10*3/UL (ref 4.4–11.3)

## 2024-02-05 PROCEDURE — 84443 ASSAY THYROID STIM HORMONE: CPT | Performed by: FAMILY MEDICINE

## 2024-02-05 PROCEDURE — 2500000004 HC RX 250 GENERAL PHARMACY W/ HCPCS (ALT 636 FOR OP/ED): Performed by: FAMILY MEDICINE

## 2024-02-05 PROCEDURE — 86431 RHEUMATOID FACTOR QUANT: CPT | Mod: CONLAB | Performed by: FAMILY MEDICINE

## 2024-02-05 PROCEDURE — 86235 NUCLEAR ANTIGEN ANTIBODY: CPT | Mod: CONLAB | Performed by: FAMILY MEDICINE

## 2024-02-05 PROCEDURE — 36591 DRAW BLOOD OFF VENOUS DEVICE: CPT

## 2024-02-05 PROCEDURE — 85025 COMPLETE CBC W/AUTO DIFF WBC: CPT | Performed by: FAMILY MEDICINE

## 2024-02-05 PROCEDURE — 84145 PROCALCITONIN (PCT): CPT | Mod: CONLAB | Performed by: FAMILY MEDICINE

## 2024-02-05 PROCEDURE — 84550 ASSAY OF BLOOD/URIC ACID: CPT | Performed by: FAMILY MEDICINE

## 2024-02-05 PROCEDURE — 83880 ASSAY OF NATRIURETIC PEPTIDE: CPT | Performed by: FAMILY MEDICINE

## 2024-02-05 PROCEDURE — 80053 COMPREHEN METABOLIC PANEL: CPT | Performed by: FAMILY MEDICINE

## 2024-02-05 PROCEDURE — 86038 ANTINUCLEAR ANTIBODIES: CPT | Mod: CONLAB | Performed by: FAMILY MEDICINE

## 2024-02-05 RX ORDER — HEPARIN 100 UNIT/ML
500 SYRINGE INTRAVENOUS AS NEEDED
Status: DISCONTINUED | OUTPATIENT
Start: 2024-02-05 | End: 2024-02-05 | Stop reason: HOSPADM

## 2024-02-05 RX ORDER — HEPARIN 100 UNIT/ML
500 SYRINGE INTRAVENOUS AS NEEDED
Status: CANCELLED | OUTPATIENT
Start: 2024-02-05

## 2024-02-05 RX ORDER — SODIUM CHLORIDE 0.9 %
VIAL (ML) TOPICAL
Qty: 900 ML | Refills: 0 | Status: SHIPPED | OUTPATIENT
Start: 2024-02-05 | End: 2024-02-08

## 2024-02-05 RX ORDER — SODIUM CHLORIDE 9 MG/ML
10 INJECTION, SOLUTION INTRAMUSCULAR; INTRAVENOUS; SUBCUTANEOUS EVERY 8 HOURS SCHEDULED
Qty: 900 ML | Refills: 0 | Status: SHIPPED | OUTPATIENT
Start: 2024-02-05 | End: 2024-02-05

## 2024-02-05 RX ORDER — HEPARIN SODIUM,PORCINE/PF 10 UNIT/ML
50 SYRINGE (ML) INTRAVENOUS AS NEEDED
Status: CANCELLED | OUTPATIENT
Start: 2024-02-05

## 2024-02-05 RX ADMIN — HEPARIN 500 UNITS: 100 SYRINGE at 13:06

## 2024-02-05 ASSESSMENT — PAIN SCALES - GENERAL: PAINLEVEL: 3

## 2024-02-05 NOTE — SIGNIFICANT EVENT
02/05/24 1301   Over the past 2 weeks, how often have you been bothered by any of the following problems?   Little interest or pleasure in doing things Not at all   Feeling down, depressed, or hopeless Not at all   Patient Health Questionnaire-2 Score 0

## 2024-02-05 NOTE — SIGNIFICANT EVENT
02/05/24 1306   Implantable Port 02/02/24 Right Chest Single lumen port   Placement Date/Time: 02/02/24 1546   Hand Hygiene Completed: Yes  Orientation: Right  Implantable Port Location: Chest  Port Type: Single lumen port  Placed by: DO   Site Assessment Clean;Dry;Intact   Access Time (1) 1300   Accessed by (1) Kaylan Caballero RN   Needle Size (1) 20 G   Needle Length (1) 1 in   Line Status (1) Blood return noted;Capped;Flushed;Saline locked;Heparin locked   Flush Performed (1) Yes   Date to be Reflushed (1) 03/11/24   De-Access Date (1) 02/05/24   De-Access Time (1) 3408

## 2024-02-05 NOTE — PROGRESS NOTES
Discharge Facility: [place]  Discharge Diagnosis: Lightheadedness, Pre-syncope  Admission Date: 2 Feb 24  Discharge Date: 3 Feb 24    PCP Appointment Date: 12 Feb 24  Specialist Appointment Date: 15 Feb 24 (Cardiology, [facility])   Hospital Encounter and Summary: Linked    See discharge assessment below for further details     Engagement  Call Start Time: 1039 (2/5/2024 10:49 AM)    Medications  Medications reviewed with patient/caregiver?: Yes (2/5/2024 10:49 AM)  Is the patient having any side effects they believe may be caused by any medication additions or changes?: No (2/5/2024 10:49 AM)  Does the patient have all medications ordered at discharge?: Yes (2/5/2024 10:49 AM)  Prescription Comments: None (2/5/2024 10:49 AM)  Is the patient taking all medications as directed (includes completed medication regime)?: Yes (2/5/2024 10:49 AM)  Medication Comments: pt stating she is having symptoms of a UTI today. pt has Azithromycin 500 mg at home that she started taking. informed patient that PCP may not want her taking that for UTI and that I would message PCP to see what she would like to do moving forward. pt states understanding to this. (2/5/2024 10:49 AM)    Appointments  Does the patient have a primary care provider?: Yes (follow up set by another for 12 Feb 24) (2/5/2024 10:49 AM)  Care Management Interventions: Verified appointment date/time/provider (2/5/2024 10:49 AM)  Has the patient kept scheduled appointments due by today?: Yes (2/5/2024 10:49 AM)  Care Management Interventions: Advised patient to keep appointment (2/5/2024 10:49 AM)    Self Management  What is the home health agency?: N/A (2/5/2024 10:49 AM)  Has home health visited the patient within 72 hours of discharge?: Not applicable (2/5/2024 10:49 AM)  What Durable Medical Equipment (DME) was ordered?: N/A (2/5/2024 10:49 AM)    Patient Teaching  Does the patient have access to their discharge instructions?: Yes (2/5/2024 10:49 AM)  Care  Management Interventions: Reviewed instructions with patient (2/5/2024 10:49 AM)  What is the patient's perception of their health status since discharge?: Improving (2/5/2024 10:49 AM)  Is the patient/caregiver able to teach back the hierarchy of who to call/visit for symptoms/problems? PCP, Specialist, Home Health nurse, Urgent Care, ED, 911: Yes (2/5/2024 10:49 AM)  Patient/Caregiver Education Comments: None (2/5/2024 10:49 AM)    Wrap Up  Wrap Up Additional Comments: None (2/5/2024 10:49 AM)  Call End Time: 1049 (2/5/2024 10:49 AM)     Pt grateful for outreach call and is agreeable to being contacted after PCP appt to see how she is doing. Pt medication situation and blood work question forwarded to PCP.

## 2024-02-06 LAB
ANA SER QL HEP2 SUBST: NEGATIVE
ATRIAL RATE: 83 BPM
CENTROMERE B AB SER-ACNC: <0.2 AI
CHROMATIN AB SERPL-ACNC: <0.2 AI
DSDNA AB SER-ACNC: <1 IU/ML
ENA JO1 AB SER QL IA: <0.2 AI
ENA RNP AB SER IA-ACNC: <0.2 AI
ENA SCL70 AB SER QL IA: <0.2 AI
ENA SM AB SER IA-ACNC: <0.2 AI
ENA SM+RNP AB SER QL IA: <0.2 AI
ENA SS-A AB SER IA-ACNC: <0.2 AI
ENA SS-B AB SER IA-ACNC: <0.2 AI
P AXIS: 61 DEGREES
P OFFSET: 198 MS
P ONSET: 143 MS
PR INTERVAL: 142 MS
PROCALCITONIN SERPL-MCNC: 0.03 NG/ML
Q ONSET: 214 MS
QRS COUNT: 14 BEATS
QRS DURATION: 132 MS
QT INTERVAL: 412 MS
QTC CALCULATION(BAZETT): 484 MS
QTC FREDERICIA: 459 MS
R AXIS: 31 DEGREES
RHEUMATOID FACT SER NEPH-ACNC: 30 IU/ML (ref 0–15)
RIBOSOMAL P AB SER-ACNC: <0.2 AI
T AXIS: 68 DEGREES
T OFFSET: 420 MS
VENTRICULAR RATE: 83 BPM

## 2024-02-08 RX ORDER — SODIUM CHLORIDE 0.9 %
VIAL (ML) TOPICAL
Qty: 900 ML | Refills: 0 | Status: SHIPPED | OUTPATIENT
Start: 2024-02-08 | End: 2024-02-12 | Stop reason: ALTCHOICE

## 2024-02-09 ENCOUNTER — TELEMEDICINE (OUTPATIENT)
Dept: PHARMACY | Facility: HOSPITAL | Age: 74
End: 2024-02-09
Payer: MEDICARE

## 2024-02-09 DIAGNOSIS — J44.9 COPD, SEVERE (MULTI): ICD-10-CM

## 2024-02-09 DIAGNOSIS — I50.22 HEART FAILURE WITH MILDLY REDUCED EJECTION FRACTION (MULTI): ICD-10-CM

## 2024-02-09 NOTE — ASSESSMENT & PLAN NOTE
CONTINUE Metoprolol Succinate 25 mg take 3 tablets daily for total daily dose of 75 mg, furosemide 20 mg take 1 tablet by mouth daily,   Discussed the addition of an SGLT2. However, patient stated she has a history of recurrent UTI's and is not interested in starting a new pill especially with that risk.  START Monitoring weight daily for fluid rentention  CONTINUE to follow up with cardiologist and PCP as directed

## 2024-02-09 NOTE — PROGRESS NOTES
Trinity Hernandez is a 74 y.o. female who presents for Hospital Follow-up (She is off her BP meds due to low BP. )    Discharge Facility: [place]  Discharge Diagnosis: Lightheadedness, Pre-syncope  Admission Date: 2 Feb 24  Discharge Date: 3 Feb 24    Syncope: The day she spoke to me she had a strange sensation on the left side of her head and then she went down on the ground, almost passing out. Completed labs that day but went to the hospital. Had workup including blood work and RUE doppler. She is currently off steroids. She had losartan stopped due to severe hypotension in the ER and her systolic BP has been in the 130's since her hospitalization. RUQ swelling has resolved and ankle swelling is now getting better overnight, coming back from time to time.     Today she is stating that her last visit with Dr. Ford was extremely unpleasant. She states that he made a comment about her blief in [place]. Told her he wouldn't write a prescription for the machine she was requesting. And also pointed at his watch to comment that she was taking too much time.      CAD, s/p stent: Following with Dr. Romero.     Small bowel perforation, abdominal hernias, epiploic appendagitis: She has been recommended laparoscopic surgery but she is hoping to avoid surgery.      Anxiety: lorazepam prn is helpful. She is having a lot more anxiety lately because of her 's health. He has an aneurysm that is over 5cm and he is in renal failure and he recently had a severe GI bleed. She is taking hydroxyzine but that makes her sleepy so she can't take it during the day.      HTN, aortic aneurysm: Reasonable control on metoprolol, losartan, and lasix. Following with cardioloy. Recently had some discomfort and got a zio patch done. She is awaiting results. She is going to get a stress test soon.     HLD: Off meds, trying to watch her diet.      COPD/sarcoidosis: On Brovana, spiriva, and budesonide. Using oxygen and prednisone prn, she  "has an albuterol inhaler. her breathing is a little bit bad today. She has been more short of breath. She is seeing Dr. Ford for pulm. She is going in for testing in April.      Hypothyroidism: On replacement, no concerns.     GERD: Off pantoprazole, she is doing much better since taking the enzymes.     All other systems have been reviewed and are negative for complaint     Objective   /73 (BP Location: Left arm)   Pulse 108   Ht 1.753 m (5' 9\")   Wt 76.2 kg (168 lb)   BMI 24.81 kg/m²     Gen: No acute distress, alert and oriented x3, pleasant   HEENT: moist mucous membranes, b/l external auditory canals are clear of debris, TMs within normal limits, no oropharyngeal lesions, eomi, perrla   Neck: thyroid within normal limits, no lymphadenopathy   CV: RRR, normal S1/S2, no murmur   Resp: Clear to auscultation bilaterally, no wheezes or rhonchi appreciated  Abd: soft, nontender, non-distended, no guarding/rigidity, bowel sounds present  Extr: no edema, no calf tenderness  Derm: Skin is warm and dry, no rashes appreciated  Psych: mood is good, affect is congruent, good hygiene, normal speech and eye contact  Neuro: cranial nerves grossly intact, normal gait    Assessment/Plan     #B/L LE edema  #Joint swelling  resolved    #Rheumatoid arthritis  monitor     #COPD exacerbation  #COPD  #Respiratory failure  On oxygen  On azithromycin three times per week  Completed prednisone  Breathing is improving slowly  Bronchiectasis vest was denied due to her diagnoses     #CAD  S/p stent   On plavix and ezetimibe  Has followup with cardio  She has midodrine for prn use     #Small bowel perforation  #Ventral hernia  Following with GI  Currently planning to avoid surgery     #Anxiety:  CSA signed  Using ativan prn, refilled  Add lexapro     #Shingles:  she has valtrex rx at home  Hasn't needed it much     #Poor venous access  port in place     #HTN  Controlled on furosemide, and metoprolol  Stopped losartan due to " hypotension  seeing cardiology ([place])     #HLD  Stable, not on meds, monitoring     #COPD/Sarcoidosis  Following with pulm, stable, on inhalers, supplemental O2, nebulizers  testing with pulm in April     #Hypothyroidism  Controlled on replacement     HCM:  s/p COVID vaccine  Mammogram Sept

## 2024-02-09 NOTE — PROGRESS NOTES
Pharmacy Post-Discharge Visit  Trinity Hernandez is a 74 y.o. female was referred to Clinical Pharmacy Team to complete a post-discharge medication optimization and monitoring visit.  The patient was referred for their COPD and Congestive Heart Failure.    Admission Date: 2/2/2024  Discharge Date: 2/3/2024    Referring Provider: Lisette Samaniego DO    Subjective   Allergies   Allergen Reactions    Hydrocodone-Acetaminophen Shortness of breath    Montelukast Unknown     Lungs felt as if there was a hole in them    Morphine Unknown     Body shakes    Nitrofurantoin Monohyd/M-Cryst Unknown    Sulfa (Sulfonamide Antibiotics) Nausea Only and Other    Atorvastatin Itching, Swelling and Unknown    Dicyclomine Hallucinations     Nightmares    Fluticasone Propion-Salmeterol Unknown    Levofloxacin Rash    Lisinopril Cough    Nitroimidazoles Nausea Only and Unknown    Omeprazole Diarrhea and Unknown    Salmeterol Unknown    Simvastatin Swelling and Unknown    Sucralfate Unknown    Umeclidinium Other     Urinary retention, blurred vision, constipation       [facility] [place] [address]  Phone: [phone] Fax: [phone]      Social History     Social History Narrative    Not on file        Notable Medication changes following discharge:  Start: N/A  Stop:   -Losartan 25 mg   Change: N/A    Patient is a 74 y.o. female who presents for an initial clinical pharmacy post discharge follow up visit. She was recently hospitalized for presyncope. At that visit her losartan was stopped due to low blood pressure. Patient expressed concerns about not being on a blood pressure medication. She stated she had one instance of her systolic BP reaching 149 mmHg while walking on the treadmill. She stated that she has already messaged her PCP with this concern and has a visit with her on Monday 2/12/24.       CHF Assessment    Symptom/Staging:  -Most recent ejection fraction: 42%  (10/10/2023 per [facility] note by Dr. Jerardo Ding)  -NYHA Stage: C  -ABCD Stage: 2  -Weight changes?: No; Patient did note some ankle swelling. She stated she has a functioning scale however forgets to weigh herself despite leaving notes reminding herself.    Guideline-Directed Medical Therapy:  -ARNI: No   -If no, then ACEi/ARB?: No;  Losartan stopped due to low BP  -Beta Blocker: No  -MRA: No  -SGLT2i: No    Secondary Prevention:  -The ASCVD Risk score (Yina ADAMES, et al., 2019) failed to calculate for the following reasons:    The patient has a prior MI or stroke diagnosis   -Aspirin 81mg? yes  -Statin?: No; Allergy atorvastatin and simvastatin causing itching/swelling    COPD ASSESSMENT  -Patient stated breathing is doing better. She was being followed by a pharmacist with [facility] for COPD management but stated that that has finished.     Rescue Inhaler Use:  -How many times per week do you use your rescue inhale? She reported using her albuterol nebulizing solution every day at 1pm    Inhaler Technique:  -How do you use your rescue inhaler?  --Appropriate    -How do you use your maintenance inhaler?  --Appropriate     Secondary Prevention (vaccines):  -Influenza: Date [8/30/2023]  -PCV13: Date [9/30/2016]  -PPSV23: Date [11/9/202,10/1/2015, 7/22/2014]    Sx Management:  -Increased cough? no  -Increased sputum production? no  -Increased SOB? no    Exacerbation Hx:  -When was your last hospitalization for an exacerbation? 12/19/2023  -When was the last time you were treated with antibiotics and/or steroids? 1/20/2024    Medication System Management:  Affordability/Accessibility: None    Objective     There were no vitals taken for this visit.     LAB  Lab Results   Component Value Date    BILITOT 0.4 02/05/2024    CALCIUM 9.2 02/05/2024    CO2 31 02/05/2024     02/05/2024    CREATININE 0.93 02/05/2024    GLUCOSE 100 (H) 02/05/2024    ALKPHOS 52 02/05/2024    K 4.0 02/05/2024    PROT 6.5  02/05/2024     02/05/2024    AST 14 02/05/2024    ALT 10 02/05/2024    BUN 11 02/05/2024    ANIONGAP 11 02/05/2024    MG 2.00 12/22/2023    PHOS 3.1 12/19/2023    ALBUMIN 3.9 02/05/2024    LIPASE 12 11/24/2021    GFRF 59 (A) 08/02/2023     Lab Results   Component Value Date    TRIG 174 (H) 11/16/2023    CHOL 181 11/16/2023    LDLCALC 110 (H) 11/16/2023    HDL 36.7 11/16/2023     Lab Results   Component Value Date    HGBA1C 5.2 11/16/2023         Current Outpatient Medications on File Prior to Visit   Medication Sig Dispense Refill    albuterol 2.5 mg /3 mL (0.083 %) nebulizer solution Take 3 mL (2.5 mg) by nebulization every 4 hours if needed for shortness of breath.      albuterol 90 mcg/actuation inhaler Inhale 2 puffs every 6 hours if needed for shortness of breath.      amoxicillin-pot clavulanate (Augmentin) 875-125 mg tablet Take 1 tablet (875 mg) by mouth 2 times a day for 10 days. 20 tablet 0    arformoterol (Brovana) 15 mcg/2 mL nebulizer solution INHALE 2ML VIA NEBULIZER AS INSTRUCTED TWICE A DAY (EVERY 12 HOURS)      aspirin 81 mg EC tablet TAKE 1 TABLET BY MOUTH EVERY 24 HRS      budesonide (Pulmicort) 0.5 mg/2 mL nebulizer solution Take 2 mL (0.5 mg) by nebulization 2 times a day. Rinse mouth with water after use to reduce aftertaste and incidence of candidiasis. Do not swallow.      cholecalciferol (Vitamin D3) 50 mcg (2,000 unit) capsule Take 1 capsule (50 mcg) by mouth once daily.      clopidogrel (Plavix) 75 mg tablet Take 1 tablet (75 mg) by mouth once daily.      docusate sodium (Colace) 100 mg capsule Take 1 capsule (100 mg) by mouth 2 times a day.      estradiol (Estrace) 0.01 % (0.1 mg/gram) vaginal cream Insert 0.5 Applicatorfuls (2 g) into the vagina once daily.      ferrous sulfate 325 (65 Fe) MG tablet Take 1 tablet by mouth once daily.      furosemide (Lasix) 20 mg tablet Take 1 tablet (20 mg) by mouth once daily.      Lactobacillus acidophilus (Probiotic) 10 billion cell capsule  Take 1 capsule by mouth once daily.      levothyroxine (Synthroid, Levoxyl) 50 mcg tablet Take 1 tablet (50 mcg) by mouth once daily. 90 tablet 3    LORazepam (Ativan) 0.5 mg tablet Take 1 tablet (0.5 mg) by mouth every 6 hours if needed for anxiety. 30 tablet 1    magnesium oxide (Mag-Ox) 400 mg (241.3 mg magnesium) tablet Take 1 tablet (400 mg) by mouth once daily.      metoprolol succinate XL (Toprol-XL) 25 mg 24 hr tablet Take 3 tablets (75 mg) by mouth once daily.      nitroglycerin (Nitrostat) 0.4 mg SL tablet Place 1 tablet (0.4 mg) under the tongue.      polyethylene glycol (Miralax) 17 gram/dose powder Take 17 g by mouth once daily.      tiotropium (Spiriva Respimat) 2.5 mcg/actuation inhaler Inhale 2 puffs once daily.      0.9 % sodium chloride (sodium chloride, PF, 0.9%) injection INFUSE 10 ML INTO A VENOUS CATHETER EVERY 8 HOURS. (Patient not taking: Reported on 2/10/2024) 900 mL 0    sodium chloride 0.9 % solution INFUSE 10 ML INTO A VENOUS CATHETER EVERY 8 HOURS. (Patient not taking: Reported on 2/10/2024) 900 mL 0    [DISCONTINUED] 0.9 % sodium chloride (sodium chloride, PF, 0.9%) injection Infuse 10 mL into a venous catheter every 8 hours. 900 mL 0    [DISCONTINUED] sodium chloride 0.9 % solution INFUSE 10 ML INTO A VENOUS CATHETER EVERY 8 HOURS. 900 mL 0    [DISCONTINUED] tiotropium (Spiriva Respimat) 2.5 mcg/actuation inhaler Inhale 2 puffs once daily.       No current facility-administered medications on file prior to visit.      Education  -Discussed with the patient that while exercising your blood pressure may increase due to higher cardiovascular demand.   -Educated on SGLT2 inhibitors due to the patients history of HF and due to her reporting that she has some mild ankle swelling could benefit from an additional medication. Went over MOA and the risk of UTI's with the medication.     Assessment/Plan   Problem List Items Addressed This Visit       COPD, severe (CMS/HCC)     - Patient reported  that her breathing is getting better and she is able to walk comfortably on the treadmill   CONTINUE Spiriva Respimat inhale 2 puffs twice dailly, albuterol nebulizer and HFA as needed for shortness of breath, brovana inhaling 2 mL via nebulizer twice daily, Pulmicort inhaling 2 mL via nebulizer twice daily   CONTINUE to follow up with PCP and pulmonologist as directed          Heart failure with mildly reduced ejection fraction (CMS/HCC)     CONTINUE Metoprolol Succinate 25 mg take 3 tablets daily for total daily dose of 75 mg, furosemide 20 mg take 1 tablet by mouth daily,   Discussed the addition of an SGLT2. However, patient stated she has a history of recurrent UTI's and is not interested in starting a new pill especially with that risk.  START Monitoring weight daily for fluid rentention  CONTINUE to follow up with cardiologist and PCP as directed             Follow up with Clinical Pharmacy Team as needed by patient or PCP    Continue all meds under the continuation of care with the referring provider and clinical pharmacy team.    Please reach out to the Clinical Pharmacy Team if there are any further questions.     Verbal consent to manage patient's drug therapy was obtained from patient. They were informed they may decline to participate or withdraw from participation in pharmacy services at any time.    Isabel Foreman, PharmD  PGY-1 Pharmacy Resident  [phone].

## 2024-02-10 RX ORDER — DOCUSATE SODIUM 100 MG/1
100 CAPSULE, LIQUID FILLED ORAL DAILY
COMMUNITY
End: 2024-03-28 | Stop reason: HOSPADM

## 2024-02-10 RX ORDER — POLYETHYLENE GLYCOL 3350 17 G/17G
17 POWDER, FOR SOLUTION ORAL DAILY
COMMUNITY
End: 2024-03-28 | Stop reason: HOSPADM

## 2024-02-10 RX ORDER — ESTRADIOL 0.1 MG/G
2 CREAM VAGINAL DAILY PRN
COMMUNITY
Start: 2023-05-04

## 2024-02-10 RX ORDER — ACETAMINOPHEN 500 MG
1 TABLET ORAL DAILY
Status: ON HOLD | COMMUNITY
End: 2024-03-28 | Stop reason: SDUPTHER

## 2024-02-10 RX ORDER — ACETAMINOPHEN 500 MG
2000 TABLET ORAL DAILY
Status: ON HOLD | COMMUNITY
End: 2024-03-28 | Stop reason: SDUPTHER

## 2024-02-10 NOTE — ASSESSMENT & PLAN NOTE
- Patient reported that her breathing is getting better and she is able to walk comfortably on the treadmill   CONTINUE Spiriva Respimat inhale 2 puffs twice dailly, albuterol nebulizer and HFA as needed for shortness of breath, brovana inhaling 2 mL via nebulizer twice daily, Pulmicort inhaling 2 mL via nebulizer twice daily   CONTINUE to follow up with PCP and pulmonologist as directed

## 2024-02-12 ENCOUNTER — OFFICE VISIT (OUTPATIENT)
Dept: PRIMARY CARE | Facility: CLINIC | Age: 74
End: 2024-02-12
Payer: MEDICARE

## 2024-02-12 VITALS
BODY MASS INDEX: 24.88 KG/M2 | DIASTOLIC BLOOD PRESSURE: 73 MMHG | SYSTOLIC BLOOD PRESSURE: 123 MMHG | HEART RATE: 108 BPM | HEIGHT: 69 IN | WEIGHT: 168 LBS

## 2024-02-12 DIAGNOSIS — I42.8 CARDIOMYOPATHY, NONISCHEMIC (MULTI): Primary | ICD-10-CM

## 2024-02-12 PROCEDURE — 3078F DIAST BP <80 MM HG: CPT | Performed by: FAMILY MEDICINE

## 2024-02-12 PROCEDURE — 3074F SYST BP LT 130 MM HG: CPT | Performed by: FAMILY MEDICINE

## 2024-02-12 PROCEDURE — 1159F MED LIST DOCD IN RCRD: CPT | Performed by: FAMILY MEDICINE

## 2024-02-12 PROCEDURE — 1160F RVW MEDS BY RX/DR IN RCRD: CPT | Performed by: FAMILY MEDICINE

## 2024-02-12 PROCEDURE — 1125F AMNT PAIN NOTED PAIN PRSNT: CPT | Performed by: FAMILY MEDICINE

## 2024-02-12 PROCEDURE — 99214 OFFICE O/P EST MOD 30 MIN: CPT | Performed by: FAMILY MEDICINE

## 2024-02-12 PROCEDURE — 1036F TOBACCO NON-USER: CPT | Performed by: FAMILY MEDICINE

## 2024-02-12 RX ORDER — IPRATROPIUM BROMIDE 0.5 MG/2.5ML
SOLUTION RESPIRATORY (INHALATION)
Status: ON HOLD | COMMUNITY
End: 2024-03-26 | Stop reason: WASHOUT

## 2024-02-13 ENCOUNTER — PATIENT OUTREACH (OUTPATIENT)
Dept: PRIMARY CARE | Facility: CLINIC | Age: 74
End: 2024-02-13
Payer: MEDICARE

## 2024-02-13 NOTE — PROGRESS NOTES
Call regarding appt. with PCP on (12 Feb 24) after hospitalization.  At time of outreach call the patient feels as if their condition has improved since last visit.  Reviewed the PCP appointment with the pt and addressed any questions or concerns.

## 2024-02-14 ENCOUNTER — TELEPHONE (OUTPATIENT)
Dept: PRIMARY CARE | Facility: CLINIC | Age: 74
End: 2024-02-14
Payer: MEDICARE

## 2024-02-14 NOTE — TELEPHONE ENCOUNTER
Pt just wanted to let you know that she is no longer taking the antibiotic as it was to rough on her stomach and she tried taking it with a probiotic and with yogurt and she just can not handle it.

## 2024-02-15 NOTE — PROGRESS NOTES
I reviewed the progress note and agree with the resident’s findings and plans as written. Case discussed with resident.    Saul Young, PharmD

## 2024-02-21 ENCOUNTER — APPOINTMENT (OUTPATIENT)
Dept: HEMATOLOGY/ONCOLOGY | Facility: HOSPITAL | Age: 74
End: 2024-02-21
Payer: MEDICARE

## 2024-03-04 ENCOUNTER — PATIENT OUTREACH (OUTPATIENT)
Dept: PRIMARY CARE | Facility: CLINIC | Age: 74
End: 2024-03-04
Payer: MEDICARE

## 2024-03-06 NOTE — PROGRESS NOTES
"Trinity Hernandez is a 74 y.o. female who presents for Follow-up (1 month; feeling a lot better; was rx'd repatha from Dr. Aleksandr Viramontes, would like to discuss that )    Right foot pain: Going on for 1 year. Started limping off and on for the last year. No specific injury. Mild right foot swelling.     Syncope: No further episodes     CAD, s/p stent: Following with Dr. Romero. Considering trying repatha.     Small bowel perforation, abdominal hernias, epiploic appendagitis: She has been recommended laparoscopic surgery but she is hoping to avoid surgery.      Anxiety: lorazepam prn is helpful. She is having a lot more anxiety lately because of her 's health. He has an aneurysm that is over 5cm and he is in renal failure and he recently had a severe GI bleed. She is taking hydroxyzine but that makes her sleepy so she can't take it during the day.      HTN, aortic aneurysm: Reasonable control on metoprolol, losartan, and lasix. Following with cardioloy. Recently had some discomfort and got a zio patch done. She is awaiting results. She is going to get a stress test soon.     HLD: Off meds, trying to watch her diet.      COPD/sarcoidosis: On Brovana, spiriva, and budesonide. Using oxygen and prednisone prn, she has an albuterol inhaler. her breathing is a little bit bad today. She has been more short of breath. She is seeing Dr. Ford for pulm. She is going in for testing in April.      Hypothyroidism: On replacement, no concerns.     GERD: Off pantoprazole, she is doing much better since taking the enzymes.     All other systems have been reviewed and are negative for complaint     Objective   /67 (BP Location: Left arm, Patient Position: Sitting, BP Cuff Size: Adult)   Pulse 94   Ht 1.753 m (5' 9\")   Wt 76.2 kg (168 lb)   BMI 24.81 kg/m²     Gen: No acute distress, alert and oriented x3, pleasant   HEENT: moist mucous membranes, b/l external auditory canals are clear of debris, TMs within normal " limits, no oropharyngeal lesions, eomi, perrla   Neck: thyroid within normal limits, no lymphadenopathy   CV: RRR, normal S1/S2, no murmur   Resp: Clear to auscultation bilaterally, no wheezes or rhonchi appreciated  Abd: soft, nontender, non-distended, no guarding/rigidity, bowel sounds present  Extr: no edema, no calf tenderness  Derm: Skin is warm and dry, no rashes appreciated  Psych: mood is good, affect is congruent, good hygiene, normal speech and eye contact  Neuro: cranial nerves grossly intact, normal gait    Assessment/Plan     #Foot pain:  Likely arthritis  Check xray     #Rheumatoid arthritis  monitor     #COPD exacerbation  #COPD  #Respiratory failure  On oxygen  On azithromycin three times per week  Completed prednisone  Breathing is improving slowly  Bronchiectasis vest was denied due to her diagnoses     #CAD  S/p stent   On plavix and ezetimibe  Has followup with cardio  She has midodrine for prn use  Consider starting repatha     #Small bowel perforation  #Ventral hernia  Following with GI  Currently planning to avoid surgery     #Anxiety:  CSA signed  Using ativan prn, refilled  Add lexapro     #Shingles:  she has valtrex rx at home  Hasn't needed it much     #Poor venous access  port in place     #HTN  Controlled on furosemide, and metoprolol  Stopped losartan due to hypotension  seeing cardiology ([place])     #HLD  Stable, not on meds, monitoring     #COPD/Sarcoidosis  Following with pulm, stable, on inhalers, supplemental O2, nebulizers  testing with pulm in April     #Hypothyroidism  Controlled on replacement     HCM:  s/p COVID vaccine  Mammogram Sept

## 2024-03-11 ENCOUNTER — OFFICE VISIT (OUTPATIENT)
Dept: PRIMARY CARE | Facility: CLINIC | Age: 74
End: 2024-03-11
Payer: MEDICARE

## 2024-03-11 VITALS
HEART RATE: 94 BPM | BODY MASS INDEX: 24.88 KG/M2 | WEIGHT: 168 LBS | SYSTOLIC BLOOD PRESSURE: 126 MMHG | DIASTOLIC BLOOD PRESSURE: 67 MMHG | HEIGHT: 69 IN

## 2024-03-11 DIAGNOSIS — M79.671 RIGHT FOOT PAIN: Primary | ICD-10-CM

## 2024-03-11 PROCEDURE — 1160F RVW MEDS BY RX/DR IN RCRD: CPT | Performed by: FAMILY MEDICINE

## 2024-03-11 PROCEDURE — 1125F AMNT PAIN NOTED PAIN PRSNT: CPT | Performed by: FAMILY MEDICINE

## 2024-03-11 PROCEDURE — 1159F MED LIST DOCD IN RCRD: CPT | Performed by: FAMILY MEDICINE

## 2024-03-11 PROCEDURE — 1036F TOBACCO NON-USER: CPT | Performed by: FAMILY MEDICINE

## 2024-03-11 PROCEDURE — 3074F SYST BP LT 130 MM HG: CPT | Performed by: FAMILY MEDICINE

## 2024-03-11 PROCEDURE — 99214 OFFICE O/P EST MOD 30 MIN: CPT | Performed by: FAMILY MEDICINE

## 2024-03-11 PROCEDURE — 3078F DIAST BP <80 MM HG: CPT | Performed by: FAMILY MEDICINE

## 2024-03-11 RX ORDER — EVOLOCUMAB 140 MG/ML
140 INJECTION, SOLUTION SUBCUTANEOUS
COMMUNITY
Start: 2024-02-15

## 2024-03-18 ENCOUNTER — INFUSION (OUTPATIENT)
Dept: HEMATOLOGY/ONCOLOGY | Facility: HOSPITAL | Age: 74
End: 2024-03-18
Payer: MEDICARE

## 2024-03-18 ENCOUNTER — APPOINTMENT (OUTPATIENT)
Dept: PRIMARY CARE | Facility: CLINIC | Age: 74
End: 2024-03-18
Payer: MEDICARE

## 2024-03-18 VITALS
OXYGEN SATURATION: 96 % | SYSTOLIC BLOOD PRESSURE: 138 MMHG | HEART RATE: 96 BPM | TEMPERATURE: 97.9 F | RESPIRATION RATE: 20 BRPM | DIASTOLIC BLOOD PRESSURE: 83 MMHG

## 2024-03-18 DIAGNOSIS — I87.8 POOR VENOUS ACCESS: ICD-10-CM

## 2024-03-18 PROCEDURE — 96523 IRRIG DRUG DELIVERY DEVICE: CPT

## 2024-03-18 PROCEDURE — 2500000004 HC RX 250 GENERAL PHARMACY W/ HCPCS (ALT 636 FOR OP/ED): Performed by: FAMILY MEDICINE

## 2024-03-18 RX ORDER — HEPARIN SODIUM,PORCINE/PF 10 UNIT/ML
50 SYRINGE (ML) INTRAVENOUS AS NEEDED
Status: CANCELLED | OUTPATIENT
Start: 2024-03-18

## 2024-03-18 RX ORDER — HEPARIN 100 UNIT/ML
500 SYRINGE INTRAVENOUS AS NEEDED
Status: CANCELLED | OUTPATIENT
Start: 2024-03-18

## 2024-03-18 RX ORDER — HEPARIN 100 UNIT/ML
500 SYRINGE INTRAVENOUS AS NEEDED
Status: DISCONTINUED | OUTPATIENT
Start: 2024-03-18 | End: 2024-03-18 | Stop reason: HOSPADM

## 2024-03-18 RX ADMIN — HEPARIN 500 UNITS: 100 SYRINGE at 12:59

## 2024-03-18 ASSESSMENT — ENCOUNTER SYMPTOMS
DEPRESSION: 1
LOSS OF SENSATION IN FEET: 1
OCCASIONAL FEELINGS OF UNSTEADINESS: 0

## 2024-03-18 ASSESSMENT — PATIENT HEALTH QUESTIONNAIRE - PHQ9
2. FEELING DOWN, DEPRESSED OR HOPELESS: SEVERAL DAYS
SUM OF ALL RESPONSES TO PHQ9 QUESTIONS 1 & 2: 2
1. LITTLE INTEREST OR PLEASURE IN DOING THINGS: SEVERAL DAYS

## 2024-03-18 ASSESSMENT — PAIN SCALES - GENERAL: PAINLEVEL: 4

## 2024-03-18 NOTE — SIGNIFICANT EVENT
03/18/24 1259   Implantable Port 02/02/24 Right Chest Single lumen port   Placement Date/Time: 02/02/24 1546   Hand Hygiene Completed: Yes  Orientation: Right  Implantable Port Location: Chest  Port Type: Single lumen port  Placed by: DO   Site Assessment Clean;Dry;Intact   Access Date (1) 03/18/24   Access Time (1) 1258   Accessed by (1) Keren Walsh RN   Needle Size (1) 20 G   Needle Length (1) 1 in   Line Status (1) Blood return noted;Flushed;Heparin locked   Flush Performed (1) Yes   Date to be Reflushed (1) 04/22/24   Dressing Status   (after booth removed gauze and dressing applied)   De-Access Date (1) 03/18/24   De-Access Time (1) 1300

## 2024-03-24 ENCOUNTER — HOSPITAL ENCOUNTER (INPATIENT)
Facility: HOSPITAL | Age: 74
LOS: 4 days | Discharge: NURSING, REHAB OR LONG-TERM CARE | DRG: 023 | End: 2024-03-28
Attending: STUDENT IN AN ORGANIZED HEALTH CARE EDUCATION/TRAINING PROGRAM | Admitting: PSYCHIATRY & NEUROLOGY
Payer: MEDICARE

## 2024-03-24 ENCOUNTER — APPOINTMENT (OUTPATIENT)
Dept: RADIOLOGY | Facility: HOSPITAL | Age: 74
End: 2024-03-24
Payer: MEDICARE

## 2024-03-24 ENCOUNTER — APPOINTMENT (OUTPATIENT)
Dept: CARDIOLOGY | Facility: HOSPITAL | Age: 74
End: 2024-03-24
Payer: MEDICARE

## 2024-03-24 ENCOUNTER — HOSPITAL ENCOUNTER (EMERGENCY)
Facility: HOSPITAL | Age: 74
Discharge: ANOTHER HOSPITAL | End: 2024-03-24
Attending: FAMILY MEDICINE
Payer: MEDICARE

## 2024-03-24 ENCOUNTER — APPOINTMENT (OUTPATIENT)
Dept: RADIOLOGY | Facility: HOSPITAL | Age: 74
DRG: 023 | End: 2024-03-24
Payer: MEDICARE

## 2024-03-24 VITALS
DIASTOLIC BLOOD PRESSURE: 71 MMHG | OXYGEN SATURATION: 100 % | RESPIRATION RATE: 16 BRPM | SYSTOLIC BLOOD PRESSURE: 95 MMHG | BODY MASS INDEX: 25.74 KG/M2 | HEART RATE: 95 BPM | WEIGHT: 164 LBS | HEIGHT: 67 IN

## 2024-03-24 DIAGNOSIS — I63.9 ISCHEMIC STROKE (MULTI): Primary | ICD-10-CM

## 2024-03-24 DIAGNOSIS — I50.22 HEART FAILURE WITH MILDLY REDUCED EJECTION FRACTION (MULTI): ICD-10-CM

## 2024-03-24 DIAGNOSIS — I42.8 CARDIOMYOPATHY, NONISCHEMIC (MULTI): ICD-10-CM

## 2024-03-24 DIAGNOSIS — F41.9 ANXIETY: ICD-10-CM

## 2024-03-24 DIAGNOSIS — I47.29 NSVT (NONSUSTAINED VENTRICULAR TACHYCARDIA) (MULTI): ICD-10-CM

## 2024-03-24 DIAGNOSIS — I62.00 SUBDURAL HEMORRHAGE (MULTI): ICD-10-CM

## 2024-03-24 DIAGNOSIS — K59.09 CHRONIC CONSTIPATION: ICD-10-CM

## 2024-03-24 DIAGNOSIS — I48.0 PAROXYSMAL ATRIAL FIBRILLATION (MULTI): ICD-10-CM

## 2024-03-24 DIAGNOSIS — I62.00 SUBDURAL BLEEDING (MULTI): ICD-10-CM

## 2024-03-24 DIAGNOSIS — Z99.81 CHRONIC RESPIRATORY FAILURE WITH HYPOXIA, ON HOME O2 THERAPY (MULTI): ICD-10-CM

## 2024-03-24 DIAGNOSIS — Z97.8 ENDOTRACHEALLY INTUBATED: ICD-10-CM

## 2024-03-24 DIAGNOSIS — I63.412 CEREBRAL INFARCTION DUE TO EMBOLISM OF LEFT MIDDLE CEREBRAL ARTERY (MULTI): ICD-10-CM

## 2024-03-24 DIAGNOSIS — R13.10 DYSPHAGIA, UNSPECIFIED TYPE: ICD-10-CM

## 2024-03-24 DIAGNOSIS — I63.9 ACUTE EMBOLIC STROKE (MULTI): Primary | ICD-10-CM

## 2024-03-24 DIAGNOSIS — G89.29 CHRONIC NECK PAIN: ICD-10-CM

## 2024-03-24 DIAGNOSIS — I10 HYPERTENSION, UNSPECIFIED TYPE: ICD-10-CM

## 2024-03-24 DIAGNOSIS — E55.9 VITAMIN D DEFICIENCY: ICD-10-CM

## 2024-03-24 DIAGNOSIS — M25.551 PAIN OF RIGHT HIP: ICD-10-CM

## 2024-03-24 DIAGNOSIS — E03.9 HYPOTHYROIDISM, UNSPECIFIED TYPE: ICD-10-CM

## 2024-03-24 DIAGNOSIS — M54.2 CHRONIC NECK PAIN: ICD-10-CM

## 2024-03-24 DIAGNOSIS — J96.11 CHRONIC RESPIRATORY FAILURE WITH HYPOXIA, ON HOME O2 THERAPY (MULTI): ICD-10-CM

## 2024-03-24 DIAGNOSIS — I63.89 OTHER CEREBRAL INFARCTION (MULTI): ICD-10-CM

## 2024-03-24 DIAGNOSIS — T45.615A: ICD-10-CM

## 2024-03-24 DIAGNOSIS — J44.0 COPD WITH ACUTE LOWER RESPIRATORY INFECTION (MULTI): ICD-10-CM

## 2024-03-24 LAB
ABO GROUP (TYPE) IN BLOOD: NORMAL
ALBUMIN SERPL BCP-MCNC: 3.6 G/DL (ref 3.4–5)
ALBUMIN SERPL BCP-MCNC: 4.4 G/DL (ref 3.4–5)
ALP SERPL-CCNC: 55 U/L (ref 33–136)
ALP SERPL-CCNC: 71 U/L (ref 33–136)
ALT SERPL W P-5'-P-CCNC: 10 U/L (ref 7–45)
ALT SERPL W P-5'-P-CCNC: 5 U/L (ref 7–45)
ANION GAP BLDV CALCULATED.4IONS-SCNC: 9 MMOL/L (ref 10–25)
ANION GAP SERPL CALC-SCNC: 11 MMOL/L (ref 10–20)
ANION GAP SERPL CALC-SCNC: 13 MMOL/L (ref 10–20)
ANTIBODY SCREEN: NORMAL
APPEARANCE UR: CLEAR
APTT PPP: 29 SECONDS (ref 27–38)
APTT PPP: 38 SECONDS (ref 27–38)
AST SERPL W P-5'-P-CCNC: 16 U/L (ref 9–39)
AST SERPL W P-5'-P-CCNC: 17 U/L (ref 9–39)
BASE EXCESS BLDV CALC-SCNC: 1.2 MMOL/L (ref -2–3)
BASOPHILS # BLD AUTO: 0.05 X10*3/UL (ref 0–0.1)
BASOPHILS # BLD AUTO: 0.07 X10*3/UL (ref 0–0.1)
BASOPHILS NFR BLD AUTO: 0.4 %
BASOPHILS NFR BLD AUTO: 0.7 %
BILIRUB SERPL-MCNC: 0.3 MG/DL (ref 0–1.2)
BILIRUB SERPL-MCNC: 0.5 MG/DL (ref 0–1.2)
BILIRUB UR STRIP.AUTO-MCNC: NEGATIVE MG/DL
BNP SERPL-MCNC: 89 PG/ML (ref 0–99)
BODY TEMPERATURE: 37 DEGREES CELSIUS
BUN SERPL-MCNC: 13 MG/DL (ref 6–23)
BUN SERPL-MCNC: 13 MG/DL (ref 6–23)
CA-I BLDV-SCNC: 1.18 MMOL/L (ref 1.1–1.33)
CALCIUM SERPL-MCNC: 10.1 MG/DL (ref 8.6–10.3)
CALCIUM SERPL-MCNC: 9.1 MG/DL (ref 8.6–10.6)
CARDIAC TROPONIN I PNL SERPL HS: 15 NG/L (ref 0–13)
CARDIAC TROPONIN I PNL SERPL HS: 22 NG/L (ref 0–13)
CARDIAC TROPONIN I PNL SERPL HS: 8 NG/L (ref 0–13)
CHLORIDE BLDV-SCNC: 103 MMOL/L (ref 98–107)
CHLORIDE SERPL-SCNC: 100 MMOL/L (ref 98–107)
CHLORIDE SERPL-SCNC: 104 MMOL/L (ref 98–107)
CO2 SERPL-SCNC: 25 MMOL/L (ref 21–32)
CO2 SERPL-SCNC: 31 MMOL/L (ref 21–32)
COLOR UR: YELLOW
CREAT SERPL-MCNC: 0.87 MG/DL (ref 0.5–1.05)
CREAT SERPL-MCNC: 0.9 MG/DL (ref 0.5–1.05)
EGFRCR SERPLBLD CKD-EPI 2021: 67 ML/MIN/1.73M*2
EGFRCR SERPLBLD CKD-EPI 2021: 70 ML/MIN/1.73M*2
EOSINOPHIL # BLD AUTO: 0.22 X10*3/UL (ref 0–0.4)
EOSINOPHIL # BLD AUTO: 0.37 X10*3/UL (ref 0–0.4)
EOSINOPHIL NFR BLD AUTO: 1.6 %
EOSINOPHIL NFR BLD AUTO: 3.8 %
ERYTHROCYTE [DISTWIDTH] IN BLOOD BY AUTOMATED COUNT: 13.2 % (ref 11.5–14.5)
ERYTHROCYTE [DISTWIDTH] IN BLOOD BY AUTOMATED COUNT: 13.2 % (ref 11.5–14.5)
FIBRINOGEN PPP-MCNC: 415 MG/DL (ref 200–400)
FLUAV RNA RESP QL NAA+PROBE: NOT DETECTED
FLUBV RNA RESP QL NAA+PROBE: NOT DETECTED
GLUCOSE BLD MANUAL STRIP-MCNC: 101 MG/DL (ref 74–99)
GLUCOSE BLD MANUAL STRIP-MCNC: 147 MG/DL (ref 74–99)
GLUCOSE BLDV-MCNC: 140 MG/DL (ref 74–99)
GLUCOSE SERPL-MCNC: 110 MG/DL (ref 74–99)
GLUCOSE SERPL-MCNC: 144 MG/DL (ref 74–99)
GLUCOSE UR STRIP.AUTO-MCNC: NEGATIVE MG/DL
HCO3 BLDV-SCNC: 27.9 MMOL/L (ref 22–26)
HCT VFR BLD AUTO: 34.3 % (ref 36–46)
HCT VFR BLD AUTO: 44.2 % (ref 36–46)
HCT VFR BLD EST: 34 % (ref 36–46)
HGB BLD-MCNC: 11.1 G/DL (ref 12–16)
HGB BLD-MCNC: 13.5 G/DL (ref 12–16)
HGB BLDV-MCNC: 11.4 G/DL (ref 12–16)
HOLD SPECIMEN: NORMAL
IMM GRANULOCYTES # BLD AUTO: 0.06 X10*3/UL (ref 0–0.5)
IMM GRANULOCYTES # BLD AUTO: 0.15 X10*3/UL (ref 0–0.5)
IMM GRANULOCYTES NFR BLD AUTO: 0.4 % (ref 0–0.9)
IMM GRANULOCYTES NFR BLD AUTO: 1.5 % (ref 0–0.9)
INR PPP: 1.1 (ref 0.9–1.1)
INR PPP: 1.1 (ref 0.9–1.1)
KETONES UR STRIP.AUTO-MCNC: NEGATIVE MG/DL
LACTATE BLDV-SCNC: 1.6 MMOL/L (ref 0.4–2)
LACTATE SERPL-SCNC: 1.7 MMOL/L (ref 0.4–2)
LEUKOCYTE ESTERASE UR QL STRIP.AUTO: NEGATIVE
LYMPHOCYTES # BLD AUTO: 1.22 X10*3/UL (ref 0.8–3)
LYMPHOCYTES # BLD AUTO: 1.45 X10*3/UL (ref 0.8–3)
LYMPHOCYTES NFR BLD AUTO: 10.8 %
LYMPHOCYTES NFR BLD AUTO: 12.4 %
MAGNESIUM SERPL-MCNC: 1.78 MG/DL (ref 1.6–2.4)
MCH RBC QN AUTO: 28.1 PG (ref 26–34)
MCH RBC QN AUTO: 28.5 PG (ref 26–34)
MCHC RBC AUTO-ENTMCNC: 30.5 G/DL (ref 32–36)
MCHC RBC AUTO-ENTMCNC: 32.4 G/DL (ref 32–36)
MCV RBC AUTO: 87 FL (ref 80–100)
MCV RBC AUTO: 93 FL (ref 80–100)
MONOCYTES # BLD AUTO: 0.85 X10*3/UL (ref 0.05–0.8)
MONOCYTES # BLD AUTO: 1.17 X10*3/UL (ref 0.05–0.8)
MONOCYTES NFR BLD AUTO: 8.7 %
MONOCYTES NFR BLD AUTO: 8.7 %
NEUTROPHILS # BLD AUTO: 10.43 X10*3/UL (ref 1.6–5.5)
NEUTROPHILS # BLD AUTO: 7.16 X10*3/UL (ref 1.6–5.5)
NEUTROPHILS NFR BLD AUTO: 72.9 %
NEUTROPHILS NFR BLD AUTO: 78.1 %
NITRITE UR QL STRIP.AUTO: NEGATIVE
NRBC BLD-RTO: 0 /100 WBCS (ref 0–0)
NRBC BLD-RTO: 0 /100 WBCS (ref 0–0)
OXYHGB MFR BLDV: 44.8 % (ref 45–75)
PCO2 BLDV: 53 MM HG (ref 41–51)
PH BLDV: 7.33 PH (ref 7.33–7.43)
PH UR STRIP.AUTO: 5 [PH]
PLATELET # BLD AUTO: 304 X10*3/UL (ref 150–450)
PLATELET # BLD AUTO: 309 X10*3/UL (ref 150–450)
PO2 BLDV: 31 MM HG (ref 35–45)
POTASSIUM BLDV-SCNC: 4.3 MMOL/L (ref 3.5–5.3)
POTASSIUM SERPL-SCNC: 4 MMOL/L (ref 3.5–5.3)
POTASSIUM SERPL-SCNC: 4.1 MMOL/L (ref 3.5–5.3)
PROT SERPL-MCNC: 6.2 G/DL (ref 6.4–8.2)
PROT SERPL-MCNC: 7.8 G/DL (ref 6.4–8.2)
PROT UR STRIP.AUTO-MCNC: NEGATIVE MG/DL
PROTHROMBIN TIME: 12 SECONDS (ref 9.8–12.8)
PROTHROMBIN TIME: 12 SECONDS (ref 9.8–12.8)
RBC # BLD AUTO: 3.95 X10*6/UL (ref 4–5.2)
RBC # BLD AUTO: 4.74 X10*6/UL (ref 4–5.2)
RBC # UR STRIP.AUTO: NEGATIVE /UL
RH FACTOR (ANTIGEN D): NORMAL
SAO2 % BLDV: 46 % (ref 45–75)
SARS-COV-2 RNA RESP QL NAA+PROBE: NOT DETECTED
SODIUM BLDV-SCNC: 136 MMOL/L (ref 136–145)
SODIUM SERPL-SCNC: 138 MMOL/L (ref 136–145)
SODIUM SERPL-SCNC: 138 MMOL/L (ref 136–145)
SP GR UR STRIP.AUTO: 1.01
UROBILINOGEN UR STRIP.AUTO-MCNC: <2 MG/DL
WBC # BLD AUTO: 13.4 X10*3/UL (ref 4.4–11.3)
WBC # BLD AUTO: 9.8 X10*3/UL (ref 4.4–11.3)

## 2024-03-24 PROCEDURE — C1725 CATH, TRANSLUMIN NON-LASER: HCPCS | Performed by: STUDENT IN AN ORGANIZED HEALTH CARE EDUCATION/TRAINING PROGRAM

## 2024-03-24 PROCEDURE — 71045 X-RAY EXAM CHEST 1 VIEW: CPT | Performed by: RADIOLOGY

## 2024-03-24 PROCEDURE — 2780000003 HC OR 278 NO HCPCS: Performed by: STUDENT IN AN ORGANIZED HEALTH CARE EDUCATION/TRAINING PROGRAM

## 2024-03-24 PROCEDURE — 93005 ELECTROCARDIOGRAM TRACING: CPT

## 2024-03-24 PROCEDURE — 61645 PERQ ART M-THROMBECT &/NFS: CPT | Performed by: NEUROLOGICAL SURGERY

## 2024-03-24 PROCEDURE — 85610 PROTHROMBIN TIME: CPT | Performed by: FAMILY MEDICINE

## 2024-03-24 PROCEDURE — 31500 INSERT EMERGENCY AIRWAY: CPT

## 2024-03-24 PROCEDURE — 70450 CT HEAD/BRAIN W/O DYE: CPT

## 2024-03-24 PROCEDURE — 85384 FIBRINOGEN ACTIVITY: CPT | Performed by: STUDENT IN AN ORGANIZED HEALTH CARE EDUCATION/TRAINING PROGRAM

## 2024-03-24 PROCEDURE — 31500 INSERT EMERGENCY AIRWAY: CPT | Performed by: FAMILY MEDICINE

## 2024-03-24 PROCEDURE — 86850 RBC ANTIBODY SCREEN: CPT | Performed by: STUDENT IN AN ORGANIZED HEALTH CARE EDUCATION/TRAINING PROGRAM

## 2024-03-24 PROCEDURE — P9012 CRYOPRECIPITATE EACH UNIT: HCPCS

## 2024-03-24 PROCEDURE — 85025 COMPLETE CBC W/AUTO DIFF WBC: CPT | Performed by: FAMILY MEDICINE

## 2024-03-24 PROCEDURE — 03CG3ZZ EXTIRPATION OF MATTER FROM INTRACRANIAL ARTERY, PERCUTANEOUS APPROACH: ICD-10-PCS | Performed by: STUDENT IN AN ORGANIZED HEALTH CARE EDUCATION/TRAINING PROGRAM

## 2024-03-24 PROCEDURE — 94002 VENT MGMT INPAT INIT DAY: CPT

## 2024-03-24 PROCEDURE — 70496 CT ANGIOGRAPHY HEAD: CPT | Performed by: RADIOLOGY

## 2024-03-24 PROCEDURE — 94799 UNLISTED PULMONARY SVC/PX: CPT

## 2024-03-24 PROCEDURE — 76377 3D RENDER W/INTRP POSTPROCES: CPT | Performed by: NEUROLOGICAL SURGERY

## 2024-03-24 PROCEDURE — 84132 ASSAY OF SERUM POTASSIUM: CPT

## 2024-03-24 PROCEDURE — 36415 COLL VENOUS BLD VENIPUNCTURE: CPT | Performed by: STUDENT IN AN ORGANIZED HEALTH CARE EDUCATION/TRAINING PROGRAM

## 2024-03-24 PROCEDURE — 2500000004 HC RX 250 GENERAL PHARMACY W/ HCPCS (ALT 636 FOR OP/ED): Performed by: FAMILY MEDICINE

## 2024-03-24 PROCEDURE — 71045 X-RAY EXAM CHEST 1 VIEW: CPT | Performed by: SURGERY

## 2024-03-24 PROCEDURE — 70498 CT ANGIOGRAPHY NECK: CPT | Performed by: RADIOLOGY

## 2024-03-24 PROCEDURE — C1757 CATH, THROMBECTOMY/EMBOLECT: HCPCS | Performed by: STUDENT IN AN ORGANIZED HEALTH CARE EDUCATION/TRAINING PROGRAM

## 2024-03-24 PROCEDURE — 2720000007 HC OR 272 NO HCPCS: Performed by: STUDENT IN AN ORGANIZED HEALTH CARE EDUCATION/TRAINING PROGRAM

## 2024-03-24 PROCEDURE — 85025 COMPLETE CBC W/AUTO DIFF WBC: CPT | Performed by: STUDENT IN AN ORGANIZED HEALTH CARE EDUCATION/TRAINING PROGRAM

## 2024-03-24 PROCEDURE — 83735 ASSAY OF MAGNESIUM: CPT | Performed by: FAMILY MEDICINE

## 2024-03-24 PROCEDURE — 81003 URINALYSIS AUTO W/O SCOPE: CPT | Performed by: FAMILY MEDICINE

## 2024-03-24 PROCEDURE — 87635 SARS-COV-2 COVID-19 AMP PRB: CPT | Performed by: FAMILY MEDICINE

## 2024-03-24 PROCEDURE — 5A1935Z RESPIRATORY VENTILATION, LESS THAN 24 CONSECUTIVE HOURS: ICD-10-PCS | Performed by: STUDENT IN AN ORGANIZED HEALTH CARE EDUCATION/TRAINING PROGRAM

## 2024-03-24 PROCEDURE — 71045 X-RAY EXAM CHEST 1 VIEW: CPT

## 2024-03-24 PROCEDURE — 2020000001 HC ICU ROOM DAILY

## 2024-03-24 PROCEDURE — C1766 INTRO/SHEATH,STRBLE,NON-PEEL: HCPCS | Performed by: STUDENT IN AN ORGANIZED HEALTH CARE EDUCATION/TRAINING PROGRAM

## 2024-03-24 PROCEDURE — 83880 ASSAY OF NATRIURETIC PEPTIDE: CPT | Performed by: FAMILY MEDICINE

## 2024-03-24 PROCEDURE — 80053 COMPREHEN METABOLIC PANEL: CPT | Performed by: FAMILY MEDICINE

## 2024-03-24 PROCEDURE — 99292 CRITICAL CARE ADDL 30 MIN: CPT

## 2024-03-24 PROCEDURE — 0042T CT BRAIN ATTACK PERFUSION W IV CONTRAST: CPT

## 2024-03-24 PROCEDURE — 2500000004 HC RX 250 GENERAL PHARMACY W/ HCPCS (ALT 636 FOR OP/ED): Mod: JW,TB

## 2024-03-24 PROCEDURE — 85730 THROMBOPLASTIN TIME PARTIAL: CPT | Performed by: FAMILY MEDICINE

## 2024-03-24 PROCEDURE — 75710 ARTERY X-RAYS ARM/LEG: CPT | Mod: RT | Performed by: NEUROLOGICAL SURGERY

## 2024-03-24 PROCEDURE — 51702 INSERT TEMP BLADDER CATH: CPT

## 2024-03-24 PROCEDURE — 70450 CT HEAD/BRAIN W/O DYE: CPT | Performed by: RADIOLOGY

## 2024-03-24 PROCEDURE — 84132 ASSAY OF SERUM POTASSIUM: CPT | Performed by: STUDENT IN AN ORGANIZED HEALTH CARE EDUCATION/TRAINING PROGRAM

## 2024-03-24 PROCEDURE — 99291 CRITICAL CARE FIRST HOUR: CPT | Mod: 25 | Performed by: STUDENT IN AN ORGANIZED HEALTH CARE EDUCATION/TRAINING PROGRAM

## 2024-03-24 PROCEDURE — 2500000005 HC RX 250 GENERAL PHARMACY W/O HCPCS: Performed by: FAMILY MEDICINE

## 2024-03-24 PROCEDURE — 84484 ASSAY OF TROPONIN QUANT: CPT | Performed by: FAMILY MEDICINE

## 2024-03-24 PROCEDURE — 37195 THROMBOLYTIC THERAPY STROKE: CPT

## 2024-03-24 PROCEDURE — 36224 PLACE CATH CAROTD ART: CPT | Mod: 50 | Performed by: NEUROLOGICAL SURGERY

## 2024-03-24 PROCEDURE — 70498 CT ANGIOGRAPHY NECK: CPT

## 2024-03-24 PROCEDURE — 85610 PROTHROMBIN TIME: CPT | Performed by: STUDENT IN AN ORGANIZED HEALTH CARE EDUCATION/TRAINING PROGRAM

## 2024-03-24 PROCEDURE — 94681 O2 UPTK CO2 OUTP % O2 XTRC: CPT

## 2024-03-24 PROCEDURE — 36430 TRANSFUSION BLD/BLD COMPNT: CPT

## 2024-03-24 PROCEDURE — 82947 ASSAY GLUCOSE BLOOD QUANT: CPT

## 2024-03-24 PROCEDURE — 83605 ASSAY OF LACTIC ACID: CPT | Performed by: FAMILY MEDICINE

## 2024-03-24 PROCEDURE — 99285 EMERGENCY DEPT VISIT HI MDM: CPT | Mod: 25

## 2024-03-24 PROCEDURE — 2550000001 HC RX 255 CONTRASTS: Performed by: FAMILY MEDICINE

## 2024-03-24 PROCEDURE — 2500000004 HC RX 250 GENERAL PHARMACY W/ HCPCS (ALT 636 FOR OP/ED)

## 2024-03-24 PROCEDURE — C1769 GUIDE WIRE: HCPCS | Performed by: STUDENT IN AN ORGANIZED HEALTH CARE EDUCATION/TRAINING PROGRAM

## 2024-03-24 PROCEDURE — 2550000001 HC RX 255 CONTRASTS: Performed by: STUDENT IN AN ORGANIZED HEALTH CARE EDUCATION/TRAINING PROGRAM

## 2024-03-24 PROCEDURE — C1760 CLOSURE DEV, VASC: HCPCS | Performed by: STUDENT IN AN ORGANIZED HEALTH CARE EDUCATION/TRAINING PROGRAM

## 2024-03-24 PROCEDURE — C1887 CATHETER, GUIDING: HCPCS | Performed by: STUDENT IN AN ORGANIZED HEALTH CARE EDUCATION/TRAINING PROGRAM

## 2024-03-24 PROCEDURE — 96374 THER/PROPH/DIAG INJ IV PUSH: CPT | Mod: 59

## 2024-03-24 RX ORDER — ARFORMOTEROL TARTRATE 15 UG/2ML
15 SOLUTION RESPIRATORY (INHALATION)
Status: DISCONTINUED | OUTPATIENT
Start: 2024-03-24 | End: 2024-03-24 | Stop reason: HOSPADM

## 2024-03-24 RX ORDER — PROPOFOL 10 MG/ML
5-50 INJECTION, EMULSION INTRAVENOUS CONTINUOUS
Status: DISCONTINUED | OUTPATIENT
Start: 2024-03-24 | End: 2024-03-24

## 2024-03-24 RX ORDER — NOREPINEPHRINE BITARTRATE/D5W 8 MG/250ML
PLASTIC BAG, INJECTION (ML) INTRAVENOUS
Status: COMPLETED
Start: 2024-03-24 | End: 2024-03-25

## 2024-03-24 RX ORDER — FORMOTEROL FUMARATE DIHYDRATE 20 UG/2ML
15 SOLUTION RESPIRATORY (INHALATION) ONCE
Status: DISCONTINUED | OUTPATIENT
Start: 2024-03-24 | End: 2024-03-24

## 2024-03-24 RX ORDER — SUCCINYLCHOLINE CHLORIDE 20 MG/ML
INJECTION INTRAMUSCULAR; INTRAVENOUS
Status: DISCONTINUED
Start: 2024-03-24 | End: 2024-03-24 | Stop reason: HOSPADM

## 2024-03-24 RX ORDER — DEXMEDETOMIDINE HYDROCHLORIDE 4 UG/ML
.1-1.5 INJECTION, SOLUTION INTRAVENOUS CONTINUOUS
Status: DISCONTINUED | OUTPATIENT
Start: 2024-03-25 | End: 2024-03-25

## 2024-03-24 RX ORDER — PROPOFOL 10 MG/ML
1 INJECTION, EMULSION INTRAVENOUS ONCE
Status: DISCONTINUED | OUTPATIENT
Start: 2024-03-24 | End: 2024-03-24 | Stop reason: HOSPADM

## 2024-03-24 RX ORDER — ARFORMOTEROL TARTRATE 15 UG/2ML
15 SOLUTION RESPIRATORY (INHALATION) ONCE
Status: DISCONTINUED | OUTPATIENT
Start: 2024-03-24 | End: 2024-03-24 | Stop reason: HOSPADM

## 2024-03-24 RX ORDER — SODIUM CHLORIDE 9 MG/ML
100 INJECTION, SOLUTION INTRAVENOUS CONTINUOUS
Status: DISCONTINUED | OUTPATIENT
Start: 2024-03-24 | End: 2024-03-27

## 2024-03-24 RX ORDER — NOREPINEPHRINE BITARTRATE/D5W 8 MG/250ML
0-3 PLASTIC BAG, INJECTION (ML) INTRAVENOUS CONTINUOUS
Status: DISCONTINUED | OUTPATIENT
Start: 2024-03-25 | End: 2024-03-25

## 2024-03-24 RX ORDER — SODIUM CHLORIDE 9 MG/ML
125 INJECTION, SOLUTION INTRAVENOUS CONTINUOUS
Status: DISCONTINUED | OUTPATIENT
Start: 2024-03-24 | End: 2024-03-24 | Stop reason: HOSPADM

## 2024-03-24 RX ORDER — LABETALOL HYDROCHLORIDE 5 MG/ML
10 INJECTION, SOLUTION INTRAVENOUS EVERY 10 MIN PRN
Status: ACTIVE | OUTPATIENT
Start: 2024-03-24 | End: 2024-03-26

## 2024-03-24 RX ORDER — SENNOSIDES 8.6 MG/1
2 TABLET ORAL 2 TIMES DAILY
Status: DISCONTINUED | OUTPATIENT
Start: 2024-03-24 | End: 2024-03-27

## 2024-03-24 RX ORDER — SUCCINYLCHOLINE CHLORIDE 20 MG/ML
INJECTION INTRAMUSCULAR; INTRAVENOUS CODE/TRAUMA/SEDATION MEDICATION
Status: COMPLETED | OUTPATIENT
Start: 2024-03-24 | End: 2024-03-24

## 2024-03-24 RX ORDER — FENTANYL CITRATE-0.9 % NACL/PF 10 MCG/ML
PLASTIC BAG, INJECTION (ML) INTRAVENOUS
Status: COMPLETED
Start: 2024-03-24 | End: 2024-03-24

## 2024-03-24 RX ORDER — DEXTROSE 50 % IN WATER (D50W) INTRAVENOUS SYRINGE
12.5
Status: DISCONTINUED | OUTPATIENT
Start: 2024-03-24 | End: 2024-03-28 | Stop reason: HOSPADM

## 2024-03-24 RX ORDER — ETOMIDATE 2 MG/ML
INJECTION INTRAVENOUS
Status: DISCONTINUED
Start: 2024-03-24 | End: 2024-03-24 | Stop reason: HOSPADM

## 2024-03-24 RX ORDER — HYDRALAZINE HYDROCHLORIDE 25 MG/1
25 TABLET, FILM COATED ORAL EVERY 6 HOURS PRN
Status: DISCONTINUED | OUTPATIENT
Start: 2024-03-26 | End: 2024-03-27

## 2024-03-24 RX ORDER — ETOMIDATE 2 MG/ML
INJECTION INTRAVENOUS CODE/TRAUMA/SEDATION MEDICATION
Status: COMPLETED | OUTPATIENT
Start: 2024-03-24 | End: 2024-03-24

## 2024-03-24 RX ORDER — INSULIN LISPRO 100 [IU]/ML
0-5 INJECTION, SOLUTION INTRAVENOUS; SUBCUTANEOUS
Status: DISCONTINUED | OUTPATIENT
Start: 2024-03-25 | End: 2024-03-25

## 2024-03-24 RX ORDER — POLYETHYLENE GLYCOL 3350 17 G/17G
17 POWDER, FOR SOLUTION ORAL DAILY
Status: DISCONTINUED | OUTPATIENT
Start: 2024-03-25 | End: 2024-03-27

## 2024-03-24 RX ORDER — ETOMIDATE 2 MG/ML
20 INJECTION INTRAVENOUS ONCE
Status: DISCONTINUED | OUTPATIENT
Start: 2024-03-24 | End: 2024-03-24 | Stop reason: HOSPADM

## 2024-03-24 RX ORDER — HYDRALAZINE HYDROCHLORIDE 20 MG/ML
10 INJECTION INTRAMUSCULAR; INTRAVENOUS
Status: ACTIVE | OUTPATIENT
Start: 2024-03-24 | End: 2024-03-26

## 2024-03-24 RX ORDER — PROPOFOL 10 MG/ML
INJECTION, EMULSION INTRAVENOUS
Status: COMPLETED
Start: 2024-03-24 | End: 2024-03-24

## 2024-03-24 RX ORDER — FENTANYL CITRATE 50 UG/ML
50 INJECTION, SOLUTION INTRAMUSCULAR; INTRAVENOUS ONCE
Status: DISCONTINUED | OUTPATIENT
Start: 2024-03-24 | End: 2024-03-24 | Stop reason: HOSPADM

## 2024-03-24 RX ORDER — FENTANYL CITRATE-0.9 % NACL/PF 10 MCG/ML
0-300 PLASTIC BAG, INJECTION (ML) INTRAVENOUS CONTINUOUS
Status: DISCONTINUED | OUTPATIENT
Start: 2024-03-24 | End: 2024-03-25

## 2024-03-24 RX ADMIN — IOHEXOL 100 ML: 350 INJECTION, SOLUTION INTRAVENOUS at 21:55

## 2024-03-24 RX ADMIN — Medication 25 MCG/HR: at 20:54

## 2024-03-24 RX ADMIN — IOHEXOL 35 ML: 350 INJECTION, SOLUTION INTRAVENOUS at 21:20

## 2024-03-24 RX ADMIN — PROPOFOL 10 MG: 10 INJECTION, EMULSION INTRAVENOUS at 20:47

## 2024-03-24 RX ADMIN — ARFORMOTEROL TARTRATE 15 MCG: 15 SOLUTION RESPIRATORY (INHALATION) at 18:00

## 2024-03-24 RX ADMIN — SODIUM CHLORIDE 125 ML/HR: 9 INJECTION, SOLUTION INTRAVENOUS at 14:20

## 2024-03-24 RX ADMIN — IOHEXOL 50 ML: 350 INJECTION, SOLUTION INTRAVENOUS at 14:48

## 2024-03-24 RX ADMIN — PROPOFOL: 10 INJECTION, EMULSION INTRAVENOUS at 18:50

## 2024-03-24 RX ADMIN — ETOMIDATE 20 MG: 2 INJECTION INTRAVENOUS at 18:40

## 2024-03-24 RX ADMIN — SUCCINYLCHOLINE CHLORIDE 100 MG: 20 INJECTION, SOLUTION INTRAMUSCULAR; INTRAVENOUS at 18:40

## 2024-03-24 RX ADMIN — Medication 19 MG: at 15:34

## 2024-03-24 SDOH — SOCIAL STABILITY: SOCIAL INSECURITY: HAVE YOU HAD THOUGHTS OF HARMING ANYONE ELSE?: UNABLE TO ASSESS

## 2024-03-24 SDOH — SOCIAL STABILITY: SOCIAL INSECURITY: ARE YOU OR HAVE YOU BEEN THREATENED OR ABUSED PHYSICALLY, EMOTIONALLY, OR SEXUALLY BY ANYONE?: UNABLE TO ASSESS

## 2024-03-24 SDOH — SOCIAL STABILITY: SOCIAL INSECURITY: DO YOU FEEL ANYONE HAS EXPLOITED OR TAKEN ADVANTAGE OF YOU FINANCIALLY OR OF YOUR PERSONAL PROPERTY?: UNABLE TO ASSESS

## 2024-03-24 SDOH — SOCIAL STABILITY: SOCIAL INSECURITY: DO YOU FEEL UNSAFE GOING BACK TO THE PLACE WHERE YOU ARE LIVING?: UNABLE TO ASSESS

## 2024-03-24 SDOH — SOCIAL STABILITY: SOCIAL INSECURITY: ABUSE: ADULT

## 2024-03-24 SDOH — SOCIAL STABILITY: SOCIAL INSECURITY: DOES ANYONE TRY TO KEEP YOU FROM HAVING/CONTACTING OTHER FRIENDS OR DOING THINGS OUTSIDE YOUR HOME?: UNABLE TO ASSESS

## 2024-03-24 SDOH — SOCIAL STABILITY: SOCIAL INSECURITY: WERE YOU ABLE TO COMPLETE ALL THE BEHAVIORAL HEALTH SCREENINGS?: NO

## 2024-03-24 SDOH — SOCIAL STABILITY: SOCIAL INSECURITY: ARE THERE ANY APPARENT SIGNS OF INJURIES/BEHAVIORS THAT COULD BE RELATED TO ABUSE/NEGLECT?: UNABLE TO ASSESS

## 2024-03-24 SDOH — SOCIAL STABILITY: SOCIAL INSECURITY: HAS ANYONE EVER THREATENED TO HURT YOUR FAMILY OR YOUR PETS?: UNABLE TO ASSESS

## 2024-03-24 ASSESSMENT — ACTIVITIES OF DAILY LIVING (ADL)
FEEDING YOURSELF: UNABLE TO ASSESS
HEARING - LEFT EAR: UNABLE TO ASSESS
JUDGMENT_ADEQUATE_SAFELY_COMPLETE_DAILY_ACTIVITIES: UNABLE TO ASSESS
GROOMING: UNABLE TO ASSESS
DRESSING YOURSELF: UNABLE TO ASSESS
TOILETING: UNABLE TO ASSESS
ADEQUATE_TO_COMPLETE_ADL: UNABLE TO ASSESS
WALKS IN HOME: UNABLE TO ASSESS
BATHING: UNABLE TO ASSESS
PATIENT'S MEMORY ADEQUATE TO SAFELY COMPLETE DAILY ACTIVITIES?: UNABLE TO ASSESS
HEARING - RIGHT EAR: UNABLE TO ASSESS

## 2024-03-24 ASSESSMENT — PAIN SCALES - GENERAL
PAINLEVEL_OUTOF10: 4
PAINLEVEL_OUTOF10: 0 - NO PAIN
PAINLEVEL_OUTOF10: 4
PAINLEVEL_OUTOF10: 0 - NO PAIN
PAINLEVEL_OUTOF10: 3

## 2024-03-24 ASSESSMENT — PAIN - FUNCTIONAL ASSESSMENT
PAIN_FUNCTIONAL_ASSESSMENT: 0-10
PAIN_FUNCTIONAL_ASSESSMENT: CPOT (CRITICAL CARE PAIN OBSERVATION TOOL)
PAIN_FUNCTIONAL_ASSESSMENT: 0-10

## 2024-03-24 ASSESSMENT — LIFESTYLE VARIABLES
HOW MANY STANDARD DRINKS CONTAINING ALCOHOL DO YOU HAVE ON A TYPICAL DAY: PATIENT UNABLE TO ANSWER
AUDIT-C TOTAL SCORE: -1
AUDIT-C TOTAL SCORE: -1
SKIP TO QUESTIONS 9-10: 0
HOW OFTEN DO YOU HAVE 6 OR MORE DRINKS ON ONE OCCASION: PATIENT UNABLE TO ANSWER
HOW OFTEN DO YOU HAVE A DRINK CONTAINING ALCOHOL: PATIENT UNABLE TO ANSWER

## 2024-03-24 ASSESSMENT — COLUMBIA-SUICIDE SEVERITY RATING SCALE - C-SSRS
6. HAVE YOU EVER DONE ANYTHING, STARTED TO DO ANYTHING, OR PREPARED TO DO ANYTHING TO END YOUR LIFE?: NO
2. HAVE YOU ACTUALLY HAD ANY THOUGHTS OF KILLING YOURSELF?: NO
1. IN THE PAST MONTH, HAVE YOU WISHED YOU WERE DEAD OR WISHED YOU COULD GO TO SLEEP AND NOT WAKE UP?: NO
2. HAVE YOU ACTUALLY HAD ANY THOUGHTS OF KILLING YOURSELF?: NO
6. HAVE YOU EVER DONE ANYTHING, STARTED TO DO ANYTHING, OR PREPARED TO DO ANYTHING TO END YOUR LIFE?: NO
1. IN THE PAST MONTH, HAVE YOU WISHED YOU WERE DEAD OR WISHED YOU COULD GO TO SLEEP AND NOT WAKE UP?: NO

## 2024-03-24 ASSESSMENT — COGNITIVE AND FUNCTIONAL STATUS - GENERAL: PATIENT BASELINE BEDBOUND: UNABLE TO ASSESS AT THIS TIME

## 2024-03-24 NOTE — ED PROVIDER NOTES
HPI   Chief Complaint   Patient presents with    Head Injury     Fall hit head on wall (on plavix) at 11:00 am now has right sided facial droop and arm weakness       HPI  This is a 74-year-old female patient brought into the emergency room by the squad with a complaint of stroke alert because difficulty getting up and walk when she was in the bathroom her legs gave out on her and she fell sideways hit her head against the wall but did not hit her head on the floor as she had difficulty moving her right leg and right arm and she has speech trouble.  Symptoms started about 45 minutes before she was brought to the ER according to family she went to bed last night and then woke up this morning without any symptoms.  Family states that she has been having  right-sided facial droop and right arm weakness.  She denies any chest pain or short of breath.  Denies any nausea vomiting or diarrhea at arrival she has some facial asymmetry and facial droop but she was able to move her arms and legs s with some  ymmetrical range of motion both upper extremity taken to CAT scan right away.  When she came back from CAT scan she has vague weakness in the right leg and right arm with she has good handgrip bilaterally.    Family history: Reviewed  Social history: Reviewed, denies substance abuse.  Review of system: 10 review of system obtained review of system as described in HPI.                  Aditya Coma Scale Score: 15                     Patient History   Past Medical History:   Diagnosis Date    Abdominal bloating 05/04/2023    Diverticulitis of small intestine without perforation or abscess without bleeding     Diverticulitis, intestine, small    Dizzy 11/20/2023    Essential (primary) hypertension 02/08/2017    HTN (hypertension), benign    Influenza B 05/08/2015    Formatting of this note might be different from the original. Completed Tamiflu Continue droplet precautions.    Nausea 11/16/2018    Personal history of other  endocrine, nutritional and metabolic disease 02/08/2017    History of hypothyroidism    Personal history of other venous thrombosis and embolism     History of deep venous thrombosis     Past Surgical History:   Procedure Laterality Date    ABDOMINAL ADHESION SURGERY  05/16/2017    Laparoscopic Lysis Of Intestinal Adhesions    CERVICAL FUSION  05/16/2017    Cervical Vertebral Fusion    CHOLECYSTECTOMY  05/16/2017    Cholecystectomy    COLECTOMY PARTIAL / TOTAL  05/16/2017    Partial Colectomy - Sigmoid    CORONARY ANGIOPLASTY WITH STENT PLACEMENT      CT ANGIO NECK  05/21/2020    CT NECK ANGIO W AND WO IV CONTRAST 5/21/2020 [facility]    CT HEAD ANGIO W AND WO IV CONTRAST  05/21/2020    CT HEAD ANGIO W AND WO IV CONTRAST 5/21/2020 [facility]    HERNIA REPAIR  05/16/2017    Hernia Repair    HYSTERECTOMY  10/03/2013    Hysterectomy    MR HEAD ANGIO WO IV CONTRAST  05/21/2020    MR HEAD ANGIO WO IV CONTRAST 5/21/2020 [facility]    MR NECK ANGIO WO IV CONTRAST  05/21/2020    MR NECK ANGIO WO IV CONTRAST 5/21/2020 [facility]    OTHER SURGICAL HISTORY  05/16/2017    Thoracoscopy Of Lungs And Pleural Space With Biopsy Of Lung Nodules    OTHER SURGICAL HISTORY  04/15/2019    Esophagogastroduodenoscopy     No family history on file.  Social History     Tobacco Use    Smoking status: Former     Types: Cigarettes     Passive exposure: Never    Smokeless tobacco: Never   Vaping Use    Vaping Use: Never used   Substance Use Topics    Alcohol use: Not Currently    Drug use: Never   EKG obtained at 1344 hrs. shows normal sinus rhythm rate of 81.  Left bundle branch block.  Cannot rule out STEMI.  Abnormal EKG GA at this EKG.  EKG obtained at 1457 hrs. showed normal sinus rhythm rate of 97.  Nonspecific intraventricular conduction delay.  Tall T waves.  No ST-T evaluation.  No STEMI.  Abnormal EKG GA at this EKG.`    Physical Exam   ED Triage Vitals [03/24/24 1344]   Temp Heart Rate  Respirations BP   -- 80 15 124/69      SpO2 Temp src Heart Rate Source Patient Position   100 % -- Monitor --      BP Location FiO2 (%)     -- --       Physical Exam    CONSTITUTIONAL: When she came back from CAT scan she has some facial asymmetry and facial drooping right arm and right leg weakness has markedly improved there is negligible weakness in the right arm and right leg strength about 4 out of 5 in the right arm 4 and 5 in the right leg 5/5 in the left arm and left leg.  She was talking with slight slurred speech but markedly improved since arrival.  Pupils are equal round spine recommendation extraocular motor function tact fundi grossly within normal rate neck was supple spine nontender.  I did not find any bruise edema or erythema on the forehead or arms or legs initially.    HENMT: The airway was intact. There was no ear or nose discharge. No drooling or stridor. Neck was supple. Talking and breathing comfortably.      EYES: Clear bilaterally, pupils equal, round and reactive to light. CARDIOVASCULAR: Regular rate and rhythm. No friction rub or murmur good peripheral pulses no peripheral edema. Chest wall nontender.  Caprosyn nontender Homans' sign negative bilaterally intact distal pulse intact sensation.  Good skin perfusion.    RESPIRATORY: Patient was breathing comfortably. No tachypnea no respiratory distress.  Upon auscultation patient clear breath sound bilaterally no chest wall tenderness breathing comfortably no hypoxemia or tachypnea or respiratory distress.    GASTROINTESTINAL: Abdomen soft positive bowel sounds no tenderness no guarding rebound surgery no CVA tenderness noted.  No pulsatile mass.  Upon gentle palpation.      GENITOURINARY:  No costovertebral angle tenderness.     MUSCULOSKELETAL: Head was normocephalic atraumatic cervical thoracic lumbar spine nontender.  Chest was nontender  She was able to move both upper lower extremities very slight weakness in the right arm and right  leg upon very careful and critical evaluation of right arm and right leg and left arm and left leg with good motion and strength 5/5 on the left 404 on the right side.  She has some facial asymmetry.  Slight speech impairment neck was supple.  Spine nontender.  Cervical thoracic lumbar spine nontender pelvic is stable.  Chest wall nontender.. NEUROLOGICAL: Awake alert pleasant and cooperative. Head was normocephalic atraumatic cervical thoracic lumbar spine nontender.  Chest was nontender  She was able to move both upper lower extremities very slight weakness in the right arm and right leg upon very careful and critical evaluation of right arm and right leg and left arm and left leg with good motion and strength 5/5 on the left 404 on the right side.  She has some facial asymmetry.  Slight speech impairment neck was supple.  Spine nontender.  Cervical thoracic lumbar spine nontender pelvic is stable.  Chest wall nontender.  SKIN: Skin normal color for race, warm, dry and intact. No evidence of trauma or lesions.     PSYCHIATRIC: Awake alert and without acute distress. No obvious depression, no suicidal thoughts or ideation. Appropriate mood. Talking and normal tone. HEME/LYMPH: No adenopathy or splenomegaly.        CRITICAL CARE    VITAL SIGNS:          DISPOSITION      ED Course & MDM   Diagnoses as of 04/06/24 0831   Acute embolic stroke (CMS/HCC)   Subdural hemorrhage (CMS/HCC)   Endotracheally intubated       Medical Decision Making  Presenting symptoms of right-sided weakness facial droop slurred speech full-blown stroke symptoms affecting right side of the body were discussed with family.  Asked in detail and carefully brought exact time of onset and the cumulative for this about 45 minutes before she came in.  CT of the head was negative for acute intracranial hemorrhage.  And CTA showed M1 segment blockage or flow abnormality.    Case was discussed with Dr. Ott, stroke attending  who advised to be patient  clinically.  Talk to the patient and family in detail risk and benefit explained to the patient and family she has mild head contusion there was no bruise she denies hitting her head on the floor risk of bleeding and complication due to clinically were explained to the patient and family they wanted patient creatinine cleared they understood risk and benefit well they want signed consent patient  patient patient want her  to sign a consent and  Tenecteplase was given to the patient and patient has remarkable for mood she is starting to talk more clearly her strength markedly improved and she was waiting to be transferred to [facility].    It took a while to get a bed for the patient at [facility] and then also it took a while for the squad to come and  the patient at least for couple of hours.  Patient was taken to the ambulance from the ER and marked improved condition talking clearly moving arms and legs and within few minutes after she left the premises she was brought back immediately by the squad because she has a change in her status she has weakness in the right arm or right leg as well as facial droop remarkably and dramatically worsen within few minutes after she left the premises/center for CT of the head which showed small subdural hematoma no mass effect.    Case discussed with Dr. Ott, stroke attending she want patient to be transferred to [facility] where they want to take her to the OR to be reversed okay and open the occlusion lesion causing her symptoms because trace amount of blood and subdural seen was not significant to cause her major neurologic deficit in the there was concern about occlusive pathology causing her symptoms.  We could not reverse the think he has been doing her reversal agent..    At this time I intubated the patient to protect her airway without any complications single attempt after family agree with intubation and subsequently transferred to [facility] by helicopter.   Patient taken to [facility] and reportedly she has reversal of and taken to procedure room where she had occlusive lesion treated with thrombectomy and limited report showed that she was having improvement.  She has INR score of what family describes as 6-8 at home when she arrived in the ER is about 2 especially when she came back From CT but CTA showed signal pathology and she was given skin care again after risk-benefit explained to them in total and thoroughly educated about complication related skin care and benefit they signed consent before patient got clinically they understood risk and benefits well.    When she was taken to the parking lot from the ER and imaging about back in 2 to 3 minutes she has INH score of 8 and remained 8 when she was taken to [facility].        Procedure  Procedures     Jorge Pereira MD  04/06/24 0877

## 2024-03-24 NOTE — Clinical Note
Mechanical thrombectomy complete. Access via right groin. Closure via angioseal. Hemostasis achieved. 2x2 and tegaderm dressing applied. Dressing clean, dry, and intact. No hematoma. Pt to keep right leg straight for 3 hours post deployment time. VSS. Pt transferred to NSU, report given to NSU RN.

## 2024-03-25 ENCOUNTER — APPOINTMENT (OUTPATIENT)
Dept: RADIOLOGY | Facility: HOSPITAL | Age: 74
DRG: 023 | End: 2024-03-25
Payer: MEDICARE

## 2024-03-25 ENCOUNTER — APPOINTMENT (OUTPATIENT)
Dept: CARDIOLOGY | Facility: HOSPITAL | Age: 74
DRG: 023 | End: 2024-03-25
Payer: MEDICARE

## 2024-03-25 LAB
ALBUMIN SERPL BCP-MCNC: 3.4 G/DL (ref 3.4–5)
ANION GAP SERPL CALC-SCNC: 12 MMOL/L (ref 10–20)
AORTIC VALVE PEAK VELOCITY: 1.54 M/S
ATRIAL RATE: 81 BPM
ATRIAL RATE: 97 BPM
AV PEAK GRADIENT: 9.5 MMHG
AVA (PEAK VEL): 1.81 CM2
BNP SERPL-MCNC: 34 PG/ML (ref 0–99)
BUN SERPL-MCNC: 13 MG/DL (ref 6–23)
CALCIUM SERPL-MCNC: 8.6 MG/DL (ref 8.6–10.6)
CHLORIDE SERPL-SCNC: 107 MMOL/L (ref 98–107)
CHOLEST SERPL-MCNC: 107 MG/DL (ref 0–199)
CHOLESTEROL/HDL RATIO: 3.2
CO2 SERPL-SCNC: 26 MMOL/L (ref 21–32)
CREAT SERPL-MCNC: 0.87 MG/DL (ref 0.5–1.05)
EGFRCR SERPLBLD CKD-EPI 2021: 70 ML/MIN/1.73M*2
EJECTION FRACTION APICAL 4 CHAMBER: 38.5
EJECTION FRACTION: 49 %
ERYTHROCYTE [DISTWIDTH] IN BLOOD BY AUTOMATED COUNT: 13.5 % (ref 11.5–14.5)
EST. AVERAGE GLUCOSE BLD GHB EST-MCNC: 105 MG/DL
GLUCOSE BLD MANUAL STRIP-MCNC: 103 MG/DL (ref 74–99)
GLUCOSE BLD MANUAL STRIP-MCNC: 122 MG/DL (ref 74–99)
GLUCOSE BLD MANUAL STRIP-MCNC: 130 MG/DL (ref 74–99)
GLUCOSE SERPL-MCNC: 136 MG/DL (ref 74–99)
HBA1C MFR BLD: 5.3 %
HCT VFR BLD AUTO: 30.7 % (ref 36–46)
HDLC SERPL-MCNC: 33.2 MG/DL
HGB BLD-MCNC: 9.4 G/DL (ref 12–16)
HOLD SPECIMEN: NORMAL
LDLC SERPL CALC-MCNC: 41 MG/DL
LEFT VENTRICLE INTERNAL DIMENSION DIASTOLE: 4 CM (ref 3.5–6)
LEFT VENTRICULAR OUTFLOW TRACT DIAMETER: 2 CM
MCH RBC QN AUTO: 29.3 PG (ref 26–34)
MCHC RBC AUTO-ENTMCNC: 30.6 G/DL (ref 32–36)
MCV RBC AUTO: 96 FL (ref 80–100)
MITRAL VALVE E/A RATIO: 0.79
MITRAL VALVE E/E' RATIO: 8.58
NON HDL CHOLESTEROL: 74 MG/DL (ref 0–149)
NRBC BLD-RTO: 0 /100 WBCS (ref 0–0)
P AXIS: 60 DEGREES
P AXIS: 69 DEGREES
P OFFSET: 196 MS
P OFFSET: 197 MS
P ONSET: 141 MS
P ONSET: 144 MS
PHOSPHATE SERPL-MCNC: 3 MG/DL (ref 2.5–4.9)
PLATELET # BLD AUTO: 276 X10*3/UL (ref 150–450)
POTASSIUM SERPL-SCNC: 4.3 MMOL/L (ref 3.5–5.3)
PR INTERVAL: 138 MS
PR INTERVAL: 140 MS
Q ONSET: 210 MS
Q ONSET: 214 MS
QRS COUNT: 13 BEATS
QRS COUNT: 16 BEATS
QRS DURATION: 130 MS
QRS DURATION: 132 MS
QT INTERVAL: 380 MS
QT INTERVAL: 410 MS
QTC CALCULATION(BAZETT): 476 MS
QTC CALCULATION(BAZETT): 482 MS
QTC FREDERICIA: 445 MS
QTC FREDERICIA: 453 MS
R AXIS: -4 DEGREES
R AXIS: 73 DEGREES
RBC # BLD AUTO: 3.21 X10*6/UL (ref 4–5.2)
RIGHT VENTRICLE FREE WALL PEAK S': 13.2 CM/S
RIGHT VENTRICLE PEAK SYSTOLIC PRESSURE: 32.4 MMHG
SODIUM SERPL-SCNC: 141 MMOL/L (ref 136–145)
T AXIS: 45 DEGREES
T AXIS: 75 DEGREES
T OFFSET: 404 MS
T OFFSET: 415 MS
TRICUSPID ANNULAR PLANE SYSTOLIC EXCURSION: 2.8 CM
TRIGL SERPL-MCNC: 166 MG/DL (ref 0–149)
VENTRICULAR RATE: 81 BPM
VENTRICULAR RATE: 97 BPM
VLDL: 33 MG/DL (ref 0–40)
WBC # BLD AUTO: 14.5 X10*3/UL (ref 4.4–11.3)

## 2024-03-25 PROCEDURE — 2500000004 HC RX 250 GENERAL PHARMACY W/ HCPCS (ALT 636 FOR OP/ED): Performed by: INTERNAL MEDICINE

## 2024-03-25 PROCEDURE — 94003 VENT MGMT INPAT SUBQ DAY: CPT

## 2024-03-25 PROCEDURE — 37799 UNLISTED PX VASCULAR SURGERY: CPT

## 2024-03-25 PROCEDURE — 83880 ASSAY OF NATRIURETIC PEPTIDE: CPT

## 2024-03-25 PROCEDURE — 2500000005 HC RX 250 GENERAL PHARMACY W/O HCPCS

## 2024-03-25 PROCEDURE — 80061 LIPID PANEL: CPT

## 2024-03-25 PROCEDURE — 2020000001 HC ICU ROOM DAILY

## 2024-03-25 PROCEDURE — 85027 COMPLETE CBC AUTOMATED: CPT | Performed by: INTERNAL MEDICINE

## 2024-03-25 PROCEDURE — 99223 1ST HOSP IP/OBS HIGH 75: CPT

## 2024-03-25 PROCEDURE — 70551 MRI BRAIN STEM W/O DYE: CPT

## 2024-03-25 PROCEDURE — 82947 ASSAY GLUCOSE BLOOD QUANT: CPT

## 2024-03-25 PROCEDURE — 2500000004 HC RX 250 GENERAL PHARMACY W/ HCPCS (ALT 636 FOR OP/ED)

## 2024-03-25 PROCEDURE — 93306 TTE W/DOPPLER COMPLETE: CPT | Performed by: INTERNAL MEDICINE

## 2024-03-25 PROCEDURE — 83036 HEMOGLOBIN GLYCOSYLATED A1C: CPT

## 2024-03-25 PROCEDURE — 70551 MRI BRAIN STEM W/O DYE: CPT | Performed by: RADIOLOGY

## 2024-03-25 PROCEDURE — 2500000002 HC RX 250 W HCPCS SELF ADMINISTERED DRUGS (ALT 637 FOR MEDICARE OP, ALT 636 FOR OP/ED): Performed by: STUDENT IN AN ORGANIZED HEALTH CARE EDUCATION/TRAINING PROGRAM

## 2024-03-25 PROCEDURE — 80069 RENAL FUNCTION PANEL: CPT | Performed by: INTERNAL MEDICINE

## 2024-03-25 PROCEDURE — 94640 AIRWAY INHALATION TREATMENT: CPT

## 2024-03-25 PROCEDURE — 2500000001 HC RX 250 WO HCPCS SELF ADMINISTERED DRUGS (ALT 637 FOR MEDICARE OP)

## 2024-03-25 PROCEDURE — 2500000001 HC RX 250 WO HCPCS SELF ADMINISTERED DRUGS (ALT 637 FOR MEDICARE OP): Performed by: REGISTERED NURSE

## 2024-03-25 PROCEDURE — 93306 TTE W/DOPPLER COMPLETE: CPT

## 2024-03-25 PROCEDURE — 99291 CRITICAL CARE FIRST HOUR: CPT | Performed by: NEUROLOGICAL SURGERY

## 2024-03-25 PROCEDURE — 2500000002 HC RX 250 W HCPCS SELF ADMINISTERED DRUGS (ALT 637 FOR MEDICARE OP, ALT 636 FOR OP/ED)

## 2024-03-25 PROCEDURE — 2500000005 HC RX 250 GENERAL PHARMACY W/O HCPCS: Performed by: REGISTERED NURSE

## 2024-03-25 RX ORDER — ALBUTEROL SULFATE 90 UG/1
2 AEROSOL, METERED RESPIRATORY (INHALATION) EVERY 6 HOURS PRN
Status: DISCONTINUED | OUTPATIENT
Start: 2024-03-25 | End: 2024-03-25

## 2024-03-25 RX ORDER — FUROSEMIDE 20 MG/1
20 TABLET ORAL DAILY
Status: DISCONTINUED | OUTPATIENT
Start: 2024-03-25 | End: 2024-03-27

## 2024-03-25 RX ORDER — ACETAMINOPHEN 325 MG/1
650 TABLET ORAL EVERY 6 HOURS PRN
Status: DISCONTINUED | OUTPATIENT
Start: 2024-03-25 | End: 2024-03-27

## 2024-03-25 RX ORDER — IPRATROPIUM BROMIDE 0.5 MG/2.5ML
0.5 SOLUTION RESPIRATORY (INHALATION) EVERY 6 HOURS PRN
Status: DISCONTINUED | OUTPATIENT
Start: 2024-03-25 | End: 2024-03-25

## 2024-03-25 RX ORDER — METOPROLOL TARTRATE 25 MG/1
25 TABLET, FILM COATED ORAL 3 TIMES DAILY
Status: DISCONTINUED | OUTPATIENT
Start: 2024-03-25 | End: 2024-03-27

## 2024-03-25 RX ORDER — BUDESONIDE 0.5 MG/2ML
0.5 INHALANT ORAL
Status: DISCONTINUED | OUTPATIENT
Start: 2024-03-25 | End: 2024-03-25

## 2024-03-25 RX ORDER — NAPROXEN SODIUM 220 MG/1
81 TABLET, FILM COATED ORAL DAILY
Status: DISCONTINUED | OUTPATIENT
Start: 2024-03-25 | End: 2024-03-25

## 2024-03-25 RX ORDER — FORMOTEROL FUMARATE DIHYDRATE 20 UG/2ML
20 SOLUTION RESPIRATORY (INHALATION)
Status: DISCONTINUED | OUTPATIENT
Start: 2024-03-25 | End: 2024-03-25

## 2024-03-25 RX ORDER — ACETAMINOPHEN 500 MG
5 TABLET ORAL NIGHTLY
Status: DISCONTINUED | OUTPATIENT
Start: 2024-03-25 | End: 2024-03-27

## 2024-03-25 RX ORDER — ALBUTEROL SULFATE 0.83 MG/ML
2.5 SOLUTION RESPIRATORY (INHALATION) EVERY 4 HOURS PRN
Status: DISCONTINUED | OUTPATIENT
Start: 2024-03-25 | End: 2024-03-28 | Stop reason: HOSPADM

## 2024-03-25 RX ORDER — BUDESONIDE 0.5 MG/2ML
0.5 INHALANT ORAL 2 TIMES DAILY
Status: DISCONTINUED | OUTPATIENT
Start: 2024-03-25 | End: 2024-03-25

## 2024-03-25 RX ORDER — ASPIRIN 300 MG/1
300 SUPPOSITORY RECTAL ONCE AS NEEDED
Status: DISCONTINUED | OUTPATIENT
Start: 2024-03-25 | End: 2024-03-26

## 2024-03-25 RX ORDER — LEVOTHYROXINE SODIUM 50 UG/1
50 TABLET ORAL DAILY
Status: DISCONTINUED | OUTPATIENT
Start: 2024-03-25 | End: 2024-03-27

## 2024-03-25 RX ORDER — BUDESONIDE 0.5 MG/2ML
0.5 INHALANT ORAL 2 TIMES DAILY
Status: DISCONTINUED | OUTPATIENT
Start: 2024-03-25 | End: 2024-03-28 | Stop reason: HOSPADM

## 2024-03-25 RX ORDER — INSULIN LISPRO 100 [IU]/ML
0-5 INJECTION, SOLUTION INTRAVENOUS; SUBCUTANEOUS
Status: DISCONTINUED | OUTPATIENT
Start: 2024-03-25 | End: 2024-03-28 | Stop reason: HOSPADM

## 2024-03-25 RX ORDER — LIDOCAINE 560 MG/1
1 PATCH PERCUTANEOUS; TOPICAL; TRANSDERMAL DAILY
Status: DISCONTINUED | OUTPATIENT
Start: 2024-03-25 | End: 2024-03-28 | Stop reason: HOSPADM

## 2024-03-25 RX ADMIN — LIDOCAINE 1 PATCH: 4 PATCH TOPICAL at 20:25

## 2024-03-25 RX ADMIN — NOREPINEPHRINE BITARTRATE 0.01 MCG/KG/MIN: 8 INJECTION, SOLUTION INTRAVENOUS at 00:30

## 2024-03-25 RX ADMIN — SODIUM CHLORIDE 500 ML: 9 INJECTION, SOLUTION INTRAVENOUS at 00:05

## 2024-03-25 RX ADMIN — PERFLUTREN 10 ML OF DILUTION: 6.52 INJECTION, SUSPENSION INTRAVENOUS at 15:17

## 2024-03-25 RX ADMIN — Medication 0.01 MCG/KG/MIN: at 00:30

## 2024-03-25 RX ADMIN — ALBUTEROL SULFATE 2.5 MG: 2.5 SOLUTION RESPIRATORY (INHALATION) at 21:08

## 2024-03-25 RX ADMIN — BUDESONIDE 0.5 MG: 0.5 INHALANT RESPIRATORY (INHALATION) at 17:03

## 2024-03-25 RX ADMIN — DEXMEDETOMIDINE HYDROCHLORIDE 0.3 MCG/KG/HR: 400 INJECTION INTRAVENOUS at 12:33

## 2024-03-25 RX ADMIN — SODIUM CHLORIDE 100 ML/HR: 9 INJECTION, SOLUTION INTRAVENOUS at 00:05

## 2024-03-25 RX ADMIN — SENNOSIDES 17.2 MG: 8.6 TABLET, FILM COATED ORAL at 20:25

## 2024-03-25 RX ADMIN — ASPIRIN 81 MG 81 MG: 81 TABLET ORAL at 16:23

## 2024-03-25 RX ADMIN — ACETAMINOPHEN 650 MG: 325 TABLET ORAL at 16:23

## 2024-03-25 RX ADMIN — ACETAMINOPHEN 650 MG: 325 TABLET ORAL at 22:00

## 2024-03-25 RX ADMIN — Medication 5 MG: at 22:00

## 2024-03-25 RX ADMIN — DEXMEDETOMIDINE HYDROCHLORIDE 0.25 MCG/KG/HR: 400 INJECTION INTRAVENOUS at 00:05

## 2024-03-25 ASSESSMENT — PAIN - FUNCTIONAL ASSESSMENT
PAIN_FUNCTIONAL_ASSESSMENT: 0-10

## 2024-03-25 ASSESSMENT — PAIN SCALES - GENERAL
PAINLEVEL_OUTOF10: 4
PAINLEVEL_OUTOF10: 0 - NO PAIN

## 2024-03-25 ASSESSMENT — PAIN DESCRIPTION - DESCRIPTORS: DESCRIPTORS: ACHING;DULL;DISCOMFORT

## 2024-03-25 NOTE — ED PROCEDURE NOTE
Procedure  Intubation    Performed by: Jorge Pereira MD  Authorized by: Aung Stephens MD    Consent:     Consent obtained:  Verbal and emergent situation    Consent given by:  Spouse    Risks discussed:  Aspiration, brain injury, dental trauma, laryngeal injury, pneumothorax, hypoxia, death and bleeding    Alternatives discussed:  No treatment, delayed treatment, alternative treatment and observation  Universal protocol:     Procedure explained and questions answered to patient or proxy's satisfaction: yes      Relevant documents present and verified: yes      Test results available: yes      Imaging studies available: yes      Required blood products, implants, devices, and special equipment available: yes      Site/side marked: yes      Immediately prior to procedure, a time out was called: yes      Patient identity confirmed:  Verbally with patient and hospital-assigned identification number  Pre-procedure details:     Indications: airway obstruction, airway protection, altered consciousness and respiratory distress      Patient status:  Altered mental status    Mouth opening - incisor distance:  2 finger widths    Hyoid-mental distance: 3 or more finger widths      Hyoid-thyroid distance: 2 or more finger widths      Mallampati score:  I    Obstruction: none    Successful intubation attempt details:     Intubation method:  Oral    Intubation technique: direct      Laryngoscope blade:  Hypercurved    Tube size (mm):  7.5  Placement assessment:     Tube secured with:  ETT beal    Breath sounds:  Equal    Placement verification: chest rise, colorimetric ETCO2, CXR verification and direct visualization    Post-procedure details:     Procedure completion:  Tolerated well, no immediate complications  Comments:      Intubated without any complications singular 10.  Tolerated procedure well.  Good secure airway and excellent ventilation secured for transport by helicopter.               Jorge Pereira  MD  03/25/24 0832

## 2024-03-25 NOTE — CONSULTS
Inpatient consult to Neurosurgery  Consult performed by: Octavio Reynaga MD  Consult ordered by: Kathy Maxwell MD      Date of Service:  3/25/2024 Attending Provider:  Kathy Maxwell MD     Reason for Consultation:  Trinity Hernandez is being seen today for a consult requested by Kathy Maxwell MD for SDH.      Subjective   History of Present Illness:  Trinity is a 74 y.o. female with h/o CAD s/p stent 10/4/2023 on DAPT, NSVT, LV systolic/diastolic dysfunction, HLD, HTN, COPD on home O2, pulmonary sarcoidosis, Afib (no AC), hypothyroidism, p/w R FD and RUE weakness s/p TNK 3/24, transferred to [facility] for thrombectomy, reversed with cryo, s/p thrombectomy TICI 2b. Patient presented to OSH after a fall and was noted to have R FD and RUE weakness. She received TNK 3/24 at 1530. Patient had increased work of breathing and drooling with repeat CTH showing R subdural. Patient was then intubated, reversed with cryo and transferred to [facility] for thrombectomy. Patient is intubated and unable to provide history. Thus history obtained from primary team and chart review.    Review of Systems   10 point ROS is obtained and negative except the ones mentioned in the HPI    Objective     Vitals:  Vitals:    03/25/24 0800   BP: 112/57   Pulse: 67   Resp: 16   Temp:    SpO2: 100%       Exam:  Constitutional: No acute distress, on precedex  Resp: intubated  Cardio: well perfused  GI: nondistended  MSK: full range of motion  Neuro: No acute distress, Ox2 with choice (self, location)  Cranial Nerves II-XII: OU3R, EOMI, Face symmetric, Facial SILT, Palate/Tongue midline and symmetric, shoulder shrugs symmetric,  Motor: LUE 5/5, LLE 5/5, RUE prox 4+ dist 5  Sensation: SILT throughout all extremities  Psych: appropriate  Skin: no obvious lesions    Medical History  Past Medical History:   Diagnosis Date    Abdominal bloating 05/04/2023    CAD (coronary artery disease)     CHF (congestive heart failure) (CMS/HCC)     Diverticulitis of small  intestine without perforation or abscess without bleeding     Diverticulitis, intestine, small    Dizzy 11/20/2023    Essential (primary) hypertension 02/08/2017    HTN (hypertension), benign    Fall     Influenza B 05/08/2015    Formatting of this note might be different from the original. Completed Tamiflu Continue droplet precautions.    Nausea 11/16/2018    Personal history of other endocrine, nutritional and metabolic disease 02/08/2017    History of hypothyroidism    Personal history of other venous thrombosis and embolism     History of deep venous thrombosis       Surgical History  Past Surgical History:   Procedure Laterality Date    ABDOMINAL ADHESION SURGERY  05/16/2017    Laparoscopic Lysis Of Intestinal Adhesions    CERVICAL FUSION  05/16/2017    Cervical Vertebral Fusion    CHOLECYSTECTOMY  05/16/2017    Cholecystectomy    COLECTOMY PARTIAL / TOTAL  05/16/2017    Partial Colectomy - Sigmoid    CORONARY ANGIOPLASTY WITH STENT PLACEMENT      CT ANGIO NECK  05/21/2020    CT NECK ANGIO W AND WO IV CONTRAST 5/21/2020 [facility]    CT HEAD ANGIO W AND WO IV CONTRAST  05/21/2020    CT HEAD ANGIO W AND WO IV CONTRAST 5/21/2020 [facility]    HERNIA REPAIR  05/16/2017    Hernia Repair    HYSTERECTOMY  10/03/2013    Hysterectomy    MR HEAD ANGIO WO IV CONTRAST  05/21/2020    MR HEAD ANGIO WO IV CONTRAST 5/21/2020 [facility]    MR NECK ANGIO WO IV CONTRAST  05/21/2020    MR NECK ANGIO WO IV CONTRAST 5/21/2020 [facility]    OTHER SURGICAL HISTORY  05/16/2017    Thoracoscopy Of Lungs And Pleural Space With Biopsy Of Lung Nodules    OTHER SURGICAL HISTORY  04/15/2019    Esophagogastroduodenoscopy        Medications  Current Outpatient Medications   Medication Instructions    albuterol 90 mcg/actuation inhaler 2 puffs, inhalation, Every 6 hours PRN    albuterol 2.5 mg, nebulization, Every 4 hours PRN    arformoterol (Brovana) 15 mcg/2 mL nebulizer solution INHALE 2ML VIA  NEBULIZER AS INSTRUCTED TWICE A DAY (EVERY 12 HOURS)    aspirin 81 mg EC tablet TAKE 1 TABLET BY MOUTH EVERY 24 HRS    budesonide (PULMICORT) 0.5 mg, nebulization, 2 times daily, Rinse mouth with water after use to reduce aftertaste and incidence of candidiasis. Do not swallow.    cholecalciferol (VITAMIN D3) 2,000 Units, oral, Daily    clopidogrel (PLAVIX) 75 mg, oral, Daily    docusate sodium (COLACE) 100 mg, oral, 2 times daily    estradiol (ESTRACE) 2 g, vaginal, Daily    ferrous sulfate (325 mg ferrous sulfate) 325 mg, oral, Daily    furosemide (Lasix) 20 mg tablet Take 1 tablet (20 mg) by mouth once daily.    ipratropium (Atrovent) 0.02 % nebulizer solution ipratropium (Atrovent) 0.02 % nebulizer solution USE 2.5 ML VIA NEBULIZER FOUR TIMES DAILY AS NEEDED 0 Active    Lactobacillus acidophilus (Probiotic) 10 billion cell capsule 1 capsule, oral, Daily    levothyroxine (SYNTHROID, LEVOXYL) 50 mcg, oral, Daily    LORazepam (ATIVAN) 0.5 mg, oral, Every 6 hours PRN    magnesium oxide (MAG-OX) 400 mg, oral, Daily RT    metoprolol succinate XL (TOPROL-XL) 75 mg, oral, Daily RT    nitroglycerin (NITROSTAT) 0.4 mg, sublingual    polyethylene glycol (MIRALAX) 17 g, oral, Daily    Repatha Syringe 140 mg, subcutaneous, Every 14 days    tiotropium (Spiriva Respimat) 2.5 mcg/actuation inhaler 2 puffs, inhalation, Daily        Diagnostic Results:    Lab Results   Component Value Date    WBC 14.5 (H) 03/25/2024    HGB 9.4 (L) 03/25/2024    HCT 30.7 (L) 03/25/2024    MCV 96 03/25/2024     03/25/2024     Lab Results   Component Value Date    CREATININE 0.87 03/25/2024    BUN 13 03/25/2024     03/25/2024    K 4.3 03/25/2024     03/25/2024    CO2 26 03/25/2024     Lab Results   Component Value Date    INR 1.1 03/24/2024    INR 1.1 03/24/2024    INR 1.1 02/02/2024    PROTIME 12.0 03/24/2024    PROTIME 12.0 03/24/2024    PROTIME 12.1 02/02/2024       === 03/24/24 ===    CT BRAIN ATTACK  PERFUSION W CONTRAST    -  Impression -  1. Loss of gray-white differentiation and sulcal effacement within  the left insular and frontal lobe cortex (series 204, images 35-48).  There is also loss of gray-white matter distinction involving the  left corpus striatum (series 204, images 34-44). Findings are in  keeping with acute to subacute ischemia.  2. Acute ischemic injury with hypoperfused left MCA territory with  total volume of 153 mL and core infarct measuring 10 mL. Mismatch  ratio is 15.3.  3. Unchanged 0.6 cm right subdural hemorrhage with 0.3 cm of  right-to-left midline shift. No new areas of hemorrhage identified.    I personally reviewed the images/study and I agree with Marilynn Cooley DO's (radiology resident) findings as stated. This study  was interpreted at [facility],  [place], [place].    MACRO:  Marilynn Cooley discussed the significance and urgency of this  critical finding in person at the scanner with  ARCHIE NATALIA on  3/24/2024 at 9:20 pm.  (**-RCF-**) Findings:  See findings.      Signed by: Diego Reddy 3/24/2024 9:59 PM  Dictation workstation:   [id number]  === 12/18/13 ===    MR KNEE      Assessment/Plan   Assessment:  75 y/o w/ h/o CAD s/p stent 10/4/2023 on DAPT, NSVT, LV systolic/diastolic dysfunction, HLD, HTN, COPD on home O2, pulmonary sarcoidosis, Afib (no AC), hypothyroidism, p/w R FD and RUE weakness, 3/24 CTA L M1 occlusion, CTH neg s/p TNK 3/24, increased WOB s/p intubation, rCTH 0.6cm R frontoparietal SDH w MLS, reversed with cryo, [place] [facility] [place], rrCTH stable, s/p thrombectomy TICI 2b    Recs  Stroke/NSU primary  No acute neurosurgical intervention. Patient with mild RUE proximal weakness with stable SDH on repeat imaging  Continue to hold ASA/Plavix, AC  Agree with cardiology for necessity/determination of DAPT for stents    Octavio Reynaga MD  Plan not finalized until note signed by attending

## 2024-03-25 NOTE — PROGRESS NOTES
"[facility]  NEUROSCIENCE INTENSIVE CARE UNIT  DAILY PROGRESS NOTE    Patient Name: Trinity Hernandez     MRN: [id number]     Admit Date: 3/24/2024     : 1950 AGE: 74 y.o. GENDER: female      Subjective   74 y.o. female with PMHx significant for CAD (s/p stent), NSVT, Paroxysmal Atrial Fibrillation, Chronic Diastolic heart Failure, HTN, AAA, HLD, COPD, Chronic Hypoxic Respiratory Failure 2/2 Pulmonary Sarcoidosis (on 2L at night), Hypothyroidism, Migraines, Hiatal Hernia, and Diverticulosis who presents with L MCA CVA s/p TNK and Thrombectomy on 3/25/2024    Interval Events: NAEON     Objective   Vitals 24 hour ranges:  Heart Rate:  []   Temp:  [36 °C (96.8 °F)-36.1 °C (97 °F)]   Resp:  []   BP: ()/()   Height:  [170 cm (5' 6.93\")-170.2 cm (5' 7\")]   Weight:  [74.4 kg (164 lb)-83.1 kg (183 lb 3.2 oz)]   SpO2:  [96 %-100 %]    Hemodynamic parameters for last 24 hours:     Intake/Output for last 24 hours:    Intake/Output Summary (Last 24 hours) at 3/25/2024 0752  Last data filed at 3/25/2024 0600  Gross per 24 hour   Intake 1150.3 ml   Output 315 ml   Net 835.3 ml      Vent settings:  Vent Mode: Volume control/assist control  FiO2 (%):  [30 %] 30 %  S RR:  [16] 16  S VT:  [450 mL] 450 mL  PEEP/CPAP (cm H2O):  [5 cm H20] 5 cm H20  MAP (cm H2O):  [9-13] 13    Physical Exam:  NEURO:  - Drowsy, oriented x2, follows commands  - PERRL 2mm, EOMI, VFF  - R 2/5, L AG wo drift  CV:  - RRR on telemetry, NSR  RESP:  - Regular, unlabored  - Oxygen: vent  :  - catheter in place  GI:  - Abdomen NT/ND, soft  SKIN:  - Intact    NIH Stroke Scale  1A. Level of Consciousness: Arouses to Minor Stimulation  1B. Ask Month and Age: No Questions Right  1C. Blink Eyes & Squeeze Hands: Performs Both Tasks  2. Best Gaze: Normal  3. Visual: No Visual Loss  4. Facial Palsy: Normal Symmetrical Movements  5A. Motor - Left Arm: No Drift  5B. Motor - Right Arm: No Effort Against " Gravity  6A. Motor - Left Leg: No Drift  6B. Motor - Right Leg: No Effort Against [place]  7. Limb Ataxia: Absent  8. Sensory Loss: Severe-to-Total Sensory Loss  9. Best Language: Mild-to-Moderate Aphasia  10. Dysarthria: Mild-to-Moderate Dysarthria  11. Extinction and Inattention: No Abnormality  NIH Stroke Scale: 13       Aditya Coma Scale  Best Eye Response: To verbal stimuli  Best Verbal Response: None  Best Motor Response: Follows commands  [place] Coma Scale Score: 10      Medications  Scheduled: PRN: Continuous:   furosemide, 20 mg, oral, Daily  insulin lispro, 0-5 Units, subcutaneous, TID with meals  levothyroxine, 50 mcg, oral, Daily  metoprolol tartrate, 25 mg, oral, TID  polyethylene glycol, 17 g, oral, Daily  sennosides, 2 tablet, oral, BID     PRN medications: albuterol, albuterol, dextrose, glucagon, hydrALAZINE **FOLLOWED BY** [START ON 3/26/2024] hydrALAZINE, labetaloL, oxygen dexmedeTOMIDine, 0.1-1.5 mcg/kg/hr, Last Rate: 0.3 mcg/kg/hr (03/25/24 0600)  fentaNYL, 0-300 mcg/hr, Last Rate: 25 mcg/hr (03/24/24 2054)  norepinephrine, 0-3 mcg/kg/min, Last Rate: 0.03 mcg/kg/min (03/25/24 0600)  sodium chloride 0.9%, 100 mL/hr, Last Rate: 100 mL/hr (03/25/24 0600)         Lab Results  Results from last 72 hours   Lab Units 03/25/24  0332 03/24/24 2057 03/24/24  1354   GLUCOSE mg/dL 136* 144* 110*   SODIUM mmol/L 141 138 138   POTASSIUM mmol/L 4.3 4.1 4.0   CHLORIDE mmol/L 107 104 100   CO2 mmol/L 26 25 31   ANION GAP mmol/L 12 13 11   BUN mg/dL 13 13 13   CREATININE mg/dL 0.87 0.87 0.90   EGFR mL/min/1.73m*2 70 70 67   CALCIUM mg/dL 8.6 9.1 10.1   PHOSPHORUS mg/dL 3.0  --   --    ALBUMIN g/dL 3.4 3.6 4.4   MAGNESIUM mg/dL  --   --  1.78      Results from last 72 hours   Lab Units 03/25/24  0332 03/24/24 2057 03/24/24  1354   WBC AUTO x10*3/uL 14.5* 13.4* 9.8   NRBC AUTO /100 WBCs 0.0 0.0 0.0   RBC AUTO x10*6/uL 3.21* 3.95* 4.74   HEMOGLOBIN g/dL 9.4* 11.1* 13.5   HEMATOCRIT % 30.7* 34.3* 44.2   MCV fL 96  87 93   MCH pg 29.3 28.1 28.5   MCHC g/dL 30.6* 32.4 30.5*   RDW % 13.5 13.2 13.2   PLATELETS AUTO x10*3/uL 276 309 304   NEUTROS PCT AUTO %  --  78.1 72.9   IG PCT AUTO %  --  0.4 1.5*   LYMPHS PCT AUTO %  --  10.8 12.4   MONOS PCT AUTO %  --  8.7 8.7   EOS PCT AUTO %  --  1.6 3.8   BASOS PCT AUTO %  --  0.4 0.7   NEUTROS ABS x10*3/uL  --  10.43* 7.16*   IG AUTO x10*3/uL  --  0.06 0.15   LYMPHS ABS AUTO x10*3/uL  --  1.45 1.22   MONOS ABS AUTO x10*3/uL  --  1.17* 0.85*   EOS ABS AUTO x10*3/uL  --  0.22 0.37   BASOS ABS AUTO x10*3/uL  --  0.05 0.07      Results from last 72 hours   Lab Units 03/24/24 2057 03/24/24  1357   PROTIME seconds 12.0 12.0   INR  1.1 1.1   APTT seconds 29 38             Imaging Results  CT brain attack head wo IV contrast   Final Result   1. Loss of gray-white differentiation and sulcal effacement within   the left insular and frontal lobe cortex (series 204, images 35-48).   There is also loss of gray-white matter distinction involving the   left corpus striatum (series 204, images 34-44). Findings are in   keeping with acute to subacute ischemia.   2. Acute ischemic injury with hypoperfused left MCA territory with   total volume of 153 mL and core infarct measuring 10 mL. Mismatch   ratio is 15.3.   3. Unchanged 0.6 cm right subdural hemorrhage with 0.3 cm of   right-to-left midline shift. No new areas of hemorrhage identified.        I personally reviewed the images/study and I agree with Marilynn Cooley DO's (radiology resident) findings as stated. This study   was interpreted at [facility],   [place], [place].        MACRO:   Marilynn Cooley discussed the significance and urgency of this   critical finding in person at the scanner with  ARCHIE FISHER on   3/24/2024 at 9:20 pm.  (**-RCF-**) Findings:  See findings.             Signed by: Diego Reddy 3/24/2024 9:59 PM   Dictation workstation:   [id number]      CT brain attack perfusion w IV  contrast   Final Result   1. Loss of gray-white differentiation and sulcal effacement within   the left insular and frontal lobe cortex (series 204, images 35-48).   There is also loss of gray-white matter distinction involving the   left corpus striatum (series 204, images 34-44). Findings are in   keeping with acute to subacute ischemia.   2. Acute ischemic injury with hypoperfused left MCA territory with   total volume of 153 mL and core infarct measuring 10 mL. Mismatch   ratio is 15.3.   3. Unchanged 0.6 cm right subdural hemorrhage with 0.3 cm of   right-to-left midline shift. No new areas of hemorrhage identified.        I personally reviewed the images/study and I agree with Marilynn Cooley DO's (radiology resident) findings as stated. This study   was interpreted at [place], [place].        MACRO:   Marilynn Cooley discussed the significance and urgency of this   critical finding in person at the scanner with  ARCHIE NATALIA on   3/24/2024 at 9:20 pm.  (**-RCF-**) Findings:  See findings.             Signed by: Diego Reddy 3/24/2024 9:59 PM   Dictation workstation:   [id number]      XR chest 1 view   Final Result   1.  Mild streaky and hazy bibasilar opacities favored to represent   atelectasis. Superimposed infectious/inflammatory process can not be   fully excluded. No focal consolidation, sizeable pleural effusion, or   pneumothorax.   2. Endotracheal tube appears satisfactorily positioned. Devices as   described above.        I personally reviewed the images/study and I agree with Marilynn Cooley DO's (radiology resident) findings as stated. This study   was interpreted at [place], [place].        MACRO:   None        Signed by: Diego Reddy 3/24/2024 9:32 PM   Dictation workstation:   [id number]      IR intervention NEURO thrombectomy    (Results Pending)         Assessment/Plan   74 y.o.  "female with PMHx significant for CAD (s/p stent), NSVT, Paroxysmal Atrial Fibrillation, Chronic Diastolic heart Failure, HTN, AAA, HLD, COPD, Chronic Hypoxic Respiratory Failure 2/2 Pulmonary Sarcoidosis (on 2L at night), Hypothyroidism, Migraines, Hiatal Hernia, and Diverticulosis who presents with L MCA CVA s/p TNK and Thrombectomy on 3/25/2024 TICI 2b. Intubated for airway protection in ED.m    NEURO:  #L MCA occlusion, s/p TNK c/b R SDH (also fall at home); likely cardioembolic stroke  Assessment:  - Neurologically: drowsy, oriented x2, right side strength 2/5, left side wo drift  - s/p crypreceipitate 3/24 pm  - s/p MT, TICI 2b; NIHSS 18 > 7  - LDL 41, A1c 5.3%  Plan:  - NSU  - Q2H neuro checks per stroke team  - start ASA 24h post TNK (~3:30pm 3/25), AC plan after discussion with cardiology (on DAPT at home), hold plavix until MRI  - MRI brain   - TTE  - NSGY consult for R SDH  - Pain: acetaminophen, oxycodone, hydromorphone PRN  - Sedation: precedex  - Nausea: ondansetron PRN  - PT/OT/SLP    CARDIOVASCULAR:  #CHF   #CAD s.p stent   #Afib  #HTN  Assessment:  - Trop on presentation 22   - Latest TTE in 2021: EF 35-40%   - Home meds: ASA 81mg, Plavix 75mg, Lasix 20 mg daily, and Metoprolol S. 75 mg daily   Plan:  - Continue to monitor on telemetry  - Goal SBP < 180 - Nicardipine, Hydralazine and Labetalol PRN  - Continue home lasix 20 mg daily   - Switch to Metoprolol T. 25 mg TID   - Resume ASA 81 mg daily 24hr post TNK, hold plavix until MRI  - Will need to be AC for the long-term, need cardiology clearance for anti-platelets therapy (was on DAPT at home for CAD s/p stent)     RESPIRATORY:  #COPD  #Asthma   #Hx of \"pulmonary sarcoidosis\"  Assessment:  - On RA at home   - Intubated at OSH for airway protection  - CXR reviewed with ET tube in place   - Home meds:  Spiriva 2 puffs daily, Ipratropium inhaler prn, and albuterol prn  Plan:  - Continuous pulse oximetry   - O2 PRN to maintain SpO2 > 94%, wean as " tolerated  - Incentive spirometry while awake   - Ventilator bundle while intubated     RENAL/:  [place]  Assessment:  - Baseline BUN/Cr: 11/0.93  Results from last 72 hours   Lab Units 24   BUN mg/dL 13 13   CREATININE mg/dL 0.87 0.87   Plan:  - Monitor with daily RFP  - External urinary catheter    FEN/GI:  [place]  Assessment:  -  last BM: PTA  Plan:  - Monitor and replace electrolytes per protocol  - IVF: 100cc/hr  - Diet: NPO  - Bowel Regimen: Miralax    ENDOCRINE:  #Hypothyroidism  Assessment:  Results from last 7 days   Lab Units 24  1531   POCT GLUCOSE mg/dL  --   --  147* 101*   GLUCOSE mg/dL 136* 144*  --   --    Plan:  - Accuchecks & ISS Q4H  - Continue home levothyroxine 50 mcg daily     HEMATOLOGY:  [place]  Assessment:  - Baseline Hgb: 11.4  - Baseline Plts: 358  Results from last 7 days   Lab Units 24   HEMOGLOBIN g/dL 9.4* 11.1*   HEMATOCRIT % 30.7* 34.3*   PLATELETS AUTO x10*3/uL 276 309   Plan:  - Continue to monitor with daily CBC and Coag panel    INFECTIOUS DISEASE:  #Leukocytosis, likely reactive  Assessment:  Results from last 7 days   Lab Units 24   WBC AUTO x10*3/uL 14.5* 13.4*   - Temp (24hrs), Av.1 °C (96.9 °F), Min:36 °C (96.8 °F), Max:36.1 °C (97 °F)  Plan:  - Continue to monitor for s/sx of infection  - Pan culture for temperature > 38.4 C    MUSCULOSKELETAL:  #Hx of RA  - Not on meds at home    SKIN:  - No acute issues  - Turns and skin care per NSU protocol    ACCESS:  - PIV x2    PROPHYLAXIS:  - DVT/VTE: SCDs, hold chem 24h after TNK (until 1530 3/25)  - GI: has MINISTERIO to omeprazole    RESTRAINTS:  I agree with nursing assessment of the patient's need for restraints to protect the patient from injury and facilitate healing. The patient is unable to cooperate with the plan of care and at risk for disrupting critical therapy (i.e., removing medical devices, lines,  tubes and/or dressings).  Please see order for specifics. Restraints can be removed when the patient is able to cooperate with plan of care and allow healing to occur, or the medical devices at risk are discontinued by the medical team.     Jamar Roper MD  Neuroscience Intensive Care     The patient is critically ill with MCA stroke and acute respiratory insufficiency  Improved after thrombolysis and thrombectomy  Acute subdural hematoma stable  Cont BP control with goal sbp<160  Secondary stroke prophylaxis per stroke team  Coronary artery disease with cardiac stents, atrial fibrillation, chronic diastolic heart failure: Cont HR control  Wean ventilator as toelrated  History of sarcoidosis  COPD  Hypothyroidism: Cont thyroid replacement    I have seen and examined the patient.  I have reviewed the patient's laboratory, radiographic, and clinical data.  I have spent 30 minutes providing critical care for the patient.      MAXI Holman M.D.

## 2024-03-25 NOTE — PROGRESS NOTES
Physical Therapy                 Therapy Communication Note    Patient Name: Trinity Hernandez  MRN: [id number]  Today's Date: 3/25/2024     Discipline: Physical Therapy    Missed Visit Reason: Missed Visit Reason:  (Pt leaving unit for MRI. Plan to re-attempt as available and appropriate.)    Missed Time: Attempt

## 2024-03-25 NOTE — PROGRESS NOTES
Patient is discussed during interdisciplinary round today.     Plan per medical team : Pt is on TNK and not medically ready to discharge currently,   Planned disposition : TBD  Discharge destination : TBD  Payor : [employer]    Pt is intubated and with restraints currently. SW visited pt, but pt is off division. SW called pt's , Robbin and left vm. SW will continue to follow and assist with discharge plan.     Shelley Ward, MERARIA, LSW

## 2024-03-25 NOTE — PRE-PROCEDURE NOTE
Pre-Procedure H&P     Provider Assessment:  Diagnosis/Reason for Procedure: LVO  Procedure: Mechanical Thrombectomy  Medications Reviewed:   yes   Prophylatic Antibiotics Needed:   no    Neuro status: Intubated, sedated  Mouth Opening OK: yes   Neck Flexibility OK: yes   Sedation Plan: Continue previously initiated sedation drips  COVID-19 Risk Consent:  Surgeon has reviewed key risks related to the risk of cuong COVID-19 and if they contract COVID-19 what the risks are.

## 2024-03-25 NOTE — CARE PLAN
The patient's goals for the shift include      The clinical goals for the shift include pt intubated and sedated, unable to assess at this time    Over the shift, the patient did make progress toward the following goals. Barriers to progression include sedation. Recommendations to address these barriers include continue plan of care.

## 2024-03-25 NOTE — ED PROVIDER NOTES
HPI   Chief Complaint   Patient presents with    Stroke       HPI     This is a 74-year-old female patient was brought into the emergency room by the squad after she had onset of right-sided right arm weakness and facial droop.  Patient stated that she was in the bathroom and tried to get up and could not walk due to weakness in the leg and arm and she had facial drooping she lost her balance and hit his head against the wall but did not lose consciousness blackout or pass out.  Denies any headache dizziness lightheaded palpitation.  She had prior history of stroke she has been Plavix and aspirin.  Patient denies any blood in stool or black stool.  Symptoms started initially with a very large  said probably 40 minutes before coming in I specifically asked about symptoms started this morning or she woke up with the symptoms is important to know the exact timing of the onset of symptoms.  Immediately prior to complete evaluation she is screened in the ER.  Noted no obvious bruise edema erythema or injury to arms or legs and she was taken to CAT scan of the head where she found.  No evidence of hemorrhage mass effect edema or stroke.    She denies any chest pain short of breath.  Denies blood in stool black stool or vomiting blood.    Family history: Reviewed  Social history: Reviewed, denies substance abuse.  Review of system   : 10 review of system obtained review of system as In HPI otherwise negative.          [place] Coma Scale Score: 11         NIH Stroke Scale: 5             Patient History   Past Medical History:   Diagnosis Date    Abdominal bloating 05/04/2023    CAD (coronary artery disease)     CHF (congestive heart failure) (CMS/[facility])     Diverticulitis of small intestine without perforation or abscess without bleeding     Diverticulitis, intestine, small    Dizzy 11/20/2023    Essential (primary) hypertension 02/08/2017    HTN (hypertension), benign    Fall     Influenza B 05/08/2015    Formatting  of this note might be different from the original. Completed Tamiflu Continue droplet precautions.    Nausea 11/16/2018    Personal history of other endocrine, nutritional and metabolic disease 02/08/2017    History of hypothyroidism    Personal history of other venous thrombosis and embolism     History of deep venous thrombosis     Past Surgical History:   Procedure Laterality Date    ABDOMINAL ADHESION SURGERY  05/16/2017    Laparoscopic Lysis Of Intestinal Adhesions    CERVICAL FUSION  05/16/2017    Cervical Vertebral Fusion    CHOLECYSTECTOMY  05/16/2017    Cholecystectomy    COLECTOMY PARTIAL / TOTAL  05/16/2017    Partial Colectomy - Sigmoid    CORONARY ANGIOPLASTY WITH STENT PLACEMENT      CT ANGIO NECK  05/21/2020    CT NECK ANGIO W AND WO IV CONTRAST 5/21/2020 [facility]    CT HEAD ANGIO W AND WO IV CONTRAST  05/21/2020    CT HEAD ANGIO W AND WO IV CONTRAST 5/21/2020 [facility]    HERNIA REPAIR  05/16/2017    Hernia Repair    HYSTERECTOMY  10/03/2013    Hysterectomy    MR HEAD ANGIO WO IV CONTRAST  05/21/2020    MR HEAD ANGIO WO IV CONTRAST 5/21/2020 [facility]    MR NECK ANGIO WO IV CONTRAST  05/21/2020    MR NECK ANGIO WO IV CONTRAST 5/21/2020 [facility]    OTHER SURGICAL HISTORY  05/16/2017    Thoracoscopy Of Lungs And Pleural Space With Biopsy Of Lung Nodules    OTHER SURGICAL HISTORY  04/15/2019    Esophagogastroduodenoscopy     No family history on file.  Social History     Tobacco Use    Smoking status: Former     Types: Cigarettes     Passive exposure: Never    Smokeless tobacco: Never   Vaping Use    Vaping Use: Never used   Substance Use Topics    Alcohol use: Not Currently    Drug use: Never       Physical Exam   ED Triage Vitals   Temp Heart Rate Resp BP   03/24/24 2254 03/24/24 2050 03/24/24 2045 03/24/24 2045   36.1 °C (97 °F) 84 11 (!) 119/92      SpO2 Temp Source Heart Rate Source Patient Position   03/24/24 2045 03/24/24 2254 03/24/24 2145 03/24/24  2302   99 % Tympanic Monitor Lying      BP Location FiO2 (%)     03/24/24 2302 03/24/24 2300     Left arm 30 %       Physical Exam    CONSTITUTIONAL: Patient was awake and alert noted a facial drooping face pulled to the left side there was no ptosis.  When I examined her she was able to move her arms and legs symmetrically nursing staff noticed initially that she has weakness of right arm and right hand  which resolved after she came back from CAT scan.  The right arm and right leg weakness are resolved she still have facial drooping but able to talk.   HENMT: The airway was intact. There was no ear or nose discharge. No drooling or stridor. Neck was supple. Talking and breathing comfortably.      EYES: Clear bilaterally, pupils equal, round and reactive to light.  No ptosis.  CARDIOVASCULAR: Regular rate and rhythm. No friction rub or murmur good peripheral pulses no peripheral edema. Nontender Homans sign negative. RESPIRATORY: Patient was breathing comfortably. No tachypnea no respiratory distress.  On auscultation he has diminished breath sounds crackles in the lung bases.  However has good skin perfusion.     GASTROINTESTINAL: Abdomen soft positive bowel sounds nontender, no guarding, rebound surgery.      GENITOURINARY:  No costovertebral angle tenderness.     MUSCULOSKELETAL: She has some facial asymmetry with face pulled to the left side drooping to the left side.  Otherwise no other facial weakness noted no ptosis.  Pupils are equal round spine light accommodation extraocular motor function fundi grossly normal.  She has symmetrical range of motion of the leg initially reported to have weakness of right arm when she was moving her right arm and right leg when she came in in the triage and the pulmonary screen examination before she went for CAT scan when she came back from CAT scan she has still good range of motion both upper lower extremity good handgrips symmetrical strength both arms and legs.  But  facial drooping was there.  Neck was supple.  I did not see any obvious bruise edema erythema a initially upon evaluation of patient.  She denies any headache or neck stiffness.  Cervical thoracic lumbar spine nontender both upper extremity and lower extremity range of motion nontender.      NEUROLOGICAL: As described above she had no weakness arms legs when she arrived she was moving arms and legs symmetrically squad noted that she has weakness right arm and facial drooping at the time of the pick her up patient went for CT of the head and after CT of the head which showed no acute infarct no edema mass effect or hemorrhage.  I reevaluated she has symmetrical range of motion of both upper lower extremity and good handgrip but she had facial asymmetry and drooping.  Head was normocephalic atraumatic.  Neuro examination grossly within normal is.  Intact sensation intact pulses.  No arms or leg drift noted.  She had no nystagmus.        SKIN: Skin normal color for race, warm, dry and intact. No evidence of trauma or lesions.  No obvious bruise noted and had a negative head and neck examination normal     PSYCHIATRIC: Awake alert and without acute distress. No obvious depression, no suicidal thoughts or ideation. Appropriate mood. Talking and normal tone. HEME/LYMPH: No adenopathy or splenomegaly.        CRITICAL CARE    VITAL SIGNS:              DISPOSITION      ED Course & MDM   Diagnoses as of 03/25/24 0337   Ischemic stroke (CMS/HCC)   Subdural bleeding (CMS/HCC)   Adverse effect of thrombolytic drug, initial encounter       Medical Decision Making  Patient had reportedly had weakness in the right arm and facial drooping face part of the left side noticed by the family event o'clock this morning she went to bed last night talking to her daughter no symptoms.  This morning she has symptoms  could not say whether he noticed anything right away or not but 11 back he thought she was having some instability of  the face with normal speech trouble.  She was brought into the ER by khurram paulson stated she has a right-sided weakness and facial asymmetry but by the time she arrived in the ER she was moving her arms and legs was taken to CAT scan immediately which showed no acute infarct on the nonenhanced CT.  Patient had no residual weakness in arms or legs she was still moving her arms or legs she has good handgrip intact radial pulses symmetrical strength both upper lower extremities.  However she had facial droop restraints pulled to the left side there was no ptosis.  CTA of the head and neck showed M1 section cutoff consistent with acute thrombus it was new compared to the last CT angio.    Case was discussed with Dr. Cat, stroke attending she advised patient to have TNK which was ordered after risk and benefit explained to the patient she had head contusion when she hit the wall but she did not fall on the ground suffered injury to head and neck.  Patient stated that she was unable to keep her balance as when she tripped and fall but denies any trouble moving arms or legs.  Family was still told about risk of bleeding and if you do not do 10K she may have pulmonary residual deficit they talked about risks and benefits in detail understood risk and benefits well and wanted to proceed thinking all 3 family member patient patient daughter and her  agreed with TNK  reatment.  After risk and benefit explained to them in details and they are agree with TNK treatment.  Also signed consent. and signed consent for TNK and TNK was given to the patient.  Patient is being observed waiting for bed assignment at ICU at [facility] [place] where she was initially arranged to be transferred after TNK.    Dr. Sepulveda, intensivist accepted patient transfer after he discussed case with me.    It took several hours for the patient to be picked up by the khurram to go to [facility].  Patient remained clinically stable she has some  improvement in her facial drooping and there was no residual weakness in arms or leg she has INH score of 2 when she was given TNK and remained to with no weakness in arms or legs.  She was waiting for transfer and after a few hours of waiting when the squad arrived they took her out in stable condition with no deterioration in her condition within 5 minutes they came back and stated that patient is getting worse immediately after the left railing going to the parking lot and in fact patient has having weakness in the right arm and right leg and facial drooping worse than she initially reported  She was having swelling in the same territory where she was found to have blockage on the CT angiogram.  We obtained CT of the head again which showed finding consistent small subdural hematoma but there was no midline shift no edema mass effect to explain patient's symptoms.  Patient was groggy and I was afraid that she will aspirate and also do to protect her airway as is that she was immediately intubated without any complication single attempt.  It was secured in position with endotracheal tube position was confirmed with direct visualization auscultation of the lungs and tube position was secured.  Patient was successfully intubated without complication finger left hand with good breath sounds bilaterally and secure airway.  No complications.    Case discussed with the Dr. Gutierrez and rest of the team including intensivist and it was decided send patient by helicopter for CT perfusion study and also treated with Versed TNK as well as intravascular treatment of the blocked vessel causing patient's right-sided weakness and facial drooping.  Patient and family understood risk and benefit well.  Fully informed about patient's change in her condition changes CT finding still risk of stroke from the blockage seen on the CTA and small bleed due to possible TNK.  They understood the risk and benefit before she got 10K and also  risk of benefit transfer and further evaluation at main [facility].  Patient was transported to [facility] by helicopter.        Procedure  Procedures     Jorge Pereira MD  03/25/24 0338       Jorge Pereira MD  03/25/24 0821

## 2024-03-25 NOTE — ED TRIAGE NOTES
Pt presents to the ED as a BAT transfer from [place]. Per EMS, pt had a fall around 1130 this morning d/t being unbalanced. Pt was brought into [place] ED around 1330 and presented with R sided weakness, R sided facial droop, and slurred speech. Pt worked up as BAT. Pt was given TNK at 1345 (around 15 minutes after arrival to ED). Initial NIH at [place] was 6 then down to NIH of 2 following TNK administration. [place] ED placed ground transport for pt to be transported. About 5 minutes after leaving ED, pt went unresponsive en route. Pt brought back to [place] ED, went to imaging, and found a subdural hematoma. Pt was intubated (used etomidate & succinylcholine used) at [place] ED and placed on propofol infusion. 50mg (x3) of ketamine given en route d/t fighting the ventilator. Pt then transported by helicopter to [facility] ED.

## 2024-03-25 NOTE — H&P
Stroke Team H&P     Chief Complaint: Right side weakness     History of Present Illness:   Ms. Hernandez is a 74 y.o. female with PMH COPD, CAD s/p PCI, HTN, HLD, pulmonary sarcoidosis, rheumatoid arthritis, and hypothyroidism CHF, Afib (not on AC), AAA, asthma, COPD, , chronic respiratory failure. who presented to OSH after fall and found to have right facial droop and RUE weakness. Reportedly initial NIHSS 2-3. /80, . CTH did not show any acute hemorrhage or early ischemic changes. She received TNK 19 mg at 1530. CTA showed L MCA occlusion.     Few minutes after, she was noted to have worsening work of breathing, drooling for which she was intubated, NIHSS worsened to 8. Repeat CTH showed 7mm R subdural hematoma but no L hemorrhage or evolving infarct. She was transferred to [facility] for cryoprecipitate and possible MT.     Arrived to [facility] intubated, NIHSS on arrival here was limited by intubation.     NIHSS  Level of consciousness: 1 = Drowsy  LOC Question: 2 = Neither correct  LOC Commands: 2 = Obeys neither  Best Gaze: 1 = Partial gaze palsy  Visual Field: 1 = Partial HH  Facial Paresis: 0 = Normal  LUE: 0 = No drift; 10 sec  RUE: 4 = No movement  LLE: 0 = No drift; 5 sec  RLE: 4 = No movement  Dysarthria: 0 = Normal  Best Language: 0 = No aphasia  Limb Ataxia: 0 = Absent  Sensory: 2 = Dense loss  Neglect: 0 = None    NIHSS Score: 18     Plan was to repeat CTH to ensure stability of SDH and obtain a Ctp to evaluate for mismatch. However, transfer to scanner and angio suite was delayed due to waiting on cryo infusion initiation.     Once cryo arrived from blood bank, and vitals signs remained stable after 2-3 min of infusion, she was taken to CT scan.     Repeat CTH was reviewed while being performed; and showed stable R SDH with subtle gray-white matter loss of differentiation on the left frontal lobe.     CT perfusion showed a large mismatch of 143mL. She was taken to the angio suite, with time to  revascularization of 2210, TICI 2b.     Post-MT NIHSS was 7; improved right arm weakness, and facial droop, and dense sensory loss. Still intubated unable to assess for dysarthria or aphasia.     Review of Systems  Relevant past medical, surgical, family, and social histories, along with ROS was reviewed and pertinent details noted above.     Past Medical History:   Diagnosis Date    Abdominal bloating 05/04/2023    CAD (coronary artery disease)     CHF (congestive heart failure) (CMS/HCC)     Diverticulitis of small intestine without perforation or abscess without bleeding     Diverticulitis, intestine, small    Dizzy 11/20/2023    Essential (primary) hypertension 02/08/2017    HTN (hypertension), benign    Fall     Influenza B 05/08/2015    Formatting of this note might be different from the original. Completed Tamiflu Continue droplet precautions.    Nausea 11/16/2018    Personal history of other endocrine, nutritional and metabolic disease 02/08/2017    History of hypothyroidism    Personal history of other venous thrombosis and embolism     History of deep venous thrombosis     Past Surgical History:   Procedure Laterality Date    ABDOMINAL ADHESION SURGERY  05/16/2017    Laparoscopic Lysis Of Intestinal Adhesions    CERVICAL FUSION  05/16/2017    Cervical Vertebral Fusion    CHOLECYSTECTOMY  05/16/2017    Cholecystectomy    COLECTOMY PARTIAL / TOTAL  05/16/2017    Partial Colectomy - Sigmoid    CORONARY ANGIOPLASTY WITH STENT PLACEMENT      CT ANGIO NECK  05/21/2020    CT NECK ANGIO W AND WO IV CONTRAST 5/21/2020 [facility]    CT HEAD ANGIO W AND WO IV CONTRAST  05/21/2020    CT HEAD ANGIO W AND WO IV CONTRAST 5/21/2020 [facility]    HERNIA REPAIR  05/16/2017    Hernia Repair    HYSTERECTOMY  10/03/2013    Hysterectomy    MR HEAD ANGIO WO IV CONTRAST  05/21/2020    MR HEAD ANGIO WO IV CONTRAST 5/21/2020 [facility]    MR NECK ANGIO WO IV CONTRAST  05/21/2020    MR NECK ANGIO WO IV  CONTRAST 5/21/2020 [facility] INPATIENT LEGAC[facility]    OTHER SURGICAL HISTORY  05/16/2017    Thoracoscopy Of Lungs And Pleural Space With Biopsy Of Lung Nodules    OTHER SURGICAL HISTORY  04/15/2019    Esophagogastroduodenoscopy     No family history on file.  Social History     Tobacco Use    Smoking status: Former     Types: Cigarettes     Passive exposure: Never    Smokeless tobacco: Never   Substance Use Topics    Alcohol use: Not Currently      Allergies   Allergen Reactions    Hydrocodone-Acetaminophen Shortness of breath    Montelukast Unknown     Lungs felt as if there was a hole in them    Morphine Unknown     Body shakes    Nitrofurantoin Monohyd/M-Cryst Unknown    Sulfa (Sulfonamide Antibiotics) Nausea Only and Other    Atorvastatin Itching, Swelling and Unknown    Dicyclomine Hallucinations     Nightmares    Fluticasone Propion-Salmeterol Unknown    Levofloxacin Rash    Lisinopril Cough    Nitroimidazoles Nausea Only and Unknown    Omeprazole Diarrhea and Unknown    Salmeterol Unknown    Simvastatin Swelling and Unknown    Sucralfate Unknown    Umeclidinium Other     Urinary retention, blurred vision, constipation        Medications:    Current Facility-Administered Medications:     dexmedeTOMIDine (Precedex) 400 mcg in 100 mL (4 mcg/mL) sodium chloride 0.9% infusion, 0.1-1.5 mcg/kg/hr, intravenous, Continuous, Travon Candelaria MD, Last Rate: 6.23 mL/hr at 03/25/24 0045, 0.3 mcg/kg/hr at 03/25/24 0045    dextrose 50 % injection 12.5 g, 12.5 g, intravenous, q15 min PRN, Stefan Herman MD    fentanyl (Sublimaze) 1000 mcg in sodium chloride 0.9% 100 mL (10 mcg/mL) infusion (premix), 0-300 mcg/hr, intravenous, Continuous, Bill Ballesteros MD, Last Rate: 2.5 mL/hr at 03/24/24 2054, 25 mcg/hr at 03/24/24 2054    glucagon (Glucagen) injection 1 mg, 1 mg, intramuscular, q15 min PRN, Stefan Herman MD    hydrALAZINE (Apresoline) injection 10 mg, 10 mg, intravenous, q20 min PRN **FOLLOWED BY** [START ON 3/26/2024] hydrALAZINE  "(Apresoline) tablet 25 mg, 25 mg, oral, q6h PRN, Stefan Herman MD    insulin lispro (HumaLOG) injection 0-5 Units, 0-5 Units, subcutaneous, TID with meals, Stefan Herman MD    labetaloL (Normodyne,Trandate) injection 10 mg, 10 mg, intravenous, q10 min PRN, Stefan Herman MD    norepinephrine (Levophed) 8 mg in dextrose 5% 250 mL (0.032 mg/mL) infusion (premix), 0-3 mcg/kg/min, intravenous, Continuous, Travon Candelaria MD    norepinephrine in dextrose 5 % (Levophed) infusion  - Omnicell Override Pull, , , ,     oxygen (O2) therapy, , inhalation, Continuous PRN - O2/gases, Stefan Herman MD    polyethylene glycol (Glycolax, Miralax) packet 17 g, 17 g, oral, Daily, Stefan Herman MD    sennosides (Senokot) tablet 17.2 mg, 2 tablet, oral, BID, Stefan Herman MD    sodium chloride 0.9% infusion, 100 mL/hr, intravenous, Continuous, Stefan Herman MD, Last Rate: 100 mL/hr at 03/25/24 0005, 100 mL/hr at 03/25/24 0005       Physical Exam:  Neurological Exam  Physical Exam    BP 90/73   Pulse 78   Temp 36 °C (96.8 °F) (Temporal)   Resp 19   Ht 1.7 m (5' 6.93\")   Wt 83.1 kg (183 lb 3.2 oz)   SpO2 100%   BMI 28.75 kg/m²      General Appearance:  Intubated, eyes open to voice.     Cardiovascular: Regular, rate and rhythm    Cranial Nerves:  Blinks to threat on left, but not to right.    Pupils round, 4 mm in diameter, equally reactive to light.   Left gaze preference, able to overcome to midline.   No nystagmus.   Mild right facial flattening of nasolabial folds    Motor:   Muscle bulk: Normal throughout.  Muscle tone: Normal in both upper and lower extremities.    Strength:   RUE: flaccid   RLE: flaccid   LUE: antigravity for 10 sec  LLE: antigravity for 5 sec    Sensory:   No withdraw or grimacing to pain on the right side     Gait:   Deferred        Results:    The following labs, imaging, and results were personally reviewed and demonstrated:    Labs Reviewed   CBC WITH AUTO DIFFERENTIAL - Abnormal       Result " Value    WBC 13.4 (*)     nRBC 0.0      RBC 3.95 (*)     Hemoglobin 11.1 (*)     Hematocrit 34.3 (*)     MCV 87      MCH 28.1      MCHC 32.4      RDW 13.2      Platelets 309      Neutrophils % 78.1      Immature Granulocytes %, Automated 0.4      Lymphocytes % 10.8      Monocytes % 8.7      Eosinophils % 1.6      Basophils % 0.4      Neutrophils Absolute 10.43 (*)     Immature Granulocytes Absolute, Automated 0.06      Lymphocytes Absolute 1.45      Monocytes Absolute 1.17 (*)     Eosinophils Absolute 0.22      Basophils Absolute 0.05     COMPREHENSIVE METABOLIC PANEL - Abnormal    Glucose 144 (*)     Sodium 138      Potassium 4.1      Chloride 104      Bicarbonate 25      Anion Gap 13      Urea Nitrogen 13      Creatinine 0.87      eGFR 70      Calcium 9.1      Albumin 3.6      Alkaline Phosphatase 55      Total Protein 6.2 (*)     AST 16      Bilirubin, Total 0.5      ALT 5 (*)    FIBRINOGEN - Abnormal    Fibrinogen 415 (*)    BLOOD GAS VENOUS FULL PANEL UNSOLICITED - Abnormal    POCT pH, Venous 7.33      POCT pCO2, Venous 53 (*)     POCT pO2, Venous 31 (*)     POCT SO2, Venous 46      POCT Oxy Hemoglobin, Venous 44.8 (*)     POCT Hematocrit Calculated, Venous 34.0 (*)     POCT Sodium, Venous 136      POCT Potassium, Venous 4.3      POCT Chloride, Venous 103      POCT Ionized Calicum, Venous 1.18      POCT Glucose, Venous 140 (*)     POCT Lactate, Venous 1.6      POCT Base Excess, Venous 1.2      POCT HCO3 Calculated, Venous 27.9 (*)     POCT Hemoglobin, Venous 11.4 (*)     POCT Anion Gap, Venous 9.0 (*)     Patient Temperature 37.0     COAGULATION SCREEN - Normal    Protime 12.0      INR 1.1      aPTT 29      Narrative:     The APTT is no longer used for monitoring Unfractionated Heparin Therapy. For monitoring Heparin Therapy, use the Heparin Assay.   TYPE AND SCREEN    ABO TYPE O      Rh TYPE POS      ANTIBODY SCREEN NEG     BLOOD GAS VENOUS FULL PANEL   LIPID PANEL   HEMOGLOBIN A1C   B-TYPE NATRIURETIC PEPTIDE    PREPARE CRYOPRECIPITATE    PRODUCT CODE X3426N42      Unit Number [id number]      Unit ABO O      Unit RH POS      Dispense Status EI      Blood Expiration Date March 25, 2024 02:01 EDT      PRODUCT BLOOD TYPE 5100      UNIT VOLUME 107      PRODUCT CODE I4430Y55      Unit Number [id number]      Unit ABO O      Unit RH POS      Dispense Status EI      Blood Expiration Date March 25, 2024 02:01 EDT      PRODUCT BLOOD TYPE 5100      UNIT VOLUME 78     PREPARE CRYOPRECIPITATE POOLS   POCT GLUCOSE METER   POCT GLUCOSE METER   POCT GLUCOSE METER   POCT GLUCOSE METER       [facility] 3/24/2024:   1. Loss of gray-white differentiation and sulcal effacement within  the left insular and frontal lobe cortex (series 204, images 35-48).  There is also loss of gray-white matter distinction involving the  left corpus striatum (series 204, images 34-44). Findings are in  keeping with acute to subacute ischemia.  2. Acute ischemic injury with hypoperfused left MCA territory with  total volume of 153 mL and core infarct measuring 10 mL. Mismatch  ratio is 15.3.  3. Unchanged 0.6 cm right subdural hemorrhage with 0.3 cm of  right-to-left midline shift. No new areas of hemorrhage identified.    IV Thrombolysis  IV Thrombolysis Given: Yes Thrombolysis Administration: prior to arrival  {Discussed the diagnosis of suspected ischemic stroke and options for treatment, including alternative treatments. For patients meeting inclusion and exclusion criteria, the expected benefits exceed the risks of IV thrombolysis therapy or withholding therapy. The main risk of IV thrombolysis therapy is bleeding into the brain or body that may require blood transfusions or lead to death. In clinical studies, the rate of death was not higher in patients who received IV thrombolysis compared to those who did not. Other risks include allergic reactions, and with any procedure there is always the possibility of an unexpected complication.  Patient is <18:  refer  [facility] for Emergency Management of pediatric patients with Acute suspected Stroke & the Pediatric IV Thrombolysis Consent. Consent is completed on a paper document and if criteria is met/ benefit outweigh the risk the thrombolytic Alteplase should be given.    {For EVT therapy, type .EVT: TICI 2b    Impression/Plan:    Ms. Hernandez is a 74 y.o. female with PMH COPD, CAD s/p PCI, HTN, HLD, sarcoidosis, rheumatoid arthritis, and hypothyroidism who presented to OSH after fall and found to have right facial droop and RUE weakness. Reportedly initial NIHSS 2-3. /80, . CTH did not show any acute hemorrhage or early ischemic changes. CTA showed L MCA occlusion. She received TNK 19 mg at 1530, after which she was noted to have worsening NIHSS to 8 and increased work of breathing requiring intubation. Repeat CTH showed 7mm R subdural hematoma. She was transferred to [facility] for cryoprecipitate and possible MT. S/p cryoprecipitate transfusion. Taken for MT, with TICI 2b.      Type: Ischemic stroke  Subtype/etiology: LVO  Vessels involved: L MCA  Neurological manifestations:  NIHSS (worst at presentation): 18   Diagnostic evaluation: CTH/CTA/CTP  Antiplatelet/antithrombotic plan for stroke prevention: ASA and atorvastatin   VTE prophylaxis: 24hr post TNK     Vascular Risk Factor modification goals:  Blood pressure goals: avoid hypotension SBP <100 that could worsen cerebral perfusion, Ischemic stroke post-thrombolysis- BP < 180/105 mmHg for 24hr  Lipid Goals: education on healthy diet and statin therapy to maintain or achieve goal LDL-cholesterol < 70mg  Glucose Goals: early treatment of hyperglycemia to goal glucose 140-180 mg/dl with long-term goal A1c < 7%   Smoking Cessation and Education  Assessment for Rehabilitation needs   Patient and family education on signs and symptoms of stroke, calling 911, healthy strategies for stroke prevention.         #L MCA occlusion  #complicated by post TNK SDH  Initial  NIHSS 3, s/p TNK   Worsening in clinical status, [facility] showed R SDH  NIHSS on arrival to [facility] (intubated) was 18   S/p cryoprecipitate transfusion   S/p MT, TICI 2b   NIHSS improved to 7   Etiology likely cardio embolic in the setting of known Afib   - Admit to NSU   - Neuro-checks q2hr   - BP goal <180 systolic   - Stroke workup: LDL, A1c, TTE, MRI brain   - Hold ASA for 24 hr post TNK   - Hold Plavix until MRI obtained to evaluate stroke burden   - Will need to be AC for the long-term, need cardiology clearance for anti-platelets therapy (was on DAPT at home for CAD s/p stent)   - PT/OT/SLP   - Consult neurosurgery for SDH     #CHF   #CAD s.p stent   #Afib  #HTN  Trop on presentation 22   Latest TTE on 2021: EF 35-40%   Home meds: ASA 81mg, Plavix 75mg, Lasix 20 mg daily, and Metoprolol S. 75 mg daily   - Continue home lasix 20 mg daily   - Switch to Metoprolol T. 25 mg TID   - Resume ASA 81 mg daily 24hr post TNK   - Will need to be AC for the long-term, need cardiology clearance for anti-platelets therapy (was on DAPT at home for CAD s/p stent)     #COPD  #Asthma   On RA at home   Intubated at OSH  CXR reviewed with ET tube in place   Home meds:  Spiriva 2 puffs daily, Ipratropium inhaler prn, and albuterol prn,  - Ventilator bundle and care  - Daily CPAP trail for extubation parameters   - Continue home inhalers when extubated      #hypothyroidism   - Continue home levothyroxine 50 mcg daily      #pulmonary sarcoidosis   #rheumatoid arthritis   - not on meds, to clarify with family     #DVT ppx  - Compression devise   - Start ppx Lovenox 24 hr post TNK     Code status: presumed full code     Stefan Herman MD  Neurology Resident PGY3

## 2024-03-25 NOTE — ED PROVIDER NOTES
CC: Stroke     HPI:  74-year-old female transfer from [place] ED for L M1 occlusion s/p TNK c/b R SDH, intubated for airway protection at OSH prior to transfer.  Patient presented to outside hospital following a witnessed fall this morning around 11 AM on Plavix with subsequent right-sided facial droop and arm weakness.  Initial NIH 6, CT and CTA performed at outside hospital which demonstrated no evidence for bleed, did demonstrate a likely left M1 occlusion.  Received TNK, was arranged for transport via ALS for evaluation for thrombectomy.  Shortly after EMS left the facility, patient had an acute change in mental status with unresponsiveness, and was brought back to [place] ED.  Intubated for airway protection with etomidate and succinylcholine.  Repeat CT head demonstrating a new right-sided SDH.  Transferred here for further evaluation by neurostroke.  Did not receive cryoprecipitate or other TNK reversal.    Patient on a propofol drip for EMS, did require occasional as needed boluses of ketamine for asynchrony with ventilator.  Blood pressure 80s over 60s on arrival to the ED.    Records Reviewed:  Recent available ED and inpatient notes reviewed in EMR.    PMHx/PSHx:  Per HPI.   - has a past surgical history that includes Hysterectomy (10/03/2013); Hernia repair (05/16/2017); Other surgical history (05/16/2017); Abdominal adhesion surgery (05/16/2017); Colectomy partial / total (05/16/2017); Cholecystectomy (05/16/2017); Cervical fusion (05/16/2017); Other surgical history (04/15/2019); MR angio head wo IV contrast (05/21/2020); MR angio neck wo IV contrast (05/21/2020); CT angio neck (05/21/2020); CT angio head w and wo IV contrast (05/21/2020); and Coronary angioplasty with stent.  - has COPD, severe (CMS/HCC); Pulmonary emphysema (CMS/HCC); Age related cataract; Anxiety; Ascending aorta dilatation (CMS/HCC); Asthmatic bronchitis; CAD (coronary artery disease), native coronary artery; Cardiomyopathy,  nonischemic (CMS/[facility]); CHF exacerbation (CMS/[facility]); Chronic neck pain; Chronic constipation; Chronic pharyngitis; Dry eyes, bilateral; Dysphagia; Elevated glucose; Elevated liver enzymes; GERD (gastroesophageal reflux disease); Hemangioma of other sites; History of cervical cancer; History of uterine cancer; Hyperlipidemia; Hypertriglyceridemia; Hypertension; Hypothyroidism; Irregular heart rhythm; LBBB (left bundle branch block); Malnutrition of moderate degree (CMS/[facility]); Hypoxia; Oral mass; Perforated viscus; Right upper quadrant abdominal pain; Sarcoidosis; SOB (shortness of breath); Steroid-induced hyperglycemia; Subcutaneous emphysema (CMS/[facility]); Poor venous access; Vitamin D deficiency; Weakness; Elevated troponin I level; Diaphoresis; Chronic respiratory failure with hypoxia, on home O2 therapy (CMS/[facility]); NSVT (nonsustained ventricular tachycardia) (CMS/[facility]); Recurrent ventral incisional hernia; Heart failure with mildly reduced ejection fraction (CMS/[facility]); Palpitations; COPD with acute lower respiratory infection (CMS/[facility]); HCAP (healthcare-associated pneumonia); and COPD (chronic obstructive pulmonary disease) (CMS/[facility]) on their problem list.    Medications:  Reviewed in EMR. See EMR for complete list of medications and doses.    Allergies:  Hydrocodone-acetaminophen, Montelukast, Morphine, Nitrofurantoin monohyd/m-cryst, Sulfa (sulfonamide antibiotics), Atorvastatin, Dicyclomine, Fluticasone propion-salmeterol, Levofloxacin, Lisinopril, Nitroimidazoles, Omeprazole, Salmeterol, Simvastatin, Sucralfate, and Umeclidinium    Social History:  - Tobacco:  reports that she has quit smoking. Her smoking use included cigarettes. She has never been exposed to tobacco smoke. She has never used smokeless tobacco.   - Alcohol:  reports that she does not currently use alcohol.   - Illicit Drugs:  reports no history of drug use.     ROS:  Per HPI.        ???????????????????????????????????????????????????????????????  Triage Vitals:  T    HR 84  BP (!) 119/92  RR 11  O2 99 %      Physical Exam  Vitals and nursing note reviewed.   Constitutional:       Comments: Intubated elderly female   HENT:      Head:      Comments: Small area of swelling L scalp     Mouth/Throat:      Comments: ETT in place  Cardiovascular:      Rate and Rhythm: Normal rate and regular rhythm.      Heart sounds: Normal heart sounds.   Pulmonary:      Comments: Equal breath sounds bilaterally on mechanical ventilation  Abdominal:      General: There is no distension.      Palpations: Abdomen is soft.   Neurological:      Comments: Opens eyes to voice.  Follows commands left upper and lower extremity.  Gaze does not cross midline to the right.  Flaccid right upper extremity.  Flicker withdrawal right lower extremity.     VAN Screen: POSITIVE  Unilateral weakness or drift -> present  Visual gaze preference -> present  Aphasia -> unable to assess  Neglect -> unable to assess      ???????????????????????????????????????????????????????????????  Assessment and Plan:  74-year-old female transfer from outside hospital with concern for new SDH in the setting of acute left M1 occlusion and TNK.  Evaluated by myself and neurostroke at bedside at time of arrival.  Was ordered cryoprecipitate for TNK reversal given her new intracranial bleed.  Portable chest x-ray to confirm ET tube placement.  Propofol and fentanyl for sedation.  Operative labs were drawn.  Will be taken for CT head and CT perfusion, as she is still potentially a thrombectomy candidate.  Majority of her symptoms at this time appear to be right-sided deficits from her stroke, without significant left-sided deficits relating to her right SDH, although exam is limited by sedation and intubation.    ED Course:  ET tube 8 cm above obinna, was advanced 2 cm to 24 cm at the teeth.  Patient received 10 units of cryoprecipitate.  CT head  demonstrates stable appearance of SDH.  CT perfusion is favorable for thrombectomy.  Patient was taken emergently to angio suite with neurology with plan for NICU admission.    Social Determinants Limiting Care:  None identified    Disposition:  Angio with neurology, then NSU    --  Bill Ballesteros MD  Emergency Medicine, PGY-3      Diagnoses as of 03/24/24 2136   Ischemic stroke (CMS/HCC)   Subdural bleeding (CMS/HCC)   Adverse effect of thrombolytic drug, initial encounter     Procedures ? SmartLinks last updated 3/24/2024 9:23 PM        Bill Ballesteros MD  Resident  03/24/24 2138

## 2024-03-25 NOTE — PROGRESS NOTES
Communication Note    Patient Name: Trinity Hernandez  MRN: [id number]  Today's Date: 3/25/2024     Discipline: Occupational Therapy      Missed Visit: Yes  Missed Visit Reason:  (Pt off the floor in MRI at this time.)      03/25/24 at 1:57 PM   Melissa Vargas OT   Rehab Office: [phone]

## 2024-03-25 NOTE — PROCEDURES
Pre-Procedure Verification and Time Out:  Procedure Location: procedure area  HUDDLE - Pre-procedure Verification:  completed  TIME OUT - Final Verification:  completed immediately prior to procedure start  DEBRIEF: completed    Complications:  None; patient tolerated the procedure well.     Disposition: NSU  Condition: stable  Specimens Collected: No specimens collected    General Information:   Anesthesia/ sedation: Non-Anesthesia  Indication(s)/Pre - Procedure Diagnoses: L MCA occlusion  Post-Procedure Diagnosis: L MCA occlusion  Procedure Name: Mechanical Thrombectomy  Procedure performed by: Mitchel  Assistant(s): Casey  Estimated Blood Loss (mL): 15  Specimen: no  Informed Consent: consent obtained and in chart    Last Known Normal: 1100  Access: 8 Fr Sheath R CFA   Closure: Angioseal  Vessels injected: L ICA, R CFA, Xper CT  Groin puncture time: 2149  Findings: L MCA occlusion  Initial Thrombectomy Time: 2208  Additional Thrombectomy Times (Times for all device passes): N/A  Revascularization Time: 2210  Mechanical Thrombectomy Device: Penumbra red 62 aspiration catheter  TICI Pre: 0  TICI Post: 2b  Post-procedure BP parameters: SBP <180  Full report to follow in PACS.

## 2024-03-25 NOTE — HOSPITAL COURSE
74 y.o. female with history significant for CAD (s/p stent, on DAPT), NSVT, Paroxysmal Atrial Fibrillation, Chronic Diastolic heart Failure, HTN, AAA, HLD, COPD, Chronic Hypoxic Respiratory Failure 2/2 Pulmonary Sarcoidosis (on 2L at night), Hypothyroidism, Migraines, Hiatal Hernia, and Diverticulosis who presents with L MCA CVA s/p TNK (developed SDH, reversed with cryoprecipitate) and Thrombectomy (TICI 2b) on 3/25/2024. Reportedly initial NIHSS 2-3 on arrival. /80, . CTH did not show any acute hemorrhage or early ischemic changes. CTA showed L MCA occlusion. She received TNK 19 mg at 1530, after which she was noted to have worsening NIHSS to 8 and increased work of breathing requiring intubation. Repeat CTH showed 7mm R subdural hematoma. [place] [facility] [place] and possible MT. S/p cryoprecipitate transfusion for TNK reversal. Taken for MT, with TICI 2b.  Repeat CT head was stable after initiation of aspirin.  Started on atorvastatin 40 mg.  Plan to repeat CT head in 4 weeks after discharge with stroke neurology follow-up to evaluate for safety of initiating anticoagulation given new diagnosis of atrial fibrillation.    Cardiology subsequently consulted for recommendations on when to restart Plavix in the setting of her dual antiplatelet therapy for previous coronary artery stents.  Determined safe to restart aspirin without need for Plavix as patient had been on DAPT greater than 6 months.  Patient with new diagnosis of atrial fibrillation was subsequently started metoprolol tartrate 25 mg 3 times daily.  Cardiology recommended initiation of Eliquis 5 mg twice daily when safe from a stroke standpoint.    On modified barium swallow study patient noted to slightly aspirate.  Patient subsequently had nasogastric tube bridled for bolus feeding.  Plan to repeat modified barium swallow study in 2-4 weeks.    Patient improved neurologically and subsequently discharged to acute  rehab.      Follow-up:  -CT head: Perform in 4 weeks.  -Stroke neurology: Follow-up for left MCA infarct status post TNK (reversed with cryo as developed subdural hematoma) and mechanical thrombectomy with TICI 2B.  Follow-up CT head performed 4 weeks postdischarge for consideration of safety of Eliquis initiation for atrial fibrillation.  -Cardiology: Follow-up for atrial fibrillation, chronic diastolic heart failure, and NSVT. Scheduled at [facility] with SURI Garcia at 4/30 2pm.  Metoprolol 25 mg 3 times daily initiated during hospitalization.   -Primary care provider: Follow-up for hypertension. Hospitalization follow-up.  -Modified barium swallow study: Repeat in 2-4 weeks after discharge (between April 10 and 24, 2024)

## 2024-03-25 NOTE — PROCEDURES
Assisted physician with intubation, 7.5 RTT inserted via video glidescope to 22cm at the lip secured with oral endotracheal tube fastener, verified placement with capnography positive color change and etco2 39. Equal and bilateral breath sounds heard, ventilated with ambu 15 Lpm oxygen until placed on ventilator shortly after.

## 2024-03-25 NOTE — ED PROCEDURE NOTE
Procedure  Critical Care    Performed by: Jorge Pereira MD  Authorized by: Aung Stephens MD    Critical care provider statement:     Critical care end time: 45.    Critical care time was exclusive of:  Separately billable procedures and treating other patients    Critical care was necessary to treat or prevent imminent or life-threatening deterioration of the following conditions:  Respiratory failure and CNS failure or compromise    Critical care was time spent personally by me on the following activities:  Development of treatment plan with patient or surrogate, discussions with consultants, discussions with primary provider, evaluation of patient's response to treatment, examination of patient, interpretation of cardiac output measurements, obtaining history from patient or surrogate, ordering and performing treatments and interventions, ordering and review of laboratory studies, ordering and review of radiographic studies, re-evaluation of patient's condition, ventilator management and pulse oximetry    Care discussed with: accepting provider at another facility                 Jorge Pereira MD  03/25/24 0816

## 2024-03-25 NOTE — ED PROCEDURE NOTE
Procedure  Critical Care    Performed by: Aung Stephens MD  Authorized by: Aung Stephens MD    Critical care provider statement:     Critical care time (minutes):  33    Critical care time was exclusive of:  Separately billable procedures and treating other patients    Critical care was necessary to treat or prevent imminent or life-threatening deterioration of the following conditions: stroke s/p TNK now with ICH.    Critical care was time spent personally by me on the following activities:  Discussions with consultants, examination of patient, obtaining history from patient or surrogate, ordering and review of radiographic studies and re-evaluation of patient's condition    Care discussed with: admitting provider                 Aung Stephens MD  03/25/24 0353

## 2024-03-26 ENCOUNTER — APPOINTMENT (OUTPATIENT)
Dept: RADIOLOGY | Facility: HOSPITAL | Age: 74
DRG: 023 | End: 2024-03-26
Payer: MEDICARE

## 2024-03-26 LAB
ALBUMIN SERPL BCP-MCNC: 3.2 G/DL (ref 3.4–5)
ANION GAP SERPL CALC-SCNC: 14 MMOL/L (ref 10–20)
BASOPHILS # BLD AUTO: 0.04 X10*3/UL (ref 0–0.1)
BASOPHILS NFR BLD AUTO: 0.3 %
BLOOD EXPIRATION DATE: NORMAL
BLOOD EXPIRATION DATE: NORMAL
BUN SERPL-MCNC: 17 MG/DL (ref 6–23)
CALCIUM SERPL-MCNC: 8.5 MG/DL (ref 8.6–10.6)
CHLORIDE SERPL-SCNC: 107 MMOL/L (ref 98–107)
CO2 SERPL-SCNC: 23 MMOL/L (ref 21–32)
CREAT SERPL-MCNC: 0.74 MG/DL (ref 0.5–1.05)
DISPENSE STATUS: NORMAL
DISPENSE STATUS: NORMAL
EGFRCR SERPLBLD CKD-EPI 2021: 84 ML/MIN/1.73M*2
EOSINOPHIL # BLD AUTO: 0.09 X10*3/UL (ref 0–0.4)
EOSINOPHIL NFR BLD AUTO: 0.6 %
ERYTHROCYTE [DISTWIDTH] IN BLOOD BY AUTOMATED COUNT: 13.3 % (ref 11.5–14.5)
ERYTHROCYTE [DISTWIDTH] IN BLOOD BY AUTOMATED COUNT: 13.4 % (ref 11.5–14.5)
GLUCOSE BLD MANUAL STRIP-MCNC: 100 MG/DL (ref 74–99)
GLUCOSE BLD MANUAL STRIP-MCNC: 108 MG/DL (ref 74–99)
GLUCOSE BLD MANUAL STRIP-MCNC: 112 MG/DL (ref 74–99)
GLUCOSE BLD MANUAL STRIP-MCNC: 115 MG/DL (ref 74–99)
GLUCOSE BLD MANUAL STRIP-MCNC: 119 MG/DL (ref 74–99)
GLUCOSE BLD MANUAL STRIP-MCNC: 93 MG/DL (ref 74–99)
GLUCOSE SERPL-MCNC: 107 MG/DL (ref 74–99)
HCT VFR BLD AUTO: 23.9 % (ref 36–46)
HCT VFR BLD AUTO: 25.8 % (ref 36–46)
HGB BLD-MCNC: 7.7 G/DL (ref 12–16)
HGB BLD-MCNC: 7.9 G/DL (ref 12–16)
IMM GRANULOCYTES # BLD AUTO: 0.08 X10*3/UL (ref 0–0.5)
IMM GRANULOCYTES NFR BLD AUTO: 0.6 % (ref 0–0.9)
LYMPHOCYTES # BLD AUTO: 1.13 X10*3/UL (ref 0.8–3)
LYMPHOCYTES NFR BLD AUTO: 7.9 %
MCH RBC QN AUTO: 28 PG (ref 26–34)
MCH RBC QN AUTO: 28.8 PG (ref 26–34)
MCHC RBC AUTO-ENTMCNC: 29.8 G/DL (ref 32–36)
MCHC RBC AUTO-ENTMCNC: 33.1 G/DL (ref 32–36)
MCV RBC AUTO: 87 FL (ref 80–100)
MCV RBC AUTO: 94 FL (ref 80–100)
MONOCYTES # BLD AUTO: 1.02 X10*3/UL (ref 0.05–0.8)
MONOCYTES NFR BLD AUTO: 7.2 %
NEUTROPHILS # BLD AUTO: 11.87 X10*3/UL (ref 1.6–5.5)
NEUTROPHILS NFR BLD AUTO: 83.4 %
NRBC BLD-RTO: 0 /100 WBCS (ref 0–0)
NRBC BLD-RTO: 0 /100 WBCS (ref 0–0)
PHOSPHATE SERPL-MCNC: 3.3 MG/DL (ref 2.5–4.9)
PLATELET # BLD AUTO: 147 X10*3/UL (ref 150–450)
PLATELET # BLD AUTO: 198 X10*3/UL (ref 150–450)
POTASSIUM SERPL-SCNC: 3.8 MMOL/L (ref 3.5–5.3)
PRODUCT BLOOD TYPE: 5100
PRODUCT BLOOD TYPE: 5100
PRODUCT CODE: NORMAL
PRODUCT CODE: NORMAL
RBC # BLD AUTO: 2.74 X10*6/UL (ref 4–5.2)
RBC # BLD AUTO: 2.75 X10*6/UL (ref 4–5.2)
SODIUM SERPL-SCNC: 140 MMOL/L (ref 136–145)
UNIT ABO: NORMAL
UNIT ABO: NORMAL
UNIT NUMBER: NORMAL
UNIT NUMBER: NORMAL
UNIT RH: NORMAL
UNIT RH: NORMAL
UNIT VOLUME: 107
UNIT VOLUME: 78
WBC # BLD AUTO: 11.2 X10*3/UL (ref 4.4–11.3)
WBC # BLD AUTO: 14.2 X10*3/UL (ref 4.4–11.3)

## 2024-03-26 PROCEDURE — 2500000002 HC RX 250 W HCPCS SELF ADMINISTERED DRUGS (ALT 637 FOR MEDICARE OP, ALT 636 FOR OP/ED): Performed by: REGISTERED NURSE

## 2024-03-26 PROCEDURE — 2500000004 HC RX 250 GENERAL PHARMACY W/ HCPCS (ALT 636 FOR OP/ED)

## 2024-03-26 PROCEDURE — 99233 SBSQ HOSP IP/OBS HIGH 50: CPT

## 2024-03-26 PROCEDURE — 37799 UNLISTED PX VASCULAR SURGERY: CPT | Performed by: REGISTERED NURSE

## 2024-03-26 PROCEDURE — 74230 X-RAY XM SWLNG FUNCJ C+: CPT

## 2024-03-26 PROCEDURE — 2500000005 HC RX 250 GENERAL PHARMACY W/O HCPCS: Performed by: REGISTERED NURSE

## 2024-03-26 PROCEDURE — 2500000001 HC RX 250 WO HCPCS SELF ADMINISTERED DRUGS (ALT 637 FOR MEDICARE OP)

## 2024-03-26 PROCEDURE — 82947 ASSAY GLUCOSE BLOOD QUANT: CPT

## 2024-03-26 PROCEDURE — 80069 RENAL FUNCTION PANEL: CPT | Performed by: INTERNAL MEDICINE

## 2024-03-26 PROCEDURE — 70450 CT HEAD/BRAIN W/O DYE: CPT | Performed by: RADIOLOGY

## 2024-03-26 PROCEDURE — 99223 1ST HOSP IP/OBS HIGH 75: CPT | Performed by: STUDENT IN AN ORGANIZED HEALTH CARE EDUCATION/TRAINING PROGRAM

## 2024-03-26 PROCEDURE — 2500000002 HC RX 250 W HCPCS SELF ADMINISTERED DRUGS (ALT 637 FOR MEDICARE OP, ALT 636 FOR OP/ED)

## 2024-03-26 PROCEDURE — 2500000005 HC RX 250 GENERAL PHARMACY W/O HCPCS

## 2024-03-26 PROCEDURE — 97165 OT EVAL LOW COMPLEX 30 MIN: CPT | Mod: GO

## 2024-03-26 PROCEDURE — 3430000001 HC RX 343 DIAGNOSTIC RADIOPHARMACEUTICALS: Performed by: PSYCHIATRY & NEUROLOGY

## 2024-03-26 PROCEDURE — 74230 X-RAY XM SWLNG FUNCJ C+: CPT | Performed by: RADIOLOGY

## 2024-03-26 PROCEDURE — 74018 RADEX ABDOMEN 1 VIEW: CPT | Performed by: RADIOLOGY

## 2024-03-26 PROCEDURE — 85025 COMPLETE CBC W/AUTO DIFF WBC: CPT | Performed by: REGISTERED NURSE

## 2024-03-26 PROCEDURE — 97161 PT EVAL LOW COMPLEX 20 MIN: CPT | Mod: GP

## 2024-03-26 PROCEDURE — 2500000001 HC RX 250 WO HCPCS SELF ADMINISTERED DRUGS (ALT 637 FOR MEDICARE OP): Performed by: PSYCHIATRY & NEUROLOGY

## 2024-03-26 PROCEDURE — 92610 EVALUATE SWALLOWING FUNCTION: CPT | Mod: GN

## 2024-03-26 PROCEDURE — 2500000002 HC RX 250 W HCPCS SELF ADMINISTERED DRUGS (ALT 637 FOR MEDICARE OP, ALT 636 FOR OP/ED): Performed by: STUDENT IN AN ORGANIZED HEALTH CARE EDUCATION/TRAINING PROGRAM

## 2024-03-26 PROCEDURE — 94640 AIRWAY INHALATION TREATMENT: CPT

## 2024-03-26 PROCEDURE — 74018 RADEX ABDOMEN 1 VIEW: CPT

## 2024-03-26 PROCEDURE — 2500000001 HC RX 250 WO HCPCS SELF ADMINISTERED DRUGS (ALT 637 FOR MEDICARE OP): Performed by: REGISTERED NURSE

## 2024-03-26 PROCEDURE — 2060000001 HC INTERMEDIATE ICU ROOM DAILY

## 2024-03-26 PROCEDURE — 85027 COMPLETE CBC AUTOMATED: CPT | Performed by: INTERNAL MEDICINE

## 2024-03-26 PROCEDURE — 70450 CT HEAD/BRAIN W/O DYE: CPT

## 2024-03-26 RX ORDER — BUDESONIDE 0.5 MG/2ML
0.5 INHALANT ORAL 2 TIMES DAILY
Status: CANCELLED | OUTPATIENT
Start: 2024-03-26

## 2024-03-26 RX ORDER — FUROSEMIDE 20 MG/1
20 TABLET ORAL DAILY
Status: CANCELLED | OUTPATIENT
Start: 2024-03-27

## 2024-03-26 RX ORDER — HYDRALAZINE HYDROCHLORIDE 50 MG/1
25 TABLET, FILM COATED ORAL EVERY 6 HOURS PRN
Status: CANCELLED | OUTPATIENT
Start: 2024-03-26

## 2024-03-26 RX ORDER — POLYETHYLENE GLYCOL 3350 17 G/17G
17 POWDER, FOR SOLUTION ORAL DAILY
Status: CANCELLED | OUTPATIENT
Start: 2024-03-27

## 2024-03-26 RX ORDER — MAGNESIUM SULFATE HEPTAHYDRATE 40 MG/ML
4 INJECTION, SOLUTION INTRAVENOUS EVERY 6 HOURS PRN
Status: DISCONTINUED | OUTPATIENT
Start: 2024-03-26 | End: 2024-03-26

## 2024-03-26 RX ORDER — MAGNESIUM SULFATE HEPTAHYDRATE 40 MG/ML
2 INJECTION, SOLUTION INTRAVENOUS EVERY 6 HOURS PRN
Status: DISCONTINUED | OUTPATIENT
Start: 2024-03-26 | End: 2024-03-26

## 2024-03-26 RX ORDER — CALCIUM GLUCONATE 20 MG/ML
2 INJECTION, SOLUTION INTRAVENOUS EVERY 6 HOURS PRN
Status: DISCONTINUED | OUTPATIENT
Start: 2024-03-26 | End: 2024-03-26

## 2024-03-26 RX ORDER — POTASSIUM CHLORIDE 1.5 G/1.58G
20 POWDER, FOR SOLUTION ORAL EVERY 6 HOURS PRN
Status: DISCONTINUED | OUTPATIENT
Start: 2024-03-26 | End: 2024-03-26

## 2024-03-26 RX ORDER — POTASSIUM CHLORIDE 1.5 G/1.58G
40 POWDER, FOR SOLUTION ORAL EVERY 6 HOURS PRN
Status: DISCONTINUED | OUTPATIENT
Start: 2024-03-26 | End: 2024-03-26

## 2024-03-26 RX ORDER — CALCIUM GLUCONATE 20 MG/ML
1 INJECTION, SOLUTION INTRAVENOUS EVERY 6 HOURS PRN
Status: DISCONTINUED | OUTPATIENT
Start: 2024-03-26 | End: 2024-03-26

## 2024-03-26 RX ORDER — METOPROLOL TARTRATE 25 MG/1
25 TABLET, FILM COATED ORAL 3 TIMES DAILY
Status: CANCELLED | OUTPATIENT
Start: 2024-03-26

## 2024-03-26 RX ORDER — SENNOSIDES 8.6 MG/1
2 TABLET ORAL 2 TIMES DAILY
Status: CANCELLED | OUTPATIENT
Start: 2024-03-26

## 2024-03-26 RX ORDER — ACETAMINOPHEN 325 MG/1
650 TABLET ORAL EVERY 6 HOURS PRN
Status: CANCELLED | OUTPATIENT
Start: 2024-03-26

## 2024-03-26 RX ORDER — ACETAMINOPHEN 500 MG
5 TABLET ORAL NIGHTLY
Status: CANCELLED | OUTPATIENT
Start: 2024-03-26

## 2024-03-26 RX ORDER — POTASSIUM CHLORIDE 20 MEQ/1
20 TABLET, EXTENDED RELEASE ORAL EVERY 6 HOURS PRN
Status: DISCONTINUED | OUTPATIENT
Start: 2024-03-26 | End: 2024-03-26

## 2024-03-26 RX ORDER — LORAZEPAM 2 MG/ML
0.25 INJECTION INTRAMUSCULAR ONCE
Status: COMPLETED | OUTPATIENT
Start: 2024-03-26 | End: 2024-03-26

## 2024-03-26 RX ORDER — LEVOTHYROXINE SODIUM 50 UG/1
50 TABLET ORAL DAILY
Status: CANCELLED | OUTPATIENT
Start: 2024-03-27

## 2024-03-26 RX ORDER — HYDRALAZINE HYDROCHLORIDE 20 MG/ML
10 INJECTION INTRAMUSCULAR; INTRAVENOUS
Status: CANCELLED | OUTPATIENT
Start: 2024-03-26 | End: 2024-03-26

## 2024-03-26 RX ORDER — LORAZEPAM 2 MG/ML
INJECTION INTRAMUSCULAR
Status: COMPLETED
Start: 2024-03-26 | End: 2024-03-26

## 2024-03-26 RX ORDER — POTASSIUM CHLORIDE 20 MEQ/1
40 TABLET, EXTENDED RELEASE ORAL EVERY 6 HOURS PRN
Status: DISCONTINUED | OUTPATIENT
Start: 2024-03-26 | End: 2024-03-26

## 2024-03-26 RX ADMIN — ALBUTEROL SULFATE 2.5 MG: 2.5 SOLUTION RESPIRATORY (INHALATION) at 06:22

## 2024-03-26 RX ADMIN — ALBUTEROL SULFATE 2.5 MG: 2.5 SOLUTION RESPIRATORY (INHALATION) at 22:14

## 2024-03-26 RX ADMIN — BARIUM SULFATE 10 ML: 0.81 POWDER, FOR SUSPENSION ORAL at 13:17

## 2024-03-26 RX ADMIN — ALBUTEROL SULFATE 2.5 MG: 2.5 SOLUTION RESPIRATORY (INHALATION) at 18:04

## 2024-03-26 RX ADMIN — LORAZEPAM 0.25 MG: 2 INJECTION INTRAMUSCULAR at 00:30

## 2024-03-26 RX ADMIN — ACETAMINOPHEN 650 MG: 325 TABLET ORAL at 02:30

## 2024-03-26 RX ADMIN — BARIUM SULFATE 20 ML: 400 PASTE ORAL at 13:18

## 2024-03-26 RX ADMIN — FUROSEMIDE 20 MG: 20 TABLET ORAL at 08:03

## 2024-03-26 RX ADMIN — SENNOSIDES 17.2 MG: 8.6 TABLET, FILM COATED ORAL at 08:03

## 2024-03-26 RX ADMIN — METOPROLOL TARTRATE 25 MG: 25 TABLET, FILM COATED ORAL at 20:05

## 2024-03-26 RX ADMIN — Medication 2 L/MIN: at 07:34

## 2024-03-26 RX ADMIN — METOPROLOL TARTRATE 25 MG: 25 TABLET, FILM COATED ORAL at 08:03

## 2024-03-26 RX ADMIN — Medication 5 MG: at 20:05

## 2024-03-26 RX ADMIN — BUDESONIDE 0.5 MG: 0.5 INHALANT RESPIRATORY (INHALATION) at 07:37

## 2024-03-26 RX ADMIN — LEVOTHYROXINE SODIUM 50 MCG: 50 TABLET ORAL at 08:03

## 2024-03-26 RX ADMIN — LIDOCAINE 1 PATCH: 4 PATCH TOPICAL at 08:04

## 2024-03-26 RX ADMIN — TIOTROPIUM BROMIDE INHALATION SPRAY 2 PUFF: 3.12 SPRAY, METERED RESPIRATORY (INHALATION) at 07:34

## 2024-03-26 RX ADMIN — SENNOSIDES 17.2 MG: 8.6 TABLET, FILM COATED ORAL at 20:05

## 2024-03-26 RX ADMIN — LORAZEPAM 0.25 MG: 2 INJECTION INTRAMUSCULAR; INTRAVENOUS at 00:30

## 2024-03-26 RX ADMIN — POLYETHYLENE GLYCOL 3350 17 G: 17 POWDER, FOR SOLUTION ORAL at 08:03

## 2024-03-26 RX ADMIN — ACETAMINOPHEN 650 MG: 325 TABLET ORAL at 22:33

## 2024-03-26 RX ADMIN — BARIUM SULFATE 5 ML: 400 SUSPENSION ORAL at 13:17

## 2024-03-26 RX ADMIN — POTASSIUM CHLORIDE 20 MEQ: 1500 TABLET, EXTENDED RELEASE ORAL at 06:00

## 2024-03-26 ASSESSMENT — COGNITIVE AND FUNCTIONAL STATUS - GENERAL
DRESSING REGULAR LOWER BODY CLOTHING: A LITTLE
TOILETING: A LITTLE
STANDING UP FROM CHAIR USING ARMS: A LITTLE
STANDING UP FROM CHAIR USING ARMS: A LITTLE
CLIMB 3 TO 5 STEPS WITH RAILING: TOTAL
MOVING TO AND FROM BED TO CHAIR: A LITTLE
WALKING IN HOSPITAL ROOM: A LOT
TURNING FROM BACK TO SIDE WHILE IN FLAT BAD: A LITTLE
DRESSING REGULAR UPPER BODY CLOTHING: A LITTLE
DRESSING REGULAR LOWER BODY CLOTHING: A LITTLE
MOBILITY SCORE: 18
CLIMB 3 TO 5 STEPS WITH RAILING: A LOT
MOVING TO AND FROM BED TO CHAIR: A LITTLE
HELP NEEDED FOR BATHING: A LITTLE
DAILY ACTIVITIY SCORE: 20
DRESSING REGULAR UPPER BODY CLOTHING: A LITTLE
WALKING IN HOSPITAL ROOM: A LITTLE
DAILY ACTIVITIY SCORE: 20
HELP NEEDED FOR BATHING: A LITTLE
MOVING FROM LYING ON BACK TO SITTING ON SIDE OF FLAT BED WITH BEDRAILS: A LITTLE
MOBILITY SCORE: 16
TOILETING: A LITTLE

## 2024-03-26 ASSESSMENT — PAIN - FUNCTIONAL ASSESSMENT
PAIN_FUNCTIONAL_ASSESSMENT: 0-10

## 2024-03-26 ASSESSMENT — PAIN SCALES - GENERAL
PAINLEVEL_OUTOF10: 0 - NO PAIN
PAINLEVEL_OUTOF10: 3
PAINLEVEL_OUTOF10: 0 - NO PAIN
PAINLEVEL_OUTOF10: 5 - MODERATE PAIN
PAINLEVEL_OUTOF10: 4
PAINLEVEL_OUTOF10: 0 - NO PAIN
PAINLEVEL_OUTOF10: 0 - NO PAIN

## 2024-03-26 ASSESSMENT — PAIN DESCRIPTION - DESCRIPTORS
DESCRIPTORS: ACHING;DISCOMFORT
DESCRIPTORS: ACHING;DISCOMFORT;DULL

## 2024-03-26 ASSESSMENT — ACTIVITIES OF DAILY LIVING (ADL)
ADL_ASSISTANCE: INDEPENDENT
BATHING_ASSISTANCE: MODERATE

## 2024-03-26 NOTE — PROGRESS NOTES
Occupational Therapy    Evaluation    Patient Name: Trinity Hernandez  MRN: [id number]  Today's Date: 3/26/2024  Time Calculation  Start Time: 1346  Stop Time: 1412  Time Calculation (min): 26 min    Assessment  IP OT Assessment  Prognosis: Good  Evaluation/Treatment Tolerance: Patient tolerated treatment well  Medical Staff Made Aware: Yes  End of Session Communication: Bedside nurse  End of Session Patient Position: Up in chair, Alarm on  Plan:  Treatment Interventions: ADL retraining, Visual perceptual retraining, Functional transfer training, UE strengthening/ROM, Endurance training, Cognitive reorientation, Patient/family training, Equipment evaluation/education, Neuromuscular reeducation, Fine motor coordination activities, Compensatory technique education  OT Frequency: 4 times per week  OT Discharge Recommendations: High intensity level of continued care  Equipment Recommended upon Discharge: Wheeled walker  OT - OK to Discharge: Yes    Subjective   Current Problem:  1. Ischemic stroke (CMS/HCC)  Transthoracic Echo (TTE) Complete    Transthoracic Echo (TTE) Complete      2. Subdural bleeding (CMS/HCC)        3. Adverse effect of thrombolytic drug, initial encounter        4. Cerebral infarction due to embolism of left middle cerebral artery (CMS/HCC)  Transthoracic Echo (TTE) Complete    Transthoracic Echo (TTE) Complete      5. Other cerebral infarction (CMS/HCC)  Transthoracic Echo (TTE) Complete        General:  Reason for Referral: Pt. presented to OSH s/p fall with HS(+) and R sided weakness, found to have L M1 occlusion s/p TNK 1345 on 3/24/24 c/b acute AMS and new CTH R SDH while at OSH. Received cryoprecipitate reversal and MT on 3/25/24 TICI 2b at [facility]. GCS: 15; NIHSS: 3  Past Medical History Relevant to Rehab: COPD, CAD s/p PCI 10/4/23, NSVT, HTN, HLD, rheumatoid arthritis, and hypothyroidism, CHF, Afib (not on AC), AAA, asthma, COPD, chronic respiratory failure 2/2 pulmonary sarcoidosis,  Migraines, Hiatal Hernia, and Diverticulosis.  Prior to Session Communication: Bedside nurse  Patient Position Received: Bed, 3 rail up, Alarm off, not on at start of session  Family/Caregiver Present: Yes  Caregiver Feedback: daughter and  present and supportive during session  General Comment: Pt pleasant and agreeable to therapy, decreased safety, required cues throughout session.   Precautions:  Hearing/Visual Limitations: appears WFL  Medical Precautions: Fall precautions  Precautions Comment: SBP < 180  Vital Signs:  Heart Rate: 94 (post 98)  Resp: 16 (post 32)  SpO2: 100 % (post 100)  BP: (!) 121/46 (during 134/70, post 127/62)  MAP (mmHg): 69  Pain:  Pain Assessment  Pain Assessment: 0-10  Pain Score: 3  Pain Location:  (R hip/LE)  Lines/Tubes/Drains:  Implantable Port 03/24/24 Right Chest Single lumen port (Active)   Number of days: 1       NG/OG/Feeding Tube Other (Comment) Right nostril 12 Fr. (Active)   Number of days: 0       External Urinary Catheter Female (Active)   Number of days: 1         Objective   Cognition:  Overall Cognitive Status: Impaired  Arousal/Alertness: Appropriate responses to stimuli  Orientation Level: Oriented X4 (except exact date)  Following Commands:  (pt followed > 90% 1-2 step commands with repetition.)  Cognition Comments: Pt slightly impulsive during session, cues for safety awareness during session and with mobility.  Insight: Mild  Impulsive: Mildly  Processing Speed: Delayed        Marie Agitation Sedation Scale  Marie Agitation Sedation Scale (RASS): Alert and calm  Home Living:  Type of Home: House (ranch)  Lives With: Spouse ( home and able to assist as needed)  Home Adaptive Equipment:  (rollator)  Home Layout: One level, Laundry in basement  Home Access: Stairs to enter with rails  Entrance Stairs-Number of Steps: 2  Bathroom Shower/Tub: Tub/shower unit  Bathroom Toilet: Standard  Bathroom Equipment: Grab bars in shower   Prior Function:  Level  of Lutz: Independent with ADLs and functional transfers, Independent with homemaking with ambulation  ADL Assistance: Independent  Homemaking Assistance: Independent (IND with cooking and cleaning,  does laundry and patient and  grocery shop together)  Ambulatory Assistance: Independent  Vocational: Retired  Leisure: enjoys cooking and doing housework  Prior Function Comments: (+) drives  IADL History:     ADL:  Eating Assistance: Stand by  Grooming Assistance: Stand by  Bathing Assistance: Moderate  UE Dressing Assistance: Minimal  LE Dressing Assistance: Moderate  Toileting Assistance with Device: Moderate  Activity Tolerance:  Early Mobility/Exercise Safety Screen: Proceed with mobilization - No exclusion criteria met  Balance:  Dynamic Sitting Balance  Dynamic Sitting-Comments: CGA  Dynamic Standing Balance  Dynamic Standing-Comments: CGA-Min A at FWW  Static Sitting Balance  Static Sitting-Level of Assistance:  (SBA-CGA)  Static Standing Balance  Static Standing-Level of Assistance: Contact guard  Static Standing-Comment/Number of Minutes: at FWW  Bed Mobility/Transfers: Bed Mobility  Bed Mobility: Yes  Bed Mobility 1  Bed Mobility 1: Supine to sitting  Level of Assistance 1: Minimum assistance  Bed Mobility Comments 1: HOB elevated, required assist for moving R LE off of bed, cues for initiation and sequencing   and Transfers  Transfer: Yes  Transfer 1  Technique 1: Sit to stand, Stand to sit  Transfer Device 1: Walker  Transfer Level of Assistance 1: Minimum assistance  Trials/Comments 1: cues for proper hand placement and sequencing  Transfers 2  Transfer From 2: Bed to  Transfer to 2: Chair with arms  Technique 2: Sit to stand, Stand to sit (side steps)  Transfer Device 2: Walker  Transfer Level of Assistance 2: Minimum assistance  Trials/Comments 2: Cues needed for walker management, sequencing, and safety. Guarded R LE d/t slight knee buckling.  IADL's:      Vision: Vision - Basic  Assessment  Current Vision: Wears glasses only for reading   and Vision - Complex Assessment  Vision Comments: able to track to all visual fields with cueing.  Sensation:  Light Touch: No apparent deficits  Strength:     Perception:     Coordination:  Movements are Fluid and Coordinated: No  Coordination Comment: Pt with tremors in B hands, RN and MD aware.   Hand Function:  Hand Function  Gross Grasp:  (R grasp 4/5, L grasp 3+/5)  Extremities: RUE   RUE : Exceptions to WFL  RUE AROM (degrees)  RUE AROM Comment: R shld flex ~ 0-60 degrees, 0-50 degrees ABD AROM, distal R UE AROM WFL  RUE Strength  R Shoulder Flexion: 3-/5  R Shoulder Extension: 3-/5  R Shoulder ABduction: 3-/5  R Elbow Flexion: 2+/5  R Elbow Extension: 2+/5, LUE   LUE: Exceptions to WFL  LUE AROM (degrees)  LUE AROM Comment: L UE AROM WFL  LUE Strength  L Shoulder Flexion: 4-/5  L Shoulder Extension: 4/5  L Elbow Flexion: 4/5  L Elbow Extension: 4/5, RLE   RLE :  (R LE limited AROM, strength at least 3-/5), and LLE   LLE :  (L LE AROM WFL, strength >/= 3+/5)    Outcome Measures: [facility] Daily Activity  Putting on and taking off regular lower body clothing: A little  Bathing (including washing, rinsing, drying): A little  Putting on and taking off regular upper body clothing: A little  Toileting, which includes using toilet, bedpan or urinal: A little  Taking care of personal grooming such as brushing teeth: None  Eating Meals: None  Daily Activity - Total Score: 20    Confusion Assessment Method-ICU (CAM-ICU)  Feature 3: Altered Level of Consciousness: Negative   ICU Mobility Screen  Early Mobility/Exercise Safety Screen: Proceed with mobilization - No exclusion criteria met,   Marie Agitation Sedation Scale  Marie Agitation Sedation Scale (RASS): Alert and calm      Education Documentation  Body Mechanics, taught by Melissa Vargas OT at 3/26/2024  5:12 PM.  Learner: Family, Patient  Readiness: Acceptance  Method: Explanation,  Demonstration  Response: Verbalizes Understanding, Demonstrated Understanding, Needs Reinforcement    Precautions, taught by Melissa Vargas, OT at 3/26/2024  5:12 PM.  Learner: Family, Patient  Readiness: Acceptance  Method: Explanation, Demonstration  Response: Verbalizes Understanding, Demonstrated Understanding, Needs Reinforcement    ADL Training, taught by Melissa Vargas, OT at 3/26/2024  5:12 PM.  Learner: Family, Patient  Readiness: Acceptance  Method: Explanation, Demonstration  Response: Verbalizes Understanding, Demonstrated Understanding, Needs Reinforcement    Education Comments  No comments found.        Goals:     Encounter Problems       Encounter Problems (Active)       ADLs       Patient will perform UB and LB bathing with Mod I.       Start:  03/26/24    Expected End:  04/09/24            Patient with complete upper body dressing with Mod I.       Start:  03/26/24    Expected End:  04/09/24            Patient with complete lower body dressing with Mod I.       Start:  03/26/24    Expected End:  04/09/24            Patient will complete daily grooming tasks with Mod I.       Start:  03/26/24    Expected End:  04/09/24            Patient will complete toileting including hygiene clothing management/hygiene with Mod I.       Start:  03/26/24    Expected End:  04/09/24               EXERCISE/STRENGTHENING       Pt will increase R UE strength and coordination to WFL to increase IND with ADLs and mobility.        Start:  03/26/24    Expected End:  04/09/24               MOBILITY       Patient will perform Functional mobility household distances with Mod I using LRAD without LOB.       Start:  03/26/24    Expected End:  04/09/24               TRANSFERS       Patient will perform bed mobility with Mod I.       Start:  03/26/24    Expected End:  04/09/24            Patient will complete functional transfers from various surfaces with Mod I using LRAD.       Start:  03/26/24    Expected End:  04/09/24                  .reh    03/26/24 at 5:12 PM   Melissa Vargas, OT   Rehab Office: [phone]

## 2024-03-26 NOTE — PROGRESS NOTES
"Trinity Hernandez is a 74 y.o. female on day 2 of admission presenting with Ischemic stroke (CMS/[facility]).    Subjective   No acute events overnight.       Objective     Last Recorded Vitals  Blood pressure 109/67, pulse 103, temperature 36 °C (96.8 °F), temperature source Temporal, resp. rate 23, height 1.7 m (5' 6.93\"), weight 87.1 kg (192 lb 0.3 oz), SpO2 97 %.    Physical Exam  Neurological Exam  GENERAL: Resting comfortably, no acute distress  HENT: No scleral icterus or conjunctival erythema. No carotid bruit.  CARDIO: Normal S1 and S2 without murmurs.   PULM: Vesicular breath sounds without wheezes or crackles.  ABD: Tenderness to palpation lower abdomen.    MENTAL STATE: Alert and oriented to name, place, date, situation.  Naming and repetition intact.    CRANIAL NERVES:   CN 2 Visual fields full to confrontation.   CN 3, 4, 6 Pupils round, 3 mm in diameter, equally reactive to light. Lids symmetric; no ptosis. EOMs normal alignment, full range with normal saccades, pursuit and convergence. No nystagmus.   CN 5 Facial sensation intact bilaterally to light touch  CN 7 right facial droop  CN 8 Hearing intact to conversation.  CN 9 Palate elevates symmetrically.   CN 11 Normal strength of shoulder shrug.  CN 12 Tongue midline, with normal bulk and strength; no fasciculations.  No dysarthria.    MOTOR:   Tremulous movements in lower extremities  Right upper extremity.  No drift  Left upper extremity: No drift  Bilateral lower extremities: No drift with.  Limited exam in right lower extremity due to hip pain    REFLEXES:     R L  BR:  2+ 2+     Biceps:  2+ 2+       Knee:  2+  2+  Ankle:  2+ 2+    No ankle clonus.    SENSORY: Grimace x 4.    COORDINATION: Coordination intact in upper and lower extremities without evidence of ataxia or dysmetria.    GAIT: Deferred    Relevant Results  NIHSS    1a  Level of consciousness: 0=alert; keenly responsive   1b. LOC questions:  0=Performs both tasks correctly   1c. LOC " commands: 0=Performs both tasks correctly   2.  Best Gaze: 0=normal   3. Visual: 0=No visual loss   4. Facial Palsy: 1=Minor paralysis (flattened nasolabial fold, asymmetric on smiling)   5a. Motor Left Arm: 0=No drift, limb holds 90 (or 45) degrees for full 10 seconds   5b.  Motor Right Arm: 0=No drift, limb holds 90 (or 45) degrees for full 10 seconds   6a. Motor Left Le=No drift, limb holds 90 (or 45) degrees for full 10 seconds   6b  Motor Right Le=Drift, limb holds 90 (or 45) degrees but drifts down before full 10 seconds: does not hit bed   7. Limb Ataxia: 0=Absent   8.  Sensory: 0=Normal; no sensory loss   9. Best Language:  0=No aphasia, normal   10. Dysarthria: 0=Normal   11. Extinction and Inattention: 0=No abnormality          Total:   2              Aditya Coma Scale  Best Eye Response: Spontaneous  Best Verbal Response: Oriented  Best Motor Response: Follows commands  Aditya Coma Scale Score: 15              Scheduled medications  budesonide, 0.5 mg, nebulization, BID  furosemide, 20 mg, oral, Daily  insulin lispro, 0-5 Units, subcutaneous, q4h NIKO  levothyroxine, 50 mcg, oral, Daily  lidocaine, 1 patch, transdermal, Daily  melatonin, 5 mg, oral, Nightly  metoprolol tartrate, 25 mg, oral, TID  perflutren protein A microsphere, 0.5 mL, intravenous, Once in imaging  polyethylene glycol, 17 g, oral, Daily  sennosides, 2 tablet, oral, BID  sulfur hexafluoride microsphr, 2 mL, intravenous, Once in imaging  tiotropium, 2 puff, inhalation, Daily      Continuous medications  sodium chloride 0.9%, 100 mL/hr, Last Rate: Stopped (24 1700)      PRN medications  PRN medications: acetaminophen, albuterol, aspirin, calcium gluconate, calcium gluconate, dextrose, glucagon, hydrALAZINE **FOLLOWED BY** hydrALAZINE, labetaloL, magnesium sulfate, magnesium sulfate, oxygen, potassium chloride CR **OR** potassium chloride, potassium chloride CR **OR** potassium chloride    Lab Results   Component Value Date     WBC 11.2 03/26/2024    HGB 7.9 (L) 03/26/2024    HCT 23.9 (L) 03/26/2024    MCV 87 03/26/2024     (L) 03/26/2024     Lab Results   Component Value Date    GLUCOSE 107 (H) 03/26/2024    CALCIUM 8.5 (L) 03/26/2024     03/26/2024    K 3.8 03/26/2024    CO2 23 03/26/2024     03/26/2024    BUN 17 03/26/2024    CREATININE 0.74 03/26/2024                        Assessment/Plan   This patient currently has cardiac telemetry ordered; if you would like to modify or discontinue the telemetry order, click here to go to the orders activity to modify/discontinue the order.  Principal Problem:    Ischemic stroke (CMS/[facility])    Ms. Hernandez is a 74 y.o. female with PMH COPD, CAD s/p PCI, HTN, HLD, sarcoidosis, rheumatoid arthritis, and hypothyroidism who presented to OSH after fall and found to have right facial droop and RUE weakness. Reportedly initial NIHSS 2-3. /80, . CTH did not show any acute hemorrhage or early ischemic changes. CTA showed L MCA occlusion. She received TNK 19 mg at 1530, after which she was noted to have worsening NIHSS to 8 and increased work of breathing requiring intubation. Repeat CTH showed 7mm R subdural hematoma. She was transferred to [facility] for cryoprecipitate and possible MT. S/p cryoprecipitate transfusion. Taken for MT, with TICI 2b.       Type: Ischemic stroke  Subtype/etiology: LVO  Vessels involved: L MCA  Neurological manifestations:  NIHSS (worst at presentation): 18   Diagnostic evaluation: CTH/CTA/CTP  Antiplatelet/antithrombotic plan for stroke prevention: ASA and atorvastatin   VTE prophylaxis: 24hr post TNK      Vascular Risk Factor modification goals:  Blood pressure goals: avoid hypotension SBP <100 that could worsen cerebral perfusion, Ischemic stroke post-thrombolysis- BP < 180/105 mmHg for 24hr  Lipid Goals: education on healthy diet and statin therapy to maintain or achieve goal LDL-cholesterol < 70mg  Glucose Goals: early treatment of hyperglycemia  to goal glucose 140-180 mg/dl with long-term goal A1c < 7%   Smoking Cessation and Education  Assessment for Rehabilitation needs   Patient and family education on signs and symptoms of stroke, calling 911, healthy strategies for stroke prevention.      Updates 3/26:  -Downgrade to MICHAELA  -Determine restart plan for anticoagulation and antiplatelet  -Follow-up cardiology recs  -MRI obtained:     #L MCA occlusion s/p mTICI 2B post-thrombectomy  #complicated by post TNK SDH  #Afib  ::Initial NIHSS 3, s/p TNK; Worsening in clinical status, [facility] showed R SDH S/p cryoprecipitate transfusion   ::NIHSS [place] [facility] [place] was 18, S/p MT, TICI 2b, NIHSS improved to 7   ::Etiology likely cardio embolic in the setting of known Afib   :: LDL 41, A1c 5.3  - MICHAELA status  - Neuro-checks q2hr   - BP goal <180 systolic   - Pending TTE  - Hold ASA and Plavix  - Will need to be AC for the long-term, need cardiology clearance for anti-platelets therapy (was on DAPT at home for CAD s/p stent)   - PT/OT/SLP   - Consult neurosurgery for SDH, continue to hold aspirin, Plavix, anticoagulation     #CHF   #CAD s.p stent   #Afib  #HTN  Trop on presentation 22   Latest TTE on 2021: EF 35-40%   Home meds: ASA 81mg, Plavix 75mg, Lasix 20 mg daily, and Metoprolol S. 75 mg daily   - Continue home lasix 20 mg daily   - Switch to Metoprolol T. 25 mg TID   - Resume ASA 81 mg daily 24hr post TNK follow-up in December thank you  - Will need to be AC for the long-term, need cardiology clearance for anti-platelets therapy (was on DAPT at home for CAD s/p stent)   -Follow-up cardiology recommendations     #COPD  #Asthma   Home meds:  Spiriva 2 puffs daily, Ipratropium inhaler prn, and albuterol prn,  -Home inhalers     #hypothyroidism   - Continue home levothyroxine 50 mcg daily       #pulmonary sarcoidosis   #rheumatoid arthritis   - not on meds, to clarify with family     F:  ml/hr  E: As needed  N: Regular diet, 2-3 g sodium  GI: Not  indicated  Bowel: MiraLAX, doc senna  DVT: Compression device     Code status: full code   Next of kin: Robbin Hernandez (spouse) [phone]    Pedro Hilliard MD  PGY-2 Neurology  Stroke Pager: [phone]                 Pedro Hilliard MD

## 2024-03-26 NOTE — CONSULTS
Reason For Consult  DAPT management     History Of Present Illness  Trinity Hernandez is a 74 y.o. female with PMH COPD, CAD s/p PCI to mid Lcx for angina (10/4/23), HTN, HLD, pulmonary sarcoidosis, rheumatoid arthritis, and hypothyroidism CHF, Afib (not on AC), AAA, asthma, COPD who presented to OSH after fall and found to have right facial droop and RUE weakness. Reportedly initial NIHSS 2-3. /80, . CTH did not show any acute hemorrhage or early ischemic changes. She received TNK 19 mg at 1530. CTA showed L MCA occlusion.      Few minutes after, she was noted to have worsening work of breathing, drooling for which she was intubated, NIHSS worsened to 8. Repeat CTH showed 7mm R subdural hematoma but no L hemorrhage or evolving infarct. s/p crypreceipitate 3/24 pm. Pt cleared to resume aspirin by neurology 3/25. Cardiology consulted for recommendations regarding initiation of plavix.     On interview, pt denies any chest pain or SOB. States she occasionally gets palpitations. Tele reviewed - in Afib, spontaneously converted to NSR on 3/22 @ 21.00    Cardiac Testing:  ECG 3/24: NSR with LBBB    TTE 3/25/24:  1. Poorly visualized anatomical structures due to suboptimal image quality.   2. Left ventricular systolic function is low normal with a 50-55% estimated ejection fraction.   3. Abnormal septal motion consistent with left bundle branch block.   4. Spectral Doppler shows an impaired relaxation pattern of left ventricular diastolic filling.   5. Mildly elevated RVSP.   6. Compared with study from 4/20/2021, EF appears improved. Reported as 35-40%.     Cor angio 10/4/23  PRE-PROCEDURE DIAGNOSIS:   New Onset Angina, less than 2 months     POST PROCEDURE DIAGNOSIS:   Successful PCI of LCX  Successful IFR LAD.     PROCEDURE:   Angioplasty and Synergy (Drug Eluting Stent) Placement  in Proximal and Mid LCX and OM #2    IFR in LAD    cMRI 10/23:  - Overall, no convincing evidence of infiltrative or  inflammatory   cardiomyopathy such as cardiac sarcoidosis, nor prior ischemic damage.     - The left ventricle is normal in size (LV EDVi = 51 ml/m²). The left   ventricular systolic function is mildly decreased (LV EF = 42 %).   Dyskinetic septal motion in keeping with LBBB conduction abnormality. No   delayed enhancement. No edema on T2 STIR. T1 times as above.     - The right ventricle is small (RV EDVi = 40 ml/m²). The right   ventricular systolic function is normal (RV EF = 56 %).     - Mild mitral regurgitation (regurgitant fraction 12%). Trace aortic   regurgitation (regurgitant fraction 5%).     - Mild ectasia of ascending aorta (4.0 cm).     - Exam was compared with the prior  cardiac MR exam performed on   2019/10/31. Similar findings.     NM stress test 9/23:   1. SPECT Perfusion Study: Normal Perfusion but abnormal EF.    2. There is no scintigraphic evidence for inducible ischemia.    3. No evidence of scarred myocardium.    4. Left ventricle is normal in size. The left ventricle systolic   function is mildly decreased.    5. Right ventricle is normal in size. The right ventricle systolic   function is normal.    6. This is a low risk scan.             Gated Stress FBP    LVEF % 50     TTE 2021:   1. The left ventricular systolic function is moderately decreased with a 35-40% estimated ejection fraction.   2. Spectral Doppler shows an abnormal pattern of left ventricular diastolic filling.   3. There is global hypokinesis of the left ventricle with minor regional variations.   4. There is trace to mild mitral and tricuspid regurgitation.          Past Medical History  She has a past medical history of Abdominal bloating (05/04/2023), CAD (coronary artery disease), CHF (congestive heart failure) (CMS/formerly [facility]), Diverticulitis of small intestine without perforation or abscess without bleeding, Dizzy (11/20/2023), Essential (primary) hypertension (02/08/2017), Fall, Influenza B (05/08/2015), Nausea  "(11/16/2018), Personal history of other endocrine, nutritional and metabolic disease (02/08/2017), and Personal history of other venous thrombosis and embolism.    Surgical History  She has a past surgical history that includes Hysterectomy (10/03/2013); Hernia repair (05/16/2017); Other surgical history (05/16/2017); Abdominal adhesion surgery (05/16/2017); Colectomy partial / total (05/16/2017); Cholecystectomy (05/16/2017); Cervical fusion (05/16/2017); Other surgical history (04/15/2019); MR angio head wo IV contrast (05/21/2020); MR angio neck wo IV contrast (05/21/2020); CT angio neck (05/21/2020); CT angio head w and wo IV contrast (05/21/2020); and Coronary angioplasty with stent.     Social History  She reports that she has quit smoking. Her smoking use included cigarettes. She has never been exposed to tobacco smoke. She has never used smokeless tobacco. She reports that she does not currently use alcohol. She reports that she does not use drugs.    Family History  No family history on file.     Allergies  Hydrocodone-acetaminophen, Montelukast, Morphine, Nitrofurantoin monohyd/m-cryst, Sulfa (sulfonamide antibiotics), Atorvastatin, Dicyclomine, Fluticasone propion-salmeterol, Levofloxacin, Lisinopril, Nitroimidazoles, Omeprazole, Salmeterol, Simvastatin, Sucralfate, and Umeclidinium    Review of Systems  12 point ROS reviewed and -ve     Physical Exam  Gen: ill appearing  Neuro: AAOx3, power 1/5 in RUE and RLE  HEENT: PEERL, EOMI, sclera anicteric, no conjunctival injection, MMM, no oropharyngeal lesions  Neck: no elevated JVD  Resp: CTAB, normal breath sounds, no wheezing/crackles/ rales  CV: RRR, normal S1/S2, no murmurs/ rubs/gallops  GI: non-tender, non-distended, normal BSs in all 4 quadrants  MSK: warm and well perfused, no edema  Skin: ecchymosis over forehead    Last Recorded Vitals  Blood pressure 105/76, pulse 106, temperature 36.1 °C (97 °F), resp. rate 26, height 1.7 m (5' 6.93\"), weight 87.1 " kg (192 lb 0.3 oz), SpO2 98 %.    Relevant Results  LABS:  CMP:  Results from last 7 days   Lab Units 03/26/24  0007 03/25/24 0332 03/24/24 2057 03/24/24  1354   SODIUM mmol/L 140 141 138 138   POTASSIUM mmol/L 3.8 4.3 4.1 4.0   CHLORIDE mmol/L 107 107 104 100   CO2 mmol/L 23 26 25 31   ANION GAP mmol/L 14 12 13 11   BUN mg/dL 17 13 13 13   CREATININE mg/dL 0.74 0.87 0.87 0.90   EGFR mL/min/1.73m*2 84 70 70 67   MAGNESIUM mg/dL  --   --   --  1.78   ALBUMIN g/dL 3.2* 3.4 3.6 4.4   ALT U/L  --   --  5* 10   AST U/L  --   --  16 17   BILIRUBIN TOTAL mg/dL  --   --  0.5 0.3     CBC:  Results from last 7 days   Lab Units 03/26/24  0007 03/25/24 0332 03/24/24 2057 03/24/24  1354   WBC AUTO x10*3/uL 11.2 14.5* 13.4* 9.8   HEMOGLOBIN g/dL 7.9* 9.4* 11.1* 13.5   HEMATOCRIT % 23.9* 30.7* 34.3* 44.2   PLATELETS AUTO x10*3/uL 147* 276 309 304   MCV fL 87 96 87 93     COAG:   Results from last 7 days   Lab Units 03/24/24 2057 03/24/24  1357   INR  1.1 1.1     ABO:   ABO TYPE   Date Value Ref Range Status   03/24/2024 O  Final     HEME/ENDO:  Results from last 7 days   Lab Units 03/25/24  0332   HEMOGLOBIN A1C % 5.3      CARDIAC:   Results from last 7 days   Lab Units 03/25/24 0332 03/24/24  1549 03/24/24  1412 03/24/24  1354   TROPHS ng/L  --  22* 15* 8   BNP pg/mL 34  --   --  89     Recent Labs     03/25/24 0332 11/16/23  1013 02/13/23  1030 09/08/22  1210 08/26/21  1019   CHOL 107 181 191 199 205*   LDLF  --   --  102* 117* 120*   LDLCALC 41 110*  --   --   --    HDL 33.2 36.7 37.7* 34.2* 36.3*   TRIG 166* 174* 256* 237* 243*      Scheduled medications  budesonide, 0.5 mg, nebulization, BID  furosemide, 20 mg, oral, Daily  insulin lispro, 0-5 Units, subcutaneous, q4h NIKO  levothyroxine, 50 mcg, oral, Daily  lidocaine, 1 patch, transdermal, Daily  melatonin, 5 mg, oral, Nightly  metoprolol tartrate, 25 mg, oral, TID  perflutren protein A microsphere, 0.5 mL, intravenous, Once in imaging  polyethylene glycol, 17 g,  oral, Daily  sennosides, 2 tablet, oral, BID  sulfur hexafluoride microsphr, 2 mL, intravenous, Once in imaging  tiotropium, 2 puff, inhalation, Daily      Continuous medications  sodium chloride 0.9%, 100 mL/hr, Last Rate: Stopped (03/25/24 1700)      PRN medications  PRN medications: acetaminophen, albuterol, dextrose, glucagon, hydrALAZINE **FOLLOWED BY** hydrALAZINE, labetaloL, oxygen         Assessment/Plan   Trinity Hernandez is a 74 y.o. female with PMH COPD, CAD s/p PCI to mid Lcx for angina (10/4/23), HTN, HLD, pulmonary sarcoidosis, rheumatoid arthritis, and hypothyroidism, Afib (not on AC), AAA, asthma, COPD who presented to OSH for L MCA stroke s/p TNK c/b L  subdural hematoma.  s/p crypreceipitate 3/24 pm. Cardiology consulted for recommendations regarding initiation of plavix.     Patient had PCI to mid Lcx in the setting of angina in 10/23 and has almost completed 6 months of DAPT.     Another question is what caused the patient's stroke. TTE performed yesterday had a suboptimal bubble study that appears to have been negative. Patient has a documented hx of Afib but is not on AC (per notes, it appears it was diagnosed in 2021 and was thought to be 2/2 sepsis). On review of all ECGs in EMR, pt was in Afib in 4/21. Tele notable for Afib, spontaneously converted to NSR on 3/22 @ 21.00. Etiology of stroke is likely cardioembolic.    -Would restart aspirin 81mg when cleared by neurosurgery  -Ok to hold plavix as patient has completed 6 months of DAPT  -CHADS-VASc 8 - patient is at 10.8% risk of stroke per year. Recommend starting eliquis 5mg BID when patient is cleared for anticoagulation by neurosurgery  -Recommend 14 day event monitor on discharge   -Outpatient cardiology follow-up 4 weeks after discharge    Thank you for the consult. Cardiology will continue to follow. Staffed with Dr. Jl Perez  PGY-V, Cardiology Fellow    General Cardiology Consult Pager: [phone] (weekday 7AM-6PM and  weekend 7AM-2PM) and other: [phone]  EP Consult Pager: [phone] (weekday 7AM-6PM and weekend 7AM-2PM) and other: [phone]  EP Device Nurse Pager: [phone] (weekday 7AM-4PM)  Advanced Heart Failure Consult Pager: [phone] anytime  CICU Fellow Pager: [phone] anytime  Endovascular/Limb Salvage Team Pager: [phone] for day coverage (8am-5pm)  Interventional Cardiology Fellow Pager: [phone] for night and weekend coverage  Structural Heart Team Pager: [phone] anytime  Night coverage: [facility] [id number]

## 2024-03-26 NOTE — SIGNIFICANT EVENT
UPDATED STROKE ASSESSMENT AND PLAN    Ms. Hernandez is a 74 y.o. female with PMH COPD, CAD s/p PCI, HTN, HLD, sarcoidosis, rheumatoid arthritis, and hypothyroidism who presented to OSH after fall and found to have right facial droop and RUE weakness. Reportedly initial NIHSS 2-3. /80, . CTH did not show any acute hemorrhage or early ischemic changes. CTA showed L MCA occlusion. She received TNK 19 mg at 1530, after which she was noted to have worsening NIHSS to 8 and increased work of breathing requiring intubation. Repeat CTH showed 7mm R subdural hematoma. She was [place] [facility] [place]oprecipitate and possible MT. S/p cryoprecipitate transfusion. Taken for MT, with TICI 2b.       Type: Ischemic stroke  Subtype/etiology: LVO  Vessels involved: L MCA  Neurological manifestations:  NIHSS (worst at presentation): 18   Diagnostic evaluation: CTH/CTA/CTP  Antiplatelet/antithrombotic plan for stroke prevention: ASA and atorvastatin   VTE prophylaxis: 24hr post TNK      Vascular Risk Factor modification goals:  Blood pressure goals: avoid hypotension SBP <100 that could worsen cerebral perfusion, Ischemic stroke post-thrombolysis- BP < 180/105 mmHg for 24hr  Lipid Goals: education on healthy diet and statin therapy to maintain or achieve goal LDL-cholesterol < 70mg  Glucose Goals: early treatment of hyperglycemia to goal glucose 140-180 mg/dl with long-term goal A1c < 7%   Smoking Cessation and Education  Assessment for Rehabilitation needs   Patient and family education on signs and symptoms of stroke, calling 911, healthy strategies for stroke prevention.       Updates 3/26:  -Downgrade to MICHAELA  -Continue holding aspirin and anticoagulation in setting of SDH, confirm when safe to restart with neurosurgery  -Follow-up cardiology recs, recommending ASA 81 mg and eliquis 5 mg BID when safe  -MRI brain demonstrated L parietal infarct wo associated hemorrhage  -TTE 3/25/24: LVEF 50-55%, unable to assess for  PFO, LA normal size  -Failed MBSS, pending NG and initiation of tube feeds. SLP recommends repeat MBSS in 2-4 weeks. Will consider continuing NG tube vs PEG     #L MCA occlusion s/p mTICI 2B post-thrombectomy  #complicated by post TNK SDH  ::Initial NIHSS 3, s/p TNK; [facility]   ::[place] [facility] [place] was 18, S/p MT, TICI 2b, NIHSS improved to 7   ::Etiology likely cardio embolic in the setting of known Afib   :: MRI brain demonstrated L parietal infarct wo associated hemorrhage  :: TTE 3/25/24: LVEF 50-55%, unable to assess for PFO, LA normal size  :: LDL 41, A1c 5.3  - MICHAELA status  - Neuro-checks q2hr   - BP goal <180 systolic   - Hold ASA, plavix, anticoagulation  - Will need to be AC for the long-term, need cardiology clearance for anti-platelets therapy (was on DAPT at home for CAD s/p stent)   - PT/OT/SLP   - Consult neurosurgery for SDH, continue to hold aspirin, Plavix, anticoagulation  - Event monitor at discharge    #Dysphagia  ::History of neck cancer from 15 years ago per patient and , s/p resection in part of ?mouth, ?tongue. Reportedly occurred in [place], but no documentation in chart  ::MBSS did not pass for diet  -Pending NG tube and initiation of tube feeds  -Dietician consulted     #CHF   #CAD s.p stent   #Afib  #HTN  ::Home meds: ASA 81mg, Plavix 75mg, Lasix 20 mg daily, and Metoprolol S. 75 mg daily   ::CHADS-VASc 8 (10/8% stroke risk/year)  :: TTE 3/25/24: LVEF 50-55%, unable to assess for PFO, LA normal size  -Continue home lasix 20 mg daily, switch to Metoprolol T. 25 mg TID   -Cardiology consulted, ok to defer DAPT, recommending eliquis 5 mg BID and ASA 81 mg once cleared by neurosurgery     #COPD  #Asthma   Home meds:  Spiriva 2 puffs daily, Ipratropium inhaler prn, and albuterol prn,  -Home inhalers     #hypothyroidism   - Continue home levothyroxine 50 mcg daily       #pulmonary sarcoidosis    #rheumatoid arthritis   - not on meds, to clarify with family      F:  ml/hr  E: As needed  N: NPO, tube feeds once NG in place  GI: Not indicated  Bowel: MiraLAX, doc senna  DVT: Compression device     Code status: full code   Next of kin: Robbin Hernandez (spouse) [phone]     Pedro Hilliard MD  PGY-2 Neurology  Stroke Pager: [phone]

## 2024-03-26 NOTE — PROGRESS NOTES
"Speech-Language Pathology  Adult Inpatient Clinical Bedside Swallow Evaluation    Patient Name: Trinity Hernandez  MRN: [id number]  Today's Date: 3/26/2024   Start Time: 1000  Stop Time: 1020  Time Calculation (min): 20 min    History of Present Illness:   Per EMR: \"Ms. Hernandez is a 74 y.o. female with PMH COPD, CAD s/p PCI, HTN, HLD, pulmonary sarcoidosis, rheumatoid arthritis, and hypothyroidism CHF, Afib (not on AC), AAA, asthma, COPD, , chronic respiratory failure. who presented to OSH after fall and found to have right facial droop and RUE weakness. Reportedly initial NIHSS 2-3. /80, . CTH did not show any acute hemorrhage or early ischemic changes. She received TNK 19 mg at 1530. CTA showed L MCA occlusion.      Few minutes after, she was noted to have worsening work of breathing, drooling for which she was intubated, NIHSS worsened to 8. Repeat CTH showed 7mm R subdural hematoma but no L hemorrhage or evolving infarct. She was [place] [facility] [place]oprecip[place] and possible MT.\"    Assessment:   Clinical bedside swallow evaluation completed. Pt received awake/alert positioned upright in chair. A&O x4. OME significant for reduced labial/lingual strength and ROM as well as Right-sided facial droop. Pt w/ mildly slurred (dysarthric) speech throughout assessment. Pt reported that she \"choked on breakfast this morning\" and coughed frequently w/ her meal. Pt also reported that she had surgery >10 years ago to remove a ? Bone/tumor from her neck. Pt w/ surgical incision scar located under chin/on surface of neck (horizontal). Spo0ke w/ MD - MD unaware. Pt stated that swallowing difficulty started during current hospital admission. Pt given x3 single sips and x1 consecutive sips of water via straw. Pt w/ coughing/choking immediately following swallow completion of x1/3 single sips and x1/1 consecutive straw sips of water. Additionally, pt w/ wet/gurgly vocal quality. PO trials ceased 2/2 c/f " pharyngeal dysphagia and aspiration. Recommend completion of an instrumental assessment (MBSS) to further assess swallow function and determine the safest PO diet. Recommend NPO. RN and MD notified.      Recommendations:  NPO  Frequent, aggressive oral care is strongly recommended to improve infection control as well as reduce dental plaque and bacteria on oropharyngeal surfaces which may increase the risk nosocomial infections, including pneumonia.  OK for small amounts of ice chips (3-5/hr) after aggressive oral care is completed.  Recommend completion of an MBSS to further assess swallow function and determine safest PO diet.     Goal:   Pt will tolerate least restrictive diet and recall/utilize safe swallow guidelines independently with no clinical s/s of aspiration 100% of time     Plan:  SLP Services Indicated: Yes  Frequency: 2x week  Discussed POC with patient  SLP - OK to Discharge    Pain:   0-10  0 = No pain.     Inpatient Education:  Extensive education provided to patient regarding current swallow function, recommendations/results, and POC.      Consultations/Referrals/Coordination of Services:   N/A    Lolis Polanco - Student SLP  Supervising SLP was present, actively participated, and made all clinical decisions during session. Lillian Herman M.A., CCC-SLP

## 2024-03-26 NOTE — PROGRESS NOTES
"Speech-Language Pathology  Adult Inpatient Modified Barium Swallow Study (MBSS)    Patient Name: Trinity Hernandez  MRN: [id number]  Today's Date: 3/26/2024   Start Time: 1200  Stop Time: 1230  Time Calculation (min): 30 min    Initial MBSS: No    Respiratory Status: Pt on room air.     History of Present Illness:   Per EMR: \"Ms. Hernandez is a 74 y.o. female with PMH COPD, CAD s/p PCI, HTN, HLD, pulmonary sarcoidosis, rheumatoid arthritis, and hypothyroidism CHF, Afib (not on AC), AAA, asthma, COPD, , chronic respiratory failure. who presented to OSH after fall and found to have right facial droop and RUE weakness. Reportedly initial NIHSS 2-3. /80, . CTH did not show any acute hemorrhage or early ischemic changes. She received TNK 19 mg at 1530. CTA showed L MCA occlusion.      Few minutes after, she was noted to have worsening work of breathing, drooling for which she was intubated, NIHSS worsened to 8. Repeat CTH showed 7mm R subdural hematoma but no L hemorrhage or evolving infarct. [place] [facility] [place] and possible MT.      Arrived to [facility] intubated, NIHSS on arrival here was limited by intubation.\"      Impression:   Modified Barium Swallow Study completed. Verbal consent obtained. Pt received awake/alert positioned upright in [place] chair. Of note, upon fluoroing, pt w/ evidence of completion of ACDF procedure. ? Related to sx pt discussed in evaluation earlier this date. Trials of thin (tsp), nectar thick (tsp and straw), honey thick liquids (tsp), and puree given. Pt presents w/ severe oropharyngeal dysphagia as characterized below:    Pt w/ poor oral coordination/control e/b premature pharyngeal entry to the level of the piriforms when consuming straw sip of nectar thick liquids. Premature pharyngeal entry of straw sip of nectar thick liquids resulted in silent aspiration in mod amounts. Trace-min residue remaining w/in pt's oral cavity following swallow completion " across all consistencies. Delay in swallow onset and poor laryngeal vestibular closure resulted in laryngeal penetration w/ honey thick liquids to the level of the TVF and silent aspiration in mod amounts w/ thin and nectar thick liquids. SLP cued cough was effective at clearing material from pt's laryngeal vestibule. Additionally, pt w/ reduced BOT retraction, hyolaryngeal elevation/excursion, and PPW squeeze. Min-mod residue remained in the pt's valleculae and piriforms across all consistencies given (residue increased as viscosity of liquids increased).   Chin-tuck strategy attempted w/ all given consistencies; however, was ineffective at eliminating the risk of laryngeal penetration/aspiration for thin, nectar thick and honey thick liquids. Chin tuck was effective w/ pureed consistencies w/ use of triple effortful swallow to aid in elimination of pharyngeal residues.   Esophageal sweep obtained - Noted retention of material w/in pt's esophagus - recommend GI consult.   Pt currently deemed not safe for a PO diet given poor safety/efficiency of the swallow function. Recommend NPO and tsp puree trials w/ SLP only (use of chin tuck and triple/effortful swallow). Recommend completion of a repeat MBSS in 2-4 weeks; however, ? If current presentation is acute onset vs. related to co-morbidities. RN and MD aware. SLP to follow for ongoing dysphagia tx.          [place]:  Thin: 8 - Material enters airway, passes below the folds, no effort to eject (no cough).  Nectar: 8 - Material enters airway, passes below the folds, no effort to eject (no cough).  Honey: 5 - Material enters above airway, contacts vocal folds and remains within laryngeal vestibule.  Puree: 1 - Material does not enter airway.     Recommendations:  NPO  Frequent, aggressive oral care is strongly recommended to improve infection control as well as reduce dental plaque and bacteria on oropharyngeal surfaces which may increase the risk nosocomial  infections, including pneumonia.  OK for small amounts of ice chips (3-5/hr) after aggressive oral care is completed.  Puree tsp trials w/ SLP only (chin tuck, triple effortful swallow).  If any changes to mentation/medical status or s/s of aspiration demonstrated during PO consumption, please make NPO and alert AND reconsult SLP to re-assess swallow function  GI consult    Goal:   Pt will tolerate least restrictive diet with no overt clinical s/s aspiration 100% of the time.        Plan:  SLP Services Indicated: Yes  Frequency: 2x week  Discussed POC with patient  SLP - OK to Discharge    Pain:   0-10  0 = No pain.     Inpatient Education:  Extensive education provided to patient regarding current swallow function, recommendations/results, and POC.      Consultations/Referrals/Coordination of Services:   GI    Lolis Polanco - Student SLP  Supervising SLP was present, actively participated, and made all clinical decisions during session. Lillian Herman M.A., CCC-SLP

## 2024-03-26 NOTE — PROGRESS NOTES
"Trinity Hernandez is a 74 y.o. female on day 2 of admission presenting with Ischemic stroke (CMS/HCC).    Subjective   No acute events overnight    Objective     Physical Exam  Ox3  L 5/5  RUE prox4- dist 5  RLE 4    Last Recorded Vitals  Blood pressure 109/67, pulse 103, temperature 36 °C (96.8 °F), temperature source Temporal, resp. rate 21, height 1.7 m (5' 6.93\"), weight 87.1 kg (192 lb 0.3 oz), SpO2 100 %.  Intake/Output last 3 Shifts:  I/O last 3 completed shifts:  In: 2717.5 (31.2 mL/kg) [P.O.:480; I.V.:1737.5 (19.9 mL/kg); IV Piggyback:500]  Out: 1115 (12.8 mL/kg) [Urine:1115 (0.4 mL/kg/hr)]  Weight: 87.1 kg     Relevant Results      Assessment/Plan   Principal Problem:    Ischemic stroke (CMS/HCC)      74 y.o. female with PMHx significant for CAD (s/p stent), NSVT, Paroxysmal Atrial Fibrillation, Chronic Diastolic heart Failure, HTN, AAA, HLD, COPD, Chronic Hypoxic Respiratory Failure 2/2 Pulmonary Sarcoidosis (on 2L at night), Hypothyroidism, Migraines, Hiatal Hernia, and Diverticulosis who presents with L MCA CVA s/p TNK and Thrombectomy on 3/25/2024    Recommendations  Stroke/[facility]U primary  No acute neurosurgical intervention. Patient with mild RUE proximal weakness with stable SDH on repeat imaging  Continue to hold ASA/Plavix, AC  ASA/PLX restart plan pending cardiology risk stratification for stent thrombosis if holding PLX for 1 month if ASA is continued     Hong Moreno MD      "

## 2024-03-26 NOTE — PROGRESS NOTES
Physical Therapy    Physical Therapy Evaluation    Patient Name: Trinity Hernandez  MRN: [id number]  Today's Date: 3/26/2024   Time Calculation  Start Time: 0907  Stop Time: 0931  Time Calculation (min): 24 min    Assessment/Plan   PT Assessment  PT Assessment Results: Decreased strength, Pain, Decreased range of motion, Decreased endurance, Impaired balance, Decreased mobility, Decreased cognition, Impaired judgement, Decreased safety awareness  Rehab Prognosis: Excellent  Barriers to Discharge: medical acuity  Evaluation/Treatment Tolerance: Patient tolerated treatment well  Medical Staff Made Aware: Yes  End of Session Communication: Bedside nurse  Assessment Comment: Pt. presents with R sided weakness and instability with ambulation. Pt. will continue to benefit from high intensity skilled PT services to increase functional independence prior to d/c.  End of Session Patient Position: Up in chair, Alarm on  IP OR SWING BED PT PLAN  Inpatient or Swing Bed: Inpatient  PT Plan  Treatment/Interventions: Bed mobility, Transfer training, Gait training, Stair training, Balance training, Neuromuscular re-education, Strengthening, Endurance training, Range of motion, Therapeutic exercise, Therapeutic activity, Home exercise program, Positioning, Postural re-education  PT Plan: Skilled PT  PT Frequency: 5 times per week  PT Discharge Recommendations: High intensity level of continued care  Equipment Recommended upon Discharge: Wheeled walker  PT Recommended Transfer Status: Assist x1, Assistive device  PT - OK to Discharge: Yes (when medically ready)      Subjective   General Visit Information:  General  Reason for Referral: Pt. presented to OSH s/p fall with HS(+) and R sided weakness, found to have L M1 occlusion s/p TNK 1345 on 3/24/24 c/b acute AMS and new CTH R SDH while at OSH. Received cryoprecipitate reversal and MT on 3/25/24 TICI 2b at [facility]. GCS: 15; NIHSS: 3  Past Medical History Relevant to Rehab: COPD, CAD s/p  "PCI 10/4/23, NSVT, HTN, HLD, rheumatoid arthritis, and hypothyroidism, CHF, Afib (not on AC), AAA, asthma, COPD, chronic respiratory failure 2/2 pulmonary sarcoidosis, Migraines, Hiatal Hernia, and Diverticulosis.  Missed Visit: No  Prior to Session Communication: Bedside nurse  Patient Position Received: Bed, 3 rail up, Alarm on  General Comment: Pt. pleasant and agreeable to PT.  Home Living:  Home Living  Type of Home: House  Lives With: Spouse  Home Adaptive Equipment: Walker rolling or standard  Home Layout: One level, Laundry in basement, Full bath main level, Able to live on main level with bedroom/bathroom  Home Access: Stairs to enter with rails  Entrance Stairs-Rails: Left  Entrance Stairs-Number of Steps: 2  Bathroom Shower/Tub: Tub/shower unit  Bathroom Equipment: Grab bars in shower  Prior Level of Function:  Prior Function Per Pt/Caregiver Report  Level of [place]: Independent with ADLs and functional transfers, Independent with homemaking with ambulation  Vocational: Retired  Prior Function Comments: drives (+), fall with HS(+) on 3/24  Precautions:  Precautions  Hearing/Visual Limitations: none appreciated  Medical Precautions: Fall precautions  Precautions Comment: SBP < 180  Vital Signs:  Vital Signs  Heart Rate: 100 (100s throughuot session; post: 106)  Heart Rate Source: Monitor  Resp: 24 (post: 28)  SpO2: 99 % (% throughout; post: 100)  BP: 106/66 (SBP 99 upon sitting EOB, increased to 117 within 1 minute with ankle pumps; post: 118/77)  MAP (mmHg): 76 (70s-80s throughout session; post: 86)  BP Location: Right arm  BP Method: Automatic  Patient Position: Lying    Objective   Pain:  Pain Assessment  Pain Assessment: 0-10 (pt. endorsed mild pain in her \"gut\")  Pain Score: 0 - No pain  Cognition:  Cognition  Overall Cognitive Status: Impaired  Arousal/Alertness: Appropriate responses to stimuli  Orientation Level: Oriented X4, Other (Comment) (said she was in the hospital because she had " "a \"brain aneurysm\")  Following Commands: Follows multistep commands without difficulty  Safety/Judgement: Exceptions to WFL  Insight: Mild (Pt. displays limited insight into gait instability, but displays appropriate insight into RUE deficits.)  Impulsive: Mildly  Processing Speed: Delayed    General Assessments:     Activity Tolerance  Endurance: Endurance does not limit participation in activity  Early Mobility/Exercise Safety Screen: Proceed with mobilization - No exclusion criteria met    Sensation  Light Touch: No apparent deficits    Coordination  Coordination Comment: displayed mild resting BUE tremors, RN aware.    Postural Control  Postural Control: Within Functional Limits    Static Sitting Balance  Static Sitting-Balance Support: No upper extremity supported, Feet supported  Static Sitting-Level of Assistance: Close supervision (x1)  Dynamic Sitting Balance  Dynamic Sitting-Balance Support: No upper extremity supported, Feet supported  Dynamic Sitting-Balance: Forward lean  Dynamic Sitting-Comments: CGA x1    Static Standing Balance  Static Standing-Balance Support: Bilateral upper extremity supported  Static Standing-Level of Assistance: Minimum assistance (x1)  Dynamic Standing Balance  Dynamic Standing-Balance Support: Bilateral upper extremity supported  Dynamic Standing-Balance:  (gait with turns)  Dynamic Standing-Comments: minAx1    Functional Assessments:  Bed Mobility  Bed Mobility: Yes  Bed Mobility 1  Bed Mobility 1: Supine to sitting  Level of Assistance 1: Minimum assistance (x1)  Bed Mobility Comments 1: Pt. required min verbal and tactile cueing for hand placement and sequencing of task, followed commands 100% of time.  Bed Mobility 2  Bed Mobility  2: Scooting (at EOB)  Level of Assistance 2: Contact guard  Bed Mobility Comments 2: Pt. required min verbal and tactile cueing for sequencing of task, followed command 100% of time.    Transfers  Transfer: Yes  Transfer 1  Transfer From 1: Bed " to  Transfer to 1: Stand  Technique 1: Sit to stand  Transfer Device 1: Walker, Gait belt  Transfer Level of Assistance 1: Minimum assistance (x1)  Trials/Comments 1: Pt. required 2 trials to stand, unable to static stand without BUE support. Pt. required min verbal and tactile cueing for hand placement and sequencing of task, followed commands 100% of time.  Transfers 2  Transfer From 2: Stand to  Transfer to 2: Sit, Chair with arms  Technique 2: Stand to sit  Transfer Device 2: Walker  Transfer Level of Assistance 2: Contact guard (x1)  Trials/Comments 2: Pt. required min verbal and tactile cueing for hand placement and sequencing of task, followed commands 100% of time.    Ambulation/Gait Training  Ambulation/Gait Training Performed: Yes  Ambulation/Gait Training 1  Surface 1: Level tile  Device 1: Rolling walker  Gait Support Devices: Gait belt  Assistance 1: Minimum assistance (x1)  Quality of Gait 1: Narrow base of support, Decreased step length, Shuffling gait, Soft knee(s), Knee(s) buckle, Diminished heel strike (R knee buckle and hyperextension in standing, able to self-correct)  Comments/Distance (ft) 1: Pt. able to sidestep 3 ft to chair, pt. required assist for R knee instability with mobility but was able to self-correct.    Stairs  Stairs: No    Extremity/Trunk Assessments:  RUE   RUE : Within Functional Limits  LUE   LUE: Within Functional Limits  RLE   RLE : Exceptions to WFL  AROM RLE (degrees)  RLE AROM Comment: WFL  Strength RLE  R Hip Flexion: 4-/5  R Knee Flexion: 4/5  R Knee Extension: 4-/5  R Ankle Dorsiflexion: 4+/5  R Ankle Plantar Flexion: 4+/5  LLE   LLE : Exceptions to WFL  AROM LLE (degrees)  LLE AROM Comment: WFL  Strength LLE  L Hip Flexion: 4/5  L Knee Flexion: 4+/5  L Knee Extension: 4+/5  L Ankle Dorsiflexion: 4+/5  L Ankle Plantar Flexion: 4+/5    Outcome Measures:  [facility] Basic Mobility  Turning from your back to your side while in a flat bed without using bedrails: YOSHI  little  Moving from lying on your back to sitting on the side of a flat bed without using bedrails: A little  Moving to and from bed to chair (including a wheelchair): A little  Standing up from a chair using your arms (e.g. wheelchair or bedside chair): A little  To walk in hospital room: A little  Climbing 3-5 steps with railing: Total  Basic Mobility - Total Score: 16    ICU Mobility Screen  Early Mobility/Exercise Safety Screen: Proceed with mobilization - No exclusion criteria met  E = Exercise and Early Mobility  Early Mobility/Exercise Safety Screen: Proceed with mobilization - No exclusion criteria met  Current Activity: Ambulating in room    Encounter Problems       Encounter Problems (Active)       PT Problem       Pt. will score 24/28 on Tinetti balance test.        Start:  03/26/24    Expected End:  04/09/24            Patient will complete supine to sit and sit to supine with Supervision.        Start:  03/26/24    Expected End:  04/09/24            Patient will complete sit to stand and stand to sit with LRAD and Supervision.       Start:  03/26/24    Expected End:  04/09/24            Patient will ambulate >200' with LRAD and Supervision.        Start:  03/26/24    Expected End:  04/09/24            Patient will ascend/descend 2 steps with Left Rail Ascending and SBA x1.        Start:  03/26/24    Expected End:  04/09/24                   Education Documentation  Precautions, taught by ROSEMARY Marquez at 3/26/2024 10:13 AM.  Learner: Patient  Readiness: Acceptance  Method: Explanation  Response: Verbalizes Understanding, Demonstrated Understanding, Needs Reinforcement    Body Mechanics, taught by ROSEMARY Marquez at 3/26/2024 10:13 AM.  Learner: Patient  Readiness: Acceptance  Method: Explanation  Response: Verbalizes Understanding, Demonstrated Understanding, Needs Reinforcement    Mobility Training, taught by ROSEMARY Marquez at 3/26/2024 10:13 AM.  Learner: Patient  Readiness:  Acceptance  Method: Explanation  Response: Verbalizes Understanding, Demonstrated Understanding, Needs Reinforcement    Education Comments  No comments found.

## 2024-03-26 NOTE — PROGRESS NOTES
Pharmacy Medication History Review    Trinity Hernandez is a 74 y.o. female admitted for Ischemic stroke (CMS/[facility]). Pharmacy reviewed the patient's [id number] medications for accuracy.    The list below reflects the updated PTA list. Comments regarding how patient may be taking medications differently can be found in the Admit Orders Activity  Prior to Admission Medications   Prescriptions Last Dose Informant   LORazepam (Ativan) 0.5 mg tablet  Self   Sig: Take 1 tablet (0.5 mg) by mouth every 6 hours if needed for anxiety.   Lactobacillus acidophilus (Probiotic) 10 billion cell capsule  Self   Sig: Take 1 capsule by mouth once daily.   Repatha Syringe 140 mg/mL injection Unknown Self   Sig: Inject 1 mL (140 mg) under the skin every 14 (fourteen) days.   albuterol 2.5 mg /3 mL (0.083 %) nebulizer solution  Self   Sig: Take 3 mL (2.5 mg) by nebulization every 4 hours if needed for shortness of breath.   albuterol 90 mcg/actuation inhaler  Self   Sig: Inhale 2 puffs every 6 hours if needed for shortness of breath.   arformoterol (Brovana) 15 mcg/2 mL nebulizer solution  Self   Sig: INHALE 2ML VIA NEBULIZER AS INSTRUCTED TWICE A DAY (EVERY 12 HOURS)   aspirin 81 mg EC tablet  Self   Sig: TAKE 1 TABLET BY MOUTH EVERY 24 HRS   budesonide (Pulmicort) 0.5 mg/2 mL nebulizer solution  Self   Sig: Take 2 mL (0.5 mg) by nebulization 2 times a day. Rinse mouth with water after use to reduce aftertaste and incidence of candidiasis. Do not swallow.   cholecalciferol (Vitamin D3) 50 mcg (2,000 unit) capsule  Self   Sig: Take 1 capsule (50 mcg) by mouth once daily.   clopidogrel (Plavix) 75 mg tablet  Self   Sig: Take 1 tablet (75 mg) by mouth once daily.   docusate sodium (Colace) 100 mg capsule  Self   Sig: Take 1 capsule (100 mg) by mouth once daily.   estradiol (Estrace) 0.01 % (0.1 mg/gram) vaginal cream  Self   Sig: Insert 0.5 Applicatorfuls (2 g) into the vagina once daily as needed.   ferrous sulfate 325 (65 Fe) MG  tablet  Self   Sig: Take 1 tablet by mouth once daily.   furosemide (Lasix) 20 mg tablet  Self   Sig: 3/26/24:  patient reports taking 1 tablet (20mg) once a day on Monday and Thursday and then 1/2 tablet (10mg) once daily all other days of the week (Sun, Tues, Wed, Fri, Sat)   levothyroxine (Synthroid, Levoxyl) 50 mcg tablet  Self   Sig: Take 1 tablet (50 mcg) by mouth once daily.   magnesium oxide (Mag-Ox) 400 mg (241.3 mg magnesium) tablet  Self   Sig: Take 1 tablet (400 mg) by mouth once daily.   metoprolol succinate XL (Toprol-XL) 25 mg 24 hr tablet  Self   Sig: Take 3 tablets (75 mg) by mouth once daily.   nitroglycerin (Nitrostat) 0.4 mg SL tablet  Self   Sig: Place 1 tablet (0.4 mg) under the tongue every 5 minutes if needed for chest pain.   polyethylene glycol (Miralax) 17 gram/dose powder  Self   Sig: Take 17 g by mouth once daily.   tiotropium (Spiriva Respimat) 2.5 mcg/actuation inhaler  Self   Sig: Inhale 2 puffs once daily.      Facility-Administered Medications: None        Patient declines M2B at discharge. Pharmacy has been updated to [facility] in [place].    Sources:    -spoke with  on 3/25 but he stated patient manages her own meds and he didn't know them at all  -Was able to interview patient on 3/26 about home medications  -outpatient pharmacy dispense history  -[facility]  -PCP office visit note/med list from 3/11  -[facility] cardiology office visit note/med list from 2/15    Below are additional concerns with the patient's PTA list:    -patient may have been a bit confused during med interview-  she knew some details very well about home meds (reported taking one 20mg tablet of lasix on Monday and Thursday and 1/2 tablet all other days), but said she was not on metoprolol and never had been (fill 3 times 2023 x 90 day suplies).  Between patient interview, outpatinet notes, and pharmacy fill history this is best possible medication history at this time.    -azithromycin 250mg tablet, 1  tablet every other day-  patient reported stopping this on her own about 1 month ago- last filled 1/26 x 90 day supply    -metoprolol succinate 25mg tablet, 3 tablets (75mg) once daily-  patient reported she is not taking, but last filled 2/19/24 x 30 days and cardiology office visit from [facility] Dr. Escobar on 2/15/24 states to take 75mg once daily    Dorinda KraftD  Transitions of Care Pharmacist  [facility]  Please reach out via Secure Chat for questions, or if no response call [phone] or [facility]

## 2024-03-27 ENCOUNTER — APPOINTMENT (OUTPATIENT)
Dept: RADIOLOGY | Facility: HOSPITAL | Age: 74
DRG: 023 | End: 2024-03-27
Payer: MEDICARE

## 2024-03-27 ENCOUNTER — APPOINTMENT (OUTPATIENT)
Dept: CARDIOLOGY | Facility: HOSPITAL | Age: 74
DRG: 023 | End: 2024-03-27
Payer: MEDICARE

## 2024-03-27 LAB
ALBUMIN SERPL BCP-MCNC: 3.1 G/DL (ref 3.4–5)
ANION GAP SERPL CALC-SCNC: 13 MMOL/L (ref 10–20)
BUN SERPL-MCNC: 13 MG/DL (ref 6–23)
CALCIUM SERPL-MCNC: 8.6 MG/DL (ref 8.6–10.6)
CHLORIDE SERPL-SCNC: 103 MMOL/L (ref 98–107)
CO2 SERPL-SCNC: 27 MMOL/L (ref 21–32)
CREAT SERPL-MCNC: 0.61 MG/DL (ref 0.5–1.05)
EGFRCR SERPLBLD CKD-EPI 2021: >90 ML/MIN/1.73M*2
ERYTHROCYTE [DISTWIDTH] IN BLOOD BY AUTOMATED COUNT: 13.2 % (ref 11.5–14.5)
GLUCOSE BLD MANUAL STRIP-MCNC: 108 MG/DL (ref 74–99)
GLUCOSE BLD MANUAL STRIP-MCNC: 125 MG/DL (ref 74–99)
GLUCOSE BLD MANUAL STRIP-MCNC: 127 MG/DL (ref 74–99)
GLUCOSE BLD MANUAL STRIP-MCNC: 129 MG/DL (ref 74–99)
GLUCOSE BLD MANUAL STRIP-MCNC: 145 MG/DL (ref 74–99)
GLUCOSE BLD MANUAL STRIP-MCNC: 98 MG/DL (ref 74–99)
GLUCOSE SERPL-MCNC: 103 MG/DL (ref 74–99)
HCT VFR BLD AUTO: 25 % (ref 36–46)
HGB BLD-MCNC: 7.9 G/DL (ref 12–16)
MAGNESIUM SERPL-MCNC: 1.69 MG/DL (ref 1.6–2.4)
MCH RBC QN AUTO: 29.4 PG (ref 26–34)
MCHC RBC AUTO-ENTMCNC: 31.6 G/DL (ref 32–36)
MCV RBC AUTO: 93 FL (ref 80–100)
NRBC BLD-RTO: 0 /100 WBCS (ref 0–0)
PHOSPHATE SERPL-MCNC: 3.3 MG/DL (ref 2.5–4.9)
PLATELET # BLD AUTO: 194 X10*3/UL (ref 150–450)
POTASSIUM SERPL-SCNC: 3.8 MMOL/L (ref 3.5–5.3)
RBC # BLD AUTO: 2.69 X10*6/UL (ref 4–5.2)
SODIUM SERPL-SCNC: 139 MMOL/L (ref 136–145)
WBC # BLD AUTO: 11.6 X10*3/UL (ref 4.4–11.3)

## 2024-03-27 PROCEDURE — 83735 ASSAY OF MAGNESIUM: CPT

## 2024-03-27 PROCEDURE — 80069 RENAL FUNCTION PANEL: CPT | Performed by: INTERNAL MEDICINE

## 2024-03-27 PROCEDURE — 2500000004 HC RX 250 GENERAL PHARMACY W/ HCPCS (ALT 636 FOR OP/ED)

## 2024-03-27 PROCEDURE — 99233 SBSQ HOSP IP/OBS HIGH 50: CPT | Performed by: NURSE PRACTITIONER

## 2024-03-27 PROCEDURE — 2500000002 HC RX 250 W HCPCS SELF ADMINISTERED DRUGS (ALT 637 FOR MEDICARE OP, ALT 636 FOR OP/ED): Performed by: STUDENT IN AN ORGANIZED HEALTH CARE EDUCATION/TRAINING PROGRAM

## 2024-03-27 PROCEDURE — 93005 ELECTROCARDIOGRAM TRACING: CPT

## 2024-03-27 PROCEDURE — 1200000002 HC GENERAL ROOM WITH TELEMETRY DAILY

## 2024-03-27 PROCEDURE — 93010 ELECTROCARDIOGRAM REPORT: CPT | Performed by: INTERNAL MEDICINE

## 2024-03-27 PROCEDURE — 73502 X-RAY EXAM HIP UNI 2-3 VIEWS: CPT | Mod: RT

## 2024-03-27 PROCEDURE — 94640 AIRWAY INHALATION TREATMENT: CPT

## 2024-03-27 PROCEDURE — 82947 ASSAY GLUCOSE BLOOD QUANT: CPT

## 2024-03-27 PROCEDURE — 85027 COMPLETE CBC AUTOMATED: CPT | Performed by: INTERNAL MEDICINE

## 2024-03-27 PROCEDURE — 92526 ORAL FUNCTION THERAPY: CPT | Mod: GN

## 2024-03-27 PROCEDURE — 73502 X-RAY EXAM HIP UNI 2-3 VIEWS: CPT | Mod: RIGHT SIDE | Performed by: INTERNAL MEDICINE

## 2024-03-27 PROCEDURE — 2500000005 HC RX 250 GENERAL PHARMACY W/O HCPCS: Performed by: REGISTERED NURSE

## 2024-03-27 PROCEDURE — 99233 SBSQ HOSP IP/OBS HIGH 50: CPT

## 2024-03-27 PROCEDURE — 2500000001 HC RX 250 WO HCPCS SELF ADMINISTERED DRUGS (ALT 637 FOR MEDICARE OP)

## 2024-03-27 PROCEDURE — 92611 MOTION FLUOROSCOPY/SWALLOW: CPT | Mod: GN

## 2024-03-27 PROCEDURE — 2500000002 HC RX 250 W HCPCS SELF ADMINISTERED DRUGS (ALT 637 FOR MEDICARE OP, ALT 636 FOR OP/ED)

## 2024-03-27 RX ORDER — LEVOTHYROXINE SODIUM 50 UG/1
50 TABLET ORAL DAILY
Status: DISCONTINUED | OUTPATIENT
Start: 2024-03-27 | End: 2024-03-28 | Stop reason: HOSPADM

## 2024-03-27 RX ORDER — SENNOSIDES 8.6 MG/1
2 TABLET ORAL 2 TIMES DAILY
Status: DISCONTINUED | OUTPATIENT
Start: 2024-03-27 | End: 2024-03-28 | Stop reason: HOSPADM

## 2024-03-27 RX ORDER — ACETAMINOPHEN 500 MG
5 TABLET ORAL NIGHTLY
Status: DISCONTINUED | OUTPATIENT
Start: 2024-03-27 | End: 2024-03-28 | Stop reason: HOSPADM

## 2024-03-27 RX ORDER — HEPARIN SODIUM 5000 [USP'U]/ML
5000 INJECTION, SOLUTION INTRAVENOUS; SUBCUTANEOUS EVERY 8 HOURS
Status: DISCONTINUED | OUTPATIENT
Start: 2024-03-27 | End: 2024-03-28 | Stop reason: HOSPADM

## 2024-03-27 RX ORDER — ACETAMINOPHEN 325 MG/1
650 TABLET ORAL EVERY 6 HOURS PRN
Status: DISCONTINUED | OUTPATIENT
Start: 2024-03-27 | End: 2024-03-27

## 2024-03-27 RX ORDER — POLYETHYLENE GLYCOL 3350 17 G/17G
17 POWDER, FOR SOLUTION ORAL DAILY
Status: DISCONTINUED | OUTPATIENT
Start: 2024-03-27 | End: 2024-03-28 | Stop reason: HOSPADM

## 2024-03-27 RX ORDER — METOPROLOL TARTRATE 25 MG/1
25 TABLET, FILM COATED ORAL 3 TIMES DAILY
Status: DISCONTINUED | OUTPATIENT
Start: 2024-03-27 | End: 2024-03-28 | Stop reason: HOSPADM

## 2024-03-27 RX ORDER — FUROSEMIDE 20 MG/1
20 TABLET ORAL DAILY
Status: DISCONTINUED | OUTPATIENT
Start: 2024-03-27 | End: 2024-03-28 | Stop reason: HOSPADM

## 2024-03-27 RX ORDER — HYDRALAZINE HYDROCHLORIDE 25 MG/1
25 TABLET, FILM COATED ORAL EVERY 6 HOURS PRN
Status: DISCONTINUED | OUTPATIENT
Start: 2024-03-27 | End: 2024-03-28 | Stop reason: HOSPADM

## 2024-03-27 RX ORDER — NAPROXEN SODIUM 220 MG/1
81 TABLET, FILM COATED ORAL DAILY
Status: DISCONTINUED | OUTPATIENT
Start: 2024-03-27 | End: 2024-03-28 | Stop reason: HOSPADM

## 2024-03-27 RX ORDER — ACETAMINOPHEN 325 MG/1
650 TABLET ORAL EVERY 6 HOURS PRN
Status: DISCONTINUED | OUTPATIENT
Start: 2024-03-27 | End: 2024-03-28 | Stop reason: HOSPADM

## 2024-03-27 RX ADMIN — HEPARIN SODIUM 5000 UNITS: 5000 INJECTION INTRAVENOUS; SUBCUTANEOUS at 17:54

## 2024-03-27 RX ADMIN — ACETAMINOPHEN 650 MG: 325 TABLET ORAL at 10:53

## 2024-03-27 RX ADMIN — ALBUTEROL SULFATE 2.5 MG: 2.5 SOLUTION RESPIRATORY (INHALATION) at 04:00

## 2024-03-27 RX ADMIN — FUROSEMIDE 20 MG: 20 TABLET ORAL at 08:38

## 2024-03-27 RX ADMIN — ACETAMINOPHEN 650 MG: 325 TABLET ORAL at 17:54

## 2024-03-27 RX ADMIN — METOPROLOL TARTRATE 25 MG: 25 TABLET, FILM COATED ORAL at 20:10

## 2024-03-27 RX ADMIN — METOPROLOL TARTRATE 25 MG: 25 TABLET, FILM COATED ORAL at 08:36

## 2024-03-27 RX ADMIN — ACETAMINOPHEN 650 MG: 325 TABLET ORAL at 04:33

## 2024-03-27 RX ADMIN — METOPROLOL TARTRATE 25 MG: 25 TABLET, FILM COATED ORAL at 16:01

## 2024-03-27 RX ADMIN — LIDOCAINE 1 PATCH: 4 PATCH TOPICAL at 08:36

## 2024-03-27 RX ADMIN — Medication 5 MG: at 20:10

## 2024-03-27 RX ADMIN — ACETAMINOPHEN 650 MG: 325 TABLET ORAL at 23:58

## 2024-03-27 RX ADMIN — HEPARIN SODIUM 5000 UNITS: 5000 INJECTION INTRAVENOUS; SUBCUTANEOUS at 10:53

## 2024-03-27 RX ADMIN — ASPIRIN 81 MG 81 MG: 81 TABLET ORAL at 10:53

## 2024-03-27 RX ADMIN — BUDESONIDE 0.5 MG: 0.5 INHALANT RESPIRATORY (INHALATION) at 09:00

## 2024-03-27 RX ADMIN — LEVOTHYROXINE SODIUM 50 MCG: 50 TABLET ORAL at 08:36

## 2024-03-27 RX ADMIN — TIOTROPIUM BROMIDE INHALATION SPRAY 2 PUFF: 3.12 SPRAY, METERED RESPIRATORY (INHALATION) at 09:34

## 2024-03-27 SDOH — ECONOMIC STABILITY: FOOD INSECURITY: WITHIN THE PAST 12 MONTHS, YOU WORRIED THAT YOUR FOOD WOULD RUN OUT BEFORE YOU GOT MONEY TO BUY MORE.: NEVER TRUE

## 2024-03-27 SDOH — SOCIAL STABILITY: SOCIAL INSECURITY: WITHIN THE LAST YEAR, HAVE YOU BEEN AFRAID OF YOUR PARTNER OR EX-PARTNER?: NO

## 2024-03-27 SDOH — ECONOMIC STABILITY: HOUSING INSECURITY: IN THE LAST 12 MONTHS, HOW MANY PLACES HAVE YOU LIVED?: 1

## 2024-03-27 SDOH — ECONOMIC STABILITY: FOOD INSECURITY: WITHIN THE PAST 12 MONTHS, THE FOOD YOU BOUGHT JUST DIDN'T LAST AND YOU DIDN'T HAVE MONEY TO GET MORE.: NEVER TRUE

## 2024-03-27 SDOH — ECONOMIC STABILITY: INCOME INSECURITY: IN THE LAST 12 MONTHS, WAS THERE A TIME WHEN YOU WERE NOT ABLE TO PAY THE MORTGAGE OR RENT ON TIME?: NO

## 2024-03-27 SDOH — ECONOMIC STABILITY: INCOME INSECURITY: IN THE PAST 12 MONTHS, HAS THE ELECTRIC, GAS, OIL, OR WATER COMPANY THREATENED TO SHUT OFF SERVICE IN YOUR HOME?: NO

## 2024-03-27 SDOH — SOCIAL STABILITY: SOCIAL INSECURITY: WITHIN THE LAST YEAR, HAVE YOU BEEN HUMILIATED OR EMOTIONALLY ABUSED IN OTHER WAYS BY YOUR PARTNER OR EX-PARTNER?: NO

## 2024-03-27 ASSESSMENT — COGNITIVE AND FUNCTIONAL STATUS - GENERAL
MOVING FROM LYING ON BACK TO SITTING ON SIDE OF FLAT BED WITH BEDRAILS: A LITTLE
TURNING FROM BACK TO SIDE WHILE IN FLAT BAD: A LOT
CLIMB 3 TO 5 STEPS WITH RAILING: TOTAL
MOBILITY SCORE: 12
WALKING IN HOSPITAL ROOM: A LOT
MOVING TO AND FROM BED TO CHAIR: A LOT
STANDING UP FROM CHAIR USING ARMS: A LOT

## 2024-03-27 ASSESSMENT — PAIN DESCRIPTION - LOCATION
LOCATION: HIP
LOCATION: HIP
LOCATION: GROIN

## 2024-03-27 ASSESSMENT — PAIN SCALES - GENERAL
PAINLEVEL_OUTOF10: 1
PAINLEVEL_OUTOF10: 2
PAINLEVEL_OUTOF10: 3

## 2024-03-27 ASSESSMENT — PAIN DESCRIPTION - ORIENTATION: ORIENTATION: RIGHT

## 2024-03-27 ASSESSMENT — PAIN - FUNCTIONAL ASSESSMENT
PAIN_FUNCTIONAL_ASSESSMENT: 0-10

## 2024-03-27 NOTE — PROGRESS NOTES
Patient is discussed during interdisciplinary round today.     Plan per medical team : Pt is doing well, and anticipated to be ready for AR on Thursday.   Planned disposition : AR  Discharge destination : AR  Payor : Medicare    VAN met with pt, pt's  and daughter at bedside to complete initial assessment for offering support and obtaining information for pt's discharge plan.  Pt is alert and fully oriented.   Pt reported she lives with her  in a 1-story house independently. Pt's , Carloz is a main caregiver for pt, and pt's daughter, Miriam who lives nearby, also able to assist pt as needed.   Pt is recommended to high intensity, pt and family agree to discharge recommendation, and made a choice to [facility]. SW made pt and family aware that SW is available for issues or questions on social work, pt and family denied any further issues or questions on social work at the moment of assessment. SW  will continue to follow and assist with discharge plan.       ADRIEN Helton, LSW         03/27/24 9308   Discharge Planning   Living Arrangements Spouse/significant other   Support Systems Children   Assistance Needed independent prior to this admission   Type of Residence Private residence   Number of Stairs to Enter Residence 3   Number of Stairs Within Residence 0   Do you have animals or pets at home? No

## 2024-03-27 NOTE — SIGNIFICANT EVENT
Rapid Response RN responding for RADAR score of 6 due to the following VS: T 36.7 °Celsius; HR 94 ; RR 26; /57; SPO2 99% .      Reviewed abnormal VS with bedside RN who indicated that the patient had recently been transferred to the division from the [facility] and that he would keep a close eye on her respiratory status.  No interventions by Rapid Response team indicated at this time.

## 2024-03-27 NOTE — CONSULTS
"Nutrition Initial Assessment:   Nutrition Assessment    Reason for Assessment: Tube feeding recommendations    Patient is a 74 y.o. female on day 3 of admission presenting with Ischemic stroke   Presented to OSH after fall and found to have right facial droop and RUE weakness. She received TNK 19 mg at 1530. CTA showed L MCA occlusion.  Repeat CTH showed 7mm R subdural hematoma but no L hemorrhage or evolving infarct. She was transferred [place] [facility] [place] cryoprecipitate and possible MT. Intubated on admission.   S/p MT, TICI 2b   Swallow eval  3/26 recommended NPO and small bore feeding tube placed 3/26.     PMHx   COPD, CAD s/p PCI, HTN, HLD, pulmonary sarcoidosis, rheumatoid arthritis, and hypothyroidism CHF, Afib (not on AC), AAA, asthma, COPD, , chronic respiratory failure     Nutrition History:  Energy Intake: Good > 75 %  Food and Nutrient History: Pt reports that she was eating well PTA. Has feeding tube in place.  Food Allergies/Intolerances:  None  GI Symptoms: None  Oral Problems: None       Anthropometrics:  Height: 170 cm (5' 6.93\")   Weight: 87.2 kg (192 lb 3.9 oz)   BMI (Calculated): 30.17  IBW/kg (Dietitian Calculated): 61 kg  Percent of IBW: 143 %       Weight History:   Wt Readings from Last 10 Encounters:   03/27/24 87.2 kg (192 lb 3.9 oz) - this weight appears incorrect.    03/24/24 74.4 kg (164 lb)   03/11/24 76.2 kg (168 lb)   02/12/24 76.2 kg (168 lb)   02/02/24 74.5 kg (164 lb 3.9 oz)   01/10/24 75.3 kg (166 lb)   01/08/24 74.4 kg (164 lb)   01/04/24 74.7 kg (164 lb 9.6 oz)   12/19/23 75.5 kg (166 lb 7.2 oz)   12/08/23 73.9 kg (162 lb 14.7 oz)      Pt states that usual weight is about 165#(75kg)    Nutrition Focused Physical Exam Findings:    Subcutaneous Fat Loss:   Orbital Fat Pads: Well nourished (slightly bulging fat pads)  Buccal Fat Pads: Well nourished (full, rounded cheeks)  Triceps: Mild-moderate (less than ample fat tissue)  Muscle Wasting:  Temporalis: Well nourished (well-defined " muscle)  Pectoralis (Clavicular Region): Well nourished (clavicle not visible)  Deltoid/Trapezius: Mild-Moderate (slight protrusion of acromion process)  Interosseous: Mild-Moderate (slightly depressed area between thumb and forefinger)  Quadriceps: Well nourished (well developed, well rounded)  Gastrocnemius: Well nourished (well developed bulbous muscle)    Nutrition Significant Labs:  CBC Trend:   Results from last 7 days   Lab Units 03/27/24  0421 03/26/24  1330 03/26/24  0007 03/25/24  0332   WBC AUTO x10*3/uL 11.6* 14.2* 11.2 14.5*   RBC AUTO x10*6/uL 2.69* 2.75* 2.74* 3.21*   HEMOGLOBIN g/dL 7.9* 7.7* 7.9* 9.4*   HEMATOCRIT % 25.0* 25.8* 23.9* 30.7*   MCV fL 93 94 87 96   PLATELETS AUTO x10*3/uL 194 198 147* 276    , BMP Trend:   Results from last 7 days   Lab Units 03/27/24  0421 03/26/24  0007 03/25/24  0332 03/24/24  2057   GLUCOSE mg/dL 103* 107* 136* 144*   CALCIUM mg/dL 8.6 8.5* 8.6 9.1   SODIUM mmol/L 139 140 141 138   POTASSIUM mmol/L 3.8 3.8 4.3 4.1   CO2 mmol/L 27 23 26 25   CHLORIDE mmol/L 103 107 107 104   BUN mg/dL 13 17 13 13   CREATININE mg/dL 0.61 0.74 0.87 0.87    , BG POCT trend:   Results from last 7 days   Lab Units 03/27/24  1212 03/27/24  0757 03/27/24  0619 03/26/24  2356 03/26/24  1947   POCT GLUCOSE mg/dL 127* 98 108* 115* 108*        Nutrition Specific Medications:  Synthroid, senokot    I/O:   Last BM Date: 03/27/24; Stool Appearance: Loose (03/27/24 0330)    Dietary Orders (From admission, onward)       Start     Ordered    03/27/24 0722  Enteral feeding with NPO Isosource 1.5; NG (nasogastric tube); 15  Diet effective now        Comments: Further uptitration per dietician   Question Answer Comment   Tube feeding formula: Isosource 1.5    Feeding route: NG (nasogastric tube)    Tube feeding continuous rate (mL/hr): 15        03/27/24 0721                     Estimated Needs:   Total Energy Estimated Needs (kCal): 1850 kCal  Method for Estimating Needs: REE = 1405 x1.3  Total  Protein Estimated Needs (g): 90 g  Method for Estimating Needs: 1,5gm/kg IBW  Total Fluid Estimated Needs (mL):  (per team)    Nutrition Diagnosis   Malnutrition Diagnosis  Patient has Malnutrition Diagnosis: No    Nutrition Diagnosis  Patient has Nutrition Diagnosis: Yes  Diagnosis Status (1): New  Nutrition Diagnosis 1: Swallowing difficulity  Related to (1): dysphagia  As Evidenced by (1): need for feeding tube for nutrition support.     Pt low risk for refeeding - minimal time without nutrition.     Nutrition Interventions/Recommendations         Nutrition Prescription:  Individualized Nutrition Prescription Provided for : Isosource 1.5 at goal rate of 50ml/hr x22 hours (hold for 1 hour before and 1 hour after Synthroid) or 200ml q4h to provide 1800 calories, 82 gm pro and 917ml free water.  Continuous feedings - Start at 15ml/hr and, if tolerated, increase by 10ml q8h to reach goal rate.   Bolus feedings -  recommend starting at 100ml q4h for 2 feedings. If tolerated increase to 150ml x2 feedings then to goal of 200ml.   Free water flushes per team. Tube feeding provides 917ml. Flushes for bolus feedings will require an additional 720ml - 60ml before and 60ml after each feeding.        Nutrition Interventions:   Interventions: Enteral intake  Goal: tolerate tube feeding at goal rate within 48hours.      Nutrition Education:   None at this time.        Nutrition Monitoring and Evaluation   Food/Nutrient Related History Monitoring  Monitoring and Evaluation Plan: Energy intake, Enteral and parenteral nutrition intake  Criteria: tube feeding adequate to meet needs    Body Composition/Growth/Weight History  Monitoring and Evaluation Plan: Weight  Criteria: prevent weight loss    Biochemical Data, Medical Tests and Procedures  Monitoring and Evaluation Plan: Electrolyte/renal panel, Glucose/endocrine profile  Criteria: Labs WNL      Time Spent/Follow-up Reminder:   Time Spent (min): 60 minutes  Last Date of  Nutrition Visit: 03/27/24  Nutrition Follow-Up Needed?: Dietitian to reassess per policy

## 2024-03-27 NOTE — PROGRESS NOTES
Speech-Language Pathology  Adult Inpatient Swallow Treatment    Patient Name: Trinity Hernandez  MRN: [id number]  Today's Date: 3/27/2024   Start Time: 1435  Stop Time: 1455  Time Calculation (min): 20 min    Impression:   Dysphagia tx completed. Pt received asleep reclined in bed, however, easily woke to verbal and tactile cues. Pt positioned upright in bed by SLP. Pt given trials of puree (applesauce) and was instructed by SLP to utilize the chin tuck method as well as triple effortful swallows. Pt consumed ~ 2 oz of puree while adequately using techniques w/ no s/s or clinical c/f aspiration. Pt then instructed to complete Ami maneuver as well as CTAR (chin tuck against resistance). Pt completed x6 reps of the Ami maneuver and x5 reps of CTAR. SLP educated pt on the use of these techniques and their effect on the muscles used for swallowing. SLP to follow up tomorrow for dysphagia tx as schedule permits. RN and MD notified.      Recommendations:  NPO  Frequent, aggressive oral care is strongly recommended to improve infection control as well as reduce dental plaque and bacteria on oropharyngeal surfaces which may increase the risk nosocomial infections, including pneumonia.  OK for small amounts of ice chips (3-5/hr) after aggressive oral care is completed.  Puree tsp trials w/ SLP only (chin tuck, triple effortful swallow).    Goal:   Pt will tolerate least restrictive diet and recall/utilize safe swallow guidelines independently with no clinical s/s of aspiration 100% of time       Plan:  SLP Services Indicated: Yes  Frequency: 2x week  Discussed POC with patient  SLP - OK to Discharge    Pain:   0-10  0 = No pain.     Inpatient Education:  Extensive education provided to patient regarding current swallow function, recommendations/results, and POC.      Consultations/Referrals/Coordination of Services:   N/A    Lolis Polanco - Student SLP  Supervising SLP was present, actively participated, and made all  clinical decisions during session. Lillian Herman M.A., CCC-SLP

## 2024-03-27 NOTE — CARE PLAN
The patient's goals for the shift include  Sleeping    The clinical goals for the shift include Patient will have no decline in neurologic exam overnight.    Over the shift, the patient was safe and had a stable neurologic exam. Patient with some mild pain- Tylenol given.  Problem: General Stroke  Goal: Demonstrate improvement in neurological exam throughout the shift  Outcome: Met  Goal: Maintain BP within ordered limits throughout shift  Outcome: Met  Goal: Participate in treatment (ie., meds, therapy) throughout shift  Outcome: Met  Goal: No symptoms of aspiration throughout shift  Outcome: Met  Goal: No symptoms of hemorrhage throughout shift  Outcome: Met

## 2024-03-27 NOTE — CARE PLAN
Problem: Nutrition  Goal: Tube feed tolerance  Outcome: Progressing  Goal: BG  mg/dL  Outcome: Progressing  Goal: Lab values WNL  Outcome: Progressing  Goal: Electrolytes WNL  Outcome: Progressing  Goal: Promote healing  Outcome: Progressing     Problem: General Stroke  Goal: Establish a mutual long term goal with patient by discharge  Outcome: Progressing  Goal: Tolerate enteral feeding throughout shift  Outcome: Progressing  Goal: Controlled blood glucose throughout shift  Outcome: Progressing       The patient's goals for the shift include  Pain control.    The clinical goals for the shift include Patient will have stable to improved neuro exam throughout shift.    Over the shift, the patient did not make progress toward the following goals. Barriers to progression include physical limitations and pain. Recommendations to address these barriers include PRN medications and interventions, as well as continued PT/OT.    Problem: General Stroke  Goal: Out of bed three times today  Outcome: Not Progressing

## 2024-03-27 NOTE — PROGRESS NOTES
Trinity Hernandez is a 74 y.o. female on day 3 of admission presenting with Ischemic stroke (CMS/HCC).      Subjective   Patietn refers right hip pain, could sleep through the night. Ok with NG tube staying in place for next several week.       Objective     Last Recorded Vitals  /61 (BP Location: Right arm, Patient Position: Lying)   Pulse 95   Temp 35.7 °C (96.3 °F) (Temporal)   Resp 18   Wt 87.2 kg (192 lb 3.9 oz)   SpO2 98%   Intake/Output last 3 Shifts:    Intake/Output Summary (Last 24 hours) at 3/27/2024 0808  Last data filed at 3/27/2024 0100  Gross per 24 hour   Intake 220 ml   Output 1050 ml   Net -830 ml       Admission Weight  Weight: 83.1 kg (183 lb 3.2 oz) (03/24/24 2302)    Daily Weight  03/27/24 : 87.2 kg (192 lb 3.9 oz)    Image Results  CT head wo IV contrast  Narrative: Interpreted By:  Kalee Fink,   STUDY:  CT HEAD WO IV CONTRAST;  3/26/2024 5:38 pm      INDICATION:  Signs/Symptoms:L MCA stroke s/p TNK and MT.      COMPARISON:  March 24, 2024      ACCESSION NUMBER(S):  [id number]      ORDERING CLINICIAN:  REANNA VELOZ      TECHNIQUE:  Noncontrast axial CT scan of head was performed. Angled reformats in  brain and bone windows were generated. The images were reviewed in  bone, brain, blood and soft tissue windows. Coronal and sagittal  reformats were provided for review.      FINDINGS:  Previously demonstrated focus of diminished attenuation with loss of  gray/white matter differentiation involving portions of the left MCA  distribution including the frontal lobe, insula, subinsular region,  and extending into the corona radiata is now more distinct and better  defined. There is subtle local mass effect with sulcal effacement and  partial effacement of the left lateral ventricle anteriorly. There is  no measurable midline shift.      There is again hyperdense subdural hematoma along the posterior right  aspect of the falx and extending along the posterior aspect of the  right  parietal and occipital lobes and the right tentorium. The  component along the right tentorium appears slightly more pronounced  compared with prior currently measuring approximately 3 mm in  thickness compared with approximately 2.5 mm which may be due to  redistribution of blood products as the component overlying the right  parietal lobe appears slightly less pronounced. There is a trace  amount of subdural hemorrhage along the left tentorium.      An enteric tube is partially visualized. There is trace mucosal  thickening within scattered ethmoid air cells. The mastoid air cells  are clear.      Impression: 1. Evolving early subacute left MCA distribution infarct with mild  associated mass effect.  2. Redemonstration of multifocal subdural hematoma.          MACRO:  None      Signed by: Kalee Fink 3/27/2024 6:57 AM  Dictation workstation:   [id number]      Physical Exam  Neurological Exam  GENERAL: Resting comfortably, no acute distress  HENT: No scleral icterus or conjunctival erythema. No carotid bruit.  CARDIO: Normal S1 and S2 without murmurs.   PULM: Vesicular breath sounds without wheezes or crackles.  ABD: Tenderness to palpation lower abdomen.     MENTAL STATE: Alert and oriented to name, place, date, situation.  Naming and repetition intact.     CRANIAL NERVES:   CN 2 Visual fields full to confrontation.   CN 3, 4, 6 Pupils round, 3 mm in diameter, equally reactive to light. Lids symmetric; no ptosis. EOMs normal alignment, full range with normal saccades, pursuit and convergence. No nystagmus.   CN 5 Facial sensation intact bilaterally to light touch  CN 7 right facial droop  CN 8 Hearing intact to conversation.  CN 9 Palate elevates symmetrically.   CN 11 Normal strength of shoulder shrug.  CN 12 Tongue midline, with normal bulk and strength; no fasciculations.  No dysarthria.     MOTOR:   Tremulous movements in lower extremities  Right upper extremity.  No drift  Left upper extremity: No  drift  Bilateral lower extremities: No drift with.  Limited exam in right lower extremity due to hip pain     REFLEXES:                           R          L  BR:                   2+        2+     Biceps:             2+        2+       Knee:                2+        2+  Ankle:               2+        2+     No ankle clonus.     SENSORY: Grimace x 4.     COORDINATION: Coordination intact in upper and lower extremities without evidence of ataxia or dysmetria.     GAIT: Deferred     Relevant Results  NIHSS     1a  Level of consciousness: 0=alert; keenly responsive   1b. LOC questions:  0=Performs both tasks correctly   1c. LOC commands: 0=Performs both tasks correctly   2.  Best Gaze: 0=normal   3. Visual: 0=No visual loss   4. Facial Palsy: 1=Minor paralysis (flattened nasolabial fold, asymmetric on smiling)   5a. Motor Left Arm: 0=No drift, limb holds 90 (or 45) degrees for full 10 seconds   5b.  Motor Right Arm: 0=No drift, limb holds 90 (or 45) degrees for full 10 seconds   6a. Motor Left Le=No drift, limb holds 90 (or 45) degrees for full 10 seconds   6b  Motor Right Le=Drift, limb holds 90 (or 45) degrees but drifts down before full 10 seconds: does not hit bed   7. Limb Ataxia: 0=Absent   8.  Sensory: 0=Normal; no sensory loss   9. Best Language:  0=No aphasia, normal   10. Dysarthria: 0=Normal   11. Extinction and Inattention: 0=No abnormality             Total:   2              Aditya Coma Scale  Best Eye Response: Spontaneous  Best Verbal Response: Oriented  Best Motor Response: Follows commands  Aditya Coma Scale Score: 15      Relevant Results    Scheduled medications  budesonide, 0.5 mg, nebulization, BID  furosemide, 20 mg, nasogastric tube, Daily  insulin lispro, 0-5 Units, subcutaneous, q4h NIKO  levothyroxine, 50 mcg, nasogastric tube, Daily  lidocaine, 1 patch, transdermal, Daily  melatonin, 5 mg, nasogastric tube, Nightly  metoprolol tartrate, 25 mg, nasogastric tube, TID  perflutren  protein A microsphere, 0.5 mL, intravenous, Once in imaging  polyethylene glycol, 17 g, nasogastric tube, Daily  sennosides, 2 tablet, nasogastric tube, BID  sulfur hexafluoride microsphr, 2 mL, intravenous, Once in imaging  tiotropium, 2 puff, inhalation, Daily      Continuous medications  sodium chloride 0.9%, 100 mL/hr, Last Rate: Stopped (24 1700)      PRN medications  PRN medications: albuterol, dextrose, glucagon, [] hydrALAZINE **FOLLOWED BY** [PENDING] hydrALAZINE **FOLLOWED BY** hydrALAZINE **FOLLOWED BY** [PENDING] hydrALAZINE, oxygen     Results for orders placed or performed during the hospital encounter of 24 (from the past 24 hour(s))   POCT GLUCOSE   Result Value Ref Range    POCT Glucose 119 (H) 74 - 99 mg/dL   CBC and Auto Differential   Result Value Ref Range    WBC 14.2 (H) 4.4 - 11.3 x10*3/uL    nRBC 0.0 0.0 - 0.0 /100 WBCs    RBC 2.75 (L) 4.00 - 5.20 x10*6/uL    Hemoglobin 7.7 (L) 12.0 - 16.0 g/dL    Hematocrit 25.8 (L) 36.0 - 46.0 %    MCV 94 80 - 100 fL    MCH 28.0 26.0 - 34.0 pg    MCHC 29.8 (L) 32.0 - 36.0 g/dL    RDW 13.4 11.5 - 14.5 %    Platelets 198 150 - 450 x10*3/uL    Neutrophils % 83.4 40.0 - 80.0 %    Immature Granulocytes %, Automated 0.6 0.0 - 0.9 %    Lymphocytes % 7.9 13.0 - 44.0 %    Monocytes % 7.2 2.0 - 10.0 %    Eosinophils % 0.6 0.0 - 6.0 %    Basophils % 0.3 0.0 - 2.0 %    Neutrophils Absolute 11.87 (H) 1.60 - 5.50 x10*3/uL    Immature Granulocytes Absolute, Automated 0.08 0.00 - 0.50 x10*3/uL    Lymphocytes Absolute 1.13 0.80 - 3.00 x10*3/uL    Monocytes Absolute 1.02 (H) 0.05 - 0.80 x10*3/uL    Eosinophils Absolute 0.09 0.00 - 0.40 x10*3/uL    Basophils Absolute 0.04 0.00 - 0.10 x10*3/uL   POCT GLUCOSE   Result Value Ref Range    POCT Glucose 112 (H) 74 - 99 mg/dL   POCT GLUCOSE   Result Value Ref Range    POCT Glucose 108 (H) 74 - 99 mg/dL   POCT GLUCOSE   Result Value Ref Range    POCT Glucose 115 (H) 74 - 99 mg/dL   CBC   Result Value Ref Range     WBC 11.6 (H) 4.4 - 11.3 x10*3/uL    nRBC 0.0 0.0 - 0.0 /100 WBCs    RBC 2.69 (L) 4.00 - 5.20 x10*6/uL    Hemoglobin 7.9 (L) 12.0 - 16.0 g/dL    Hematocrit 25.0 (L) 36.0 - 46.0 %    MCV 93 80 - 100 fL    MCH 29.4 26.0 - 34.0 pg    MCHC 31.6 (L) 32.0 - 36.0 g/dL    RDW 13.2 11.5 - 14.5 %    Platelets 194 150 - 450 x10*3/uL   Renal function panel   Result Value Ref Range    Glucose 103 (H) 74 - 99 mg/dL    Sodium 139 136 - 145 mmol/L    Potassium 3.8 3.5 - 5.3 mmol/L    Chloride 103 98 - 107 mmol/L    Bicarbonate 27 21 - 32 mmol/L    Anion Gap 13 10 - 20 mmol/L    Urea Nitrogen 13 6 - 23 mg/dL    Creatinine 0.61 0.50 - 1.05 mg/dL    eGFR >90 >60 mL/min/1.73m*2    Calcium 8.6 8.6 - 10.6 mg/dL    Phosphorus 3.3 2.5 - 4.9 mg/dL    Albumin 3.1 (L) 3.4 - 5.0 g/dL   Magnesium   Result Value Ref Range    Magnesium 1.69 1.60 - 2.40 mg/dL   POCT GLUCOSE   Result Value Ref Range    POCT Glucose 108 (H) 74 - 99 mg/dL   POCT GLUCOSE   Result Value Ref Range    POCT Glucose 98 74 - 99 mg/dL      CT head wo IV contrast    Result Date: 3/27/2024  Interpreted By:  Kalee Fink, STUDY: CT HEAD WO IV CONTRAST;  3/26/2024 5:38 pm   INDICATION: Signs/Symptoms:L MCA stroke s/p TNK and MT.   COMPARISON: March 24, 2024   ACCESSION NUMBER(S): [id number]   ORDERING CLINICIAN: REANNA VELOZ   TECHNIQUE: Noncontrast axial CT scan of head was performed. Angled reformats in brain and bone windows were generated. The images were reviewed in bone, brain, blood and soft tissue windows. Coronal and sagittal reformats were provided for review.   FINDINGS: Previously demonstrated focus of diminished attenuation with loss of gray/white matter differentiation involving portions of the left MCA distribution including the frontal lobe, insula, subinsular region, and extending into the corona radiata is now more distinct and better defined. There is subtle local mass effect with sulcal effacement and partial effacement of the left lateral ventricle  anteriorly. There is no measurable midline shift.   There is again hyperdense subdural hematoma along the posterior right aspect of the falx and extending along the posterior aspect of the right parietal and occipital lobes and the right tentorium. The component along the right tentorium appears slightly more pronounced compared with prior currently measuring approximately 3 mm in thickness compared with approximately 2.5 mm which may be due to redistribution of blood products as the component overlying the right parietal lobe appears slightly less pronounced. There is a trace amount of subdural hemorrhage along the left tentorium.   An enteric tube is partially visualized. There is trace mucosal thickening within scattered ethmoid air cells. The mastoid air cells are clear.       1. Evolving early subacute left MCA distribution infarct with mild associated mass effect. 2. Redemonstration of multifocal subdural hematoma.     MACRO: None   Signed by: Kalee Fink 3/27/2024 6:57 AM Dictation workstation:   [id number]    XR abdomen 1 view    Result Date: 3/26/2024  Interpreted By:  Mackenzie Vance, STUDY: XR ABDOMEN 1 VIEW;  3/26/2024 4:39 pm   INDICATION: Signs/Symptoms:NG / Feeding Tube Placement Verification.   COMPARISON: None.   ACCESSION NUMBER(S): [id number]   ORDERING CLINICIAN: MONIE CASTANEDA   FINDINGS: Nonobstructive bowel gas pattern. Limited evaluation of pneumoperitoneum on supine imaging, however no gross evidence of free air is noted.   Enteric tube with the tip in the projection of the gastric antrum, proximal duodenal.   Osseous structures demonstrate no acute bony changes.       1.  Enteric tube with the tip in the projection of the gastric antrum, proximal duodenal.   MACRO: None   Signed by: Mackenzie Vance 3/26/2024 4:44 PM Dictation workstation:   [id number]    IR intervention NEURO thrombectomy    Result Date: 3/25/2024  Interpreted By:  Kati Peña, STUDY: IR INTERVENTION NEURO  THROMBECTOMY;  3/24/2024 10:34 pm   INDICATION: Signs/Symptoms:LVO.   COMPARISON: CT angiogram 03/24/2024   ACCESSION NUMBER(S): [id number]   ORDERING CLINICIAN: ALIVIA PHILIP   TECHNIQUE: Risks including groin site injury, groin hematoma, intracerebral hemorrhage, pseudoaneurysm, retroperitoneal hematoma, vessel dissection, stroke, paralysis, contrast induced nephropathy, and death were explained to the patient's family. Following discussion of risks, benefits, and alternatives, patient's family agreed to proceed with cerebral angiogram and informed consent was obtained.   The patient was monitored throughout for EKG, blood pressure, and pulse oximetry.   No sedation was given. The physician was assisted by an independent trained observer in the continuous monitoring of patient level of consciousness and physiologic status. There were no apparent sedation complications.   Total fluoroscopy time was 5.7 minutes. Approximately 30 ML Optiray 320 contrast was employed.   Using Seldinger technique and a right common femoral approach a 8 Pashto sheath was placed. Next, an BCKSTGRS Infinity base catheter was navigated into the distal cervical segment of the left internal carotid artery over a 6 Pashto Penumbra Berenstein Select catheter over a glidewire.   A selective injection of the left internal carotid artery was then performed.   Following confirmation of large vessel occlusion of the left middle cerebral artery, the Select catheter was removed and a Penumbra Red 62 reperfusion catheter was navigated to the thrombus in the right middle cerebral artery over a Trevo Trak microcatheter over a Fathom 014 microwire.   The microcatheter and microwire were removed and pump aspiration was directly applied to the reperfusion catheter for approximately 3 minutes.   The reperfusion catheter was then carefully withdrawn through the base catheter under continuous pump aspiration through the reperfusion catheter and manual aspiration  through the base catheter.   The base catheter was allowed to back bleed, flushed, and repeat injections of the left internal carotid artery were performed.   The base catheter was then withdrawn into the right common femoral artery and a final selective injection was performed.   A flat panel rotational XperCT of the head was performed and reconstructed using an independent workstation.   The catheter and then the sheath were removed. Hemostasis was obtained with hand compression following placement of Angio-Seal device. The patient was taken to a hospital bed for routine post procedure observation and care. There were no apparent complications.   FINDINGS: LEFT INTERNAL CAROTID ARTERY INJECTION: DSA runs were obtained over the head in PA and lateral views. The distal cervical and intracranial left internal carotid artery opacify well and appear unremarkable.  Flash filling of the left posterior cerebral artery from the left posterior communicating artery is seen on this injection. The left internal carotid artery bifurcates normally into left anterior cerebral and left middle cerebral arteries. The left middle cerebral artery is occluded in the M1 segment. Filling of the right anterior cerebral artery with good visualization of an anterior communicating artery is seen. No aneurysm or early draining vein is seen.   LEFT INTERNAL CAROTID ARTERY INJECTIONS S/P THROMBECTOMY: DSA runs were obtained over the head in PA, lateral, and oblique views. The distal cervical and intracranial left internal carotid artery opacify well and appear unremarkable.  Flash filling of the left posterior cerebral artery from the left posterior communicating artery is seen on this injection. The left internal carotid artery bifurcates normally into left anterior cerebral and left middle cerebral arteries. There has been interval recanalization of the left middle cerebral artery with residual mild irregularity and narrowing near the M1  bifurcation, sluggish filling of some distal branches, and a parenchymal filling defect within the left frontal lobe consistent with TICI 2b reperfusion. Filling of the right anterior cerebral artery with good visualization of an anterior communicating artery is seen. No aneurysm or early draining vein is seen.   RIGHT COMMON FEMORAL ARTERY INJECTION: DSA run over the right hip was obtained in VARMA view. The right common femoral artery, femoral bifurcation, and distal vasculature opacify well and appear unremarkable. There is no evidence of pseudoaneurysm, stenosis, dissection, or other type of vascular injury.   X PER CT HEAD: A flat panel x per CT head was performed and reconstructed using an independent workstation. No evidence of hemorrhage or other new acute intracranial pathology is visualized.       1. Left middle cerebral artery occlusion in the M1 segment. 2. Status post mechanical thrombectomy with residual mild irregularity and narrowing near the M1 bifurcation, sluggish filling of some distal branches, and a parenchymal filling defect within the left frontal lobe consistent with TICI 2b reperfusion. 3. X per CT head without evidence of hemorrhage or other new acute intracranial pathology.   I was present for and/or performed the critical portions of the procedure and immediately available throughout the entire procedure. I personally reviewed the image(s)/study and interpretation. I agree with the findings as stated. Performed and dictated at [facility].   MACRO: None     Dictation workstation:   [id number]    Transthoracic Echo (TTE) Complete    Result Date: 3/25/2024   [facility], [address] [place] [address]                Tel [phone] and Fax [phone] TRANSTHORACIC ECHOCARDIOGRAM REPORT  Patient Name:      BABAR STOKES    Reading Physician:    [id number] Bhupsalome                                                                Lisa MOSQUERA Study Date:        3/25/2024            Ordering Provider:    [id number] MONIE CASTANEDA MRN/PID:           [id number]             Fellow: Accession#:        [id number]         Nurse: Date of Birth/Age: 1950 / 74 years  Sonographer:          MARILYN Longoria RDCS Gender:            F                    Additional Staff: Height:            167.64 cm            Admit Date:           3/24/2024 Weight:            83.01 kg             Admission Status:     Inpatient -                                                               Routine BSA / BMI:         1.93 m2 / 29.54      Encounter#:           [id number]                    kg/m2                                         Department Location:  [facility] Blood Pressure: 124 /67 mmHg Study Type:    TRANSTHORACIC ECHO (TTE) COMPLETE Diagnosis/ICD: Other cerebral infarction-I63.89 Indication:    Cerebrovascular Accident CPT Code:      Echo Complete w Full Doppler-[id number] Patient History: Pertinent History: HFrEF/ CM, COPD, NXVT, Htn, HLD, CAD, A-fib, DVT, asthma. Study Detail: The following Echo studies were performed: M-Mode, 2D, Doppler and               color flow. Technically challenging study due to poor acoustic               windows and patient lying in supine position. Definity used as a               contrast agent for endocardial border definition and agitated               saline used as a contrast agent for intraseptal flow evaluation.               Total contrast used for this procedure was 2 mL via IV push. The               patient was awake.  PHYSICIAN INTERPRETATION: Left Ventricle: The left ventricular systolic function is low normal, with an estimated ejection fraction of 50-55%. There are no regional wall motion abnormalities. The left ventricular cavity size is normal. Abnormal (paradoxical) septal motion, consistent with left bundle branch block. Spectral Doppler shows an impaired  relaxation pattern of left ventricular diastolic filling. Left Atrium: The left atrium is normal in size. A bubble study using agitated saline was very technically difficult and unable to be interpreted. Right Ventricle: The right ventricle is normal in size. There is normal right ventricular global systolic function. Right Atrium: The right atrium is normal in size. Aortic Valve: The aortic valve is probably trileaflet. There is minimal aortic valve cusp calcification. There is trivial aortic valve regurgitation. The peak instantaneous gradient of the aortic valve is 9.5 mmHg. Mitral Valve: The mitral valve is normal in structure. There is trace mitral valve regurgitation. Tricuspid Valve: The tricuspid valve is structurally normal. There is trace tricuspid regurgitation. The Doppler estimated RVSP is mildly elevated at 32.4 mmHg. Pulmonic Valve: The pulmonic valve is structurally normal. There is physiologic pulmonic valve regurgitation. Pericardium: There is a trivial pericardial effusion. Aorta: The aortic root is normal. Systemic Veins: The inferior vena cava appears to be of normal size. There is IVC inspiratory collapse greater than 50%. In comparison to the previous echocardiogram(s): Compared with study from 4/20/2021, EF appears improved. Reported as 35-40%.  CONCLUSIONS:  1. Poorly visualized anatomical structures due to suboptimal image quality.  2. Left ventricular systolic function is low normal with a 50-55% estimated ejection fraction.  3. Abnormal septal motion consistent with left bundle branch block.  4. Spectral Doppler shows an impaired relaxation pattern of left ventricular diastolic filling.  5. Mildly elevated RVSP.  6. Compared with study from 4/20/2021, EF appears improved. Reported as 35-40%. QUANTITATIVE DATA SUMMARY: 2D MEASUREMENTS:                          Normal Ranges: Ao Root d:     3.10 cm   (2.0-3.7cm) LAs:           2.10 cm   (2.7-4.0cm) IVSd:          1.00 cm   (0.6-1.1cm)  LVPWd:         0.90 cm   (0.6-1.1cm) LVIDd:         4.00 cm   (3.9-5.9cm) LVIDs:         3.20 cm LV Mass Index: 61.4 g/m2 LV % FS        20.0 % RA VOLUME BY A/L METHOD:                               Normal Ranges: RA Vol A4C:        29.7 ml    (8.3-19.5ml) RA Vol Index A4C:  15.4 ml/m2 RA Area A4C:       10.9 cm2 RA Major Axis A4C: 3.4 cm AORTA MEASUREMENTS:                    Normal Ranges: Asc Ao, d: 2.80 cm (2.1-3.4cm) LV SYSTOLIC FUNCTION BY 2D PLANIMETRY (MOD):                     Normal Ranges: EF-A4C View: 38.5 % (>=55%) EF-A2C View: 56.7 % EF-Biplane:  48.9 % LV DIASTOLIC FUNCTION:                               Normal Ranges: MV Peak E:        0.69 m/s    (0.7-1.2 m/s) MV Peak A:        0.87 m/s    (0.42-0.7 m/s) E/A Ratio:        0.79        (1.0-2.2) MV e'             0.08 m/s    (>8.0) MV lateral e'     0.08 m/s MV medial e'      0.08 m/s E/e' Ratio:       8.58        (<8.0) a'                0.11 m/s PulmV Sys Connor:    24.70 cm/s PulmV Alford Connor:   27.40 cm/s PulmV S/D Connor:    0.90 PulmV A Revs Connor: 32.60 cm/s PulmV A Revs Dur: 143.00 msec MITRAL VALVE:                 Normal Ranges: MV DT: 190 msec (150-240msec) AORTIC VALVE:                         Normal Ranges: AoV Vmax:      1.54 m/s (<=1.7m/s) AoV Peak P.5 mmHg (<20mmHg) LVOT Max Connor:  0.89 m/s (<=1.1m/s) LVOT VTI:      19.40 cm LVOT Diameter: 2.00 cm  (1.8-2.4cm) AoV Area,Vmax: 1.81 cm2 (2.5-4.5cm2)  RIGHT VENTRICLE: RV Basal 3.43 cm RV Mid   2.05 cm RV Major 6.7 cm TAPSE:   28.0 mm RV s'    0.13 m/s TRICUSPID VALVE/RVSP:                             Normal Ranges: Peak TR Velocity: 2.71 m/s RV Syst Pressure: 32.4 mmHg (< 30mmHg) IVC Diam:         1.40 cm PULMONIC VALVE:                      Normal Ranges: PV Max Connor: 1.1 m/s  (0.6-0.9m/s) PV Max P.9 mmHg Pulmonary Veins: PulmV A Revs Dur: 143.00 msec PulmV A Revs Connor: 32.60 cm/s PulmV Alford Connor:   27.40 cm/s PulmV S/D Connor:    0.90 PulmV Sys Connor:    24.70 cm/s  [id number] Mark Gonzalez MD  Electronically signed on 3/25/2024 at 4:53:29 PM  ** Final **     MR brain wo IV contrast    Result Date: 3/25/2024  Interpreted By:  Prashant Barron and Muddasani Dheeraj STUDY: MR BRAIN WO IV CONTRAST;  3/25/2024 2:15 pm   INDICATION: Signs/Symptoms:L MCA stroke s/p thrombectomy.   COMPARISON: Noncontrast CT, CTA angiogram and CT perfusion of the head 03/24/2024.   ACCESSION NUMBER(S): [id number]   ORDERING CLINICIAN: ISHA ESPITIA   TECHNIQUE: Noncontrast axial T2, FLAIR, DWI, gradient echo T2 and T1 weighted images of the brain were acquired.   FINDINGS: Images are somewhat degraded by motion.   There is restricted diffusion with associated T2 and FLAIR hyperintense signal involving the left frontal lobe cortex extending to the left insular cortex and left corpus striatum as well as periventricular region left atrium, corresponding to CT findings of acute to subacute infarcts. Additional nonspecific T2 and FLAIR hyperintense signal throughout the supratentorial white matter is noted. Leftward midline shift is unchanged at 3 mm.   Acute subdural hemorrhage along the right cerebral convexity measures up to 6 mm, similar to previous given differences in technique, which layers along the tentorium. There is no new area of hemorrhage.   Ventricles are nondilated. Basal cisterns are patent.   Major intracranial flow voids are patent.   Scalp contusion is again noted over the right parietal region.   Mucosal thickening is scattered throughout the visualized paranasal sinuses. Mastoid air cells are clear. Bilateral lenses have been surgically replaced.       Status post thrombectomy. Acute to subacute infarcts involving the left MCA territory correspond to CT findings demonstrated on 03/24/2024. No new areas of infarction. No associated hemorrhage.   Acute subdural hemorrhage along the right cerebral convexity is similar to previous CT given differences in technique.   Stable leftward midline shift.   Scalp contusion  over the right parietal region.   I personally reviewed the images/study and I agree with the findings as stated by Dr. Lo.   MACRO: None   Signed by: Prashant Barron 3/25/2024 2:57 PM Dictation workstation:   [id number]    ECG 12 lead    Result Date: 3/25/2024  Normal sinus rhythm Nonspecific intraventricular block Abnormal ECG When compared with ECG of 24-MAR-2024 13:44, (unconfirmed) Questionable change in QRS axis Nonspecific T wave abnormality no longer evident in Inferior leads See ED provider note for full interpretation and clinical correlation Confirmed by Diamante Rodríguez ([id number]) on 3/25/2024 10:47:36 AM    ECG 12 lead    Result Date: 3/25/2024  Normal sinus rhythm Left bundle branch block Abnormal ECG When compared with ECG of 02-FEB-2024 15:30, Nonspecific T wave abnormality now evident in Lateral leads See ED provider note for full interpretation and clinical correlation Confirmed by Diamante Rodríguez ([id number]) on 3/25/2024 10:47:24 AM    CT brain attack head wo IV contrast    Result Date: 3/24/2024  Interpreted By:  Diego Reddy and Stephens Katherine STUDY: CT BRAIN ATTACK HEAD WO IV CONTRAST; CT BRAIN ATTACK PERFUSION W IV CONTRAST; 3/24/2024 9:31 pm   INDICATION: Signs/Symptoms:Righ side weakness; Signs/Symptoms:right side weakness.   COMPARISON: Same day CT head and CTA head and neck.   ACCESSION NUMBER(S): [id number]; [id number]   ORDERING CLINICIAN: ARCHIE FISHER   TECHNIQUE: CT perfusion of the brain was performed following the bolus administration of approximately 35 mL Omnipaque 350 intravenous contrast. Mean transit time, time to peak, cerebral blood volume, and cerebral blood flow maps were rendered.   FINDINGS: HEAD:   Loss of gray-white differentiation and sulcal effacement within the left insular and frontal lobe cortex (series 204, images 35-48). There is also loss of gray-white matter distinction involving the left corpus striatum (series 204, images 34-44). Findings are in keeping  with acute to subacute ischemia. Otherwise the gray-white differentiation is intact.   Re-demonstration of acute right subdural hemorrhage overlying the right cerebral convexity and tracking along the falx cerebri again measuring up to 0.6 cm in maximal thickness over the right parietal lobe. Unchanged 0.3 cm right-to-left midline shift. No additional areas of hemorrhage identified.   The calvarium is unremarkable. Visualized paranasal sinuses and mastoid air cells are clear. Re-demonstration of soft tissue swelling scalp hematoma in the right parietal region.   CT PERFUSION:   Using the threshold of T-max greater than 6 seconds, there is an area of hypoperfusion in the left MCA territory with a total volume of hypoperfusion of 153 mL. Using the threshold of C BF less than 30% there is a core infarct measuring 10 mL. The mismatch ratio is 15.3 in the mismatch volume is 143 mL.       1. Loss of gray-white differentiation and sulcal effacement within the left insular and frontal lobe cortex (series 204, images 35-48). There is also loss of gray-white matter distinction involving the left corpus striatum (series 204, images 34-44). Findings are in keeping with acute to subacute ischemia. 2. Acute ischemic injury with hypoperfused left MCA territory with total volume of 153 mL and core infarct measuring 10 mL. Mismatch ratio is 15.3. 3. Unchanged 0.6 cm right subdural hemorrhage with 0.3 cm of right-to-left midline shift. No new areas of hemorrhage identified.   I personally reviewed the images/study and I agree with Marilynn Cooley DO's (radiology resident) findings as stated. This study was interpreted at [facility], [place], [place].   MACRO: Marilynn Cooley discussed the significance and urgency of this critical finding in person at the scanner with  ARCHIE FISHER on 3/24/2024 at 9:20 pm.  (**-RCF-**) Findings:  See findings.     Signed by: Diego Reddy 3/24/2024 9:59 PM  Dictation workstation:   [id number]    CT brain attack perfusion w IV contrast    Result Date: 3/24/2024  Interpreted By:  Diego Reddy and Stephens Katherine STUDY: CT BRAIN ATTACK HEAD WO IV CONTRAST; CT BRAIN ATTACK PERFUSION W IV CONTRAST; 3/24/2024 9:31 pm   INDICATION: Signs/Symptoms:Righ side weakness; Signs/Symptoms:right side weakness.   COMPARISON: Same day CT head and CTA head and neck.   ACCESSION NUMBER(S): [id number]; [id number]   ORDERING CLINICIAN: ARCHIE FISHER   TECHNIQUE: CT perfusion of the brain was performed following the bolus administration of approximately 35 mL Omnipaque 350 intravenous contrast. Mean transit time, time to peak, cerebral blood volume, and cerebral blood flow maps were rendered.   FINDINGS: HEAD:   Loss of gray-white differentiation and sulcal effacement within the left insular and frontal lobe cortex (series 204, images 35-48). There is also loss of gray-white matter distinction involving the left corpus striatum (series 204, images 34-44). Findings are in keeping with acute to subacute ischemia. Otherwise the gray-white differentiation is intact.   Re-demonstration of acute right subdural hemorrhage overlying the right cerebral convexity and tracking along the falx cerebri again measuring up to 0.6 cm in maximal thickness over the right parietal lobe. Unchanged 0.3 cm right-to-left midline shift. No additional areas of hemorrhage identified.   The calvarium is unremarkable. Visualized paranasal sinuses and mastoid air cells are clear. Re-demonstration of soft tissue swelling scalp hematoma in the right parietal region.   CT PERFUSION:   Using the threshold of T-max greater than 6 seconds, there is an area of hypoperfusion in the left MCA territory with a total volume of hypoperfusion of 153 mL. Using the threshold of C BF less than 30% there is a core infarct measuring 10 mL. The mismatch ratio is 15.3 in the mismatch volume is 143 mL.       1. Loss of gray-white  differentiation and sulcal effacement within the left insular and frontal lobe cortex (series 204, images 35-48). There is also loss of gray-white matter distinction involving the left corpus striatum (series 204, images 34-44). Findings are in keeping with acute to subacute ischemia. 2. Acute ischemic injury with hypoperfused left MCA territory with total volume of 153 mL and core infarct measuring 10 mL. Mismatch ratio is 15.3. 3. Unchanged 0.6 cm right subdural hemorrhage with 0.3 cm of right-to-left midline shift. No new areas of hemorrhage identified.   I personally reviewed the images/study and I agree with Marilynn Cooley DO's (radiology resident) findings as stated. This study was interpreted at [facility], [place], [place].   MACRO: Marilynn Cooley discussed the significance and urgency of this critical finding in person at the scanner with  ARCHIE NGUYENMEJIARANDY on 3/24/2024 at 9:20 pm.  (**-RCF-**) Findings:  See findings.     Signed by: Diego Reddy 3/24/2024 9:59 PM Dictation workstation:   [id number]    XR chest 1 view    Result Date: 3/24/2024  Interpreted By:  Diego Reddy and Stephens Katherine STUDY: XR CHEST 1 VIEW;  3/24/2024 9:09 pm   INDICATION: Signs/Symptoms:ETT placement post transport.   COMPARISON: Same day chest radiograph from 7:01 p.m. CT chest 10/05/2023   ACCESSION NUMBER(S): [id number]   ORDERING CLINICIAN: ALEX GONZALEZ   FINDINGS: AP radiograph of the chest was provided.   Endotracheal tube noted with tip just beyond the level of thoracic inlet. Right chest wall MediPort with tip overlying the lower SVC.     Coronary artery stent in place.   CARDIOMEDIASTINAL SILHOUETTE: Cardiomediastinal silhouette is normal in size and configuration. Aortic knob calcifications present.   LUNGS: Mild streaky and hazy bibasilar opacities favored to represent atelectasis. Partial pneumonectomy changes again seen to the right lung base. No focal consolidation,  pleural effusion, or pneumothorax. Lungs are hyperinflated with interstitial coarsening, as before, suggesting emphysema/COPD.   ABDOMEN: No remarkable upper abdominal findings.   BONES: No acute osseous changes.Postsurgical changes from cervical spinal fusion.       1.  Mild streaky and hazy bibasilar opacities favored to represent atelectasis. Superimposed infectious/inflammatory process can not be fully excluded. No focal consolidation, sizeable pleural effusion, or pneumothorax. 2. Endotracheal tube appears satisfactorily positioned. Devices as described above.   I personally reviewed the images/study and I agree with Marilynn Cooley DO's (radiology resident) findings as stated. This study was interpreted at [place], [place].   MACRO: None   Signed by: Diego Reddy 3/24/2024 9:32 PM Dictation workstation:   [id number]    XR chest 1 view    Result Date: 3/24/2024  Interpreted By:  Daniel Eduardo, STUDY: XR CHEST 1 VIEW;  3/24/2024 7:01 pm   INDICATION: Signs/Symptoms:intubation.   COMPARISON: Chest x-ray 03/24/2024   ACCESSION NUMBER(S): [id number]   ORDERING CLINICIAN: JIMMY ALICEA   FINDINGS: Interval placement of endotracheal tube terminating 6.5 cm above the obinna. Right-sided MediPort terminates in the cavoatrial junction. Multiple overlying leads noted.   CARDIOMEDIASTINAL SILHOUETTE: Cardiomediastinal silhouette is stable in size and configuration. Atherosclerotic calcification of the aorta.   LUNGS: Bibasilar atelectasis. No consolidation, sizeable effusion or pneumothorax.   ABDOMEN: No remarkable upper abdominal findings.   BONES: Multilevel degenerative changes of the spine.       Support hardware as described above.   Bibasilar atelectasis. Superimposed infection not excluded. Correlate clinically.   MACRO: None   Signed by: Daniel Eduardo 3/24/2024 7:33 PM Dictation workstation:   [id number]    CT brain attack head wo IV contrast    Result Date:  3/24/2024  Interpreted By:  William White, STUDY: CT BRAIN ATTACK HEAD WO IV CONTRAST;  3/24/2024 7:09 pm   INDICATION: Signs/Symptoms:Stroke symptoms.   COMPARISON: None.   ACCESSION NUMBER(S): [id number]   ORDERING CLINICIAN: JIMMY ALICEA   TECHNIQUE: Noncontrast axial CT scan of head was performed. Angled reformats in brain and bone windows were generated. The images were reviewed in bone, brain, blood and soft tissue windows.   FINDINGS: CSF Spaces: The ventricles, sulci and basal cisterns are within normal limits. There is a small to moderate size right high convexity frontal and parietal subdural hematoma. It measures up to 6 mm in thickness over the high parietal convexity. An element of subarachnoid hemorrhage within the occipital region is also suspected. There is mass effect upon the adjacent sulci and cisterns with 2-3 mm midline shift to the left. No intraventricular hemorrhage is seen.   Parenchyma: The gray-white differentiation appears intact.   Calvarium: There is right posterior parietal scalp swelling/hematoma. No associated skull fracture is seen.   Paranasal sinuses and mastoids: Visualized paranasal sinuses and mastoids are clear.       Acute subdural hematoma of the right frontal and right parietal convexity extending to the right occipital region with a concern for right posterior parietal/occipital acute subarachnoid hemorrhage. There is 2-3 mm midline shift to the left. No intraventricular hemorrhage is seen. No signs of acute cortical infarction at this study. Recommend clinical correlation and follow-up.   SUPPLEMENTAL INFORMATION: Notifi message was left for JIMMY ALICEA regarding this exam by Dr. White on 3/24/2024 at approximately 19:12 hours.   Signed by: William White 3/24/2024 7:18 PM Dictation workstation:   [id number]    CT angio head and neck w and wo IV contrast    Result Date: 3/24/2024  Interpreted By:  Aren Restrepo, STUDY: CT ANGIO HEAD AND NECK W AND WO IV CONTRAST;   3/24/2024 2:47 pm   INDICATION: Signs/Symptoms:Stroke symptoms CT negative..   COMPARISON: None.   ACCESSION NUMBER(S): [id number]   ORDERING CLINICIAN: JIMMY ALICEA   TECHNIQUE: Thin cut axial CT images were generated over the upper thorax, neck, and head during the arterial passage of a full contrast bolus.  The bolus was generated with a power injector and followed with immediate saline flush. These image data were subtracted and then used for 3-D reconstructions. Maximum intensity projections and shaded surface displays were generated in multiple planes. In addition images were transferred into a 3-D processing program and additional projections and displays were reviewed by the interpreting physician.   FINDINGS: The CT angiogram through the upper thorax demonstrates atherosclerotic calcifications along the aortic arch and origins of the great vessels. There is moderate narrowing along the origin/proximal left subclavian artery. Mild narrowing is noted along the origins of the right brachiocephalic artery and left common carotid artery. No significant stenosis noted along the origins of the vertebral arteries.   The CT angiogram of the neck demonstrates atherosclerotic calcifications along the origin/proximal internal carotid arteries bilaterally contributing to mild non hemodynamically significant narrowing utilizing more distal cervical segments of the internal carotid arteries as a point of reference. No significant focal stenosis is noted along the cervical segments of the vertebral arteries.   The CT angiogram of the head demonstrates an abnormal branch cutoff involving the M1 segment of the left middle cerebral artery with reconstitution of asymmetric fewer and diminished caliber M2 and more distal branch vessels of the left middle cerebral artery compared with the right. Atherosclerotic calcifications noted along the bilateral carotid siphons contributing to mild segmental narrowing. The A1 segment of  the right and cerebral artery is not clearly visualized which may related to congenital hypoplasia or secondary narrowing. The more distal right anterior cerebral artery is supplied from the left via a patent anterior communicating artery. No large vessel branch cutoffs of the right middle cerebral artery are noted. There are small caliber P1 segments of the posterior cerebral arteries right more so than left. Patent posterior communicating arteries are identified bilaterally. No large vessel branch cutoffs of the posterior cerebral arteries are noted.       The CT angiogram through the upper thorax demonstrates atherosclerotic calcifications along the aortic arch and origins of the great vessels. There is moderate narrowing along the origin/proximal left subclavian artery. Mild narrowing is noted along the origins of the right brachiocephalic artery and left common carotid artery. No significant stenosis noted along the origins of the vertebral arteries.   The CT angiogram of the neck demonstrates atherosclerotic calcifications along the origin/proximal internal carotid arteries bilaterally contributing to mild non hemodynamically significant narrowing utilizing more distal cervical segments of the internal carotid arteries as a point of reference. No significant focal stenosis is noted along the cervical segments of the vertebral arteries.   The CT angiogram of the head demonstrates an abnormal branch cutoff involving the M1 segment of the left middle cerebral artery with reconstitution of asymmetric fewer and diminished caliber M2 and more distal branch vessels of the left middle cerebral artery compared with the right. Atherosclerotic calcifications noted along the bilateral carotid siphons contributing to mild segmental narrowing. The A1 segment of the right and cerebral artery is not clearly visualized which may related to congenital hypoplasia or secondary narrowing. The more distal right anterior cerebral  artery is supplied from the left via a patent anterior communicating artery. No large vessel branch cutoffs of the right middle cerebral artery are noted. There are small caliber P1 segments of the posterior cerebral arteries right more so than left. Patent posterior communicating arteries are identified bilaterally. No large vessel branch cutoffs of the posterior cerebral arteries are noted.   The findings were discussed with the physician caring for the patient in the [facility] emergency room on 03/24/2024 time 2:58 p.m.   MACRO: None   Signed by: Aren Restrepo 3/24/2024 3:06 PM Dictation workstation:   [id number]    XR chest 1 view    Result Date: 3/24/2024  Interpreted By:  Schoenberger, Joseph, STUDY: XR CHEST 1 VIEW;  3/24/2024 2:17 pm   INDICATION: Signs/Symptoms:Stroke symptoms.   COMPARISON: 01/30/2024   ACCESSION NUMBER(S): [id number]   ORDERING CLINICIAN: JIMMY ALICEA   FINDINGS: Implanted venous port catheter in apparent satisfactory position unchanged from prior.       CARDIOMEDIASTINAL SILHOUETTE: Cardiomediastinal silhouette is normal in size and configuration.   LUNGS: No new focal lung opacity. Emphysema/COPD unchanged.   ABDOMEN: No remarkable upper abdominal findings.   BONES: No acute osseous changes.       1.  No evidence of acute cardiopulmonary process. See above       MACRO: None   Signed by: Joseph Schoenberger 3/24/2024 2:23 PM Dictation workstation:   [id number]    CT brain attack head wo IV contrast    Result Date: 3/24/2024  Interpreted By:  Kanchan Chaudhry, STUDY: CT BRAIN ATTACK HEAD WO IV CONTRAST;  3/24/2024 1:36 pm   INDICATION: Signs/Symptoms:stroke.   COMPARISON: 02/02/2024   ACCESSION NUMBER(S): [id number]   ORDERING CLINICIAN: JIMMY ALICEA   TECHNIQUE: Noncontrast axial CT scan of head was performed. Angled reformats in brain and bone windows were generated. The images were reviewed in bone, brain, blood and soft tissue windows.   FINDINGS: CSF Spaces: The ventricles,  sulci and basal cisterns are within normal limits. There is no extraaxial fluid collection.   Parenchyma:  The grey-white differentiation is intact. There is no mass effect or midline shift.  There is no intracranial hemorrhage.   Calvarium: The calvarium is unremarkable.   Paranasal sinuses and mastoids: Visualized paranasal sinuses and mastoids are clear.       No evidence of acute cortical infarct or intracranial hemorrhage.   No evidence of intracranial hemorrhage or displaced skull fracture.   MACRO: Kanchan Chaudhry discussed the significance and urgency of this critical finding by telephone with  JIMMY ALICEA on 3/24/2024 at 1:47 pm.  (**-RCF-**) Findings:  See findings.     Signed by: Kanchan Chaudhry 3/24/2024 1:48 PM Dictation workstation:   [id number]        Assessment/Plan      Ms. Hernandez is a 74 y.o. female with PMH COPD, CAD s/p PCI, HTN, HLD, sarcoidosis, rheumatoid arthritis, and hypothyroidism who presented to OSH after fall and found to have right facial droop and RUE weakness. Reportedly initial NIHSS 2-3. /80, . CTH did not show any acute hemorrhage or early ischemic changes. CTA showed L MCA occlusion. She received TNK 19 mg at 1530, after which she was noted to have worsening NIHSS to 8 and increased work of breathing requiring intubation. Repeat CTH showed 7mm R subdural hematoma. She was [place] to [facility] for cryoprecipitate and possible MT. S/p cryoprecipitate transfusion. Taken for MT, with TICI 2b.       Type: Ischemic stroke  Subtype/etiology: LVO  Vessels involved: L MCA  Neurological manifestations:  NIHSS (worst at presentation): 18   Diagnostic evaluation: CTH/CTA/CTP  Antiplatelet/antithrombotic plan for stroke prevention: ASA and atorvastatin   VTE prophylaxis: 24hr post TNK      Vascular Risk Factor modification goals:  Blood pressure goals: avoid hypotension SBP <100 that could worsen cerebral perfusion, Ischemic stroke post-thrombolysis- BP < 180/105 mmHg for  24hr  Lipid Goals: education on healthy diet and statin therapy to maintain or achieve goal LDL-cholesterol < 70mg  Glucose Goals: early treatment of hyperglycemia to goal glucose 140-180 mg/dl with long-term goal A1c < 7%   Smoking Cessation and Education  Assessment for Rehabilitation needs   Patient and family education on signs and symptoms of stroke, calling 911, healthy strategies for stroke prevention.       Updates 3/27:  -Downgraded to floor  -Has an accepting acute rehab ([facility]) with bridled dobhoff  -Follow-up dietician recs  -Neurosurgery: ok for ASA on post bleed day 3, DVT prophylaxis on post-bleed day 2  -Cardiology: recommending ASA 81 mg and eliquis 5 mg BID for atrial fib, no need for plavix as had been on DAPT > 6 months  -MRI brain demonstrated L parietal infarct wo associated hemorrhage  -TTE 3/25/24: LVEF 50-55%, unable to assess for PFO, LA normal size  -Failed MBSS, pending NG and initiation of tube feeds. SLP recommends repeat MBSS in 2-4 weeks. Will consider continuing NG tube vs PEG     #L MCA occlusion s/p mTICI 2B post-thrombectomy  #complicated by post TNK SDH  #Afib  ::Initial NIHSS 3, s/p TNK; Worsening in clinical status, rCTH showed R SDH S/p cryoprecipitate transfusion   ::NIHSS on [place] [facility] [place]) was 18, S/p MT, TICI 2b, NIHSS improved to 7   ::Etiology likely cardio embolic in the setting of known Afib   :: LDL 41, A1c 5.3  - MICHAELA status  - Neuro-checks q2hr   - BP goal <180 systolic   - Pending TTE  - Hold ASA and Plavix  - Will need to be AC for the long-term, need cardiology clearance for anti-platelets therapy (was on DAPT at home for CAD s/p stent)   - PT/OT/SLP   - Consult neurosurgery for SDH, ok for ASA on post bleed day 3, DVT prophylaxis on post bleed day 2. Needs CT head in 2 weeks, neurosurgery to arrange     #CHF   #CAD s.p stent   #Afib  #HTN  Trop on presentation 22   Latest TTE on 2021: EF 35-40%   Home meds: ASA 81mg, Plavix 75mg, Lasix 20 mg daily,  and Metoprolol S. 75 mg daily   - Continue home lasix 20 mg daily   - Switch to Metoprolol T. 25 mg TID   - Resume ASA 81 mg daily 24hr post TNK follow-up in December thank you  - Will need to be AC for the long-term, need cardiology clearance for anti-platelets therapy (was on DAPT at home for CAD s/p stent)   -Follow-up cardiology recommendations: recommending ASA 81 mg, eliquis 5 mg BID for Afib, no need for plavix as had been on DAPT > 6 months     #COPD  #Asthma   Home meds:  Spiriva 2 puffs daily, Ipratropium inhaler prn, and albuterol prn,  -Home inhalers     #hypothyroidism   - Continue home levothyroxine 50 mcg daily       #pulmonary sarcoidosis   #rheumatoid arthritis   - not on meds, to clarify with family      F:  ml/hr  E: As needed  N: Regular diet, 2-3 g sodium  GI: Not indicated  Bowel: MiraLAX, doc senna  DVT: Compression device     Code status: full code   Next of kin: Robbin Hernandez (spouse) [phone]            Principal Problem:    Ischemic stroke (CMS/HCC)                  LESLIE VALDEZ     I personally saw, examined, and discussed the patient above. I have reviewed and agree with the medical student note above. All edits or corrections have been made directly into the note, including the physical exam and assessment/plan. Patient was seen and discussed with the attending.     Pedro Hilliard MD  PGY-2 Neurology  Stroke Pager: [phone]

## 2024-03-27 NOTE — SIGNIFICANT EVENT
Patient with R subdural hematoma stable on repeat imaging. No acute neurosurgical intervention or additional neuroimaging needed at this time. Prophylactic anticoagulation allowed on post-bleed day 2. Ok for ASA post bleed day 3, do not recommend restarting plavix. No follow up needed. Thank you for allowing us to participate in the care of this patient. Will sign off at this time. Please page [phone] with any questions or concerns.    Hong Moreno MD  PGY-2 Neurosurgery  6:09 AM

## 2024-03-27 NOTE — PROGRESS NOTES
Patient is discussed during interdisciplinary round today.     Plan per medical team : Pt is anticipated to be ready to discharge tomorrow.   Planned disposition : [place]  Discharge destination : [facility]  Payor : Medicare    Per medical team, pt is ready to discharge with dabhoff, and referral was sent to [facility]. [facility] messaged that they can accept pt with bridled dabhoff, and team agrees with bridled.   VAN talked with pt's  via phone regarding pt's discharge disposition and ADOD. , Ed is agreeable with pt's discharge plan, and requested SW to notify him with pickup time.  [facility] messaged back to  that if medical team would be able to switch pt's dabhoff to bolus feeding. Team agrees with bolus feeding and nutrition consult was ordered. SW updated [facility], and they are willing to accept pt tomorrow. SW set up transportation for 12:30pm tomorrow.   SW met with pt's  and daughter at pt's bedside and notified pt's dc schedule for tomorrow.  and daughter denied any further issues or questions on social work at the moment of assessment. SW will continue to follow and assist with discharge plan.     Shelley Ward, MERARIA, LSW

## 2024-03-27 NOTE — PROGRESS NOTES
"Subjective:  SR 70-80s with PVCs  Denies CP, SOB    Started on ASA, subcutaneous Heparin       Objective:    Physical Exam  Gen: ill appearing, 2L nc, NAD  Neuro: AAOx3, RUE and RLE weakness  HEENT: EOMI, right DHT  Neck: no elevated JVD  Resp: Reduced A/E  CV: RRR  GI: obese, non-tender, non-distended  MSK: warm and well perfused, no edema  Skin: ecchymosis over forehead    Last Recorded Vitals  Blood pressure (!) 124/100, pulse 82, temperature 36.5 °C (97.7 °F), temperature source Temporal, resp. rate 18, height 1.7 m (5' 6.93\"), weight 87.2 kg (192 lb 3.9 oz), SpO2 98 %.    Relevant Results  LABS:  CMP:  Results from last 7 days   Lab Units 03/27/24  0421 03/26/24  0007 03/25/24 0332 03/24/24 2057 03/24/24  1354   SODIUM mmol/L 139 140 141 138 138   POTASSIUM mmol/L 3.8 3.8 4.3 4.1 4.0   CHLORIDE mmol/L 103 107 107 104 100   CO2 mmol/L 27 23 26 25 31   ANION GAP mmol/L 13 14 12 13 11   BUN mg/dL 13 17 13 13 13   CREATININE mg/dL 0.61 0.74 0.87 0.87 0.90   EGFR mL/min/1.73m*2 >90 84 70 70 67   MAGNESIUM mg/dL 1.69  --   --   --  1.78   ALBUMIN g/dL 3.1* 3.2* 3.4 3.6 4.4   ALT U/L  --   --   --  5* 10   AST U/L  --   --   --  16 17   BILIRUBIN TOTAL mg/dL  --   --   --  0.5 0.3       CBC:  Results from last 7 days   Lab Units 03/27/24  0421 03/26/24  1330 03/26/24  0007 03/25/24 0332 03/24/24 2057 03/24/24  1354   WBC AUTO x10*3/uL 11.6* 14.2* 11.2 14.5* 13.4* 9.8   HEMOGLOBIN g/dL 7.9* 7.7* 7.9* 9.4* 11.1* 13.5   HEMATOCRIT % 25.0* 25.8* 23.9* 30.7* 34.3* 44.2   PLATELETS AUTO x10*3/uL 194 198 147* 276 309 304   MCV fL 93 94 87 96 87 93       COAG:   Results from last 7 days   Lab Units 03/24/24 2057 03/24/24  1357   INR  1.1 1.1       ABO:   ABO TYPE   Date Value Ref Range Status   03/24/2024 O  Final     HEME/ENDO:  Results from last 7 days   Lab Units 03/25/24  0332   HEMOGLOBIN A1C % 5.3        CARDIAC:   Results from last 7 days   Lab Units 03/25/24  0332 03/24/24  1549 03/24/24  1412 03/24/24  1354 "   TROPHS ng/L  --  22* 15* 8   BNP pg/mL 34  --   --  89       Recent Labs     03/25/24  0332 11/16/23  1013 02/13/23  1030 09/08/22  1210 08/26/21  1019   CHOL 107 181 191 199 205*   LDLF  --   --  102* 117* 120*   LDLCALC 41 110*  --   --   --    HDL 33.2 36.7 37.7* 34.2* 36.3*   TRIG 166* 174* 256* 237* 243*        Scheduled medications  aspirin, 81 mg, nasogastric tube, Daily  budesonide, 0.5 mg, nebulization, BID  furosemide, 20 mg, nasogastric tube, Daily  heparin (porcine), 5,000 Units, subcutaneous, q8h  insulin lispro, 0-5 Units, subcutaneous, q4h NIKO  levothyroxine, 50 mcg, nasogastric tube, Daily  lidocaine, 1 patch, transdermal, Daily  melatonin, 5 mg, nasogastric tube, Nightly  metoprolol tartrate, 25 mg, nasogastric tube, TID  perflutren protein A microsphere, 0.5 mL, intravenous, Once in imaging  polyethylene glycol, 17 g, nasogastric tube, Daily  sennosides, 2 tablet, nasogastric tube, BID  sulfur hexafluoride microsphr, 2 mL, intravenous, Once in imaging  tiotropium, 2 puff, inhalation, Daily         Assessment/Plan   Trinity Hernandez is a 74 y.o. female with PMH COPD, CAD s/p PCI to mid Lcx for angina (10/4/23), HTN, HLD, pulmonary sarcoidosis, rheumatoid arthritis, and hypothyroidism, Afib (not on AC), AAA, asthma, COPD who presented to OSH for L MCA stroke s/p TNK c/b L  subdural hematoma.  s/p crypreceipitate 3/24 pm. Cardiology consulted for recommendations regarding initiation of plavix.     Patient had PCI to mid Lcx in the setting of angina in 10/23 and has almost completed 6 months of DAPT.     Another question is what caused the patient's stroke. TTE  had a suboptimal bubble study that appears to have been negative. Patient has a documented hx of Afib but is not on AC (per notes, it appears it was diagnosed in 2021 and was thought to be 2/2 sepsis). On review of all ECGs in EMR, pt was in Afib in 4/21. Tele notable for Afib, spontaneously converted to NSR on 3/22 @ 21.00. Etiology of  stroke is likely cardioembolic.      - Continue metoprolol tartrate 25 mg three times daily with goal to consolidate to metoprolol succinate on discharge if able to swallow whole pills    -Aspirin 81mg started today  -Ok to hold plavix as patient has completed 6 months of DAPT  -CHADS-VASc 8 - patient is at 10.8% risk of stroke per year. Recommend starting eliquis 5mg BID when patient is cleared for anticoagulation by neurosurgery  -Recommend 14 day event monitor on discharge   -prior to discharge, please arrange for 4 week follow up with general cardiology, call  to schedule       Cardiology will sign off   Case discussed with Dr. Jl Curtis, APRN-CNP  Cardiology Consults    Please call with any questions  Pager [phone] M-F 8a-5p; Saturday 8a-2p  Pager [phone] all other times

## 2024-03-28 ENCOUNTER — HOSPITAL ENCOUNTER (INPATIENT)
Facility: HOSPITAL | Age: 74
LOS: 16 days | Discharge: HOME | DRG: 092 | End: 2024-04-13
Attending: INTERNAL MEDICINE | Admitting: INTERNAL MEDICINE
Payer: MEDICARE

## 2024-03-28 ENCOUNTER — APPOINTMENT (OUTPATIENT)
Dept: RADIOLOGY | Facility: HOSPITAL | Age: 74
DRG: 023 | End: 2024-03-28
Payer: MEDICARE

## 2024-03-28 VITALS
DIASTOLIC BLOOD PRESSURE: 68 MMHG | OXYGEN SATURATION: 98 % | WEIGHT: 192.24 LBS | HEART RATE: 85 BPM | HEIGHT: 67 IN | SYSTOLIC BLOOD PRESSURE: 109 MMHG | BODY MASS INDEX: 30.17 KG/M2 | TEMPERATURE: 97.2 F | RESPIRATION RATE: 18 BRPM

## 2024-03-28 PROBLEM — I61.9 CVA (CEREBROVASCULAR ACCIDENT DUE TO INTRACEREBRAL HEMORRHAGE) (MULTI): Status: ACTIVE | Noted: 2024-03-28

## 2024-03-28 PROBLEM — M25.551 PAIN OF RIGHT HIP: Status: ACTIVE | Noted: 2024-03-28

## 2024-03-28 LAB
ALBUMIN SERPL BCP-MCNC: 3 G/DL (ref 3.4–5)
ANION GAP SERPL CALC-SCNC: 8 MMOL/L (ref 10–20)
ATRIAL RATE: 89 BPM
BUN SERPL-MCNC: 16 MG/DL (ref 6–23)
CALCIUM SERPL-MCNC: 8.6 MG/DL (ref 8.6–10.6)
CHLORIDE SERPL-SCNC: 103 MMOL/L (ref 98–107)
CO2 SERPL-SCNC: 33 MMOL/L (ref 21–32)
CREAT SERPL-MCNC: 0.65 MG/DL (ref 0.5–1.05)
EGFRCR SERPLBLD CKD-EPI 2021: >90 ML/MIN/1.73M*2
ERYTHROCYTE [DISTWIDTH] IN BLOOD BY AUTOMATED COUNT: 13.3 % (ref 11.5–14.5)
GLUCOSE BLD MANUAL STRIP-MCNC: 114 MG/DL (ref 74–99)
GLUCOSE BLD MANUAL STRIP-MCNC: 157 MG/DL (ref 74–99)
GLUCOSE BLD MANUAL STRIP-MCNC: 174 MG/DL (ref 74–99)
GLUCOSE SERPL-MCNC: 141 MG/DL (ref 74–99)
HCT VFR BLD AUTO: 23.8 % (ref 36–46)
HGB BLD-MCNC: 7.6 G/DL (ref 12–16)
MAGNESIUM SERPL-MCNC: 1.77 MG/DL (ref 1.6–2.4)
MCH RBC QN AUTO: 29.9 PG (ref 26–34)
MCHC RBC AUTO-ENTMCNC: 31.9 G/DL (ref 32–36)
MCV RBC AUTO: 94 FL (ref 80–100)
NRBC BLD-RTO: 0 /100 WBCS (ref 0–0)
P AXIS: 82 DEGREES
P OFFSET: 195 MS
P ONSET: 140 MS
PHOSPHATE SERPL-MCNC: 2.2 MG/DL (ref 2.5–4.9)
PLATELET # BLD AUTO: 243 X10*3/UL (ref 150–450)
POTASSIUM SERPL-SCNC: 3.7 MMOL/L (ref 3.5–5.3)
PR INTERVAL: 148 MS
Q ONSET: 214 MS
QRS COUNT: 14 BEATS
QRS DURATION: 128 MS
QT INTERVAL: 410 MS
QTC CALCULATION(BAZETT): 498 MS
QTC FREDERICIA: 467 MS
R AXIS: 71 DEGREES
RBC # BLD AUTO: 2.54 X10*6/UL (ref 4–5.2)
SODIUM SERPL-SCNC: 140 MMOL/L (ref 136–145)
T AXIS: 46 DEGREES
T OFFSET: 419 MS
VENTRICULAR RATE: 89 BPM
WBC # BLD AUTO: 11.3 X10*3/UL (ref 4.4–11.3)

## 2024-03-28 PROCEDURE — 1180000001 HC REHAB PRIVATE ROOM DAILY

## 2024-03-28 PROCEDURE — 2500000001 HC RX 250 WO HCPCS SELF ADMINISTERED DRUGS (ALT 637 FOR MEDICARE OP)

## 2024-03-28 PROCEDURE — 2500000002 HC RX 250 W HCPCS SELF ADMINISTERED DRUGS (ALT 637 FOR MEDICARE OP, ALT 636 FOR OP/ED): Performed by: INTERNAL MEDICINE

## 2024-03-28 PROCEDURE — 85027 COMPLETE CBC AUTOMATED: CPT | Performed by: INTERNAL MEDICINE

## 2024-03-28 PROCEDURE — 2500000001 HC RX 250 WO HCPCS SELF ADMINISTERED DRUGS (ALT 637 FOR MEDICARE OP): Performed by: INTERNAL MEDICINE

## 2024-03-28 PROCEDURE — 2500000004 HC RX 250 GENERAL PHARMACY W/ HCPCS (ALT 636 FOR OP/ED)

## 2024-03-28 PROCEDURE — 82947 ASSAY GLUCOSE BLOOD QUANT: CPT

## 2024-03-28 PROCEDURE — 99232 SBSQ HOSP IP/OBS MODERATE 35: CPT

## 2024-03-28 PROCEDURE — 92526 ORAL FUNCTION THERAPY: CPT | Mod: GN

## 2024-03-28 PROCEDURE — 2500000002 HC RX 250 W HCPCS SELF ADMINISTERED DRUGS (ALT 637 FOR MEDICARE OP, ALT 636 FOR OP/ED): Performed by: STUDENT IN AN ORGANIZED HEALTH CARE EDUCATION/TRAINING PROGRAM

## 2024-03-28 PROCEDURE — 84100 ASSAY OF PHOSPHORUS: CPT | Performed by: INTERNAL MEDICINE

## 2024-03-28 PROCEDURE — 83735 ASSAY OF MAGNESIUM: CPT

## 2024-03-28 PROCEDURE — 94640 AIRWAY INHALATION TREATMENT: CPT

## 2024-03-28 PROCEDURE — 70450 CT HEAD/BRAIN W/O DYE: CPT

## 2024-03-28 PROCEDURE — 70450 CT HEAD/BRAIN W/O DYE: CPT | Performed by: RADIOLOGY

## 2024-03-28 PROCEDURE — 2500000005 HC RX 250 GENERAL PHARMACY W/O HCPCS: Performed by: INTERNAL MEDICINE

## 2024-03-28 PROCEDURE — 2500000002 HC RX 250 W HCPCS SELF ADMINISTERED DRUGS (ALT 637 FOR MEDICARE OP, ALT 636 FOR OP/ED)

## 2024-03-28 PROCEDURE — 2500000002 HC RX 250 W HCPCS SELF ADMINISTERED DRUGS (ALT 637 FOR MEDICARE OP, ALT 636 FOR OP/ED): Mod: MUE | Performed by: INTERNAL MEDICINE

## 2024-03-28 PROCEDURE — 2500000005 HC RX 250 GENERAL PHARMACY W/O HCPCS: Performed by: REGISTERED NURSE

## 2024-03-28 RX ORDER — BISACODYL 10 MG/1
10 SUPPOSITORY RECTAL DAILY PRN
Status: DISCONTINUED | OUTPATIENT
Start: 2024-03-28 | End: 2024-04-13 | Stop reason: HOSPADM

## 2024-03-28 RX ORDER — LEVOTHYROXINE SODIUM 50 UG/1
50 TABLET ORAL DAILY
Start: 2024-03-29

## 2024-03-28 RX ORDER — NITROGLYCERIN 0.4 MG/1
0.4 TABLET SUBLINGUAL EVERY 5 MIN PRN
Status: DISCONTINUED | OUTPATIENT
Start: 2024-03-28 | End: 2024-04-13 | Stop reason: HOSPADM

## 2024-03-28 RX ORDER — ACETAMINOPHEN 160 MG/5ML
650 SOLUTION ORAL EVERY 6 HOURS PRN
Status: DISCONTINUED | OUTPATIENT
Start: 2024-03-28 | End: 2024-04-06 | Stop reason: ALTCHOICE

## 2024-03-28 RX ORDER — ACETAMINOPHEN 500 MG
2000 TABLET ORAL DAILY
Start: 2024-03-28

## 2024-03-28 RX ORDER — ACETAMINOPHEN 500 MG
5 TABLET ORAL NIGHTLY PRN
Start: 2024-03-28

## 2024-03-28 RX ORDER — ALBUTEROL SULFATE 0.83 MG/ML
2.5 SOLUTION RESPIRATORY (INHALATION) EVERY 4 HOURS PRN
Status: DISCONTINUED | OUTPATIENT
Start: 2024-03-28 | End: 2024-04-13 | Stop reason: HOSPADM

## 2024-03-28 RX ORDER — LIDOCAINE 560 MG/1
1 PATCH PERCUTANEOUS; TOPICAL; TRANSDERMAL DAILY
Start: 2024-03-29 | End: 2024-04-13 | Stop reason: HOSPADM

## 2024-03-28 RX ORDER — SODIUM CHLORIDE 0.9 % (FLUSH) 0.9 %
10 SYRINGE (ML) INJECTION AS NEEDED
Status: DISCONTINUED | OUTPATIENT
Start: 2024-03-28 | End: 2024-03-28 | Stop reason: HOSPADM

## 2024-03-28 RX ORDER — HEPARIN 100 UNIT/ML
5 SYRINGE INTRAVENOUS ONCE AS NEEDED
Status: DISCONTINUED | OUTPATIENT
Start: 2024-03-28 | End: 2024-03-28 | Stop reason: HOSPADM

## 2024-03-28 RX ORDER — FORMOTEROL FUMARATE DIHYDRATE 20 UG/2ML
20 SOLUTION RESPIRATORY (INHALATION) 2 TIMES DAILY
Status: DISCONTINUED | OUTPATIENT
Start: 2024-03-28 | End: 2024-04-13 | Stop reason: HOSPADM

## 2024-03-28 RX ORDER — POLYETHYLENE GLYCOL 3350 17 G/17G
17 POWDER, FOR SOLUTION ORAL DAILY
Status: DISCONTINUED | OUTPATIENT
Start: 2024-03-28 | End: 2024-03-30

## 2024-03-28 RX ORDER — ACETAMINOPHEN 325 MG/1
650 TABLET ORAL EVERY 6 HOURS PRN
Status: DISCONTINUED | OUTPATIENT
Start: 2024-03-28 | End: 2024-03-28

## 2024-03-28 RX ORDER — IBUPROFEN 400 MG/1
400 TABLET ORAL ONCE
Status: COMPLETED | OUTPATIENT
Start: 2024-03-28 | End: 2024-03-28

## 2024-03-28 RX ORDER — LEVOTHYROXINE SODIUM 50 UG/1
50 TABLET ORAL DAILY
Status: DISCONTINUED | OUTPATIENT
Start: 2024-03-29 | End: 2024-04-13 | Stop reason: HOSPADM

## 2024-03-28 RX ORDER — FUROSEMIDE 20 MG/1
20 TABLET ORAL DAILY
Start: 2024-03-29

## 2024-03-28 RX ORDER — METOPROLOL TARTRATE 25 MG/1
25 TABLET, FILM COATED ORAL 3 TIMES DAILY
Status: DISCONTINUED | OUTPATIENT
Start: 2024-03-28 | End: 2024-04-13 | Stop reason: HOSPADM

## 2024-03-28 RX ORDER — ACETAMINOPHEN 500 MG
5 TABLET ORAL NIGHTLY PRN
Status: DISCONTINUED | OUTPATIENT
Start: 2024-03-28 | End: 2024-04-13 | Stop reason: HOSPADM

## 2024-03-28 RX ORDER — L. ACIDOPHILUS/L.BULGARICUS 1MM CELL
1 TABLET ORAL DAILY
Status: DISCONTINUED | OUTPATIENT
Start: 2024-03-28 | End: 2024-04-13 | Stop reason: HOSPADM

## 2024-03-28 RX ORDER — CHOLECALCIFEROL (VITAMIN D3) 50 MCG
2000 TABLET ORAL DAILY
Status: DISCONTINUED | OUTPATIENT
Start: 2024-03-29 | End: 2024-03-28

## 2024-03-28 RX ORDER — SODIUM CHLORIDE 0.9 % (FLUSH) 0.9 %
10 SYRINGE (ML) INJECTION EVERY 12 HOURS SCHEDULED
Status: DISCONTINUED | OUTPATIENT
Start: 2024-03-28 | End: 2024-03-28 | Stop reason: HOSPADM

## 2024-03-28 RX ORDER — SENNOSIDES 8.6 MG/1
2 TABLET ORAL 2 TIMES DAILY
Start: 2024-03-28 | End: 2024-04-13 | Stop reason: HOSPADM

## 2024-03-28 RX ORDER — LANOLIN ALCOHOL/MO/W.PET/CERES
400 CREAM (GRAM) TOPICAL
Start: 2024-03-28

## 2024-03-28 RX ORDER — NAPROXEN SODIUM 220 MG/1
81 TABLET, FILM COATED ORAL DAILY
Start: 2024-03-29 | End: 2024-05-09 | Stop reason: ALTCHOICE

## 2024-03-28 RX ORDER — BUDESONIDE 0.5 MG/2ML
0.5 INHALANT ORAL 2 TIMES DAILY
Status: DISCONTINUED | OUTPATIENT
Start: 2024-03-28 | End: 2024-04-13 | Stop reason: HOSPADM

## 2024-03-28 RX ORDER — LANOLIN ALCOHOL/MO/W.PET/CERES
400 CREAM (GRAM) TOPICAL DAILY
Status: DISCONTINUED | OUTPATIENT
Start: 2024-03-29 | End: 2024-04-13 | Stop reason: HOSPADM

## 2024-03-28 RX ORDER — ARFORMOTEROL TARTRATE 15 UG/2ML
15 SOLUTION RESPIRATORY (INHALATION)
Status: DISCONTINUED | OUTPATIENT
Start: 2024-03-28 | End: 2024-03-28

## 2024-03-28 RX ORDER — ESTRADIOL 0.1 MG/G
2 CREAM VAGINAL DAILY PRN
Status: DISCONTINUED | OUTPATIENT
Start: 2024-03-28 | End: 2024-04-13 | Stop reason: HOSPADM

## 2024-03-28 RX ORDER — ALBUTEROL SULFATE 90 UG/1
2 AEROSOL, METERED RESPIRATORY (INHALATION) EVERY 6 HOURS PRN
Status: DISCONTINUED | OUTPATIENT
Start: 2024-03-28 | End: 2024-03-28

## 2024-03-28 RX ORDER — NAPROXEN SODIUM 220 MG/1
81 TABLET, FILM COATED ORAL DAILY
Status: DISCONTINUED | OUTPATIENT
Start: 2024-03-29 | End: 2024-04-13 | Stop reason: HOSPADM

## 2024-03-28 RX ORDER — FUROSEMIDE 20 MG/1
20 TABLET ORAL DAILY
Status: DISCONTINUED | OUTPATIENT
Start: 2024-03-29 | End: 2024-04-13 | Stop reason: HOSPADM

## 2024-03-28 RX ORDER — LIDOCAINE 560 MG/1
1 PATCH PERCUTANEOUS; TOPICAL; TRANSDERMAL DAILY
Status: DISCONTINUED | OUTPATIENT
Start: 2024-03-29 | End: 2024-04-13 | Stop reason: HOSPADM

## 2024-03-28 RX ORDER — ACETAMINOPHEN 325 MG/1
650 TABLET ORAL EVERY 6 HOURS PRN
Qty: 30 TABLET | Refills: 0 | Status: SHIPPED | OUTPATIENT
Start: 2024-03-28

## 2024-03-28 RX ORDER — ACETAMINOPHEN 500 MG
1 TABLET ORAL DAILY
Start: 2024-03-28

## 2024-03-28 RX ORDER — METOPROLOL TARTRATE 25 MG/1
25 TABLET, FILM COATED ORAL 3 TIMES DAILY
Start: 2024-03-28

## 2024-03-28 RX ADMIN — FORMOTEROL FUMARATE DIHYDRATE 20 MCG: 20 SOLUTION RESPIRATORY (INHALATION) at 20:24

## 2024-03-28 RX ADMIN — ALBUTEROL SULFATE 2.5 MG: 2.5 SOLUTION RESPIRATORY (INHALATION) at 17:25

## 2024-03-28 RX ADMIN — BUDESONIDE INHALATION 0.5 MG: 0.5 SUSPENSION RESPIRATORY (INHALATION) at 20:24

## 2024-03-28 RX ADMIN — LEVOTHYROXINE SODIUM 50 MCG: 50 TABLET ORAL at 05:10

## 2024-03-28 RX ADMIN — SENNOSIDES 17.2 MG: 8.6 TABLET, FILM COATED ORAL at 10:28

## 2024-03-28 RX ADMIN — INSULIN LISPRO 1 UNITS: 100 INJECTION, SOLUTION INTRAVENOUS; SUBCUTANEOUS at 10:28

## 2024-03-28 RX ADMIN — HEPARIN SODIUM 5000 UNITS: 5000 INJECTION INTRAVENOUS; SUBCUTANEOUS at 02:20

## 2024-03-28 RX ADMIN — ALBUTEROL SULFATE 2.5 MG: 2.5 SOLUTION RESPIRATORY (INHALATION) at 03:00

## 2024-03-28 RX ADMIN — BUDESONIDE 0.5 MG: 0.5 INHALANT RESPIRATORY (INHALATION) at 10:18

## 2024-03-28 RX ADMIN — POLYETHYLENE GLYCOL 3350 17 G: 17 POWDER, FOR SOLUTION ORAL at 10:28

## 2024-03-28 RX ADMIN — METOPROLOL TARTRATE 25 MG: 25 TABLET, FILM COATED ORAL at 17:25

## 2024-03-28 RX ADMIN — METOPROLOL TARTRATE 25 MG: 25 TABLET, FILM COATED ORAL at 10:28

## 2024-03-28 RX ADMIN — Medication 1 TABLET: at 17:25

## 2024-03-28 RX ADMIN — TIOTROPIUM BROMIDE INHALATION SPRAY 2 PUFF: 3.12 SPRAY, METERED RESPIRATORY (INHALATION) at 10:14

## 2024-03-28 RX ADMIN — HEPARIN SODIUM 5000 UNITS: 5000 INJECTION INTRAVENOUS; SUBCUTANEOUS at 10:28

## 2024-03-28 RX ADMIN — FUROSEMIDE 20 MG: 20 TABLET ORAL at 10:28

## 2024-03-28 RX ADMIN — IBUPROFEN 400 MG: 400 TABLET, FILM COATED ORAL at 04:20

## 2024-03-28 RX ADMIN — ASPIRIN 81 MG 81 MG: 81 TABLET ORAL at 10:28

## 2024-03-28 RX ADMIN — METOPROLOL TARTRATE 25 MG: 25 TABLET, FILM COATED ORAL at 21:00

## 2024-03-28 RX ADMIN — LIDOCAINE 1 PATCH: 4 PATCH TOPICAL at 10:26

## 2024-03-28 RX ADMIN — ACETAMINOPHEN 650 MG: 160 SOLUTION ORAL at 22:59

## 2024-03-28 RX ADMIN — Medication 2 L/MIN: at 17:30

## 2024-03-28 RX ADMIN — Medication 5 MG: at 23:06

## 2024-03-28 RX ADMIN — ACETAMINOPHEN 650 MG: 325 TABLET ORAL at 14:18

## 2024-03-28 SDOH — SOCIAL STABILITY: SOCIAL INSECURITY: WERE YOU ABLE TO COMPLETE ALL THE BEHAVIORAL HEALTH SCREENINGS?: YES

## 2024-03-28 SDOH — SOCIAL STABILITY: SOCIAL INSECURITY: HAS ANYONE EVER THREATENED TO HURT YOUR FAMILY OR YOUR PETS?: NO

## 2024-03-28 SDOH — SOCIAL STABILITY: SOCIAL INSECURITY: ARE YOU OR HAVE YOU BEEN THREATENED OR ABUSED PHYSICALLY, EMOTIONALLY, OR SEXUALLY BY ANYONE?: YES

## 2024-03-28 SDOH — SOCIAL STABILITY: SOCIAL INSECURITY: ARE THERE ANY APPARENT SIGNS OF INJURIES/BEHAVIORS THAT COULD BE RELATED TO ABUSE/NEGLECT?: NO

## 2024-03-28 SDOH — SOCIAL STABILITY: SOCIAL INSECURITY: DOES ANYONE TRY TO KEEP YOU FROM HAVING/CONTACTING OTHER FRIENDS OR DOING THINGS OUTSIDE YOUR HOME?: NO

## 2024-03-28 SDOH — SOCIAL STABILITY: SOCIAL INSECURITY: ABUSE: ADULT

## 2024-03-28 SDOH — SOCIAL STABILITY: SOCIAL INSECURITY: DO YOU FEEL ANYONE HAS EXPLOITED OR TAKEN ADVANTAGE OF YOU FINANCIALLY OR OF YOUR PERSONAL PROPERTY?: NO

## 2024-03-28 SDOH — SOCIAL STABILITY: SOCIAL INSECURITY: DO YOU FEEL UNSAFE GOING BACK TO THE PLACE WHERE YOU ARE LIVING?: NO

## 2024-03-28 SDOH — SOCIAL STABILITY: SOCIAL INSECURITY: HAVE YOU HAD THOUGHTS OF HARMING ANYONE ELSE?: NO

## 2024-03-28 ASSESSMENT — ACTIVITIES OF DAILY LIVING (ADL)
TOILETING: INDEPENDENT
BATHING: INDEPENDENT
BLADDER: CONTINENT
DRESSING: INDEPENDENT
BOWEL: CONTINENT
DOMESTIC_CHORES: REQUIRED ASSISTANCE

## 2024-03-28 ASSESSMENT — PATIENT HEALTH QUESTIONNAIRE - PHQ9
2. FEELING DOWN, DEPRESSED OR HOPELESS: NOT AT ALL
SUM OF ALL RESPONSES TO PHQ9 QUESTIONS 1 & 2: 0
SUM OF ALL RESPONSES TO PHQ9 QUESTIONS 1 & 2: 0
1. LITTLE INTEREST OR PLEASURE IN DOING THINGS: NOT AT ALL
1. LITTLE INTEREST OR PLEASURE IN DOING THINGS: NOT AT ALL
2. FEELING DOWN, DEPRESSED OR HOPELESS: NOT AT ALL

## 2024-03-28 ASSESSMENT — LIFESTYLE VARIABLES
PRESCIPTION_ABUSE_PAST_12_MONTHS: NO
SUBSTANCE_ABUSE_PAST_12_MONTHS: NO
HOW OFTEN DO YOU HAVE A DRINK CONTAINING ALCOHOL: NEVER
SKIP TO QUESTIONS 9-10: 1
HOW MANY STANDARD DRINKS CONTAINING ALCOHOL DO YOU HAVE ON A TYPICAL DAY: PATIENT DOES NOT DRINK
HOW OFTEN DO YOU HAVE 6 OR MORE DRINKS ON ONE OCCASION: NEVER
AUDIT-C TOTAL SCORE: 0
AUDIT-C TOTAL SCORE: 0

## 2024-03-28 ASSESSMENT — BRIEF INTERVIEW FOR MENTAL STATUS (BIMS)
INITIAL REPETITION OF BED BLUE SOCK - FIRST ATTEMPT: 3
ASKED TO RECALL BLUE: YES, NO CUE REQUIRED
WHAT DAY OF THE WEEK IS IT: CORRECT
WHAT YEAR IS IT: CORRECT
ASKED TO RECALL BED: NO, COULD NOT RECALL
COGNITIVE PATTERN ASSESSMENT USED: BIMS
ASKED TO RECALL SOCK: YES, NO CUE REQUIRED
BIMS SUMMARY SCORE: 13
WHAT MONTH IS IT: ACCURATE WITHIN 5 DAYS

## 2024-03-28 ASSESSMENT — PAIN SCALES - GENERAL
PAINLEVEL_OUTOF10: 5 - MODERATE PAIN
PAINLEVEL_OUTOF10: 2
PAINLEVEL_OUTOF10: 3
PAINLEVEL_OUTOF10: 4

## 2024-03-28 ASSESSMENT — PAIN DESCRIPTION - LOCATION
LOCATION: GROIN
LOCATION: GENERALIZED

## 2024-03-28 ASSESSMENT — COLUMBIA-SUICIDE SEVERITY RATING SCALE - C-SSRS
1. IN THE PAST MONTH, HAVE YOU WISHED YOU WERE DEAD OR WISHED YOU COULD GO TO SLEEP AND NOT WAKE UP?: NO
6. HAVE YOU EVER DONE ANYTHING, STARTED TO DO ANYTHING, OR PREPARED TO DO ANYTHING TO END YOUR LIFE?: NO
2. HAVE YOU ACTUALLY HAD ANY THOUGHTS OF KILLING YOURSELF?: NO

## 2024-03-28 ASSESSMENT — PAIN - FUNCTIONAL ASSESSMENT
PAIN_FUNCTIONAL_ASSESSMENT: 0-10

## 2024-03-28 ASSESSMENT — PAIN DESCRIPTION - DESCRIPTORS: DESCRIPTORS: ACHING

## 2024-03-28 NOTE — DISCHARGE INSTRUCTIONS
Dear Trinity Hernandez,    You presented to an outside hospital after presenting with a fall and being found down.  You were found to have a stroke.    You were transferred to [facility] for consideration of a surgical procedure called a thrombectomy to treat the stroke.  Prior to transfer you received a clot busting medication.  After the clot busting medicine was given you were noted to have a bleed in your brain.  You were given a medication to reverse the action of the clot busting medication.    When you arrive to [facility] you underwent the surgical procedure called a thrombectomy to remove the blood clot from your brain that is causing the stroke.  The bleed in your brain was shown to be without worsening and you continued to improve.    You were also evaluated by the cardiologist and received a new diagnosis of atrial fibrillation.  Atrial fibrillation is a heart condition in which your heart beats irregularly.  Once a repeat brain scan has been done in 4 weeks, your outpatient stroke neurologist will determine when it is safe to be on a blood thinner.  The blood thinner will help prevent clots from forming your heart due to the atrial fibrillation that can cause another stroke.    You are going to be discharged to an acute rehabilitation facility to work on recovering from the stroke.    Medication updates:  -Your medications that were previously given by mouth will now be given via the feeding tube in her nose    New medications:  -Aspirin: Take 81 mg of aspirin daily to help prevent future strokes and heart attacks.  -Lidocaine patch: This is a pain patch that can be applied to your knee and hip while the swelling resolves.  -Melatonin: This is a medication to help you with sleeping at night.  You can take this as needed.  -Metoprolol: This is a medication to control your heart rate given that you have been diagnosed with atrial  fibrillation that can cause rapid heart rate.  Please take 25 mg 3 times a day.  -Sennoside: This is a medication to prevent constipation.    Medication changes:  -Now that you have the feeding tube in your nose, you will need to stop taking clopidogrel, docusate senna, ferrous sulfate, metoprolol succinate, and polyethylene glycol.  -We do not recommend continuing the lorazepam (Ativan).    Follow-up appointments:  -CT head: You will undergo repeat brain imaging in 4 weeks. Please call  Radiology Scheduling at [phone] to arrange this MRI.    -You will undergo a repeat swallowing evaluation in 2-4 weeks.  -Stroke neurology: You will follow-up with the stroke neurologist after you have a CT scan to determine when it is safe to start the blood thinner that is needed for your new diagnosis of atrial fibrillation. You have a virtual appointment with the stroke neurologist, Dr. Juliet Rausch, scheduled for Thursday, May 9th at 11:30 am. This appointment will be over the phone.  -Cardiology: With your new diagnosis of atrial fibrillation, we want you to follow-up with the cardiologist.  You will follow-up with [facility] cardiology on 4/30/2024 at 2 pm .  The address is [address] [place] [address]. Phone: [phone]  -Primary care provider: You will follow-up with your primary care provider, Dr. Lisette Samaniego, on April 10th, 2024.     If you need to make any scheduling changes to your appointments, please call [phone] to schedule these follow-up appointments.     We wish you all the [facility] Neurology Team

## 2024-03-28 NOTE — NURSING NOTE
Patient arrived via stretcher from [facility]. Patient was a 3 person transfer from stretcher to bed. Patient is on 2l/min 02 via NC.  Dr Carlisle notified of arrival.

## 2024-03-28 NOTE — CARE PLAN
The patient's goals for the shift include  pain control    The clinical goals for the shift include Patient will remain safe overnight.    Over the shift, the patient was safe and had a stable exam. Patient endorsed some hip and groin pain, but now sleeping after receiving ibuprofen.  Problem: General Stroke  Goal: Tolerate enteral feeding throughout shift  Outcome: Met  Goal: Controlled blood glucose throughout shift  Outcome: Met

## 2024-03-28 NOTE — PROGRESS NOTES
Speech-Language Pathology  Adult Inpatient Swallow Treatment    Patient Name: Trinity Hernandez  MRN: [id number]  Today's Date: 3/28/2024   Start Time: 940  Stop Time: 1000  Time Calculation (min): 20 min    Impression:   Dysphagia tx completed. Pt received awake positioned upright in bed. SLP instructed pt to complete Ami maneuver as well as CTAR (chin tuck against resistance). Pt completed x10 reps of the Ami maneuver and x5 reps of CTAR. Pt able to recall 50% of safe swallow strategies when provided w/ verbal cues (chin-tuck and triple swallow). Pt given trials of puree (applesauce) and was instructed by SLP to utilize the chin tuck method as well as triple effortful swallows. Pt consumed ~ 2 oz of puree while adequately using techniques w/ no s/s or clinical c/f aspiration. Recommend completion of a repeat instrumental assessment (MBSS) in 2-4 weeks to reassess swallow function. RN and MD notified.      Recommendations:  NPO  Frequent, aggressive oral care is strongly recommended to improve infection control as well as reduce dental plaque and bacteria on oropharyngeal surfaces which may increase the risk nosocomial infections, including pneumonia.  OK for small amounts of ice chips (3-5/hr) after aggressive oral care is completed.  Puree tsp trials w/ SLP only (chin tuck, triple effortful swallow).  Repeat MBSS in 2-4 weeks.     Goal:   Pt will tolerate least restrictive diet and recall/utilize safe swallow guidelines independently with no clinical s/s of aspiration 100% of time         Plan:  SLP Services Indicated: Yes  Frequency: 2x week  Discussed POC with patient  SLP - OK to Discharge    Pain:   0-10  0 = No pain.     Inpatient Education:  Extensive education provided to patient regarding current swallow function, recommendations/results, and POC.      Consultations/Referrals/Coordination of Services:   N/A    Lolis Polanco - Student SLP  Supervising SLP was present, actively participated, and made  all clinical decisions during session. Lillian Herman M.A., CCC-SLP

## 2024-03-28 NOTE — PROGRESS NOTES
SW found out that pt's transportation for 12:30pm today was canceled. SW called [employer], and they reported it is canceled by  care coordinator, but no SW or TCC canceled this pt's transportation, and SW did not receive any notice. SW re-booked transportation to [employer] via phone, and crew will be here between 2pm to 3pm for pt's discharge.     Shelley Ward, MERARIA, LSW

## 2024-03-28 NOTE — DISCHARGE SUMMARY
Discharge Diagnosis  Ischemic stroke (CMS/HCC)    Problem List  Principal Problem:    Ischemic stroke (CMS/HCC)  Active Problems:    Pain of right hip      Trinity Hernandez is a 74 y.o. female who presented to the hospital with falls. They were diagnosed with a stroke.  Etiology: Ischemic Stroke: Large artery atherosclerosis    Relevant hospital complications: None  Discharge antithrombotics: ASA 81 mg  Pending evaluation:    Repeat CT head in 4 weeks to evaluate for SDH and infarct resolution   Issues Requiring Follow-Up  -CT head: Perform in 4 weeks.  -Stroke neurology: Follow-up for left MCA infarct status post TNK (reversed with cryo as developed subdural hematoma) and mechanical thrombectomy with TICI 2B.  Follow-up CT head performed 4 weeks postdischarge for consideration of safety of Eliquis initiation for atrial fibrillation.  -Cardiology: Follow-up for atrial fibrillation, chronic diastolic heart failure, and NSVT. Scheduled at [facility] with SURI Garcia at 4/30 2pm.  Metoprolol 25 mg 3 times daily initiated during hospitalization.   -Primary care provider: Follow-up for hypertension. Hospitalization follow-up.  -Modified barium swallow study: Repeat in 2-4 weeks after discharge (between April 10 and 24, 2024)    Imaging results:  CT head 3/24/24:  No evidence of acute cortical infarct or intracranial hemorrhage.  No evidence of intracranial hemorrhage or displaced skull fracture.    CTA head and neck 3/24/24:  Acute subdural hematoma of the right frontal and right parietal  convexity extending to the right occipital region with a concern for  right posterior parietal/occipital acute subarachnoid hemorrhage.  There is 2-3 mm midline shift to the left. No intraventricular  hemorrhage is seen. No signs of acute cortical infarction at this  study. Recommend clinical correlation and follow-up.      CT perfusion 3/24/24:  1. Loss of gray-white differentiation and sulcal effacement within  the left insular  and frontal lobe cortex (series 204, images 35-48).  There is also loss of gray-white matter distinction involving the  left corpus striatum (series 204, images 34-44). Findings are in  keeping with acute to subacute ischemia.  2. Acute ischemic injury with hypoperfused left MCA territory with  total volume of 153 mL and core infarct measuring 10 mL. Mismatch  ratio is 15.3.  3. Unchanged 0.6 cm right subdural hemorrhage with 0.3 cm of  right-to-left midline shift. No new areas of hemorrhage identified.    Diagnostic cerebral angiogram 3/24/24:  1. Left middle cerebral artery occlusion in the M1 segment.  2. Status post mechanical thrombectomy with residual mild  irregularity and narrowing near the M1 bifurcation, sluggish filling  of some distal branches, and a parenchymal filling defect within the  left frontal lobe consistent with TICI 2b reperfusion.  3. X per CT head without evidence of hemorrhage or other new acute  intracranial pathology.    MRI brain wo contrast 3/25/24:  Status post thrombectomy. Acute to subacute infarcts involving the  left MCA territory correspond to CT findings demonstrated on  03/24/2024. No new areas of infarction. No associated hemorrhage.      Acute subdural hemorrhage along the right cerebral convexity is  similar to previous CT given differences in technique.      Stable leftward midline shift.      Scalp contusion over the right parietal region.    TTE 3/25/24:   1. Poorly visualized anatomical structures due to suboptimal image quality.   2. Left ventricular systolic function is low normal with a 50-55% estimated ejection fraction.   3. Abnormal septal motion consistent with left bundle branch block.   4. Spectral Doppler shows an impaired relaxation pattern of left ventricular diastolic filling.   5. Mildly elevated RVSP.   6. Compared with study from 4/20/2021, EF appears improved. Reported as 35-40%.        Results from last 7 days   Lab Units 03/25/24  7954   HEMOGLOBIN A1C  % 5.3     BNP   Date/Time Value Ref Range Status   03/25/2024 03:32 AM 34 0 - 99 pg/mL Final       Test Results Pending At Discharge  Pending Labs       No current pending labs.            Hospital Course  74 y.o. female with history significant for CAD (s/p stent, on DAPT), NSVT, Paroxysmal Atrial Fibrillation, Chronic Diastolic heart Failure, HTN, AAA, HLD, COPD, Chronic Hypoxic Respiratory Failure 2/2 Pulmonary Sarcoidosis (on 2L at night), Hypothyroidism, Migraines, Hiatal Hernia, and Diverticulosis who presents with L MCA CVA s/p TNK (developed SDH, reversed with cryoprecipitate) and Thrombectomy (TICI 2b) on 3/25/2024. Reportedly initial NIHSS 2-3 on arrival. /80, . CTH did not show any acute hemorrhage or early ischemic changes. CTA showed L MCA occlusion. She received TNK 19 mg at 1530, after which she was noted to have worsening NIHSS to 8 and increased work of breathing requiring intubation. Repeat CTH showed 7mm R subdural hematoma. She was transferred to [facility] for cryoprecipitate and possible MT. S/p cryoprecipitate transfusion for TNK reversal. Taken for MT, with TICI 2b.  Repeat CT head was stable after initiation of aspirin.  Started on atorvastatin 40 mg.  Plan to repeat CT head in 4 weeks after discharge with stroke neurology follow-up to evaluate for safety of initiating anticoagulation given new diagnosis of atrial fibrillation.    Cardiology subsequently consulted for recommendations on when to restart Plavix in the setting of her dual antiplatelet therapy for previous coronary artery stents.  Determined safe to restart aspirin without need for Plavix as patient had been on DAPT greater than 6 months.  Patient with new diagnosis of atrial fibrillation was subsequently started metoprolol tartrate 25 mg 3 times daily.  Cardiology recommended initiation of Eliquis 5 mg twice daily when safe from a stroke standpoint.    On modified barium swallow study patient noted to slightly aspirate.   Patient subsequently had nasogastric tube bridled for bolus feeding.  Plan to repeat modified barium swallow study in 2-4 weeks.    Patient improved neurologically and subsequently discharged to acute rehab.      Follow-up:  -CT head: Perform in 4 weeks.  -Stroke neurology: Follow-up for left MCA infarct status post TNK (reversed with cryo as developed subdural hematoma) and mechanical thrombectomy with TICI 2B.  Follow-up CT head performed 4 weeks postdischarge for consideration of safety of Eliquis initiation for atrial fibrillation.  -Cardiology: Follow-up for atrial fibrillation, chronic diastolic heart failure, and NSVT. Scheduled at [facility] with SURI Garcia at 4/30 2pm.  Metoprolol 25 mg 3 times daily initiated during hospitalization.   -Primary care provider: Follow-up for hypertension. Hospitalization follow-up.  -Modified barium swallow study: Repeat in 2-4 weeks after discharge (between April 10 and 24, 2024)    Pertinent Physical Exam At Time of Discharge  Physical Exam  Neurological Exam  Neurological Exam  GENERAL: Resting comfortably, no acute distress  HENT: No scleral icterus or conjunctival erythema. No carotid bruit.  CARDIO: Normal S1 and S2 without murmurs.   PULM: Vesicular breath sounds without wheezes or crackles.  ABD: Tenderness to palpation lower abdomen.     MENTAL STATE: Alert and oriented to name, place, date, situation.  Naming and repetition intact.     CRANIAL NERVES:   CN 2 Visual fields full to confrontation.   CN 3, 4, 6 Pupils round, 3 mm in diameter, equally reactive to light. Lids symmetric; no ptosis. EOMs normal alignment, full range with normal saccades, pursuit and convergence. No nystagmus.   CN 5 Facial sensation intact bilaterally to light touch  CN 7 right facial droop  CN 8 Hearing intact to conversation.  CN 9 Palate elevates symmetrically.   CN 11 Normal strength of shoulder shrug.  CN 12 Tongue midline, with normal bulk and strength; no fasciculations.  No  dysarthria.     MOTOR:   Tremulous movements in lower extremities  Right upper extremity.  No drift  Left upper extremity: No drift  Bilateral lower extremities: No drift with.  Limited exam in right lower extremity due to hip pain     REFLEXES:                           R          L  BR:                   2+        2+     Biceps:             2+        2+       Knee:                2+        2+  Ankle:               2+        2+     No ankle clonus.     SENSORY: Grimace x 4.     COORDINATION: Coordination intact in upper and lower extremities without evidence of ataxia or dysmetria.     GAIT: Deferred     Relevant Results  NIHSS     1a  Level of consciousness: 0=alert; keenly responsive   1b. LOC questions:  0=Performs both tasks correctly   1c. LOC commands: 0=Performs both tasks correctly   2.  Best Gaze: 0=normal   3. Visual: 0=No visual loss   4. Facial Palsy: 1=Minor paralysis (flattened nasolabial fold, asymmetric on smiling)   5a. Motor Left Arm: 0=No drift, limb holds 90 (or 45) degrees for full 10 seconds   5b.  Motor Right Arm: 0=No drift, limb holds 90 (or 45) degrees for full 10 seconds   6a. Motor Left Le=No drift, limb holds 90 (or 45) degrees for full 10 seconds   6b  Motor Right Le=Drift, limb holds 90 (or 45) degrees but drifts down before full 10 seconds: does not hit bed   7. Limb Ataxia: 0=Absent   8.  Sensory: 0=Normal; no sensory loss   9. Best Language:  0=No aphasia, normal   10. Dysarthria: 0=Normal   11. Extinction and Inattention: 0=No abnormality             Total:   2     Outpatient Follow-Up  Future Appointments   Date Time Provider Department [place]   4/10/2024  2:45 PM Lisette Samaniego DO [id number] [facility]   2024  1:00 PM INF 01 CONNEAUT CONSCCINF [facility]   2024 11:30 AM Juliet Coronel MD [id number] [facility]   6/3/2024  2:00 PM Lisette Samaniego DO [id number] [facility]       Pedro Hilliard MD

## 2024-03-28 NOTE — PROGRESS NOTES
90 on 2 L via nasal cannula Trinity Hernandez is a 74 y.o. female on day 4 of admission presenting with Ischemic stroke (CMS/HCC).      Subjective   No acute events overnight. Ongoing right hip pain though R hip x-ray negative for fracture. Tolerating tube feeds.  On supplemental oxygen, 2 L via nasal cannula.  Satting high 90s, versus home rate.  Exam stable from yesterday.         Objective     Last Recorded Vitals  /51 (BP Location: Left arm, Patient Position: Lying)   Pulse 97   Temp 37 °C (98.6 °F) (Temporal)   Resp (!) 28   Wt 87.2 kg (192 lb 3.9 oz)   SpO2 97%     Last BM Date: 03/27/24         Intake/Output last 3 Shifts:    Intake/Output Summary (Last 24 hours) at 3/28/2024 0654  Last data filed at 3/28/2024 0600  Gross per 24 hour   Intake 1505 ml   Output 1352 ml   Net 153 ml         Admission Weight  Weight: 83.1 kg (183 lb 3.2 oz) (03/24/24 2302)    Daily Weight  03/27/24 : 87.2 kg (192 lb 3.9 oz)      Physical Exam  Neurological Exam  GENERAL: Resting comfortably, no acute distress  HENT: No scleral icterus or conjunctival erythema. No carotid bruit.  CARDIO: Normal S1 and S2 without murmurs.   PULM: Vesicular breath sounds without wheezes or crackles.  ABD: Tenderness to palpation lower abdomen.     MENTAL STATE: Alert and oriented to name, place, date, situation.  Naming and repetition intact.     CRANIAL NERVES:   CN 2 Visual fields full to confrontation.   CN 3, 4, 6 Pupils round, 3 mm in diameter, equally reactive to light. Lids symmetric; no ptosis. EOMs normal alignment, full range with normal saccades, pursuit and convergence. No nystagmus.   CN 5 Facial sensation intact bilaterally to light touch  CN 7 right facial droop  CN 8 Hearing intact to conversation.  CN 9 Palate elevates symmetrically.   CN 11 Normal strength of shoulder shrug.  CN 12 Tongue midline, with normal bulk and strength; no fasciculations.  No dysarthria.     MOTOR:   Tremulous movements in lower  extremities  Right upper extremity.  No drift  Left upper extremity: No drift  Bilateral lower extremities: No drift with.  Limited exam in right lower extremity due to hip pain     REFLEXES:                           R          L  BR:                   2+        2+     Biceps:             2+        2+       Knee:                2+        2+  Ankle:               2+        2+     No ankle clonus.     SENSORY: Grimace x 4.     COORDINATION: Coordination intact in upper and lower extremities without evidence of ataxia or dysmetria.     GAIT: Deferred     Relevant Results  NIHSS     1a  Level of consciousness: 0=alert; keenly responsive   1b. LOC questions:  0=Performs both tasks correctly   1c. LOC commands: 0=Performs both tasks correctly   2.  Best Gaze: 0=normal   3. Visual: 0=No visual loss   4. Facial Palsy: 1=Minor paralysis (flattened nasolabial fold, asymmetric on smiling)   5a. Motor Left Arm: 0=No drift, limb holds 90 (or 45) degrees for full 10 seconds   5b.  Motor Right Arm: 0=No drift, limb holds 90 (or 45) degrees for full 10 seconds   6a. Motor Left Le=No drift, limb holds 90 (or 45) degrees for full 10 seconds   6b  Motor Right Le=Drift, limb holds 90 (or 45) degrees but drifts down before full 10 seconds: does not hit bed   7. Limb Ataxia: 0=Absent   8.  Sensory: 0=Normal; no sensory loss   9. Best Language:  0=No aphasia, normal   10. Dysarthria: 0=Normal   11. Extinction and Inattention: 0=No abnormality             Total:   2              [place] Coma Scale  Best Eye Response: Spontaneous  Best Verbal Response: Oriented  Best Motor Response: Follows commands  Aditya Coma Scale Score: 15      Relevant Results    Scheduled medications  aspirin, 81 mg, nasogastric tube, Daily  budesonide, 0.5 mg, nebulization, BID  furosemide, 20 mg, nasogastric tube, Daily  heparin (porcine), 5,000 Units, subcutaneous, q8h  insulin lispro, 0-5 Units, subcutaneous, q4h NIKO  levothyroxine, 50 mcg, nasogastric  tube, Daily  lidocaine, 1 patch, transdermal, Daily  melatonin, 5 mg, nasogastric tube, Nightly  metoprolol tartrate, 25 mg, nasogastric tube, TID  perflutren protein A microsphere, 0.5 mL, intravenous, Once in imaging  polyethylene glycol, 17 g, nasogastric tube, Daily  sennosides, 2 tablet, nasogastric tube, BID  sulfur hexafluoride microsphr, 2 mL, intravenous, Once in imaging  tiotropium, 2 puff, inhalation, Daily      Continuous medications       PRN medications  PRN medications: acetaminophen, albuterol, dextrose, glucagon, [] hydrALAZINE **FOLLOWED BY** [PENDING] hydrALAZINE **FOLLOWED BY** hydrALAZINE **FOLLOWED BY** [PENDING] hydrALAZINE, oxygen     Lab Results   Component Value Date    WBC 11.3 2024    HGB 7.6 (L) 2024    HCT 23.8 (L) 2024    MCV 94 2024     2024     Lab Results   Component Value Date    GLUCOSE 141 (H) 2024    CALCIUM 8.6 2024     2024    K 3.7 2024    CO2 33 (H) 2024     2024    BUN 16 2024    CREATININE 0.65 2024             Assessment/Plan      Ms. Hernandez is a 74 y.o. female with PMH COPD, CAD s/p PCI, HTN, HLD, sarcoidosis, rheumatoid arthritis, and hypothyroidism who presented to OSH after fall and found to have right facial droop and RUE weakness. Reportedly initial NIHSS 2-3. /80, . CTH did not show any acute hemorrhage or early ischemic changes. CTA showed L MCA occlusion. She received TNK 19 mg at 1530, after which she was noted to have worsening NIHSS to 8 and increased work of breathing requiring intubation. Repeat CTH showed 7mm R subdural hematoma. She was transferred to [facility] for cryoprecipitate and possible MT. S/p cryoprecipitate transfusion. Taken for MT, with TICI 2b.       Type: Ischemic stroke  Subtype/etiology: LVO  Vessels involved: L MCA  Neurological manifestations:  NIHSS (worst at presentation): 18   Diagnostic evaluation:  CTH/CTA/CTP  Antiplatelet/antithrombotic plan for stroke prevention: ASA and atorvastatin   VTE prophylaxis: 24hr post TNK      Vascular Risk Factor modification goals:  Blood pressure goals: avoid hypotension SBP <100 that could worsen cerebral perfusion, Ischemic stroke post-thrombolysis- BP < 180/105 mmHg for 24hr  Lipid Goals: education on healthy diet and statin therapy to maintain or achieve goal LDL-cholesterol < 70mg  Glucose Goals: early treatment of hyperglycemia to goal glucose 140-180 mg/dl with long-term goal A1c < 7%   Smoking Cessation and Education  Assessment for Rehabilitation needs   Patient and family education on signs and symptoms of stroke, calling 911, healthy strategies for stroke prevention.       Updates 3/28:  -CT head stable SDH and no hemorrhage within infarct  -Started ASA 81 mg and DVT prophylaxis yesterday  -Tube feeds initiated, tolerating  -Outpatient follow-up with cardiology request for Afib follow-up  -R hip XR: no fracture  -Repeat CT scan 4 weeks after discharge for determination of AC restart  -Failed MBSS, pending NG and initiation of tube feeds. SLP recommends repeat MBSS in 2-4 weeks. Will consider continuing NG tube vs PEG  -Discharge today     #L MCA occlusion s/p mTICI 2B post-thrombectomy  #complicated by post TNK SDH  #Afib  ::Initial NIHSS 3, s/p TNK; Worsening in clinical status, rCTH showed R SDH S/p cryoprecipitate transfusion   ::NIHSS on arrival to [facility] (intubated) was 18, S/p MT, TICI 2b, NIHSS improved to 7   ::Etiology likely cardio embolic in the setting of known Afib   ::CT head 3/28: after initiation of ASA 81 mg, stable SDH and no hemorrhage within infarct  :: LDL 41, A1c 5.3  - MICHAELA status  - Neuro-checks q2hr   - BP goal <180 systolic   - Pending TTE  - Hold ASA and Plavix  - Will need to be AC for the long-term, need cardiology clearance for anti-platelets therapy (was on DAPT at home for CAD s/p stent)   - PT/OT/SLP   - NPO: isosource 1.5 @ 200 ml q4  with 60 ml FWF before and after bolus via NG tube  - Consult neurosurgery for SDH, ok for ASA on post bleed day 3, DVT prophylaxis on post bleed day 2. Needs CT head in 2 weeks, neurosurgery to arrange  - ASA 81 mg started 3/27  -Repeat CT scan 4 weeks after discharge for determination of AC restart     #CHF   #CAD s.p stent   #Afib  #HTN  Trop on presentation 22   Latest TTE on 2021: EF 35-40%   Home meds: ASA 81mg, Plavix 75mg, Lasix 20 mg daily, and Metoprolol S. 75 mg daily   - Continue home lasix 20 mg daily   - Switch to Metoprolol T. 25 mg TID   - Resume ASA 81 mg daily 24hr post TNK follow-up in December thank you  - Will need to be AC for the long-term, need cardiology clearance for anti-platelets therapy (was on DAPT at home for CAD s/p stent)   -Follow-up cardiology recommendations: recommending ASA 81 mg, eliquis 5 mg BID for Afib, no need for plavix as had been on DAPT > 6 months     #Hip pain, right  #Right hip hematoma  ::R hip XR: no fracture  -Tylenol and lidocaine patch    #COPD  #Asthma   Home meds:  Spiriva 2 puffs daily, Ipratropium inhaler prn, and albuterol prn,  -Home inhalers     #hypothyroidism   - Continue home levothyroxine 50 mcg daily       #pulmonary sarcoidosis   #rheumatoid arthritis   - not on meds, to clarify with family      F: PRN  E: As needed  N: isosource 1.5 @ 200 ml q4 with 60 ml FWF before and after bolus via NG tube  GI: Not indicated  Bowel: MiraLAX, doc senna  DVT: Compression device, enoxaparin     Code status: full code   Next of kin: Robbin Hernandez (spouse) [phone]            Principal Problem:    Ischemic stroke (CMS/HCC)                  Pedro Hilliard MD  PGY-2 Neurology  Stroke Pager: [phone]

## 2024-03-29 ENCOUNTER — DOCUMENTATION (OUTPATIENT)
Dept: PRIMARY CARE | Facility: CLINIC | Age: 74
End: 2024-03-29
Payer: MEDICARE

## 2024-03-29 PROBLEM — I69.391 DYSPHAGIA AS LATE EFFECT OF STROKE: Status: ACTIVE | Noted: 2023-05-04

## 2024-03-29 LAB
ANION GAP SERPL CALC-SCNC: 9 MMOL/L
BUN SERPL-MCNC: 15 MG/DL (ref 8–25)
CALCIUM SERPL-MCNC: 9.3 MG/DL (ref 8.5–10.4)
CHLORIDE SERPL-SCNC: 101 MMOL/L (ref 97–107)
CO2 SERPL-SCNC: 29 MMOL/L (ref 24–31)
CREAT SERPL-MCNC: 0.8 MG/DL (ref 0.4–1.6)
EGFRCR SERPLBLD CKD-EPI 2021: 77 ML/MIN/1.73M*2
ERYTHROCYTE [DISTWIDTH] IN BLOOD BY AUTOMATED COUNT: 13.5 % (ref 11.5–14.5)
GLUCOSE BLD MANUAL STRIP-MCNC: 111 MG/DL (ref 74–99)
GLUCOSE BLD MANUAL STRIP-MCNC: 141 MG/DL (ref 74–99)
GLUCOSE SERPL-MCNC: 134 MG/DL (ref 65–99)
HCT VFR BLD AUTO: 27.5 % (ref 36–46)
HGB BLD-MCNC: 8.2 G/DL (ref 12–16)
MCH RBC QN AUTO: 28 PG (ref 26–34)
MCHC RBC AUTO-ENTMCNC: 29.8 G/DL (ref 32–36)
MCV RBC AUTO: 94 FL (ref 80–100)
NRBC BLD-RTO: 0 /100 WBCS (ref 0–0)
PLATELET # BLD AUTO: 288 X10*3/UL (ref 150–450)
POTASSIUM SERPL-SCNC: 3.9 MMOL/L (ref 3.4–5.1)
RBC # BLD AUTO: 2.93 X10*6/UL (ref 4–5.2)
SODIUM SERPL-SCNC: 139 MMOL/L (ref 133–145)
WBC # BLD AUTO: 8.6 X10*3/UL (ref 4.4–11.3)

## 2024-03-29 PROCEDURE — 97542 WHEELCHAIR MNGMENT TRAINING: CPT | Mod: GP

## 2024-03-29 PROCEDURE — 2500000002 HC RX 250 W HCPCS SELF ADMINISTERED DRUGS (ALT 637 FOR MEDICARE OP, ALT 636 FOR OP/ED): Performed by: INTERNAL MEDICINE

## 2024-03-29 PROCEDURE — 2500000004 HC RX 250 GENERAL PHARMACY W/ HCPCS (ALT 636 FOR OP/ED): Performed by: INTERNAL MEDICINE

## 2024-03-29 PROCEDURE — 2500000005 HC RX 250 GENERAL PHARMACY W/O HCPCS: Performed by: INTERNAL MEDICINE

## 2024-03-29 PROCEDURE — 97166 OT EVAL MOD COMPLEX 45 MIN: CPT | Mod: GO

## 2024-03-29 PROCEDURE — 97116 GAIT TRAINING THERAPY: CPT | Mod: GP,CQ

## 2024-03-29 PROCEDURE — 1180000001 HC REHAB PRIVATE ROOM DAILY

## 2024-03-29 PROCEDURE — 36415 COLL VENOUS BLD VENIPUNCTURE: CPT | Performed by: INTERNAL MEDICINE

## 2024-03-29 PROCEDURE — 97116 GAIT TRAINING THERAPY: CPT | Mod: GP

## 2024-03-29 PROCEDURE — 97530 THERAPEUTIC ACTIVITIES: CPT | Mod: GP

## 2024-03-29 PROCEDURE — 2500000002 HC RX 250 W HCPCS SELF ADMINISTERED DRUGS (ALT 637 FOR MEDICARE OP, ALT 636 FOR OP/ED): Mod: MUE | Performed by: INTERNAL MEDICINE

## 2024-03-29 PROCEDURE — 97535 SELF CARE MNGMENT TRAINING: CPT | Mod: GO

## 2024-03-29 PROCEDURE — 92610 EVALUATE SWALLOWING FUNCTION: CPT | Mod: GN

## 2024-03-29 PROCEDURE — 97163 PT EVAL HIGH COMPLEX 45 MIN: CPT | Mod: GP

## 2024-03-29 PROCEDURE — 97530 THERAPEUTIC ACTIVITIES: CPT | Mod: GP,CQ

## 2024-03-29 PROCEDURE — 97530 THERAPEUTIC ACTIVITIES: CPT | Mod: GO,CO

## 2024-03-29 PROCEDURE — 2500000001 HC RX 250 WO HCPCS SELF ADMINISTERED DRUGS (ALT 637 FOR MEDICARE OP): Performed by: INTERNAL MEDICINE

## 2024-03-29 PROCEDURE — 92526 ORAL FUNCTION THERAPY: CPT | Mod: GN

## 2024-03-29 PROCEDURE — 85027 COMPLETE CBC AUTOMATED: CPT | Performed by: INTERNAL MEDICINE

## 2024-03-29 PROCEDURE — 80048 BASIC METABOLIC PNL TOTAL CA: CPT | Performed by: INTERNAL MEDICINE

## 2024-03-29 RX ADMIN — FORMOTEROL FUMARATE DIHYDRATE 20 MCG: 20 SOLUTION RESPIRATORY (INHALATION) at 21:33

## 2024-03-29 RX ADMIN — Medication 1 TABLET: at 08:28

## 2024-03-29 RX ADMIN — TIOTROPIUM BROMIDE INHALATION SPRAY 2 PUFF: 3.12 SPRAY, METERED RESPIRATORY (INHALATION) at 08:27

## 2024-03-29 RX ADMIN — FUROSEMIDE 20 MG: 20 TABLET ORAL at 08:28

## 2024-03-29 RX ADMIN — BUDESONIDE INHALATION 0.5 MG: 0.5 SUSPENSION RESPIRATORY (INHALATION) at 21:33

## 2024-03-29 RX ADMIN — Medication 2 L/MIN: at 17:30

## 2024-03-29 RX ADMIN — Medication 5 MG: at 21:26

## 2024-03-29 RX ADMIN — ACETAMINOPHEN 650 MG: 160 SOLUTION ORAL at 05:29

## 2024-03-29 RX ADMIN — BUDESONIDE INHALATION 0.5 MG: 0.5 SUSPENSION RESPIRATORY (INHALATION) at 08:28

## 2024-03-29 RX ADMIN — ACETAMINOPHEN 650 MG: 160 SOLUTION ORAL at 13:14

## 2024-03-29 RX ADMIN — ACETAMINOPHEN 650 MG: 160 SOLUTION ORAL at 21:26

## 2024-03-29 RX ADMIN — METOPROLOL TARTRATE 25 MG: 25 TABLET, FILM COATED ORAL at 21:26

## 2024-03-29 RX ADMIN — LIDOCAINE 1 PATCH: 4 PATCH TOPICAL at 08:28

## 2024-03-29 RX ADMIN — FORMOTEROL FUMARATE DIHYDRATE 20 MCG: 20 SOLUTION RESPIRATORY (INHALATION) at 08:28

## 2024-03-29 RX ADMIN — METOPROLOL TARTRATE 25 MG: 25 TABLET, FILM COATED ORAL at 08:28

## 2024-03-29 RX ADMIN — Medication 400 MG: at 08:28

## 2024-03-29 RX ADMIN — POLYETHYLENE GLYCOL 3350 17 G: 17 POWDER, FOR SOLUTION ORAL at 08:28

## 2024-03-29 RX ADMIN — METOPROLOL TARTRATE 25 MG: 25 TABLET, FILM COATED ORAL at 13:31

## 2024-03-29 RX ADMIN — LEVOTHYROXINE SODIUM 50 MCG: 50 TABLET ORAL at 05:29

## 2024-03-29 RX ADMIN — ASPIRIN 81 MG CHEWABLE TABLET 81 MG: 81 TABLET CHEWABLE at 08:28

## 2024-03-29 ASSESSMENT — COGNITIVE AND FUNCTIONAL STATUS - GENERAL
TOILETING: A LOT
DRESSING REGULAR LOWER BODY CLOTHING: TOTAL
HELP NEEDED FOR BATHING: A LOT
DRESSING REGULAR UPPER BODY CLOTHING: A LITTLE
EATING MEALS: TOTAL
PERSONAL GROOMING: A LITTLE
DAILY ACTIVITIY SCORE: 12

## 2024-03-29 ASSESSMENT — PAIN - FUNCTIONAL ASSESSMENT
PAIN_FUNCTIONAL_ASSESSMENT: 0-10

## 2024-03-29 ASSESSMENT — PAIN SCALES - GENERAL
PAINLEVEL_OUTOF10: 9
PAINLEVEL_OUTOF10: 3
PAINLEVEL_OUTOF10: 6
PAINLEVEL_OUTOF10: 3
PAINLEVEL_OUTOF10: 8
PAINLEVEL_OUTOF10: 4
PAINLEVEL_OUTOF10: 8
PAINLEVEL_OUTOF10: 4
PAINLEVEL_OUTOF10: 8
PAINLEVEL_OUTOF10: 2

## 2024-03-29 ASSESSMENT — ACTIVITIES OF DAILY LIVING (ADL)
HOME_MANAGEMENT_TIME_ENTRY: 40
ADL_ASSISTANCE: INDEPENDENT
ADL_ASSISTANCE: INDEPENDENT
BATHING_ASSISTANCE: MODERATE

## 2024-03-29 ASSESSMENT — PAIN DESCRIPTION - ORIENTATION
ORIENTATION: RIGHT
ORIENTATION: RIGHT

## 2024-03-29 ASSESSMENT — PAIN DESCRIPTION - LOCATION
LOCATION: LEG
LOCATION: LEG

## 2024-03-29 ASSESSMENT — BRIEF INTERVIEW FOR MENTAL STATUS (BIMS): GENERAL FUNCTIONAL COGNITION RATING: INDEPENDENT

## 2024-03-29 NOTE — CONSULTS
"Nutrition Assessement Note    Nutrition Assessment         Reason for Hospital Admission:  Trinity Hernandez is a 74 y.o. female who is admitted for rehab following ischemic stroke. MBS completed 3/26 with NPO recs. Last seen by RD on 3/27 and [facility].     Nutrition History:  Food and Nutrient History: NPO. was receiving isosource 1.5 @ 50 ml/hr x 22 hrs and advanced to 200 ml bolus feedings q4 hrs.       Oral Problems: Swallowing difficulty    Anthropometrics:  Ht: 170 cm (5' 6.93\"), Wt: 76.4 kg (168 lb 6.9 oz), BMI: 26.44             Weight Change:  Daily Weight  03/28/24 : 76.4 kg (168 lb 6.9 oz)  03/27/24 : 87.2 kg (192 lb 3.9 oz)  03/24/24 : 74.4 kg (164 lb)  03/11/24 : 76.2 kg (168 lb)  02/12/24 : 76.2 kg (168 lb)  02/02/24 : 74.5 kg (164 lb 3.9 oz)  01/10/24 : 75.3 kg (166 lb)  01/08/24 : 74.4 kg (164 lb)  01/04/24 : 74.7 kg (164 lb 9.6 oz)  12/19/23 : 75.5 kg (166 lb 7.2 oz)  12/08/23 : 73.9 kg (162 lb 14.7 oz)  12/06/23 : 74.3 kg (163 lb 12.8 oz)  11/17/23 : 75.3 kg (166 lb)  11/17/23 : 74.8 kg (165 lb)  10/24/23 : 75 kg (165 lb 3.8 oz)  10/18/23 : 75.3 kg (166 lb)  10/05/23 : 77.5 kg (170 lb 13.7 oz)  09/08/23 : 76.7 kg (169 lb)  08/16/23 : 77.6 kg (171 lb)  03/13/23 : 78.8 kg (173 lb 11.6 oz)     Weight History / % Weight Change: no significant wt changes noted in chart review. question accuracy of wt from 3/27/24.             Nutrition Focused Physical Exam Findings: completed 3/27/24  Subcutaneous Fat Loss  Orbital Fat Pads: Well nourished (slightly bulging fat pads)  Buccal Fat Pads: Well nourished (full, rounded cheeks)  Triceps: Mild-Moderate (less than ample fat tissue)    Muscle Wasting  Temporalis: Well nourished (well-defined muscle)  Pectoralis (Clavicular Region): Well nourished (clavicle not visible)  Deltoid/Trapezius: Mild-Moderate (slight protrusion of acromion process)  Interosseous: Mild-Moderate (slightly depressed area between thumb and forefinger)  Quadriceps: Well nourished (well " developed, well rounded)  Gastrocnemius: Well nourished (well developed bulbous muscle)              Nutrition Significant Labs:  Lab Results   Component Value Date    WBC 8.6 03/29/2024    HGB 8.2 (L) 03/29/2024    HCT 27.5 (L) 03/29/2024     03/29/2024    CHOL 107 03/25/2024    TRIG 166 (H) 03/25/2024    HDL 33.2 03/25/2024    ALT 5 (L) 03/24/2024    AST 16 03/24/2024     03/29/2024    K 3.9 03/29/2024     03/29/2024    CREATININE 0.80 03/29/2024    BUN 15 03/29/2024    CO2 29 03/29/2024    TSH 0.93 02/05/2024    INR 1.1 03/24/2024    HGBA1C 5.3 03/25/2024       Current Facility-Administered Medications:     acetaminophen (Tylenol) oral liquid 650 mg, 650 mg, oral, q6h PRN, Vikas Cruz MD, 650 mg at 03/29/24 0529    albuterol 2.5 mg /3 mL (0.083 %) nebulizer solution 2.5 mg, 2.5 mg, nebulization, q4h PRN, Vikas Cruz MD, 2.5 mg at 03/28/24 1725    aspirin chewable tablet 81 mg, 81 mg, nasogastric tube, Daily, Vikas Cruz MD, 81 mg at 03/29/24 0828    bisacodyl (Dulcolax) suppository 10 mg, 10 mg, rectal, Daily PRN, Vikas Cruz MD    budesonide (Pulmicort) 0.5 mg/2 mL nebulizer solution 0.5 mg, 0.5 mg, nebulization, BID, Vikas Cruz MD, 0.5 mg at 03/29/24 0828    estradiol (Estrace) 0.01 % (0.1 mg/gram) vaginal cream 2 g, 2 g, vaginal, Daily PRN, Vikas Cruz MD    formoterol (Perforomist) 20 mcg/2 mL nebulizer solution 20 mcg, 20 mcg, nebulization, BID, Vikas Cruz MD, 20 mcg at 03/29/24 0828    furosemide (Lasix) tablet 20 mg, 20 mg, g-tube, Daily, Vikas Cruz MD, 20 mg at 03/29/24 0828    lactobacillus acidophilus tablet 1 tablet, 1 tablet, nasogastric tube, Daily, Vikas Cruz MD, 1 tablet at 03/29/24 0828    levothyroxine (Synthroid, Levoxyl) tablet 50 mcg, 50 mcg, nasogastric tube, Daily, Vikas Cruz MD, 50 mcg at 03/29/24 0529    lidocaine 4 % patch 1 patch, 1 patch, transdermal, Daily, Vikas Cruz MD, 1  patch at 03/29/24 0828    magnesium oxide (Mag-Ox) tablet 400 mg, 400 mg, nasogastric tube, Daily, Vikas Cruz MD, 400 mg at 03/29/24 0828    melatonin tablet 5 mg, 5 mg, nasogastric tube, Nightly PRN, Vikas Cruz MD, 5 mg at 03/28/24 2306    metoprolol tartrate (Lopressor) tablet 25 mg, 25 mg, nasogastric tube, TID, Vikas Cruz MD, 25 mg at 03/29/24 0828    nitroglycerin (Nitrostat) SL tablet 0.4 mg, 0.4 mg, sublingual, q5 min PRN, Vikas Cruz MD    oxygen (O2) therapy, , inhalation, q24h, Vikas Cruz MD, 2 L/min at 03/29/24 0529    polyethylene glycol (Glycolax, Miralax) packet 17 g, 17 g, oral, Daily, Vikas Cruz MD, 17 g at 03/29/24 0828    tiotropium (Spiriva Respimat) 2.5 mcg/actuation inhaler 2 puff, 2 puff, inhalation, Daily, Vikas Cruz MD, 2 puff at 03/29/24 0827    Dietary Orders (From admission, onward)       Start     Ordered    03/28/24 1715  Enteral feeding with ; 6 TIMES  DAY; 100 (BEFORE AND AFTER THE BOLUS FEED); Water; Bottled water; With each bolus  Diet effective now        Question Answer Comment   Tube feeding formula: Jevity 1.5    Tube feeding bolus (mL): 200    Tube feeding bolus frequency: 6 TIMES  DAY    Tube feeding flush (mL): 100 BEFORE AND AFTER THE BOLUS FEED   Flush type: Water    Water type: Bottled water    Flush frequency: With each bolus        03/28/24 1716                  Nutrition Support Intake provides: 1800 kcals, 77g protein and 912 ml free water (total of 2112 ml free water with TF and flushes)      Estimated Needs:   Estimated Energy Needs  Total Energy Estimated Needs (kCal): 1909 kCal  Total Estimated Energy Need per Day (kCal/kg): 25 kCal/kg  Method for Estimating Needs: actual wt    Estimated Protein Needs  Total Protein Estimated Needs (g): 92 g  Total Protein Estimated Needs (g/kg): 1.2 g/kg  Method for Estimating Needs: actual wt    Estimated Fluid Needs  Method for Estimating Needs: 1 ml/kcal         Nutrition Diagnosis   Nutrition Diagnosis:  Nutrition Diagnosis  Patient has Nutrition Diagnosis: Yes  Diagnosis Status (1): New  Nutrition Diagnosis 1: Swallowing difficulity  Related to (1): dysphagia  As Evidenced by (1): failed MBS       Nutrition Interventions/Recommendations   Nutrition Interventions and Recommendations:    Nutrition Prescription:  Individualized Nutrition Prescription Provided for : 1909 kcals and 92g protein to be provided via enteral nutrition    Nutrition Interventions:   Food and/or Nutrient Delivery Interventions  Interventions: Enteral intake  Enteral Intake: Other (Comment)  Goal: continue providing 200 ml bolus feeding Jevity 1.5 q4hrs to provide 1800 kcals, 77g protein and 912 ml free water. flush with a minimum of 75 ml free water before and after each feeding for hydration.    Education Documentation  No documentation found.           Nutrition Monitoring and Evaluation   Monitoring/Evaluation:   Food/Nutrient Related History Monitoring  Monitoring and Evaluation Plan: Enteral and parenteral nutrition intake  Enteral and Parenteral Nutrition Intake: Enteral nutrition intake  Criteria: enteral feeding will provide >/= 75% estimated needs, pt will tolerate enteral feeding at goal rate    Body Composition/Growth/Weight History  Monitoring and Evaluation Plan: Weight  Weight: Measured weight  Criteria: pt will maintain wt at this time       Time Spent/Follow-up:   Follow Up  Time Spent (min): 40 minutes  Last Date of Nutrition Visit: 03/29/24  Nutrition Follow-Up Needed?: 5-7 days  Follow up Comment: 4/3/24

## 2024-03-29 NOTE — PROGRESS NOTES
Pt discharged from inpatient stay to SNF. TCM closed. Will monitor for discharge to restart TCM program.

## 2024-03-29 NOTE — CARE PLAN
Problem: Bathing  Goal: LTG - Patient will utilize adaptive techniques to bathe body independently using ad aides as needed.  Outcome: Progressing  Goal: STG - Patient will bathe body with setup/supervision assist using ad aides as needed.  Outcome: Progressing     Problem: Dressings Lower Extremities  Goal: LTG - Patient will dress lower body independently using ad aides as needed.  Outcome: Progressing  Goal: STG - Patient will complete lower body dressing with setup/supervision assist using ad aides as needed.  Outcome: Progressing     Problem: Dressing Upper Extremities  Goal: LTG - Patient will complete upper body dressing independently.  Outcome: Progressing     Problem: Grooming  Goal: LTG - Patient will complete daily grooming tasks independently.  Outcome: Progressing     Problem: Instrumental Activities of Daily Living  Goal: LTG - Patient will complete simple meal preparation activities with distant supervision.  Outcome: Progressing     Problem: Functional Mobility  Goal: Pt will increase right UE strength and GM/FM coordination to baseline functional status.  Outcome: Progressing     Problem: Toileting  Goal: LTG - Patient will complete daily toileting tasks independently.  Outcome: Progressing  Goal: STG - Patient will complete toileting tasks with supervision.  Outcome: Progressing     Problem: OT Transfers  Goal: LTG - Patient will transfer to car with distant supervision.  Outcome: Progressing  Goal: LTG - Patient will transfer to tub/shower with modified independence using DME.  Outcome: Progressing  Goal: STG - Patient will perform toilet transfer with distant supervision using DME as needed.  Outcome: Progressing  Goal: STG - Patient will perform tub/shower transfer with supervision using DME.  Outcome: Progressing

## 2024-03-29 NOTE — NURSING NOTE
Patient a&ox 2-3, full code, NPO, mouth care provided, Dobbhoff intact to right nare, patient tolerating 200 cc bolus without difficulty, scattered bruising noted, right groin puncture, RUC de accessed port, mild RSW noted, 02 2L NC, no s/s of respiratory distress, respirations even and unlabored, medicated x 1 for report of right hip / leg generalized pain unrelieved by non pharmacological interventions, medication reported as effective, call light within reach safety measures remain in place.

## 2024-03-29 NOTE — PROGRESS NOTES
Occupational Therapy Evaluation       03/29/24 8942-7712   OT Last Visit   OT Received On 03/29/24   General   Reason for Referral Decline in ADLs s/p CVA   Referred By Dr Carlisle   Past Medical History Relevant to Rehab CAD,  HTN, HLD, RA, CHF, Afib, asthma, chronic respiratory failure d/t COPD and pulmonary sarcoidosis - home O2,   Prior to Session Communication Bedside nurse   Patient Position Received Bed, 3 rail up;Alarm off, not on at start of session   Preferred Learning Style verbal;visual   General Comment Pt cleared by nursing and agreeable for therapy.   Precautions   Hearing/Visual Limitations hearing diminished; corective lenses for reading   Medical Precautions Other (comment);Oxygen therapy device and L/min  (NPO - +dobhoff - keep HOB elevated 30 degrees or higher at all times; 2L O2 via NC continuous (baseline))   Vital Signs   SpO2   (92% pre, 96% post)   Pain Assessment   Pain Assessment 0-10   Pain Score 9   Pain Type Acute pain  (s/p fall (imaging negative for fracture))   Pain Location Hip   Pain Orientation Right   Pain Interventions Other (Comment)  (Requested/recieved pain meds)   Cognition   Overall Cognitive Status WFL   Orientation Level Oriented X4  (grossly to time (knows month/year))   Following Commands Follows one step commands without difficulty   Cognition Comments Patient denies mental status change s/p CVA and appears WFL but will further assess/monitor during admission.   Home Living   Type of Home   ([place]anch)   Lives With Spouse   Home Adaptive Equipment Scooter;Other (Comment);Cane;Reacher;Long-handled sponge  (rollator)   Home Access Stairs to enter with rails   Entrance Stairs-Rails Left   Entrance Stairs-Number of Steps 2   Bathroom Shower/Tub Tub/shower unit   Bathroom Toilet Standard   Bathroom Equipment Grab bars in shower;Tub transfer bench   Home Living Comments laundry in basement which spouse completes - does not need to access   Prior Function Per Pt/Caregiver Report  "  Level of [place] Independent with ADLs and functional transfers;Independent with homemaking with ambulation   Receives Help From Family  (Spouse is home and able to assist)   ADL Assistance Independent   Homemaking Assistance Independent   Ambulatory Assistance Independent   Vocational Retired  (, [employer], 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\")   Leisure   (likes to watch  TV, \"play with my cell phone\")   Hand Dominance Right   Prior Function Comments (+) drives   ADL   Eating Assistance Unable to assess   Eating Deficit NPO  (+dobhoff)   Grooming Assistance Minimal   Grooming Deficit   (assist brushing hair, setup for oral care and washing hands/face seated edge of bed)   Bathing Assistance Moderate   Bathing Deficit Perineal area;Buttocks;Right lower leg including foot;Left lower leg including foot   UE Dressing Assistance Stand by   UE Dressing Deficit Setup  (donning/doffing a housecoat with a zipper)   LE Dressing Assistance Total   LE Dressing Deficit Don/doff R sock;Don/doff L sock  (not wearing pants this date)   Toileting Assistance with Device Maximal   Toileting Deficit   (assist with protective briefs down/up over hips; patient able to perform sunil care seated)   Activity Tolerance   Endurance Decreased tolerance for upright activites   Activity Tolerance Comments   (requires mutliple rest breaks d/t chronic RF and generalized fatigue from prolonged inactivity s/p CVA)   Bed Mobility   Bed Mobility Yes   Bed Mobility 1   Bed Mobility 1 Supine to sitting   Level of Assistance 1 Moderate assistance   Bed Mobility Comments 1 assist with right LE and trunk toward the left side of bed with head elevated ~40 degrees   Functional Mobility   Functional Mobility Performed Yes  (Min assist x 2 bed to/from bathroom with a gait belt and 2 wheeled walker. Required cues for safety to remain inside walker frame prior to aligning self with seated surfaces.)   Transfers   Transfer Yes   Transfer 1   Transfer " From 1 Bed to   Transfer to 1 Stand   Technique 1 Sit to stand;Stand to sit   Transfer Device 1 Walker  (gait belt)   Transfer Level of Assistance 1 Moderate assistance   Trials/Comments 1 x 2 with cues for hand placement   Transfers 2   Transfer From 2 Stand to   Transfer to 2 Sit  (ETS with arms)   Technique 2 Stand to sit;Sit to stand   Transfer Device 2 Walker  (gait belt)   Transfer Level of Assistance 2 Moderate assistance   Trials/Comments 2 cues for hand placement   Transfers 3   Transfer From 3 Stand to   Transfer to 3 Sit  (chair with arms)   Technique 3 Stand to sit   Transfer Device 3 Walker  (gait belt)   Transfer Level of Assistance 3 Moderate assistance   Trials/Comments 3 cues for hand placement   Sensation   Light Touch No apparent deficits   Sensation Comment Denies tingling/numbness   Strength   Strength Comments right sided weakness acute onset s/p CVA (~3/5 grossly)   Hand Function   Gross Grasp Impaired  (right)   Coordination Impaired  (right)   IP OT Assessment   OT Assessment 73 yo female admits from [facility] with a left MCA occlusion following a fall.  Patient received TNK and subsequently sustained respiratory failure for which she was intubated and a right-sided SDH.   On eval, patient presents with right-sided weakness, impaired balance, impaired activity tolerance, and swallowing deficits.  Patient requires OT services in order to maximize functional capacity and safety with mobility.   Prognosis Good   Barriers to Discharge None   Evaluation/Treatment Tolerance Patient limited by fatigue;Patient limited by pain   Medical Staff Made Aware Yes   End of Session Communication Bedside nurse   End of Session Patient Position Up in chair;Alarm off, not on at start of session   OT Assessment   OT Assessment Results Decreased ADL status;Decreased upper extremity strength;Decreased endurance;Decreased fine motor control;Decreased functional mobility;Decreased gross motor control;Decreased  IADLs   Strengths Housing layout;Premorbid level of function   Barriers to Participation Comorbidities   Education   Individual(s) Educated Patient   Education Provided Fall precautons;Risk and benefits of OT discussed with patient or other;POC discussed and agreed upon   Risk and Benefits Discussed with Patient/Caregiver/Other yes   Patient/Caregiver Demonstrated Understanding yes   Plan of Care Discussed and Agreed Upon yes   Patient Response to Education Patient/Caregiver Verbalized Understanding of Information   Inpatient/Swing Bed or Outpatient   Inpatient/Swing Bed or Outpatient Inpatient   Inpatient Plan   Treatment Interventions ADL retraining;Functional transfer training;UE strengthening/ROM;Endurance training;Patient/family training;Equipment evaluation/education;Fine motor coordination activities;Neuromuscular reeducation;Compensatory technique education   OT Frequency 5 times per week   OT Discharge Recommendations Low intensity level of continued care   Equipment Recommended upon Discharge Wheeled walker   OT - OK to Discharge Yes   OT Recommended Transfer Status Assist of 2  (for mobility, otherwise assist x 1)

## 2024-03-29 NOTE — PROGRESS NOTES
Occupational Therapy       03/29/24 7033-9368   OT Last Visit   OT Received On 03/29/24   General   Reason for Referral Decline in ADLs s/p CVA   Referred By Dr. Carlisle   Past Medical History Relevant to Rehab COPD, CAD s/p PCI 10/4/23, NSVT, HTN, HLD, rheumatoid arthritis, and hypothyroidism, CHF, Afib (not on AC), AAA, asthma, COPD, chronic respiratory failure 2/2 pulmonary sarcoidosis, Migraines, Hiatal Hernia, and Diverticulosis.   Prior to Session Communication Bedside nurse   Patient Position Received Bed, 3 rail up;Alarm off, not on at start of session   Preferred Learning Style verbal;visual   General Comment Pt cleared by nursing and agreeable for therapy.   Vital Signs   Heart Rate 102   Heart Rate Source Brachial   Resp 17   SpO2 99 %   /59   MAP (mmHg) 73   BP Location Left arm   BP Method Automatic   Patient Position Lying   Pain Assessment   Pain Assessment 0-10   Pain Score 8   Pain Type Acute pain   Pain Location Hip   Pain Orientation Right   Toileting   Toileting Level of Assistance Minimum assistance   Where Assessed Toilet   Toileting Comments Min assist for standing posterior care.   Toilet Transfers   Toilet Transfer From Wheelchair   Toilet Transfer Type To and from   Toilet Transfer to Standard toilet   Toilet Transfer Technique Ambulating   Toilet Transfers Contact guard   IP OT Assessment   Medical Staff Made Aware Yes   End of Session Communication Bedside nurse   End of Session Patient Position Up in chair   OT Assessment   Strengths Ability to acquire knowledge;Attitude of self   Barriers to Participation Comorbidities   Inpatient/Swing Bed or Outpatient   Inpatient/Swing Bed or Outpatient Inpatient   Inpatient Plan   Treatment Interventions ADL retraining;Functional transfer training;UE strengthening/ROM;Fine motor coordination activities   OT Frequency 5 times per week   OT - OK to Discharge Yes      03/29/24 1300   OT Adult Other Outcome Measures   9 Hole Peg Test RUE: 51 sec.  LUE: 32 sec   Box and Block RUE while seated: 42 blks/min. LUE while seated:49 blks/min.   Other Outcome Measures RUE  strength: 47#  LUE  strength: 32#  (BITS bell cancellation with RUE while seated in w/c - 2:16 mins, 0 misses, 1 distractor. BITS trailmaking B with RUE while seated in w/c - 1:37 mins, 27 errors, 26 interruptions.)

## 2024-03-29 NOTE — PROGRESS NOTES
Trinity Hernandez is a 74 year old female admitted to  [facility] 3.28.24 from  [facility]. Patient is s/p stroke. LSW is following for discharge planning. Met with patient to review Care transitions role and acute rehab discharge planning process. Identified patient's preferred point person as spouse Robbin [phone]. At baseline, patient lives with spouse in a ranch home with 2 stairs to enter the residence.  Patient is independent with ADL's and IADL's. No Advanced directives in HIM and LSW is available for document completion upon request. PCP is Dr. Samaniego. Pharmacy of choice is [facility]. Reviewed prior level of function, current status, and plan of care for IRF stay as well as average length of stay, anticipated date of discharge, and discharge goals. Patient hopes to meet therapy goals and return home with support of family. LSW will continue to follow as a resource for transition of care and will facilitate family training, updates regarding insurance, meeting with patient to answer questions or concerns, and collaborate with IDT on patient needs to ensure safe return home. Patient was thankful for meeting with LSW feels care transitions has met needs in regards to discharge planning at this time.  No ADOD due to new admission status.

## 2024-03-29 NOTE — H&P
[facility]: [facility]  PROGRESS NOTE      Trinity Hernandez is a 74 y.o. female that is being seen  today for No chief complaint on file..  Subjective   Ms. Hernandez is a 74 y.o. female with PMH COPD, CAD s/p PCI, HTN, HLD, pulmonary sarcoidosis, rheumatoid arthritis, and hypothyroidism CHF, Afib (not on AC), AAA, asthma, COPD, , chronic respiratory failure. who presented to OSH after fall and found to have right facial droop and RUE weakness. Reportedly initial NIHSS 2-3. /80, . CTH did not show any acute hemorrhage or early ischemic changes. She received TNK 19 mg at 1530. CTA showed L MCA occlusion.    Few minutes after, she was noted to have worsening work of breathing, drooling for which she was intubated, NIHSS worsened to 8. Repeat CTH showed 7mm R subdural hematoma but no  hemorrhage or evolving infarct. She was transferred to [facility] for cryoprecipitate and possible MT. S/p cryoprecipitate transfusion for TNK reversal. Taken for MT, with TICI 2b.  Repeat CT head was stable after initiation of aspirin.  Started on atorvastatin 40 mg.  Plan to repeat CT head in 4 weeks after discharge with stroke neurology follow-up to evaluate for safety of initiating anticoagulation given new diagnosis of atrial fibrillation.   Cardiology subsequently consulted for recommendations on when to restart Plavix in the setting of her dual antiplatelet therapy for previous coronary artery stents.  Determined safe to restart aspirin without need for Plavix as patient had been on DAPT greater than 6 months.  Patient with new diagnosis of atrial fibrillation was subsequently started metoprolol tartrate 25 mg 3 times daily.  Cardiology recommended initiation of Eliquis 5 mg twice daily when safe from a stroke standpoint.   On modified barium swallow study patient noted to slightly aspirate.  Patient subsequently had nasogastric tube bridled for bolus feeding.  Plan to repeat modified barium swallow  study in 2-4 weeks.  Patient is on tube feeds now.  Does complain of pain in her right hip.  No significant shortness of breath.  Patient has been on oxygen 2 L.          ROS  Negative for fever or chills  Negative for sore throat, ear pain, nasal discharge  Negative for cough, shortness of breath or wheezing  Negative for chest pain, palpitations, swelling of legs  Negative for abdominal pain, constipation, diarrhea, blood in the stools  Negative for urinary complaints  Negative for headache, dizziness, positive for weakness  Positive for right hip joint pain  Negative for depression or anxiety  All other systems reviewed and were negative   Vitals:    03/29/24 0529   BP: 122/65   Pulse: 78   Resp: 18   Temp: 37 °C (98.6 °F)   SpO2: 98%      Vitals:    03/28/24 1640   Weight: 76.4 kg (168 lb 6.9 oz)     Body mass index is 26.44 kg/m².  Physical Exam  Constitutional: Patient does not appear to be in any acute distress  Head and Face: NCAT  Eyes: Normal external exam, EOMI  ENT: Normal external inspection of ears and nose. Oropharynx normal.  Speech is normal.  Patient has a feeding tube  Cardiovascular: Irregularly irregular, no edema of extremities  Pulmonary: CTAB, no respiratory distress.  Abdomen: +BS, soft, non-tender, nondistended, no guarding or rebound, no masses noted  MSK: Decreased range of motion of right leg  Skin- No lesions, contusions, or erythema.  Peripheral puslses palpable bilaterally 2+  Neuro: AAO X3, Cranial nerves 2-12 grossly intact, decreased reflexes in the right leg   LABS   [unfilled]  Lab Results   Component Value Date    GLUCOSE 141 (H) 03/28/2024    CALCIUM 8.6 03/28/2024     03/28/2024    K 3.7 03/28/2024    CO2 33 (H) 03/28/2024     03/28/2024    BUN 16 03/28/2024    CREATININE 0.65 03/28/2024     Lab Results   Component Value Date    ALT 5 (L) 03/24/2024    AST 16 03/24/2024    ALKPHOS 55 03/24/2024    BILITOT 0.5 03/24/2024     Lab Results   Component Value Date    WBC  8.6 03/29/2024    HGB 8.2 (L) 03/29/2024    HCT 27.5 (L) 03/29/2024    MCV 94 03/29/2024     03/29/2024     Lab Results   Component Value Date    CHOL 107 03/25/2024    CHOL 181 11/16/2023    CHOL 191 02/13/2023     Lab Results   Component Value Date    HDL 33.2 03/25/2024    HDL 36.7 11/16/2023    HDL 37.7 (A) 02/13/2023     Lab Results   Component Value Date    LDLCALC 41 03/25/2024    LDLCALC 110 (H) 11/16/2023     Lab Results   Component Value Date    TRIG 166 (H) 03/25/2024    TRIG 174 (H) 11/16/2023    TRIG 256 (H) 02/13/2023     Lab Results   Component Value Date    HGBA1C 5.3 03/25/2024     Other labs not included in the list above were reviewed either before or during this encounter.    History    Past Medical History:   Diagnosis Date    Abdominal bloating 05/04/2023    CAD (coronary artery disease)     CHF (congestive heart failure) (CMS/HCC)     Diverticulitis of small intestine without perforation or abscess without bleeding     Diverticulitis, intestine, small    Dizzy 11/20/2023    Essential (primary) hypertension 02/08/2017    HTN (hypertension), benign    Fall     Influenza B 05/08/2015    Formatting of this note might be different from the original. Completed Tamiflu Continue droplet precautions.    Nausea 11/16/2018    Personal history of other endocrine, nutritional and metabolic disease 02/08/2017    History of hypothyroidism    Personal history of other venous thrombosis and embolism     History of deep venous thrombosis     Past Surgical History:   Procedure Laterality Date    ABDOMINAL ADHESION SURGERY  05/16/2017    Laparoscopic Lysis Of Intestinal Adhesions    CERVICAL FUSION  05/16/2017    Cervical Vertebral Fusion    CHOLECYSTECTOMY  05/16/2017    Cholecystectomy    COLECTOMY PARTIAL / TOTAL  05/16/2017    Partial Colectomy - Sigmoid    CORONARY ANGIOPLASTY WITH STENT PLACEMENT      CT ANGIO NECK  05/21/2020    CT NECK ANGIO W AND WO IV CONTRAST 5/21/2020 [facility] INPATIENT LEGACY    CT  HEAD ANGIO W AND WO IV CONTRAST  05/21/2020    CT HEAD ANGIO W AND WO IV CONTRAST 5/21/2020 [facility] INPATIENT LEGACY    HERNIA REPAIR  05/16/2017    Hernia Repair    HYSTERECTOMY  10/03/2013    Hysterectomy    MR HEAD ANGIO WO IV CONTRAST  05/21/2020    MR HEAD ANGIO WO IV CONTRAST 5/21/2020 [facility] INPATIENT LEGACY    MR NECK ANGIO WO IV CONTRAST  05/21/2020    MR NECK ANGIO WO IV CONTRAST 5/21/2020 [facility] INPATIENT LEGACY    OTHER SURGICAL HISTORY  05/16/2017    Thoracoscopy Of Lungs And Pleural Space With Biopsy Of Lung Nodules    OTHER SURGICAL HISTORY  04/15/2019    Esophagogastroduodenoscopy     No family history on file.  Allergies   Allergen Reactions    Hydrocodone-Acetaminophen Shortness of breath    Montelukast Unknown     Lungs felt as if there was a hole in them    Morphine Unknown     Body shakes    Nitrofurantoin Monohyd/M-Cryst Unknown    Sulfa (Sulfonamide Antibiotics) Nausea Only and Other    Atorvastatin Itching, Swelling and Unknown    Dicyclomine Hallucinations     Nightmares    Fluticasone Propion-Salmeterol Unknown    Levofloxacin Rash    Lisinopril Cough    Nitroimidazoles Nausea Only and Unknown    Omeprazole Diarrhea and Unknown    Salmeterol Unknown    Simvastatin Swelling and Unknown    Sucralfate Unknown    Umeclidinium Other     Urinary retention, blurred vision, constipation     Current Facility-Administered Medications on File Prior to Encounter   Medication Dose Route Frequency Provider Last Rate Last Admin    [DISCONTINUED] acetaminophen (Tylenol) tablet 650 mg  650 mg oral q6h PRN Pedro Hilliard MD   650 mg at 03/28/24 1418    [DISCONTINUED] albuterol 2.5 mg /3 mL (0.083 %) nebulizer solution 2.5 mg  2.5 mg nebulization q4h PRN Stefan Herman MD   2.5 mg at 03/28/24 0300    [DISCONTINUED] aspirin chewable tablet 81 mg  81 mg nasogastric tube Daily Pedro Hilliard MD   81 mg at 03/28/24 1028    [DISCONTINUED] budesonide (Pulmicort) 0.5 mg/2 mL nebulizer solution 0.5 mg  0.5 mg nebulization BID  Javy Arriola MD   0.5 mg at 03/28/24 1018    [DISCONTINUED] dextrose 50 % injection 12.5 g  12.5 g intravenous q15 min PRN Stefan Herman MD        [DISCONTINUED] furosemide (Lasix) tablet 20 mg  20 mg nasogastric tube Daily Pedro Hilliard MD   20 mg at 03/28/24 1028    [DISCONTINUED] glucagon (Glucagen) injection 1 mg  1 mg intramuscular q15 min PRN Stefan Herman MD        [DISCONTINUED] heparin (porcine) injection 5,000 Units  5,000 Units subcutaneous q8h Pedro Hilliard MD   5,000 Units at 03/28/24 1028    [DISCONTINUED] heparin flush 100 unit/mL syringe 500 Units  5 mL intra-catheter Once PRN Pedro Hilliard MD        [DISCONTINUED] hydrALAZINE (Apresoline) tablet 25 mg  25 mg nasogastric tube q6h PRN Pedro Hilliard MD        [DISCONTINUED] insulin lispro (HumaLOG) injection 0-5 Units  0-5 Units subcutaneous q4h NIKO Jamar Roper MD   1 Units at 03/28/24 1028    [DISCONTINUED] levothyroxine (Synthroid, Levoxyl) tablet 50 mcg  50 mcg nasogastric tube Daily Pedro Hilliard MD   50 mcg at 03/28/24 0510    [DISCONTINUED] lidocaine 4 % patch 1 patch  1 patch transdermal Daily HASMUKH Sarmiento-CNP   1 patch at 03/28/24 1026    [DISCONTINUED] melatonin tablet 5 mg  5 mg nasogastric tube Nightly Pedro Hilliard MD   5 mg at 03/27/24 2010    [DISCONTINUED] metoprolol tartrate (Lopressor) tablet 25 mg  25 mg nasogastric tube TID Pedro Hilliard MD   25 mg at 03/28/24 1028    [DISCONTINUED] oxygen (O2) therapy   inhalation Continuous PRN - O2/gases Stefan Herman MD   2 L/min at 03/28/24 1014    [DISCONTINUED] perflutren protein A microsphere (Optison) injection 0.5 mL  0.5 mL intravenous Once in imaging Jamar Roper MD        [DISCONTINUED] polyethylene glycol (Glycolax, Miralax) packet 17 g  17 g nasogastric tube Daily Pedro Hillirad MD   17 g at 03/28/24 1028    [DISCONTINUED] sennosides (Senokot) tablet 17.2 mg  2 tablet nasogastric tube BID Pedro Hilliard MD   17.2 mg at 03/28/24 1027     [DISCONTINUED] sodium chloride 0.9% flush 10 mL  10 mL intravenous PRN Pedro Hilliard MD        [DISCONTINUED] sodium chloride 0.9% flush 10 mL  10 mL intravenous q12h [facility] Pedro Hilliard MD        [DISCONTINUED] sulfur hexafluoride microsphr (Lumason) injection 24.28 mg  2 mL intravenous Once in imaging Jamar Roper MD        [DISCONTINUED] tiotropium (Spiriva Respimat) 2.5 mcg/actuation inhaler 2 puff  2 puff inhalation Daily Jamar Roper MD   2 puff at 03/28/24 1014     Current Outpatient Medications on File Prior to Encounter   Medication Sig Dispense Refill    acetaminophen (Tylenol) 325 mg tablet 2 tablets (650 mg) by nasogastric tube route every 6 hours if needed for mild pain (1 - 3). 30 tablet 0    albuterol 2.5 mg /3 mL (0.083 %) nebulizer solution Take 3 mL (2.5 mg) by nebulization every 4 hours if needed for shortness of breath.      albuterol 90 mcg/actuation inhaler Inhale 2 puffs every 6 hours if needed for shortness of breath.      arformoterol (Brovana) 15 mcg/2 mL nebulizer solution INHALE 2ML VIA NEBULIZER AS INSTRUCTED TWICE A DAY (EVERY 12 HOURS)      aspirin 81 mg chewable tablet 1 tablet (81 mg) by nasogastric tube route once daily. Do not start before March 29, 2024.      budesonide (Pulmicort) 0.5 mg/2 mL nebulizer solution Take 2 mL (0.5 mg) by nebulization 2 times a day. Rinse mouth with water after use to reduce aftertaste and incidence of candidiasis. Do not swallow.      cholecalciferol (Vitamin D3) 50 mcg (2,000 unit) capsule 1 capsule (50 mcg) by nasogastric tube route once daily.      estradiol (Estrace) 0.01 % (0.1 mg/gram) vaginal cream Insert 0.5 Applicatorfuls (2 g) into the vagina once daily as needed.      furosemide (Lasix) 20 mg tablet 1 tablet (20 mg) by g-tube route once daily. Do not start before March 29, 2024.      Lactobacillus acidophilus (Probiotic) 10 billion cell capsule 1 capsule by nasogastric tube route once daily.      levothyroxine (Synthroid,  Levoxyl) 50 mcg tablet 1 tablet (50 mcg) by nasogastric tube route once daily. Do not start before March 29, 2024.      lidocaine 4 % patch Place 1 patch over 12 hours on the skin once daily. Place over right hip. Remove & discard patch within 12 hours or as directed by MD. Do not start before March 29, 2024.      magnesium oxide (Mag-Ox) 400 mg (241.3 mg magnesium) tablet 1 tablet (400 mg) by nasogastric tube route once daily.      melatonin 5 mg tablet 1 tablet (5 mg) by nasogastric tube route as needed at bedtime for sleep.      metoprolol tartrate (Lopressor) 25 mg tablet 1 tablet (25 mg) by nasogastric tube route 3 times a day.      nitroglycerin (Nitrostat) 0.4 mg SL tablet Place 1 tablet (0.4 mg) under the tongue every 5 minutes if needed for chest pain.      oxygen (O2) gas therapy Inhale 1 each once every 24 hours.      Repatha Syringe 140 mg/mL injection Inject 1 mL (140 mg) under the skin every 14 (fourteen) days.      sennosides (Senokot) 8.6 mg tablet 2 tablets (17.2 mg) by nasogastric tube route 2 times a day.      tiotropium (Spiriva Respimat) 2.5 mcg/actuation inhaler Inhale 2 puffs once daily.      [DISCONTINUED] aspirin 81 mg EC tablet TAKE 1 TABLET BY MOUTH EVERY 24 HRS      [DISCONTINUED] cholecalciferol (Vitamin D3) 50 mcg (2,000 unit) capsule Take 1 capsule (50 mcg) by mouth once daily.      [DISCONTINUED] clopidogrel (Plavix) 75 mg tablet Take 1 tablet (75 mg) by mouth once daily.      [DISCONTINUED] docusate sodium (Colace) 100 mg capsule Take 1 capsule (100 mg) by mouth once daily.      [DISCONTINUED] ferrous sulfate 325 (65 Fe) MG tablet Take 1 tablet by mouth once daily.      [DISCONTINUED] furosemide (Lasix) 20 mg tablet 3/26/24:  patient reports taking 1 tablet (20mg) once a day on Monday and Thursday and then 1/2 tablet (10mg) once daily all other days of the week (Sun, Tues, Wed, Fri, Sat)      [DISCONTINUED] ipratropium (Atrovent) 0.02 % nebulizer solution ipratropium (Atrovent) 0.02  % nebulizer solution USE 2.5 ML VIA NEBULIZER FOUR TIMES DAILY AS NEEDED 0 Active      [DISCONTINUED] Lactobacillus acidophilus (Probiotic) 10 billion cell capsule Take 1 capsule by mouth once daily.      [DISCONTINUED] levothyroxine (Synthroid, Levoxyl) 50 mcg tablet Take 1 tablet (50 mcg) by mouth once daily. 90 tablet 3    [DISCONTINUED] LORazepam (Ativan) 0.5 mg tablet Take 1 tablet (0.5 mg) by mouth every 6 hours if needed for anxiety. 30 tablet 1    [DISCONTINUED] magnesium oxide (Mag-Ox) 400 mg (241.3 mg magnesium) tablet Take 1 tablet (400 mg) by mouth once daily.      [DISCONTINUED] metoprolol succinate XL (Toprol-XL) 25 mg 24 hr tablet Take 3 tablets (75 mg) by mouth once daily.      [DISCONTINUED] polyethylene glycol (Miralax) 17 gram/dose powder Take 17 g by mouth once daily.       Immunization History   Administered Date(s) Administered    Flu vaccine, quadrivalent, high-dose, preservative free, age 65y+ (FLUZONE) 10/04/2021, 09/20/2022    Influenza, High Dose Seasonal, Preservative Free 10/01/2015, 09/30/2016, 09/29/2017, 09/19/2018, 09/09/2019    Influenza, Seasonal, Quadrivalent, Adjuvanted 08/30/2023    Influenza, Unspecified 09/10/2019    Influenza, seasonal, injectable 09/04/2020    Moderna COVID-19 vaccine, bivalent, blue cap/gray label *Check age/dose* 11/30/2022    Moderna SARS-CoV-2 Vaccination 03/01/2021, 03/28/2021, 12/30/2021, 05/22/2022    Pneumococcal conjugate vaccine, 13-valent (PREVNAR 13) 09/30/2016    Pneumococcal conjugate vaccine, 20-valent (PREVNAR 20) 12/12/2022    Pneumococcal polysaccharide vaccine, 23-valent, age 2 years and older (PNEUMOVAX 23) 07/22/2014, 10/01/2015, 11/09/2020     Patient's medical history was reviewed and updated either before or during this encounter.  ASSESSMENT / PLAN:  Active Hospital Problems    *CVA (cerebrovascular accident due to intracerebral hemorrhage) (CMS/[facility])      Ischemic stroke (CMS/[facility])      COPD (chronic obstructive pulmonary disease)  (CMS/HCC)      Heart failure with mildly reduced ejection fraction (CMS/HCC)      Anxiety      Asthmatic bronchitis      GERD (gastroesophageal reflux disease)      Dysphagia as late effect of stroke      CAD (coronary artery disease), native coronary artery       Patient is being admitted for rehab at CCR unit.  Patient had a stroke with hemorrhage after tPA.  Patient has seen right-sided weakness.  Patient had dysphagia after stroke and aspiration risk.  Patient has been on NG tube feeds and is on tube feeds.  Patient does have anemia and is on oral iron supplementation.  Patient has not been on any anticoagulation at present.  Patient will be reassessed in 4 weeks with CT of the head for consideration for anticoagulation.  Patient will be getting extensive physical therapy and Occupational Therapy.    Vikas Cruz MD

## 2024-03-29 NOTE — PROGRESS NOTES
Speech-Language Pathology    SLP Adult IRF CCR Clinical Swallow Evaluation/Initial Dysphagia Treatment Note    Patient Name: Trinity Hernandez  MRN: [id number]  Today's Date: 3/29/2024   Time Calculation  Start Time: 1140  Stop Time: 1220  Time Calculation (min): 40 min     1/5sw    Current Problem:   Ischemic stroke, right body involvement (left brain)    Recent MBSS 3/26 at [facility]    Recommendations:  Risk for Aspiration: Yes  Solid Diet Recommendations : NPO  Liquid Diet Recommendations: NPO  Medication Administration Recommendations: Non Oral    Frequent, aggressive oral care is strongly recommended to improve infection control as well as reduce dental plaque and bacteria on oropharyngeal surfaces which may increase the risk nosocomial infections, including pneumonia.  Puree tsp trials w/ SLP only (chin tuck, triple effortful swallow).  Repeat MBSS in 2-4 weeks.    Assessment:  Prognosis: Good, Fair  Treatment Provided: Yes  Treatment Tolerance: Patient limited by fatigue  Medical Staff Made Aware: Yes    Pt seen upright in w/c. On 2L O2, bridled dobhoff in place (not currently running). Pt A&O x4. However, fatigued and asking to go to bed. Limited assessment.    In general, reduced/slow OME. Reduced lingual, labial strength/ROM. Pt was seen with 3 small tsp amounts of applesauce. Difficult to palpate laryngeal excursion d/t chin-tuck position. However, laryngeal excursion appears reduced/incomplete. Slight vocal change x1 post swallow. Resolved with  subsequent swallow. No further difficulties noted.    Plan:  Inpatient/Swing Bed or Outpatient: Inpatient  Treatment/Interventions: Bolus trials, Pharyngeal exercises, Oral motor exercises  SLP Plan: Skilled SLP  SLP Frequency: 5x per week  Duration: Current admission  SLP Discharge Recommendations: Continue skilled SLP services at the next level of care  Next Treatment Priority: po trials with SLP only  Discussed POC: Patient  Discussed Risks/Benefits: Yes,  Patient  Patient/Caregiver Agreeable: Yes    DYSPHAGIA GOALS initiated 3/29, anticipated end 4/19:  Pt will tolerate tsp trials of pureed textures with SLP only to 90% without overt s/s aspiration.   PROGRESS: initiated education RE: MBSS results  STATUS: Reviewed MBSS results with pt. Provided education RE: silent aspiration and SLP only trials.  Pt will utilize chin-tuck, triple effortful swallow with SLP only trials to 100%.   PROGRESS: 90% - 100% with SLP trials.   STATUS: Pt is affected by fatigue. Did not wish further p.o. intake.  Pt will increase swallow reflex via OPEs to 80% accuracy.   PROGRESS: able to sustain phonation for approx. 4sec.   STATUS: Initiated pitch glides and attempted Ami.  Pt will improve swallow reflex via OMEs to 80% accuracy.   PROGRESS: able to complete sets of x2-3 lingual, labial OMEs with mod cues.   STATUS: Fatigue increases as session continues.  Ongoing assessment via MBSS when indicated to reassess swallow function.   PROGRESS: not at this time, MBSS rec's indicate repeat MBSS in 2-4wks.   STATUS: see above    Subjective   Pt seen upright in w/c, asking to go to bed.    General Visit Information:  Patient Class: IRF  Ordering Physician: Dr. Cruz  Reason for Referral: s/p CVA, NPO, bridled dobhoff in place  Referred By: Dr. Carlisle  Past Medical History Relevant to Rehab: COPD, CAD s/p PCI 10/4/23, NSVT, HTN, HLD, rheumatoid arthritis, and hypothyroidism, CHF, Afib (not on AC), AAA, asthma, COPD, chronic respiratory failure 2/2 pulmonary sarcoidosis, Migraines, Hiatal Hernia, and Diverticulosis.  Prior to Session Communication: Bedside nurse  [employer] Diet: reg/thin per pt report  Current Diet : NPO, p.o. trials with SLP only  Dysphagia Diagnosis: Other (Comment) (severe oropharyngeal dysphagia)  :     Objective     Baseline Assessment:  History of Intubation: Yes  Behavior/Cognition: Alert, Cooperative  Patient Positioning: Upright in Chair  Baseline Vocal Quality:  Wet  Volitional Cough: Other (Comment) (moist, productive)      Pain:  Pain Score: 3  Pain Location: Hip  Pain Orientation: Right       Oral/Motor Assessment:  Oral Hygiene: lingual surface appears dry. improved after oral care with swabs/toothettes.  Dentition: Dentures (Upper Full), Dentures (Lower Partial)  Facial Symmetry: Other (Comment) (slow movements)  Labial ROM: Reduced right, Reduced left  Labial Strength: Reduced  Lingual Agility: Reduced  Lingual Strength: Reduced  Palatal Elevation: Other (Comment) (unable to view d/t fatigue level)  Vocal Quality: Exceptions to WFL (moist without p.o. intake)  Intelligibility: Intelligible  Breath Support: Adequate for speech      Consistencies Trialed:  Consistencies Trialed: Yes  Consistencies Trialed: Pureed/extremely thick (IDDSI Level 4)      Clinical Observations:  Patient Positioning: Upright in Chair (uncomfortable, asking to go back to bed)  Management of Oral Secretions: Adequate  Other Signs/Symptoms of Difficulty with Feeding: Oral Holding, Other (Comment) (mild prior to swallow onset)  Multiple Swallows: Pureed/Extremely Thick (IDDSI Level 4), Other (Comment) (after chin-tuck and triple swallow sequence)  Reduced Laryngeal Movement Upon Palpation: Pureed/Extremely Thick (IDDSI Level 4)  Wet Vocal Quality: Pureed/Extremely Thick (IDDSI Level 4), Other (Comment) (x1, able to clear)    Inpatient Education:  Adult Inpatient Education  Individual(s) Educated: Patient  Verbal Education : POC, goals, chin-tuck/triple  effortful swallow  Risk and Benefits Discussed with Patient/Caregiver/Other: yes  Patient/Caregiver Demonstrated Understanding: yes  Plan of Care Discussed and Agreed Upon: yes  Patient Response to Education: Patient/Caregiver Verbalized Understanding of Information

## 2024-03-29 NOTE — PROGRESS NOTES
Occupational Therapy Evaluation & Treatment     03/29/24 3764-5243 eval/treat   OT Last Visit   OT Received On 03/29/24   General   Reason for Referral Decline in ADLs s/p CVA   Referred By Dr Carlisle   Past Medical History Relevant to Rehab CAD,  HTN, HLD, RA, CHF, Afib, asthma, chronic respiratory failure d/t COPD and pulmonary sarcoidosis - home O2,   Prior to Session Communication Bedside nurse   Patient Position Received Bed, 3 rail up;Alarm off, not on at start of session   Preferred Learning Style verbal;visual   General Comment Pt cleared by nursing and agreeable for therapy.   Precautions   Hearing/Visual Limitations hearing diminished; corective lenses for reading   Medical Precautions Other (comment);Oxygen therapy device and L/min  (NPO - +dobhoff - keep HOB elevated 30 degrees or higher at all times; 2L O2 via NC continuous (baseline))   Vital Signs   SpO2   (92% pre, 96% post)   Pain Assessment   Pain Assessment 0-10   Pain Score 9   Pain Type Acute pain  (s/p fall (imaging negative for fracture))   Pain Location Hip   Pain Orientation Right   Pain Interventions Other (Comment)  (Requested/recieved pain meds)   Cognition   Overall Cognitive Status WFL   Orientation Level Oriented X4  (grossly to time (knows month/year))   Following Commands Follows one step commands without difficulty   Cognition Comments Patient denies mental status change s/p CVA and appears WFL but will further assess/monitor during admission.   Home Living   Type of Home   ([place])   Lives With Spouse   Home Adaptive Equipment Scooter;Other (Comment);Cane;Reacher;Long-handled sponge  (rollator)   Home Access Stairs to enter with rails   Entrance Stairs-Rails Left   Entrance Stairs-Number of Steps 2   Bathroom Shower/Tub Tub/shower unit   Bathroom Toilet Standard   Bathroom Equipment Grab bars in shower;Tub transfer bench   Home Living Comments laundry in basement which spouse completes - does not need to access   Prior Function Per  "Pt/Caregiver Report   Level of [place] Independent with ADLs and functional transfers;Independent with homemaking with ambulation   Receives Help From Family  (Spouse is home and able to assist)   ADL Assistance Independent   Homemaking Assistance Independent   Ambulatory Assistance Independent   Vocational Retired  (, [employer], 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\")   Leisure   (likes to watch  TV, \"play with my cell phone\")   Hand Dominance Right   Prior Function Comments (+) drives   ADL   Eating Assistance Unable to assess   Eating Deficit NPO  (+dobhoff)   Grooming Assistance Minimal   Grooming Deficit   (assist brushing hair, setup for oral care and washing hands/face seated edge of bed)   Bathing Assistance Moderate   Bathing Deficit Perineal area;Buttocks;Right lower leg including foot;Left lower leg including foot   UE Dressing Assistance Stand by   UE Dressing Deficit Setup  (donning/doffing a housecoat with a zipper)   LE Dressing Assistance Total   LE Dressing Deficit Don/doff R sock;Don/doff L sock  (not wearing pants this date)   Toileting Assistance with Device Maximal   Toileting Deficit   (assist with protective briefs down/up over hips; patient able to perform sunil care seated)   Activity Tolerance   Endurance Decreased tolerance for upright activites   Activity Tolerance Comments   (requires mutliple rest breaks d/t chronic RF and generalized fatigue from prolonged inactivity s/p CVA)   Bed Mobility   Bed Mobility Yes   Bed Mobility 1   Bed Mobility 1 Supine to sitting   Level of Assistance 1 Moderate assistance   Bed Mobility Comments 1 assist with right LE and trunk toward the left side of bed with head elevated ~40 degrees   Functional Mobility   Functional Mobility Performed Yes  (Min assist x 2 bed to/from bathroom with a gait belt and 2 wheeled walker. Required cues for safety to remain inside walker frame prior to aligning self with seated surfaces.)   Transfers   Transfer Yes "   Transfer 1   Transfer From 1 Bed to   Transfer to 1 Stand   Technique 1 Sit to stand;Stand to sit   Transfer Device 1 Walker  (gait belt)   Transfer Level of Assistance 1 Moderate assistance   Trials/Comments 1 x 2 with cues for hand placement   Transfers 2   Transfer From 2 Stand to   Transfer to 2 Sit  (ETS with arms)   Technique 2 Stand to sit;Sit to stand   Transfer Device 2 Walker  (gait belt)   Transfer Level of Assistance 2 Moderate assistance   Trials/Comments 2 cues for hand placement   Transfers 3   Transfer From 3 Stand to   Transfer to 3 Sit  (chair with arms)   Technique 3 Stand to sit   Transfer Device 3 Walker  (gait belt)   Transfer Level of Assistance 3 Moderate assistance   Trials/Comments 3 cues for hand placement   Sensation   Light Touch No apparent deficits   Sensation Comment Denies tingling/numbness   Strength   Strength Comments right sided weakness acute onset s/p CVA (~3/5 grossly)   Hand Function   Gross Grasp Impaired  (right)   Coordination Impaired  (right)   IP OT Assessment   OT Assessment 73 [facility] female [facility] with a left MCA occlusion following a fall.  Patient received TNK and subsequently sustained respiratory failure for which she was intubated and a right-sided SDH.   On eval, patient presents with right-sided weakness, impaired balance, impaired activity tolerance, and swallowing deficits.  Patient requires OT services in order to maximize functional capacity and safety with mobility.   Prognosis Good   Barriers to Discharge None   Evaluation/Treatment Tolerance Patient limited by fatigue;Patient limited by pain   Medical Staff Made Aware Yes   End of Session Communication Bedside nurse   End of Session Patient Position Up in chair;Alarm off, not on at start of session   OT Assessment   OT Assessment Results Decreased ADL status;Decreased upper extremity strength;Decreased endurance;Decreased fine motor control;Decreased functional mobility;Decreased gross  motor control;Decreased IADLs   Strengths Housing layout;Premorbid level of function   Barriers to Participation Comorbidities   Education   Individual(s) Educated Patient   Education Provided Fall precautons;Risk and benefits of OT discussed with patient or other;POC discussed and agreed upon   Risk and Benefits Discussed with Patient/Caregiver/Other yes   Patient/Caregiver Demonstrated Understanding yes   Plan of Care Discussed and Agreed Upon yes   Patient Response to Education Patient/Caregiver Verbalized Understanding of Information   Inpatient/Swing Bed or Outpatient   Inpatient/Swing Bed or Outpatient Inpatient   Inpatient Plan   Treatment Interventions ADL retraining;Functional transfer training;UE strengthening/ROM;Endurance training;Patient/family training;Equipment evaluation/education;Fine motor coordination activities;Neuromuscular reeducation;Compensatory technique education   OT Frequency 5 times per week   OT Discharge Recommendations Low intensity level of continued care   Equipment Recommended upon Discharge Wheeled walker   OT - OK to Discharge Yes   OT Recommended Transfer Status Assist of 2  (for mobility, otherwise assist x 1)            03/29/24 [id number] IRF ITEMS   Eating   Reason if not Attempted   (Patient is NPO)   Oral Hygiene   Assistance Needed Set-up / clean-up   Physical Assistance Level No physical assistance   CARE Score - Oral Hygiene 5   Oral Hygiene Discharge Goal   Discharge Goal 6   Toileting Hygiene   Assistance Needed Physical assistance   Physical Assistance Level 76% or more   CARE Score - Toileting Hygiene 2   Toileting Hygiene Discharge Goal   Discharge Goal 6   Shower/Bathe Self   Assistance Needed Physical assistance   Physical Assistance Level 51%-75%   CARE Score - Shower/Bathe Self 2   Shower/Bathe Self Discharge Goal   Discharge Goal 6   Upper Body Dressing   Assistance Needed Set-up / clean-up   Physical Assistance Level No physical assistance   CARE Score - Upper  Body Dressing 5   Upper Body Dressing Discharge Goal   Discharge Goal 6   Lower Body Dressing   Assistance Needed Physical assistance   Physical Assistance Level Total assistance   CARE Score - Lower Body Dressing 1   Lower Body Dressing Discharge Goal   Discharge Goal 6   Putting On/Taking Off Footwear   Assistance Needed Physical assistance   Physical Assistance Level Total assistance   CARE Score - Putting On/Taking Off Footwear 1   Putting On/Taking Off Footwear Discharge Goal   Discharge Goal 6   Toilet Transfer   Assistance Needed Physical assistance   Physical Assistance Level 51%-75%   CARE Score - Toilet Transfer 2   Toilet Transfer Discharge Goal   Discharge Goal 6   Car Transfer   Reason if not Attempted Safety concerns   CARE Score - Car Transfer 88   Car Transfer Discharge Goal   Discharge Goal 4          03/29/24 [id number] Outcomes Measures   [facility] Daily Activity   Putting on and taking off regular lower body clothing 1   Bathing (including washing, rinsing, drying) 2   Putting on and taking off regular upper body clothing 3   Toileting, which includes using toilet, bedpan or urinal 2   Taking care of personal grooming such as brushing teeth 3   Eating Meals 1   Daily Activity - Total Score 12       Problem: Bathing  Goal: LTG - Patient will utilize adaptive techniques to bathe body independently using ad aides as needed.  Outcome: Progressing  Goal: STG - Patient will bathe body with setup/supervision assist using ad aides as needed.  Outcome: Progressing     Problem: Dressings Lower Extremities  Goal: LTG - Patient will dress lower body independently using ad aides as needed.  Outcome: Progressing  Goal: STG - Patient will complete lower body dressing with setup/supervision assist using ad aides as needed.  Outcome: Progressing     Problem: Dressing Upper Extremities  Goal: LTG - Patient will complete upper body dressing independently.  Outcome: Progressing     Problem: Grooming  Goal: LTG - Patient  will complete daily grooming tasks independently.  Outcome: Progressing     Problem: Instrumental Activities of Daily Living  Goal: LTG - Patient will complete simple meal preparation activities with distant supervision.  Outcome: Progressing     Problem: Functional Mobility  Goal: Pt will increase right UE strength and GM/FM coordination to baseline functional status.  Outcome: Progressing     Problem: Toileting  Goal: LTG - Patient will complete daily toileting tasks independently.  Outcome: Progressing  Goal: STG - Patient will complete toileting tasks with supervision.  Outcome: Progressing     Problem: OT Transfers  Goal: LTG - Patient will transfer to car with distant supervision.  Outcome: Progressing  Goal: LTG - Patient will transfer to tub/shower with modified independence using DME.  Outcome: Progressing  Goal: STG - Patient will perform toilet transfer with distant supervision using DME as needed.  Outcome: Progressing  Goal: STG - Patient will perform tub/shower transfer with supervision using DME.  Outcome: Progressing

## 2024-03-29 NOTE — PROGRESS NOTES
Physical Therapy       03/29/24 8826-3149   PT  Visit   PT Received On 03/29/24   Response to Previous Treatment Patient reporting fatigue but able to participate.   General   Reason for Referral Decline in ADLs s/p CVA   Referred By Dr. Carlisle   Past Medical History Relevant to Rehab COPD, CAD s/p PCI 10/4/23, NSVT, HTN, HLD, rheumatoid arthritis, and hypothyroidism, CHF, Afib (not on AC), AAA, asthma, COPD, chronic respiratory failure 2/2 pulmonary sarcoidosis, Migraines, Hiatal Hernia, and Diverticulosis.   Patient Position Received Up in chair   Preferred Learning Style verbal;visual   Precautions   Hearing/Visual Limitations hearing diminished; corective lenses for reading   Medical Precautions Other (comment);Oxygen therapy device and L/min  (NPO, keep HOB elevated 30 degrees or higher due to dobhoff)   Pain Assessment   Pain Assessment 0-10   Pain Score 4   Pain Type Acute pain;Chronic pain   Pain Location Hip   Pain Orientation Right   Therapeutic Exercise   Therapeutic Exercise Activity 1 seated hip abduction, greeen theraband, 2x15 reps with 5 sec hold   Therapeutic Exercise Activity 2 seated isometric hip adduction/ball squeezes, 2x15 reps with 5 sec hold   Bed Mobility 1   Bed Mobility 1 Sitting to supine   Level of Assistance 1 Minimum assistance   Bed Mobility Comments 1 HOB elevated 30 degrees, assist for LE's up   Ambulation/Gait Training 1   Surface 1 Level tile   Device 1 Rolling walker   Gait Support Devices Gait belt   Assistance 1 Contact guard;Minimum assistance   Quality of Gait 1 Diminished heel strike;Inconsistent stride length;Decreased step length;Shuffling gait   Comments/Distance (ft) 1 40 ft x 3 with turns, vc's for closer proximity to RW.  Good posture noted.  Fatigue reported after last trial.   Transfer 1   Technique 1 Sit to stand;Stand to sit   Transfer Device 1 Walker   Transfer Level of Assistance 1 Contact guard   Trials/Comments 1 cues for hand placement   Transfers 2    Transfer From 2 Wheelchair to   Transfer to 2 Bed   Technique 2 Stand pivot   Transfer Device 2 Walker   Transfer Level of Assistance 2 Contact guard   Trials/Comments 2 lead left   Activity Tolerance   Endurance Tolerates 30 min exercise with multiple rests   PT Assessment   PT Assessment Results Decreased strength;Pain;Decreased range of motion;Decreased endurance;Impaired balance;Decreased mobility;Decreased cognition;Impaired judgement;Decreased safety awareness   Rehab Prognosis Excellent   Strengths Ability to acquire knowledge;Attitude of self   Barriers to Participation Comorbidities   End of Session Patient Position Bed, 2 rail up  (HOB elevated 30 degrees)   PT Plan   Inpatient/Swing Bed or Outpatient Inpatient   PT Plan   Treatment/Interventions Bed mobility;Transfer training;Gait training;Stair training;Neuromuscular re-education;Therapeutic exercise;Therapeutic activity   PT Plan Skilled PT   Equipment Recommended upon Discharge Wheeled walker   PT Recommended Transfer Status Assist x1

## 2024-03-29 NOTE — NURSING NOTE
Patient awake and alert. Dobhoff flushes good at this time. Meds administered. Mouth rinsed out with water after breathing tx.

## 2024-03-30 PROCEDURE — 97530 THERAPEUTIC ACTIVITIES: CPT | Mod: GO,CO

## 2024-03-30 PROCEDURE — 2500000005 HC RX 250 GENERAL PHARMACY W/O HCPCS: Performed by: INTERNAL MEDICINE

## 2024-03-30 PROCEDURE — 2500000004 HC RX 250 GENERAL PHARMACY W/ HCPCS (ALT 636 FOR OP/ED): Performed by: INTERNAL MEDICINE

## 2024-03-30 PROCEDURE — 97530 THERAPEUTIC ACTIVITIES: CPT | Mod: GP,CQ

## 2024-03-30 PROCEDURE — 2500000002 HC RX 250 W HCPCS SELF ADMINISTERED DRUGS (ALT 637 FOR MEDICARE OP, ALT 636 FOR OP/ED): Mod: MUE | Performed by: INTERNAL MEDICINE

## 2024-03-30 PROCEDURE — 1180000001 HC REHAB PRIVATE ROOM DAILY

## 2024-03-30 PROCEDURE — 97116 GAIT TRAINING THERAPY: CPT | Mod: GP,CQ

## 2024-03-30 PROCEDURE — 97110 THERAPEUTIC EXERCISES: CPT | Mod: GO,CO

## 2024-03-30 PROCEDURE — 97535 SELF CARE MNGMENT TRAINING: CPT | Mod: GO,CO

## 2024-03-30 PROCEDURE — 2500000001 HC RX 250 WO HCPCS SELF ADMINISTERED DRUGS (ALT 637 FOR MEDICARE OP): Performed by: INTERNAL MEDICINE

## 2024-03-30 PROCEDURE — 2500000002 HC RX 250 W HCPCS SELF ADMINISTERED DRUGS (ALT 637 FOR MEDICARE OP, ALT 636 FOR OP/ED): Performed by: INTERNAL MEDICINE

## 2024-03-30 PROCEDURE — 97110 THERAPEUTIC EXERCISES: CPT | Mod: GP,CQ

## 2024-03-30 RX ORDER — POLYETHYLENE GLYCOL 3350 17 G/17G
17 POWDER, FOR SOLUTION ORAL DAILY PRN
Status: DISCONTINUED | OUTPATIENT
Start: 2024-03-30 | End: 2024-04-13 | Stop reason: HOSPADM

## 2024-03-30 RX ORDER — NYSTATIN 100000 U/G
CREAM TOPICAL 2 TIMES DAILY
Status: DISCONTINUED | OUTPATIENT
Start: 2024-03-30 | End: 2024-03-30

## 2024-03-30 RX ORDER — NYSTATIN 100000 [USP'U]/ML
5 SUSPENSION ORAL 3 TIMES DAILY
Status: COMPLETED | OUTPATIENT
Start: 2024-03-30 | End: 2024-04-01

## 2024-03-30 RX ADMIN — ACETAMINOPHEN 650 MG: 160 SOLUTION ORAL at 05:27

## 2024-03-30 RX ADMIN — POLYETHYLENE GLYCOL 3350 17 G: 17 POWDER, FOR SOLUTION ORAL at 10:10

## 2024-03-30 RX ADMIN — LIDOCAINE 1 PATCH: 4 PATCH TOPICAL at 10:10

## 2024-03-30 RX ADMIN — FUROSEMIDE 20 MG: 20 TABLET ORAL at 10:09

## 2024-03-30 RX ADMIN — Medication: at 17:30

## 2024-03-30 RX ADMIN — METOPROLOL TARTRATE 25 MG: 25 TABLET, FILM COATED ORAL at 10:09

## 2024-03-30 RX ADMIN — LEVOTHYROXINE SODIUM 50 MCG: 50 TABLET ORAL at 05:27

## 2024-03-30 RX ADMIN — METOPROLOL TARTRATE 25 MG: 25 TABLET, FILM COATED ORAL at 14:34

## 2024-03-30 RX ADMIN — FORMOTEROL FUMARATE DIHYDRATE 20 MCG: 20 SOLUTION RESPIRATORY (INHALATION) at 21:38

## 2024-03-30 RX ADMIN — Medication 1 TABLET: at 10:09

## 2024-03-30 RX ADMIN — TIOTROPIUM BROMIDE INHALATION SPRAY 2 PUFF: 3.12 SPRAY, METERED RESPIRATORY (INHALATION) at 10:10

## 2024-03-30 RX ADMIN — NYSTATIN 500000 UNITS: 100000 SUSPENSION ORAL at 21:38

## 2024-03-30 RX ADMIN — BUDESONIDE INHALATION 0.5 MG: 0.5 SUSPENSION RESPIRATORY (INHALATION) at 10:09

## 2024-03-30 RX ADMIN — METOPROLOL TARTRATE 25 MG: 25 TABLET, FILM COATED ORAL at 21:38

## 2024-03-30 RX ADMIN — FORMOTEROL FUMARATE DIHYDRATE 20 MCG: 20 SOLUTION RESPIRATORY (INHALATION) at 10:09

## 2024-03-30 RX ADMIN — ACETAMINOPHEN 650 MG: 160 SOLUTION ORAL at 14:34

## 2024-03-30 RX ADMIN — Medication 400 MG: at 10:09

## 2024-03-30 RX ADMIN — Medication 5 MG: at 21:38

## 2024-03-30 RX ADMIN — ASPIRIN 81 MG CHEWABLE TABLET 81 MG: 81 TABLET CHEWABLE at 10:09

## 2024-03-30 RX ADMIN — ACETAMINOPHEN 650 MG: 160 SOLUTION ORAL at 21:38

## 2024-03-30 RX ADMIN — NYSTATIN 500000 UNITS: 100000 SUSPENSION ORAL at 16:18

## 2024-03-30 RX ADMIN — BUDESONIDE INHALATION 0.5 MG: 0.5 SUSPENSION RESPIRATORY (INHALATION) at 21:38

## 2024-03-30 ASSESSMENT — PAIN DESCRIPTION - LOCATION
LOCATION: LEG
LOCATION: HIP

## 2024-03-30 ASSESSMENT — PAIN SCALES - GENERAL
PAINLEVEL_OUTOF10: 4
PAINLEVEL_OUTOF10: 3
PAINLEVEL_OUTOF10: 4
PAINLEVEL_OUTOF10: 4
PAINLEVEL_OUTOF10: 8
PAINLEVEL_OUTOF10: 3
PAINLEVEL_OUTOF10: 4
PAINLEVEL_OUTOF10: 0 - NO PAIN

## 2024-03-30 ASSESSMENT — ACTIVITIES OF DAILY LIVING (ADL)
HOME_MANAGEMENT_TIME_ENTRY: 30
BATHING_LEVEL_OF_ASSISTANCE: MINIMUM ASSISTANCE
BATHING_EQUIPMENT_NEEDED: LONG-HANDLED SPONGE;REMOVEABLE SHOWER HEAD
BATHING_WHERE_ASSESSED: SHOWER
LACK_OF_TRANSPORTATION: NO

## 2024-03-30 ASSESSMENT — PAIN - FUNCTIONAL ASSESSMENT
PAIN_FUNCTIONAL_ASSESSMENT: 0-10

## 2024-03-30 ASSESSMENT — PAIN DESCRIPTION - ORIENTATION
ORIENTATION: RIGHT
ORIENTATION: RIGHT

## 2024-03-30 ASSESSMENT — PAIN DESCRIPTION - DESCRIPTORS: DESCRIPTORS: SHARP

## 2024-03-30 NOTE — NURSING NOTE
Assumed care of patient at this time, assisted with repositioning, no needs voiced at this time, call light within reach safety measures remain in place.

## 2024-03-30 NOTE — NURSING NOTE
Assumed care of pt @ 0730, report received, in to assess pt, pt here s/p CVA, PT HAS SEVERE DYPHAGIA AND has dobhoff NG tube and strict NPO, pt speech is clear and coherent, pt is very weak as she was in the hospital for several weeks, pt is currently on 2 L NC, lung sounds diminished throughout, apical regular, no edema noted, abdomen soft and round, bow2l sounds active x 4 quadrants, pt denies any acute needs, call light within reach, continuing to monitor.

## 2024-03-30 NOTE — PROGRESS NOTES
Occupational Therapy       24 [id number]   OT Last Visit   OT Received On 24   General   Reason for Referral Decline in ADLs s/p CVA   Referred By Dr. Carlisle   Past Medical History Relevant to Rehab COPD, CAD s/p PCI 10/4/23, NSVT, HTN, HLD, rheumatoid arthritis, and hypothyroidism, CHF, Afib (not on AC), AAA, asthma, COPD, chronic respiratory failure 2/2 pulmonary sarcoidosis, Migraines, Hiatal Hernia, and Diverticulosis.   Missed Visit No   Family/Caregiver Present No   Prior to Session Communication Bedside nurse   Patient Position Received Bed, 3 rail up   Preferred Learning Style verbal;visual   General Comment Conferedwith NSG.  Ptagreeable to skilled therappeutic intervention   Precautions   Hearing/Visual Limitations hearing diminished; corective lenses for reading   Medical Precautions Other (comment);Oxygen therapy device and L/min  (NPO, keep HOB elevated 30 degrees or higher due to dobhoff)   Precautions Comment  Joey Tube NPO   Vital Signs   SpO2 96 %  (range throughout session 86-98)   Pain Assessment   Pain Assessment 0-10   Pain Score 3   Pain Type Chronic pain   Pain Location Leg   Pain Orientation Right   Pain Interventions Repositioned;Environmental changes;Relaxation technique;Therapeutic presence   Response to Interventions effective   Cognition   Overall Cognitive Status WFL   Orientation Level Oriented X4   Following Commands Follows one step commands with increased time   Coordination   Movements are Fluid and Coordinated No   Rapid Alternating Movements Bradykinesia  (RLE)   Coordination Comment Weakness RUE/LE   RUE    RUE  X   LUE    LUE X   LUE AROM (degrees)   LUE AROM Comment L UE AROM WFL   Feeding   Feeding Comments NPO   Grooming   Grooming Level of Assistance Setup   Grooming Where Assessed Wheelchair   Grooming Comments Pt brushing hair items in reach   UE Bathing   UE Bathing Adaptive Equipment Removeable shower head   UE Bathing Level of Assistance Setup   UE  Bathing Where Assessed Shower   UE Bathing Comments all task seated   LE Bathing   LE Bathing Adaptive Equipment Long-handled sponge;Removeable shower head   LE Bathing Level of Assistance Minimum assistance   LE Bathing Where Assessed Shower   LE Bathing Comments LH sponge B feet, grab bar in stance unilaterally Pt wash buttocks/sunil care   UE Dressing   UE Dressing Level of Assistance Setup   UE Dressing Where Assessed Wheelchair   UE Dressing Comments Pt doff/don pullover garment   LE Dressing   LE Dressing Yes   LE Dressing Adaptive Equipment Reacher;Sock aide;Shoe horn   Pants Level of Assistance Minimum assistance  (for balance in stance Pt thread B feet LH reacher RLE)   Sock Level of Assistance Contact guard  (Pt doff/don socks LH reacher R , sock aid to don Pt figure four to doff/don L)   Shoe Level of Assistance Minimum assistance  (Pt don B shoes LH shoe horn R assist to fasten Pt figure four L shoe don and fasten lace)   Adult Briefs Level of Assistance Moderate assistance   LE Dressing Where Assessed Other (Comment);Wheelchair  (shower grab bar/gait belt with all stance phases)   Toileting   Toileting Level of Assistance Minimum assistance   Where Assessed Toilet   Toileting Comments Min assist for standing posterior care, Pt adjsut clothing prior/after   Bed Mobility   Bed Mobility Yes   Bed Mobility 1   Bed Mobility 1 Supine to sitting   Level of Assistance 1 Contact guard   Bed Mobility Comments 1 HOB 45' use side transfer bar effort with trunk vc bodty mechanics   Bed Mobility 2   Bed Mobility  2 Rolling left   Level of Assistance 2 Close supervision   Bed Mobility Comments 2 use side transfer bar   Transfers   Transfer Yes   Transfer 1   Transfer From 1 Bed to   Transfer to 1 Stand   Technique 1 Sit to stand;Stand to sit   Transfer Device 1 Walker  (rolling)   Transfer Level of Assistance 1 Minimum assistance   Trials/Comments 1 cues for hand placement to w/c   Toilet Transfers   Toilet Transfer  From Wheelchair   Toilet Transfer Type To and from   Toilet Transfer to Standard bedside commode  (atop [id number])   Toilet Transfer Technique Stand pivot   Toilet Transfers Minimal assistance   Toilet Transfers Comments grab bar use vc hand placement   Shower Transfers   Shower Transfer From Wheelchair   Shower Transfer Type To and from   Shower Transfer to Shower seat with back   Shower Transfer Technique Stand pivot   Shower Transfers Minimal assistance   Shower Transfers Comments grab bar gait belt vc hand placement   Therapeutic Activity   Therapeutic Activity Performed Yes   Therapeutic Activity 1 educated Pt with energy conservation and application deep breathing fair follow through   IP OT Assessment   OT Assessment Pt progressing with established POC.  Will continue with skilled therapeutic intervention to address remaining deficits   Prognosis Good   Barriers to Discharge None   Evaluation/Treatment Tolerance Patient limited by fatigue   Medical Staff Made Aware Yes   End of Session Communication Bedside nurse   End of Session Patient Position Up in chair  (call light in reach all needs met)   OT Assessment   OT Assessment Results Decreased ADL status;Decreased upper extremity strength;Decreased endurance;Decreased fine motor control;Decreased functional mobility;Decreased gross motor control;Decreased IADLs   Strengths Ability to acquire knowledge;Attitude of self;Coping skills;Support of extended family/friends   Barriers to Participation Comorbidities   Inpatient/Swing Bed or Outpatient   Inpatient/Swing Bed or Outpatient Inpatient   Inpatient Plan   Treatment Interventions ADL retraining;Functional transfer training;Endurance training;Patient/family training;Compensatory technique education;Equipment evaluation/education   OT Frequency 5 times per week   Equipment Recommended upon Discharge Wheeled walker   OT - OK to Discharge Yes   OT Recommended Transfer Status Assist of 1

## 2024-03-30 NOTE — NURSING NOTE
Spoke with MD as requested by family regarding stronger sleep aide and medication for anxiety previously being taken at home, no new orders at this time d/t post cva status per MD, patient and family aware.

## 2024-03-30 NOTE — PROGRESS NOTES
Physical Therapy       24 [id number]   PT  Visit   PT Received On 24   Response to Previous Treatment Patient reporting fatigue but able to participate.   General   Reason for Referral Decline in ADLs s/p CVA   Referred By Dr. Carlisle   Past Medical History Relevant to Rehab COPD, CAD s/p PCI 10/4/23, NSVT, HTN, HLD, rheumatoid arthritis, and hypothyroidism, CHF, Afib (not on AC), AAA, asthma, COPD, chronic respiratory failure 2/2 pulmonary sarcoidosis, Migraines, Hiatal Hernia, and Diverticulosis.   Patient Position Received Up in chair   Preferred Learning Style verbal;visual   Precautions   Hearing/Visual Limitations hearing diminished; corective lenses for reading   Precautions Comment  Joey Tube NPO, HOB elevated 45 degrees or more   Pain Assessment   Pain Assessment 0-10   Pain Score 4   Pain Type Chronic pain   Pain Location Hip   Pain Orientation Right   Ambulation/Gait Training 1   Surface 1 Level tile;Carpet   Device 1 Rolling walker   Gait Support Devices Gait belt  (O2 2L/min)   Assistance 1 Contact guard   Quality of Gait 1 Diminished heel strike;Inconsistent stride length;Decreased step length;Shuffling gait   Comments/Distance (ft) 1 100 ft x 1, 40 ft x 1, turns included   Transfer 1   Technique 1 Sit to stand;Stand to sit   Transfer Device 1 Walker   Transfer Level of Assistance 1 Contact guard;Minimum assistance   Transfers 2   Transfer From 2 Stand to;Toilet to   Transfer to 2 Toilet;Stand   Technique 2 Stand pivot   Transfer Level of Assistance 2 Contact guard   Trials/Comments 2 Use of grab bar for support/balance during transfer   Other Activity   Other Activity 1 extra time spent usinf toilet.  Pt able to perform sunil care without additional assist.   Activity Tolerance   Endurance Tolerates 10 - 20 min exercise with multiple rests   PT Assessment   PT Assessment Results Decreased strength;Pain;Decreased range of motion;Decreased endurance;Impaired balance;Decreased  mobility;Decreased cognition;Impaired judgement;Decreased safety awareness   Rehab Prognosis Excellent   Strengths Ability to acquire knowledge;Attitude of self;Premorbid level of function   Barriers to Participation Comorbidities   Assessment Comment Amb distance increased this PM.  Pt displayed GRAY, but recovered quickly with seated rest.   End of Session Patient Position Up in chair   PT Plan   Inpatient/Swing Bed or Outpatient Inpatient   PT Plan   Treatment/Interventions Bed mobility;Transfer training;Gait training;Stair training;Neuromuscular re-education;Therapeutic exercise;Therapeutic activity   PT Plan Skilled PT   Equipment Recommended upon Discharge Wheeled walker   PT Recommended Transfer Status Assist x1

## 2024-03-30 NOTE — NURSING NOTE
Pt hooked up to bolus feeding, jevity 1.5 200 ml bolus, tylenol given @ this time for pain in right hip/right leg, pt denies any other needs, call light within reach, continuing to monitor.

## 2024-03-30 NOTE — PROGRESS NOTES
Occupational Therapy       24 [id number]   OT Last Visit   OT Received On 24   General   Reason for Referral Decline in ADLs s/p CVA   Referred By Dr. Carlisle   Past Medical History Relevant to Rehab COPD, CAD s/p PCI 10/4/23, NSVT, HTN, HLD, rheumatoid arthritis, and hypothyroidism, CHF, Afib (not on AC), AAA, asthma, COPD, chronic respiratory failure 2/2 pulmonary sarcoidosis, Migraines, Hiatal Hernia, and Diverticulosis.   Missed Visit No   Family/Caregiver Present No   Prior to Session Communication Bedside nurse   Patient Position Received Bed, 3 rail up   Preferred Learning Style verbal;visual   General Comment Confered with NSG.  Pt agreeable to skilled therappeutic intervention   Precautions   Hearing/Visual Limitations hearing diminished; corective lenses for reading   Medical Precautions   (NPO, dobhoff, HOB up 45 degrees or higher)   Precautions Comment  Joey Tube NPO   Pain Assessment   Pain Assessment 0-10   Pain Score 4   Pain Type Chronic pain   Pain Location Hip   Pain Orientation Right   Pain Interventions Repositioned;Environmental changes;Relaxation technique;Therapeutic presence   Response to Interventions effective   Cognition   Overall Cognitive Status WFL   Orientation Level Oriented X4   Processing Speed Delayed   Coordination   Movements are Fluid and Coordinated No   Coordination Comment Weakness RUE/LE   RUE    RUE  X   LUE    LUE X   LUE AROM (degrees)   LUE AROM Comment L UE AROM WFL   Dynamic Sitting Balance   Dynamic Sitting-Balance Support Feet unsupported   Dynamic Sitting-Balance Forward lean;Lateral lean;Reaching for objects;Reaching across midline   Dynamic Sitting-Comments challenged functional reach sequencing and decision making with card sorting task on elevation to increase engagement in enviromnent and falls prevention   Therapeutic Exercise   Therapeutic Exercise Performed Yes  (BUKRISSY bHEP established with handout issuance proper pacing, joint alignment and  countig reps aloud good follow through.  15 reps x 1 set)   Therapeutic Exercise Activity 1 shoulder flexion/extension   Therapeutic Exercise Activity 2 elbow flexioon/extension   Therapeutic Exercise Activity 3 horizontal abduction/adduction   Therapeutic Exercise Activity 4 shoulder abduction/adduction   Therapeutic Exercise Activity 5 forrearm peronation/supination   Therapeutic Activity   Therapeutic Activity Performed Yes   Therapeutic Activity 1 challenged hand eye coordination, sequencing peripheral visual  abilities with seated BITS program   IP OT Assessment   OT Assessment Pt progressing with established POC. Will continue with skilled therapeutic intervention to address remaining deficits   Prognosis Good   Barriers to Discharge None   Evaluation/Treatment Tolerance Patient limited by fatigue   Medical Staff Made Aware Yes   End of Session Communication Bedside nurse   End of Session Patient Position Up in chair  (transfered to PTsession all needs met)   OT Assessment   OT Assessment Results Decreased ADL status;Decreased upper extremity strength;Decreased endurance;Decreased fine motor control;Decreased functional mobility;Decreased gross motor control;Decreased IADLs   Strengths Ability to acquire knowledge;Attitude of self;Premorbid level of function   Barriers to Participation Comorbidities   Inpatient/Swing Bed or Outpatient   Inpatient/Swing Bed or Outpatient Inpatient   Inpatient Plan   Treatment Interventions ADL retraining;Functional transfer training;Cognitive reorientation;Endurance training;Compensatory technique education   OT Frequency 5 times per week   Equipment Recommended upon Discharge Wheeled walker   OT - OK to Discharge Yes   OT Recommended Transfer Status Assist of 1

## 2024-03-30 NOTE — PROGRESS NOTES
Physical Therapy       24 [phone]   PT  Visit   PT Received On 24   Response to Previous Treatment Patient reporting fatigue but able to participate.   General   Reason for Referral Decline in ADLs s/p CVA   Referred By Dr. Carlisle   Past Medical History Relevant to Rehab COPD, CAD s/p PCI 10/4/23, NSVT, HTN, HLD, rheumatoid arthritis, and hypothyroidism, CHF, Afib (not on AC), AAA, asthma, COPD, chronic respiratory failure 2/2 pulmonary sarcoidosis, Migraines, Hiatal Hernia, and Diverticulosis.   Patient Position Received Up in chair   Preferred Learning Style verbal;visual   Precautions   Hearing/Visual Limitations hearing diminished; corective lenses for reading   Medical Precautions Other (comment);Oxygen therapy device and L/min  (NPO, dobhoff, HOB up 45 degrees or higher)   Precautions Comment  Joey Tube NPO   Pain Assessment   Pain Assessment 0-10   Pain Score 4   Pain Type Chronic pain   Pain Location Hip   Pain Orientation Right   Therapeutic Exercise   Therapeutic Exercise Performed   (GRAY noted, but quick recovery with rest)   Therapeutic Exercise Activity 1 seated hip flexion, 2x15 reps, 1.5# LLE, 1# RLE   Therapeutic Exercise Activity 2 seated LAQ, 2x15 reps, 1.5# LLE, 1# RLE   Therapeutic Exercise Activity 3 standing heel raises, 2x15 reps, RW   Therapeutic Exercise Activity 4 standing hip flexion, 2x15 reps, RW   Therapeutic Exercise Activity 5 standing hip extension, 2x15 reps, RW   Ambulation/Gait Training 1   Surface 1 Level tile;Carpet   Device 1 Rolling walker   Gait Support Devices Gait belt  (oxygen tank)   Assistance 1 Contact guard;Minimum assistance   Quality of Gait 1 Diminished heel strike;Inconsistent stride length;Decreased step length;Shuffling gait   Comments/Distance (ft) 1 55 ft x 2, turns included, vc's for closer proximity to RW.  GRAY noted but quickly recovered   Transfer 1   Technique 1 Sit to stand;Stand to sit   Transfer Device 1 Walker   Transfer Level of  Assistance 1 Minimum assistance   Trials/Comments 1 safe hand placement noted.  Multiple trials performed during tx   Stairs   Rails 1 Bilateral   Device 1 Railing   Support Devices 1 Gait belt   Assistance 1 Minimum assistance   Comment/Number of Steps 1 Up/down 6 4-inch steps, step-to pattern, vc's for correct sequencing/WBOS/full foot on step   Activity Tolerance   Endurance Tolerates 10 - 20 min exercise with multiple rests   PT Assessment   PT Assessment Results Decreased strength;Pain;Decreased range of motion;Decreased endurance;Impaired balance;Decreased mobility;Decreased cognition;Impaired judgement;Decreased safety awareness   Rehab Prognosis Excellent   Barriers to Discharge medical acuity   Evaluation/Treatment Tolerance Patient limited by fatigue   Strengths Ability to acquire knowledge;Attitude of self;Coping skills   Barriers to Participation Comorbidities   Assessment Comment Pt put forth good effort in PT this AM.  Stair negotiation initiated and pt tolerated well.   End of Session Patient Position Up in chair   PT Plan   Inpatient/Swing Bed or Outpatient Inpatient   PT Plan   Treatment/Interventions Bed mobility;Transfer training;Gait training;Stair training;Neuromuscular re-education;Therapeutic exercise;Therapeutic activity   PT Plan Skilled PT   Equipment Recommended upon Discharge Wheeled walker   PT Recommended Transfer Status Assist x1

## 2024-03-31 PROCEDURE — 2500000001 HC RX 250 WO HCPCS SELF ADMINISTERED DRUGS (ALT 637 FOR MEDICARE OP): Performed by: INTERNAL MEDICINE

## 2024-03-31 PROCEDURE — 2500000004 HC RX 250 GENERAL PHARMACY W/ HCPCS (ALT 636 FOR OP/ED): Performed by: INTERNAL MEDICINE

## 2024-03-31 PROCEDURE — 2500000002 HC RX 250 W HCPCS SELF ADMINISTERED DRUGS (ALT 637 FOR MEDICARE OP, ALT 636 FOR OP/ED): Mod: MUE | Performed by: INTERNAL MEDICINE

## 2024-03-31 PROCEDURE — 2500000002 HC RX 250 W HCPCS SELF ADMINISTERED DRUGS (ALT 637 FOR MEDICARE OP, ALT 636 FOR OP/ED): Performed by: INTERNAL MEDICINE

## 2024-03-31 PROCEDURE — 1180000001 HC REHAB PRIVATE ROOM DAILY

## 2024-03-31 PROCEDURE — 2500000005 HC RX 250 GENERAL PHARMACY W/O HCPCS: Performed by: INTERNAL MEDICINE

## 2024-03-31 RX ADMIN — ACETAMINOPHEN 650 MG: 160 SOLUTION ORAL at 20:28

## 2024-03-31 RX ADMIN — Medication 400 MG: at 09:04

## 2024-03-31 RX ADMIN — NYSTATIN 500000 UNITS: 100000 SUSPENSION ORAL at 09:04

## 2024-03-31 RX ADMIN — ACETAMINOPHEN 650 MG: 160 SOLUTION ORAL at 04:41

## 2024-03-31 RX ADMIN — FORMOTEROL FUMARATE DIHYDRATE 20 MCG: 20 SOLUTION RESPIRATORY (INHALATION) at 09:04

## 2024-03-31 RX ADMIN — ACETAMINOPHEN 650 MG: 160 SOLUTION ORAL at 11:23

## 2024-03-31 RX ADMIN — FUROSEMIDE 20 MG: 20 TABLET ORAL at 09:04

## 2024-03-31 RX ADMIN — METOPROLOL TARTRATE 25 MG: 25 TABLET, FILM COATED ORAL at 15:08

## 2024-03-31 RX ADMIN — BUDESONIDE INHALATION 0.5 MG: 0.5 SUSPENSION RESPIRATORY (INHALATION) at 20:29

## 2024-03-31 RX ADMIN — METOPROLOL TARTRATE 25 MG: 25 TABLET, FILM COATED ORAL at 09:04

## 2024-03-31 RX ADMIN — METOPROLOL TARTRATE 25 MG: 25 TABLET, FILM COATED ORAL at 20:29

## 2024-03-31 RX ADMIN — TIOTROPIUM BROMIDE INHALATION SPRAY 2 PUFF: 3.12 SPRAY, METERED RESPIRATORY (INHALATION) at 09:04

## 2024-03-31 RX ADMIN — Medication 1 TABLET: at 09:05

## 2024-03-31 RX ADMIN — LEVOTHYROXINE SODIUM 50 MCG: 50 TABLET ORAL at 05:00

## 2024-03-31 RX ADMIN — NYSTATIN 500000 UNITS: 100000 SUSPENSION ORAL at 20:28

## 2024-03-31 RX ADMIN — NYSTATIN 500000 UNITS: 100000 SUSPENSION ORAL at 15:08

## 2024-03-31 RX ADMIN — ASPIRIN 81 MG CHEWABLE TABLET 81 MG: 81 TABLET CHEWABLE at 09:04

## 2024-03-31 RX ADMIN — FORMOTEROL FUMARATE DIHYDRATE 20 MCG: 20 SOLUTION RESPIRATORY (INHALATION) at 20:29

## 2024-03-31 RX ADMIN — LIDOCAINE 1 PATCH: 4 PATCH TOPICAL at 09:05

## 2024-03-31 RX ADMIN — BUDESONIDE INHALATION 0.5 MG: 0.5 SUSPENSION RESPIRATORY (INHALATION) at 09:04

## 2024-03-31 RX ADMIN — Medication 5 MG: at 20:28

## 2024-03-31 RX ADMIN — Medication 2 L/MIN: at 17:30

## 2024-03-31 ASSESSMENT — PAIN DESCRIPTION - LOCATION
LOCATION: LEG
LOCATION: LEG

## 2024-03-31 ASSESSMENT — PAIN - FUNCTIONAL ASSESSMENT
PAIN_FUNCTIONAL_ASSESSMENT: 0-10

## 2024-03-31 ASSESSMENT — PAIN SCALES - GENERAL
PAINLEVEL_OUTOF10: 3
PAINLEVEL_OUTOF10: 6
PAINLEVEL_OUTOF10: 3

## 2024-03-31 ASSESSMENT — PAIN DESCRIPTION - ORIENTATION
ORIENTATION: RIGHT
ORIENTATION: RIGHT

## 2024-03-31 NOTE — NURSING NOTE
Patient awake and alert resting in bed with O2 in place via NC. Patient has been to restroom several times this am. Communicates needs effectively. Patient remains NPO with dobhof feedings q 4.

## 2024-03-31 NOTE — INDIVIDUALIZED OVERALL PLAN OF CARE NOTE
Physical Medicine and Rehabilitation  Individualized Overall Plan of Care    Patient Name: Trinity Hernandez  Medical Record Number: [id number]  YOB: 1950  Sex: Female  Payor Info: Payor: MEDICARE / Plan: MEDICARE PART A AND B / Product Type: *No Product type* /     Primary Rehab (Etiologic) Diagnosis: CVA (cerebrovascular accident due to intracerebral hemorrhage) (CMS/[facility])     Estimated Length of Stay: 14 days    Medical functional prognosis is good for the patient to be discharged home at modified independent with higher level assist for higher level ADL's.    Patient/Family anticipated outcome: Home as independent as possible.    Functional Outcome/Goal:  Bed mobility: modified independent  Transfer sit to stand: supervision  Ambulation with: supervision  Upper extremity dressing: set-up  Lower extremity dressing: set-up  Stairs: contact guard assist    Anticipated Interventions:  Therapeutic exercises  Gait Training   Transfer training  Balance and high-level mobility training  Exercises to improve balance and mobility  ADL retraining for grooming, bathing, ADL activities  Exercises to improve strength and endurance    Required Therapy:  Physical therapy 90 minutes 5 days/week.  Occupational therapy 90 minutes 5 days/week  Speech therapy 30 minutes 5 days/week.    Patient is a Good    Assessment/Plan   Principal Problem:    CVA (cerebrovascular accident due to intracerebral hemorrhage) (CMS/[facility])  Active Problems:    Anxiety    Asthmatic bronchitis    CAD (coronary artery disease), native coronary artery    Dysphagia as late effect of stroke    GERD (gastroesophageal reflux disease)    Heart failure with mildly reduced ejection fraction (CMS/[facility])    COPD (chronic obstructive pulmonary disease) (CMS/[facility])    Ischemic stroke (CMS/[facility])       Saray Trent  3/31/2024 1:07 PM   Scribed on behalf of Vikas Cruz MD

## 2024-03-31 NOTE — NURSING NOTE
Assumed care of pt. Report received. Pt up to bathroom for liquid stool x1. Returned to bed, alarms on; call light w/in reach. No questions or concerns expressed at this time. Will update as needed throughout shift.

## 2024-04-01 PROCEDURE — 97535 SELF CARE MNGMENT TRAINING: CPT | Mod: GO,CO

## 2024-04-01 PROCEDURE — 99233 SBSQ HOSP IP/OBS HIGH 50: CPT | Performed by: INTERNAL MEDICINE

## 2024-04-01 PROCEDURE — 2500000001 HC RX 250 WO HCPCS SELF ADMINISTERED DRUGS (ALT 637 FOR MEDICARE OP): Performed by: INTERNAL MEDICINE

## 2024-04-01 PROCEDURE — 2500000002 HC RX 250 W HCPCS SELF ADMINISTERED DRUGS (ALT 637 FOR MEDICARE OP, ALT 636 FOR OP/ED): Mod: MUE | Performed by: INTERNAL MEDICINE

## 2024-04-01 PROCEDURE — 97530 THERAPEUTIC ACTIVITIES: CPT | Mod: GP,CQ

## 2024-04-01 PROCEDURE — 97530 THERAPEUTIC ACTIVITIES: CPT | Mod: GO,CO

## 2024-04-01 PROCEDURE — 97116 GAIT TRAINING THERAPY: CPT | Mod: GP,CQ

## 2024-04-01 PROCEDURE — 92526 ORAL FUNCTION THERAPY: CPT | Mod: GN

## 2024-04-01 PROCEDURE — 2500000005 HC RX 250 GENERAL PHARMACY W/O HCPCS: Performed by: INTERNAL MEDICINE

## 2024-04-01 PROCEDURE — 97110 THERAPEUTIC EXERCISES: CPT | Mod: GP,CQ

## 2024-04-01 PROCEDURE — 2500000002 HC RX 250 W HCPCS SELF ADMINISTERED DRUGS (ALT 637 FOR MEDICARE OP, ALT 636 FOR OP/ED): Performed by: INTERNAL MEDICINE

## 2024-04-01 PROCEDURE — 1180000001 HC REHAB PRIVATE ROOM DAILY

## 2024-04-01 PROCEDURE — 2500000004 HC RX 250 GENERAL PHARMACY W/ HCPCS (ALT 636 FOR OP/ED): Performed by: INTERNAL MEDICINE

## 2024-04-01 RX ADMIN — Medication 400 MG: at 08:29

## 2024-04-01 RX ADMIN — METOPROLOL TARTRATE 25 MG: 25 TABLET, FILM COATED ORAL at 08:29

## 2024-04-01 RX ADMIN — NYSTATIN 500000 UNITS: 100000 SUSPENSION ORAL at 15:29

## 2024-04-01 RX ADMIN — ACETAMINOPHEN 650 MG: 160 SOLUTION ORAL at 04:42

## 2024-04-01 RX ADMIN — LIDOCAINE 1 PATCH: 4 PATCH TOPICAL at 08:28

## 2024-04-01 RX ADMIN — TIOTROPIUM BROMIDE INHALATION SPRAY 2 PUFF: 3.12 SPRAY, METERED RESPIRATORY (INHALATION) at 09:00

## 2024-04-01 RX ADMIN — LEVOTHYROXINE SODIUM 50 MCG: 50 TABLET ORAL at 05:13

## 2024-04-01 RX ADMIN — FORMOTEROL FUMARATE DIHYDRATE 20 MCG: 20 SOLUTION RESPIRATORY (INHALATION) at 08:27

## 2024-04-01 RX ADMIN — BUDESONIDE INHALATION 0.5 MG: 0.5 SUSPENSION RESPIRATORY (INHALATION) at 21:29

## 2024-04-01 RX ADMIN — Medication 1 TABLET: at 08:28

## 2024-04-01 RX ADMIN — Medication 5 MG: at 21:30

## 2024-04-01 RX ADMIN — FUROSEMIDE 20 MG: 20 TABLET ORAL at 08:27

## 2024-04-01 RX ADMIN — Medication: at 17:30

## 2024-04-01 RX ADMIN — ACETAMINOPHEN 650 MG: 160 SOLUTION ORAL at 21:30

## 2024-04-01 RX ADMIN — FORMOTEROL FUMARATE DIHYDRATE 20 MCG: 20 SOLUTION RESPIRATORY (INHALATION) at 21:29

## 2024-04-01 RX ADMIN — ASPIRIN 81 MG CHEWABLE TABLET 81 MG: 81 TABLET CHEWABLE at 08:25

## 2024-04-01 RX ADMIN — BUDESONIDE INHALATION 0.5 MG: 0.5 SUSPENSION RESPIRATORY (INHALATION) at 08:27

## 2024-04-01 RX ADMIN — METOPROLOL TARTRATE 25 MG: 25 TABLET, FILM COATED ORAL at 21:30

## 2024-04-01 RX ADMIN — NYSTATIN 500000 UNITS: 100000 SUSPENSION ORAL at 08:30

## 2024-04-01 RX ADMIN — METOPROLOL TARTRATE 25 MG: 25 TABLET, FILM COATED ORAL at 15:00

## 2024-04-01 ASSESSMENT — PAIN SCALES - GENERAL
PAINLEVEL_OUTOF10: 5 - MODERATE PAIN
PAINLEVEL_OUTOF10: 3
PAINLEVEL_OUTOF10: 4
PAINLEVEL_OUTOF10: 3
PAINLEVEL_OUTOF10: 4

## 2024-04-01 ASSESSMENT — ACTIVITIES OF DAILY LIVING (ADL)
HOME_MANAGEMENT_TIME_ENTRY: 45
HOME_MANAGEMENT_TIME_ENTRY: 30

## 2024-04-01 ASSESSMENT — PAIN - FUNCTIONAL ASSESSMENT
PAIN_FUNCTIONAL_ASSESSMENT: 0-10

## 2024-04-01 ASSESSMENT — PAIN DESCRIPTION - LOCATION: LOCATION: LEG

## 2024-04-01 NOTE — CARE PLAN
The patient's goals for the shift include maintain safety    The clinical goals for the shift include maintain safety    Over the shift, the patient did make progress toward the following goals. Barriers to progression include disease process. Recommendations to address these barriers include patient education.

## 2024-04-01 NOTE — PROGRESS NOTES
"   24 1100 to 1200   OT Last Visit   OT Received On 24   General   Reason for Referral Decline in ADLs s/p CVA   Referred By Dr. 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   Prior to Session Communication Bedside nurse   Patient Position Received Bed, 2 rail up   Preferred Learning Style verbal;visual   General Comment Confered with NSG.  Pt agreeable to skilled therappeutic intervention   Precautions   Hearing/Visual Limitations hearing diminished; corective lenses for reading   Precautions Comment  Joey Tube NPO, HOB elevated 45 degrees or more. 2 litres 02 via nasal canula   Vital Signs   Heart Rate 77   SpO2 99 %   BP Method Other (Comment)   Patient Position Sitting   Pain Assessment   Pain Assessment 0-10   Pain Score 4   Pain Type Chronic pain   Pain Location Leg   Pain Orientation Right   Feeding   Feeding Comments NPO   Grooming   Grooming Level of Assistance Setup   Grooming Where Assessed Wheelchair   Grooming Comments Pt brushed hair and completed oral care using mouth sponge   UE Bathing   UE Bathing Level of Assistance Setup   UE Bathing Where Assessed Sitting sinkside   LE Bathing   LE Bathing Comments Patient declined lower bathing. Its alreday been done\"   UE Dressing   UE Dressing Level of Assistance Setup   UE Dressing Where Assessed Wheelchair   UE Dressing Comments pull over on off   LE Dressing   LE Dressing Comments Patient decline lower dressing   Toileting   Toileting Level of Assistance Minimum assistance   Where Assessed Toilet   Toileting Comments min a for clothing adjustment for pants up   Bed Mobility   Bed Mobility Yes   Bed Mobility 1   Bed Mobility 1 Supine to sitting   Level of Assistance 1 Close supervision   Bed Mobility Comments 1 Use " fo bed railo   Bed Mobility 2   Bed Mobility  2 Sitting to supine   Level of Assistance 2 Close supervision   Bed Mobility Comments 2 use of side bar.   Functional Mobility   Functional Mobility Performed Yes   Transfers   Transfer Yes   Transfer 1   Transfer From 1 Bed to   Transfer to 1 Wheelchair   Technique 1 Stand pivot   Transfer Level of Assistance 1 Contact guard   Trials/Comments 1 cues for safe hand placement   Toilet Transfers   Toilet Transfer From Wheelchair   Toilet Transfer Type To and from   Toilet Transfer to Standard bedside commode  (over toilet)   Toilet Transfer Technique Stand pivot   Toilet Transfers Minimal assistance   Toilet Transfers Comments cuesfor handplacement on grab bar   Shower Transfers   Shower Transfers Comments Patient declined to shower aggreable to wash up at sink   Therapeutic Exercise   Therapeutic Exercise Performed Yes   Therapeutic Exercise Activity 1 challenged Patient to a sequencing activity using bilateral hands completing figure directions patient completed 2 without any cues and demonstrated ability to use bilateral hands.   IP OT Assessment   End of Session Communication Bedside nurse   End of Session Patient Position Up in chair;Bed, 2 rail up   OT Assessment   OT Assessment Results Decreased ADL status;Decreased upper extremity strength;Decreased endurance;Decreased fine motor control;Decreased functional mobility;Decreased gross motor control;Decreased IADLs   Strengths Ability to acquire knowledge;Coping skills   Barriers to Participation Comorbidities   Inpatient/Swing Bed or Outpatient   Inpatient/Swing Bed or Outpatient Inpatient   Inpatient Plan   Treatment Interventions ADL retraining   OT - OK to Discharge Yes

## 2024-04-01 NOTE — PROGRESS NOTES
Physical Medicine and Rehabilitation  Individualized Overall Plan of Care    Patient Name: Trinity Hernandez  Medical Record Number: [id number]  YOB: 1950  Sex: Female  Payor Info: Payor: MEDICARE / Plan: MEDICARE PART A AND B / Product Type: *No Product type* /     Primary Rehab (Etiologic) Diagnosis: CVA (cerebrovascular accident due to intracerebral hemorrhage) (CMS/[facility])     Estimated Length of Stay: 14 days    Medical functional prognosis is good for the patient to be discharged home at modified independent with higher level assist for higher level ADL's.    Patient/Family anticipated outcome: Home as independent as possible.    Functional Outcome/Goal:  Bed mobility: modified independent  Transfer sit to stand: supervision  Ambulation with: supervision  Upper extremity dressing: set-up  Lower extremity dressing: set-up  Stairs: contact guard assist    Anticipated Interventions:  Therapeutic exercises  Gait Training   Transfer training  Balance and high-level mobility training  Exercises to improve balance and mobility  ADL retraining for grooming, bathing, ADL activities  Exercises to improve strength and endurance    Required Therapy:  Physical therapy 90 minutes 5 days/week.  Occupational therapy 90 minutes 5 days/week  Speech therapy 30 minutes 5 days/week.    Rehab Potential: Good    Assessment/Plan   Principal Problem:    CVA (cerebrovascular accident due to intracerebral hemorrhage) (CMS/[facility])  Active Problems:    Anxiety    Asthmatic bronchitis    CAD (coronary artery disease), native coronary artery    Dysphagia as late effect of stroke    GERD (gastroesophageal reflux disease)    Heart failure with mildly reduced ejection fraction (CMS/[facility])    COPD (chronic obstructive pulmonary disease) (CMS/[facility])    Ischemic stroke (CMS/[facility])       Saray Trent  3/31/2024 1:07 PM   Scribed on behalf of Vikas Cruz MD

## 2024-04-01 NOTE — PROGRESS NOTES
04/01/24 1401 to 14.31   OT Last Visit   OT Received On 04/01/24   General   Reason for Referral Decline in ADLs s/p CVA   Referred By Dr. 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   Prior to Session Communication Bedside nurse   Patient Position Received Up in chair   Preferred Learning Style verbal;visual   Pain Assessment   Pain Assessment 0-10   Pain Score 3   Pain Type Chronic pain   Therapeutic Activity   Therapeutic Activity Performed Yes   Therapeutic Activity 1 Patient challenged to activity reading a menu and following structured directions with ordering 3 items form the menu. Patient required to total all items after choosing and locating prices. Patient was inacurate with prices of food, required verbal assistance and inaccurate totalling items together.   Therapeutic Activity 2 Patient challenged to second activity graded down locating 4 items and accurately documenting the prices. One error no cues required.   Therapeutic Activity 3 safety cards 3 set of  2 able to complete no cues required   IP OT Assessment   End of Session Communication Bedside nurse   End of Session Patient Position Up in chair   OT Assessment   OT Assessment Results Decreased ADL status;Decreased upper extremity strength;Decreased endurance;Decreased fine motor control;Decreased functional mobility;Decreased gross motor control;Decreased IADLs   Strengths Ability to acquire knowledge   Barriers to Participation Comorbidities   Inpatient/Swing Bed or Outpatient   Inpatient/Swing Bed or Outpatient Inpatient   Inpatient Plan   Treatment Interventions Cognitive reorientation

## 2024-04-01 NOTE — CARE PLAN
The patient's goals for the shift include sleep    The clinical goals for the shift include maintain safety; promote sleep

## 2024-04-01 NOTE — PROGRESS NOTES
Physical Therapy       24 [id number]   PT  Visit   PT Received On 24   Response to Previous Treatment Patient reporting fatigue but able to participate.   General   Reason for Referral Decline in ADLs s/p CVA   Referred By Dr. 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   Patient Position Received Up in chair   Preferred Learning Style verbal;visual   Precautions   Hearing/Visual Limitations hearing diminished; corective lenses for reading   Precautions Comment  Joey Tube NPO, HOB elevated 45 degrees or more. 2 litres 02 via nasal canula   Pain Assessment   Pain Assessment 0-10   Pain Score 5 - Moderate pain   Pain Type Chronic pain   Pain Location Leg   Pain Orientation Right   Multiple Pain Sites   (neck)   Balance/Neuromuscular Re-Education   Balance/Neuromuscular Re-Education Activity 1 // rosario: BUE support, small milvia forward stepping, CG/Gerhard   Balance/Neuromuscular Re-Education Activity 2 // bars: BUE support, side stepping over small hurdles CG/Gerhard, vc's for remaining facing forward (not turning in direction of travel)   Ambulation/Gait Training 1   Surface 1 Level tile   Device 1 Rollator   Gait Support Devices Gait belt  (O2 at 2 L/min)   Assistance 1 Contact guard   Quality of Gait 1 Diminished heel strike;Shuffling gait;Forward flexed posture   Comments/Distance (ft) 1 60 ft x 3, seated rest breaks on rollator between trials   Transfer 1   Transfer From 1 Sit to;Stand to   Transfer to 1   (rollator)   Technique 1 Stand pivot   Transfer Device 1   (rollator)   Transfer Level of Assistance 1 Contact guard   Trials/Comments 1 vc's for brakes   Wheelchair Activities   Propulsion Type 1 Manual   Level 1 Level tile   Method 1 Right upper extremity;Left upper  extremity;Right lower extremity;Left lower extremity   Level of Assistance 1 Minimum assistance   Description/Details 1 veers right, vc's for steering techniques, effortful   Activity Tolerance   Endurance Tolerates 10 - 20 min exercise with multiple rests   PT Assessment   PT Assessment Results Decreased strength;Pain;Decreased range of motion;Decreased endurance;Impaired balance;Decreased mobility;Decreased cognition;Impaired judgement;Decreased safety awareness   Rehab Prognosis Excellent   End of Session Patient Position Up in chair   PT Plan   Inpatient/Swing Bed or Outpatient Inpatient   PT Plan   Treatment/Interventions Bed mobility;Transfer training;Gait training;Stair training;Neuromuscular re-education;Therapeutic exercise;Therapeutic activity   PT Plan Skilled PT   Equipment Recommended upon Discharge Wheeled walker   PT Recommended Transfer Status Assist x1

## 2024-04-01 NOTE — PROGRESS NOTES
Speech-Language Pathology    SLP Adult IRF CCR Dysphagia Treatment     Patient Name: Trinity Hernandez  MRN: [id number]  Today's Date: 4/1/2024  Time Calculation  Start Time: 0903  Stop Time: 1000  Time Calculation (min): 57 min     2/5sw    Current Problem:   Ischemic stroke, right body involvement (left brain)     Recent MBSS 3/26 at [facility]     SLP Assessment:   RN cleared pt for p.o. trials with SLP only. Discussed and provided handout to RN for ice -chips as listed below.    NPO  Frequent, aggressive oral care is strongly recommended to improve infection control as well as reduce dental plaque and bacteria on oropharyngeal surfaces which may increase the risk nosocomial infections, including pneumonia.  If pt requests, OK for small amounts of ice chips (no more than 3-5/hr) with supervision only after aggressive oral care is completed. Discontinue if there are concerns for s/s aspiration.  Puree tsp trials w/ SLP only (chin tuck, triple effortful swallow).  Repeat MBSS in 2-4 weeks.        Plan:  Inpatient/Swing Bed or Outpatient: Inpatient  Treatment/Interventions: Other (Comment) (dysphagia)  SLP TX Plan: Continue Plan of Care  SLP Plan: Skilled SLP  SLP Frequency: 5x per week  Duration: Current admission  SLP Discharge Recommendations: Continue skilled SLP services at the next level of care  Next Treatment Priority: po trials with SLP only, OPEs, CTAR  Discussed POC: Patient, Nursing  Discussed Risks/Benefits: Yes, Patient, Nursing  Patient/Caregiver Agreeable: Yes      Subjective   Pt seen upright in w/c. Currently on 2L O2.     General Visit Information:   Reason for Referral: f/u dysphagia tx  Referred By: Dr. Cruz  Past Medical History Relevant to Rehab: COPD, CAD s/p PCI 10/4/23, NSVT, HTN, HLD, rheumatoid arthritis, and hypothyroidism, CHF, Afib (not on AC), AAA, asthma, COPD, chronic respiratory failure 2/2 pulmonary sarcoidosis, Migraines, Hiatal Hernia, and Diverticulosis.  Prior to Session  "Communication: Bedside nurse    Pain Assessment:   Pain Score: 4  Pain Location: Leg  Pain Orientation: Right      Objective     DYSPHAGIA GOALS initiated 3/29, anticipated end 4/19:  Pt will tolerate tsp trials of pureed textures with SLP only to 90% without overt s/s aspiration.              PROGRESS: 3 ice-chips, and 4 small tsp of applesauce (approx less than 1 oz).  STATUS: Pt c/o pain during swallow d/t dobhoff. Notified RN. No overt s/s aspiration were noted during pureed trials. Pt was able to clear throat and brought up clear, white colored mucuous/phlegm.  Pt will utilize chin-tuck, triple effortful swallow with SLP only trials to 100%.              PROGRESS: 90% - 100% with SLP trials.  STATUS: Pt is affected by pain during swallow. However, able to maintain chin-tuck and complete x3 successive swallows.  Pt will increase swallow reflex via OPEs to 80% accuracy.              PROGRESS: 60% with reduced cues.  STATUS: CTAR x20. Pitch glides via \"I see.\" X10. Gargle, x5 for 3-5 seconds. BOT elevation x10. Tongue Pops x10. Cues for breath support, pacing, jaw neutral.  Pt will improve swallow reflex via OMEs to 80% accuracy.              PROGRESS: 70% with min cues  STATUS: Able to complete sets of x10 lingual protrusion/retraction, lateralization,  buccal protrusion/retraction. Reduced cues for pacing, full ROM.  Ongoing assessment via MBSS when indicated to reassess swallow function.              PROGRESS: not at this time, MBSS rec's indicate repeat MBSS in 2-4wks.              STATUS: see above    Therapeutic Swallow:  Therapeutic Swallow Intervention : PO Trials, Oral Strengthening Techniques, Pharyngeal Strengthening Techniques  Pharyngeal Strengthening Techniques: Effortful Swallow, Chin Tuck Against Resistance, Pitch Glides, Supraglottic Swallow, Ami Maneuver  Solid Diet Recommendations: NPO  Liquid Diet Recommendations: NPO  Swallow Comments: Pt rates pain during swallow as 5,6,7. Pt feels d/t " presence of dobhoff. RN notified.    Inpatient Education:  Adult Inpatient Education  Individual(s) Educated: Patient  Verbal Education : goals, progress  Risk and Benefits Discussed with Patient/Caregiver/Other: yes  Patient/Caregiver Demonstrated Understanding: yes  Plan of Care Discussed and Agreed Upon: yes  Patient Response to Education: Patient/Caregiver Verbalized Understanding of Information

## 2024-04-01 NOTE — PROGRESS NOTES
Physical Therapy       24 [id number]   PT  Visit   PT Received On 24   Response to Previous Treatment Patient reporting fatigue but able to participate.   General   Reason for Referral Decline in ADLs s/p CVA   Referred By Dr. Carlisle   Past Medical History Relevant to Rehab COPD, CAD s/p PCI 10/4/23, NSVT, HTN, HLD, rheumatoid arthritis, and hypothyroidism, CHF, Afib (not on AC), AAA, asthma, COPD, chronic respiratory failure 2/2 pulmonary sarcoidosis, Migraines, Hiatal Hernia, and Diverticulosis.   Patient Position Received Bed, 2 rail up   Preferred Learning Style verbal;visual   Precautions   Hearing/Visual Limitations hearing diminished; corective lenses for reading   Precautions Comment  Joey Tube NPO, HOB elevated 45 degrees or more   Pain Assessment   Pain Assessment 0-10   Pain Score 5 - Moderate pain   Pain Type Chronic pain   Pain Location Leg   Pain Orientation Right   Therapeutic Exercise   Therapeutic Exercise Activity 1 seated isometric hip adduction/ball squeezes, 2x15 reps with 5 sec hold   Therapeutic Exercise Activity 2 seated hip abduction, 2x15 reps, blue theraband   Bed Mobility 1   Bed Mobility 1 Supine to sitting   Level of Assistance 1 Close supervision   Bed Mobility Comments 1 Pt used bed rail to assist with transfer   Ambulation/Gait Training 1   Surface 1 Level tile   Device 1 Rolling walker   Gait Support Devices Gait belt  (O2 at 2L/min)   Assistance 1 Contact guard   Quality of Gait 1 Diminished heel strike;Shuffling gait;Forward flexed posture   Comments/Distance (ft) 1 80 ft x 2, turns included.  Brief standing rest breaks taken due to GRAY.  Slow oz.   Ambulation/Gait Training 2   Surface 2 Level tile   Device 2 Rollator   Gait Support Devices Gait belt  (O2 at 2 L/min)   Assistance 2 Close supervision;Contact guard   Quality of Gait 2 Diminished heel strike;Forward flexed posture;Shuffling gait   Comments/Distance (ft) 2 Pt trialed rollator and reported that it  "was \"OK\".  Seated rest break taken between trials due to GRAY and fatigue.  60 ft x 2   Transfer 1   Transfer From 1 Bed to   Transfer to 1 Wheelchair   Technique 1 Stand pivot   Transfer Device 1 Walker   Transfer Level of Assistance 1 Contact guard   Transfers 2   Transfer From 2 Sit to   Technique 2 Sit to stand;Stand to sit   Transfer Device 2 Walker   Transfer Level of Assistance 2 Contact guard   Trials/Comments 2 vc's for safe hand placememnt   Transfers 3   Transfer From 3 Wheelchair to;Toilet to   Transfer to 3 Toilet;Wheelchair   Technique 3 Stand pivot   Transfer Device 3   (wall rail)   Transfer Level of Assistance 3 Close supervision   Stairs   Rails 1 Bilateral   Device 1 Railing   Support Devices 1 Gait belt   Assistance 1 Contact guard   Comment/Number of Steps 1 3 trials of up/down 4 6\" steps, vc's for step-to sequencing and WBOS/full foot on step.   Other Activity   Other Activity 1 extra time spent using restroom.  Assist for pants up.  Indpendent with sunil care   Activity Tolerance   Endurance Tolerates 10 - 20 min exercise with multiple rests   PT Assessment   PT Assessment Results Decreased strength;Pain;Decreased range of motion;Decreased endurance;Impaired balance;Decreased mobility;Decreased cognition;Impaired judgement;Decreased safety awareness   Rehab Prognosis Excellent   Barriers to Discharge medical acuity   Evaluation/Treatment Tolerance Patient limited by fatigue   Strengths Ability to acquire knowledge;Attitude of self;Premorbid level of function   Barriers to Participation Comorbidities   Assessment Comment Frequent rest breaks required this AM during tx due to reports of not feeling well and GRAY (pulse ox WNL for pt).  Trialed rollator with fair results.   End of Session Patient Position Up in chair   PT Plan   Inpatient/Swing Bed or Outpatient Inpatient   PT Plan   Treatment/Interventions Bed mobility;Transfer training;Gait training;Stair training;Neuromuscular " re-education;Therapeutic exercise;Therapeutic activity   PT Plan Skilled PT   Equipment Recommended upon Discharge Wheeled walker   PT Recommended Transfer Status Assist x1

## 2024-04-01 NOTE — PROGRESS NOTES
[facility]: [facility]  PROGRESS NOTE      Trinity Hernandez is a 74 y.o. female that is being seen  today for follow-up at CCR unit   Subjective   Patient is being seen for follow-up at CCR unit.  Patient has history of stroke and is admitted for physical therapy.  Patient is on oxygen.  Vital signs are stable.  Patient is on tube feeds.  Patient is also started on oral nystatin swish and swallow.      ROS  Negative for fever or chills  Negative for sore throat, ear pain, nasal discharge.  Patient has difficulty in swallowing and has feeding tube  Negative for cough, shortness of breath or wheezing  Negative for chest pain, palpitations, swelling of legs  Negative for abdominal pain, constipation, diarrhea, blood in the stools  Negative for urinary complaints  Negative for headache, dizziness, weakness or numbness  Positive for difficulty in walking  Negative for depression or anxiety  All other systems reviewed and were negative   Vitals:    04/01/24 1100   BP:    Pulse: 77   Resp:    Temp:    SpO2: 99%      Vitals:    03/28/24 1640   Weight: 76.4 kg (168 lb 6.9 oz)     Body mass index is 26.44 kg/m².  Physical Exam  Constitutional: Patient does not appear to be in any acute distress  Head and Face: NCAT  Eyes: Normal external exam, EOMI  ENT: Patient has nasogastric feeding tube  Cardiovascular: RRR, S1/S2, no murmurs, rubs, or gallops, radial pulses +2, no edema of extremities  Pulmonary: CTAB, no respiratory distress.  Abdomen: +BS, soft, non-tender, nondistended, no guarding or rebound, no masses noted  MSK: No joint swelling, normal movements of all extremities. Range of motion- normal.  Skin- No lesions, contusions, or erythema.  Peripheral puslses palpable bilaterally 2+  Neuro: AAO X3, Cranial nerves 2-12 grossly intact,DTR 2+ in all 4 limbs, weakness of the legs.      LABS   [unfilled]  Lab Results   Component Value Date    GLUCOSE 134 (H) 03/29/2024    CALCIUM 9.3 03/29/2024    NA  139 03/29/2024    K 3.9 03/29/2024    CO2 29 03/29/2024     03/29/2024    BUN 15 03/29/2024    CREATININE 0.80 03/29/2024     Lab Results   Component Value Date    ALT 5 (L) 03/24/2024    AST 16 03/24/2024    ALKPHOS 55 03/24/2024    BILITOT 0.5 03/24/2024     Lab Results   Component Value Date    WBC 8.6 03/29/2024    HGB 8.2 (L) 03/29/2024    HCT 27.5 (L) 03/29/2024    MCV 94 03/29/2024     03/29/2024     Lab Results   Component Value Date    CHOL 107 03/25/2024    CHOL 181 11/16/2023    CHOL 191 02/13/2023     Lab Results   Component Value Date    HDL 33.2 03/25/2024    HDL 36.7 11/16/2023    HDL 37.7 (A) 02/13/2023     Lab Results   Component Value Date    LDLCALC 41 03/25/2024    LDLCALC 110 (H) 11/16/2023     Lab Results   Component Value Date    TRIG 166 (H) 03/25/2024    TRIG 174 (H) 11/16/2023    TRIG 256 (H) 02/13/2023     Lab Results   Component Value Date    HGBA1C 5.3 03/25/2024     Other labs not included in the list above were reviewed either before or during this encounter.    History    Past Medical History:   Diagnosis Date    Abdominal bloating 05/04/2023    CAD (coronary artery disease)     CHF (congestive heart failure) (CMS/HCC)     Diverticulitis of small intestine without perforation or abscess without bleeding     Diverticulitis, intestine, small    Dizzy 11/20/2023    Essential (primary) hypertension 02/08/2017    HTN (hypertension), benign    Fall     Influenza B 05/08/2015    Formatting of this note might be different from the original. Completed Tamiflu Continue droplet precautions.    Nausea 11/16/2018    Personal history of other endocrine, nutritional and metabolic disease 02/08/2017    History of hypothyroidism    Personal history of other venous thrombosis and embolism     History of deep venous thrombosis     Past Surgical History:   Procedure Laterality Date    ABDOMINAL ADHESION SURGERY  05/16/2017    Laparoscopic Lysis Of Intestinal Adhesions    CERVICAL FUSION   05/16/2017    Cervical Vertebral Fusion    CHOLECYSTECTOMY  05/16/2017    Cholecystectomy    COLECTOMY PARTIAL / TOTAL  05/16/2017    Partial Colectomy - Sigmoid    CORONARY ANGIOPLASTY WITH STENT PLACEMENT      CT ANGIO NECK  05/21/2020    CT NECK ANGIO W AND WO IV CONTRAST 5/21/2020 [facility]    CT HEAD ANGIO W AND WO IV CONTRAST  05/21/2020    CT HEAD ANGIO W AND WO IV CONTRAST 5/21/2020 [facility]    HERNIA REPAIR  05/16/2017    Hernia Repair    HYSTERECTOMY  10/03/2013    Hysterectomy    MR HEAD ANGIO WO IV CONTRAST  05/21/2020    MR HEAD ANGIO WO IV CONTRAST 5/21/2020 [facility]    MR NECK ANGIO WO IV CONTRAST  05/21/2020    MR NECK ANGIO WO IV CONTRAST 5/21/2020 [facility]    OTHER SURGICAL HISTORY  05/16/2017    Thoracoscopy Of Lungs And Pleural Space With Biopsy Of Lung Nodules    OTHER SURGICAL HISTORY  04/15/2019    Esophagogastroduodenoscopy     No family history on file.  Allergies   Allergen Reactions    Hydrocodone-Acetaminophen Shortness of breath    Montelukast Unknown     Lungs felt as if there was a hole in them    Morphine Unknown     Body shakes    Nitrofurantoin Monohyd/M-Cryst Unknown    Sulfa (Sulfonamide Antibiotics) Nausea Only and Other    Atorvastatin Itching, Swelling and Unknown    Dicyclomine Hallucinations     Nightmares    Fluticasone Propion-Salmeterol Unknown    Levofloxacin Rash    Lisinopril Cough    Nitroimidazoles Nausea Only and Unknown    Omeprazole Diarrhea and Unknown    Salmeterol Unknown    Simvastatin Swelling and Unknown    Sucralfate Unknown    Umeclidinium Other     Urinary retention, blurred vision, constipation     No current facility-administered medications on file prior to encounter.     Current Outpatient Medications on File Prior to Encounter   Medication Sig Dispense Refill    acetaminophen (Tylenol) 325 mg tablet 2 tablets (650 mg) by nasogastric tube route every 6 hours if needed for mild pain (1 - 3). 30 tablet 0     albuterol 2.5 mg /3 mL (0.083 %) nebulizer solution Take 3 mL (2.5 mg) by nebulization every 4 hours if needed for shortness of breath.      albuterol 90 mcg/actuation inhaler Inhale 2 puffs every 6 hours if needed for shortness of breath.      arformoterol (Brovana) 15 mcg/2 mL nebulizer solution INHALE 2ML VIA NEBULIZER AS INSTRUCTED TWICE A DAY (EVERY 12 HOURS)      aspirin 81 mg chewable tablet 1 tablet (81 mg) by nasogastric tube route once daily. Do not start before March 29, 2024.      budesonide (Pulmicort) 0.5 mg/2 mL nebulizer solution Take 2 mL (0.5 mg) by nebulization 2 times a day. Rinse mouth with water after use to reduce aftertaste and incidence of candidiasis. Do not swallow.      cholecalciferol (Vitamin D3) 50 mcg (2,000 unit) capsule 1 capsule (50 mcg) by nasogastric tube route once daily.      estradiol (Estrace) 0.01 % (0.1 mg/gram) vaginal cream Insert 0.5 Applicatorfuls (2 g) into the vagina once daily as needed.      furosemide (Lasix) 20 mg tablet 1 tablet (20 mg) by g-tube route once daily. Do not start before March 29, 2024.      Lactobacillus acidophilus (Probiotic) 10 billion cell capsule 1 capsule by nasogastric tube route once daily.      levothyroxine (Synthroid, Levoxyl) 50 mcg tablet 1 tablet (50 mcg) by nasogastric tube route once daily. Do not start before March 29, 2024.      lidocaine 4 % patch Place 1 patch over 12 hours on the skin once daily. Place over right hip. Remove & discard patch within 12 hours or as directed by MD. Do not start before March 29, 2024.      magnesium oxide (Mag-Ox) 400 mg (241.3 mg magnesium) tablet 1 tablet (400 mg) by nasogastric tube route once daily.      melatonin 5 mg tablet 1 tablet (5 mg) by nasogastric tube route as needed at bedtime for sleep.      metoprolol tartrate (Lopressor) 25 mg tablet 1 tablet (25 mg) by nasogastric tube route 3 times a day.      nitroglycerin (Nitrostat) 0.4 mg SL tablet Place 1 tablet (0.4 mg) under the tongue  every 5 minutes if needed for chest pain.      oxygen (O2) gas therapy Inhale 1 each once every 24 hours.      Repatha Syringe 140 mg/mL injection Inject 1 mL (140 mg) under the skin every 14 (fourteen) days.      sennosides (Senokot) 8.6 mg tablet 2 tablets (17.2 mg) by nasogastric tube route 2 times a day.      tiotropium (Spiriva Respimat) 2.5 mcg/actuation inhaler Inhale 2 puffs once daily.      [DISCONTINUED] aspirin 81 mg EC tablet TAKE 1 TABLET BY MOUTH EVERY 24 HRS      [DISCONTINUED] cholecalciferol (Vitamin D3) 50 mcg (2,000 unit) capsule Take 1 capsule (50 mcg) by mouth once daily.      [DISCONTINUED] clopidogrel (Plavix) 75 mg tablet Take 1 tablet (75 mg) by mouth once daily.      [DISCONTINUED] docusate sodium (Colace) 100 mg capsule Take 1 capsule (100 mg) by mouth once daily.      [DISCONTINUED] ferrous sulfate 325 (65 Fe) MG tablet Take 1 tablet by mouth once daily.      [DISCONTINUED] furosemide (Lasix) 20 mg tablet 3/26/24:  patient reports taking 1 tablet (20mg) once a day on Monday and Thursday and then 1/2 tablet (10mg) once daily all other days of the week (Sun, Tues, Wed, Fri, Sat)      [DISCONTINUED] ipratropium (Atrovent) 0.02 % nebulizer solution ipratropium (Atrovent) 0.02 % nebulizer solution USE 2.5 ML VIA NEBULIZER FOUR TIMES DAILY AS NEEDED 0 Active      [DISCONTINUED] Lactobacillus acidophilus (Probiotic) 10 billion cell capsule Take 1 capsule by mouth once daily.      [DISCONTINUED] levothyroxine (Synthroid, Levoxyl) 50 mcg tablet Take 1 tablet (50 mcg) by mouth once daily. 90 tablet 3    [DISCONTINUED] LORazepam (Ativan) 0.5 mg tablet Take 1 tablet (0.5 mg) by mouth every 6 hours if needed for anxiety. 30 tablet 1    [DISCONTINUED] magnesium oxide (Mag-Ox) 400 mg (241.3 mg magnesium) tablet Take 1 tablet (400 mg) by mouth once daily.      [DISCONTINUED] metoprolol succinate XL (Toprol-XL) 25 mg 24 hr tablet Take 3 tablets (75 mg) by mouth once daily.      [DISCONTINUED] polyethylene  glycol (Miralax) 17 gram/dose powder Take 17 g by mouth once daily.       Immunization History   Administered Date(s) Administered    Flu vaccine, quadrivalent, high-dose, preservative free, age 65y+ (FLUZONE) 10/04/2021, 09/20/2022    Influenza, High Dose Seasonal, Preservative Free 10/01/2015, 09/30/2016, 09/29/2017, 09/19/2018, 09/09/2019    Influenza, Seasonal, Quadrivalent, Adjuvanted 08/30/2023    Influenza, Unspecified 09/10/2019    Influenza, seasonal, injectable 09/04/2020    Moderna COVID-19 vaccine, bivalent, blue cap/gray label *Check age/dose* 11/30/2022    Moderna SARS-CoV-2 Vaccination 03/01/2021, 03/28/2021, 12/30/2021, 05/22/2022    Pneumococcal conjugate vaccine, 13-valent (PREVNAR 13) 09/30/2016    Pneumococcal conjugate vaccine, 20-valent (PREVNAR 20) 12/12/2022    Pneumococcal polysaccharide vaccine, 23-valent, age 2 years and older (PNEUMOVAX 23) 07/22/2014, 10/01/2015, 11/09/2020     Patient's medical history was reviewed and updated either before or during this encounter.  ASSESSMENT / PLAN:  Active Hospital Problems    *CVA (cerebrovascular accident due to intracerebral hemorrhage) (CMS/[facility])      Ischemic stroke (CMS/[facility])      COPD (chronic obstructive pulmonary disease) (CMS/[facility])      Heart failure with mildly reduced ejection fraction (CMS/[facility])      Anxiety      Asthmatic bronchitis      GERD (gastroesophageal reflux disease)      Dysphagia as late effect of stroke      CAD (coronary artery disease), native coronary artery       Patient is being seen for follow-up.  Patient is on tube feeds.  No issues with aspiration at present.  Patient has anemia.  Patient is on iron supplementation.  Patient is being seen by speech therapy as well as with physical and Occupational Therapy      Vikas Cruz MD

## 2024-04-02 PROCEDURE — 97542 WHEELCHAIR MNGMENT TRAINING: CPT | Mod: GP

## 2024-04-02 PROCEDURE — 97110 THERAPEUTIC EXERCISES: CPT | Mod: GP

## 2024-04-02 PROCEDURE — 97530 THERAPEUTIC ACTIVITIES: CPT | Mod: GO,CO

## 2024-04-02 PROCEDURE — 2500000001 HC RX 250 WO HCPCS SELF ADMINISTERED DRUGS (ALT 637 FOR MEDICARE OP): Performed by: INTERNAL MEDICINE

## 2024-04-02 PROCEDURE — 92526 ORAL FUNCTION THERAPY: CPT | Mod: GN

## 2024-04-02 PROCEDURE — 1180000001 HC REHAB PRIVATE ROOM DAILY

## 2024-04-02 PROCEDURE — 97110 THERAPEUTIC EXERCISES: CPT | Mod: GO,CO

## 2024-04-02 PROCEDURE — 2500000005 HC RX 250 GENERAL PHARMACY W/O HCPCS: Performed by: INTERNAL MEDICINE

## 2024-04-02 PROCEDURE — 2500000002 HC RX 250 W HCPCS SELF ADMINISTERED DRUGS (ALT 637 FOR MEDICARE OP, ALT 636 FOR OP/ED): Mod: MUE | Performed by: INTERNAL MEDICINE

## 2024-04-02 PROCEDURE — 99233 SBSQ HOSP IP/OBS HIGH 50: CPT | Performed by: INTERNAL MEDICINE

## 2024-04-02 PROCEDURE — 2500000004 HC RX 250 GENERAL PHARMACY W/ HCPCS (ALT 636 FOR OP/ED): Performed by: INTERNAL MEDICINE

## 2024-04-02 PROCEDURE — 2500000002 HC RX 250 W HCPCS SELF ADMINISTERED DRUGS (ALT 637 FOR MEDICARE OP, ALT 636 FOR OP/ED): Performed by: INTERNAL MEDICINE

## 2024-04-02 PROCEDURE — 97116 GAIT TRAINING THERAPY: CPT | Mod: GP

## 2024-04-02 PROCEDURE — 97535 SELF CARE MNGMENT TRAINING: CPT | Mod: GO,CO

## 2024-04-02 PROCEDURE — 97530 THERAPEUTIC ACTIVITIES: CPT | Mod: GP

## 2024-04-02 RX ORDER — NYSTATIN 100000 [USP'U]/ML
5 SUSPENSION ORAL 3 TIMES DAILY
Status: DISPENSED | OUTPATIENT
Start: 2024-04-02 | End: 2024-04-09

## 2024-04-02 RX ADMIN — LEVOTHYROXINE SODIUM 50 MCG: 50 TABLET ORAL at 06:00

## 2024-04-02 RX ADMIN — METOPROLOL TARTRATE 25 MG: 25 TABLET, FILM COATED ORAL at 21:07

## 2024-04-02 RX ADMIN — Medication 5 MG: at 21:06

## 2024-04-02 RX ADMIN — ACETAMINOPHEN 650 MG: 160 SOLUTION ORAL at 04:50

## 2024-04-02 RX ADMIN — LIDOCAINE 1 PATCH: 4 PATCH TOPICAL at 08:16

## 2024-04-02 RX ADMIN — FORMOTEROL FUMARATE DIHYDRATE 20 MCG: 20 SOLUTION RESPIRATORY (INHALATION) at 08:15

## 2024-04-02 RX ADMIN — TIOTROPIUM BROMIDE INHALATION SPRAY 2 PUFF: 3.12 SPRAY, METERED RESPIRATORY (INHALATION) at 08:15

## 2024-04-02 RX ADMIN — Medication 400 MG: at 08:15

## 2024-04-02 RX ADMIN — Medication: at 17:30

## 2024-04-02 RX ADMIN — Medication 1 TABLET: at 08:15

## 2024-04-02 RX ADMIN — METOPROLOL TARTRATE 25 MG: 25 TABLET, FILM COATED ORAL at 14:32

## 2024-04-02 RX ADMIN — METOPROLOL TARTRATE 25 MG: 25 TABLET, FILM COATED ORAL at 08:15

## 2024-04-02 RX ADMIN — BUDESONIDE INHALATION 0.5 MG: 0.5 SUSPENSION RESPIRATORY (INHALATION) at 08:15

## 2024-04-02 RX ADMIN — ASPIRIN 81 MG CHEWABLE TABLET 81 MG: 81 TABLET CHEWABLE at 08:15

## 2024-04-02 RX ADMIN — FUROSEMIDE 20 MG: 20 TABLET ORAL at 08:15

## 2024-04-02 RX ADMIN — NYSTATIN 500000 UNITS: 100000 SUSPENSION ORAL at 21:06

## 2024-04-02 RX ADMIN — NYSTATIN 500000 UNITS: 100000 SUSPENSION ORAL at 12:11

## 2024-04-02 RX ADMIN — ACETAMINOPHEN 650 MG: 160 SOLUTION ORAL at 13:10

## 2024-04-02 RX ADMIN — ACETAMINOPHEN 650 MG: 160 SOLUTION ORAL at 18:37

## 2024-04-02 RX ADMIN — NYSTATIN 500000 UNITS: 100000 SUSPENSION ORAL at 14:32

## 2024-04-02 RX ADMIN — FORMOTEROL FUMARATE DIHYDRATE 20 MCG: 20 SOLUTION RESPIRATORY (INHALATION) at 21:06

## 2024-04-02 RX ADMIN — BUDESONIDE INHALATION 0.5 MG: 0.5 SUSPENSION RESPIRATORY (INHALATION) at 21:00

## 2024-04-02 ASSESSMENT — PAIN DESCRIPTION - LOCATION
LOCATION: LEG
LOCATION: NECK

## 2024-04-02 ASSESSMENT — ACTIVITIES OF DAILY LIVING (ADL)
BATHING_EQUIPMENT_NEEDED: LONG-HANDLED SPONGE
BATHING_LEVEL_OF_ASSISTANCE: SETUP
HOME_MANAGEMENT_TIME_ENTRY: 45
HOME_MANAGEMENT_TIME_ENTRY: 15
BATHING_WHERE_ASSESSED: WHEELCHAIR;SITTING SINKSIDE

## 2024-04-02 ASSESSMENT — PAIN SCALES - GENERAL
PAINLEVEL_OUTOF10: 5 - MODERATE PAIN
PAINLEVEL_OUTOF10: 5 - MODERATE PAIN
PAINLEVEL_OUTOF10: 3
PAINLEVEL_OUTOF10: 4
PAINLEVEL_OUTOF10: 0 - NO PAIN
PAINLEVEL_OUTOF10: 5 - MODERATE PAIN
PAINLEVEL_OUTOF10: 8
PAINLEVEL_OUTOF10: 4
PAINLEVEL_OUTOF10: 5 - MODERATE PAIN

## 2024-04-02 ASSESSMENT — PAIN DESCRIPTION - DESCRIPTORS
DESCRIPTORS: ACHING
DESCRIPTORS: ACHING

## 2024-04-02 ASSESSMENT — PAIN - FUNCTIONAL ASSESSMENT
PAIN_FUNCTIONAL_ASSESSMENT: 0-10

## 2024-04-02 ASSESSMENT — PAIN DESCRIPTION - ORIENTATION
ORIENTATION: RIGHT
ORIENTATION: RIGHT

## 2024-04-02 NOTE — PROGRESS NOTES
Occupational Therapy       24 [id number]   OT Last Visit   OT Received On 24   General   Reason for Referral Impaired mobility   Referred By Dr. 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   Missed Visit No   Prior to Session Communication Bedside nurse   Patient Position Received Bed, 2 rail up   Preferred Learning Style verbal;visual   General Comment Pt. agreeable to therapy but feeling fatigued.  (POC discussed with OTR/L)   Precautions   Hearing/Visual Limitations hearing diminished; corective lenses for reading   Precautions Comment  Joey Tube NPO, HOB elevated 45 degrees or more. 2 litres 02 via nasal canula   Vital Signs   Heart Rate 85   Heart Rate Source Brachial   SpO2 99 %   /66   BP Location Left arm   Patient Position Sitting   Pain Assessment   Pain Assessment 0-10   Pain Score 4   Pain Type Acute pain   Pain Location Hip   Pain Orientation Right   Pain Descriptors Aching   Cognition   Overall Cognitive Status WFL   Coordination   Movements are Fluid and Coordinated No   Feeding   Feeding Comments NPO   Bed Mobility   Bed Mobility Yes   Bed Mobility 1   Bed Mobility 1 Supine to sitting   Level of Assistance 1 Close supervision   Bed Mobility Comments 1 utilized bed rails   Transfers   Transfer Yes   Transfer 1   Transfer From 1 Bed to   Transfer to 1 Wheelchair   Technique 1 Stand to sit;Sit to stand   Transfer Device 1 Gait belt;Walker   Transfer Level of Assistance 1 Contact guard   Trials/Comments 1 Verbal and tactile cues to bring walker closer to body when pivoting to align with w/c for safe descent. Verbal cues for hand placement.   Kitchen Mobility   Kitchen Mobility Level of Assistance Minimum assistance   Kitchen-Mobility Level Walker   Kitchen  Activity Retrieve items;Transport items   Kitchen Mobility Comments Pt engaged in retrieving and transporting items from high cupboards, low cupboards, fridge, and oven. Pt educated on engery conservation techniques and safe hand placement for safety and balance. Pt returned demonstration with min assist and gait belt. Pt stated she was fatigued and tired, took vital signs.   Therapeutic Activity   Therapeutic Activity Performed Yes   Therapeutic Activity 1 Pt engaged in shoulder arch with RUE to increase ROM and crossing midline for functional reach in ADLs/IADLs. Pt transported 12/12 rings with RUE while seated. Pt transported 12/12 rings without hitting tubing to increase coordination and endurance for ADLs.   OT Assessment   OT Assessment Results Decreased ADL status;Decreased upper extremity range of motion;Decreased upper extremity strength;Decreased safe judgment during ADL;Decreased endurance;Decreased functional mobility   Strengths Ability to acquire knowledge;Attitude of self;Coping skills   Barriers to Participation Comorbidities   Education   Individual(s) Educated Patient   Education Provided Fall precautons;Joint protection and energy conservation   Education Comment Pt educated on engery conservation and safety techniques for kitchen mobility. Pt verbalized understanding and returned demonstration.   Inpatient/Swing Bed or Outpatient   Inpatient/Swing Bed or Outpatient Inpatient   Inpatient Plan   Treatment Interventions ADL retraining;Functional transfer training;Neuromuscular reeducation;UE strengthening/ROM   OT Frequency 5 times per week   OT - OK to Discharge Yes     ASHLEY AQUINO OTA  As supervising TITA, I attest that the above information is correct.

## 2024-04-02 NOTE — PROGRESS NOTES
[facility]: [facility]  PROGRESS NOTE      Trinity Hernandez is a 74 y.o. female that is being seen  today for follow-up at CCR unit   Subjective   Patient is being seen for follow-up at CCR unit.  Patient has history of stroke and is admitted for physical therapy.  Patient is on oxygen.  Vital signs are stable.  Patient is on tube feeds.  Patient is on oral nystatin swish and swallow.  Patient is being seen by speech therapy, physical therapy and Occupational Therapy      ROS  Negative for fever or chills  Negative for sore throat, ear pain, nasal discharge.  Patient has difficulty in swallowing and has feeding tube  Negative for cough, shortness of breath or wheezing  Negative for chest pain, palpitations, swelling of legs  Negative for abdominal pain, constipation, diarrhea, blood in the stools  Negative for urinary complaints  Negative for headache, dizziness, weakness or numbness  Positive for difficulty in walking  Negative for depression or anxiety  All other systems reviewed and were negative   Vitals:    04/02/24 0500   BP: 135/73   Pulse: 95   Resp: 18   Temp: 37.2 °C (99 °F)   SpO2: 96%      Vitals:    03/28/24 1640   Weight: 76.4 kg (168 lb 6.9 oz)     Body mass index is 26.44 kg/m².  Physical Exam  Constitutional: Patient does not appear to be in any acute distress  Head and Face: NCAT  Eyes: Normal external exam, EOMI  ENT: Patient has nasogastric feeding tube  Cardiovascular: RRR, S1/S2, no murmurs, rubs, or gallops, radial pulses +2, no edema of extremities  Pulmonary: CTAB, no respiratory distress.  Abdomen: +BS, soft, non-tender, nondistended, no guarding or rebound, no masses noted  MSK: No joint swelling, normal movements of all extremities. Range of motion- normal.  Skin- No lesions, contusions, or erythema.  Peripheral puslses palpable bilaterally 2+  Neuro: AAO X3, Cranial nerves 2-12 grossly intact,DTR 2+ in all 4 limbs, weakness of the legs.      LABS    [unfilled]  Lab Results   Component Value Date    GLUCOSE 134 (H) 03/29/2024    CALCIUM 9.3 03/29/2024     03/29/2024    K 3.9 03/29/2024    CO2 29 03/29/2024     03/29/2024    BUN 15 03/29/2024    CREATININE 0.80 03/29/2024     Lab Results   Component Value Date    ALT 5 (L) 03/24/2024    AST 16 03/24/2024    ALKPHOS 55 03/24/2024    BILITOT 0.5 03/24/2024     Lab Results   Component Value Date    WBC 8.6 03/29/2024    HGB 8.2 (L) 03/29/2024    HCT 27.5 (L) 03/29/2024    MCV 94 03/29/2024     03/29/2024     Lab Results   Component Value Date    CHOL 107 03/25/2024    CHOL 181 11/16/2023    CHOL 191 02/13/2023     Lab Results   Component Value Date    HDL 33.2 03/25/2024    HDL 36.7 11/16/2023    HDL 37.7 (A) 02/13/2023     Lab Results   Component Value Date    LDLCALC 41 03/25/2024    LDLCALC 110 (H) 11/16/2023     Lab Results   Component Value Date    TRIG 166 (H) 03/25/2024    TRIG 174 (H) 11/16/2023    TRIG 256 (H) 02/13/2023     Lab Results   Component Value Date    HGBA1C 5.3 03/25/2024     Other labs not included in the list above were reviewed either before or during this encounter.    History    Past Medical History:   Diagnosis Date    Abdominal bloating 05/04/2023    CAD (coronary artery disease)     CHF (congestive heart failure) (CMS/HCC)     Diverticulitis of small intestine without perforation or abscess without bleeding     Diverticulitis, intestine, small    Dizzy 11/20/2023    Essential (primary) hypertension 02/08/2017    HTN (hypertension), benign    Fall     Influenza B 05/08/2015    Formatting of this note might be different from the original. Completed Tamiflu Continue droplet precautions.    Nausea 11/16/2018    Personal history of other endocrine, nutritional and metabolic disease 02/08/2017    History of hypothyroidism    Personal history of other venous thrombosis and embolism     History of deep venous thrombosis     Past Surgical History:   Procedure Laterality Date     ABDOMINAL ADHESION SURGERY  05/16/2017    Laparoscopic Lysis Of Intestinal Adhesions    CERVICAL FUSION  05/16/2017    Cervical Vertebral Fusion    CHOLECYSTECTOMY  05/16/2017    Cholecystectomy    COLECTOMY PARTIAL / TOTAL  05/16/2017    Partial Colectomy - Sigmoid    CORONARY ANGIOPLASTY WITH STENT PLACEMENT      CT ANGIO NECK  05/21/2020    CT NECK ANGIO W AND WO IV CONTRAST 5/21/2020 [facility]    CT HEAD ANGIO W AND WO IV CONTRAST  05/21/2020    CT HEAD ANGIO W AND WO IV CONTRAST 5/21/2020 [facility]    HERNIA REPAIR  05/16/2017    Hernia Repair    HYSTERECTOMY  10/03/2013    Hysterectomy    MR HEAD ANGIO WO IV CONTRAST  05/21/2020    MR HEAD ANGIO WO IV CONTRAST 5/21/2020 [facility]    MR NECK ANGIO WO IV CONTRAST  05/21/2020    MR NECK ANGIO WO IV CONTRAST 5/21/2020 [facility]    OTHER SURGICAL HISTORY  05/16/2017    Thoracoscopy Of Lungs And Pleural Space With Biopsy Of Lung Nodules    OTHER SURGICAL HISTORY  04/15/2019    Esophagogastroduodenoscopy     No family history on file.  Allergies   Allergen Reactions    Hydrocodone-Acetaminophen Shortness of breath    Montelukast Unknown     Lungs felt as if there was a hole in them    Morphine Unknown     Body shakes    Nitrofurantoin Monohyd/M-Cryst Unknown    Sulfa (Sulfonamide Antibiotics) Nausea Only and Other    Atorvastatin Itching, Swelling and Unknown    Dicyclomine Hallucinations     Nightmares    Fluticasone Propion-Salmeterol Unknown    Levofloxacin Rash    Lisinopril Cough    Nitroimidazoles Nausea Only and Unknown    Omeprazole Diarrhea and Unknown    Salmeterol Unknown    Simvastatin Swelling and Unknown    Sucralfate Unknown    Umeclidinium Other     Urinary retention, blurred vision, constipation     No current facility-administered medications on file prior to encounter.     Current Outpatient Medications on File Prior to Encounter   Medication Sig Dispense Refill    acetaminophen (Tylenol) 325 mg tablet  2 tablets (650 mg) by nasogastric tube route every 6 hours if needed for mild pain (1 - 3). 30 tablet 0    albuterol 2.5 mg /3 mL (0.083 %) nebulizer solution Take 3 mL (2.5 mg) by nebulization every 4 hours if needed for shortness of breath.      albuterol 90 mcg/actuation inhaler Inhale 2 puffs every 6 hours if needed for shortness of breath.      arformoterol (Brovana) 15 mcg/2 mL nebulizer solution INHALE 2ML VIA NEBULIZER AS INSTRUCTED TWICE A DAY (EVERY 12 HOURS)      aspirin 81 mg chewable tablet 1 tablet (81 mg) by nasogastric tube route once daily. Do not start before March 29, 2024.      budesonide (Pulmicort) 0.5 mg/2 mL nebulizer solution Take 2 mL (0.5 mg) by nebulization 2 times a day. Rinse mouth with water after use to reduce aftertaste and incidence of candidiasis. Do not swallow.      cholecalciferol (Vitamin D3) 50 mcg (2,000 unit) capsule 1 capsule (50 mcg) by nasogastric tube route once daily.      estradiol (Estrace) 0.01 % (0.1 mg/gram) vaginal cream Insert 0.5 Applicatorfuls (2 g) into the vagina once daily as needed.      furosemide (Lasix) 20 mg tablet 1 tablet (20 mg) by g-tube route once daily. Do not start before March 29, 2024.      Lactobacillus acidophilus (Probiotic) 10 billion cell capsule 1 capsule by nasogastric tube route once daily.      levothyroxine (Synthroid, Levoxyl) 50 mcg tablet 1 tablet (50 mcg) by nasogastric tube route once daily. Do not start before March 29, 2024.      lidocaine 4 % patch Place 1 patch over 12 hours on the skin once daily. Place over right hip. Remove & discard patch within 12 hours or as directed by MD. Do not start before March 29, 2024.      magnesium oxide (Mag-Ox) 400 mg (241.3 mg magnesium) tablet 1 tablet (400 mg) by nasogastric tube route once daily.      melatonin 5 mg tablet 1 tablet (5 mg) by nasogastric tube route as needed at bedtime for sleep.      metoprolol tartrate (Lopressor) 25 mg tablet 1 tablet (25 mg) by nasogastric tube route 3  times a day.      nitroglycerin (Nitrostat) 0.4 mg SL tablet Place 1 tablet (0.4 mg) under the tongue every 5 minutes if needed for chest pain.      oxygen (O2) gas therapy Inhale 1 each once every 24 hours.      Repatha Syringe 140 mg/mL injection Inject 1 mL (140 mg) under the skin every 14 (fourteen) days.      sennosides (Senokot) 8.6 mg tablet 2 tablets (17.2 mg) by nasogastric tube route 2 times a day.      tiotropium (Spiriva Respimat) 2.5 mcg/actuation inhaler Inhale 2 puffs once daily.      [DISCONTINUED] aspirin 81 mg EC tablet TAKE 1 TABLET BY MOUTH EVERY 24 HRS      [DISCONTINUED] cholecalciferol (Vitamin D3) 50 mcg (2,000 unit) capsule Take 1 capsule (50 mcg) by mouth once daily.      [DISCONTINUED] clopidogrel (Plavix) 75 mg tablet Take 1 tablet (75 mg) by mouth once daily.      [DISCONTINUED] docusate sodium (Colace) 100 mg capsule Take 1 capsule (100 mg) by mouth once daily.      [DISCONTINUED] ferrous sulfate 325 (65 Fe) MG tablet Take 1 tablet by mouth once daily.      [DISCONTINUED] furosemide (Lasix) 20 mg tablet 3/26/24:  patient reports taking 1 tablet (20mg) once a day on Monday and Thursday and then 1/2 tablet (10mg) once daily all other days of the week (Sun, Tues, Wed, Fri, Sat)      [DISCONTINUED] Lactobacillus acidophilus (Probiotic) 10 billion cell capsule Take 1 capsule by mouth once daily.      [DISCONTINUED] levothyroxine (Synthroid, Levoxyl) 50 mcg tablet Take 1 tablet (50 mcg) by mouth once daily. 90 tablet 3    [DISCONTINUED] LORazepam (Ativan) 0.5 mg tablet Take 1 tablet (0.5 mg) by mouth every 6 hours if needed for anxiety. 30 tablet 1    [DISCONTINUED] magnesium oxide (Mag-Ox) 400 mg (241.3 mg magnesium) tablet Take 1 tablet (400 mg) by mouth once daily.      [DISCONTINUED] metoprolol succinate XL (Toprol-XL) 25 mg 24 hr tablet Take 3 tablets (75 mg) by mouth once daily.      [DISCONTINUED] polyethylene glycol (Miralax) 17 gram/dose powder Take 17 g by mouth once daily.        Immunization History   Administered Date(s) Administered    Flu vaccine, quadrivalent, high-dose, preservative free, age 65y+ (FLUZONE) 10/04/2021, 09/20/2022    Influenza, High Dose Seasonal, Preservative Free 10/01/2015, 09/30/2016, 09/29/2017, 09/19/2018, 09/09/2019    Influenza, Seasonal, Quadrivalent, Adjuvanted 08/30/2023    Influenza, Unspecified 09/10/2019    Influenza, seasonal, injectable 09/04/2020    Moderna COVID-19 vaccine, bivalent, blue cap/gray label *Check age/dose* 11/30/2022    Moderna SARS-CoV-2 Vaccination 03/01/2021, 03/28/2021, 12/30/2021, 05/22/2022    Pneumococcal conjugate vaccine, 13-valent (PREVNAR 13) 09/30/2016    Pneumococcal conjugate vaccine, 20-valent (PREVNAR 20) 12/12/2022    Pneumococcal polysaccharide vaccine, 23-valent, age 2 years and older (PNEUMOVAX 23) 07/22/2014, 10/01/2015, 11/09/2020     Patient's medical history was reviewed and updated either before or during this encounter.  ASSESSMENT / PLAN:  Active Hospital Problems    *CVA (cerebrovascular accident due to intracerebral hemorrhage) (CMS/HCC)      Ischemic stroke (CMS/[facility])      COPD (chronic obstructive pulmonary disease) (CMS/HCC)      Heart failure with mildly reduced ejection fraction (CMS/HCC)      Anxiety      Asthmatic bronchitis      GERD (gastroesophageal reflux disease)      Dysphagia as late effect of stroke      CAD (coronary artery disease), native coronary artery       Patient is being seen for follow-up.  Patient is on tube feeds.  No issues with aspiration at present.  Patient has anemia.  Patient is on iron supplementation.  Patient is being seen by speech therapy as well as with physical and Occupational Therapy      Vikas Cruz MD

## 2024-04-02 NOTE — PROGRESS NOTES
Occupational Therapy       [id number]   OT Last Visit   OT Received On 24   General   Reason for Referral Impaired mobility   Referred By Dr. 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   Prior to Session Communication Bedside nurse   Patient Position Received Up in chair   Preferred Learning Style verbal;visual   General Comment Pt. agreeable to therapy but feeling fatigued.  (discussed POC with OTR/L)   Precautions   Hearing/Visual Limitations hearing diminished; corective lenses for reading   Precautions Comment  Joey Tube NPO, HOB elevated 45 degrees or more. 2 litres 02 via nasal canula   Pain Assessment   Pain Assessment 0-10   Pain Score 5 - Moderate pain   Pain Location Neck   Pain Descriptors Aching   Cognition   Overall Cognitive Status WFL   Feeding   Feeding Comments NPO   Grooming   Grooming Level of Assistance Setup   Grooming Where Assessed Wheelchair   Grooming Comments Pt. washed hands and performed oral care with sponge while seated at sinkside.   UE Bathing   UE Bathing Level of Assistance Setup   UE Bathing Where Assessed Sitting sinkside   UE Bathing Comments All tasks seated and items within reach.   LE Bathing   LE Bathing Adaptive Equipment Long-handled sponge   LE Bathing Level of Assistance Setup   LE Bathing Where Assessed Wheelchair;Sitting sinkside   LE Bathing Comments Pt washed LB with figure four technique. LH sponge to wash feet. Verbal cues for safety while leaning forward.   UE Dressing   UE Dressing Level of Assistance Setup   UE Dressing Where Assessed Wheelchair   UE Dressing Comments Pt donned/doffed pull over dress.   LE Dressing   LE Dressing Yes   LE Dressing Adaptive Equipment Reacher;Shoe horn   Sock Level of Assistance Contact  guard   Shoe Level of Assistance Setup   Adult Briefs Level of Assistance Contact guard   LE Dressing Where Assessed Wheelchair   LE Dressing Comments Pt seated to thread LB through briefs, education on reacher and pt. returned demonstration. Min verbal cues for safety while leaning forward. CGA while in stance for clothing management over hips. Pt. seated to doff socks, educated on shoe horn and pt returned demonstration.   Toileting   Toileting Level of Assistance Minimum assistance   Where Assessed Toilet   Toileting Comments pericare while in stance. min assist for clothing management over hips.   Transfers   Transfer Yes   Transfer 1   Transfer From 1 Wheelchair to   Transfer to 1 Stand   Technique 1 Sit to stand;Stand to sit   Transfer Device 1 Gait belt   Transfer Level of Assistance 1 Contact guard   Trials/Comments 1 verbal cues for hand placement for controlled descent and to align before descending.   Toilet Transfers   Toilet Transfer From Wheelchair   Toilet Transfer Type To and from   Toilet Transfer to Standard bedside commode  (over toilet)   Toilet Transfer Technique Ambulating   Toilet Transfers Contact guard   Toilet Transfers Comments Verbal cues for walker management and hand placement.   Shower Transfers   Shower Transfers Comments declined to shower but agreed to wash up at the sink.   Therapeutic Exercise   Therapeutic Exercise Performed Yes   Therapeutic Exercise Activity 1 15x elbow flexion/extension with theraband while seated to increase strength and endurance for ADLs/IADLs.   Therapeutic Exercise Activity 2 15x of shoulder flexion/extension with theraband while seated to increase strength and endurnace for ADLs/IADLs.   IP OT Assessment   Prognosis Good   Barriers to Discharge None   Evaluation/Treatment Tolerance Patient limited by fatigue   Medical Staff Made Aware Yes   End of Session Communication Bedside nurse  (Communicated to NSG about dressing on lower back.)   End of Session  Patient Position Up in chair  (Call light in reach. All needs met.)   OT Assessment   OT Assessment Results Decreased ADL status;Decreased upper extremity range of motion;Decreased upper extremity strength;Decreased safe judgment during ADL;Decreased endurance   Strengths Ability to acquire knowledge   Barriers to Participation Comorbidities   Education   Individual(s) Educated Patient   Education Provided Fall precautons   Patient Response to Education Patient/Caregiver Performed Return Demonstration of Exercises/Activities;Patient/Caregiver Verbalized Understanding of Information   Education Comment Pt educated on AE for LB dressing. Pt returned demonstration of shoe horn and reacher to don/doff socks and briefs.   Inpatient/Swing Bed or Outpatient   Inpatient/Swing Bed or Outpatient Inpatient   Inpatient Plan   Treatment Interventions ADL retraining;Functional transfer training;UE strengthening/ROM;Endurance training   OT Frequency 5 times per week   OT - OK to Discharge Yes   ASHLEY AQUINO OTA  As supervising TITA, I attest that the above information is correct.

## 2024-04-02 NOTE — PROGRESS NOTES
Speech-Language Pathology    SLP Adult IRF [facility] Dysphagia Treatment     Patient Name: Trinity Hernandez  MRN: [id number]  Today's Date: 4/2/2024  Time Calculation  Start Time: 1010  Stop Time: 1100  Time Calculation (min): 50 min     3/5sw    Current Problem:   Ischemic stroke, right body involvement (left brain)      Recent MBSS 3/26 at [facility]       SLP Assessment:  SLP TX Intervention Outcome: Making Progress Towards Goals  Prognosis: Good  Treatment Provided: Yes   Treatment Tolerance: Patient tolerated treatment well  Medical Staff Made Aware: Yes  Strengths: Motivation  Barriers: Comorbidities    NPO  Frequent, aggressive oral care is strongly recommended to improve infection control as well as reduce dental plaque and bacteria on oropharyngeal surfaces which may increase the risk nosocomial infections, including pneumonia.  If pt requests, OK for small amounts of ice chips (no more than 3-5/hr) with supervision only after aggressive oral care is completed. Discontinue if there are concerns for s/s aspiration.  Puree tsp trials w/ SLP only (chin tuck, triple effortful swallow).  Repeat MBSS in 2-4 weeks.     Plan:  Inpatient/Swing Bed or Outpatient: Inpatient  Treatment/Interventions: Other (Comment) (dysphagia)  SLP TX Plan: Continue Plan of Care  SLP Plan: Skilled SLP  SLP Frequency: 5x per week  Duration: Current admission  SLP Discharge Recommendations: Continue skilled SLP services at the next level of care  Next Treatment Priority: po trials with SLP only, OPEs, ctar, effortful swallow  Discussed POC: Patient, Nursing  Discussed Risks/Benefits: Yes, Patient, Nursing  Patient/Caregiver Agreeable: Yes      Subjective   Pt seen upright in w/c. Bridled dobhoff in place. Currently on 2L O2.     General Visit Information:   Reason for Referral: f/u dysphagia tx  Referred By: Dr. Cruz  Past Medical History Relevant to Rehab: COPD, CAD s/p PCI 10/4/23, NSVT, HTN, HLD, rheumatoid arthritis, and hypothyroidism, CHF,  Afib (not on AC), AAA, asthma, COPD, chronic respiratory failure 2/2 pulmonary sarcoidosis, Migraines, Hiatal Hernia, and Diverticulosis.  Prior to Session Communication: Bedside nurse    Pain Assessment:   Pain Score: 5 - Moderate pain  Pain Location: Neck  Pain Orientation: Left      Objective     DYSPHAGIA GOALS initiated 3/29, anticipated end 4/19:  Pt will tolerate tsp trials of pureed textures with SLP only to 90% without overt s/s aspiration.  PROGRESS: RN cleared pt for p.o. trials with SLP. 3 ice-chips, and 5 small tsp of applesauce (approx less than 1 oz).  STATUS: Pt c/o pain during swallow d/t dobhoff. Notified RN. No overt s/s aspiration were noted during pureed trials or with ice-chip trials. Less mucus/phlegm today than prvs day. Pulse oc remained at 96-99.  2. Pt will utilize chin-tuck, triple effortful swallow with SLP only trials to 100%.              PROGRESS: 90% - 100% with SLP trials.  STATUS: Pt is affected by pain during swallow. However, able to maintain chin-tuck and complete x3 successive swallows. Cues for third swallow. Improved timeliness between successive swallows.  Pt will increase swallow reflex via OPEs to 80% accuracy.              PROGRESS: 60%-70% with reduced cues.  STATUS: CTAR x20. Pitch glides X10. Phonation with increased consistency 3-4 sec. . BOT elevation x15. Tongue Pops x15. Cues for breath support, pacing, jaw neutral.  Pt will improve swallow reflex via OMEs to 80% accuracy.              PROGRESS: 70% with min cues  STATUS: Able to complete sets of x10 lingual protrusion/retraction, lateralization,  buccal protrusion/retraction. Reduced cues for pacing, full ROM.  Ongoing assessment via MBSS when indicated to reassess swallow function.              PROGRESS: not at this time, MBSS rec's indicate repeat MBSS in 2-4wks.              STATUS: see above     Therapeutic Swallow:  Therapeutic Swallow Intervention : PO Trials, Oral Strengthening Techniques, Pharyngeal  Strengthening Techniques  Pharyngeal Strengthening Techniques: Effortful Swallow, Chin Tuck Against Resistance, Supraglottic Swallow, Pitch Glides, Ami Maneuver  Solid Diet Recommendations: NPO  Liquid Diet Recommendations: NPO  Swallow Comments: Pt rates pain as 5,6 during swallow. Feels related to presence of dobhoff. RN aware    Inpatient Education:  Adult Inpatient Education  Individual(s) Educated: Patient  Verbal Education : goals, progress, MBSS results, purpose of p.o. trials  Risk and Benefits Discussed with Patient/Caregiver/Other: yes  Patient/Caregiver Demonstrated Understanding: yes  Plan of Care Discussed and Agreed Upon: yes  Patient Response to Education: Patient/Caregiver Verbalized Understanding of Information

## 2024-04-02 NOTE — NURSING NOTE
Pt requesting scheduled breathing treatment now, pulse ox 95% on 2 L NC, scheduled breathing treatment given.

## 2024-04-02 NOTE — PROGRESS NOTES
Physical Therapy       24 [id number]   PT  Visit   Response to Previous Treatment Patient reporting fatigue but able to participate.   General   Reason for Referral Impaired mobility   Referred By Dr. 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   Prior to Session Communication Bedside nurse   Patient Position Received Up in chair   Preferred Learning Style verbal;visual   Precautions   Hearing/Visual Limitations hearing diminished; corective lenses for reading   Precautions Comment  Joey Tube NPO, HOB elevated 45 degrees or more. 2 litres 02 via nasal canula   Pain Assessment   Pain Assessment 0-10   Pain Score 4   Pain Type Chronic pain   Pain Location Leg   Pain Orientation Right   Cognition   Overall Cognitive Status WFL   Orientation Level Oriented X4   Dynamic Standing Balance   Dynamic Standing-Balance Support No upper extremity supported   Dynamic Standing-Comments CGA with no UE support for coordination ladder adn small hurdles   Therapeutic Exercise   Therapeutic Exercise Performed Yes   Therapeutic Exercise Activity 1 hip flex 2x15 0#R, 2#L   Therapeutic Exercise Activity 2 LAQ 2x15 2#B   Therapeutic Exercise Activity 3 hip adduction against ball 2x15   Therapeutic Exercise Activity 4 hip abduction against blueband   Therapeutic Exercise Activity 5 hamstring curls 2x15 blue band   Therapeutic Exercise Activity 6 standing toe raises 2x15   Therapeutic Exercise Activity 7 Standing mini squats 2x15   Balance/Neuromuscular Re-Education   Balance/Neuromuscular Re-Education Activity Performed Yes   Balance/Neuromuscular Re-Education Activity 1 coordination ladder no UE support CGA   Balance/Neuromuscular Re-Education Activity 2 blue therapads in tandem  balloon tennis and  cone reaching no UE support CGA for safety. +GRAY, fatigues quickly and requires frequent rest breaks   Balance/Neuromuscular Re-Education Activity 3 Side stepping no UE support gray power band CGA   Balance/Neuromuscular Re-Education Activity 4 Alternating toe taps on 4inch step min A x 1   Ambulation/Gait Training 1   Surface 1 Level tile   Device 1 Rollator   Gait Support Devices Gait belt   Assistance 1 Contact guard   Quality of Gait 1 Diminished heel strike;Shuffling gait;Forward flexed posture   Comments/Distance (ft) 1 70 ft x 4 , seated rest break on rollator in between gait attempts   Transfer 1   Transfer From 1 Wheelchair to   Transfer to 1 Stand   Technique 1 Sit to stand;Stand to sit   Transfer Device 1   (rollator)   Transfer Level of Assistance 1 Contact guard   Trials/Comments 1 cues for brakes on chair and rollator   Stairs   Rails 1 Bilateral   Device 1 Railing   Support Devices 1 Gait belt   Assistance 1 Contact guard   Comment/Number of Steps 1 3 trials of 6 (4inch) steps with no rest break between   Wheelchair Activities   Propulsion Type 1 Manual   Level 1 Level tile   Method 1 Right upper extremity;Left upper extremity;Right lower extremity;Left lower extremity   Level of Assistance 1 Minimum assistance   Other Activity   Other Activity 1 restroom use with close Supervision for transfer and to manage pants   Activity Tolerance   Endurance Tolerates 10 - 20 min exercise with multiple rests   Activity Tolerance Comments requires mutliple rest breaks d/t chronic RF and generalized fatigue from prolonged inactivity s/p CVA   Rate of Perceived Exertion (RPE) 4/10 post ambulation   PT Assessment   PT Assessment Results Decreased strength;Pain;Decreased range of motion;Decreased endurance;Impaired balance;Decreased mobility;Decreased cognition;Impaired judgement;Decreased safety awareness   Rehab Prognosis Excellent   Barriers to Discharge medical acuity   Evaluation/Treatment Tolerance Patient  limited by fatigue   Barriers to Participation Comorbidities   End of Session Communication Bedside nurse   Assessment Comment Frequent rest breaks for breath recovery, patient requires encouragement to participate well in all activities   PT Plan   Treatment/Interventions Transfer training;Gait training;Stair training;Balance training;Neuromuscular re-education;Strengthening;Endurance training;Therapeutic exercise;Therapeutic activity;Postural re-education;Wheelchair management   PT Plan Skilled PT   PT Recommended Transfer Status Assist x1   PT - OK to Discharge Yes

## 2024-04-03 PROCEDURE — 97116 GAIT TRAINING THERAPY: CPT | Mod: GP

## 2024-04-03 PROCEDURE — 92526 ORAL FUNCTION THERAPY: CPT | Mod: GN

## 2024-04-03 PROCEDURE — 2500000004 HC RX 250 GENERAL PHARMACY W/ HCPCS (ALT 636 FOR OP/ED): Performed by: INTERNAL MEDICINE

## 2024-04-03 PROCEDURE — 2500000005 HC RX 250 GENERAL PHARMACY W/O HCPCS: Performed by: INTERNAL MEDICINE

## 2024-04-03 PROCEDURE — 97110 THERAPEUTIC EXERCISES: CPT | Mod: GP

## 2024-04-03 PROCEDURE — 2500000002 HC RX 250 W HCPCS SELF ADMINISTERED DRUGS (ALT 637 FOR MEDICARE OP, ALT 636 FOR OP/ED): Mod: MUE | Performed by: INTERNAL MEDICINE

## 2024-04-03 PROCEDURE — 97530 THERAPEUTIC ACTIVITIES: CPT | Mod: GP

## 2024-04-03 PROCEDURE — 2500000001 HC RX 250 WO HCPCS SELF ADMINISTERED DRUGS (ALT 637 FOR MEDICARE OP): Performed by: INTERNAL MEDICINE

## 2024-04-03 PROCEDURE — 97530 THERAPEUTIC ACTIVITIES: CPT | Mod: GO,CO

## 2024-04-03 PROCEDURE — 97535 SELF CARE MNGMENT TRAINING: CPT | Mod: GO,CO

## 2024-04-03 PROCEDURE — 2500000002 HC RX 250 W HCPCS SELF ADMINISTERED DRUGS (ALT 637 FOR MEDICARE OP, ALT 636 FOR OP/ED): Performed by: INTERNAL MEDICINE

## 2024-04-03 PROCEDURE — 1180000001 HC REHAB PRIVATE ROOM DAILY

## 2024-04-03 RX ADMIN — NYSTATIN 500000 UNITS: 100000 SUSPENSION ORAL at 21:55

## 2024-04-03 RX ADMIN — LIDOCAINE 1 PATCH: 4 PATCH TOPICAL at 09:03

## 2024-04-03 RX ADMIN — FUROSEMIDE 20 MG: 20 TABLET ORAL at 09:05

## 2024-04-03 RX ADMIN — LEVOTHYROXINE SODIUM 50 MCG: 50 TABLET ORAL at 06:04

## 2024-04-03 RX ADMIN — BUDESONIDE INHALATION 0.5 MG: 0.5 SUSPENSION RESPIRATORY (INHALATION) at 09:06

## 2024-04-03 RX ADMIN — ASPIRIN 81 MG CHEWABLE TABLET 81 MG: 81 TABLET CHEWABLE at 09:06

## 2024-04-03 RX ADMIN — ACETAMINOPHEN 650 MG: 160 SOLUTION ORAL at 18:02

## 2024-04-03 RX ADMIN — Medication 1 TABLET: at 09:05

## 2024-04-03 RX ADMIN — FORMOTEROL FUMARATE DIHYDRATE 20 MCG: 20 SOLUTION RESPIRATORY (INHALATION) at 09:06

## 2024-04-03 RX ADMIN — TIOTROPIUM BROMIDE INHALATION SPRAY 2 PUFF: 3.12 SPRAY, METERED RESPIRATORY (INHALATION) at 09:02

## 2024-04-03 RX ADMIN — NYSTATIN 500000 UNITS: 100000 SUSPENSION ORAL at 09:05

## 2024-04-03 RX ADMIN — METOPROLOL TARTRATE 25 MG: 25 TABLET, FILM COATED ORAL at 09:05

## 2024-04-03 RX ADMIN — FORMOTEROL FUMARATE DIHYDRATE 20 MCG: 20 SOLUTION RESPIRATORY (INHALATION) at 21:54

## 2024-04-03 RX ADMIN — ACETAMINOPHEN 650 MG: 160 SOLUTION ORAL at 00:07

## 2024-04-03 RX ADMIN — Medication 400 MG: at 09:05

## 2024-04-03 RX ADMIN — NYSTATIN 500000 UNITS: 100000 SUSPENSION ORAL at 15:09

## 2024-04-03 RX ADMIN — ACETAMINOPHEN 650 MG: 160 SOLUTION ORAL at 12:05

## 2024-04-03 RX ADMIN — BUDESONIDE INHALATION 0.5 MG: 0.5 SUSPENSION RESPIRATORY (INHALATION) at 21:54

## 2024-04-03 RX ADMIN — Medication 5 MG: at 22:09

## 2024-04-03 RX ADMIN — ACETAMINOPHEN 650 MG: 160 SOLUTION ORAL at 06:04

## 2024-04-03 RX ADMIN — Medication 2 L/MIN: at 17:30

## 2024-04-03 RX ADMIN — METOPROLOL TARTRATE 25 MG: 25 TABLET, FILM COATED ORAL at 22:08

## 2024-04-03 RX ADMIN — ACETAMINOPHEN 650 MG: 160 SOLUTION ORAL at 23:57

## 2024-04-03 RX ADMIN — METOPROLOL TARTRATE 25 MG: 25 TABLET, FILM COATED ORAL at 15:10

## 2024-04-03 ASSESSMENT — PAIN DESCRIPTION - DESCRIPTORS
DESCRIPTORS: ACHING

## 2024-04-03 ASSESSMENT — PAIN SCALES - GENERAL
PAINLEVEL_OUTOF10: 0 - NO PAIN
PAINLEVEL_OUTOF10: 5 - MODERATE PAIN
PAINLEVEL_OUTOF10: 3
PAINLEVEL_OUTOF10: 5 - MODERATE PAIN
PAINLEVEL_OUTOF10: 2
PAINLEVEL_OUTOF10: 2
PAINLEVEL_OUTOF10: 5 - MODERATE PAIN
PAINLEVEL_OUTOF10: 3

## 2024-04-03 ASSESSMENT — ACTIVITIES OF DAILY LIVING (ADL)
BATHING_LEVEL_OF_ASSISTANCE: MINIMUM ASSISTANCE;MINIMAL VERBAL CUES
HOME_MANAGEMENT_TIME_ENTRY: 45
BATHING_EQUIPMENT_NEEDED: LONG-HANDLED SPONGE;REMOVEABLE SHOWER HEAD
BATHING_WHERE_ASSESSED: SHOWER
HOME_MANAGEMENT_TIME_ENTRY: 15

## 2024-04-03 ASSESSMENT — PAIN DESCRIPTION - ORIENTATION
ORIENTATION: RIGHT

## 2024-04-03 ASSESSMENT — PAIN DESCRIPTION - LOCATION
LOCATION: LEG
LOCATION: LEG
LOCATION: HIP

## 2024-04-03 NOTE — PROGRESS NOTES
Occupational Therapy       24 [id number]   OT Last Visit   OT Received On 24   General   Reason for Referral Impaired mobility   Referred By Dr. 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   Prior to Session Communication Bedside nurse  (Discussed POC with OTR/L)   Patient Position Received Up in chair   Preferred Learning Style verbal;visual   General Comment Pt fatigued but agreeable to therapy.   Precautions   Hearing/Visual Limitations hearing diminished; corective lenses for reading   Precautions Comment  Joey Tube NPO, HOB elevated 45 degrees or more. 2 litres 02 via nasal canula   Pain Assessment   Pain Assessment 0-10   Pain Score 3   Pain Type Chronic pain   Pain Location Hip   Pain Orientation Right   Pain Descriptors Aching   Bed Mobility   Bed Mobility Yes   Bed Mobility 1   Bed Mobility 1 Sitting to supine;Supine to sitting   Level of Assistance 1 Close supervision;Contact guard   Bed Mobility Comments 1 Educated and demonstrated standard bed transfer. Pt returned demonstration with increased effort and close supervision.   Transfers   Transfer Yes   Transfer 1   Transfer From 1 Wheelchair to   Transfer to 1 Stand;Sit   Technique 1 Sit to stand;Stand to sit   Transfer Device 1 Gait belt;Walker   Transfer Level of Assistance 1 Contact guard   Trials/Comments 1 Verbal cues for hand placement on w/c for controlled descent.   Car Transfers   Car Transfer From Walker   Car Transfer Technique Ambulating   Car Transfers Contact guard;Verbal cues   Car Transfers Comments Educated, demonstrated and provided handout on car transfer. Pt returned demonstration of simulated car transfer 2x. First transfer: CGA and mod verbal cues for walker managment and hand placement  while turning into car. Second transfer: CGA and min verbal cues for hand placement.   Therapeutic Activity   Therapeutic Activity Performed Yes   Therapeutic Activity 1 Pt engaged in card matching activity with 1lb wrist weights while seated. Pt matched 52/52 cards to increase strength and endurance for functional reach and crossing midline to improve ADLs/IADLs.   IP OT Assessment   Prognosis Good   Barriers to Discharge None   Medical Staff Made Aware Yes   End of Session Communication Bedside nurse  (call light in reach. all needs met.)   End of Session Patient Position Up in chair   OT Assessment   OT Assessment Results Decreased ADL status;Decreased upper extremity range of motion;Decreased upper extremity strength;Decreased safe judgment during ADL;Decreased endurance   Strengths Ability to acquire knowledge;Attitude of self   Barriers to Participation Comorbidities   Education   Individual(s) Educated Patient   Education Provided Fall precautons   Patient Response to Education Patient/Caregiver Performed Return Demonstration of Exercises/Activities;Patient/Caregiver Verbalized Understanding of Information   Education Comment Pt educated on breathing techniques and engery conservation techniques while performing car/bed transfers. Pt returned demostration of breathing techniques.   Inpatient/Swing Bed or Outpatient   Inpatient/Swing Bed or Outpatient Inpatient   Inpatient Plan   Treatment Interventions ADL retraining;Functional transfer training;UE strengthening/ROM;Fine motor coordination activities   OT Frequency 5 times per week   OT - OK to Discharge Yes   ASHLEY AQUINO OTA  As supervising TITA, I attest that the above information is correct.

## 2024-04-03 NOTE — PROGRESS NOTES
Physical Therapy       24 [phone]   PT  Visit   PT Received On 24   Response to Previous Treatment Patient reporting fatigue but able to participate.   General   Reason for Referral Impaired mobility   Referred By Dr. 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   Prior to Session Communication Bedside nurse   Patient Position Received Up in chair;Alarm on   Preferred Learning Style verbal;visual   General Comment reports fatigue but is agreeable to PT   Precautions   Hearing/Visual Limitations hearing diminished; corective lenses for reading   Precautions Comment  Joey Tube NPO, HOB elevated 45 degrees or more. 2 litres 02 via nasal canula   Pain Assessment   Pain Assessment 0-10   Pain Score 2   Pain Location Hip   Pain Orientation Right   Cognition   Overall Cognitive Status WFL   Orientation Level Oriented X4   Ambulation/Gait Training 1   Surface 1 Level tile   Device 1 Rollator   Gait Support Devices Gait belt   Assistance 1 Contact guard   Quality of Gait 1 Diminished heel strike;Shuffling gait;Forward flexed posture   Comments/Distance (ft) 1 80 ft x 2 with RW CGA. +GRAY spO2 97% post ambulation. On 2L oxygent via nasal canula. Participates with music therapy during gait well to increa   Transfer 1   Transfer From 1 Wheelchair to   Transfer to 1 Stand   Technique 1 Sit to stand;Stand to sit   Transfer Device 1 Gait belt   Transfer Level of Assistance 1 Contact guard   Trials/Comments 1 v.c.s for safe hand placement with mobility   Activity Tolerance   Endurance Tolerates 10 - 20 min exercise with multiple rests   Early Mobility/Exercise Safety Screen Proceed with mobilization - No exclusion criteria met   Activity Tolerance Comments Easliy fatigues.  Standing  Tolerance 2-3 min   PT Assessment   PT Assessment Results Decreased strength;Pain;Decreased range of motion;Decreased endurance;Impaired balance;Decreased mobility;Decreased cognition;Impaired judgement;Decreased safety awareness   Rehab Prognosis Excellent   Barriers to Discharge medical acuity   End of Session Communication Bedside nurse   End of Session Patient Position Up in chair   PT Plan   Treatment/Interventions Transfer training;Gait training;Endurance training;Therapeutic activity   PT Plan Skilled PT   PT - OK to Discharge Yes

## 2024-04-03 NOTE — CARE PLAN
Problem: PT Problem  Goal: STG- BED MOBILITY Patient will transfer supine to sit and sit to supine with supervision assist to facilitate mobility.   Outcome: Progressing  Goal: STG TRANSFERS Patient will transfer bed to chair and chair to bed with contact guard assist to facilitate mobility.   Outcome: Progressing  Goal: STG AMBULATION Patient will amb 100 feet with rolling walker device including two turns on even surface with contact guard assist to facilitate safe mobility.   Outcome: Progressing  Goal: STG STAIRS Patient will negotiate 4  stairs with two rails and minimal assist with no device to enter home.   Outcome: Progressing  Goal: STG- TUG Patient will perform TUG with least restrictive assistive device in 30 seconds or less to promote mobility and reduce fall risk with dynamic standing tasks.   Outcome: Progressing  Goal: LTG - BED MOBILITY Patient will transfer supine to sit and sit to supine with independent assist to facilitate mobility.   Outcome: Progressing  Goal: LTG - TRANSFERS Patient will transfer bed to chair and chair to bed with independent assist to facilitate mobility.   Outcome: Progressing  Goal: LTG - AMBULATION Patient will amb 150 feet with rolling walker device including two turns on uneven surface with independent assist to facilitate safe mobility.   Outcome: Progressing  Goal: LTG - STAIRS Patient will negotiate 12 stairs with two rails and independent assist with no device to enter home.   Outcome: Progressing  Goal: LTG - TUG Patient will perform TUG with least restrictive assistive device in 25 seconds or less to promote mobility and reduce fall risk with dynamic standing tasks.   Outcome: Progressing

## 2024-04-03 NOTE — PROGRESS NOTES
"Music Therapy Note    Trinity Hernandez was referred by     Therapy Session  Referral Type: New referral this admission  Visit Type: New visit  Session Start Time: 1400  Session End Time: 1430  Intervention Delivery: In-person  Conflict of Service: None  Number of staff members present: 1     Pre-assessment  Unable to Assess Reason: Outcomes not applicable  Mood/Affect: Tired/lethargic         Treatment/Interventions  Areas of Focus: Physiological functioning improvement  Music Therapy Interventions: Neurologic techniques  Co-Treatment: PT  Interruption: No  Patient Fell Asleep at End of Session: No    Post-assessment  Unable to Assess Reason: Outcomes not applicable  Mood/Affect: Appropriate  Verbalized Emotional State: Gratitude  Continue Visiting: Yes  Total Session Time (min): 30 minutes    Narrative  Assessment Detail: Pt working with PT on endurance. Reports feeling very tired and does not want to walk, agreed to walk with music therapy  Plan: Play pt preferred songs as a diversion from noticing fatigue  Intervention: MT played several \"walking\" songs as pt walked up and down the hallway  Evaluation: Pt benefits from music therapy for motivation. Pt sang along to \"Walkin' After Midnight\" and \"I Walk the Line\"  Follow-up: Will follow up throughout admission    Education Documentation  No documentation found.          "

## 2024-04-03 NOTE — PROGRESS NOTES
Physical Therapy       24 [phone]   PT  Visit   PT Received On 24   Response to Previous Treatment Patient reporting fatigue but able to participate.   General   Reason for Referral Impaired mobility   Referred By Dr. 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   Prior to Session Communication Bedside nurse   Patient Position Received Up in chair;Alarm on   Preferred Learning Style verbal;visual   General Comment reports fatigue but is agreeable to PT   Precautions   Precautions Comment  Joey Tube NPO, HOB elevated 45 degrees or more. 2 litres 02 via nasal canula   Pain Assessment   Pain Assessment 0-10   Pain Score 5 - Moderate pain   Pain Location Neck   Cognition   Overall Cognitive Status WFL   Arousal/Alertness Appropriate responses to stimuli   Orientation Level Oriented X4   Postural Control   Postural Control WFL   Posture Comment Flexed at hips, knees, forwarde head posture. Uses RW   Therapeutic Exercise   Therapeutic Exercise Performed Yes   Therapeutic Exercise Activity 1 hip flex 2x15 2#B   Therapeutic Exercise Activity 2 LAQ 2x15 2#B   Therapeutic Exercise Activity 3 hip adduction against ball 2x15   Therapeutic Exercise Activity 4 hip abduction against green band   Therapeutic Exercise Activity 5 hamstring curls 2x15 green band   Therapeutic Activity   Therapeutic Activity 1 Standing alternating drum taps with in standing with music therapist with CGA.   Bed Mobility 1   Bed Mobility 1 Supine to sitting   Level of Assistance 1 Close supervision   Bed Mobility Comments 1 use of bedrails   Ambulation/Gait Training 1   Surface 1 Level tile   Device 1 Rollator   Gait Support Devices Gait belt   Assistance 1 Contact guard   Quality of Gait 1 Diminished heel  strike;Shuffling gait;Forward flexed posture   Transfer 1   Transfer From 1 Wheelchair to   Transfer to 1 Stand   Technique 1 Sit to stand;Stand to sit   Transfer Device 1 Gait belt   Transfer Level of Assistance 1 Contact guard;Minimal verbal cues   Trials/Comments 1 cues to push up from wc   Stairs   Rails 1 Bilateral   Device 1 Railing   Support Devices 1 Gait belt   Assistance 1 Contact guard   Comment/Number of Steps 1 6--4 inch steps with 2 rails , cues for safe foot placement   Activity Tolerance   Endurance Tolerates 10 - 20 min exercise with multiple rests   Early Mobility/Exercise Safety Screen Proceed with mobilization - No exclusion criteria met   Rate of Perceived Exertion (RPE) 5/10   PT Assessment   PT Assessment Results Decreased strength;Pain;Decreased range of motion;Decreased endurance;Impaired balance;Decreased mobility;Decreased cognition;Impaired judgement;Decreased safety awareness   Rehab Prognosis Excellent   Barriers to Discharge medical acuity   Strengths Ability to acquire knowledge   Barriers to Participation Comorbidities   End of Session Patient Position Up in chair   PT Plan   Treatment/Interventions Transfer training;Gait training;Stair training;Strengthening;Endurance training;Therapeutic exercise;Therapeutic activity   PT Recommended Transfer Status Assist x1   PT - OK to Discharge Yes

## 2024-04-03 NOTE — PROGRESS NOTES
continues to follow for discharge planning. IDT meeting held 4.2.24 to review overall clinical status and progress toward therapy goals.     Advanced Directives: No advanced directives in HIM and patient is aware of LSW availability to complete documentation upon request. Identified patient's preferred point person as spouse Robbin [phone]     PLOF: At baseline, patient lives with spouse in a ranch home with 2 stairs to enter the residence. Patient is independent with ADL's and IADL's.     PCP:  Lisette Samaniego DO    Pharmacy: Pharmacy of choice is [facility]     Discharge Goal:  home with spouse     ADOD: 4.18.24    Barriers/to do:  Plan for family training prior to discharge. Patient may be interested in home health however recommendations for therapy post discharge are evolving due to patient progress. If home health is recommended and preferred, LSW will follow for provider of choice and refer accordingly.

## 2024-04-03 NOTE — PROGRESS NOTES
Occupational Therapy       24 [id number]   OT Last Visit   OT Received On 24   General   Reason for Referral Impaired mobility   Referred By Dr. 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   Prior to Session Communication Bedside nurse  (Discussed POC with OTR/L)   Patient Position Received Bed, 2 rail up   Preferred Learning Style verbal;visual   General Comment Pt. agreeable to therapy but fatigued.   Precautions   Hearing/Visual Limitations hearing diminished; corective lenses for reading   Precautions Comment  Joey Tube NPO, HOB elevated 45 degrees or more. 2 litres 02 via nasal canula   Vital Signs   Heart Rate 103   Heart Rate Source Brachial   SpO2 98 %   /72   BP Location Right arm   Patient Position Sitting   Pain Assessment   Pain Assessment 0-10   Pain Score 5 - Moderate pain   Pain Type Chronic pain   Pain Location Leg   Pain Orientation Right   Pain Descriptors Aching   Cognition   Overall Cognitive Status WFL   Arousal/Alertness Appropriate responses to stimuli   Coordination   Movements are Fluid and Coordinated No   RUE    RUE  X   Feeding   Feeding Comments NPO   Grooming   Grooming Level of Assistance Setup   Grooming Where Assessed Wheelchair   Grooming Comments Pt washed hands, combed hair, and rinsed out mouth with swab while seated at sink side.   UE Bathing   UE Bathing Adaptive Equipment Removeable shower head   UE Bathing Level of Assistance Setup   UE Bathing Where Assessed Shower   UE Bathing Comments all tasks seated and items within reach.   LE Bathing   LE Bathing Adaptive Equipment Long-handled sponge;Removeable shower head   LE Bathing Level of Assistance Minimum assistance;Minimal verbal cues   LE Bathing Where Assessed Shower   LE  Bathing Comments LH sponge for R foot, figure four technique for L foot. Min assist for posterior pericare while in stance. Verbal cues for hand/foot placement while in stance at grab bar for safety and balance.   UE Dressing   UE Dressing Level of Assistance Setup;Minimum assistance   UE Dressing Where Assessed Wheelchair   UE Dressing Comments pt donned/doffed pull over dress. Min assist with threading O2 tubing through dress.   LE Dressing   LE Dressing Yes   LE Dressing Adaptive Equipment Reacher;Shoe horn   Sock Level of Assistance Setup   Shoe Level of Assistance Setup   Adult Briefs Level of Assistance Contact guard   LE Dressing Where Assessed Wheelchair   LE Dressing Comments Education and demonstration of reacher, Pt retruned demonstration and utilized reacher to thread through RLE and figure four technqiue for LLE. In stance for brief management over hips with CGA.  Pt utilized shoe horn to doff socks with min verbal cues for placement of AE.   Toileting   Toileting Level of Assistance Moderate assistance   Where Assessed Toilet   Toileting Comments Pt incontinent to stool. NSG informed. mod assist for posterior pericare and verbal cues for hand placement on grab bars for safety and balance.   Bed Mobility   Bed Mobility Yes   Bed Mobility 1   Bed Mobility 1 Supine to sitting   Level of Assistance 1 Close supervision   Bed Mobility Comments 1 utilized bed rails   Transfers   Transfer Yes   Transfer 1   Transfer From 1 Bed to   Transfer to 1 Wheelchair   Technique 1 Stand pivot   Transfer Device 1 Gait belt   Transfer Level of Assistance 1 Contact guard   Trials/Comments 1 verbal cues for hand placement on bed to rise and on w/c for controlled descent.   Toilet Transfers   Toilet Transfer From Wheelchair   Toilet Transfer Type To and from   Toilet Transfer to Standard toilet   Toilet Transfer Technique Stand pivot   Toilet Transfers Contact guard   Toilet Transfers Comments Verbal cues for hand placement  on grab bars for safety and balance.   Shower Transfers   Shower Transfer From Wheelchair   Shower Transfer Type To and from   Shower Transfer to Shower seat with back   Shower Transfer Technique Stand pivot   Shower Transfers Contact guard   Shower Transfers Comments verbal cues for hand placement on grab bars for safety and balance. Pt stated not feeling well, took vitals, and communicated with NSG, pt has high anxiety.   Therapeutic Activity   Therapeutic Activity Performed Yes   Therapeutic Activity 1 Pt engaged in item retrival while seated in w/c with grabber. Pt retrieved 15/15 items located on floor and bed/dresser level with 3 rest breaks. Educated pt on breathing techniques and energy conservation while resting.   IP OT Assessment   Prognosis Good   Barriers to Discharge None   Evaluation/Treatment Tolerance Patient limited by fatigue   Medical Staff Made Aware Yes   End of Session Communication Bedside nurse  (Informed NSG about stool incontinence. Pt stated not feeling well, communicate with NSG, Pt has high anxiety.)   End of Session Patient Position Up in chair   OT Assessment   OT Assessment Results Decreased ADL status;Decreased upper extremity range of motion;Decreased upper extremity strength;Decreased safe judgment during ADL;Decreased endurance   Strengths Ability to acquire knowledge   Barriers to Participation Comorbidities   Education   Individual(s) Educated Patient   Education Provided Fall precautons;Joint protection and energy conservation   Risk and Benefits Discussed with Patient/Caregiver/Other yes   Patient/Caregiver Demonstrated Understanding yes   Patient Response to Education Patient/Caregiver Performed Return Demonstration of Exercises/Activities;Patient/Caregiver Verbalized Understanding of Information   Education Comment Pt educated on breathing techniques and engery conservation techniques. Pt verbalized understanding and returned demonstration of breathing technqiues.    Inpatient/Swing Bed or Outpatient   Inpatient/Swing Bed or Outpatient Inpatient   Inpatient Plan   Treatment Interventions ADL retraining;Functional transfer training;Endurance training   OT Frequency 5 times per week   OT - OK to Discharge Yes   ASHLEY AQUINO OTA  As supervising TITA, I attest that the above information is correct.

## 2024-04-03 NOTE — PROGRESS NOTES
"Nutrition Follow up Note    Nutrition Assessment      Spoke with RN. RN reported that tube feed has been getting clogged. Pt tolerating tube feed otherwise. SLP following.     Nutrition History:  Food and Nutrient History: Tube feed     Food Allergies/Intolerances:  None  GI Symptoms: None  Oral Problems: Swallowing difficulty    Anthropometrics:  Ht: 170 cm (5' 6.93\"), Wt: 76.4 kg (168 lb 6.9 oz), BMI: 26.44  IBW/kg (Dietitian Calculated): 61 kg  Percent of IBW: 143 %       Weight Change:  Daily Weight  03/28/24 : 76.4 kg (168 lb 6.9 oz)  03/27/24 : 87.2 kg (192 lb 3.9 oz)  03/24/24 : 74.4 kg (164 lb)  03/11/24 : 76.2 kg (168 lb)  02/12/24 : 76.2 kg (168 lb)  02/02/24 : 74.5 kg (164 lb 3.9 oz)  01/10/24 : 75.3 kg (166 lb)  01/08/24 : 74.4 kg (164 lb)  01/04/24 : 74.7 kg (164 lb 9.6 oz)  12/19/23 : 75.5 kg (166 lb 7.2 oz)     Weight History / % Weight Change: no significant wt changes noted in chart review. question accuracy of wt from 3/27/24.             Nutrition Focused Physical Exam Findings:   Subcutaneous Fat Loss  Orbital Fat Pads: Well nourished (slightly bulging fat pads)  Buccal Fat Pads: Well nourished (full, rounded cheeks)  Triceps: Mild-Moderate (less than ample fat tissue)    Muscle Wasting  Temporalis: Well nourished (well-defined muscle)  Pectoralis (Clavicular Region): Well nourished (clavicle not visible)  Deltoid/Trapezius: Mild-Moderate (slight protrusion of acromion process)  Interosseous: Mild-Moderate (slightly depressed area between thumb and forefinger)  Quadriceps: Well nourished (well developed, well rounded)  Gastrocnemius: Well nourished (well developed bulbous muscle)              Nutrition Significant Labs:  Lab Results   Component Value Date    WBC 8.6 03/29/2024    HGB 8.2 (L) 03/29/2024    HCT 27.5 (L) 03/29/2024     03/29/2024    CHOL 107 03/25/2024    TRIG 166 (H) 03/25/2024    HDL 33.2 03/25/2024    ALT 5 (L) 03/24/2024    AST 16 03/24/2024     03/29/2024    K 3.9 " 03/29/2024     03/29/2024    CREATININE 0.80 03/29/2024    BUN 15 03/29/2024    CO2 29 03/29/2024    TSH 0.93 02/05/2024    INR 1.1 03/24/2024    HGBA1C 5.3 03/25/2024     Nutrition Specific Medications:  aspirin, 81 mg, nasogastric tube, Daily  budesonide, 0.5 mg, nebulization, BID  formoterol, 20 mcg, nebulization, BID  furosemide, 20 mg, g-tube, Daily  lactobacillus acidophilus, 1 tablet, nasogastric tube, Daily  levothyroxine, 50 mcg, nasogastric tube, Daily  lidocaine, 1 patch, transdermal, Daily  magnesium oxide, 400 mg, nasogastric tube, Daily  metoprolol tartrate, 25 mg, nasogastric tube, TID  nystatin, 5 mL, Swish & Swallow, TID  oxygen, , inhalation, q24h  tiotropium, 2 puff, inhalation, Daily           Dietary Orders (From admission, onward)       Start     Ordered    03/28/24 1715  Enteral feeding with ; 6 TIMES  DAY; 100 (BEFORE AND AFTER THE BOLUS FEED); Water; Bottled water; With each bolus  Diet effective now        Question Answer Comment   Tube feeding formula: Jevity 1.5    Tube feeding bolus (mL): 200    Tube feeding bolus frequency: 6 TIMES  DAY    Tube feeding flush (mL): 100 BEFORE AND AFTER THE BOLUS FEED   Flush type: Water    Water type: Bottled water    Flush frequency: With each bolus        03/28/24 1716                  Nutrition Support Intake provides: 1800 kcals, 77g protein and 912 ml free water       Estimated Needs:   Estimated Energy Needs  Total Energy Estimated Needs (kCal): 1909 kCal  Total Estimated Energy Need per Day (kCal/kg): 25 kCal/kg  Method for Estimating Needs: actual wt    Estimated Protein Needs  Total Protein Estimated Needs (g): 92 g  Total Protein Estimated Needs (g/kg): 1.2 g/kg  Method for Estimating Needs: actual wt    Estimated Fluid Needs  Method for Estimating Needs: 1 ml/kcal        Nutrition Diagnosis   Nutrition Diagnosis:       Nutrition Diagnosis  Patient has Nutrition Diagnosis: Yes  Diagnosis Status (1): Ongoing  Nutrition Diagnosis 1:  Swallowing difficulity  Related to (1): dysphagia  As Evidenced by (1): failed MBS       Nutrition Interventions/Recommendations   Nutrition Interventions and Recommendations:    Nutrition Prescription:  Individualized Nutrition Prescription Provided for : 1909 kcals and 92g protein to be provided via enteral nutrition    Nutrition Interventions:   Food and/or Nutrient Delivery Interventions  Interventions: Enteral intake  Enteral Intake: Other (Comment)  Goal: continue providing 200 ml bolus feeding Jevity 1.5 q4hrs to provide 1800 kcals, 77g protein and 912 ml free water. flush with a minimum of 75 ml free water before and after each feeding for hydration.    Education Documentation  No documentation found.           Nutrition Monitoring and Evaluation   Monitoring/Evaluation:   Food/Nutrient Related History Monitoring  Monitoring and Evaluation Plan: Enteral and parenteral nutrition intake  Enteral and Parenteral Nutrition Intake: Enteral nutrition formula/solution  Criteria: enteral feeding will provide >/= 75% estimated needs, pt will tolerate enteral feeding at goal rate            Time Spent/Follow-up:   Follow Up  Time Spent (min): 20 minutes  Last Date of Nutrition Visit: 04/03/24  Nutrition Follow-Up Needed?: 5-7 days  Follow up Comment: 4/8/24

## 2024-04-03 NOTE — PROGRESS NOTES
Speech-Language Pathology    SLP Adult [facility] Speech-Language Pathology Treatment     Patient Name: Trinity Hernandez  MRN: [id number]  Today's Date: 4/3/2024  Time Calculation  Start Time: 1000  Stop Time: 1045  Time Calculation (min): 45 min     4/5sw    Current Problem:   Ischemic stroke, right body involvement (left brain)      Recent MBSS 3/26 at [facility]       SLP Assessment:  SLP TX Intervention Outcome: Making Progress Towards Goals  SLP Assessment Results: Other (Comment) (dysphagia)  Prognosis: Good  Treatment Provided: Yes   Treatment Tolerance: Patient tolerated treatment well  Medical Staff Made Aware: Yes  Strengths: Motivation  Barriers: Comorbidities      NPO  Frequent, aggressive oral care is strongly recommended to improve infection control as well as reduce dental plaque and bacteria on oropharyngeal surfaces which may increase the risk nosocomial infections, including pneumonia.  If pt requests, OK for small amounts of ice chips (no more than 3-5/hr) with supervision only after aggressive oral care is completed. Discontinue if there are concerns for s/s aspiration.  Puree tsp trials w/ SLP only (chin tuck, triple effortful swallow).  Repeat MBSS in 2-4 weeks.        Plan:  Inpatient/Swing Bed or Outpatient: Inpatient  Treatment/Interventions: Other (Comment) (dysphagia)  SLP TX Plan: Continue Plan of Care  SLP Plan: Skilled SLP  SLP Frequency: 5x per week  Duration: Current admission  SLP Discharge Recommendations: Continue skilled SLP services at the next level of care  Next Treatment Priority: po trials with SLP only, OPEs, ctar, effortful swallow  Discussed POC: Patient, Nursing  Discussed Risks/Benefits: Yes, Patient, Nursing  Patient/Caregiver Agreeable: Yes      Subjective   Pt seen upright in w/c in ST room after PT session. Appears fatigued. Asked to return to room and lay down. Instructed pt and RN to remain upright in bed for at least 30 minutes (or as able) d/t p.o. trials.     General  Visit Information:   Reason for Referral: f/u dysphagia tx  Referred By: Dr. rCuz  Past Medical History Relevant to Rehab: COPD, CAD s/p PCI 10/4/23, NSVT, HTN, HLD, rheumatoid arthritis, and hypothyroidism, CHF, Afib (not on AC), AAA, asthma, COPD, chronic respiratory failure 2/2 pulmonary sarcoidosis, Migraines, Hiatal Hernia, and Diverticulosis.  Prior to Session Communication: Bedside nurse    Pain Assessment:   Pain Score: 5 - Moderate pain  Pain Location: Neck  Pain Orientation: Left      Objective   DYSPHAGIA GOALS initiated 3/29, anticipated end 4/19:  Pt will tolerate tsp trials of pureed textures with SLP only to 90% without overt s/s aspiration.  PROGRESS: RN cleared pt for p.o. trials with SLP. 3 larger bolus tsp amounts of applesauce after oral care.  STATUS: Pt c/o pain during swallow d/t dobhoff. RN aware. There was a delayed cough x1 post swallow with larger bolus. No difficulties with smaller bolus amount.  Less mucus/phlegm today than prvs day. Congestion persists. Pulse ox remained at .  2. Pt will utilize chin-tuck, triple effortful swallow with SLP only trials to 100%.              PROGRESS: 90% - 100% with cues, with SLP trials.  STATUS: Pt is affected by pain during swallow. However, able to maintain chin-tuck and complete x3 successive swallows. Cues to initiate sequence and fully complete third swallow.  Pt will increase swallow reflex via OPEs to 80% accuracy.              PROGRESS: 60%-70% with reduced cues.  STATUS: CTAR x30. Pitch glides X15. Phonation with increased consistency 3-4 sec. . BOT elevation x15. Tongue Pops x15. Cues for breath support, pacing, jaw neutral. Attempted Ami, uncomfortable. Supraglottic sequence x2.  Pt will improve swallow reflex via OMEs to 80% accuracy.              PROGRESS: 75%-80% with min cues  STATUS: Able to complete sets of x10 labial protrusion/retraction, lingual lateralization,  buccal protrusion/retraction. Reduced cues for pacing, full  ROM.  Ongoing assessment via MBSS when indicated to reassess swallow function.              PROGRESS: not at this time, MBSS rec's indicate repeat MBSS in 2-4wks.              STATUS: see above    Therapeutic Swallow:  Therapeutic Swallow Intervention : PO Trials, Oral Strengthening Techniques, Compensatory Strategies, Pharyngeal Strengthening Techniques  Pharyngeal Strengthening Techniques: Effortful Swallow, Chin Tuck Against Resistance, Supraglottic Swallow, Pitch Glides  Solid Diet Recommendations: NPO  Liquid Diet Recommendations: NPO  Swallow Comments: Pt continues to c/o pain during swallow. RN aware.    Inpatient Education:  Adult Inpatient Education  Individual(s) Educated: Patient  Verbal Education : goals, progress  Risk and Benefits Discussed with Patient/Caregiver/Other: yes  Patient/Caregiver Demonstrated Understanding: yes  Plan of Care Discussed and Agreed Upon: yes  Patient Response to Education: Patient/Caregiver Verbalized Understanding of Information

## 2024-04-03 NOTE — PROGRESS NOTES
Physical Therapy Evaluation and Treatment        24 [id number]   PT  Visit   PT Received On 24   General   Reason for Referral Decline in ADLs s/p CVA   Referred By Dr Carlisle   Past Medical History Relevant to Rehab CAD,  HTN, HLD, RA, CHF, Afib, asthma, chronic respiratory failure d/t COPD and pulmonary sarcoidosis - home O2,   Prior to Session Communication Bedside nurse   Patient Position Received Up in chair   General Comment Just finishede OT session.  Up in wheelchair dressed.  02 chronically 2 liters   Precautions   Hearing/Visual Limitations hearing diminished; corective lenses for reading   Precautions Comment  Joey Tube NPO   Pain Assessment   Pain Assessment 0-10   Pain Score 8   Pain Type Chronic pain   Pain Location Hip   Pain Orientation Right  (Has Bruising Right knee)   Pain Interventions   (nsg to call MD regarding pain meds)   Cognition   Overall Cognitive Status WFL   Orientation Level Oriented X4  (knows month and year)   Following Commands Follows one step commands with increased time   Postural Control   Posture Comment Flexed at hips, knees, forwarde head posture. Uses RW   Static Sitting Balance   Static Sitting-Balance Support No upper extremity supported;Feet supported   Static Sitting-Level of Assistance Close supervision   Static Sitting-Comment/Number of Minutes x 5 mins   Dynamic Sitting Balance   Dynamic Sitting-Balance Support No upper extremity supported;Feet supported   Dynamic Sitting-Balance Reaching across midline   Dynamic Sitting-Comments supervised   Static Standing Balance   Static Standing-Balance Support Bilateral upper extremity supported   Static Standing-Level of Assistance Minimum assistance   Static Standing-Comment/Number of Minutes with RW   Dynamic Standing Balance   Dynamic Standing-Balance Support Bilateral upper extremity supported   Dynamic Standing-Balance Turning   Dynamic Standing-Comments min assist   Strength RLE   R Hip Flexion 2+/5   R Knee  Flexion 2+/5   R Knee Extension 2+/5   R Ankle Dorsiflexion 3+/5   R Ankle Plantar Flexion 3+/5   R Hip Extension 2+/5   R Hip ABduction 2+/5   R Hip ADduction 2+/5   AROM LLE (degrees)   LLE AROM Comment 1/2 ROM hip/knee/ankle   Strength LLE   L Hip Flexion 4-/5   L Knee Flexion 4/5   L Knee Extension 4/5   L Ankle Dorsiflexion 4+/5   L Hip Extension 4-/5   L Hip ABduction 4-/5   L Hip ADduction 4-/5   Bed Mobility 1   Bed Mobility 1 Supine to sitting;Sitting to supine   Level of Assistance 1 Minimum assistance;Moderate assistance   Bed Mobility Comments 1 trunk assist up, LEs off bed   Bed Mobility 2   Bed Mobility  2 Rolling right;Rolling left   Level of Assistance 2 Contact guard   Bed Mobility Comments 2 with rail   Ambulation/Gait Training   Ambulation/Gait Training Performed Yes   Ambulation/Gait Training 1   Surface 1 Level tile   Device 1 Rolling walker   Gait Support Devices Gait belt   Assistance 1 Minimum assistance   Quality of Gait 1 NBOS;Decreased step length;Shuffling gait;Soft knee(s);Knee(s) buckle;Diminished heel strike   Comments/Distance (ft) 1 65 feet with RW min assist.   Transfers   Transfer Yes   Transfer 1   Transfer From 1 Sit to   Transfer to 1 Stand   Technique 1 Stand to sit;Sit to stand   Transfer Device 1 Walker   Transfer Level of Assistance 1 Minimum assistance   Trials/Comments 1 with RW   Transfers 2   Transfer From 2 Stand to   Transfer to 2 Sit   Technique 2 Stand to sit;Sit to stand   Transfer Device 2 Walker   Transfer Level of Assistance 2 Minimum assistance   Trials/Comments 2 cues for hand placement   Transfers 3   Transfer From 3 Wheelchair to   Transfer to 3 Mat   Technique 3 Stand pivot   Transfer Device 3 Walker   Transfer Level of Assistance 3 Minimum assistance;Minimal verbal cues   Trials/Comments 3 SAfety Cues   Stairs   Stairs No   Wheelchair Activities   Wheelchair Parts Management No   Propulsion Yes   Propulsion Type 1 Manual   Level 1 Level tile   Method 1  Right upper extremity;Left upper extremity;Right lower extremity;Left lower extremity   Level of Assistance 1 Minimum assistance   Description/Details 1 Veres Right.  Effortful.  Moderate fatigue.   Activity Tolerance   Rate of Perceived Exertion (RPE) 3/10 after TUG   PT Assessment   PT Assessment Results Decreased strength;Pain;Decreased range of motion;Decreased endurance;Impaired balance;Decreased mobility;Decreased cognition;Impaired judgement;Decreased safety awareness   Rehab Prognosis Excellent   Strengths Coping skills   Barriers to Participation Comorbidities   End of Session Communication Bedside nurse   End of Session Patient Position Up in chair;Alarm off, not on at start of session   PT Plan   Treatment/Interventions Bed mobility;Transfer training;Gait training;Balance training;Neuromuscular re-education;Range of motion;Therapeutic exercise;Therapeutic activity;Wheelchair management   PT Plan Skilled PT   PT Frequency   (11x/week 90 min 5x/week 45 min 1x/week)   PT Recommended Transfer Status Assist x1   PT - OK to Discharge Yes   .15 seconds with RW Min assist   Problem: PT Problem  Goal: STG- BED MOBILITY Patient will transfer supine to sit and sit to supine with supervision assist to facilitate mobility.   Outcome: Progressing  Goal: STG TRANSFERS Patient will transfer bed to chair and chair to bed with contact guard assist to facilitate mobility.   Outcome: Progressing  Goal: STG AMBULATION Patient will amb 100 feet with rolling walker device including two turns on even surface with contact guard assist to facilitate safe mobility.   Outcome: Progressing  Goal: STG STAIRS Patient will negotiate 4  stairs with two rails and minimal assist with no device to enter home.   Outcome: Progressing  Goal: STG- TUG Patient will perform TUG with least restrictive assistive device in 30 seconds or less to promote mobility and reduce fall risk with dynamic standing tasks.   Outcome: Progressing  Goal:  LTG - BED MOBILITY Patient will transfer supine to sit and sit to supine with independent assist to facilitate mobility.   Outcome: Progressing  Goal: LTG - TRANSFERS Patient will transfer bed to chair and chair to bed with independent assist to facilitate mobility.   Outcome: Progressing  Goal: LTG - AMBULATION Patient will amb 150 feet with rolling walker device including two turns on uneven surface with independent assist to facilitate safe mobility.   Outcome: Progressing  Goal: LTG - STAIRS Patient will negotiate 12 stairs with two rails and independent assist with no device to enter home.   Outcome: Progressing  Goal: LTG - TUG Patient will perform TUG with least restrictive assistive device in 25 seconds or less to promote mobility and reduce fall risk with dynamic standing tasks.   Outcome: Progressing

## 2024-04-03 NOTE — PROGRESS NOTES
Physical Therapy Treatment        24 [id number]   PT  Visit   PT Received On 24   Response to Previous Treatment Patient reporting fatigue but able to participate.   General   Reason for Referral Impaired mobility   Referred By Dr. Cruz   Precautions   Precautions Comment  Joey Tube NPO, HOB elevated 45 degrees or more. 2 litres 02 via nasal canula   Pain Assessment   Pain Assessment 0-10   Pain Score 5 - Moderate pain   Pain Type Chronic pain   Pain Location Neck   Pain Orientation Right  (hIp, knee)   Therapeutic Exercise   Therapeutic Exercise Performed Yes   Therapeutic Exercise Activity 1 Standing Heel RAises 2x15 ll bars   Balance/Neuromuscular Re-Education   Balance/Neuromuscular Re-Education Activity Performed Yes   Balance/Neuromuscular Re-Education Activity 1 ll bars WBOS Alt UEs cgx1/sup   Balance/Neuromuscular Re-Education Activity 2 Sidestepping ll bars light uE support.  Cues to lift hands, fatigued quickly, GRAY   Ambulation/Gait Training 1   Surface 1 Level tile   Device 1 Rolling walker   Gait Support Devices Gait belt   Assistance 1 Contact guard   Quality of Gait 1 Diminished heel strike;Shuffling gait;Forward flexed posture   Comments/Distance (ft) 1 75 feet x2 Assist with RW step through pattern, slow oz.  Moderate GRAY.  2 Liters 02.   Transfers   Transfer Yes   Transfer 1   Transfer From 1 Wheelchair to   Transfer to 1 Stand   Technique 1 Sit to stand;Stand to sit   Transfer Device 1 Gait belt   Transfer Level of Assistance 1 Contact guard   Trials/Comments 1 VCs for pushing up/reaching back   Transfers 2   Transfer From 2 Wheelchair to   Transfer to 2 Chair with arms   Technique 2 Sit to stand;Stand to sit   Transfer Device 2 Walker   Transfer Level of Assistance 2 Contact guard   Trials/Comments 2 VCs for safe hand transition   Stairs   Stairs Yes   Stairs   Rails 1 Bilateral   Device 1 Railing   Support Devices 1 Gait belt   Assistance 1 Contact guard   Comment/Number of  Steps 1 6 4 inch steps 2 Rails CGx1   Wheelchair Activities   Propulsion Yes   Propulsion Type 1 Manual   Method 1 Right upper extremity;Left upper extremity   Level of Assistance 1 Contact guard   Description/Details 1 assist for 02 cord management +GRAY after   Activity Tolerance   Endurance Tolerates 10 - 20 min exercise with multiple rests   Early Mobility/Exercise Safety Screen Proceed with mobilization - No exclusion criteria met   Activity Tolerance Comments Easliy fatigues.  Standing Tolerance 2-3 min for NMR   Rate of Perceived Exertion (RPE) 4-5/10   PT Assessment   PT Assessment Results Decreased strength;Pain;Decreased range of motion;Decreased endurance;Impaired balance;Decreased mobility;Decreased cognition;Impaired judgement;Decreased safety awareness   Rehab Prognosis Excellent   Barriers to Discharge medical acuity   Strengths Ability to acquire knowledge   Barriers to Participation Comorbidities   End of Session Patient Position Up in chair   PT Plan   Inpatient/Swing Bed or Outpatient Inpatient   PT Plan   Treatment/Interventions Transfer training;Gait training;Stair training;Balance training

## 2024-04-03 NOTE — PROGRESS NOTES
Music Therapy Note    Trinity Hernandez was referred by     Therapy Session  Referral Type: New referral this admission  Visit Type: New visit  Session Start Time: 0900  Session End Time: 1000  Intervention Delivery: In-person  Number of staff members present: 1     Pre-assessment  Unable to Assess Reason: Outcomes not applicable  Mood/Affect: Tired/lethargic         Treatment/Interventions  Areas of Focus: Physiological functioning improvement  Music Therapy Interventions: Neurologic techniques  Co-Treatment: PT  Interruption: No  Patient Fell Asleep at End of Session: No    Post-assessment  Unable to Assess Reason: Outcomes not applicable  Mood/Affect: Appropriate  Verbalized Emotional State: Gratitude  Continue Visiting: Yes  Total Session Time (min): 60 minutes    Narrative  Assessment Detail: Pt working with PT on endurance. Fatigues easily. Easy to engage verbally, but hesistant to engage in PT. Enjoys country gospel.  Plan: Provide country gospel music to motivate pt to participate in PT  Intervention: MT played several live versions of pt preferred music while she participated in PT exericises  Evaluation: Pt appeared to engage for an extended period of time above baseline during MT intervention  Follow-up: Will follow up throughout admission    Education Documentation  No documentation found.

## 2024-04-04 PROCEDURE — 97530 THERAPEUTIC ACTIVITIES: CPT | Mod: GO

## 2024-04-04 PROCEDURE — 97110 THERAPEUTIC EXERCISES: CPT | Mod: GO

## 2024-04-04 PROCEDURE — 1180000001 HC REHAB PRIVATE ROOM DAILY

## 2024-04-04 PROCEDURE — 99233 SBSQ HOSP IP/OBS HIGH 50: CPT | Performed by: INTERNAL MEDICINE

## 2024-04-04 PROCEDURE — 2500000004 HC RX 250 GENERAL PHARMACY W/ HCPCS (ALT 636 FOR OP/ED): Performed by: INTERNAL MEDICINE

## 2024-04-04 PROCEDURE — 2500000001 HC RX 250 WO HCPCS SELF ADMINISTERED DRUGS (ALT 637 FOR MEDICARE OP): Performed by: INTERNAL MEDICINE

## 2024-04-04 PROCEDURE — 2500000005 HC RX 250 GENERAL PHARMACY W/O HCPCS: Performed by: INTERNAL MEDICINE

## 2024-04-04 PROCEDURE — 2500000002 HC RX 250 W HCPCS SELF ADMINISTERED DRUGS (ALT 637 FOR MEDICARE OP, ALT 636 FOR OP/ED): Performed by: INTERNAL MEDICINE

## 2024-04-04 PROCEDURE — 97110 THERAPEUTIC EXERCISES: CPT | Mod: GP,CQ

## 2024-04-04 PROCEDURE — 2500000002 HC RX 250 W HCPCS SELF ADMINISTERED DRUGS (ALT 637 FOR MEDICARE OP, ALT 636 FOR OP/ED): Mod: MUE | Performed by: INTERNAL MEDICINE

## 2024-04-04 PROCEDURE — 97530 THERAPEUTIC ACTIVITIES: CPT | Mod: GP,CQ

## 2024-04-04 PROCEDURE — 97535 SELF CARE MNGMENT TRAINING: CPT | Mod: GO

## 2024-04-04 PROCEDURE — 97116 GAIT TRAINING THERAPY: CPT | Mod: GP,CQ

## 2024-04-04 PROCEDURE — 92526 ORAL FUNCTION THERAPY: CPT | Mod: GN

## 2024-04-04 RX ORDER — FLUCONAZOLE 150 MG/1
150 TABLET ORAL DAILY
Status: COMPLETED | OUTPATIENT
Start: 2024-04-04 | End: 2024-04-10

## 2024-04-04 RX ADMIN — NYSTATIN 500000 UNITS: 100000 SUSPENSION ORAL at 14:21

## 2024-04-04 RX ADMIN — FORMOTEROL FUMARATE DIHYDRATE 20 MCG: 20 SOLUTION RESPIRATORY (INHALATION) at 21:14

## 2024-04-04 RX ADMIN — ASPIRIN 81 MG CHEWABLE TABLET 81 MG: 81 TABLET CHEWABLE at 09:46

## 2024-04-04 RX ADMIN — METOPROLOL TARTRATE 25 MG: 25 TABLET, FILM COATED ORAL at 09:46

## 2024-04-04 RX ADMIN — ACETAMINOPHEN 650 MG: 160 SOLUTION ORAL at 11:25

## 2024-04-04 RX ADMIN — NYSTATIN 500000 UNITS: 100000 SUSPENSION ORAL at 21:18

## 2024-04-04 RX ADMIN — LEVOTHYROXINE SODIUM 50 MCG: 50 TABLET ORAL at 05:01

## 2024-04-04 RX ADMIN — Medication 1 TABLET: at 09:46

## 2024-04-04 RX ADMIN — Medication 2 L/MIN: at 17:30

## 2024-04-04 RX ADMIN — NYSTATIN 500000 UNITS: 100000 SUSPENSION ORAL at 09:46

## 2024-04-04 RX ADMIN — METOPROLOL TARTRATE 25 MG: 25 TABLET, FILM COATED ORAL at 21:17

## 2024-04-04 RX ADMIN — ALBUTEROL SULFATE 2.5 MG: 2.5 SOLUTION RESPIRATORY (INHALATION) at 04:56

## 2024-04-04 RX ADMIN — Medication 5 MG: at 21:17

## 2024-04-04 RX ADMIN — METOPROLOL TARTRATE 25 MG: 25 TABLET, FILM COATED ORAL at 14:21

## 2024-04-04 RX ADMIN — ACETAMINOPHEN 650 MG: 160 SOLUTION ORAL at 05:01

## 2024-04-04 RX ADMIN — FUROSEMIDE 20 MG: 20 TABLET ORAL at 09:46

## 2024-04-04 RX ADMIN — BUDESONIDE INHALATION 0.5 MG: 0.5 SUSPENSION RESPIRATORY (INHALATION) at 09:46

## 2024-04-04 RX ADMIN — BUDESONIDE INHALATION 0.5 MG: 0.5 SUSPENSION RESPIRATORY (INHALATION) at 21:14

## 2024-04-04 RX ADMIN — ACETAMINOPHEN 650 MG: 160 SOLUTION ORAL at 17:24

## 2024-04-04 RX ADMIN — FLUCONAZOLE 150 MG: 150 TABLET ORAL at 21:00

## 2024-04-04 RX ADMIN — LIDOCAINE 1 PATCH: 4 PATCH TOPICAL at 09:47

## 2024-04-04 RX ADMIN — TIOTROPIUM BROMIDE INHALATION SPRAY 2 PUFF: 3.12 SPRAY, METERED RESPIRATORY (INHALATION) at 09:46

## 2024-04-04 RX ADMIN — Medication 400 MG: at 09:46

## 2024-04-04 RX ADMIN — ACETAMINOPHEN 650 MG: 160 SOLUTION ORAL at 22:59

## 2024-04-04 RX ADMIN — FORMOTEROL FUMARATE DIHYDRATE 20 MCG: 20 SOLUTION RESPIRATORY (INHALATION) at 09:46

## 2024-04-04 ASSESSMENT — COGNITIVE AND FUNCTIONAL STATUS - GENERAL
DAILY ACTIVITIY SCORE: 17
DRESSING REGULAR UPPER BODY CLOTHING: A LITTLE
TOILETING: A LITTLE
EATING MEALS: A LITTLE
DRESSING REGULAR LOWER BODY CLOTHING: A LOT
HELP NEEDED FOR BATHING: A LITTLE
PERSONAL GROOMING: A LITTLE

## 2024-04-04 ASSESSMENT — PAIN DESCRIPTION - LOCATION
LOCATION: LEG
LOCATION: HIP
LOCATION: LEG

## 2024-04-04 ASSESSMENT — PAIN - FUNCTIONAL ASSESSMENT
PAIN_FUNCTIONAL_ASSESSMENT: 0-10

## 2024-04-04 ASSESSMENT — ACTIVITIES OF DAILY LIVING (ADL): HOME_MANAGEMENT_TIME_ENTRY: 15

## 2024-04-04 ASSESSMENT — PAIN SCALES - GENERAL
PAINLEVEL_OUTOF10: 5 - MODERATE PAIN
PAINLEVEL_OUTOF10: 6
PAINLEVEL_OUTOF10: 5 - MODERATE PAIN
PAINLEVEL_OUTOF10: 7
PAINLEVEL_OUTOF10: 7
PAINLEVEL_OUTOF10: 0 - NO PAIN
PAINLEVEL_OUTOF10: 0 - NO PAIN
PAINLEVEL_OUTOF10: 5 - MODERATE PAIN
PAINLEVEL_OUTOF10: 4
PAINLEVEL_OUTOF10: 2
PAINLEVEL_OUTOF10: 7

## 2024-04-04 ASSESSMENT — PAIN DESCRIPTION - ORIENTATION
ORIENTATION: RIGHT

## 2024-04-04 NOTE — PROGRESS NOTES
"Occupational Therapy  Treatment    24 0800 - 0900   OT Last Visit   OT Received On 24   General   Reason for Referral decline in ADLs   Referred By Dr. 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   Prior to Session Communication Bedside nurse   Patient Position Received Bed, 3 rail up;Alarm off, not on at start of session   Preferred Learning Style verbal;visual   General Comment Patient cleared by nursing for therapy. Patient in bed upon arrival. Requires verbal encouragement for participation, education provided and patient agreeable   Precautions   Hearing/Visual Limitations hearing diminished; corective lenses for reading   Medical Precautions Oxygen therapy device and L/min   Precautions Comment  Joey Tube NPO, HOB elevated 45 degrees or more. 2 litres 02 via nasal canula   Pain Assessment   Pain Assessment 0-10   Pain Score 7   Pain Type Chronic pain   Pain Location Neck   Cognition   Overall Cognitive Status   (patient able to follow commands appropriately. fatigued. increased time and encouragement to participate)   RUE    RUE  X  (fatigues easily)   LUE    LUE X  (fatigues easily)   LUE AROM (degrees)   LUE AROM Comment L UE AROM WFL   Grooming   Grooming Level of Assistance Setup   Grooming Where Assessed Wheelchair   Grooming Comments wash hands, wash face, brush teeth, hair care   UE Bathing   UE Bathing Comments patient declines, \"i just did took a shower yesterday\"   LE Bathing   LE Bathing Comments patient declines, \"i just did took a shower yesterday\"   UE Dressing   UE Dressing Comments patient declines \"this is clean\"   LE Dressing   Shoe Level of Assistance Setup  (slip on shoes)   LE Dressing Where Assessed Wheelchair   Toileting   Toileting Level of " Assistance Contact guard   Where Assessed Toilet   Toileting Comments CGA doing brieft and clothing management   Functional Standing Tolerance   Time ~1 minute   Activity Fair/Fair+ balance   Functional Standing Tolerance Comments static standing. 2 trials   Bed Mobility   Bed Mobility Yes   Bed Mobility 1   Bed Mobility 1 Supine to sitting   Level of Assistance 1 Close supervision   Bed Mobility Comments 1 head of bed elevated   Transfers   Transfer Yes   Transfer 1   Transfer From 1 Bed to   Transfer to 1 Wheelchair   Technique 1 Stand pivot   Transfer Level of Assistance 1 Contact guard   Transfers 2   Transfer From 2 Wheelchair to   Transfer to 2 Stand   Technique 2 Sit to stand   Transfer Device 2 Walker   Transfer Level of Assistance 2 Contact guard   Trials/Comments 2 x3 trials   Transfers 3   Transfer From 3 Wheelchair to   Transfer to 3 Toilet   Technique 3 Stand pivot   Transfer Device 3   (grab bars)   Transfer Level of Assistance 3 Contact guard   Transfers 4   Transfer From 4 Toilet to   Transfer to 4 Wheelchair   Technique 4 Stand pivot   Transfer Device 4   (grab bars)   Transfer Level of Assistance 4 Contact guard   Static Sitting Balance   Static Sitting-Balance Support Feet supported   Static Sitting-Level of Assistance Close supervision   Dynamic Sitting Balance   Dynamic Sitting-Balance Support Feet supported   Dynamic Sitting-Balance Forward lean;Lateral lean;Reaching for objects;Reaching across midline   Dynamic Sitting-Comments Close Supervision   Static Standing Balance   Static Standing-Balance Support Bilateral upper extremity supported   Static Standing-Level of Assistance Close supervision   Static Standing-Comment/Number of Minutes x2 trials. 1 minute the first attempt and 40 seconds the second attempt   Toilet Transfers   Toilet Transfer From Wheelchair   Toilet Transfer Type To and from   Toilet Transfer to Standard toilet   Toilet Transfer Technique Stand pivot   Toilet Transfers  "Contact guard   Toilet Transfers Comments with use of grab bars   Car Transfers   Car Transfers Comments patient declines \"i just went over that yesterday, I am not doing it again\"   Kitchen Mobility   Kitchen Mobility Comments patient declines   Therapeutic Exercise   Therapeutic Exercise Performed Yes   Therapeutic Exercise Activity 1 seated in the w/c, patient completes 2x10 B UE exercises. exercises include bicep curls, chest press, shoulder flex/ext, shoulder elevation/depression, shoulder reverse rolls, scapular protraction/retraction, forearm supination/pronation, and wrist flex/ext. Fair form and tolerance noted. therapeutic rest breaks as needed for maximal muscle benefits   Therapeutic Activity   Therapeutic Activity 1 patient completes seated functional reaching activities, challenging use of B UE, crossing midline, reaching, FMC, and FM strengthening. Fair tolerane noted with rest breaks as needed   Other Activity   Other Activity 1 patient completes 9-hole PEG, box and blocks, [place] cancellation, and  strength assessments   IP OT Assessment   OT Assessment Patient was limited this date due to fatigue and not feeling well. She is CGA for txfers and requires multiple breaks throughout any functional activity. Patient would continue to benefit from skilled OT in order to increase safety and independence with daily tasks.   Prognosis Good   Barriers to Discharge None   Evaluation/Treatment Tolerance Patient limited by fatigue   End of Session Communication Bedside nurse   End of Session Patient Position Up in chair  (transported to PT)   OT Assessment   OT Assessment Results Decreased ADL status;Decreased upper extremity range of motion;Decreased upper extremity strength;Decreased safe judgment during ADL;Decreased endurance   Inpatient/Swing Bed or Outpatient   Inpatient/Swing Bed or Outpatient Inpatient   Inpatient Plan   Treatment Interventions ADL retraining;Functional transfer training;UE " strengthening/ROM;Endurance training;Patient/family training;Neuromuscular reeducation;Compensatory technique education   OT Frequency 5 times per week   OT Discharge Recommendations Low intensity level of continued care   Equipment Recommended upon Discharge Wheeled walker   OT - OK to Discharge Yes   OT Recommended Transfer Status Stand by assist;Minimal assist;Assist of 1     Outcome Measures     04/04/24 0800   [facility] Daily Activity   Putting on and taking off regular lower body clothing 2   Bathing (including washing, rinsing, drying) 3   Putting on and taking off regular upper body clothing 3   Toileting, which includes using toilet, bedpan or urinal 3   Taking care of personal grooming such as brushing teeth 3   Eating Meals 3   Daily Activity - Total Score 17   OT Adult Other Outcome Measures   9 Hole Peg Test This date: R UE 41.53, L UE 29.9  (Previous: RUE: 51 sec. LUE: 32 sec)   Box and Block This date: RUE while seated: 41 blks/min. LUE while seated:42 blks/min.  (Previous: RUE while seated: 42 blks/min. LUE while seated:49 blks/min.)   Other Outcome Measures RUE  strength: 56# LUE  strength: 50#  ([place] cancellation: seated in w/c, use of R UE: 1 minute 56 seconds, 5 misses, 1 distraction)

## 2024-04-04 NOTE — NURSING NOTE
[place]obbhoff patent, patient tolerating intermittent bolus without difficulty, 02 2L nc intact, no s/s of respiratory distress, prn tylenol administered x 2 for report of chronic right leg pain unrelieved by non pharmacological interventions, positive results noted, continent B/B, steady standby assist to wheelchair, no needs voiced at this time, call light within reach safety measures remain in place.

## 2024-04-04 NOTE — PROGRESS NOTES
[facility]: [facility]  PROGRESS NOTE      Trinity Hernandez is a 74 y.o. female that is being seen  today for follow-up at [facility] unit   Subjective   Patient is being seen for follow-up at [facility] unit.  Patient has history of stroke and is admitted for physical therapy.  Patient is on oxygen.  Vital signs are stable.  Patient is on tube feeds.  Patient is on oral nystatin swish and swallow.  Patient is being seen by speech therapy, physical therapy and Occupational Therapy      ROS  Negative for fever or chills  Negative for sore throat, ear pain, nasal discharge.  Patient has difficulty in swallowing and has feeding tube  Negative for cough, shortness of breath or wheezing  Negative for chest pain, palpitations, swelling of legs  Negative for abdominal pain, constipation, diarrhea, blood in the stools  Negative for urinary complaints  Negative for headache, dizziness, weakness or numbness  Positive for difficulty in walking  Negative for depression or anxiety  All other systems reviewed and were negative   Vitals:    04/03/24 2100   BP: 127/65   Pulse: 78   Resp: 16   Temp: 37 °C (98.6 °F)   SpO2: 95%      Vitals:    03/28/24 1640   Weight: 76.4 kg (168 lb 6.9 oz)     Body mass index is 26.44 kg/m².  Physical Exam  Constitutional: Patient does not appear to be in any acute distress  Head and Face: NCAT  Eyes: Normal external exam, EOMI  ENT: Patient has nasogastric feeding tube  Cardiovascular: RRR, S1/S2, no murmurs, rubs, or gallops, radial pulses +2, no edema of extremities  Pulmonary: CTAB, no respiratory distress.  Abdomen: +BS, soft, non-tender, nondistended, no guarding or rebound, no masses noted  MSK: No joint swelling, normal movements of all extremities. Range of motion- normal.  Skin- No lesions, contusions, or erythema.  Peripheral puslses palpable bilaterally 2+  Neuro: AAO X3, Cranial nerves 2-12 grossly intact,DTR 2+ in all 4 limbs, weakness of the legs.      LABS    [unfilled]  Lab Results   Component Value Date    GLUCOSE 134 (H) 03/29/2024    CALCIUM 9.3 03/29/2024     03/29/2024    K 3.9 03/29/2024    CO2 29 03/29/2024     03/29/2024    BUN 15 03/29/2024    CREATININE 0.80 03/29/2024     Lab Results   Component Value Date    ALT 5 (L) 03/24/2024    AST 16 03/24/2024    ALKPHOS 55 03/24/2024    BILITOT 0.5 03/24/2024     Lab Results   Component Value Date    WBC 8.6 03/29/2024    HGB 8.2 (L) 03/29/2024    HCT 27.5 (L) 03/29/2024    MCV 94 03/29/2024     03/29/2024     Lab Results   Component Value Date    CHOL 107 03/25/2024    CHOL 181 11/16/2023    CHOL 191 02/13/2023     Lab Results   Component Value Date    HDL 33.2 03/25/2024    HDL 36.7 11/16/2023    HDL 37.7 (A) 02/13/2023     Lab Results   Component Value Date    LDLCALC 41 03/25/2024    LDLCALC 110 (H) 11/16/2023     Lab Results   Component Value Date    TRIG 166 (H) 03/25/2024    TRIG 174 (H) 11/16/2023    TRIG 256 (H) 02/13/2023     Lab Results   Component Value Date    HGBA1C 5.3 03/25/2024     Other labs not included in the list above were reviewed either before or during this encounter.    History    Past Medical History:   Diagnosis Date    Abdominal bloating 05/04/2023    CAD (coronary artery disease)     CHF (congestive heart failure) (CMS/HCC)     Diverticulitis of small intestine without perforation or abscess without bleeding     Diverticulitis, intestine, small    Dizzy 11/20/2023    Essential (primary) hypertension 02/08/2017    HTN (hypertension), benign    Fall     Influenza B 05/08/2015    Formatting of this note might be different from the original. Completed Tamiflu Continue droplet precautions.    Nausea 11/16/2018    Personal history of other endocrine, nutritional and metabolic disease 02/08/2017    History of hypothyroidism    Personal history of other venous thrombosis and embolism     History of deep venous thrombosis     Past Surgical History:   Procedure Laterality Date     ABDOMINAL ADHESION SURGERY  05/16/2017    Laparoscopic Lysis Of Intestinal Adhesions    CERVICAL FUSION  05/16/2017    Cervical Vertebral Fusion    CHOLECYSTECTOMY  05/16/2017    Cholecystectomy    COLECTOMY PARTIAL / TOTAL  05/16/2017    Partial Colectomy - Sigmoid    CORONARY ANGIOPLASTY WITH STENT PLACEMENT      CT ANGIO NECK  05/21/2020    CT NECK ANGIO W AND WO IV CONTRAST 5/21/2020 [facility]    CT HEAD ANGIO W AND WO IV CONTRAST  05/21/2020    CT HEAD ANGIO W AND WO IV CONTRAST 5/21/2020 [facility]    HERNIA REPAIR  05/16/2017    Hernia Repair    HYSTERECTOMY  10/03/2013    Hysterectomy    MR HEAD ANGIO WO IV CONTRAST  05/21/2020    MR HEAD ANGIO WO IV CONTRAST 5/21/2020 [facility]    MR NECK ANGIO WO IV CONTRAST  05/21/2020    MR NECK ANGIO WO IV CONTRAST 5/21/2020 [facility]    OTHER SURGICAL HISTORY  05/16/2017    Thoracoscopy Of Lungs And Pleural Space With Biopsy Of Lung Nodules    OTHER SURGICAL HISTORY  04/15/2019    Esophagogastroduodenoscopy     No family history on file.  Allergies   Allergen Reactions    Hydrocodone-Acetaminophen Shortness of breath    Montelukast Unknown     Lungs felt as if there was a hole in them    Morphine Unknown     Body shakes    Nitrofurantoin Monohyd/M-Cryst Unknown    Sulfa (Sulfonamide Antibiotics) Nausea Only and Other    Atorvastatin Itching, Swelling and Unknown    Dicyclomine Hallucinations     Nightmares    Fluticasone Propion-Salmeterol Unknown    Levofloxacin Rash    Lisinopril Cough    Nitroimidazoles Nausea Only and Unknown    Omeprazole Diarrhea and Unknown    Salmeterol Unknown    Simvastatin Swelling and Unknown    Sucralfate Unknown    Umeclidinium Other     Urinary retention, blurred vision, constipation     No current facility-administered medications on file prior to encounter.     Current Outpatient Medications on File Prior to Encounter   Medication Sig Dispense Refill    acetaminophen (Tylenol) 325 mg tablet  2 tablets (650 mg) by nasogastric tube route every 6 hours if needed for mild pain (1 - 3). 30 tablet 0    albuterol 2.5 mg /3 mL (0.083 %) nebulizer solution Take 3 mL (2.5 mg) by nebulization every 4 hours if needed for shortness of breath.      albuterol 90 mcg/actuation inhaler Inhale 2 puffs every 6 hours if needed for shortness of breath.      arformoterol (Brovana) 15 mcg/2 mL nebulizer solution INHALE 2ML VIA NEBULIZER AS INSTRUCTED TWICE A DAY (EVERY 12 HOURS)      aspirin 81 mg chewable tablet 1 tablet (81 mg) by nasogastric tube route once daily. Do not start before March 29, 2024.      budesonide (Pulmicort) 0.5 mg/2 mL nebulizer solution Take 2 mL (0.5 mg) by nebulization 2 times a day. Rinse mouth with water after use to reduce aftertaste and incidence of candidiasis. Do not swallow.      cholecalciferol (Vitamin D3) 50 mcg (2,000 unit) capsule 1 capsule (50 mcg) by nasogastric tube route once daily.      estradiol (Estrace) 0.01 % (0.1 mg/gram) vaginal cream Insert 0.5 Applicatorfuls (2 g) into the vagina once daily as needed.      furosemide (Lasix) 20 mg tablet 1 tablet (20 mg) by g-tube route once daily. Do not start before March 29, 2024.      Lactobacillus acidophilus (Probiotic) 10 billion cell capsule 1 capsule by nasogastric tube route once daily.      levothyroxine (Synthroid, Levoxyl) 50 mcg tablet 1 tablet (50 mcg) by nasogastric tube route once daily. Do not start before March 29, 2024.      lidocaine 4 % patch Place 1 patch over 12 hours on the skin once daily. Place over right hip. Remove & discard patch within 12 hours or as directed by MD. Do not start before March 29, 2024.      magnesium oxide (Mag-Ox) 400 mg (241.3 mg magnesium) tablet 1 tablet (400 mg) by nasogastric tube route once daily.      melatonin 5 mg tablet 1 tablet (5 mg) by nasogastric tube route as needed at bedtime for sleep.      metoprolol tartrate (Lopressor) 25 mg tablet 1 tablet (25 mg) by nasogastric tube route 3  times a day.      nitroglycerin (Nitrostat) 0.4 mg SL tablet Place 1 tablet (0.4 mg) under the tongue every 5 minutes if needed for chest pain.      oxygen (O2) gas therapy Inhale 1 each once every 24 hours.      Repatha Syringe 140 mg/mL injection Inject 1 mL (140 mg) under the skin every 14 (fourteen) days.      sennosides (Senokot) 8.6 mg tablet 2 tablets (17.2 mg) by nasogastric tube route 2 times a day.      tiotropium (Spiriva Respimat) 2.5 mcg/actuation inhaler Inhale 2 puffs once daily.       Immunization History   Administered Date(s) Administered    Flu vaccine, quadrivalent, high-dose, preservative free, age 65y+ (FLUZONE) 10/04/2021, 09/20/2022    Influenza, High Dose Seasonal, Preservative Free 10/01/2015, 09/30/2016, 09/29/2017, 09/19/2018, 09/09/2019    Influenza, Seasonal, Quadrivalent, Adjuvanted 08/30/2023    Influenza, Unspecified 09/10/2019    Influenza, seasonal, injectable 09/04/2020    Moderna COVID-19 vaccine, bivalent, blue cap/gray label *Check age/dose* 11/30/2022    Moderna SARS-CoV-2 Vaccination 03/01/2021, 03/28/2021, 12/30/2021, 05/22/2022    Pneumococcal conjugate vaccine, 13-valent (PREVNAR 13) 09/30/2016    Pneumococcal conjugate vaccine, 20-valent (PREVNAR 20) 12/12/2022    Pneumococcal polysaccharide vaccine, 23-valent, age 2 years and older (PNEUMOVAX 23) 07/22/2014, 10/01/2015, 11/09/2020     Patient's medical history was reviewed and updated either before or during this encounter.  ASSESSMENT / PLAN:  Active Hospital Problems    *CVA (cerebrovascular accident due to intracerebral hemorrhage) (CMS/HCC)      Ischemic stroke (CMS/HCC)      COPD (chronic obstructive pulmonary disease) (CMS/HCC)      Heart failure with mildly reduced ejection fraction (CMS/HCC)      Anxiety      Asthmatic bronchitis      GERD (gastroesophageal reflux disease)      Dysphagia as late effect of stroke      CAD (coronary artery disease), native coronary artery       Patient is being seen for follow-up.   Patient is on tube feeds.  No issues with aspiration at present.  Patient has anemia.  Patient is on iron supplementation.  Patient is being seen by speech therapy as well as with physical and Occupational Therapy      Vikas Cruz MD

## 2024-04-04 NOTE — CARE PLAN
Problem: PT Problem  Goal: STG- BED MOBILITY Patient will transfer supine to sit and sit to supine with supervision assist to facilitate mobility.   Outcome: Met  Goal: STG TRANSFERS Patient will transfer bed to chair and chair to bed with contact guard assist to facilitate mobility.   Outcome: Met  Goal: STG AMBULATION Patient will amb 100 feet with rolling walker device including two turns on even surface with contact guard assist to facilitate safe mobility.   Outcome: Progressing  Goal: STG STAIRS Patient will negotiate 4  stairs with two rails and minimal assist with no device to enter home.   Outcome: Met  Goal: STG- TUG Patient will perform TUG with least restrictive assistive device in 30 seconds or less to promote mobility and reduce fall risk with dynamic standing tasks.   Outcome: Met  Goal: LTG - BED MOBILITY Patient will transfer supine to sit and sit to supine with independent assist to facilitate mobility.   Outcome: Progressing  Goal: LTG - TRANSFERS Patient will transfer bed to chair and chair to bed with independent assist to facilitate mobility.   Outcome: Progressing  Goal: LTG - AMBULATION Patient will amb 150 feet with rolling walker device including two turns on uneven surface with independent assist to facilitate safe mobility.   Outcome: Progressing  Goal: LTG - STAIRS Patient will negotiate 12 stairs with two rails and independent assist with no device to enter home.   Outcome: Progressing

## 2024-04-04 NOTE — PROGRESS NOTES
WEEKLY PROGRESS NOTE  Evaluation date: 3/29/2024    Treatment Rx provided: See daily flowsheet    Progress Note Completed: 2024    PRECAUTIONS  Precautions  Hearing/Visual Limitations: hearing diminished; corective lenses for reading  Medical Precautions: Oxygen therapy device and L/min  Precautions Comment:  Joey Tube NPO, HOB elevated 45 degrees or more. 2 litres 02 via nasal canula    ADL/ Functional Status:  Eating   NPO LE pants  Pants Level of Assistance: Minimum assistance (for balance in stance Pt thread B feet LH reacher RLE)   Grooming  Grooming Level of Assistance: Setup LE socks  Sock Level of Assistance: Setup   Toileting Toileting Level of Assistance: Contact guard LE shoes  Shoe Level of Assistance: Setup (slip on shoes)   Bathing UE UE Bathing Level of Assistance: Setup LE adult brief  Adult Briefs Level of Assistance: Contact guard   Bathing LE LE Bathing Level of Assistance: Minimum assistance, Minimal verbal cues UE dressing  UE Dressing Level of Assistance: Setup, Minimum assistance   Toilet Transfer  Toilet Transfers: Contact guard Meal prep       Car Transfer Car Transfers: Contact guard, Verbal cues Kitchen   Kitchen Mobility Level of Assistance: Minimum assistance     Standardized Tests:  Box of blocks Box and Block: This date: RUE while seated: 41 blks/min. LUE while seated:42 blks/min. (Previous: RUE while seated: 42 blks/min. LUE while seated:49 blks/min.)   9 hole peg test 9 Hole Peg Test: This date: R UE 41.53, L UE 29.9 (Previous: RUE: 51 sec. LUE: 32 sec)    strength  Bits Jc cancellation  [place] making A/B Other Outcome Measures: RUE  strength: 56# LUE  strength: 50# ([place] cancellation: seated in w/c, use of R UE: 1 minute 56 seconds, 5 misses, 1 distraction)         Plan Of Care: Patient is demonstrating improvements towards meeting short term goals and progressing towards long term goals. Patient improved in ADL UB and LB dressing tasks, requires decreased  assistance for bathing. She is CGA for functional transfers. She has demonstrated improvements in  strength, 9-hole peg assessment, box and blocks assessment. Patient was faster with Bell's cancellation, however, demonstrated more misses. Patient is limited at times due to fatigue and decreased motivation. Patient will continue to benefit from skilled OT in order to continue to increase patient's safety and independence with ADL/IADL tasks. Patient to still improve towards long term goals of safety in the kitchen, as well as continuing to progress in areas of strength and ADLs.

## 2024-04-04 NOTE — PROGRESS NOTES
Speech-Language Pathology    SLP Adult IRF [facility] Speech-Language Pathology Treatment     Patient Name: Trinity Hernandez  MRN: [id number]  Today's Date: 4/4/2024  Time Calculation  Start Time: 1110  Stop Time: 1200  Time Calculation (min): 50 min     5/5sw    Current Problem:   schemic stroke, right body involvement (left brain)      Recent MBSS 3/26 at [facility]       SLP Assessment:  SLP TX Intervention Outcome: Making Progress Towards Goals  SLP Assessment Results: Other (Comment) (DYSPHAGIA)  Prognosis: Good  Treatment Provided: Yes   Treatment Tolerance: Patient tolerated treatment well  Medical Staff Made Aware: Yes  Strengths: Motivation  Barriers: Comorbidities    NPO  Frequent, aggressive oral care is strongly recommended to improve infection control as well as reduce dental plaque and bacteria on oropharyngeal surfaces which may increase the risk nosocomial infections, including pneumonia.  If pt requests, OK for small amounts of ice chips (no more than 3-5/hr) with supervision only after aggressive oral care is completed. Discontinue if there are concerns for s/s aspiration.  Puree tsp trials w/ SLP only (chin tuck, triple effortful swallow).  Repeat MBSS in 2-4 weeks.     Plan:  Inpatient/Swing Bed or Outpatient: Inpatient  Treatment/Interventions: Other (Comment) (dysphagia)  SLP TX Plan: Continue Plan of Care  SLP Plan: Skilled SLP  SLP Frequency: 5x per week  Duration: Current admission  SLP Discharge Recommendations: Continue skilled SLP services at the next level of care  Next Treatment Priority: po trials with SLP only, OPEs, ctar, effortful swallow  Discussed POC: Patient, Nursing  Discussed Risks/Benefits: Yes, Patient, Nursing  Patient/Caregiver Agreeable: Yes      Subjective   Pt seen upright in bed, per pt request. She does not feel well and is asking to stay in bed. She is agreeable to maintaining upright position for p.o. trials and remaining upright after p.o. trials (for at least 30 minutes or as  tolerated). SLP student is present and assisting with tx tasks under direct supervision of this SLP.     General Visit Information:   Reason for Referral: f/u dysphagia tx  Referred By: Dr. Cruz  Past Medical History Relevant to Rehab: COPD, CAD s/p PCI 10/4/23, NSVT, HTN, HLD, rheumatoid arthritis, and hypothyroidism, CHF, Afib (not on AC), AAA, asthma, COPD, chronic respiratory failure 2/2 pulmonary sarcoidosis, Migraines, Hiatal Hernia, and Diverticulosis.  Prior to Session Communication: Bedside nurse    Pain Assessment:   Pain Score: 5 - Moderate pain  Pain Location: Neck      Objective   DYSPHAGIA GOALS initiated 3/29, anticipated end 4/19:  Pt will tolerate tsp trials of pureed textures with SLP only to 90% without overt s/s aspiration.  PROGRESS: RN cleared pt for p.o. trials with SLP. 3 ice chips and approx. 1-2 oz of applesauce via tsp after oral care.  STATUS: Pt c/o pain during swallow d/t dobhoff. RN aware. Breathing treatment provided some relief. Pt was able to self-feed after instruction for smaller amounts. No overt s/s aspiration were noted.  2. Pt will utilize chin-tuck, triple effortful swallow with SLP only trials to 100%.              PROGRESS: 90% - 100% with cues, with SLP trials.  STATUS: Pt is affected by pain during swallow. Less facial grimacing today. Able to complete chin-tuck followed by 3 swallows after SLP provides cues to initiate and continue through trials.  Pt will increase swallow reflex via OPEs to 80% accuracy.              PROGRESS: 70% with reduced cues.  STATUS: CTAR x15. Pitch glides X15. Phonation with increased consistency 3-4 sec. . BOT elevation x15. Tongue Pops x15. Cues for breath support, pacing, jaw neutral. Attempted Ami, uncomfortable. Supraglottic sequence x1.  Pt will improve swallow reflex via OMEs to 80% accuracy.              PROGRESS: 80% with min cues  STATUS: Able to complete sets of x10 labial protrusion/retraction, lingual lateralization,  buccal  protrusion/retraction. Reduced cues for pacing, full ROM.  Ongoing assessment via MBSS when indicated to reassess swallow function.  PROGRESS: Discussed prvs MBSS rec's, which indicate to repeat MBSS in 2-4wks. Plan to schedule repeat MBSS next tx week. Pt is in agreement.              STATUS: see above    Therapeutic Swallow:  Therapeutic Swallow Intervention : PO Trials, Oral Strengthening Techniques, Pharyngeal Strengthening Techniques, Compensatory Strategies  Pharyngeal Strengthening Techniques: Effortful Swallow, Chin Tuck Against Resistance, Pitch Glides  Solid Diet Recommendations: NPO  Liquid Diet Recommendations: NPO  Swallow Comments: Pt continues to c/o pain during swallow. Per RN, breathing tx provided some relief.    Inpatient Education:  Adult Inpatient Education  Individual(s) Educated: Patient  Verbal Education : goals, progress  Risk and Benefits Discussed with Patient/Caregiver/Other: yes  Patient/Caregiver Demonstrated Understanding: yes  Plan of Care Discussed and Agreed Upon: yes  Patient Response to Education: Patient/Caregiver Verbalized Understanding of Information

## 2024-04-04 NOTE — PROGRESS NOTES
Physical Therapy Weekly Progress Note       24 0725-1915   PT  Visit   PT Received On 24   Response to Previous Treatment Patient reporting fatigue but able to participate.   General   Reason for Referral Impaired mobility   Referred By Dr. 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   Patient Position Received Up in chair   Preferred Learning Style verbal;visual   General Comment Pt reports fatigue and not feeling well.   Precautions   Hearing/Visual Limitations hearing diminished; corective lenses for reading   Precautions Comment  Joey Tube NPO, HOB elevated 45 degrees or more. 2 litres 02 via nasal canula   Pain Assessment   Pain Assessment 0-10   Pain Score 7   Pain Type Chronic pain   Pain Location Neck   Pain Interventions Massage   Response to Interventions effective   Multiple Pain Sites   (R hip and leg)   AROM RLE (degrees)   RLE AROM Comment WFL   Strength RLE   R Hip Flexion 4-/5   R Knee Flexion 3+/5   R Knee Extension 4-/5   R Ankle Dorsiflexion 4-/5   R Ankle Plantar Flexion 4-/5   R Hip Extension 3+/5   R Hip ABduction 4-/5   R Hip ADduction 4/5   Strength LLE   L Hip Flexion 4/5   L Knee Flexion 4+/5   L Knee Extension 5/5   L Ankle Dorsiflexion 4+/5   L Ankle Plantar Flexion 4+/5   L Hip Extension 4/5   L Hip ABduction 4/5   L Hip ADduction 5/5   Therapeutic Exercise   Therapeutic Exercise Activity 1 reclined glute sets 1x15 reps   Therapeutic Exercise Activity 2 SAQ, 2x15 reps, 2# RLE, 2.5# LLE   Therapeutic Exercise Activity 3 heel slides, 1x15 reps, 2# RLE, 2.5# LLE   Therapeutic Exercise Activity 4 supine hip abduction, 2# RLE, 2.5# LLE, 1x15 reps   Balance/Neuromuscular Re-Education   Balance/Neuromuscular Re-Education Activity 1 TU.85 sec with  rollator, CGA   Bed Mobility 1   Bed Mobility 1 Supine to sitting;Sitting to supine   Level of Assistance 1 Close supervision   Bed Mobility Comments 1 HOB elevated ~45 degrees   Bed Mobility 2   Bed Mobility  2 Rolling right;Rolling left   Level of Assistance 2 Close supervision   Bed Mobility Comments 2 use of bed rail   Ambulation/Gait Training 1   Surface 1 Level tile   Device 1 Rollator   Gait Support Devices Gait belt   Assistance 1 Contact guard   Quality of Gait 1 Diminished heel strike;Shuffling gait;Forward flexed posture   Comments/Distance (ft) 1 90 ft x 2, turns included.  Seated rest break on rollator taken between trials.   Transfer 1   Transfer From 1 Sit to;Stand to   Transfer to 1   (rollator)   Technique 1 Stand pivot   Transfer Device 1 Gait belt   Transfer Level of Assistance 1 Contact guard   Trials/Comments 1 vc's to lock brakes of rollator prior to sitting   Transfers 2   Technique 2 Sit to stand;Stand to sit   Transfer Device 2 Walker   Transfer Level of Assistance 2 Close supervision   Transfers 3   Transfer From 3 Wheelchair to;Mat to   Transfer to 3 Mat;Wheelchair   Technique 3 Stand pivot   Transfer Device 3 Gait belt   Transfer Level of Assistance 3 Contact guard;Close supervision   Trials/Comments 3 cues for backing fully to seat   Stairs   Rails 1 Bilateral   Device 1 Railing   Support Devices 1 Gait belt   Assistance 1 Contact guard   Comment/Number of Steps 1 Up/down 8 6-inch steps, step-to pattern, fatiguing. Vc's for WBOS   Wheelchair Activities   Propulsion Type 1 Manual   Level 1 Level tile   Method 1 Right upper extremity;Left upper extremity;Right lower extremity;Left lower extremity   Level of Assistance 1 Close supervision   Description/Details 1 50 ft, turns included, assist with O2 management   Other Activity   Other Activity 1 Pt able to  small cone from floor with AD and long handled reacher with CGA   Activity Tolerance   Endurance Tolerates 10 - 20 min exercise  with multiple rests   Activity Tolerance Comments Pt easily fatigues, needs encouragement today to push through not feeling well/pain/fatigue   PT Assessment   PT Assessment Results Decreased strength;Pain;Decreased range of motion;Decreased endurance;Impaired balance;Decreased mobility;Decreased cognition;Impaired judgement;Decreased safety awareness  PT NOTE: Good progress toward all goals.  Specifically Improved TUG Score, improved strength, gait distance. Lives with Spouse.  Will suggest family training prior to discharge.  Focus on LTGs.    Rehab Prognosis Excellent   Barriers to Discharge medical acuity   Evaluation/Treatment Tolerance Patient tolerated treatment well   Strengths Ability to acquire knowledge;Attitude of self   Barriers to Participation Comorbidities   Assessment Comment TUG score improved to 20.85 sec from greater than 30 sec.  Pt required encouragement to participate for entire tx session this AM as pt reported not feelin gwell and hurting.   End of Session Patient Position Up in chair   PT Plan   Inpatient/Swing Bed or Outpatient Inpatient   PT Plan   Treatment/Interventions Bed mobility;Transfer training;Gait training;Stair training;Neuromuscular re-education;Therapeutic exercise;Therapeutic activity   PT Plan Skilled PT   Equipment Recommended upon Discharge Wheeled walker   PT Recommended Transfer Status Assist x1           Cosign Needed            Problem: PT Problem  Goal: STG- BED MOBILITY Patient will transfer supine to sit and sit to supine with supervision assist to facilitate mobility.   Outcome: Met  Goal: STG TRANSFERS Patient will transfer bed to chair and chair to bed with contact guard assist to facilitate mobility.   Outcome: Met  Goal: STG AMBULATION Patient will amb 100 feet with rolling walker device including two turns on even surface with contact guard assist to facilitate safe mobility.   Outcome: Progressing  Goal: STG STAIRS Patient will negotiate 4  stairs with two  rails and minimal assist with no device to enter home.   Outcome: Met  Goal: STG- TUG Patient will perform TUG with least restrictive assistive device in 30 seconds or less to promote mobility and reduce fall risk with dynamic standing tasks.   Outcome: Met  Goal: LTG - BED MOBILITY Patient will transfer supine to sit and sit to supine with independent assist to facilitate mobility.   Outcome: Progressing  Goal: LTG - TRANSFERS Patient will transfer bed to chair and chair to bed with independent assist to facilitate mobility.   Outcome: Progressing  Goal: LTG - AMBULATION Patient will amb 150 feet with rolling walker device including two turns on uneven surface with independent assist to facilitate safe mobility.   Outcome: Progressing  Goal: LTG - STAIRS Patient will negotiate 12 stairs with two rails and independent assist with no device to enter home.   Outcome: Progressing

## 2024-04-04 NOTE — PROGRESS NOTES
Physical Therapy       24 [id number]   PT  Visit   PT Received On 24   Response to Previous Treatment Patient reporting fatigue but able to participate.   General   Reason for Referral Impaired mobility   Referred By Dr. 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   Patient Position Received Up in chair   Preferred Learning Style verbal;visual   General Comment Pt reports fatigue and not feeling well.   Precautions   Precautions Comment  Joey Tube NPO, HOB elevated 45 degrees or more. 2 litres 02 via nasal canula   Pain Assessment   Pain Assessment 0-10   Pain Score 6   Pain Type Acute pain   Pain Location Throat   Therapeutic Exercise   Therapeutic Exercise Activity 1 seated hip abduction, blue theraband, 2x15 reps   Therapeutic Exercise Activity 2 seated isometric hip adduction/ball squeezes, 2x15 reps   Therapeutic Exercise Activity 3 seated hamstring curls, blue theraband, 2x15 reps   Ambulation/Gait Training 1   Surface 1 Level tile   Device 1 Rollator   Gait Support Devices Gait belt   Assistance 1 Close supervision;Contact guard   Quality of Gait 1 Diminished heel strike;Shuffling gait;Forward flexed posture   Comments/Distance (ft) 1 100 ft x 2, brief standing rest break taken between trials for fatigue.  Turns included   Transfer 1   Technique 1 Sit to stand;Stand to sit   Transfer Device 1 Gait belt   Transfer Level of Assistance 1 Close supervision;Contact guard   Activity Tolerance   Endurance Tolerates 10 - 20 min exercise with multiple rests   PT Assessment   PT Assessment Results Decreased strength;Pain;Decreased range of motion;Decreased endurance;Impaired balance;Decreased mobility;Decreased cognition;Impaired judgement;Decreased safety awareness   Rehab  Prognosis Excellent   End of Session Patient Position Up in chair   PT Plan   Inpatient/Swing Bed or Outpatient Inpatient   PT Plan   Treatment/Interventions Bed mobility;Transfer training;Gait training;Stair training;Neuromuscular re-education;Therapeutic exercise;Therapeutic activity   PT Plan Skilled PT   Equipment Recommended upon Discharge Wheeled walker   PT Recommended Transfer Status Assist x1

## 2024-04-04 NOTE — NURSING NOTE
Dobhof feeding completed. Tube is patent, flushes well with gravity. Patient asked to use restroom. Patient used walker for mobility. O2 in place via NC at 2l/min.

## 2024-04-04 NOTE — PROGRESS NOTES
Occupational Therapy    Treatment    24 1330 - 1400   OT Last Visit   OT Received On 24   General   Reason for Referral decline in ADLs   Referred By Dr. 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   Prior to Session Communication Bedside nurse   Patient Position Received Bed, 2 rail up   Preferred Learning Style verbal;visual   General Comment Patient reports fatigue, agreeable to participate to EOB functional activities.   Precautions   Hearing/Visual Limitations hearing diminished; corective lenses for reading   Medical Precautions Oxygen therapy device and L/min   Precautions Comment  Joey Tube NPO, HOB elevated 45 degrees or more. 2 litres 02 via nasal canula   Pain Assessment   Pain Assessment 0-10   Pain Score 7   Pain Type Chronic pain   Pain Location Neck   Cognition   Overall Cognitive Status   (patient able to follow commands appropriately. fatigued. increased time and encouragement to participate)   Bed Mobility   Bed Mobility Yes   Bed Mobility 1   Bed Mobility 1 Supine to sitting;Sitting to supine   Level of Assistance 1 Close supervision   Bed Mobility Comments 1 head of bed elevated   Transfers   Transfer Yes   Transfer 1   Transfer From 1 Bed to   Transfer to 1 Wheelchair   Technique 1 Stand pivot   Transfer Level of Assistance 1 Close supervision   Trials/Comments 1 verbal cues to lock breaks prior to txfer   Static Sitting Balance   Static Sitting-Balance Support Feet supported   Static Sitting-Level of Assistance Close supervision   Static Sitting-Comment/Number of Minutes sitting EOB   Dynamic Sitting Balance   Dynamic Sitting-Balance Support Feet supported   Dynamic Sitting-Balance Forward lean;Lateral lean;Reaching for objects;Reaching across midline    Dynamic Sitting-Comments Close Supervision   Therapeutic Activity   Therapeutic Activity 1 patient completes seated functional reaching activities, challenging use of B UE, crossing midline, reaching, FMC, and FM strengthening. Fair tolerane noted with rest breaks as needed. completed EOB to engage core strengthening   IP OT Assessment   OT Assessment Patient was limited by fatigue. Patient would continue to progress throughout POC in order to increase patient's safety and independence with daily tasks.   Prognosis Good   Barriers to Discharge None   Evaluation/Treatment Tolerance Patient limited by fatigue   End of Session Communication Bedside nurse   End of Session Patient Position Up in chair  (in w/c transported to PT)   OT Assessment   OT Assessment Results Decreased ADL status;Decreased upper extremity range of motion;Decreased upper extremity strength;Decreased safe judgment during ADL;Decreased endurance   Strengths Ability to acquire knowledge   Barriers to Participation Comorbidities   Inpatient/Swing Bed or Outpatient   Inpatient/Swing Bed or Outpatient Inpatient   Inpatient Plan   Treatment Interventions ADL retraining;Functional transfer training;Endurance training;UE strengthening/ROM;Patient/family training;Neuromuscular reeducation;Compensatory technique education   OT Frequency 5 times per week   Equipment Recommended upon Discharge Wheeled walker   OT - OK to Discharge Yes   OT Recommended Transfer Status Stand by assist;Assist of 1

## 2024-04-04 NOTE — CARE PLAN
Problem: Bathing  Goal: LTG - Patient will utilize adaptive techniques to bathe body independently using ad aides as needed.  4/4/2024 1205 by Silvana Chan OT  Outcome: Progressing  4/4/2024 1154 by Silvana Chan OT  Outcome: Progressing  Goal: STG - Patient will bathe body with setup/supervision assist using ad aides as needed.  4/4/2024 1205 by Silvana Chan OT  Outcome: Met  4/4/2024 1154 by Silvana Chan OT  Outcome: Progressing     Problem: Dressings Lower Extremities  Goal: LTG - Patient will dress lower body independently using ad aides as needed.  4/4/2024 1205 by Slivana Chan OT  Outcome: Progressing  4/4/2024 1154 by Silvana Chan OT  Outcome: Progressing  Goal: STG - Patient will complete lower body dressing with setup/supervision assist using ad aides as needed.  4/4/2024 1205 by Silvana Chan OT  Outcome: Met  4/4/2024 1154 by Silvana Chan OT  Outcome: Progressing     Problem: Dressing Upper Extremities  Goal: LTG - Patient will complete upper body dressing independently.  4/4/2024 1205 by Silvana Chan OT  Outcome: Progressing  4/4/2024 1154 by Silvana Chan OT  Outcome: Progressing     Problem: Grooming  Goal: LTG - Patient will complete daily grooming tasks independently.  4/4/2024 1205 by Silvana Chan OT  Outcome: Progressing  4/4/2024 1154 by Silvana Chan OT  Outcome: Progressing     Problem: Instrumental Activities of Daily Living  Goal: LTG - Patient will complete simple meal preparation activities with distant supervision.  4/4/2024 1205 by Silvana Chan OT  Outcome: Progressing  4/4/2024 1154 by Silvana Chan OT  Outcome: Progressing     Problem: Functional Mobility  Goal: Pt will increase right UE strength and GM/FM coordination to baseline functional status.  4/4/2024 1205 by Silvana Chan OT  Outcome: Progressing  4/4/2024 1154 by Silvana Chan OT  Outcome: Progressing     Problem: Toileting  Goal: LTG - Patient will complete daily toileting tasks  independently.  4/4/2024 1205 by Silvana Chan OT  Outcome: Progressing  4/4/2024 1154 by Silvana Chan OT  Outcome: Progressing  Goal: STG - Patient will complete toileting tasks with supervision.  4/4/2024 1205 by Silvana Chan, OT  Outcome: Progressing  4/4/2024 1154 by Silvana Chan OT  Outcome: Progressing     Problem: OT Transfers  Goal: LTG - Patient will transfer to car with distant supervision.  4/4/2024 1205 by Silvana Chan, OT  Outcome: Progressing  4/4/2024 1154 by Silvana Chan OT  Outcome: Progressing  Goal: LTG - Patient will transfer to tub/shower with modified independence using DME.  4/4/2024 1205 by Silvana Chan OT  Outcome: Progressing  4/4/2024 1154 by Silvana Chan OT  Outcome: Progressing  Goal: STG - Patient will perform toilet transfer with distant supervision using DME as needed.  4/4/2024 1205 by Silvana Chan, OT  Outcome: Progressing  4/4/2024 1154 by Silvana Chan OT  Outcome: Progressing  Goal: STG - Patient will perform tub/shower transfer with supervision using DME.  4/4/2024 1205 by Silvana Chan OT  Outcome: Progressing  4/4/2024 1154 by Silvana Chan OT  Outcome: Progressing

## 2024-04-05 ENCOUNTER — APPOINTMENT (OUTPATIENT)
Dept: RADIOLOGY | Facility: HOSPITAL | Age: 74
End: 2024-04-05
Payer: MEDICARE

## 2024-04-05 PROCEDURE — 2500000002 HC RX 250 W HCPCS SELF ADMINISTERED DRUGS (ALT 637 FOR MEDICARE OP, ALT 636 FOR OP/ED): Mod: MUE | Performed by: INTERNAL MEDICINE

## 2024-04-05 PROCEDURE — 2500000004 HC RX 250 GENERAL PHARMACY W/ HCPCS (ALT 636 FOR OP/ED): Performed by: INTERNAL MEDICINE

## 2024-04-05 PROCEDURE — 97530 THERAPEUTIC ACTIVITIES: CPT | Mod: GO,CO

## 2024-04-05 PROCEDURE — 92526 ORAL FUNCTION THERAPY: CPT | Mod: GN

## 2024-04-05 PROCEDURE — 2500000001 HC RX 250 WO HCPCS SELF ADMINISTERED DRUGS (ALT 637 FOR MEDICARE OP): Performed by: INTERNAL MEDICINE

## 2024-04-05 PROCEDURE — 2500000001 HC RX 250 WO HCPCS SELF ADMINISTERED DRUGS (ALT 637 FOR MEDICARE OP)

## 2024-04-05 PROCEDURE — 1180000001 HC REHAB PRIVATE ROOM DAILY

## 2024-04-05 PROCEDURE — 74230 X-RAY XM SWLNG FUNCJ C+: CPT | Performed by: RADIOLOGY

## 2024-04-05 PROCEDURE — 97110 THERAPEUTIC EXERCISES: CPT | Mod: GP

## 2024-04-05 PROCEDURE — 3430000001 HC RX 343 DIAGNOSTIC RADIOPHARMACEUTICALS

## 2024-04-05 PROCEDURE — 92611 MOTION FLUOROSCOPY/SWALLOW: CPT | Mod: GN

## 2024-04-05 PROCEDURE — 97530 THERAPEUTIC ACTIVITIES: CPT | Mod: GP,CQ

## 2024-04-05 PROCEDURE — 74230 X-RAY XM SWLNG FUNCJ C+: CPT

## 2024-04-05 PROCEDURE — 2500000005 HC RX 250 GENERAL PHARMACY W/O HCPCS: Performed by: INTERNAL MEDICINE

## 2024-04-05 PROCEDURE — 97116 GAIT TRAINING THERAPY: CPT | Mod: GP

## 2024-04-05 PROCEDURE — 99232 SBSQ HOSP IP/OBS MODERATE 35: CPT | Performed by: PHYSICIAN ASSISTANT

## 2024-04-05 PROCEDURE — 2500000002 HC RX 250 W HCPCS SELF ADMINISTERED DRUGS (ALT 637 FOR MEDICARE OP, ALT 636 FOR OP/ED): Performed by: INTERNAL MEDICINE

## 2024-04-05 PROCEDURE — 97112 NEUROMUSCULAR REEDUCATION: CPT | Mod: GP,CQ

## 2024-04-05 PROCEDURE — 97116 GAIT TRAINING THERAPY: CPT | Mod: GP,CQ

## 2024-04-05 PROCEDURE — 97535 SELF CARE MNGMENT TRAINING: CPT | Mod: GO,CO

## 2024-04-05 RX ADMIN — NYSTATIN 500000 UNITS: 100000 SUSPENSION ORAL at 14:44

## 2024-04-05 RX ADMIN — METOPROLOL TARTRATE 25 MG: 25 TABLET, FILM COATED ORAL at 09:26

## 2024-04-05 RX ADMIN — NYSTATIN 500000 UNITS: 100000 SUSPENSION ORAL at 09:26

## 2024-04-05 RX ADMIN — ACETAMINOPHEN 650 MG: 160 SOLUTION ORAL at 15:18

## 2024-04-05 RX ADMIN — ACETAMINOPHEN 650 MG: 160 SOLUTION ORAL at 21:16

## 2024-04-05 RX ADMIN — ACETAMINOPHEN 650 MG: 160 SOLUTION ORAL at 05:46

## 2024-04-05 RX ADMIN — FUROSEMIDE 20 MG: 20 TABLET ORAL at 09:26

## 2024-04-05 RX ADMIN — BUDESONIDE INHALATION 0.5 MG: 0.5 SUSPENSION RESPIRATORY (INHALATION) at 21:17

## 2024-04-05 RX ADMIN — Medication 5 MG: at 21:16

## 2024-04-05 RX ADMIN — Medication 400 MG: at 09:26

## 2024-04-05 RX ADMIN — Medication 1 TABLET: at 09:26

## 2024-04-05 RX ADMIN — METOPROLOL TARTRATE 25 MG: 25 TABLET, FILM COATED ORAL at 14:44

## 2024-04-05 RX ADMIN — FORMOTEROL FUMARATE DIHYDRATE 20 MCG: 20 SOLUTION RESPIRATORY (INHALATION) at 21:17

## 2024-04-05 RX ADMIN — BARIUM SULFATE 85 ML: 400 SUSPENSION ORAL at 13:39

## 2024-04-05 RX ADMIN — BARIUM SULFATE 5 ML: 400 SUSPENSION ORAL at 13:38

## 2024-04-05 RX ADMIN — FLUCONAZOLE 150 MG: 150 TABLET ORAL at 21:16

## 2024-04-05 RX ADMIN — Medication 2 L/MIN: at 17:30

## 2024-04-05 RX ADMIN — FORMOTEROL FUMARATE DIHYDRATE 20 MCG: 20 SOLUTION RESPIRATORY (INHALATION) at 09:26

## 2024-04-05 RX ADMIN — NYSTATIN 500000 UNITS: 100000 SUSPENSION ORAL at 21:16

## 2024-04-05 RX ADMIN — METOPROLOL TARTRATE 25 MG: 25 TABLET, FILM COATED ORAL at 21:16

## 2024-04-05 RX ADMIN — BARIUM SULFATE 90 ML: 0.81 POWDER, FOR SUSPENSION ORAL at 13:37

## 2024-04-05 RX ADMIN — LEVOTHYROXINE SODIUM 50 MCG: 50 TABLET ORAL at 05:46

## 2024-04-05 RX ADMIN — BUDESONIDE INHALATION 0.5 MG: 0.5 SUSPENSION RESPIRATORY (INHALATION) at 09:26

## 2024-04-05 RX ADMIN — ASPIRIN 81 MG CHEWABLE TABLET 81 MG: 81 TABLET CHEWABLE at 09:26

## 2024-04-05 RX ADMIN — TIOTROPIUM BROMIDE INHALATION SPRAY 2 PUFF: 3.12 SPRAY, METERED RESPIRATORY (INHALATION) at 09:26

## 2024-04-05 RX ADMIN — BARIUM SULFATE 10 ML: 400 PASTE ORAL at 13:38

## 2024-04-05 RX ADMIN — LIDOCAINE 1 PATCH: 4 PATCH TOPICAL at 09:26

## 2024-04-05 ASSESSMENT — PAIN - FUNCTIONAL ASSESSMENT
PAIN_FUNCTIONAL_ASSESSMENT: 0-10
PAIN_FUNCTIONAL_ASSESSMENT: FLACC (FACE, LEGS, ACTIVITY, CRY, CONSOLABILITY)
PAIN_FUNCTIONAL_ASSESSMENT: 0-10

## 2024-04-05 ASSESSMENT — PAIN SCALES - GENERAL
PAINLEVEL_OUTOF10: 6
PAINLEVEL_OUTOF10: 3
PAINLEVEL_OUTOF10: 5 - MODERATE PAIN
PAINLEVEL_OUTOF10: 6
PAINLEVEL_OUTOF10: 0 - NO PAIN
PAINLEVEL_OUTOF10: 5 - MODERATE PAIN
PAINLEVEL_OUTOF10: 6
PAINLEVEL_OUTOF10: 5 - MODERATE PAIN
PAINLEVEL_OUTOF10: 0 - NO PAIN
PAINLEVEL_OUTOF10: 6

## 2024-04-05 ASSESSMENT — PAIN DESCRIPTION - LOCATION
LOCATION: LEG
LOCATION: THROAT

## 2024-04-05 ASSESSMENT — ACTIVITIES OF DAILY LIVING (ADL)
HOME_MANAGEMENT_TIME_ENTRY: 15
HOME_MANAGEMENT_TIME_ENTRY: 30

## 2024-04-05 ASSESSMENT — PAIN DESCRIPTION - DESCRIPTORS: DESCRIPTORS: ACHING

## 2024-04-05 ASSESSMENT — PAIN DESCRIPTION - ORIENTATION: ORIENTATION: RIGHT

## 2024-04-05 NOTE — NURSING NOTE
Patient is compliant with care, 02 2L nc, no s/s of respiratory distress at this time, oral nystatin and diflucan via dobbhoff for oral thrush and report of esophageal discomfort, 2117 prn melatonin administered for noted insomnia, positive results noted, dobbhoff patent patient tolerating bolus infusions without difficulty, medicated with prn tylenol for constant report of persistent right hip pain, moderate results noted, call light within reach safety measures remain in place.

## 2024-04-05 NOTE — PROGRESS NOTES
Inpatient Rehab Team Conference held this date. This LSW, PT, OT, ST, RN Assistant Nurse Manager, and Clinical Liaison in attendance to discuss overall clinical status, plan of care and treatment, functional goals including current functional status, barriers to achievement of goals, and estimated discharge date/disposition. ADOD is 4.18.24 with goal of return home. LSW met with patient to provide update from meeting held this date and identify any questions or concerns. Barrier to discharge is Dobhoff. ST is following for possible repeat MBS. LSW will follow up with patient and family for plan of care communication and to arrange home DME needs, coordinate family training, and arrange for post-discharge services.

## 2024-04-05 NOTE — PROGRESS NOTES
"Trinity Hernandez is a 74 y.o. female on day 8 of admission presenting with CVA (cerebrovascular accident due to intracerebral hemorrhage) (CMS/HCC).    Subjective   Pt with h/o COPD, CAD, HTN, pulmonary sarcoidosis, CHF, Afib, and Chronic respiratory failure admitted with L MCA ischemic stroke and subdural hematoma following TPA.  Pt has right sided weakness and dysphagia.  She is currently on continuous tube feeding which she is tolerating well.  She is asking when she will be able to get the NG tube out.   Pt to have repeat modified swallow eval in couple weeks.  Speech following.   She has chronic cough and continues on home inhalers and aerosols.          Objective     Physical Exam  Constitutional:       General: She is not in acute distress.  HENT:      Nose:      Comments: NG tube in place to the right nostril.       Mouth/Throat:      Comments: Oral thrush appears to be improving.  Eyes:      Conjunctiva/sclera: Conjunctivae normal.      Pupils: Pupils are equal, round, and reactive to light.   Cardiovascular:      Rate and Rhythm: Normal rate and regular rhythm.      Heart sounds: No murmur heard.  Pulmonary:      Effort: Pulmonary effort is normal.      Breath sounds: No wheezing, rhonchi or rales.   Abdominal:      General: Abdomen is flat. Bowel sounds are normal. There is no distension.      Palpations: Abdomen is soft. There is no mass.      Tenderness: There is no abdominal tenderness.   Musculoskeletal:         General: No swelling. Normal range of motion.   Skin:     General: Skin is warm and dry.      Findings: No rash.   Neurological:      General: No focal deficit present.      Mental Status: She is alert and oriented to person, place, and time. Mental status is at baseline.         Last Recorded Vitals  Blood pressure 145/74, pulse 107, temperature 36.7 °C (98.1 °F), temperature source Temporal, resp. rate 20, height 1.7 m (5' 6.93\"), weight 76.4 kg (168 lb 6.9 oz), SpO2 94 %.  Intake/Output " "last 3 Shifts:  I/O last 3 completed shifts:  In: 1200 (15.7 mL/kg) [NG/GT:1200]  Out: 1 (0 mL/kg) [Stool:1]  Weight: 76.4 kg     Relevant Results                             Assessment/Plan   Principal Problem:    CVA (cerebrovascular accident due to intracerebral hemorrhage) (CMS/[facility])  Active Problems:    Anxiety    Asthmatic bronchitis    CAD (coronary artery disease), native coronary artery    Dysphagia as late effect of stroke    GERD (gastroesophageal reflux disease)    Heart failure with mildly reduced ejection fraction (CMS/[facility])    COPD (chronic obstructive pulmonary disease) (CMS/[facility])    Ischemic stroke (CMS/[facility])    CVA:  continue PT/OT/ST efforts.  She is on aspirin therapy.    Dysphagia:  continue ST.   Pt continues with tube feedings.   To have repeat modified in 2-4 weeks.       COPD\"  continue pulmicort, perforomist aerosols, and spiriva.  Stable on continuous oxygen.     CHF:  continue lasix.  Monitor weights and labs.    Thrush:  on nystsatin and diflucan           Mckenzie So PA-C      "

## 2024-04-05 NOTE — PROGRESS NOTES
Speech-Language Pathology    SLP Adult IRF Dysphagia Treatment     Patient Name: Trinity Hernandez  MRN: [id number]  Today's Date: 4/5/2024  Time Calculation  Start Time: 1100  Stop Time: 1140  Time Calculation (min): 40 min     1/5sw    Current Problem:   schemic stroke, right body involvement (left brain)      Recent MBSS 3/26 at [facility]     SLP Assessment:  SLP TX Intervention Outcome: Making Progress Towards Goals  SLP Assessment Results: Other (Comment) (dysphagia)  Prognosis: Good  Treatment Provided: Yes   Treatment Tolerance: Patient limited by fatigue  Medical Staff Made Aware: Yes  Strengths: Family/Caregiver Suppport, Motivation  Barriers: Comorbidities     Plan:  Inpatient/Swing Bed or Outpatient: Inpatient  Treatment/Interventions: Other (Comment), Patient/family education (MBSS)  SLP TX Plan: Continue Plan of Care, Goals Adjusted, Other (Comment) (scheduled MBSS)  SLP Plan: Skilled SLP  SLP Frequency: 4x per week  Duration: Current admission  SLP Discharge Recommendations: Continue skilled SLP services at the next level of care  Next Treatment Priority: MBSS 4/5  Discussed POC: Patient, Nursing, Other (Comment), Caregiver/family (spouse, Dr. Cruz)  Discussed Risks/Benefits: Yes, Patient, Nursing, Caregiver/Family (spouse, Quiana RN, Dr. Cooley)  Patient/Caregiver Agreeable: Yes (pt and spouse)      Subjective   Pt seen upright in bed. RN in to administer feeding. Pt is requesting p.o. intake and expresses persistent throat discomfort with dobhoff.    General Visit Information:   Reason for Referral: f/u dysphagia tx  Referred By: Dr. Cruz  Past Medical History Relevant to Rehab: COPD, CAD s/p PCI 10/4/23, NSVT, HTN, HLD, rheumatoid arthritis, and hypothyroidism, CHF, Afib (not on AC), AAA, asthma, COPD, chronic respiratory failure 2/2 pulmonary sarcoidosis, Migraines, Hiatal Hernia, and Diverticulosis.  Prior to Session Communication: Bedside nurse    Pain Assessment:   Pain Score: 5 - Moderate  pain  Pain Location: Generalized      Objective   DYSPHAGIA GOALS initiated 3/29, anticipated end 4/19:  Pt will tolerate tsp trials of pureed textures with SLP only to 90% without overt s/s aspiration.  PROGRESS: RN cleared pt for p.o. trials with SLP. 3 ice chips and approx. 1-2 oz of applesauce via tsp after oral care. 4/5: not addressed on this date.  STATUS: Pt c/o pain during swallow d/t dobhoff. RN aware. Breathing treatment provided some relief. Pt was able to self-feed after instruction for smaller amounts. No overt s/s aspiration were noted. 4/5: not addressed this date as RN was in to administer feed.  2. Pt will utilize chin-tuck, triple effortful swallow with SLP only trials to 100%.              PROGRESS: 90% - 100% with cues, with SLP trials. 4/5: not indicated, see above  STATUS: Pt is affected by pain during swallow. Less facial grimacing today. Able to complete chin-tuck followed by 3 swallows after SLP provides cues to initiate and continue through trials. 4/5: not indicated, see above  Pt will increase swallow reflex via OPEs to 80% accuracy.              PROGRESS: 70% with reduced cues. 4/5: not indicated, see above  STATUS: CTAR x15. Pitch glides X15. Phonation with increased consistency 3-4 sec. . BOT elevation x15. Tongue Pops x15. Cues for breath support, pacing, jaw neutral. Attempted Ami, uncomfortable. Supraglottic sequence x1. 4/5: not indicated, see above  Pt will improve swallow reflex via OMEs to 80% accuracy.              PROGRESS: 80% with min cues 4/5: not indicated, see above  STATUS: Able to complete sets of x10 labial protrusion/retraction, lingual lateralization,  buccal protrusion/retraction. Reduced cues for pacing, full ROM. 4/5: not indicated, see above  Ongoing assessment via MBSS when indicated to reassess swallow function.  PROGRESS: Discussed prvs MBSS rec's, with both pt and spouse. Pt feels too uncomfortable with dobhoff, c/o persistent throat pain. Pt and spouse  are in agreement for MBSS schduled today. Notified Dr. Cruz and Quiana RN.  STATUS: Discussed with both pt and spouse if recommendations come back to continue NPO status, may need to further discuss with MD if dobhoff is causing too much discomfort. Pt and spouse verbalized understanding.    Therapeutic Swallow:  Solid Diet Recommendations: NPO  Liquid Diet Recommendations: NPO    Inpatient Education:  Adult Inpatient Education  Individual(s) Educated: Patient  Verbal Education : goals, progress, MBSS  Risk and Benefits Discussed with Patient/Caregiver/Other: yes  Patient/Caregiver Demonstrated Understanding: yes  Plan of Care Discussed and Agreed Upon: yes  Patient Response to Education: Patient/Caregiver Verbalized Understanding of Information

## 2024-04-05 NOTE — PROGRESS NOTES
"Speech-Language Pathology        Modified Barium Swallow Study     Patient Name: Trinity Hernandez  MRN: [id number]  : 1950  Today's Date: 24  Time Calculation  Start Time: 1310  Stop Time: 1403  Time Calculation (min): 53 min       Recommendations:  -Cautiously initiate Pureed diet level 4 with honey/moderately thick liquids.   -Crush meds in puree.   -3-4 swallows per bite, alternate solids/liquids, slow rate, small bites/sips, upright 90* for all PO and 20 minutes following PO  -Continue ST for dysphagia per POC.     Assessment/Impression:    Full detailed SLP/Radiologist Modified Barium Swallow study report can be found under Chart Review tab, Imaging tab and  titled \"FL Modified Barium Swallow Study\"      Pt. Presenting with moderate oropharyngeal dysphagia.  Aspiration of thin and deep laryngeal penetration to the folds with nectar. Delayed cough response. Trace transient laryngeal penetration of honey thick which clears upon completion of swallow. Pharyngeal residuals in vallecular and pyriforms with all consistencies and increases as viscosity of bolus increases. Reduced but not eliminated with multiple swallows and liquid wash. Discoordinated laryngeal closure d/t delay in swallow onset. Compensatory strategies tested (Chin tuck and head turn to R) did not reduce penetration, or residuals during this study.     MBSImP Physiological Component  ORAL PHASE:  Lip Closure - No labial escape/anterior loss of bolus   Tongue Control During Bolus Hold - Cohesive bolus between tongue to palatal seal   Bolus prep/mastication - Mastication not assessed   Bolus transport/lingual motion - Repetitive/disorganized tongue motion (tongue pumping)   Oral residue - Trace residue lining oral structures     PHARYNGEAL PHASE:  Initiation of pharyngeal swallow - Bolus head at pit of pyriforms   Soft palate elevation - No bolus between soft palate/pharyngeal wall   Laryngeal elevation - Partial superior movement of " thyroid cartilage and/or partial approximation of arytenoids to epiglottic petiole   Anterior hyoid excursion - Partial anterior movement   Epiglottic movement - Complete inversion    Laryngeal vestibule closure - Incomplete - narrow column of air/contrast in laryngeal vestibule   Pharyngeal stripping wave - Present, however, diminished   Pharyngeal contraction (A/P view) - Not tested       Pharyngoesophageal segment opening - Complete distension and complete duration/no obstruction of flow of bolus   Tongue base retraction - Trace column of contrast or air between tongue base and pharyngeal wall   Pharyngeal residue - Collection of residue within or on the pharyngeal structures     ESOPHAGEAL PHASE:  Esophageal clearance - Complete clearance     Plan:  Treatment/Interventions: Pharyngeal exercises, Oral motor exercises, Patient/family education, Bolus trials  SLP Plan: Skilled SLP warranted  SLP Frequency: To be determined by treating SLP in SNF setting  Duration: 30 days    Discussed POC: Patient, Nursing   Discussed Risks/Benefits: Yes  Patient/Caregiver Agreeable: Yes    Pain:   Rating 0-10: Throat pain reported during each swallow.       GOALS:  To be determined by treating SLP at acute rehab facility.     Education:   Pt. Educated on results of MBS study, recommended diet and recommended safe swallow strategies

## 2024-04-05 NOTE — NURSING NOTE
Assumed care of patient. Patient here s/p CVA with dysphagia. Has dobhof. 6 Bolus feedings 200 ml each with 100 ml flush before and after.  Strict NPO with a few ice chips with supervision. On 2L NC. Moist productive cough. Breathing treatments. Call light is within reach. Will continue to monitor.

## 2024-04-05 NOTE — PROGRESS NOTES
Physical Therapy       24 [id number]   PT  Visit   PT Received On 24   Response to Previous Treatment Patient reporting fatigue but able to participate.   General   Reason for Referral Impaired mobility   Referred By Dr. 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   Patient Position Received Up in chair   Preferred Learning Style verbal;visual   General Comment Pt continues to report not feeling well this AM   Precautions   Precautions Comment  Joey Tube NPO, HOB elevated 45 degrees or more. 2 litres 02 via nasal canula   Pain Assessment   Pain Assessment 0-10   Pain Score 5 - Moderate pain   Pain Type Chronic pain   Pain Location Leg   Pain Orientation Right   Multiple Pain Sites   (neck)   Balance/Neuromuscular Re-Education   Balance/Neuromuscular Re-Education Activity 1 // bars:  coordination ladder, no UE support, close supervision   Balance/Neuromuscular Re-Education Activity 2 // bars: small hurdles forward, no UE support, close supervision   Balance/Neuromuscular Re-Education Activity 3 // bars: small hurdles sideways, no UE support, CGA.  More effortful leading left.  Vc's for technique.   Bed Mobility 1   Bed Mobility 1 Sitting to supine   Level of Assistance 1 Modified independent   Bed Mobility Comments 1 HOB elevated 45 degress   Ambulation/Gait Training 1   Surface 1 Level tile   Device 1 Rollator   Gait Support Devices Gait belt   Assistance 1 Close supervision   Quality of Gait 1 Diminished heel strike;Shuffling gait;Forward flexed posture   Comments/Distance (ft) 1 110 ft x 2, multiple turns.  Vc's for correct use of rollator brakes prior to transfers   Ambulation/Gait Training 2   Surface 2 Level tile;Carpet   Device 2 No device   Gait Support Devices Gait  belt   Assistance 2 Contact guard   Quality of Gait 2 Diminished heel strike;Forward flexed posture;Shuffling gait   Comments/Distance (ft) 2 Pt fatigued by end of session (time of this amb) and required CGA for safety/stability.  Pt amb 60 ft x 2 with turns   Transfer 1   Transfer From 1 Sit to;Stand to   Transfer to 1   (rollator)   Technique 1 Stand pivot   Transfer Device 1 Gait belt   Transfer Level of Assistance 1 Close supervision   Trials/Comments 1 vc's for safe use of brakes prior to transferring   Transfers 2   Technique 2 Sit to stand;Stand to sit   Transfer Device 2 Walker   Transfer Level of Assistance 2 Set up   Trials/Comments 2 multiple trials performed   Transfers 3   Transfer From 3 Stand to;Commode-standard to   Transfer to 3 Commode-standard;Stand   Technique 3 Stand pivot   Transfer Device 3 Gait belt   Transfer Level of Assistance 3 Set up   Stairs   Rails 1 Bilateral   Device 1 Railing   Support Devices 1 Gait belt   Assistance 1 Close supervision   Comment/Number of Steps 1 up/down 10 steps, step-to pattern, pt manages O2 tubing   Wheelchair Activities   Propulsion Type 1 Manual   Level 1 Level tile   Method 1 Right upper extremity;Left upper extremity;Right lower extremity;Left lower extremity   Level of Assistance 1 Set up   Description/Details 1 100 ft, turns, low pile carpet, assist with O2 tank   Other Activity   Other Activity 1 extra time spent using bathroom.  Pt able to pull up/push down pants without assist   PT Assessment   PT Assessment Results Decreased strength;Pain;Decreased range of motion;Decreased endurance;Impaired balance;Decreased mobility;Decreased cognition;Impaired judgement;Decreased safety awareness   Rehab Prognosis Excellent   Barriers to Discharge medical acuity   Evaluation/Treatment Tolerance Patient limited by fatigue   End of Session Patient Position Up in chair   PT Plan   Inpatient/Swing Bed or Outpatient Inpatient   PT Plan   Treatment/Interventions Bed  mobility;Transfer training;Gait training;Stair training;Neuromuscular re-education;Therapeutic exercise;Therapeutic activity   PT Plan Skilled PT   Equipment Recommended upon Discharge Wheeled walker   PT Recommended Transfer Status Assist x1

## 2024-04-05 NOTE — PROGRESS NOTES
Physical Therapy       24 [phone]   PT  Visit   PT Received On 24   Response to Previous Treatment Patient reporting fatigue but able to participate.   General   Reason for Referral Impaired mobility   Referred By Dr. 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   Prior to Session Communication Bedside nurse   Patient Position Received Up in chair   Preferred Learning Style verbal;visual   General Comment Pt continues to report not feeling well this PM   Precautions   Hearing/Visual Limitations hearing diminished; corective lenses for reading   Medical Precautions Oxygen therapy device and L/min  (2L)   Precautions Comment  Joey Tube NPO, HOB elevated 45 degrees or more. 2 litres 02 via nasal canula   Pain Assessment   Pain Score 5 - Moderate pain   Pain Location Generalized   Pain 2   Pain Score 2 6   Pain Location 2 Hip   Cognition   Arousal/Alertness Appropriate responses to stimuli   Orientation Level Oriented X4   Following Commands Follows all commands and directions without difficulty   Therapeutic Exercise   Therapeutic Exercise Activity 1 seated hip abduction, blue theraband, 2x15 reps   Therapeutic Exercise Activity 2 seated isometric hip adduction/ball squeezes, 2x15 reps   Therapeutic Exercise Activity 3 seated hamstring curls, blue theraband, 2x15 reps   Therapeutic Exercise Activity 4 LAQ 2x15 1.5# B LE   Ambulation/Gait Training 1   Surface 1 Level tile   Device 1 Rollator   Gait Support Devices Gait belt   Assistance 1 Close supervision   Quality of Gait 1 Diminished heel strike;Shuffling gait;Forward flexed posture   Comments/Distance (ft) 1 110 ft x 2 with turns, cues for safe technqiue   Ambulation/Gait Training 2   Surface 2 Level tile;Carpet   Device  2 No device   Gait Support Devices Gait belt   Assistance 2 Minimum assistance   Quality of Gait 2 Diminished heel strike;Forward flexed posture;Shuffling gait   Comments/Distance (ft) 2 50 ft x 2. More fatigued this pm.   Transfer 1   Technique 1 Sit to stand;Stand to sit   Transfer Device 1 Gait belt   Transfer Level of Assistance 1 Close supervision   Trials/Comments 1 vc's for safe use of brakes prior to transferring

## 2024-04-05 NOTE — PROGRESS NOTES
Speech-Language Pathology    SLP Adult IRF CCR Dysphagia Treatment     Patient Name: Trinity Hernandez  MRN: [id number]  Today's Date: 4/5/2024  Time Calculation  Start Time: 1535  Stop Time: 1550  Time Calculation (min): 15 min     1/4sw    Current Problem:   schemic stroke, right body involvement (left brain)     SLP Assessment:  SLP TX Intervention Outcome: Making Progress Towards Goals  SLP Assessment Results: Other (Comment) (dysphagia)  Prognosis: Good  Treatment Provided: Yes  Treatment Tolerance: Patient limited by fatigue  Medical Staff Made Aware: Yes  Strengths: Family/Caregiver Suppport, Motivation  Barriers: Comorbidities    Recommendations:  -Cautiously initiate Pureed diet level 4 with honey/moderately thick liquids.   -Crush meds in puree.   -3-4 swallows per bite, alternate solids/liquids, slow rate, small bites/sips, upright 90* for all PO and 20 minutes following PO     Plan:  Inpatient/Swing Bed or Outpatient: Inpatient  Treatment/Interventions: Other (Comment), Patient/family education (dysphagia, diet tolerance. strat's)  SLP TX Plan: Continue Plan of Care, Goals Adjusted, Other (Comment) (adjusted for MBSS results)  SLP Plan: Skilled SLP  SLP Frequency: 4x per week  Duration: Current admission  SLP Discharge Recommendations: Continue skilled SLP services at the next level of care  Next Treatment Priority: diet tolerance, strat's  Discussed POC: Patient, Caregiver/family, Nursing, Other (Comment) (spouse. Notified MD via Secure Chat)  Discussed Risks/Benefits: Yes, Patient, Nursing, Caregiver/Family, Other (Comment) (spouse and Mae JOSÉ RN)  Patient/Caregiver Agreeable: Yes      Subjective   Pt seen upright in bed. Appears fatigued. 2L O2.      General Visit Information:   Reason for Referral: f/u dysphagia tx  Referred By: Dr. Cruz  Past Medical History Relevant to Rehab: COPD, CAD s/p PCI 10/4/23, NSVT, HTN, HLD, rheumatoid arthritis, and hypothyroidism, CHF, Afib (not on AC), AAA, asthma,  COPD, chronic respiratory failure 2/2 pulmonary sarcoidosis, Migraines, Hiatal Hernia, and Diverticulosis.  Caregiver Feedback: spouse  Prior to Session Communication: Bedside nurse    Pain Assessment:   Pain Score: 0 - No pain  Pain Location: Generalized      Objective   DYSPHAGIA GOALS initiated 3/29, anticipated end 4/19:  Pt will tolerate tsp trials of pureed textures with SLP only to 90% without overt s/s aspiration.  PROGRESS: RN cleared pt for p.o. trials with SLP. 3 ice chips and approx. 1-2 oz of applesauce via tsp after oral care. 4/5: not addressed on this date.  STATUS: Pt c/o pain during swallow d/t dobhoff. RN aware. Breathing treatment provided some relief. Pt was able to self-feed after instruction for smaller amounts. No overt s/s aspiration were noted. 4/5: not addressed this date d/t MBSS.  2. Pt will utilize chin-tuck, triple effortful swallow with SLP only trials to 100%.              PROGRESS: 90% - 100% with cues, with SLP trials. 4/5: not indicated, see above  STATUS: Pt is affected by pain during swallow. Less facial grimacing today. Able to complete chin-tuck followed by 3 swallows after SLP provides cues to initiate and continue through trials. 4/5: not indicated, see above  Pt will increase swallow reflex via OPEs to 80% accuracy.              PROGRESS: 70% with reduced cues. 4/5: not indicated, see above  STATUS: CTAR x15. Pitch glides X15. Phonation with increased consistency 3-4 sec. . BOT elevation x15. Tongue Pops x15. Cues for breath support, pacing, jaw neutral. Attempted Ami, uncomfortable. Supraglottic sequence x1. 4/5: not indicated, see above  Pt will improve swallow reflex via OMEs to 80% accuracy.              PROGRESS: 80% with min cues 4/5: not indicated, see above  STATUS: Able to complete sets of x10 labial protrusion/retraction, lingual lateralization,  buccal protrusion/retraction. Reduced cues for pacing, full ROM. 4/5: not indicated, see above  Ongoing assessment  via MBSS when indicated to reassess swallow function.  PROGRESS: Discussed MBSS results with pt, spouse, and Mae JOSÉ RN. Notified Dr. Cruz via [employer] Secure Chat of MBSS results.  STATUS: 4/5 - GOAL MET  6.  Pt will tolerate pureed and moderately-thick/honey-thick textures to 90% without overt s/s aspiration.   PROGRESS: Established 4/5, anticipated end 4/19   STATUS: Notified Dr. Cruz of MBSS results, recommendations.  7. Pt will use safe swallow strategies to 90% accuracy with cues during meals and/or snacks.   PROGRESS: Established 4/5, anticipated end 4/19   STATUS: Notified Dr. Cruz of MBSS results, recommendations.    Therapeutic Swallow:  Solid Diet Recommendations: Pureed/extremely thick  (IDDSI Level 4)  Liquid Diet Recommendations: Honey thick/Moderately thick  Swallow Comments: med's crushed in pureed    Inpatient Education:  Adult Inpatient Education  Individual(s) Educated: Patient, Spouse  Verbal Education : MBSS results including safe swallow strategies  Risk and Benefits Discussed with Patient/Caregiver/Other: yes  Patient/Caregiver Demonstrated Understanding: yes  Plan of Care Discussed and Agreed Upon: yes  Patient Response to Education: Patient/Caregiver Verbalized Understanding of Information  Education Comment: reviewed with spouse

## 2024-04-05 NOTE — PROGRESS NOTES
Occupational Therapy       24 [id number]   OT Last Visit   OT Received On 24   General   Reason for Referral Impaired mobility   Referred By Dr. 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   Missed Visit No   Prior to Session Communication Bedside nurse  (Discussed POC with OTR/L)   Patient Position Received Up in chair   Preferred Learning Style verbal;visual   General Comment Pt continues to report not feeling well this PM, but agrees to partipcate in therapy.   Precautions   Hearing/Visual Limitations hearing diminished; corective lenses for reading   Medical Precautions Oxygen therapy device and L/min  (2L O2 NC)   Precautions Comment  Joey Tube NPO, HOB elevated 45 degrees or more. 2 litres 02 via nasal canula   Pain Assessment   Pain Assessment 0-10   Pain Score 5 - Moderate pain   Pain Location Throat   Pain Interventions Emotional support;Therapeutic presence   Response to Interventions Effective   Cognition   Overall Cognitive Status WFL   Orientation Level Oriented X4   Following Commands Follows all commands and directions without difficulty   Coordination   Movements are Fluid and Coordinated No   RUE    RUE  X   LUE    LUE X   Toileting   Toileting Level of Assistance Contact guard   Where Assessed Toilet  (BSC over toilet)   Toileting Comments Pt adjust clothing before/after hygiene. Pericare while in stance at grab bar with verbal cues for hand placement for balance.   Bed Mobility   Bed Mobility Yes   Bed Mobility 1   Bed Mobility 1 Sitting to supine   Level of Assistance 1 Modified independent   Bed Mobility Comments 1 utilized bed rails. HOB raised to 35 degrees.   Transfer 1   Transfer From 1 Wheelchair to   Transfer to 1 Stand   Technique 1 Sit to  stand;Stand to sit   Transfer Device 1 Walker;Gait belt  (Rolling Walker)   Transfer Level of Assistance 1 Close supervision   Trials/Comments 1 verabl cues for walker management when descending.   Dynamic Sitting Balance   Dynamic Sitting-Balance Support Feet supported   Dynamic Sitting-Balance Forward lean;Reaching for objects;Reaching across midline;Trunk control activities   Dynamic Sitting-Comments challenged functional reach, crossing midline, and bilateral coordination with balloon volley to improve ADLs. Pt engaged for ~5 minutes.   Toilet Transfers   Toilet Transfer From Rolling walker   Toilet Transfer Type To and from   Toilet Transfer to Standard bedside commode  (over standard toilet)   Toilet Transfer Technique Ambulating   Toilet Transfers Supervision   Toilet Transfers Comments utilized grab bars   Light Housekeeping   Light Housekeeping Level of Assistance Modified independent   Light Housekeeping Level Wheelchair   Light Housekeeping Pt engaged in folding laundry while seated to increase endurance for IADLs.   Therapeutic Activity   Therapeutic Activity Performed Yes   Therapeutic Activity 1 Pt engaged in state matching activity to improve functional reach, crossing midline, and trunk stability for ADLs. 12/12 completed with verbal cues to utilize both UE.   IP OT Assessment   OT Assessment Pt progressing with established POC but fatigues easily. Will continue with skilled therapeutic interventions to address remaining deficits.   Prognosis Good   Barriers to Discharge None   Evaluation/Treatment Tolerance Patient limited by fatigue   Medical Staff Made Aware Yes   End of Session Communication Bedside nurse   End of Session Patient Position Bed, 3 rail up;Alarm on  (Call light in reach. All needs met.)   OT Assessment   OT Assessment Results Decreased ADL status;Decreased upper extremity strength;Decreased endurance   Strengths Attitude of self;Ability to acquire knowledge   Barriers to  Participation Comorbidities   Education   Individual(s) Educated Patient   Education Provided Fall precautons;Joint protection and energy conservation   Risk and Benefits Discussed with Patient/Caregiver/Other yes   Patient/Caregiver Demonstrated Understanding yes   Plan of Care Discussed and Agreed Upon yes   Patient Response to Education Patient/Caregiver Verbalized Understanding of Information   Education Comment Pt receptive and progressing towards new learned techniques   Inpatient/Swing Bed or Outpatient   Inpatient/Swing Bed or Outpatient Inpatient   Inpatient Plan   Treatment Interventions ADL retraining;Functional transfer training;Endurance training;UE strengthening/ROM   OT Frequency 5 times per week   OT - OK to Discharge Yes   OT Recommended Transfer Status Stand by assist  (fatigues quickly)   ASHLEY AQUINO  As supervising RIVERS/L I attest that all the above information is correct.  SAM Tran

## 2024-04-05 NOTE — PROGRESS NOTES
Occupational Therapy       24 [id number]   OT Last Visit   OT Received On 24   General   Reason for Referral Impaired mobility   Referred By Dr. 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   Missed Visit No   Family/Caregiver Present No   Prior to Session Communication Bedside nurse  (OTR/L re Pts current POC)   Patient Position Received Up in chair   Preferred Learning Style verbal;visual   General Comment Confered with NSG.  Pt agreeable to skilled therapeutic intervention   Precautions   Hearing/Visual Limitations hearing diminished; corective lenses for reading   Medical Precautions Oxygen therapy device and L/min  (2LO2 NC)   Precautions Comment  Joey Tube NPO, HOB elevated 45 degrees or more. 2 litres 02 via nasal canula   Pain Assessment   Pain Assessment 0-10   Pain Score 6   Pain Type Chronic pain   Pain Location Leg   Pain Orientation Right   Pain Interventions Repositioned;Environmental changes;Relaxation technique;Therapeutic presence   Response to Interventions Pt able to participate in therapy session   Pain 2   Pain Score 2 6   Pain Type 2 Chronic pain   Pain Location 2 Hip  (right)   Pain Interventions 2 Repositioned;Environmental changes;Relaxation technique;Therapeutic presence   Response to Interventions 2 Pt able to participate in therapy session   Cognition   Orientation Level Oriented X4   Insight Moderate   Impulsive Moderately   Processing Speed Delayed   Coordination   Movements are Fluid and Coordinated No   Coordination Comment Weakness RUE/LE   RUE    RUE  X   LUE    LUE X   Feeding   Feeding Comments NPO   Grooming   Grooming Level of Assistance Modified independent   Grooming Where Assessed Wheelchair   Grooming Comments Pt seated all items in  "reach wash hands/face, brush teeth   UE Bathing   UE Bathing Comments Pt  state\"I'm not taking a shower\"  declined any bathing to include sponge   LE Bathing   LE Bathing Comments Pt state\"I'm not taking a shower\" declined any bathing to include sponge   UE Dressing   UE Dressing Level of Assistance Modified independent   UE Dressing Where Assessed Wheelchair   UE Dressing Comments P{t doff gown don pullover garment   LE Dressing   LE Dressing Yes   Pants Level of Assistance Distant supervision  (Pt using figure four to thread pants  pull to waist in stance)   Sock Level of Assistance Modified independent  (Pt using figure four to doff/don socks efficiently)   LE Dressing Where Assessed Wheelchair   LE Dressing Comments Instructed Pt with energy conservation princilpes application  of pacing, grouping , deep brreathing to ADL skills with fair follow through   Toileting   Toileting Level of Assistance Distant supervision   Where Assessed Toilet   Toileting Comments Pt  adjusting clothing prior/after/hygiene   Bed Mobility   Bed Mobility Yes   Bed Mobility 1   Bed Mobility 1 Supine to sitting   Level of Assistance 1 Modified independent   Bed Mobility Comments 1 HOB elevated 35' use side transfer bar   Bed Mobility 2   Bed Mobility  2 Rolling left;Scooting   Level of Assistance 2 Modified independent   Bed Mobility Comments 2 use side transfer bar to scoot to edge of bed   Functional Mobility   Functional Mobility Performed Yes   Functional Mobility 1   Surface 1 Level tile   Device 1 Rolling walker   Functional Mobility Support Devices Gait belt   Assistance 1 Close supervision   Comments 1 15' to include doorway, turns and backing   Transfers   Transfer Yes   Transfer 1   Transfer From 1 Bed to   Transfer to 1 Stand   Technique 1 Sit to stand   Transfer Device 1 Walker;Gait belt   Transfer Level of Assistance 1 Distant supervision   Trials/Comments 1 vc angi placement and safety wit managing O2 cording   Dynamic " Sitting Balance   Dynamic Sitting-Balance Support Feet supported   Dynamic Sitting-Balance Lateral lean;Forward lean;Reaching for objects;Reaching across midline;Trunk control activities   Dynamic Sitting-Comments challenged funcitonal reach hand eye coordination, attention to environmental stimuli with use BITS program Pt engaged 10 minutes to increase transfer safety   Toilet Transfers   Toilet Transfer From Rolling walker   Toilet Transfer Type To and from   Toilet Transfer to Standard bedside commode  (atop toilet grab bar use)   Toilet Transfer Technique Ambulating   Toilet Transfers Supervision   Toilet Transfers Comments grab bar use in transition   Tub Transfers   Tub Transfer From Wheelchair   Tub Transfer Type To and from   Tub Transfer to Transfer tub bench   Tub Transfer Technique Stand pivot   Tub Transfers Supervision   Tub Transfers Comments DRY transfer TTB grab bar us cues technique good safety demonstrated   Car Transfers   Car Transfer From Wheelchair   Car Transfer Technique Stand pivot   Car Transfers Supervision   Car Transfers Comments Simulated with TTB vc initially hand placement   Therapeutic Activity   Therapeutic Activity Performed Yes   Therapeutic Activity 1 educated Pt with energy conservation pronciples deep breathing, pacing and grouping of task with fair follow through   IP OT Assessment   OT Assessment Pt progressing witih established POC however fatigues easily.  Will continue with skilled therapeutic interventionto address remaining deficits   Prognosis Good   Barriers to Discharge None   Evaluation/Treatment Tolerance Patient limited by fatigue   Medical Staff Made Aware Yes   End of Session Communication Bedside nurse  (OTR/L re Pts current POC)   End of Session Patient Position Up in chair  (transported by therapy aid all needs met)   OT Assessment   OT Assessment Results Decreased ADL status;Decreased upper extremity range of motion;Decreased upper extremity  strength;Decreased safe judgment during ADL;Decreased endurance   Strengths Ability to acquire knowledge;Attitude of self;Support of extended family/friends   Barriers to Participation Comorbidities   Education   Individual(s) Educated Patient   Education Provided Fall precautons;Joint protection and energy conservation;POC discussed and agreed upon   Risk and Benefits Discussed with Patient/Caregiver/Other yes   Patient/Caregiver Demonstrated Understanding yes   Plan of Care Discussed and Agreed Upon yes   Patient Response to Education Patient/Caregiver Performed Return Demonstration of Exercises/Activities;Patient/Caregiver Verbalized Understanding of Information   Education Comment Pt educated on breathing techniques and engery conservation techniques while performing car/bed transfers. Pt returned demostration of breathing techniques.   Inpatient/Swing Bed or Outpatient   Inpatient/Swing Bed or Outpatient Inpatient   Inpatient Plan   Treatment Interventions ADL retraining;Functional transfer training;Endurance training;Equipment evaluation/education;Compensatory technique education   OT Frequency 5 times per week   Equipment Recommended upon Discharge Wheeled walker   OT - OK to Discharge Yes   OT Recommended Transfer Status Stand by assist

## 2024-04-06 PROBLEM — Z97.8 ENDOTRACHEALLY INTUBATED: Status: ACTIVE | Noted: 2024-04-06

## 2024-04-06 PROCEDURE — 2500000004 HC RX 250 GENERAL PHARMACY W/ HCPCS (ALT 636 FOR OP/ED): Performed by: INTERNAL MEDICINE

## 2024-04-06 PROCEDURE — 2500000002 HC RX 250 W HCPCS SELF ADMINISTERED DRUGS (ALT 637 FOR MEDICARE OP, ALT 636 FOR OP/ED): Performed by: INTERNAL MEDICINE

## 2024-04-06 PROCEDURE — 2500000001 HC RX 250 WO HCPCS SELF ADMINISTERED DRUGS (ALT 637 FOR MEDICARE OP): Performed by: INTERNAL MEDICINE

## 2024-04-06 PROCEDURE — 1180000001 HC REHAB PRIVATE ROOM DAILY

## 2024-04-06 PROCEDURE — 2500000002 HC RX 250 W HCPCS SELF ADMINISTERED DRUGS (ALT 637 FOR MEDICARE OP, ALT 636 FOR OP/ED): Mod: MUE | Performed by: INTERNAL MEDICINE

## 2024-04-06 PROCEDURE — 99233 SBSQ HOSP IP/OBS HIGH 50: CPT | Performed by: INTERNAL MEDICINE

## 2024-04-06 PROCEDURE — 97116 GAIT TRAINING THERAPY: CPT | Mod: GP,CQ

## 2024-04-06 PROCEDURE — 2500000005 HC RX 250 GENERAL PHARMACY W/O HCPCS: Performed by: INTERNAL MEDICINE

## 2024-04-06 PROCEDURE — 97530 THERAPEUTIC ACTIVITIES: CPT | Mod: GP,CQ

## 2024-04-06 RX ORDER — ACETAMINOPHEN 325 MG/1
650 TABLET ORAL EVERY 6 HOURS PRN
Status: DISCONTINUED | OUTPATIENT
Start: 2024-04-06 | End: 2024-04-08

## 2024-04-06 RX ADMIN — ACETAMINOPHEN 650 MG: 325 TABLET ORAL at 19:48

## 2024-04-06 RX ADMIN — BUDESONIDE INHALATION 0.5 MG: 0.5 SUSPENSION RESPIRATORY (INHALATION) at 21:26

## 2024-04-06 RX ADMIN — ACETAMINOPHEN 650 MG: 325 TABLET ORAL at 03:32

## 2024-04-06 RX ADMIN — LIDOCAINE 1 PATCH: 4 PATCH TOPICAL at 08:57

## 2024-04-06 RX ADMIN — TIOTROPIUM BROMIDE INHALATION SPRAY 2 PUFF: 3.12 SPRAY, METERED RESPIRATORY (INHALATION) at 09:53

## 2024-04-06 RX ADMIN — FLUCONAZOLE 150 MG: 150 TABLET ORAL at 21:27

## 2024-04-06 RX ADMIN — BUDESONIDE INHALATION 0.5 MG: 0.5 SUSPENSION RESPIRATORY (INHALATION) at 09:52

## 2024-04-06 RX ADMIN — ACETAMINOPHEN 650 MG: 325 TABLET ORAL at 11:29

## 2024-04-06 RX ADMIN — NYSTATIN 500000 UNITS: 100000 SUSPENSION ORAL at 21:26

## 2024-04-06 RX ADMIN — METOPROLOL TARTRATE 25 MG: 25 TABLET, FILM COATED ORAL at 21:26

## 2024-04-06 RX ADMIN — METOPROLOL TARTRATE 25 MG: 25 TABLET, FILM COATED ORAL at 08:57

## 2024-04-06 RX ADMIN — ASPIRIN 81 MG CHEWABLE TABLET 81 MG: 81 TABLET CHEWABLE at 08:58

## 2024-04-06 RX ADMIN — FORMOTEROL FUMARATE DIHYDRATE 20 MCG: 20 SOLUTION RESPIRATORY (INHALATION) at 21:26

## 2024-04-06 RX ADMIN — Medication 400 MG: at 08:58

## 2024-04-06 RX ADMIN — LEVOTHYROXINE SODIUM 50 MCG: 50 TABLET ORAL at 06:19

## 2024-04-06 RX ADMIN — ALBUTEROL SULFATE 2.5 MG: 2.5 SOLUTION RESPIRATORY (INHALATION) at 18:22

## 2024-04-06 RX ADMIN — NYSTATIN 500000 UNITS: 100000 SUSPENSION ORAL at 08:57

## 2024-04-06 RX ADMIN — FORMOTEROL FUMARATE DIHYDRATE 20 MCG: 20 SOLUTION RESPIRATORY (INHALATION) at 09:52

## 2024-04-06 RX ADMIN — Medication 1 TABLET: at 08:58

## 2024-04-06 RX ADMIN — FUROSEMIDE 20 MG: 20 TABLET ORAL at 08:58

## 2024-04-06 ASSESSMENT — PAIN - FUNCTIONAL ASSESSMENT
PAIN_FUNCTIONAL_ASSESSMENT: 0-10
PAIN_FUNCTIONAL_ASSESSMENT: FLACC (FACE, LEGS, ACTIVITY, CRY, CONSOLABILITY)
PAIN_FUNCTIONAL_ASSESSMENT: FLACC (FACE, LEGS, ACTIVITY, CRY, CONSOLABILITY)
PAIN_FUNCTIONAL_ASSESSMENT: 0-10

## 2024-04-06 ASSESSMENT — PAIN SCALES - GENERAL
PAINLEVEL_OUTOF10: 0 - NO PAIN
PAINLEVEL_OUTOF10: 5 - MODERATE PAIN
PAINLEVEL_OUTOF10: 0 - NO PAIN
PAINLEVEL_OUTOF10: 2
PAINLEVEL_OUTOF10: 7
PAINLEVEL_OUTOF10: 0 - NO PAIN
PAINLEVEL_OUTOF10: 3
PAINLEVEL_OUTOF10: 0 - NO PAIN
PAINLEVEL_OUTOF10: 5 - MODERATE PAIN

## 2024-04-06 ASSESSMENT — PAIN DESCRIPTION - LOCATION: LOCATION: NECK

## 2024-04-06 ASSESSMENT — PAIN DESCRIPTION - DESCRIPTORS: DESCRIPTORS: ACHING

## 2024-04-06 NOTE — CARE PLAN
Problem: Pain  Goal: My pain/discomfort is manageable  Outcome: Progressing     Problem: Safety  Goal: Patient will be injury free during hospitalization  Outcome: Progressing  Goal: I will remain free of falls  Outcome: Progressing     Problem: Discharge Barriers  Goal: My discharge needs are met  Outcome: Progressing     Problem: Fall/Injury  Goal: Not fall by end of shift  Outcome: Progressing  Goal: Be free from injury by end of the shift  Outcome: Progressing  Goal: Verbalize understanding of personal risk factors for fall in the hospital  Outcome: Progressing  Goal: Verbalize understanding of risk factor reduction measures to prevent injury from fall in the home  Outcome: Progressing  Goal: Use assistive devices by end of the shift  Outcome: Progressing  Goal: Pace activities to prevent fatigue by end of the shift  Outcome: Progressing     Problem: Skin  Goal: Decreased wound size/increased tissue granulation at next dressing change  Outcome: Progressing  Goal: Participates in plan/prevention/treatment measures  Outcome: Progressing  Goal: Prevent/manage excess moisture  Outcome: Progressing  Goal: Prevent/minimize sheer/friction injuries  Outcome: Progressing  Goal: Promote/optimize nutrition  Outcome: Progressing  Goal: Promote skin healing  Outcome: Progressing     Problem: General Stroke  Goal: Establish a mutual long term goal with patient by discharge  Outcome: Progressing  Goal: Demonstrate improvement in neurological exam throughout the shift  Outcome: Progressing  Goal: Maintain BP within ordered limits throughout shift  Outcome: Progressing  Goal: Participate in treatment (ie., meds, therapy) throughout shift  Outcome: Progressing  Goal: No symptoms of aspiration throughout shift  Outcome: Progressing  Goal: No symptoms of hemorrhage throughout shift  Outcome: Progressing  Goal: Tolerate enteral feeding throughout shift  Outcome: Progressing  Goal: Decreased nausea/vomiting throughout  shift  Outcome: Progressing  Goal: Controlled blood glucose throughout shift  Outcome: Progressing  Goal: Out of bed three times today  Outcome: Progressing     Problem: Heart Failure  Goal: Improved gas exchange this shift  Outcome: Progressing  Goal: Report improvement of dyspnea/breathlessness this shift  Outcome: Progressing  Goal: Increase self care and/or family involvement in 24 hours  Outcome: Progressing     Problem: Pain  Goal: Takes deep breaths with improved pain control throughout the shift  Outcome: Progressing  Goal: Turns in bed with improved pain control throughout the shift  Outcome: Progressing  Goal: Walks with improved pain control throughout the shift  Outcome: Progressing  Goal: Performs ADL's with improved pain control throughout shift  Outcome: Progressing  Goal: Participates in PT with improved pain control throughout the shift  Outcome: Progressing  Goal: Free from opioid side effects throughout the shift  Outcome: Progressing  Goal: Free from acute confusion related to pain meds throughout the shift  Outcome: Progressing     Problem: Bathing  Goal: LTG - Patient will utilize adaptive techniques to bathe body independently using ad aides as needed.  Outcome: Progressing     Problem: Dressings Lower Extremities  Goal: LTG - Patient will dress lower body independently using ad aides as needed.  Outcome: Progressing     Problem: Dressing Upper Extremities  Goal: LTG - Patient will complete upper body dressing independently.  Outcome: Progressing     Problem: Grooming  Goal: LTG - Patient will complete daily grooming tasks independently.  Outcome: Progressing     Problem: Instrumental Activities of Daily Living  Goal: LTG - Patient will complete simple meal preparation activities with distant supervision.  Outcome: Progressing     Problem: Toileting  Goal: LTG - Patient will complete daily toileting tasks independently.  Outcome: Progressing  Goal: STG - Patient will complete toileting tasks  with supervision.  Outcome: Progressing     Problem: Discharge Planning - Care Management  Goal: Discharge to home with appropriate resources  Outcome: Progressing       The clinical goals for the shift include Safety and pain control

## 2024-04-06 NOTE — NURSING NOTE
General Note      Patient is requesting medication for sleep for tonight. This nurse administered PRN PO Melatonin 5mg for Sleep at 2116 on 4/5/2024.

## 2024-04-06 NOTE — PROGRESS NOTES
Physical Therapy       24 [id number]   PT  Visit   PT Received On 24   Response to Previous Treatment Patient reporting fatigue but able to participate.   General   Reason for Referral Impaired mobility   Referred By Dr. 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   Patient Position Received Up in chair   Preferred Learning Style verbal;visual   General Comment Pt's diet has been upgraded to pureed and honey thick liquids.  Pt reports still not feeling well, specifically tired.   Precautions   Hearing/Visual Limitations hearing diminished; corective lenses for reading   Precautions Comment  Joey Tube NPO, HOB elevated 45 degrees or more. 2 litres 02 via nasal canula   Pain Assessment   Pain Assessment 0-10   Pain Score 7   Pain Type Acute pain;Chronic pain   Pain Location Neck   Multiple Pain Sites   (R hip - 5/10)   Balance/Neuromuscular Re-Education   Balance/Neuromuscular Re-Education Activity 1 static stand on blue therapads with RW (no UE support):  balloon toss, CGA   Balance/Neuromuscular Re-Education Activity 2 static stand on blue therapads with RW (no UE support):  cone reaching, CGA   Ambulation/Gait Training 1   Surface 1 Level tile   Device 1 Rollator   Gait Support Devices Gait belt   Assistance 1 Close supervision   Quality of Gait 1 Diminished heel strike;Shuffling gait;Forward flexed posture   Comments/Distance (ft) 1 100 ft, 110ft x 2.  Turns included.  Vc's for locking brakes prior to transfers.  Slow oz   Transfer 1   Technique 1 Sit to stand;Stand to sit   Transfer Device 1 Walker;Gait belt   Transfer Level of Assistance 1 Close supervision   Trials/Comments 1 vc's for locking of brakes of rollator prior to transfers and for safe hand placement.    Stairs   Rails 1 Bilateral   Device 1 Railing   Support Devices 1 Gait belt   Assistance 1 Close supervision;Contact guard   Comment/Number of Steps 1 up/down 12 steps, step-to pattern, vc's for WBOS   Wheelchair Activities   Propulsion Type 1 Manual   Level 1 Level tile   Method 1 Right upper extremity;Left upper extremity;Right lower extremity;Left lower extremity   Level of Assistance 1 Set up   Description/Details 1 150 ft, assist for O2 tank   Activity Tolerance   Endurance Tolerates 10 - 20 min exercise with multiple rests   Activity Tolerance Comments Rest breaks needed this AM due to fatigue.  Pt continues to demonstrate improved stability with all mobility tasks.   PT Assessment   PT Assessment Results Decreased strength;Pain;Decreased range of motion;Decreased endurance;Impaired balance;Decreased mobility;Decreased cognition;Impaired judgement;Decreased safety awareness   Rehab Prognosis Excellent   Barriers to Discharge medical acuity   Strengths Attitude of self;Ability to acquire knowledge   Barriers to Participation Comorbidities   Assessment Comment Frequent rest breaks needed due to fatigue.  Nursing reports that pt's Dobb Joey nasal tube wiill be removed today.   End of Session Patient Position Up in chair   PT Plan   Inpatient/Swing Bed or Outpatient Inpatient   PT Plan   Treatment/Interventions Bed mobility;Transfer training;Gait training;Stair training;Neuromuscular re-education;Therapeutic exercise;Therapeutic activity   PT Plan Skilled PT   Equipment Recommended upon Discharge Wheeled walker  (rollator)   PT Recommended Transfer Status Assist x1

## 2024-04-06 NOTE — PROGRESS NOTES
[facility]: [facility]  PROGRESS NOTE      Trinity Hernandez is a 74 y.o. female that is being seen  today for follow-up at CCR unit   Subjective   Patient is being seen for follow-up at CCR unit.  Patient has history of stroke and is admitted for physical therapy.  Patient is on oxygen.  Vital signs are stable.  Patient had a modified barium swallow yesterday done.  Patient has been started on puréed diet with nectar thick fluids.  Patient has been eating well.  Patient still has a Dobbhoff tube which will be removed.  Patient will continue to get speech therapy as well as physical therapy and Occupational Therapy.  Oral thrush has been better.  Patient has completed course of nystatin.      ROS  Negative for fever or chills  Negative for sore throat, ear pain, nasal discharge.    Negative for cough, shortness of breath or wheezing  Negative for chest pain, palpitations, swelling of legs  Negative for abdominal pain, constipation, diarrhea, blood in the stools  Negative for urinary complaints  Negative for headache, dizziness, weakness or numbness  Positive for difficulty in walking  Negative for depression or anxiety  All other systems reviewed and were negative   Vitals:    04/06/24 0500   BP: 116/60   Pulse: 78   Resp: 18   Temp: 36.4 °C (97.5 °F)   SpO2: 98%      Vitals:    03/28/24 1640   Weight: 76.4 kg (168 lb 6.9 oz)     Body mass index is 26.44 kg/m².  Physical Exam  Constitutional: Patient does not appear to be in any acute distress  Head and Face: NCAT  Eyes: Normal external exam, EOMI  ENT: Patient has nasogastric feeding tube  Cardiovascular: RRR, S1/S2, no murmurs, rubs, or gallops, radial pulses +2, no edema of extremities  Pulmonary: CTAB, no respiratory distress.  Abdomen: +BS, soft, non-tender, nondistended, no guarding or rebound, no masses noted  MSK: No joint swelling, normal movements of all extremities. Range of motion- normal.  Skin- No lesions, contusions, or  erythema.  Peripheral puslses palpable bilaterally 2+  Neuro: AAO X3, Cranial nerves 2-12 grossly intact,DTR 2+ in all 4 limbs, weakness of the legs.      LABS   [unfilled]  Lab Results   Component Value Date    GLUCOSE 134 (H) 03/29/2024    CALCIUM 9.3 03/29/2024     03/29/2024    K 3.9 03/29/2024    CO2 29 03/29/2024     03/29/2024    BUN 15 03/29/2024    CREATININE 0.80 03/29/2024     Lab Results   Component Value Date    ALT 5 (L) 03/24/2024    AST 16 03/24/2024    ALKPHOS 55 03/24/2024    BILITOT 0.5 03/24/2024     Lab Results   Component Value Date    WBC 8.6 03/29/2024    HGB 8.2 (L) 03/29/2024    HCT 27.5 (L) 03/29/2024    MCV 94 03/29/2024     03/29/2024     Lab Results   Component Value Date    CHOL 107 03/25/2024    CHOL 181 11/16/2023    CHOL 191 02/13/2023     Lab Results   Component Value Date    HDL 33.2 03/25/2024    HDL 36.7 11/16/2023    HDL 37.7 (A) 02/13/2023     Lab Results   Component Value Date    LDLCALC 41 03/25/2024    LDLCALC 110 (H) 11/16/2023     Lab Results   Component Value Date    TRIG 166 (H) 03/25/2024    TRIG 174 (H) 11/16/2023    TRIG 256 (H) 02/13/2023     Lab Results   Component Value Date    HGBA1C 5.3 03/25/2024     Other labs not included in the list above were reviewed either before or during this encounter.    History    Past Medical History:   Diagnosis Date    Abdominal bloating 05/04/2023    CAD (coronary artery disease)     CHF (congestive heart failure) (CMS/HCC)     Diverticulitis of small intestine without perforation or abscess without bleeding     Diverticulitis, intestine, small    Dizzy 11/20/2023    Essential (primary) hypertension 02/08/2017    HTN (hypertension), benign    Fall     Influenza B 05/08/2015    Formatting of this note might be different from the original. Completed Tamiflu Continue droplet precautions.    Nausea 11/16/2018    Personal history of other endocrine, nutritional and metabolic disease 02/08/2017    History of  hypothyroidism    Personal history of other venous thrombosis and embolism     History of deep venous thrombosis     Past Surgical History:   Procedure Laterality Date    ABDOMINAL ADHESION SURGERY  05/16/2017    Laparoscopic Lysis Of Intestinal Adhesions    CERVICAL FUSION  05/16/2017    Cervical Vertebral Fusion    CHOLECYSTECTOMY  05/16/2017    Cholecystectomy    COLECTOMY PARTIAL / TOTAL  05/16/2017    Partial Colectomy - Sigmoid    CORONARY ANGIOPLASTY WITH STENT PLACEMENT      CT ANGIO NECK  05/21/2020    CT NECK ANGIO W AND WO IV CONTRAST 5/21/2020 [facility]    CT HEAD ANGIO W AND WO IV CONTRAST  05/21/2020    CT HEAD ANGIO W AND WO IV CONTRAST 5/21/2020 [facility]    HERNIA REPAIR  05/16/2017    Hernia Repair    HYSTERECTOMY  10/03/2013    Hysterectomy    MR HEAD ANGIO WO IV CONTRAST  05/21/2020    MR HEAD ANGIO WO IV CONTRAST 5/21/2020 [facility]    MR NECK ANGIO WO IV CONTRAST  05/21/2020    MR NECK ANGIO WO IV CONTRAST 5/21/2020 [facility]    OTHER SURGICAL HISTORY  05/16/2017    Thoracoscopy Of Lungs And Pleural Space With Biopsy Of Lung Nodules    OTHER SURGICAL HISTORY  04/15/2019    Esophagogastroduodenoscopy     No family history on file.  Allergies   Allergen Reactions    Hydrocodone-Acetaminophen Shortness of breath    Montelukast Unknown     Lungs felt as if there was a hole in them    Morphine Unknown     Body shakes    Nitrofurantoin Monohyd/M-Cryst Unknown    Sulfa (Sulfonamide Antibiotics) Nausea Only and Other    Atorvastatin Itching, Swelling and Unknown    Dicyclomine Hallucinations     Nightmares    Fluticasone Propion-Salmeterol Unknown    Levofloxacin Rash    Lisinopril Cough    Nitroimidazoles Nausea Only and Unknown    Omeprazole Diarrhea and Unknown    Salmeterol Unknown    Simvastatin Swelling and Unknown    Sucralfate Unknown    Umeclidinium Other     Urinary retention, blurred vision, constipation     No current facility-administered  medications on file prior to encounter.     Current Outpatient Medications on File Prior to Encounter   Medication Sig Dispense Refill    acetaminophen (Tylenol) 325 mg tablet 2 tablets (650 mg) by nasogastric tube route every 6 hours if needed for mild pain (1 - 3). 30 tablet 0    albuterol 2.5 mg /3 mL (0.083 %) nebulizer solution Take 3 mL (2.5 mg) by nebulization every 4 hours if needed for shortness of breath.      albuterol 90 mcg/actuation inhaler Inhale 2 puffs every 6 hours if needed for shortness of breath.      arformoterol (Brovana) 15 mcg/2 mL nebulizer solution INHALE 2ML VIA NEBULIZER AS INSTRUCTED TWICE A DAY (EVERY 12 HOURS)      aspirin 81 mg chewable tablet 1 tablet (81 mg) by nasogastric tube route once daily. Do not start before March 29, 2024.      budesonide (Pulmicort) 0.5 mg/2 mL nebulizer solution Take 2 mL (0.5 mg) by nebulization 2 times a day. Rinse mouth with water after use to reduce aftertaste and incidence of candidiasis. Do not swallow.      cholecalciferol (Vitamin D3) 50 mcg (2,000 unit) capsule 1 capsule (50 mcg) by nasogastric tube route once daily.      estradiol (Estrace) 0.01 % (0.1 mg/gram) vaginal cream Insert 0.5 Applicatorfuls (2 g) into the vagina once daily as needed.      furosemide (Lasix) 20 mg tablet 1 tablet (20 mg) by g-tube route once daily. Do not start before March 29, 2024.      Lactobacillus acidophilus (Probiotic) 10 billion cell capsule 1 capsule by nasogastric tube route once daily.      levothyroxine (Synthroid, Levoxyl) 50 mcg tablet 1 tablet (50 mcg) by nasogastric tube route once daily. Do not start before March 29, 2024.      lidocaine 4 % patch Place 1 patch over 12 hours on the skin once daily. Place over right hip. Remove & discard patch within 12 hours or as directed by MD. Do not start before March 29, 2024.      magnesium oxide (Mag-Ox) 400 mg (241.3 mg magnesium) tablet 1 tablet (400 mg) by nasogastric tube route once daily.      melatonin 5 mg  tablet 1 tablet (5 mg) by nasogastric tube route as needed at bedtime for sleep.      metoprolol tartrate (Lopressor) 25 mg tablet 1 tablet (25 mg) by nasogastric tube route 3 times a day.      nitroglycerin (Nitrostat) 0.4 mg SL tablet Place 1 tablet (0.4 mg) under the tongue every 5 minutes if needed for chest pain.      oxygen (O2) gas therapy Inhale 1 each once every 24 hours.      Repatha Syringe 140 mg/mL injection Inject 1 mL (140 mg) under the skin every 14 (fourteen) days.      sennosides (Senokot) 8.6 mg tablet 2 tablets (17.2 mg) by nasogastric tube route 2 times a day.      tiotropium (Spiriva Respimat) 2.5 mcg/actuation inhaler Inhale 2 puffs once daily.       Immunization History   Administered Date(s) Administered    Flu vaccine, quadrivalent, high-dose, preservative free, age 65y+ (FLUZONE) 10/04/2021, 09/20/2022    Influenza, High Dose Seasonal, Preservative Free 10/01/2015, 09/30/2016, 09/29/2017, 09/19/2018, 09/09/2019    Influenza, Seasonal, Quadrivalent, Adjuvanted 08/30/2023    Influenza, Unspecified 09/10/2019    Influenza, seasonal, injectable 09/04/2020    Moderna COVID-19 vaccine, bivalent, blue cap/gray label *Check age/dose* 11/30/2022    Moderna SARS-CoV-2 Vaccination 03/01/2021, 03/28/2021, 12/30/2021, 05/22/2022    Pneumococcal conjugate vaccine, 13-valent (PREVNAR 13) 09/30/2016    Pneumococcal conjugate vaccine, 20-valent (PREVNAR 20) 12/12/2022    Pneumococcal polysaccharide vaccine, 23-valent, age 2 years and older (PNEUMOVAX 23) 07/22/2014, 10/01/2015, 11/09/2020     Patient's medical history was reviewed and updated either before or during this encounter.  ASSESSMENT / PLAN:  Active Hospital Problems    *CVA (cerebrovascular accident due to intracerebral hemorrhage) (CMS/[facility])      Ischemic stroke (CMS/[facility])      COPD (chronic obstructive pulmonary disease) (CMS/[facility])      Heart failure with mildly reduced ejection fraction (CMS/HCC)      Anxiety      Asthmatic bronchitis      GERD  (gastroesophageal reflux disease)      Dysphagia as late effect of stroke      CAD (coronary artery disease), native coronary artery       Patient is being seen for follow-up.  Patient had modified barium swallow and she has been started on puréed diet with nectar thick fluids.  Patient has been eating well with no signs of aspiration.  Patient does have a Dobbhoff tube which will be removed.    Vikas Cruz MD

## 2024-04-06 NOTE — NURSING NOTE
General Note      Assumed care of patient at 1900. Patient is in patient's room laying in bed awake and watching television. Call light is within reach.

## 2024-04-06 NOTE — NURSING NOTE
General Note      At this time of reassessment for the effectiveness of PRN PO Melatonin 5mg given for Sleep at 2116 on 4/5/2024, patient is in patient's room laying in bed sleeping.

## 2024-04-07 PROCEDURE — 1180000001 HC REHAB PRIVATE ROOM DAILY

## 2024-04-07 PROCEDURE — 2500000001 HC RX 250 WO HCPCS SELF ADMINISTERED DRUGS (ALT 637 FOR MEDICARE OP): Performed by: INTERNAL MEDICINE

## 2024-04-07 PROCEDURE — 2500000004 HC RX 250 GENERAL PHARMACY W/ HCPCS (ALT 636 FOR OP/ED): Performed by: INTERNAL MEDICINE

## 2024-04-07 PROCEDURE — 2500000002 HC RX 250 W HCPCS SELF ADMINISTERED DRUGS (ALT 637 FOR MEDICARE OP, ALT 636 FOR OP/ED): Mod: MUE | Performed by: INTERNAL MEDICINE

## 2024-04-07 PROCEDURE — 2500000005 HC RX 250 GENERAL PHARMACY W/O HCPCS: Performed by: INTERNAL MEDICINE

## 2024-04-07 PROCEDURE — 2500000002 HC RX 250 W HCPCS SELF ADMINISTERED DRUGS (ALT 637 FOR MEDICARE OP, ALT 636 FOR OP/ED): Performed by: INTERNAL MEDICINE

## 2024-04-07 RX ADMIN — Medication 1 TABLET: at 09:28

## 2024-04-07 RX ADMIN — METOPROLOL TARTRATE 25 MG: 25 TABLET, FILM COATED ORAL at 15:28

## 2024-04-07 RX ADMIN — METOPROLOL TARTRATE 25 MG: 25 TABLET, FILM COATED ORAL at 09:28

## 2024-04-07 RX ADMIN — NYSTATIN 500000 UNITS: 100000 SUSPENSION ORAL at 20:49

## 2024-04-07 RX ADMIN — ACETAMINOPHEN 650 MG: 325 TABLET ORAL at 21:59

## 2024-04-07 RX ADMIN — Medication: at 17:30

## 2024-04-07 RX ADMIN — NYSTATIN 500000 UNITS: 100000 SUSPENSION ORAL at 09:27

## 2024-04-07 RX ADMIN — BUDESONIDE INHALATION 0.5 MG: 0.5 SUSPENSION RESPIRATORY (INHALATION) at 20:48

## 2024-04-07 RX ADMIN — LIDOCAINE 1 PATCH: 4 PATCH TOPICAL at 09:29

## 2024-04-07 RX ADMIN — ASPIRIN 81 MG CHEWABLE TABLET 81 MG: 81 TABLET CHEWABLE at 09:28

## 2024-04-07 RX ADMIN — ACETAMINOPHEN 650 MG: 325 TABLET ORAL at 00:47

## 2024-04-07 RX ADMIN — ACETAMINOPHEN 650 MG: 325 TABLET ORAL at 09:27

## 2024-04-07 RX ADMIN — TIOTROPIUM BROMIDE INHALATION SPRAY 2 PUFF: 3.12 SPRAY, METERED RESPIRATORY (INHALATION) at 09:28

## 2024-04-07 RX ADMIN — FORMOTEROL FUMARATE DIHYDRATE 20 MCG: 20 SOLUTION RESPIRATORY (INHALATION) at 20:53

## 2024-04-07 RX ADMIN — FUROSEMIDE 20 MG: 20 TABLET ORAL at 09:28

## 2024-04-07 RX ADMIN — FORMOTEROL FUMARATE DIHYDRATE 20 MCG: 20 SOLUTION RESPIRATORY (INHALATION) at 09:45

## 2024-04-07 RX ADMIN — BUDESONIDE INHALATION 0.5 MG: 0.5 SUSPENSION RESPIRATORY (INHALATION) at 09:45

## 2024-04-07 RX ADMIN — Medication 400 MG: at 09:28

## 2024-04-07 RX ADMIN — ALBUTEROL SULFATE 2.5 MG: 2.5 SOLUTION RESPIRATORY (INHALATION) at 05:10

## 2024-04-07 RX ADMIN — METOPROLOL TARTRATE 25 MG: 25 TABLET, FILM COATED ORAL at 20:48

## 2024-04-07 RX ADMIN — FLUCONAZOLE 150 MG: 150 TABLET ORAL at 20:48

## 2024-04-07 RX ADMIN — ACETAMINOPHEN 650 MG: 325 TABLET ORAL at 15:27

## 2024-04-07 RX ADMIN — NYSTATIN 500000 UNITS: 100000 SUSPENSION ORAL at 15:27

## 2024-04-07 RX ADMIN — LEVOTHYROXINE SODIUM 50 MCG: 50 TABLET ORAL at 05:07

## 2024-04-07 RX ADMIN — Medication 5 MG: at 21:59

## 2024-04-07 ASSESSMENT — PAIN SCALES - GENERAL
PAINLEVEL_OUTOF10: 6
PAINLEVEL_OUTOF10: 3
PAINLEVEL_OUTOF10: 6
PAINLEVEL_OUTOF10: 0 - NO PAIN
PAINLEVEL_OUTOF10: 6
PAINLEVEL_OUTOF10: 3
PAINLEVEL_OUTOF10: 5 - MODERATE PAIN
PAINLEVEL_OUTOF10: 5 - MODERATE PAIN

## 2024-04-07 ASSESSMENT — PAIN - FUNCTIONAL ASSESSMENT
PAIN_FUNCTIONAL_ASSESSMENT: 0-10

## 2024-04-07 ASSESSMENT — PAIN DESCRIPTION - LOCATION
LOCATION: NECK
LOCATION: OTHER (COMMENT)
LOCATION: NECK

## 2024-04-07 NOTE — NURSING NOTE
Assumed  care of patient. Patient here s/p CVA with dysphagia. Has dobhof but just passed MBS so now on pureed diet. Pills crushed. On 2L NC. Moist productive cough. Breathing treatments. Call light within reach. Will continue to monitor.

## 2024-04-07 NOTE — CARE PLAN
The patient's goals for the shift include maintain safety    The clinical goals for the shift include Safety and pain control

## 2024-04-07 NOTE — NURSING NOTE
Pt. C/o of pain to neck,arms and hips. Pain rated as 5/10 on pain scale. Administered Tylenol with positive outcome.

## 2024-04-08 PROCEDURE — 2500000004 HC RX 250 GENERAL PHARMACY W/ HCPCS (ALT 636 FOR OP/ED): Performed by: INTERNAL MEDICINE

## 2024-04-08 PROCEDURE — 2500000002 HC RX 250 W HCPCS SELF ADMINISTERED DRUGS (ALT 637 FOR MEDICARE OP, ALT 636 FOR OP/ED): Mod: MUE | Performed by: INTERNAL MEDICINE

## 2024-04-08 PROCEDURE — 2500000002 HC RX 250 W HCPCS SELF ADMINISTERED DRUGS (ALT 637 FOR MEDICARE OP, ALT 636 FOR OP/ED): Performed by: INTERNAL MEDICINE

## 2024-04-08 PROCEDURE — 92526 ORAL FUNCTION THERAPY: CPT | Mod: GN

## 2024-04-08 PROCEDURE — 1180000001 HC REHAB PRIVATE ROOM DAILY

## 2024-04-08 PROCEDURE — 97110 THERAPEUTIC EXERCISES: CPT | Mod: GP

## 2024-04-08 PROCEDURE — 97112 NEUROMUSCULAR REEDUCATION: CPT | Mod: GP

## 2024-04-08 PROCEDURE — 99232 SBSQ HOSP IP/OBS MODERATE 35: CPT | Performed by: PHYSICIAN ASSISTANT

## 2024-04-08 PROCEDURE — 97116 GAIT TRAINING THERAPY: CPT | Mod: GP

## 2024-04-08 PROCEDURE — 2500000001 HC RX 250 WO HCPCS SELF ADMINISTERED DRUGS (ALT 637 FOR MEDICARE OP): Performed by: INTERNAL MEDICINE

## 2024-04-08 PROCEDURE — 2500000005 HC RX 250 GENERAL PHARMACY W/O HCPCS: Performed by: INTERNAL MEDICINE

## 2024-04-08 RX ORDER — ACETAMINOPHEN 325 MG/1
650 TABLET ORAL EVERY 4 HOURS PRN
Status: DISCONTINUED | OUTPATIENT
Start: 2024-04-08 | End: 2024-04-13 | Stop reason: HOSPADM

## 2024-04-08 RX ORDER — ACETAMINOPHEN 160 MG/5ML
650 SOLUTION ORAL EVERY 4 HOURS PRN
Status: DISCONTINUED | OUTPATIENT
Start: 2024-04-08 | End: 2024-04-13 | Stop reason: HOSPADM

## 2024-04-08 RX ADMIN — FORMOTEROL FUMARATE DIHYDRATE 20 MCG: 20 SOLUTION RESPIRATORY (INHALATION) at 08:49

## 2024-04-08 RX ADMIN — Medication 5 MG: at 20:46

## 2024-04-08 RX ADMIN — FLUCONAZOLE 150 MG: 150 TABLET ORAL at 20:46

## 2024-04-08 RX ADMIN — METOPROLOL TARTRATE 25 MG: 25 TABLET, FILM COATED ORAL at 08:49

## 2024-04-08 RX ADMIN — LIDOCAINE 1 PATCH: 4 PATCH TOPICAL at 08:49

## 2024-04-08 RX ADMIN — ACETAMINOPHEN 650 MG: 325 TABLET ORAL at 20:46

## 2024-04-08 RX ADMIN — ACETAMINOPHEN 650 MG: 325 TABLET ORAL at 14:42

## 2024-04-08 RX ADMIN — NYSTATIN 500000 UNITS: 100000 SUSPENSION ORAL at 20:46

## 2024-04-08 RX ADMIN — NYSTATIN 500000 UNITS: 100000 SUSPENSION ORAL at 15:59

## 2024-04-08 RX ADMIN — METOPROLOL TARTRATE 25 MG: 25 TABLET, FILM COATED ORAL at 15:59

## 2024-04-08 RX ADMIN — ACETAMINOPHEN 650 MG: 325 TABLET ORAL at 07:44

## 2024-04-08 RX ADMIN — ALBUTEROL SULFATE 2.5 MG: 2.5 SOLUTION RESPIRATORY (INHALATION) at 05:39

## 2024-04-08 RX ADMIN — METOPROLOL TARTRATE 25 MG: 25 TABLET, FILM COATED ORAL at 20:46

## 2024-04-08 RX ADMIN — Medication: at 17:30

## 2024-04-08 RX ADMIN — NYSTATIN 500000 UNITS: 100000 SUSPENSION ORAL at 08:49

## 2024-04-08 RX ADMIN — FORMOTEROL FUMARATE DIHYDRATE 20 MCG: 20 SOLUTION RESPIRATORY (INHALATION) at 20:46

## 2024-04-08 RX ADMIN — Medication 1 TABLET: at 08:49

## 2024-04-08 RX ADMIN — FUROSEMIDE 20 MG: 20 TABLET ORAL at 08:49

## 2024-04-08 RX ADMIN — TIOTROPIUM BROMIDE INHALATION SPRAY 2 PUFF: 3.12 SPRAY, METERED RESPIRATORY (INHALATION) at 08:51

## 2024-04-08 RX ADMIN — LEVOTHYROXINE SODIUM 50 MCG: 50 TABLET ORAL at 05:08

## 2024-04-08 RX ADMIN — BUDESONIDE INHALATION 0.5 MG: 0.5 SUSPENSION RESPIRATORY (INHALATION) at 08:49

## 2024-04-08 RX ADMIN — ASPIRIN 81 MG CHEWABLE TABLET 81 MG: 81 TABLET CHEWABLE at 08:49

## 2024-04-08 RX ADMIN — BUDESONIDE INHALATION 0.5 MG: 0.5 SUSPENSION RESPIRATORY (INHALATION) at 20:46

## 2024-04-08 RX ADMIN — Medication 400 MG: at 08:49

## 2024-04-08 ASSESSMENT — PAIN - FUNCTIONAL ASSESSMENT
PAIN_FUNCTIONAL_ASSESSMENT: 0-10

## 2024-04-08 ASSESSMENT — BALANCE ASSESSMENTS
STANDING UNSUPPORTED WITH EYES CLOSED: ABLE TO STAND 10 SECONDS SAFELY
PLACE ALTERNATE FOOT ON STEP OR STOOL WHILE STANDING UNSUPPORTED: ABLE TO STAND INDEPENDENTLY AND SAFELY AND COMPLETE 8 STEPS IN 20 SECONDS
STANDING TO SITTING: CONTROLS DESCENT BY USING HANDS
TRANSFERS: ABLE TO TRANSFER SAFELY WITH MINOR USE OF HANDS
PLACE ALTERNATE FOOT ON STEP OR STOOL WHILE STANDING UNSUPPORTED: LOOKS BEHIND ONE SIDE ONLY OTHER SIDE SHOWS LESS WEIGHT SHIFT
STANDING UNSUPPORTED: ABLE TO STAND SAFELY FOR 2 MINUTES
REACHING FORWARD WITH OUTSTRETCHED ARM WHILE STANDING: CAN REACH FORWARD CONFIDENTLY 25 CM (10 INCHES)
STANDING TO SITTING: ABLE TO STAND WITHOUT USING HANDS AND STABILIZE INDEPENDENTLY
STANDING UNSUPPORTED ONE FOOT IN FRONT: ABLE TO PLACE FOOT TANDEM INDEPENDENTLY AND HOLD 30 SECONDS
STANDING ON ONE LEG: ABLE TO LIFT LEG INDEPENDENTLY AND HOLD 5-10 SECONDS
TURN 360 DEGREES: ABLE TO TURN 360 DEGREES SAFELY ONE SIDE ONLY 4 SECONDS OR LESS
LONG VERSION TOTAL SCORE (MAX 56): 52
SITTING WITH BACK UNSUPPORTED BUT FEET SUPPORTED ON FLOOR OR ON A STOOL: ABLE TO SIT SAFELY AND SECURELY FOR 2 MINUTES
STANDING UNSUPPORTED WITH FEET TOGETHER: ABLE TO PLACE FEET TOGETHER INDEPENDENTLY AND STAND 1 MINUTE SAFELY
PICK UP OBJECT FROM THE FLOOR FROM A STANDING POSITION: ABLE TO PICK UP SLIPPER SAFELY AND EASILY

## 2024-04-08 ASSESSMENT — ACTIVITIES OF DAILY LIVING (ADL)
BATHING_LEVEL_OF_ASSISTANCE: CONTACT GUARD
BATHING_EQUIPMENT_NEEDED: LONG-HANDLED SPONGE;REMOVEABLE SHOWER HEAD
BATHING_WHERE_ASSESSED: SHOWER
HOME_MANAGEMENT_TIME_ENTRY: 30

## 2024-04-08 ASSESSMENT — PAIN DESCRIPTION - DESCRIPTORS: DESCRIPTORS: ACHING

## 2024-04-08 ASSESSMENT — PAIN SCALES - GENERAL
PAINLEVEL_OUTOF10: 6
PAINLEVEL_OUTOF10: 4
PAINLEVEL_OUTOF10: 3
PAINLEVEL_OUTOF10: 5 - MODERATE PAIN
PAINLEVEL_OUTOF10: 0 - NO PAIN
PAINLEVEL_OUTOF10: 5 - MODERATE PAIN
PAINLEVEL_OUTOF10: 5 - MODERATE PAIN
PAINLEVEL_OUTOF10: 2
PAINLEVEL_OUTOF10: 4
PAINLEVEL_OUTOF10: 3
PAINLEVEL_OUTOF10: 0 - NO PAIN

## 2024-04-08 ASSESSMENT — PAIN DESCRIPTION - LOCATION: LOCATION: NECK

## 2024-04-08 NOTE — PROGRESS NOTES
Occupational Therapy       04/08/24 [id number]   OT Last Visit   OT Received On 04/08/24   General   Reason for Referral Impaired mobility   Referred By Dr. 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   Prior to Session Communication Bedside nurse   Patient Position Received Up in chair   Preferred Learning Style verbal;visual   General Comment Pt's diet has been upgraded to pureed and honey thick liquids. Reports feeling ok.   Precautions   Hearing/Visual Limitations hearing diminished; corective lenses for reading   Medical Precautions Oxygen therapy device and L/min   Precautions Comment Oxygen tubing.   Pain Assessment   Pain Assessment 0-10   Pain Score 3   Pain Type Chronic pain   Pain Location Neck   Pain Orientation Right   Feeding   Feeding Level of Assistance Setup   Feeding Where Assessed Edge of bed   Feeding Comments Purred diet.   Grooming   Grooming Level of Assistance Modified independent   Grooming Where Assessed Wheelchair   Grooming Comments Pt able to brush teeth and brush hair seated at sink.   UE Bathing   UE Bathing Adaptive Equipment Removeable shower head   UE Bathing Level of Assistance Close supervision   UE Bathing Where Assessed Shower  (Seated on shower chair.)   UE Bathing Comments Seated on shower chair.   LE Bathing   LE Bathing Adaptive Equipment Long-handled sponge;Removeable shower head   LE Bathing Level of Assistance Contact guard   LE Bathing Where Assessed Shower   LE Bathing Comments Seated and standing with grab bar assist.   UE Dressing   UE Dressing Level of Assistance Modified independent   UE Dressing Where Assessed Wheelchair   UE Dressing Comments Able to doff/don gown while seated.   LE Dressing   Pants Level of Assistance Distant  supervision  (To don underwear via figure 4 technique.)   Shoe Level of Assistance Distant supervision  (Slip on shoes.)   LE Dressing Where Assessed Wheelchair   Toileting   Toileting Level of Assistance Setup   Where Assessed Toilet   Toileting Comments Seated on BSC. Able to perform perform pericare and clothing management. Decreased endurance.   Transfer 1   Transfer From 1 Sit to   Transfer to 1 Stand   Technique 1 Sit to stand;Stand to sit   Transfer Device 1 Walker   Transfer Level of Assistance 1 Close supervision   Transfers 2   Transfer From 2 Bed to   Transfer to 2 Toilet   Transfer Device 2 Walker   Transfer Level of Assistance 2 Close supervision   Trials/Comments 2 Transfer in room with FWW.   Toilet Transfers   Toilet Transfer From Rolling walker   Toilet Transfer Type To and from   Toilet Transfer to Standard bedside commode  (Over toilet.)   Toilet Transfer Technique Ambulating   Toilet Transfers Supervision   Toilet Transfers Comments Good safety with grab bars.   Shower Transfers   Shower Transfer From Walker   Shower Transfer Type To and from   Shower Transfer to Shower seat with back   Shower Transfer Technique Stand pivot   Shower Transfers Supervision   Shower Transfers Comments Min VC's for safety.   IP OT Assessment   Evaluation/Treatment Tolerance Patient limited by fatigue  (Self limiting at times.)   End of Session Communication Bedside nurse   End of Session Patient Position Up in chair   Inpatient Plan   Treatment Interventions ADL retraining;Functional transfer training;UE strengthening/ROM;Endurance training   OT Frequency 5 times per week

## 2024-04-08 NOTE — PROGRESS NOTES
Speech-Language Pathology    SLP Adult IR[facility] CCR Dysphagia Treatment     Patient Name: Trinity Hernandez  MRN: [id number]  Today's Date: 4/8/2024  Time Calculation  Start Time: 1200  Stop Time: 1230  Time Calculation (min): 30 min     2/4sw    Current Problem:   Ischemic stroke, right body involvement (left brain)     SLP Assessment:  SLP TX Intervention Outcome: Making Progress Towards Goals  SLP Assessment Results: Other (Comment) (dysphagia)  Prognosis: Good  Treatment Provided: Yes   Treatment Tolerance: Patient tolerated treatment well  Medical Staff Made Aware: Yes  Strengths: Family/Caregiver Suppport, Motivation  Barriers: Comorbidities     Plan:  Inpatient/Swing Bed or Outpatient: Inpatient  Treatment/Interventions: Other (Comment) (dysphagia)  SLP TX Plan: Continue Plan of Care  SLP Plan: Skilled SLP  SLP Frequency: 4x per week  Duration: Current admission  SLP Discharge Recommendations: Continue skilled SLP services at the next level of care  Next Treatment Priority: diet tolerance, strat's  Discussed POC: Patient, Caregiver/family, Nursing  Discussed Risks/Benefits: Yes, Patient, Nursing  Patient/Caregiver Agreeable: Yes      Subjective   Pt seen upright on edge of bed. 2L O2. Dobhoff was removed on 4/5 per pt. Pt feels that throat pain improved as soon as Dobhoff was removed.     General Visit Information:   Reason for Referral: f/u dysphagia tx  Referred By: Dr. Cruz  Past Medical History Relevant to Rehab: COPD, CAD s/p PCI 10/4/23, NSVT, HTN, HLD, rheumatoid arthritis, and hypothyroidism, CHF, Afib (not on AC), AAA, asthma, COPD, chronic respiratory failure 2/2 pulmonary sarcoidosis, Migraines, Hiatal Hernia, and Diverticulosis.  Prior to Session Communication: Bedside nurse    Pain Assessment:   Pain Score: 0 - No pain      Objective     DYSPHAGIA GOALS initiated 3/29, anticipated end 4/19:  Pt will tolerate tsp trials of pureed textures with SLP only to 90% without overt s/s aspiration.  PROGRESS:  RN cleared pt for p.o. trials with SLP. 3 ice chips and approx. 1-2 oz of applesauce via tsp after oral care. 4/8 - GOAL MET  STATUS: Pt c/o pain during swallow d/t dobhoff. RN aware. Breathing treatment provided some relief. Pt was able to self-feed after instruction for smaller amounts. No overt s/s aspiration were noted. 4/8 - GOAL MET  2. Pt will utilize chin-tuck, triple effortful swallow with SLP only trials to 100%.              PROGRESS: 90% - 100% with cues, with SLP trials. 4/8 - GOAL MET  STATUS: Pt is affected by pain during swallow. Less facial grimacing today. Able to complete chin-tuck followed by 3 swallows after SLP provides cues to initiate and continue through trials. 4/8 - GOAL MET  Pt will increase swallow reflex via OPEs to 80% accuracy.  PROGRESS: 70% with reduced cues. 4/8: not addressed this session, focused on p.o. intake.  STATUS: CTAR x15. Pitch glides X15. Phonation with increased consistency 3-4 sec. . BOT elevation x15. Tongue Pops x15. Cues for breath support, pacing, jaw neutral. Attempted Ami, uncomfortable. Supraglottic sequence x1. 4/8: not addressed this session, focused on p.o. intake.  Pt will improve swallow reflex via OMEs to 80% accuracy.              PROGRESS: 80% with min cues 4/8: GOAL MET  STATUS: Able to complete sets of x10 labial protrusion/retraction, lingual lateralization,  buccal protrusion/retraction. Reduced cues for pacing, full ROM. 4/8: GOAL MET  Ongoing assessment via MBSS when indicated to reassess swallow function.  PROGRESS: Discussed MBSS results with pt, spouse, and Mae JOSÉ RN. Notified Dr. Cruz via [employer] Secure Chat of MBSS results.  STATUS: 4/5 - GOAL MET  6.  Pt will tolerate pureed and moderately-thick/honey-thick textures to 90% without overt s/s aspiration.              PROGRESS: Established 4/5, anticipated end 4/19. 4/8: 75%  STATUS: No reported s/s aspiration. Pt consumed more than 75% of lunch meal. 1 small episode of cough post swallow  with solids. No further difficulties noted with subsequent clearing swallow.  7. Pt will use safe swallow strategies to 90% accuracy with cues during meals and/or snacks.              PROGRESS: Established 4/5, anticipated end 4/19 4/8: 80%   STATUS: Pt recalls to use 3/4 swallows per bite. Utilizes slow rate. Recalls upright position and to remain upright after p.o. intake. Cough post swallow x1, however, able to clear with subsequent swallow. Suspect d/t larger bolus amount.    Therapeutic Swallow:  Therapeutic Swallow Intervention : Compensatory Strategies, PO Trials  Solid Diet Recommendations: Pureed/extremely thick  (IDDSI Level 4)  Liquid Diet Recommendations: Honey thick/liquidised/moderately thick (IDDS Level 3)  Swallow Comments: med's crushed in pureed    Inpatient Education:  Adult Inpatient Education  Individual(s) Educated: Patient  Verbal Education : MBSS reults, pureed/moderately-thick textures. Reviewed handout of swallowing schematic to discuss areas of residuals.  Risk and Benefits Discussed with Patient/Caregiver/Other: yes  Patient/Caregiver Demonstrated Understanding: yes  Plan of Care Discussed and Agreed Upon: yes  Patient Response to Education: Patient/Caregiver Verbalized Understanding of Information

## 2024-04-08 NOTE — NURSING NOTE
Spoke with patient and her spouse in regards to the solar eclipse on 4/8/24. Staff will close blinds around 2:30pm and leave them closed until the eclipse has completed. Patient and visitors are expected not to attempt to view anywhere in facility with or without eye protection. Patient stated understanding and agreed to comply with education.

## 2024-04-08 NOTE — PROGRESS NOTES
Occupational Therapy       04/08/24 7248-7289   OT Last Visit   OT Received On 04/08/24   General   Reason for Referral Impaired mobility   Referred By Dr. 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   Prior to Session Communication Bedside nurse   Patient Position Received Up in chair   Preferred Learning Style verbal;visual   General Comment Pt's diet has been upgraded to pureed and honey thick liquids. Reports feeling ok.   Precautions   Hearing/Visual Limitations hearing diminished; corective lenses for reading   Medical Precautions Oxygen therapy device and L/min   Precautions Comment Oxygen tubing.   Pain Assessment   Pain Assessment 0-10   Pain Score 5 - Moderate pain   Pain Type Chronic pain   Pain Location Neck   Pain Descriptors Aching   Toileting   Toileting Level of Assistance Setup   Where Assessed Toilet;Bedside commode   Toileting Comments Able to perform pericare and clothing management.   Bed Mobility   Bed Mobility Yes   Bed Mobility 1   Bed Mobility 1 Supine to sitting   Level of Assistance 1 Modified independent   Bed Mobility Comments 1 with use of bed rails.   Transfer 1   Transfer From 1 Sit to   Transfer to 1 Stand   Technique 1 Sit to stand;Stand to sit   Transfer Device 1 Walker   Transfer Level of Assistance 1 Distant supervision   Toilet Transfers   Toilet Transfer From Rolling walker   Toilet Transfer Type To and from   Toilet Transfer to Standard bedside commode   Toilet Transfer Technique Ambulating   Toilet Transfers Supervision   Toilet Transfers Comments Good use of grab bars.   Therapeutic Exercise   Therapeutic Exercise Activity 1 Seated elbow flexion/ extension x10 with 2lb WT. x2 sets   Therapeutic Exercise Activity 2 Seated lateral shoulder  adduction x10 x2 sets. 2lb WT.   Therapeutic Exercise Activity 3 Seated wrist flexion/ extension x10 x2 sets with 2lb WT.   Therapeutic Activity   Therapeutic Activity 1 Challanged standing activity tolerance. x5 mins with card game. Prep task for standing meal prep. Easily fatigued. Required seated restbreak.   IP OT Assessment   Evaluation/Treatment Tolerance Patient limited by fatigue   End of Session Communication Bedside nurse   End of Session Patient Position Up in chair

## 2024-04-08 NOTE — NURSING NOTE
Assumed care of patient at 0730. Patient here s/p CVA with dysphagia. Had dobhof but passed MBS so on pureed diet with nectar thick liquids. Meds crushed. On 2L NC. Moist productive cough. Breathing treatments. Call light within reach. Will continue to monitor.   Patient is a 51-year-old male with chief complaint of pain in his right shoulder and pain in his right knee shoulder pain began when he had a fall on an outstretched arm during the winter, several weeks later he had to change a tire with the lug nuts frozen onto the rim and he was using a breaker bar.  He pulled back very hard and strained the anterior aspect of his shoulder.  Since that time he has had intermittent pain along the anterior superior aspect of the shoulder, if he sleeps on that side for any length of time it will wake him up with a dull ache.  He has full range of motion and does not notice any strength loss except that from discomfort.  His right knee was injured in a valgus stress injury on the ice as well.  Since then his knee pain is largely resolved.  He does occasionally get a click in the knee but denies any instability, recurring pain, or effusions.    Past medical history: Tourette's syndrome variant, hyperlipidemia, hypothyroidism.  His medication list is reviewed and confirmed.  Allergies: Certain antihistamines, Keflex causes GI disturbance.    Review of systems: The patient states is been in good health recently with no significant changes in his weight or fever chills etc.  HEENT, respiratory, cardiovascular, GI, , neurologic, integumentary all within normal ranges, musculoskeletal: Refer to present complaints.  Endocrine, no recent changes.    Physical exam: The patient is an alert, thin but otherwise healthy-appearing adult male.    HEENT: Full range of motion cervical spine, midline trachea.    Chest: Normal respiratory effort and excursion.    Cardiovascular: Regular rate and rhythm with normal vital signs.    Neurologic: Patient walks with a normal gait and coordination, has no sensory deficits in the upper or lower extremities.    Integumentary: Exposed skin on the upper extremities and is essentially normal.    Extremity exam: Both knees are examined.  The exam of the left knee  is completely normal full range of motion, no tenderness, no effusion, stable in all planes including the patellofemoral joint.  The right knee demonstrates no effusion, no joint line tenderness, full range of motion, normal patellofemoral exam, stable to varus valgus drawer and Lachman's.  The right shoulder demonstrates tenderness over the anterolateral aspect of the humerus, slight tenderness over the coracoid process on the right compared to left.  Both shoulders have a full active range of motion.  Resisted forward flexion causes discomfort, patient has slight weakness to external rotation in the right shoulder due to pain otherwise strength testing is completely normal.    X-ray reviewed demonstrates no obvious abnormality.    Assessment and plan: The patient's knee problem was most likely grade 1-2 sprain of the medial collateral ligament that resolved on its own over 6 to 8 weeks.  The right shoulder however is probably some tendinitis or tendinosis of the rotator cuff.  He has not tried any particular therapy.  He was given a list of exercises to perform by his internal medicine specialist.  Recommending he try these exercises for approximately 6 to 8 weeks and if his symptoms persist or worsen he should follow-up for repeat evaluation.

## 2024-04-08 NOTE — PROGRESS NOTES
-       Trinity Hernandez is a 74 y.o. female who presents for No chief complaint on file.        Objective   There were no vitals taken for this visit.    Gen: No acute distress, alert and oriented x3, pleasant   HEENT: moist mucous membranes, b/l external auditory canals are clear of debris, TMs within normal limits, no oropharyngeal lesions, eomi, perrla   Neck: thyroid within normal limits, no lymphadenopathy   CV: RRR, normal S1/S2, no murmur   Resp: Clear to auscultation bilaterally, no wheezes or rhonchi appreciated  Abd: soft, nontender, non-distended, no guarding/rigidity, bowel sounds present  Extr: no edema, no calf tenderness  Derm: Skin is warm and dry, no rashes appreciated  Psych: mood is good, affect is congruent, good hygiene, normal speech and eye contact  Neuro: cranial nerves grossly intact, normal gait    Assessment/Plan

## 2024-04-08 NOTE — NURSING NOTE
Assumed care of pt. Report received. Pt in bed; alarms on; call light w/in reach. No concerns or needs voiced at this time. Will update as needed throughout shift.

## 2024-04-08 NOTE — PROGRESS NOTES
"Nutrition Follow up Note    Nutrition Assessment      TF has been discontinued, PO intake has been good, pt agreeable to receiving Magic cup w/ lunch and dinner to maximize po intake    Nutrition History:     Energy Intake: Fair 50-75 %  Food Allergies/Intolerances:  None  GI Symptoms: None  Oral Problems: Swallowing difficulty    Anthropometrics:  Ht: 170 cm (5' 6.93\"), Wt: 76.4 kg (168 lb 6.9 oz), BMI: 26.44  IBW/kg (Dietitian Calculated): 61 kg  Percent of IBW: 143 %     Weight Change:  Daily Weight  03/28/24 : 76.4 kg (168 lb 6.9 oz)  03/27/24 : 87.2 kg (192 lb 3.9 oz)  03/24/24 : 74.4 kg (164 lb)  03/11/24 : 76.2 kg (168 lb)  02/12/24 : 76.2 kg (168 lb)  02/02/24 : 74.5 kg (164 lb 3.9 oz)  01/10/24 : 75.3 kg (166 lb)  01/08/24 : 74.4 kg (164 lb)  01/04/24 : 74.7 kg (164 lb 9.6 oz)  12/19/23 : 75.5 kg (166 lb 7.2 oz)             Nutrition Focused Physical Exam Findings:   Subcutaneous Fat Loss  Orbital Fat Pads: Well nourished (slightly bulging fat pads)  Buccal Fat Pads: Well nourished (full, rounded cheeks)  Triceps: Mild-Moderate (less than ample fat tissue)    Muscle Wasting  Temporalis: Well nourished (well-defined muscle)  Pectoralis (Clavicular Region): Well nourished (clavicle not visible)  Deltoid/Trapezius: Mild-Moderate (slight protrusion of acromion process)  Interosseous: Mild-Moderate (slightly depressed area between thumb and forefinger)  Quadriceps: Well nourished (well developed, well rounded)  Gastrocnemius: Well nourished (well developed bulbous muscle)    Edema  Edema: none     Nutrition Significant Labs:  Lab Results   Component Value Date    WBC 8.6 03/29/2024    HGB 8.2 (L) 03/29/2024    HCT 27.5 (L) 03/29/2024     03/29/2024    CHOL 107 03/25/2024    TRIG 166 (H) 03/25/2024    HDL 33.2 03/25/2024    ALT 5 (L) 03/24/2024    AST 16 03/24/2024     03/29/2024    K 3.9 03/29/2024     03/29/2024    CREATININE 0.80 03/29/2024    BUN 15 03/29/2024    CO2 29 03/29/2024    TSH " 0.93 02/05/2024    INR 1.1 03/24/2024    HGBA1C 5.3 03/25/2024     Nutrition Specific Medications:  aspirin, 81 mg, nasogastric tube, Daily  barium sulfate, 1 tablet, oral, Once in imaging  budesonide, 0.5 mg, nebulization, BID  fluconazole, 150 mg, nasogastric tube, Daily  formoterol, 20 mcg, nebulization, BID  furosemide, 20 mg, g-tube, Daily  lactobacillus acidophilus, 1 tablet, nasogastric tube, Daily  levothyroxine, 50 mcg, nasogastric tube, Daily  lidocaine, 1 patch, transdermal, Daily  magnesium oxide, 400 mg, nasogastric tube, Daily  metoprolol tartrate, 25 mg, nasogastric tube, TID  nystatin, 5 mL, Swish & Swallow, TID  oxygen, , inhalation, q24h  tiotropium, 2 puff, inhalation, Daily       Dietary Orders (From admission, onward)       Start     Ordered    04/05/24 1616  Adult diet Regular; Pureed 4; Moderately thick 3  Diet effective now        Comments: Pureed diet level 4 with honey/moderately thick liquids. Crush meds in puree. 3-4 swallows per bite, alternate solids/liquids, slow rate, small bites/sips, upright 90* for all PO and 20 minutes following PO   Question Answer Comment   Diet type Regular    Texture Pureed 4    Fluid consistency Moderately thick 3        04/05/24 1616                   Estimated Needs:   Estimated Energy Needs  Total Energy Estimated Needs (kCal): 1909 kCal  Total Estimated Energy Need per Day (kCal/kg): 25 kCal/kg  Method for Estimating Needs: actual wt    Estimated Protein Needs  Total Protein Estimated Needs (g): 92 g  Total Protein Estimated Needs (g/kg): 1.2 g/kg  Method for Estimating Needs: actual wt    Estimated Fluid Needs  Method for Estimating Needs: 1 ml/kcal      Nutrition Diagnosis   Nutrition Diagnosis:     Nutrition Diagnosis  Patient has Nutrition Diagnosis: Yes  Diagnosis Status (1): Ongoing  Nutrition Diagnosis 1: Swallowing difficulity  Related to (1): dysphagia  As Evidenced by (1): conditions associated w/ dx       Nutrition Interventions/Recommendations    Nutrition Interventions and Recommendations:    Nutrition Prescription:  Individualized Nutrition Prescription Provided for : 1909 kcals and 92g protein to be provided via enteral nutrition    Nutrition Interventions:   Food and/or Nutrient Delivery Interventions  Interventions: Medical food supplement  Medical Food Supplement: Modified beverage  Goal: Provide magic cup w/ lunch and dinner, each provides 290 calories, 9gm protein    Education Documentation  No documentation found.       Nutrition Monitoring and Evaluation   Monitoring/Evaluation:   Food/Nutrient Related History Monitoring  Monitoring and Evaluation Plan: Energy intake  Energy Intake: Estimated energy intake  Criteria: Pt will consume >/= 75% estimated energy needs    Body Composition/Growth/Weight History  Monitoring and Evaluation Plan: Weight  Weight: Measured weight  Criteria: Pt will maintain wt           Time Spent/Follow-up:   Follow Up  Time Spent (min): 25 minutes  Last Date of Nutrition Visit: 04/08/24  Nutrition Follow-Up Needed?: 5-7 days  Follow up Comment: 4/15/24

## 2024-04-08 NOTE — PROGRESS NOTES
Physical Therapy Treatment Note        04/08/24 [id number]   PT  Visit   PT Received On 04/08/24   Response to Previous Treatment Patient reporting fatigue but able to participate.   General   Reason for Referral Impaired mobility   Referred By Dr. 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   General Comment 2 Liters 02 continuous chronically   Precautions   Hearing/Visual Limitations hearing diminished; corective lenses for reading   Pain Assessment   Pain Assessment 0-10   Pain Score 5 - Moderate pain   Pain Type Chronic pain   Pain Location Neck   General Observation   General Observation DobHoff Removed Saturday   Therapeutic Exercise   Therapeutic Exercise Activity 2 Supine Heel slide 2# BLE 2x15   Therapeutic Exercise Activity 3 Supine Hip Abd/Add 2# BLE 2x15   Therapeutic Exercise Activity 4 SLR 2x15 0 BLE   Therapeutic Exercise Activity 5 Bridging 2x15 hooklying   Bed Mobility   Bed Mobility Yes   Bed Mobility 1   Bed Mobility 1 Supine to sitting   Level of Assistance 1 Modified independent   Ambulation/Gait Training 1   Surface 1 Level tile   Device 1 Rollator   Gait Support Devices Gait belt   Assistance 1 Close supervision   Quality of Gait 1 Diminished heel strike;Shuffling gait;Forward flexed posture   Comments/Distance (ft) 1 125,90 feet   Ambulation/Gait Training 2   Surface 2 Level tile;Carpet;Outdoors   Device 2 No device   Gait Support Devices Gait belt   Comments/Distance (ft) 2 50 feet No AD assist for 02 cg/supervised   Transfer 1   Transfer From 1 Sit to   Transfer to 1 Stand   Technique 1 Sit to stand;Stand to sit   Transfer Device 1 Walker   Transfer Level of Assistance 1 Distant supervision   Transfers 2   Transfer From 2 Wheelchair to   Transfer to 2 Chair with  arms   Technique 2 Sit to stand;Stand to sit   Transfer Level of Assistance 2 Close supervision   PT Plan   PT Recommended Transfer Status Stand by assist   PT - OK to Discharge Yes

## 2024-04-08 NOTE — PROGRESS NOTES
Physical Therapy Treatment Note          04/08/24 4814-6037   PT  Visit   PT Received On 04/08/24   Response to Previous Treatment Patient reporting fatigue but able to participate.   General   Reason for Referral Impaired mobility   Referred By Dr. 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   Patient Position Received Up in chair   Preferred Learning Style verbal;visual   General Comment Pt's diet has been upgraded to pureed and honey thick liquids. Reports feeling ok.   Precautions   Hearing/Visual Limitations hearing diminished; corective lenses for reading   Medical Precautions Oxygen therapy device and L/min  (2 Liters /Min)   Pain Assessment   Pain Assessment 0-10   Pain Score 3   Pain Type Chronic pain   Pain Location Neck   Pain Orientation Right  (HAs pain patch on)   Cognition   Overall Cognitive Status WFL   Arousal/Alertness Appropriate responses to stimuli   Orientation Level Oriented X4   Processing Speed Delayed   Postural Control   Posture Comment Flexed at hips, knees, forwarde head posture. Uses RW   General Observation   General Observation DobHoff Removed Saturday   Static Sitting Balance   Static Sitting-Level of Assistance Independent   Dynamic Sitting Balance   Dynamic Sitting-Comments Able to safely reach outside ALEC indepenedently   Dynamic Standing Balance   Dynamic Standing-Comments SAWYER Score 52/56   Therapeutic Exercise   Therapeutic Exercise Performed Yes   Therapeutic Exercise Activity 1 seated hip abduction, blue theraband, 2x15 reps   Therapeutic Exercise Activity 2 seated isometric hip adduction/ball squeezes, 2x15 reps   Therapeutic Exercise Activity 3 seated hamstring curls, blue theraband, 2x15 reps   Therapeutic Exercise Activity 4 LAQ 2x15 1.5# B  LE   Therapeutic Exercise Activity 5 hamstring curls 2x15 green band   Therapeutic Exercise Activity 6 standing toe raises 2x15   Balance/Neuromuscular Re-Education   Balance/Neuromuscular Re-Education Activity Performed Yes   Balance/Neuromuscular Re-Education Activity 1 360 degree turns supervised   Balance/Neuromuscular Re-Education Activity 2 TApping BLE supervised   Balance/Neuromuscular Re-Education Activity 3 Reaching TAsks WBOS supervised No LOB   Ambulation/Gait Training   Ambulation/Gait Training Performed Yes   Ambulation/Gait Training 1   Surface 1 Level tile   Device 1 Rollator   Gait Support Devices Gait belt   Assistance 1 Close supervision   Quality of Gait 1 Diminished heel strike;Shuffling gait;Forward flexed posture   Comments/Distance (ft) 1 125 feet x2  (Performed safely.  No LOB multi directional turns.  FAtigue limits distance.)   Transfer 1   Transfer From 1 Sit to   Transfer to 1 Stand   Technique 1 Sit to stand;Stand to sit   Transfer Level of Assistance 1 Distant supervision   Transfers 2   Transfer From 2 Wheelchair to   Transfer to 2 Chair with arms   Transfer Level of Assistance 2 Distant supervision   Trials/Comments 2 Performs safely   Stairs   Stairs Yes   Stairs   Rails 1 Bilateral   Device 1 Railing   Support Devices 1 Gait belt   Assistance 1 Close supervision;Contact guard   Comment/Number of Steps 1 1 flight 2 Rails  supervised and 02 cord management.   Wheelchair Activities   Propulsion Yes   Propulsion Type 1 Manual   Level 1 Level tile   Method 1 Right upper extremity;Left upper extremity;Right lower extremity;Left lower extremity   Level of Assistance 1 Set up   Description/Details 1 150 feet   Activity Tolerance   Endurance Tolerates 10 - 20 min exercise with multiple rests   PT Assessment   End of Session Patient Position Bed, 2 rail up  (pt requested back to bed.  Fatigue)   Outpatient Education   Education Comment Discussed reason for rollator including transport of 02,  place to sit when fatigued.  Pt understands and complains hers at home does not have a seat.   PT Plan   Inpatient/Swing Bed or Outpatient Inpatient   PT Plan   Treatment/Interventions Bed mobility;Transfer training;Gait training   PT Recommended Transfer Status Stand by assist   PT - OK to Discharge Yes

## 2024-04-08 NOTE — PROGRESS NOTES
"Trinity Hernandez is a 74 y.o. female on day 11 of admission presenting with CVA (cerebrovascular accident due to intracerebral hemorrhage) (CMS/[facility]).    Subjective   Pt with h/o COPD, CAD, HTN, pulmonary sarcoidosis, CHF, Afib, and Chronic respiratory failure admitted with L MCA ischemic stroke and subdural hematoma following TPA.  Pt has right sided weakness and dysphagia.   Pt is doing well.  NG tube removed.  She is eating and taking her medications by mouth.  She reports a good appetite.  NO abdominal pain.  No n/v/d/c.  She does have some arthritis pains for which she has been taking tylenol.  She reports that tylenol is effective for her pain.  She denies any shortness of breath or cough.  She is on home inhalers and aerosols.   She has no lower leg edema.           Objective     Physical Exam  Constitutional:       General: She is not in acute distress.  Eyes:      Conjunctiva/sclera: Conjunctivae normal.      Pupils: Pupils are equal, round, and reactive to light.   Cardiovascular:      Rate and Rhythm: Normal rate and regular rhythm.      Heart sounds: No murmur heard.  Pulmonary:      Effort: Pulmonary effort is normal.      Breath sounds: No wheezing, rhonchi or rales.   Abdominal:      General: Abdomen is flat. Bowel sounds are normal. There is no distension.      Palpations: Abdomen is soft. There is no mass.      Tenderness: There is no abdominal tenderness.   Musculoskeletal:         General: No swelling. Normal range of motion.   Skin:     General: Skin is warm and dry.      Findings: No rash.   Neurological:      General: No focal deficit present.      Mental Status: She is alert and oriented to person, place, and time. Mental status is at baseline.         Last Recorded Vitals  Blood pressure 128/69, pulse 75, temperature 36.4 °C (97.5 °F), temperature source Temporal, resp. rate 16, height 1.7 m (5' 6.93\"), weight 76.4 kg (168 lb 6.9 oz), SpO2 98 %.  Intake/Output last 3 Shifts:  No intake/output " data recorded.    Relevant Results                             Assessment/Plan   Principal Problem:    CVA (cerebrovascular accident due to intracerebral hemorrhage) (CMS/[facility])  Active Problems:    Anxiety    Asthmatic bronchitis    CAD (coronary artery disease), native coronary artery    Dysphagia as late effect of stroke    GERD (gastroesophageal reflux disease)    Heart failure with mildly reduced ejection fraction (CMS/[facility])    COPD (chronic obstructive pulmonary disease) (CMS/[facility])    Ischemic stroke (CMS/[facility])    CVA:  continue PT/OT/ST efforts.  She is on aspirin therapy.     Dysphagia:  continue ST. NG tube removed.  Eating and taking meds by mouth.      COPD:  continue pulmicort, perforomist aerosols, and spiriva.  Stable on continuous oxygen.      CHF:  continue lasix.  Monitor weights and labs.     Thrush:  on nystsatin and diflucan           Mckenzie So PA-C

## 2024-04-09 ENCOUNTER — TELEPHONE (OUTPATIENT)
Dept: PRIMARY CARE | Facility: CLINIC | Age: 74
End: 2024-04-09
Payer: MEDICARE

## 2024-04-09 PROCEDURE — 1180000001 HC REHAB PRIVATE ROOM DAILY

## 2024-04-09 PROCEDURE — 92526 ORAL FUNCTION THERAPY: CPT | Mod: GN

## 2024-04-09 PROCEDURE — 2500000002 HC RX 250 W HCPCS SELF ADMINISTERED DRUGS (ALT 637 FOR MEDICARE OP, ALT 636 FOR OP/ED): Mod: MUE | Performed by: INTERNAL MEDICINE

## 2024-04-09 PROCEDURE — 97530 THERAPEUTIC ACTIVITIES: CPT | Mod: GO,CO

## 2024-04-09 PROCEDURE — 2500000002 HC RX 250 W HCPCS SELF ADMINISTERED DRUGS (ALT 637 FOR MEDICARE OP, ALT 636 FOR OP/ED): Performed by: INTERNAL MEDICINE

## 2024-04-09 PROCEDURE — 97116 GAIT TRAINING THERAPY: CPT | Mod: GP

## 2024-04-09 PROCEDURE — 2500000005 HC RX 250 GENERAL PHARMACY W/O HCPCS: Performed by: INTERNAL MEDICINE

## 2024-04-09 PROCEDURE — 97110 THERAPEUTIC EXERCISES: CPT | Mod: GO

## 2024-04-09 PROCEDURE — 2500000004 HC RX 250 GENERAL PHARMACY W/ HCPCS (ALT 636 FOR OP/ED): Performed by: INTERNAL MEDICINE

## 2024-04-09 PROCEDURE — 97110 THERAPEUTIC EXERCISES: CPT | Mod: GP

## 2024-04-09 PROCEDURE — 97535 SELF CARE MNGMENT TRAINING: CPT | Mod: GO,CO

## 2024-04-09 PROCEDURE — 2500000001 HC RX 250 WO HCPCS SELF ADMINISTERED DRUGS (ALT 637 FOR MEDICARE OP): Performed by: INTERNAL MEDICINE

## 2024-04-09 PROCEDURE — 99232 SBSQ HOSP IP/OBS MODERATE 35: CPT | Performed by: INTERNAL MEDICINE

## 2024-04-09 RX ADMIN — ASPIRIN 81 MG CHEWABLE TABLET 81 MG: 81 TABLET CHEWABLE at 08:34

## 2024-04-09 RX ADMIN — ACETAMINOPHEN 650 MG: 325 TABLET ORAL at 05:45

## 2024-04-09 RX ADMIN — FORMOTEROL FUMARATE DIHYDRATE 20 MCG: 20 SOLUTION RESPIRATORY (INHALATION) at 08:33

## 2024-04-09 RX ADMIN — ALBUTEROL SULFATE 2.5 MG: 2.5 SOLUTION RESPIRATORY (INHALATION) at 17:52

## 2024-04-09 RX ADMIN — METOPROLOL TARTRATE 25 MG: 25 TABLET, FILM COATED ORAL at 08:34

## 2024-04-09 RX ADMIN — Medication 400 MG: at 08:34

## 2024-04-09 RX ADMIN — Medication 1 TABLET: at 08:34

## 2024-04-09 RX ADMIN — BUDESONIDE INHALATION 0.5 MG: 0.5 SUSPENSION RESPIRATORY (INHALATION) at 08:33

## 2024-04-09 RX ADMIN — METOPROLOL TARTRATE 25 MG: 25 TABLET, FILM COATED ORAL at 20:35

## 2024-04-09 RX ADMIN — ALBUTEROL SULFATE 2.5 MG: 2.5 SOLUTION RESPIRATORY (INHALATION) at 05:51

## 2024-04-09 RX ADMIN — FUROSEMIDE 20 MG: 20 TABLET ORAL at 08:34

## 2024-04-09 RX ADMIN — LEVOTHYROXINE SODIUM 50 MCG: 50 TABLET ORAL at 05:30

## 2024-04-09 RX ADMIN — LIDOCAINE 1 PATCH: 4 PATCH TOPICAL at 08:33

## 2024-04-09 RX ADMIN — METOPROLOL TARTRATE 25 MG: 25 TABLET, FILM COATED ORAL at 14:39

## 2024-04-09 RX ADMIN — ACETAMINOPHEN 650 MG: 325 TABLET ORAL at 13:16

## 2024-04-09 RX ADMIN — FLUCONAZOLE 150 MG: 150 TABLET ORAL at 20:35

## 2024-04-09 RX ADMIN — BUDESONIDE INHALATION 0.5 MG: 0.5 SUSPENSION RESPIRATORY (INHALATION) at 20:35

## 2024-04-09 RX ADMIN — TIOTROPIUM BROMIDE INHALATION SPRAY 2 PUFF: 3.12 SPRAY, METERED RESPIRATORY (INHALATION) at 08:33

## 2024-04-09 RX ADMIN — FORMOTEROL FUMARATE DIHYDRATE 20 MCG: 20 SOLUTION RESPIRATORY (INHALATION) at 20:34

## 2024-04-09 RX ADMIN — Medication 2 L/MIN: at 17:30

## 2024-04-09 ASSESSMENT — PAIN - FUNCTIONAL ASSESSMENT
PAIN_FUNCTIONAL_ASSESSMENT: 0-10

## 2024-04-09 ASSESSMENT — PAIN SCALES - GENERAL
PAINLEVEL_OUTOF10: 4
PAINLEVEL_OUTOF10: 0 - NO PAIN
PAINLEVEL_OUTOF10: 5 - MODERATE PAIN

## 2024-04-09 ASSESSMENT — PAIN DESCRIPTION - DESCRIPTORS
DESCRIPTORS: ACHING

## 2024-04-09 ASSESSMENT — PAIN DESCRIPTION - LOCATION: LOCATION: HIP

## 2024-04-09 ASSESSMENT — ACTIVITIES OF DAILY LIVING (ADL)
HOME_MANAGEMENT_TIME_ENTRY: 30
HOME_MANAGEMENT_TIME_ENTRY: 15

## 2024-04-09 ASSESSMENT — PAIN DESCRIPTION - ORIENTATION: ORIENTATION: RIGHT

## 2024-04-09 NOTE — CARE PLAN
The patient's goals for the shift include sleep    The clinical goals for the shift include maintain safety; promote sleep; manage pain

## 2024-04-09 NOTE — NURSING NOTE
Patient sitting on the side of bed eating breakfast at this time. Patient reminded of eating techniques.

## 2024-04-09 NOTE — PROGRESS NOTES
Physical Therapy       04/09/24 9272-0113   PT  Visit   PT Received On 04/09/24   Response to Previous Treatment Patient reporting fatigue but able to participate.   General   Reason for Referral Impaired mobility   Referred By Dr. 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   Patient Position Received Up in chair   Preferred Learning Style verbal;visual   General Comment 2 Liters 02 continuous chronically   Precautions   Hearing/Visual Limitations hearing diminished; corective lenses for reading   Medical Precautions Oxygen therapy device and L/min  (2 L/min)   Precautions Comment Oxygen tubing.   Pain Assessment   Pain Assessment 0-10   Pain Score 4   Pain Type Chronic pain   Pain Location Neck   Pain Orientation Right   Pain Descriptors Aching   Pain Interventions Back rub   Response to Interventions effective   General Observation   General Observation DobHoff Removed Saturday   Therapeutic Exercise   Therapeutic Exercise Performed Yes  (all standing exercises performed at rollator with close supervision)   Therapeutic Exercise Activity 1 standing toe raises 2 x 15   Therapeutic Exercise Activity 2 standing mini squats 2 x 15   Therapeutic Exercise Activity 3 standing marches  x 15   Therapeutic Exercise Activity 4 standing hip extension 2 x 15   Therapeutic Exercise Activity 5 standing hip abduction 2 x 15   Ambulation/Gait Training   Ambulation/Gait Training Performed Yes   Ambulation/Gait Training 1   Surface 1 Level tile   Device 1 Rollator   Gait Support Devices Gait belt  (O2 transport)   Assistance 1 Close supervision   Quality of Gait 1 Diminished heel strike;Shuffling gait;Forward flexed posture   Comments/Distance (ft) 1 90' x 1, 125' x 1   Transfers   Transfer Yes    Transfer 1   Transfer From 1 Wheelchair to   Transfer to 1 Stand   Technique 1 Sit to stand;Stand to sit   Transfer Device 1 Gait belt;Walker   Transfer Level of Assistance 1 Close supervision   Other Activity   Other Activity Performed Yes   Other Activity 1 omnicycle, 0 resistance, 5 mins, 0.8 km, 96% activity   Activity Tolerance   Endurance Tolerates 10 - 20 min exercise with multiple rests   Early Mobility/Exercise Safety Screen Proceed with mobilization - No exclusion criteria met   PT Assessment   PT Assessment Results Decreased strength;Pain;Decreased range of motion;Decreased endurance;Impaired balance;Decreased mobility;Decreased cognition;Impaired judgement;Decreased safety awareness   Rehab Prognosis Excellent   Barriers to Discharge medical acuity   Evaluation/Treatment Tolerance Patient limited by fatigue   Strengths Ability to acquire knowledge;Attitude of self   Barriers to Participation Comorbidities   Assessment Comment Pt provided good effort this morning. Pt balance has improved as indicated with SAWYER score 52/56 placing her at low fall risk. Ambulated close supervision with rollator and demonstrates safe execution of locking breaks when needing a rest break. Pt fatigues quickly which requires frequent rests. Continue with exercises to address pt deficits to promote safe discharge.   End of Session Patient Position Up in chair  (needs in reach)   PT Plan   Inpatient/Swing Bed or Outpatient Inpatient   PT Plan   Treatment/Interventions Transfer training;Gait training;Endurance training;Therapeutic exercise   PT Plan Skilled PT   Equipment Recommended upon Discharge Wheeled walker  (rollator)   PT Recommended Transfer Status Stand by assist   PT - OK to Discharge Yes

## 2024-04-09 NOTE — PROGRESS NOTES
"[facility]  Physician Progress Note    Subjective   Trinity Hernandez is a 74 y.o. female admitted to inpatient rehabilitation unit.    The patient is doing well overall and expresses no particular concerns.  Participating well with therapy.  Eating well and the bowels are moving.  No chest pain or shortness of breath is reported.    An interdisciplinary team meeting was held today.  I was in attendance and discussed this patient's functional progress with the physical and occupational therapist, as well as the speech therapist if they are involved in the patient's care.  Also present were representatives from the nursing staff and case management.  PLEASE REFER TO THE INTERDISCIPLINARY TEAM DOCUMENTATION FOR ADDITIONAL COMMENTS ON THE PATIENT'S CLINICAL AND FUNCTIONAL CONDITION.  I agree with the decisions made by the team.  The patient's medical and functional condition continue to require frequent physician visits, 24 hour rehabilitation nursing care and an interdisciplinary approach, such that this level of care could not be provided outside of an inpatient rehabilitation facility.  The patient's anticipated discharge plan and date was reviewed and approved by me.      Objective   /63 (BP Location: Right arm, Patient Position: Sitting)   Pulse 72   Temp 36.2 °C (97.2 °F) (Oral)   Resp 18   Ht 1.7 m (5' 6.93\")   Wt 76.4 kg (168 lb 6.9 oz)   SpO2 94%   BMI 26.44 kg/m²      Physical Exam  Vitals reviewed.   Constitutional:       Appearance: Normal appearance.   Cardiovascular:      Rate and Rhythm: Normal rate and regular rhythm.      Heart sounds: No murmur heard.  Pulmonary:      Breath sounds: Normal breath sounds. No wheezing, rhonchi or rales.   Musculoskeletal:      Right lower leg: No edema.      Left lower leg: No edema.         Labs  BMP:    CBC:    Coagulation:       Assesment and Plan    Left MCA stroke - cont ASA  SDH - follow up CT in " 4 weeks  AF off AC due to SDH  Dysphagia - cont ST efforts  Hypertension - cont metoprolol  Hypothyroidism - cont levothyroxine    Daniel Carlisle MD

## 2024-04-09 NOTE — PROGRESS NOTES
Occupational Therapy Note      04/09/24 7826-7499   OT Last Visit   OT Received On 04/09/24   General   Reason for Referral Impaired mobility   Referred By Dr. 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   Missed Visit No   Family/Caregiver Present No   Prior to Session Communication Bedside nurse   Patient Position Received Up in chair   Preferred Learning Style verbal;visual   General Comment agreeable to therapy   Precautions   Hearing/Visual Limitations hearing diminished; corective lenses for reading   Medical Precautions Oxygen therapy device and L/min  (2L)   Precautions Comment Oxygen tubing.   Pain Assessment   Pain Assessment 0-10   Pain Score 4   Pain Type Chronic pain   Pain Location Head  (neck)   Pain Orientation Right   Pain Descriptors Aching   Pain Frequency Intermittent   Pain Onset Ongoing   Clinical Progression Not changed   Patient's Stated Pain Goal No pain   Cognition   Overall Cognitive Status WFL   Arousal/Alertness Appropriate responses to stimuli   Orientation Level Oriented X4   Following Commands Follows all commands and directions without difficulty   Safety/Judgement X   Insight Moderate   Impulsive Moderately   Processing Speed Delayed   Coordination   Movements are Fluid and Coordinated No   Coordination Comment Weakness RUE/LE   RUE    RUE  WFL   LUE    LUE WFL   Feeding   Feeding Level of Assistance Modified independent   Feeding Where Assessed Wheelchair   Feeding Comments to eat pudding cup   Grooming   Grooming Level of Assistance Modified independent   Grooming Where Assessed Wheelchair   Grooming Comments to brush teeth with electric toothbrush sitting in wc at sink   UE Bathing   UE Bathing Comments declined bathing   LE Bathing   LE Bathing  Comments declined bathing   UE Dressing   UE Dressing Comments already dressed   LE Dressing   LE Dressing No   LE Dressing Comments already dressed   Functional Standing Tolerance   Time approximately 8 mins   Activity during meal prep   Functional Standing Tolerance Comments dynamic standing with unilateral support  of counter top   Functional Mobility   Functional Mobility Performed Yes   Functional Mobility 1   Surface 1 Level tile   Device 1 Rolling walker   Functional Mobility Support Devices Gait belt   Assistance 1 Close supervision   Comments 1 during kitchen mobility   Transfers   Transfer Yes   Transfer 1   Transfer From 1 Wheelchair to   Transfer to 1 Stand   Technique 1 Sit to stand;Stand to sit   Transfer Device 1 Walker;Gait belt   Transfer Level of Assistance 1 Close supervision   Modalities   Modalities Used No   Static Standing Balance   Static Standing-Balance Support Bilateral upper extremity supported   Static Standing-Level of Assistance Close supervision   Dynamic Standing Balance   Dynamic Standing-Balance Support Left upper extremity supported   Dynamic Standing-Balance Reaching for objects   Dynamic Standing-Comments close supervision unilateral support at sink   Meal Prep   Meal Prep Level of Assistance Close supervision   Meal Prep Level Walker   Meal Preparation to cook eggs on stove top   Kitchen Mobility   Kitchen Mobility Level of Assistance Close supervision   Kitchen-Mobility Level Walker   Kitchen Activity Retrieve items;Transport items   Kitchen Mobility Comments pt ed on walker tray  (communicated with PT who is trialling a rollator with pt)   Light Housekeeping   Light Housekeeping Level of Assistance Close supervision   Light Housekeeping Level Other (Comment)   Light Housekeeping unilateral support of countertop   Therapeutic Exercise   Therapeutic Exercise Performed Yes   Therapeutic Exercise Activity 1 PREs with 2# hand weights with BUEs 15 reps x 1 set in all planes with  good tolerance to improve muscle strength for ADLs/IADLs.   IP OT Assessment   OT Assessment Pt actively engages in cooking activity in preparation for return home.  Pt plans to have a family training on Thursday, 4/11 at 9:00 am to make AE/DME recommendations to promote independence with ADLs.  Continue skilled OT services to address remaining goals and begin DC planning.   Prognosis Good   Barriers to Discharge None   Evaluation/Treatment Tolerance Patient limited by fatigue   Medical Staff Made Aware Yes   End of Session Communication Bedside nurse   End of Session Patient Position Up in chair   OT Assessment   OT Assessment Results Decreased ADL status;Decreased upper extremity strength;Decreased endurance   Strengths Ability to acquire knowledge   Barriers to Participation Comorbidities   Education   Individual(s) Educated Patient   Education Provided Fall precautons   Equipment Other  (walker tray)   Risk and Benefits Discussed with Patient/Caregiver/Other yes   Patient/Caregiver Demonstrated Understanding yes   Plan of Care Discussed and Agreed Upon yes   Patient Response to Education Patient/Caregiver Verbalized Understanding of Information   Education Comment Pt receptive and progressing towards new learned techniques   Inpatient/Swing Bed or Outpatient   Inpatient/Swing Bed or Outpatient Inpatient   Inpatient Plan   Treatment Interventions ADL retraining;Functional transfer training;UE strengthening/ROM;Endurance training;Patient/family training;Equipment evaluation/education;Neuromuscular reeducation;Compensatory technique education   OT Frequency 5 times per week   OT Discharge Recommendations Low intensity level of continued care   Equipment Recommended upon Discharge Wheeled walker   OT - OK to Discharge Yes   OT Recommended Transfer Status Stand by assist

## 2024-04-09 NOTE — PROGRESS NOTES
Physical Therapy       04/09/24 [id number]   PT  Visit   PT Received On 04/09/24   Response to Previous Treatment Patient reporting fatigue but able to participate.   General   Reason for Referral Impaired mobility   Referred By Dr. 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   Patient Position Received Up in chair   Preferred Learning Style verbal;visual   Precautions   Hearing/Visual Limitations hearing diminished; corective lenses for reading   Medical Precautions Fall precautions;Oxygen therapy device and L/min  (2l/O2 NC)   Precautions Comment Oxygen tubing.   Pain Assessment   Pain Assessment 0-10   Pain Score 4   Pain Type Chronic pain   Pain Location Head  (Neck)   Pain Descriptors Aching   Pain Interventions   (pt reports receiving tylenol before therapy, has not kicked in yet)   Therapeutic Exercise   Therapeutic Exercise Performed Yes   Therapeutic Exercise Activity 1 seated hip adduction, 2 x 15   Therapeutic Exercise Activity 2 seated hip abduction, 2 x 15, green theraband   Therapeutic Exercise Activity 3 seated LAQ, no #, 1 x 15 5 sec holds   Therapeutic Exercise Activity 4 seated hamstring curls, 2 x 15, green theraband   Ambulation/Gait Training   Ambulation/Gait Training Performed Yes   Ambulation/Gait Training 1   Surface 1 Level tile   Device 1 Rollator   Gait Support Devices Gait belt  (O2 follow)   Assistance 1 Close supervision   Quality of Gait 1 Diminished heel strike;Shuffling gait;Forward flexed posture   Comments/Distance (ft) 1 100' x 2   Transfers   Transfer Yes   Transfer 1   Transfer From 1 Stand to   Transfer to 1 Sit   Technique 1 Sit to stand;Stand to sit   Transfer Device 1 Walker;Gait belt   Transfer Level of Assistance 1 Close supervision   Activity  Tolerance   Endurance Tolerates 10 - 20 min exercise with multiple rests   Early Mobility/Exercise Safety Screen Proceed with mobilization - No exclusion criteria met   PT Assessment   PT Assessment Results Decreased strength;Pain;Decreased range of motion;Decreased endurance;Impaired balance;Decreased mobility;Decreased cognition;Impaired judgement;Decreased safety awareness   Rehab Prognosis Excellent   Barriers to Discharge medical acuity   Evaluation/Treatment Tolerance Patient limited by fatigue   Strengths Ability to acquire knowledge   Barriers to Participation Comorbidities   End of Session Patient Position Up in chair   PT Plan   Inpatient/Swing Bed or Outpatient Inpatient   PT Plan   Treatment/Interventions Gait training;Transfer training;Strengthening;Therapeutic exercise;Therapeutic activity   PT Plan Skilled PT   Equipment Recommended upon Discharge Wheeled walker  (rollator)   PT Recommended Transfer Status Stand by assist   PT - OK to Discharge Yes

## 2024-04-09 NOTE — PROGRESS NOTES
Occupational Therapy       04/09/24 [id number]   OT Last Visit   OT Received On 04/09/24   General   Reason for Referral Impaired mobility   Referred By Dr. 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   Missed Visit No   Family/Caregiver Present No   Prior to Session Communication Bedside nurse  (OTR/L re Pts current POC)   Patient Position Received Bed, 3 rail up;Alarm off, not on at start of session   Preferred Learning Style verbal;visual   General Comment Confered with NSG. Pt agreeable to skilled therapeutic intervention.   Precautions   Hearing/Visual Limitations hearing diminished; corective lenses for reading   Medical Precautions Fall precautions;Oxygen therapy device and L/min  (2LO2 NC)   Pain Assessment   Pain Assessment 0-10   Pain Score 5 - Moderate pain   Pain Location Head   Pain Orientation Left   Pain Interventions Repositioned;Environmental changes;Relaxation technique;Therapeutic presence   Response to Interventions effective   Cognition   Overall Cognitive Status WFL   Orientation Level Oriented X4   Insight Moderate   Impulsive Moderately   Processing Speed Delayed   Coordination   Movements are Fluid and Coordinated No   RUE    RUE  WFL   LUE    LUE WFL   Feeding   Feeding Level of Assistance Modified independent   Feeding Where Assessed Wheelchair   Feeding Comments honey thick liquids Pt selfr feed   Grooming   Grooming Level of Assistance Modified independent   Grooming Where Assessed Wheelchair   Grooming Comments Pt washing hands   Toileting   Toileting Level of Assistance Setup   Where Assessed Toilet;Bedside commode  (atop toilet)   Toileting Comments Pt adjusting clothing prior/after/hygioene   Bed Mobility   Bed Mobility Yes   Bed Mobility 1   Bed Mobility  1 Supine to sitting   Level of Assistance 1 Modified independent   Bed Mobility Comments 1 use side transfer bar   Bed Mobility 2   Bed Mobility  2 Scooting   Level of Assistance 2 Modified independent   Bed Mobility Comments 2 to edge of bed   Functional Mobility   Functional Mobility Performed Yes   Functional Mobility 1   Surface 1 Level tile   Device 1 Rolling walker   Functional Mobility Support Devices Gait belt   Assistance 1 Close supervision   Comments 1 15' to include doorway, turns and backing, educated Pt with clothing safety as Pt wearing dress too long makiing contact with B shoes.   Transfers   Transfer Yes   Transfer 1   Transfer From 1 Stand to   Transfer to 1 Sit   Transfer Device 1 Walker;Gait belt   Transfer Level of Assistance 1 Distant supervision   Trials/Comments 1 vc hand placement   Toilet Transfers   Toilet Transfer From Rolling walker   Toilet Transfer Type To and from   Toilet Transfer to Standard toilet   Toilet Transfer Technique Ambulating  (atop toilet)   Toilet Transfers Supervision   Toilet Transfers Comments Good use of grab bars.   IP OT Assessment   OT Assessment Pt progressing with established POC but fatigues easily. Will continue with skilled therapeutic interventions to address remaining deficits.   Prognosis Good   Barriers to Discharge None   Evaluation/Treatment Tolerance Patient tolerated treatment well   Medical Staff Made Aware Yes   End of Session Communication Bedside nurse   End of Session Patient Position Up in chair  (in PT gym staff present all needs met)

## 2024-04-10 ENCOUNTER — APPOINTMENT (OUTPATIENT)
Dept: PRIMARY CARE | Facility: CLINIC | Age: 74
End: 2024-04-10
Payer: MEDICARE

## 2024-04-10 PROCEDURE — 1180000001 HC REHAB PRIVATE ROOM DAILY

## 2024-04-10 PROCEDURE — 97116 GAIT TRAINING THERAPY: CPT | Mod: GP

## 2024-04-10 PROCEDURE — 2500000005 HC RX 250 GENERAL PHARMACY W/O HCPCS: Performed by: INTERNAL MEDICINE

## 2024-04-10 PROCEDURE — 97535 SELF CARE MNGMENT TRAINING: CPT | Mod: GO

## 2024-04-10 PROCEDURE — 92526 ORAL FUNCTION THERAPY: CPT | Mod: GN

## 2024-04-10 PROCEDURE — 2500000004 HC RX 250 GENERAL PHARMACY W/ HCPCS (ALT 636 FOR OP/ED): Performed by: INTERNAL MEDICINE

## 2024-04-10 PROCEDURE — 97530 THERAPEUTIC ACTIVITIES: CPT | Mod: GP

## 2024-04-10 PROCEDURE — 2500000001 HC RX 250 WO HCPCS SELF ADMINISTERED DRUGS (ALT 637 FOR MEDICARE OP): Performed by: INTERNAL MEDICINE

## 2024-04-10 PROCEDURE — 2500000002 HC RX 250 W HCPCS SELF ADMINISTERED DRUGS (ALT 637 FOR MEDICARE OP, ALT 636 FOR OP/ED): Mod: MUE | Performed by: INTERNAL MEDICINE

## 2024-04-10 PROCEDURE — 94640 AIRWAY INHALATION TREATMENT: CPT

## 2024-04-10 PROCEDURE — 97110 THERAPEUTIC EXERCISES: CPT | Mod: GP

## 2024-04-10 PROCEDURE — 97112 NEUROMUSCULAR REEDUCATION: CPT | Mod: GP

## 2024-04-10 PROCEDURE — 97530 THERAPEUTIC ACTIVITIES: CPT | Mod: GO

## 2024-04-10 PROCEDURE — 2500000002 HC RX 250 W HCPCS SELF ADMINISTERED DRUGS (ALT 637 FOR MEDICARE OP, ALT 636 FOR OP/ED): Performed by: INTERNAL MEDICINE

## 2024-04-10 RX ADMIN — TIOTROPIUM BROMIDE INHALATION SPRAY 2 PUFF: 3.12 SPRAY, METERED RESPIRATORY (INHALATION) at 08:13

## 2024-04-10 RX ADMIN — ACETAMINOPHEN 650 MG: 325 TABLET ORAL at 20:52

## 2024-04-10 RX ADMIN — BUDESONIDE INHALATION 0.5 MG: 0.5 SUSPENSION RESPIRATORY (INHALATION) at 20:51

## 2024-04-10 RX ADMIN — METOPROLOL TARTRATE 25 MG: 25 TABLET, FILM COATED ORAL at 08:13

## 2024-04-10 RX ADMIN — ALBUTEROL SULFATE 2.5 MG: 2.5 SOLUTION RESPIRATORY (INHALATION) at 06:13

## 2024-04-10 RX ADMIN — ASPIRIN 81 MG CHEWABLE TABLET 81 MG: 81 TABLET CHEWABLE at 08:13

## 2024-04-10 RX ADMIN — ACETAMINOPHEN 650 MG: 325 TABLET ORAL at 00:44

## 2024-04-10 RX ADMIN — Medication 400 MG: at 08:13

## 2024-04-10 RX ADMIN — BUDESONIDE INHALATION 0.5 MG: 0.5 SUSPENSION RESPIRATORY (INHALATION) at 08:13

## 2024-04-10 RX ADMIN — ACETAMINOPHEN 650 MG: 325 TABLET ORAL at 14:35

## 2024-04-10 RX ADMIN — ACETAMINOPHEN 650 MG: 325 TABLET ORAL at 07:46

## 2024-04-10 RX ADMIN — METOPROLOL TARTRATE 25 MG: 25 TABLET, FILM COATED ORAL at 20:51

## 2024-04-10 RX ADMIN — ALBUTEROL SULFATE 2.5 MG: 2.5 SOLUTION RESPIRATORY (INHALATION) at 18:02

## 2024-04-10 RX ADMIN — Medication: at 17:30

## 2024-04-10 RX ADMIN — FORMOTEROL FUMARATE DIHYDRATE 20 MCG: 20 SOLUTION RESPIRATORY (INHALATION) at 08:13

## 2024-04-10 RX ADMIN — METOPROLOL TARTRATE 25 MG: 25 TABLET, FILM COATED ORAL at 14:35

## 2024-04-10 RX ADMIN — LIDOCAINE 1 PATCH: 4 PATCH TOPICAL at 08:14

## 2024-04-10 RX ADMIN — FLUCONAZOLE 150 MG: 150 TABLET ORAL at 23:14

## 2024-04-10 RX ADMIN — FUROSEMIDE 20 MG: 20 TABLET ORAL at 08:13

## 2024-04-10 RX ADMIN — LEVOTHYROXINE SODIUM 50 MCG: 50 TABLET ORAL at 05:14

## 2024-04-10 RX ADMIN — FORMOTEROL FUMARATE DIHYDRATE 20 MCG: 20 SOLUTION RESPIRATORY (INHALATION) at 20:51

## 2024-04-10 RX ADMIN — Medication 1 TABLET: at 08:13

## 2024-04-10 ASSESSMENT — PAIN - FUNCTIONAL ASSESSMENT
PAIN_FUNCTIONAL_ASSESSMENT: 0-10

## 2024-04-10 ASSESSMENT — PAIN SCALES - GENERAL
PAINLEVEL_OUTOF10: 0 - NO PAIN
PAINLEVEL_OUTOF10: 5 - MODERATE PAIN
PAINLEVEL_OUTOF10: 2
PAINLEVEL_OUTOF10: 0 - NO PAIN
PAINLEVEL_OUTOF10: 0 - NO PAIN
PAINLEVEL_OUTOF10: 4
PAINLEVEL_OUTOF10: 4
PAINLEVEL_OUTOF10: 0 - NO PAIN

## 2024-04-10 ASSESSMENT — ACTIVITIES OF DAILY LIVING (ADL)
HOME_MANAGEMENT_TIME_ENTRY: 15
HOME_MANAGEMENT_TIME_ENTRY: 15

## 2024-04-10 ASSESSMENT — PAIN DESCRIPTION - DESCRIPTORS: DESCRIPTORS: ACHING

## 2024-04-10 ASSESSMENT — PAIN DESCRIPTION - LOCATION
LOCATION: BACK
LOCATION: NECK
LOCATION: NECK
LOCATION: BACK

## 2024-04-10 NOTE — PROGRESS NOTES
Occupational Therapy Note     04/10/24 [id number]   OT Last Visit   OT Received On 04/10/24   General   Reason for Referral Impaired mobility   Referred By Dr. 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   Missed Visit No   Family/Caregiver Present No   Prior to Session Communication Bedside nurse   Patient Position Received Bed, 2 rail up  (pt seated at EOB)   Preferred Learning Style verbal;visual   General Comment pt agreeable to therapy; however declined showering or changing clothes   Precautions   Hearing/Visual Limitations hearing diminished; corective lenses for reading   Medical Precautions Fall precautions;Oxygen therapy device and L/min  (2L/O2 via NC)   Precautions Comment Oxygen tubing.   Pain Assessment   Pain Assessment 0-10   Pain Score 2   Pain Type Chronic pain   Pain Location Neck   Pain Orientation Right   Pain Descriptors Aching   Pain Frequency Intermittent   Pain Onset Ongoing   Clinical Progression Not changed   Patient's Stated Pain Goal No pain   Cognition   Overall Cognitive Status WFL   Arousal/Alertness Appropriate responses to stimuli   Orientation Level Oriented X4   Following Commands Follows one step commands without difficulty   Safety/Judgement X   Insight Moderate   Impulsive Moderately   Processing Speed Delayed   Coordination   Movements are Fluid and Coordinated No   Coordination Comment Weakness RUE/LE   RUE    RUE  WFL   Feeding   Feeding Level of Assistance Modified independent   Feeding Where Assessed Wheelchair   Grooming   Grooming Level of Assistance Modified independent   Grooming Where Assessed Edge of bed   Grooming Comments wash/dry face and brush teeth   UE Bathing   UE Bathing Comments  declined bathing  (reported to nursing)   LE Bathing   LE Bathing Comments declined bathing  (reported to nursing)   UE Dressing   UE Dressing Comments declined changing clothes from the day prior  (pt ed on risks of poor hygiene; pt continued to decline)   LE Dressing   LE Dressing Comments declined changing clothes  (pt ed on poor hygiene; pt continued to decline)   Functional Mobility   Functional Mobility Performed No   Transfers   Transfer Yes   Transfer 1   Transfer From 1 Stand to   Transfer to 1 Sit   Technique 1 Sit to stand;Stand to sit   Transfer Device 1 Walker;Gait belt   Transfer Level of Assistance 1 Close supervision   Transfers 2   Transfer From 2 Bed to   Transfer to 2 Wheelchair   Technique 2 Sit to stand;Stand to sit   Transfer Device 2 Walker   Transfer Level of Assistance 2 Close supervision   Modalities   Modalities Used No   Static Sitting Balance   Static Sitting-Balance Support Feet supported   Static Sitting-Level of Assistance Distant supervision   Static Sitting-Comment/Number of Minutes sitting at EOB   Dynamic Sitting Balance   Dynamic Sitting-Balance Support Feet supported   Dynamic Sitting-Balance Reaching for objects   Dynamic Sitting-Comments sitting at EOB during ADL activities   Static Standing Balance   Static Standing-Balance Support Bilateral upper extremity supported   Static Standing-Level of Assistance Close supervision   Tub Transfers   Tub Transfer From Walker   Tub Transfer Type To and from   Tub Transfer to Transfer tub bench   Tub Transfer Technique Stand pivot   Tub Transfers Supervision   Tub Transfers Comments DRY transfer TTB with use of grab bar.  Pt demonstrated good techniuqe.  (issued handout for reference)   Car Transfers   Car Transfer From Walker   Car Transfer Technique Stand pivot   Car Transfers Supervision   Car Transfers Comments simulated with use of TTB   Therapeutic Activity   Therapeutic Activity Performed Yes   Therapeutic Activity 1 preparation  for 4/11 home visit; ed on AE/DME, issued handouts   IP OT Assessment   OT Assessment Pt has declined a shower for 2 days and changing clothes for 1 day.  Provided gentle encouragement and reported to nursing.  This has been identified as a limiting factor.  Pt has performed a simulated car transfer with use of a TTB, a DRY TTB transfer, and prepared a stove top meal with close supervision and safe technique. Pt progressing towards established goals.  Continue skilled OT for independence with ADLs/IADLS, perform family training on 4/11 and begin DC home.   Prognosis Good   Barriers to Discharge None   Evaluation/Treatment Tolerance Patient tolerated treatment well   Medical Staff Made Aware Yes   End of Session Communication Bedside nurse   End of Session Patient Position   (seated at EOB with call light in place)   OT Assessment   OT Assessment Results Decreased ADL status;Decreased upper extremity strength;Decreased endurance   Strengths Ability to acquire knowledge   Barriers to Participation Comorbidities   Education   Individual(s) Educated Patient   Education Provided Fall precautons;Other  (tub transfer with use of TTB, simulated car transfer with use of TTB; issued handouts)   Equipment Other  (ed on rollator, TTB, BSC, grab bars shower/toilet; issued handouts)   Community Resources [facility]   Risk and Benefits Discussed with Patient/Caregiver/Other yes   Patient/Caregiver Demonstrated Understanding yes   Plan of Care Discussed and Agreed Upon yes   Patient Response to Education Patient/Caregiver Verbalized Understanding of Information   Education Comment Pt receptive and progressing towards new learned techniques   Inpatient/Swing Bed or Outpatient   Inpatient/Swing Bed or Outpatient Inpatient   Inpatient Plan   Treatment Interventions ADL retraining;Functional transfer training;UE strengthening/ROM;Endurance training;Patient/family training;Equipment evaluation/education;Neuromuscular  reeducation;Compensatory technique education   OT Frequency 5 times per week   OT Discharge Recommendations Low intensity level of continued care   Equipment Recommended upon Discharge Wheeled walker  (rollator)   OT - OK to Discharge Yes   OT Recommended Transfer Status Stand by assist

## 2024-04-10 NOTE — PROGRESS NOTES
Music Therapy Note    Trinity Hernandez was referred by     Therapy Session  Referral Type: New referral this admission  Visit Type: Follow-up visit  Session Start Time: 1300  Session End Time: 1330  Intervention Delivery: In-person  Conflict of Service: None  Family Present for Session: None     Pre-assessment  Unable to Assess Reason: Outcomes not assessed  Mood/Affect: Appropriate, Calm         Treatment/Interventions  Areas of Focus: Physiological functioning improvement  Music Therapy Interventions: Active music engagement, Live music listening  Co-Treatment: OT  Interruption: No  Patient Fell Asleep at End of Session: No    Post-assessment  Unable to Assess Reason: Outcomes not evaluated  Mood/Affect: Appropriate, Calm  Verbalized Emotional State: Enjoyment, Gratitude  Continue Visiting: Yes  Total Session Time (min): 30 minutes    Narrative  Assessment Detail: Pt was brought into [facility] by OT. Pt was verbally happy about services, and stated that this would be her last time seeing MT due to discharge.  Plan: To enhance OT tx through music intervention  Intervention: Pt played hand chimes along to live versions of [employer]. Next, pt played tambourine along to [employer] World and I'll Fly Away. Pt sang throughout [facility].  Evaluation: Pt remained positive throughout session and sang along to all songs. Pt was hesitant but willing to attempt new instruments. Pt shared gratitude and enjoyment for MT services once session concluded.  Follow-up: Pt expecting discharge; proper closure made.    Education Documentation  No documentation found.

## 2024-04-10 NOTE — PROGRESS NOTES
Occupational Therapy Note     04/10/24 [id number]   OT Last Visit   OT Received On 04/10/24   General   Reason for Referral Impaired mobility   Referred By Dr. Carlisle   Past Medical History Relevant to Rehab COPD, CAD s/p PCI 10/4/23, NSVT, HTN, HLD, rheumatoid arthritis, and hypothyroidism, CHF, Afib (not on AC), AAA, asthma, COPD, chronic respiratory failure 2/2 pulmonary sarcoidosis, Migraines, Hiatal Hernia, and Diverticulosis.   Patient Position Received   (sitting at EOB)   Preferred Learning Style verbal;visual   General Comment pt agreeable to therapy; however declined showering or changing clothes   Precautions   Hearing/Visual Limitations hearing diminished; corective lenses for reading   Medical Precautions Fall precautions;Oxygen therapy device and L/min   Post-Surgical Precautions   (2L O2 NC)   Precautions Comment Oxygen tubing.   Pain Assessment   Pain Assessment 0-10   Pain Score 0 - No pain   Cognition   Overall Cognitive Status WFL   Arousal/Alertness Appropriate responses to stimuli   Orientation Level Oriented X4   Following Commands Follows one step commands without difficulty   Safety/Judgement X   Insight Moderate   Impulsive Moderately   Processing Speed Delayed   RUE    RUE  WFL   JAYE    JAYE WFL   Toileting   Toileting Level of Assistance Setup   Where Assessed Toilet;Bedside commode  (placed over toilet)   Toileting Comments able to manage pants up/down   Functional Standing Tolerance   Time 3 minutes standing   Activity Musical Jc activity  (in collaboration with Music Therapy)   Functional Standing Tolerance Comments dynamic standing without UB support with F+ balance   Dynamic Sitting Balance   Dynamic Sitting-Balance Support Feet supported   Dynamic Sitting-Balance   (playing various musical instruments)   Dynamic Sitting-Comments unsupported sitting with good balance/tolerance  (with Music Therapy)   Dynamic Standing Balance   Dynamic Standing-Balance Support No upper extremity  supported   Dynamic Standing-Balance Reaching for objects   Dynamic Standing-Comments supervision   Therapeutic Activity   Therapeutic Activity Performed Yes   Therapeutic Activity 1 singing and playing instrument to music in sitting/standing  (to promote standing balance, tolerance, ROM, leisure, uplift mood for active engagement and change focus from medical issues)   IP OT Assessment   OT Assessment Pt actively engaged in music activity this afternoon for physical and emotional well being.  Continue skilled OT promote independence and safety with ADLs.   Prognosis Good   Barriers to Discharge None   Evaluation/Treatment Tolerance Patient tolerated treatment well   Medical Staff Made Aware Yes   End of Session Communication Bedside nurse   End of Session Patient Position Up in chair   OT Assessment   OT Assessment Results Decreased ADL status;Decreased upper extremity strength;Decreased endurance   Strengths Ability to acquire knowledge   Barriers to Participation Comorbidities   Education   Individual(s) Educated Patient   Education Provided Fall precautons;Other  (ed to lock wc brakes prior to standing)   Risk and Benefits Discussed with Patient/Caregiver/Other yes   Patient/Caregiver Demonstrated Understanding yes   Plan of Care Discussed and Agreed Upon yes   Patient Response to Education Patient/Caregiver Verbalized Understanding of Information   Inpatient/Swing Bed or Outpatient   Inpatient/Swing Bed or Outpatient Inpatient   Inpatient Plan   Treatment Interventions ADL retraining;UE strengthening/ROM;Endurance training;Neuromuscular reeducation   OT Frequency 5 times per week   OT Discharge Recommendations Low intensity level of continued care   Equipment Recommended upon Discharge Wheeled walker  (rollator)   OT - OK to Discharge Yes   OT Recommended Transfer Status Stand by assist

## 2024-04-10 NOTE — PROGRESS NOTES
Physical Therapy       04/10/24 [id number]   PT  Visit   PT Received On 04/10/24   Response to Previous Treatment Patient reporting fatigue but able to participate.   General   Reason for Referral Impaired mobility   Referred By Dr. 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   Patient Position Received Bed, 2 rail up   Preferred Learning Style verbal;visual   General Comment Confered with NSG. Pt agreeable to skilled therapeutic intervention.   Precautions   Hearing/Visual Limitations hearing diminished; corective lenses for reading   Medical Precautions Fall precautions;Oxygen therapy device and L/min  (2L/O2 via NC)   Precautions Comment Oxygen tubing.   Pain Assessment   Pain Assessment 0-10   Pain Score 4   Pain Type Chronic pain   Pain Location Neck   Pain Interventions Back rub   Response to Interventions effective   Therapeutic Exercise   Therapeutic Exercise Performed Yes   Therapeutic Exercise Activity 1 seated hip adduction, 2 x 15   Therapeutic Exercise Activity 2 seated hip abduction, 2 x 15, green theraband   Therapeutic Exercise Activity 3 seated LAQ, 1.5# BLE, 1 x 15 5 sec holds   Therapeutic Exercise Activity 4 seated hamstring curls, 2 x 15, green theraband   Therapeutic Exercise Activity 5 seated marches, 1.5# BLE 2 x 15,   Therapeutic Exercise Activity 7 Standing mini squats 2x15   Bed Mobility   Bed Mobility Yes   Bed Mobility 1   Bed Mobility 1 Supine to sitting;Sitting to supine   Level of Assistance 1 Modified independent   Bed Mobility Comments 1 bed flat, uses bed rail   Ambulation/Gait Training   Ambulation/Gait Training Performed Yes   Ambulation/Gait Training 1   Surface 1 Level tile   Device 1 Rollator   Gait Support Devices Gait belt  (O2 follow)    Assistance 1 Close supervision   Quality of Gait 1 Forward flexed posture   Comments/Distance (ft) 1 100' x 2, stedy pace   Transfers   Transfer Yes   Transfer 1   Transfer From 1 Wheelchair to   Transfer to 1 Stand   Technique 1 Sit to stand;Stand to sit   Transfer Device 1 Gait belt   Transfer Level of Assistance 1 Close supervision   Transfers 2   Transfer From 2 Bed to   Transfer to 2 Wheelchair   Technique 2 Sit to stand;Stand to sit   Transfer Device 2 Gait belt   Transfer Level of Assistance 2 Set up   Trials/Comments 2 performs safely   Other Activity   Other Activity Performed Yes   Other Activity 1 pt requested bathroom. Pt wheeled into bathroom set up, pt transfered wheelchair to stand with grab bar set up, pt transfered to toilet set up, pericare set up, pt transfered toilet to stand with grab bar set up, and stand to sit set up.   Activity Tolerance   Endurance Tolerates 10 - 20 min exercise with multiple rests   Early Mobility/Exercise Safety Screen Proceed with mobilization - No exclusion criteria met   Rate of Perceived Exertion (RPE) 5/10 after ambulation   PT Assessment   PT Assessment Results Decreased strength;Pain;Decreased range of motion;Decreased endurance;Impaired balance;Decreased mobility;Decreased cognition;Impaired judgement;Decreased safety awareness   Rehab Prognosis Excellent   Evaluation/Treatment Tolerance Patient limited by fatigue   Strengths Ability to acquire knowledge   Barriers to Participation Comorbidities   Assessment Comment Pt provided good effort this morning. Pt endurance improved during ambulation with rollator demonstrated through ambulaiton longer distance before requesting a rest break.Pt was also able to perform all transfers safely with minimal cuing.   End of Session Patient Position Up in chair  (essentials in reach)   PT Plan   Inpatient/Swing Bed or Outpatient Inpatient   PT Plan   Treatment/Interventions Bed mobility;Gait training;Therapeutic  activity;Therapeutic exercise   PT Plan Skilled PT   Equipment Recommended upon Discharge Wheeled walker  (rollator)   PT Recommended Transfer Status Stand by assist   PT - OK to Discharge Yes

## 2024-04-10 NOTE — NURSING NOTE
A/O x 3. Breathing 2LO2  without difficulty. Pox 98%. Reported pain as 5/10 on pain scale. Administered Tylenol 650 mg with positive outcome. Swallowed med. (Crashed in apple source) without difficulty. Walked to the restroom with assistance of 2-wheel walker without difficulty with steady gate. Had a mojgan bowel movement movement x 3. Bed in lower position. Call light within reach. Will cont. to monitor.

## 2024-04-10 NOTE — PROGRESS NOTES
Speech-Language Pathology    SLP Adult IRF CCR Dysphagia Treatment     Patient Name: Trinity Hernandez  MRN: [id number]  Today's Date: 4/10/2024  Time Calculation  Start Time: 1110  Stop Time: 1155  Time Calculation (min): 45 min     4/4sw    Current Problem:   Ischemic stroke, right body involvement (left brain)     SLP Assessment:  SLP TX Intervention Outcome: Making Progress Towards Goals  SLP Assessment Results: Other (Comment) (dysphagia)  Prognosis: Good  Treatment Provided: Yes  Treatment Tolerance: Patient limited by fatigue  Medical Staff Made Aware: Yes  Strengths: Family/Caregiver Suppport  Barriers: Comorbidities  Education Provided: Yes    Tx session today focused on discussion to continue pureed/moderately-thickened textures at home, where to purchase thickener, different types of thickener, how to utilize for moderate texture. Items that are considered thin liquids such as: ice-cream, Jell-o, sherbet, soup. Provided example of MagicCup. Pt verbalized understanding that soups would need to be thickened. Recommended continued ST intervention at the next level of care. Recommended repeat MBSS prior to any diet changes.      Plan:  Inpatient/Swing Bed or Outpatient: Inpatient  Treatment/Interventions: Other (Comment) (dysphagia)  SLP TX Plan: Continue Plan of Care  SLP Plan: Skilled SLP  SLP Frequency: 4x per week  Duration: Current admission  SLP Discharge Recommendations: Continue skilled SLP services at the next level of care  Next Treatment Priority: family training  Discussed POC: Patient, Nursing  Discussed Risks/Benefits: Yes, Patient, Nursing  Patient/Caregiver Agreeable: Yes      Subjective   Pt seen upright, sitting on edge of bed. 2L O2. Family training is planned for next tx day as pt will return home on 4/13.    General Visit Information:   Reason for Referral: f/u dysphagia tx  Referred By: Dr. Carlisle  Past Medical History Relevant to Rehab: COPD, CAD s/p PCI 10/4/23, NSVT, HTN, HLD,  rheumatoid arthritis, and hypothyroidism, CHF, Afib (not on AC), AAA, asthma, COPD, chronic respiratory failure 2/2 pulmonary sarcoidosis, Migraines, Hiatal Hernia, and Diverticulosis.  Prior to Session Communication: Bedside nurse    Pain Assessment:   Pain Score: 0 - No pain (RN was in to administer pain patch)    Objective   DYSPHAGIA GOALS initiated 3/29, anticipated end 4/19:  Pt will tolerate tsp trials of pureed textures with SLP only to 90% without overt s/s aspiration.  PROGRESS: RN cleared pt for p.o. trials with SLP. 3 ice chips and approx. 1-2 oz of applesauce via tsp after oral care. 4/8 - GOAL MET  STATUS: Pt c/o pain during swallow d/t dobhoff. RN aware. Breathing treatment provided some relief. Pt was able to self-feed after instruction for smaller amounts. No overt s/s aspiration were noted. 4/8 - GOAL MET  2. Pt will utilize chin-tuck, triple effortful swallow with SLP only trials to 100%.              PROGRESS: 90% - 100% with cues, with SLP trials. 4/8 - GOAL MET  STATUS: Pt is affected by pain during swallow. Less facial grimacing today. Able to complete chin-tuck followed by 3 swallows after SLP provides cues to initiate and continue through trials. 4/8 - GOAL MET  Pt will increase swallow reflex via OPEs to 80% accuracy.  PROGRESS: 75% with reduced cues. 4/10: Not addressed d/t education for thickened liquids.  STATUS: CTAR x20. Pitch glides X15. Phonation with increased consistency 3-6 sec. . However, phonation decreases as task progresses. BOT elevation x15. Tongue Pops x15. Cues for breath support, pacing. Ongoing education to monitor breath support. Ami, x3. Supraglottic sequence x4. 4/10: see above. Reviewed handout to continue at home.  Pt will improve swallow reflex via OMEs to 80% accuracy.              PROGRESS: 80% with min cues 4/8: GOAL MET  STATUS: Able to complete sets of x10 labial protrusion/retraction, lingual lateralization,  buccal protrusion/retraction. Reduced cues for  pacing, full ROM. 4/8: GOAL MET  Ongoing assessment via MBSS when indicated to reassess swallow function.  PROGRESS: Discussed MBSS results with pt, spouse, and Mae JOSÉ RN. Notified Dr. Cruz via [employer] Secure Chat of MBSS results.  STATUS: 4/5 - GOAL MET  6.  Pt will tolerate pureed and moderately-thick/honey-thick textures to 90% without overt s/s aspiration.              PROGRESS: Established 4/5, anticipated end 4/19. 4/10: 90%  STATUS: No reported s/s aspiration. Provided education for how to use SimplyThick EasyMix. Provided demonstration with 8oz cola, how to measure, stir, etc. Pt verbalized understanding.  7. Pt will use safe swallow strategies to 90% accuracy with cues during meals and/or snacks.              PROGRESS: Established 4/5, anticipated end 4/19  4/10: 85%   STATUS: Pt utilizes slow rate. Recalls upright position and to remain upright after p.o. intake. Occasional cues for subsequent swallow. Reviewed handout for safe swallow strategies.     Therapeutic Swallow:  Therapeutic Swallow Intervention : Compensatory Strategies, PO Trials  Solid Diet Recommendations: Pureed/extremely thick  (IDDSI Level 4)  Liquid Diet Recommendations: Honey thick/liquidised/moderately thick (IDDS Level 3)  Swallow Comments: med's crushed in pureed    Inpatient Education:  Adult Inpatient Education  Individual(s) Educated: Patient  Verbal Education : goals, progress, how to use thickener, provided SimplyThick Easy Mix samples and how to use  Risk and Benefits Discussed with Patient/Caregiver/Other: yes  Patient/Caregiver Demonstrated Understanding: yes  Plan of Care Discussed and Agreed Upon: yes  Patient Response to Education: Patient/Caregiver Verbalized Understanding of Information

## 2024-04-10 NOTE — NURSING NOTE
Assumed car of patient. Patient here s/p CVA with dysphagia. Pureed diet with nectar thick liquids. Meds crushed. On 2L NC. Moist productive cough. Breathing treatments. Tylenol for chronic pain. Scheduled to be discharged on Saturday. Call light is within reach. Will continue.

## 2024-04-10 NOTE — PROGRESS NOTES
continues to follow for discharge planning. IDT meeting held 4.9.24 to review overall clinical status and progress toward therapy goals.     Advanced Directives: No advanced directives in HIM and patient is aware of LSW availability to complete documentation upon request. Identified patient's preferred point person as spouse Robbin [phone] who was updated on patient status this date.    PLOF: At baseline, patient lives with spouse in a ranch home with 2 stairs to enter the residence. Patient is independent with ADL's and IADL's.     PCP:  Lisette Samaniego DO    Pharmacy: [facility]    Discharge Goal:  return home     ADOD:  4.13.24    Barriers/to do:  Patient's main barrier to discharge was Dobhoff which has been removed. Patient working with speech therapy and on pureed diet, moderately thickened liquids. Due to patient progress, discharge date has been changed to 4.13.24 pending outcome of family training planned for 4.11.24 at 9am. Patient and family prefer home health upon discharge versus outpatient therapy

## 2024-04-10 NOTE — CARE PLAN
The patient's goals for the shift include maintain safety    The clinical goals for the shift include maintain safety

## 2024-04-10 NOTE — PROGRESS NOTES
"Physical Therapy Treatment       04/10/24 [id number]   PT  Visit   PT Received On 04/10/24   Response to Previous Treatment Patient reporting fatigue but able to participate.   General   Reason for Referral Impaired mobility   Referred By Dr. Carlisle   Past Medical History Relevant to Rehab COPD, CAD s/p PCI 10/4/23, NSVT, HTN, HLD, rheumatoid arthritis, and hypothyroidism, CHF, Afib (not on AC), AAA, asthma, COPD, chronic respiratory failure 2/2 pulmonary sarcoidosis, Migraines, Hiatal Hernia, and Diverticulosis.   Patient Position Received Up in chair   Preferred Learning Style verbal;visual   General Comment pt agreeable to therapy   Precautions   Hearing/Visual Limitations hearing diminished; corective lenses for reading   Medical Precautions Fall precautions;Oxygen therapy device and L/min   Post-Surgical Precautions   (2L O2 via NC)   Precautions Comment Oxygen tubing.   Pain Assessment   Pain Assessment 0-10   Pain Score 0 - No pain   Balance/Neuromuscular Re-Education   Balance/Neuromuscular Re-Education Activity Performed Yes   Balance/Neuromuscular Re-Education Activity 1 cone reaching and stacking, standing on 2 blue therapads, no UE support, CGA, x 2 mins   Balance/Neuromuscular Re-Education Activity 2 balloon toss, standing on 2 blue therapads, no UE support, x 3 mins, CGA   Balance/Neuromuscular Re-Education Activity 3 hitting ball with dowel thad, standing on 2 blue therapads, no UE support, CGA,  x 3 mins   Balance/Neuromuscular Re-Education Activity 4 forward stepping over 4\" hurdles x 8, no UE support; CGA   Balance/Neuromuscular Re-Education Activity 5 lateral stepping over 4\" x 8 hurdles, no UE support, CGA   Ambulation/Gait Training   Ambulation/Gait Training Performed Yes   Ambulation/Gait Training 1   Surface 1 Level tile   Device 1 Rollator   Gait Support Devices Gait belt  (O2 follow)   Assistance 1 Close supervision   Quality of Gait 1 Forward flexed posture   Comments/Distance (ft) 1 100' x " 2, steady pace   Transfers   Transfer Yes   Transfer 1   Transfer From 1 Stand to   Transfer to 1 Sit   Technique 1 Sit to stand;Stand to sit   Transfer Device 1 Walker;Gait belt   Transfer Level of Assistance 1 Close supervision   Activity Tolerance   Endurance Tolerates 10 - 20 min exercise with multiple rests   Early Mobility/Exercise Safety Screen Proceed with mobilization - No exclusion criteria met   PT Assessment   PT Assessment Results Decreased strength;Pain;Decreased range of motion;Decreased endurance;Impaired balance;Decreased mobility;Decreased cognition;Impaired judgement;Decreased safety awareness   Rehab Prognosis Excellent   Evaluation/Treatment Tolerance Patient limited by fatigue   Strengths Ability to acquire knowledge   Barriers to Participation Comorbidities   End of Session Patient Position Up in chair  (needs in reach)   PT Plan   Inpatient/Swing Bed or Outpatient Inpatient   PT Plan   Treatment/Interventions Transfer training;Gait training;Neuromuscular re-education;Therapeutic activity   PT Plan Skilled PT   Equipment Recommended upon Discharge Wheeled walker  (rollator)   PT Recommended Transfer Status Stand by assist   PT - OK to Discharge Yes

## 2024-04-11 PROBLEM — J45.909 ASTHMATIC BRONCHITIS (HHS-HCC): Status: RESOLVED | Noted: 2023-05-04 | Resolved: 2024-04-11

## 2024-04-11 PROBLEM — I61.9 CVA (CEREBROVASCULAR ACCIDENT DUE TO INTRACEREBRAL HEMORRHAGE) (MULTI): Status: RESOLVED | Noted: 2024-03-28 | Resolved: 2024-04-11

## 2024-04-11 PROCEDURE — 97110 THERAPEUTIC EXERCISES: CPT | Mod: GO

## 2024-04-11 PROCEDURE — 97535 SELF CARE MNGMENT TRAINING: CPT | Mod: GO

## 2024-04-11 PROCEDURE — 2500000005 HC RX 250 GENERAL PHARMACY W/O HCPCS: Performed by: INTERNAL MEDICINE

## 2024-04-11 PROCEDURE — 92526 ORAL FUNCTION THERAPY: CPT | Mod: GN

## 2024-04-11 PROCEDURE — 97112 NEUROMUSCULAR REEDUCATION: CPT | Mod: GP

## 2024-04-11 PROCEDURE — 2500000001 HC RX 250 WO HCPCS SELF ADMINISTERED DRUGS (ALT 637 FOR MEDICARE OP): Performed by: INTERNAL MEDICINE

## 2024-04-11 PROCEDURE — 2500000004 HC RX 250 GENERAL PHARMACY W/ HCPCS (ALT 636 FOR OP/ED): Performed by: INTERNAL MEDICINE

## 2024-04-11 PROCEDURE — 99232 SBSQ HOSP IP/OBS MODERATE 35: CPT | Performed by: INTERNAL MEDICINE

## 2024-04-11 PROCEDURE — 97110 THERAPEUTIC EXERCISES: CPT | Mod: GP

## 2024-04-11 PROCEDURE — 2500000002 HC RX 250 W HCPCS SELF ADMINISTERED DRUGS (ALT 637 FOR MEDICARE OP, ALT 636 FOR OP/ED): Performed by: INTERNAL MEDICINE

## 2024-04-11 PROCEDURE — 97530 THERAPEUTIC ACTIVITIES: CPT | Mod: GO

## 2024-04-11 PROCEDURE — 97530 THERAPEUTIC ACTIVITIES: CPT | Mod: GP

## 2024-04-11 PROCEDURE — 97116 GAIT TRAINING THERAPY: CPT | Mod: GP

## 2024-04-11 PROCEDURE — 2500000002 HC RX 250 W HCPCS SELF ADMINISTERED DRUGS (ALT 637 FOR MEDICARE OP, ALT 636 FOR OP/ED): Mod: MUE | Performed by: INTERNAL MEDICINE

## 2024-04-11 PROCEDURE — 1180000001 HC REHAB PRIVATE ROOM DAILY

## 2024-04-11 RX ADMIN — ACETAMINOPHEN 650 MG: 325 TABLET ORAL at 13:29

## 2024-04-11 RX ADMIN — BUDESONIDE INHALATION 0.5 MG: 0.5 SUSPENSION RESPIRATORY (INHALATION) at 21:06

## 2024-04-11 RX ADMIN — FUROSEMIDE 20 MG: 20 TABLET ORAL at 08:29

## 2024-04-11 RX ADMIN — Medication 400 MG: at 08:29

## 2024-04-11 RX ADMIN — ASPIRIN 81 MG CHEWABLE TABLET 81 MG: 81 TABLET CHEWABLE at 08:29

## 2024-04-11 RX ADMIN — ALBUTEROL SULFATE 2.5 MG: 2.5 SOLUTION RESPIRATORY (INHALATION) at 05:47

## 2024-04-11 RX ADMIN — BUDESONIDE INHALATION 0.5 MG: 0.5 SUSPENSION RESPIRATORY (INHALATION) at 08:27

## 2024-04-11 RX ADMIN — LEVOTHYROXINE SODIUM 50 MCG: 50 TABLET ORAL at 05:47

## 2024-04-11 RX ADMIN — ALBUTEROL SULFATE 2.5 MG: 2.5 SOLUTION RESPIRATORY (INHALATION) at 17:48

## 2024-04-11 RX ADMIN — Medication 2 L/MIN: at 17:30

## 2024-04-11 RX ADMIN — ACETAMINOPHEN 650 MG: 325 TABLET ORAL at 21:06

## 2024-04-11 RX ADMIN — Medication 5 MG: at 21:06

## 2024-04-11 RX ADMIN — TIOTROPIUM BROMIDE INHALATION SPRAY 2 PUFF: 3.12 SPRAY, METERED RESPIRATORY (INHALATION) at 09:02

## 2024-04-11 RX ADMIN — ACETAMINOPHEN 650 MG: 325 TABLET ORAL at 06:22

## 2024-04-11 RX ADMIN — Medication 1 TABLET: at 08:29

## 2024-04-11 RX ADMIN — FORMOTEROL FUMARATE DIHYDRATE 20 MCG: 20 SOLUTION RESPIRATORY (INHALATION) at 08:28

## 2024-04-11 RX ADMIN — LIDOCAINE 1 PATCH: 4 PATCH TOPICAL at 08:29

## 2024-04-11 RX ADMIN — FORMOTEROL FUMARATE DIHYDRATE 20 MCG: 20 SOLUTION RESPIRATORY (INHALATION) at 21:06

## 2024-04-11 RX ADMIN — METOPROLOL TARTRATE 25 MG: 25 TABLET, FILM COATED ORAL at 15:35

## 2024-04-11 RX ADMIN — METOPROLOL TARTRATE 25 MG: 25 TABLET, FILM COATED ORAL at 08:29

## 2024-04-11 RX ADMIN — METOPROLOL TARTRATE 25 MG: 25 TABLET, FILM COATED ORAL at 21:06

## 2024-04-11 ASSESSMENT — PAIN - FUNCTIONAL ASSESSMENT
PAIN_FUNCTIONAL_ASSESSMENT: 0-10

## 2024-04-11 ASSESSMENT — PAIN SCALES - GENERAL
PAINLEVEL_OUTOF10: 0 - NO PAIN
PAINLEVEL_OUTOF10: 3
PAINLEVEL_OUTOF10: 0 - NO PAIN
PAINLEVEL_OUTOF10: 4
PAINLEVEL_OUTOF10: 5 - MODERATE PAIN
PAINLEVEL_OUTOF10: 5 - MODERATE PAIN
PAINLEVEL_OUTOF10: 0 - NO PAIN
PAINLEVEL_OUTOF10: 4
PAINLEVEL_OUTOF10: 5 - MODERATE PAIN
PAINLEVEL_OUTOF10: 2

## 2024-04-11 ASSESSMENT — PAIN DESCRIPTION - ORIENTATION
ORIENTATION: RIGHT
ORIENTATION: RIGHT

## 2024-04-11 ASSESSMENT — ACTIVITIES OF DAILY LIVING (ADL)
HOME_MANAGEMENT_TIME_ENTRY: 15
HOME_MANAGEMENT_TIME_ENTRY: 15

## 2024-04-11 ASSESSMENT — PAIN DESCRIPTION - LOCATION
LOCATION: LEG
LOCATION: HIP

## 2024-04-11 ASSESSMENT — PAIN DESCRIPTION - DESCRIPTORS: DESCRIPTORS: ACHING

## 2024-04-11 NOTE — CARE PLAN
Problem: PT Problem  Goal: STG AMBULATION Patient will amb 100 feet with rolling walker device including two turns on even surface with contact guard assist to facilitate safe mobility.   Outcome: Met  Goal: LTG - BED MOBILITY Patient will transfer supine to sit and sit to supine with independent assist to facilitate mobility.   Outcome: Met  Goal: LTG - TRANSFERS Patient will transfer bed to chair and chair to bed with independent assist to facilitate mobility.   Outcome: Met  Goal: LTG - AMBULATION Patient will amb 150 feet with rolling walker device including two turns on uneven surface with independent assist to facilitate safe mobility.   Outcome: Progressing  Goal: LTG - STAIRS Patient will negotiate 12 stairs with two rails and independent assist with no device to enter home.   Outcome: Met  Goal: LTG - TUG Patient will perform TUG with least restrictive assistive device in 25 seconds or less to promote mobility and reduce fall risk with dynamic standing tasks.   Outcome: Met

## 2024-04-11 NOTE — PROGRESS NOTES
Speech-Language Pathology    SLP Adult IRF Dysphagia Treatment     Patient Name: Trinity Hernandez  MRN: [id number]  Today's Date: 4/11/2024  Time Calculation  Start Time: 1105  Stop Time: 1155  Time Calculation (min): 50 min     5/4sw    Current Problem:   Ischemic stroke, right body involvement (left brain)     SLP Assessment:  SLP TX Intervention Outcome: Making Progress Towards Goals  SLP Assessment Results: Other (Comment) (dysphagia)  Prognosis: Good  Treatment Provided: Yes  Treatment Tolerance: Patient tolerated treatment well  Medical Staff Made Aware: Yes  Strengths: Family/Caregiver Suppport  Barriers: Comorbidities  Education Provided: Yes    Tx session was focused primarily on family education as pt is planning home discharge on 4/13. See notes below for detailed training information.     Plan:  Inpatient/Swing Bed or Outpatient: Inpatient  Treatment/Interventions: Other (Comment) (dysphagia)  SLP TX Plan: Continue Plan of Care  SLP Plan: Skilled SLP  SLP Frequency: 4x per week  Duration: Current admission  SLP Discharge Recommendations: Continue skilled SLP services at the next level of care  Next Treatment Priority: OPEs, d/c note  Discussed POC: Patient, Nursing, Caregiver/family  Discussed Risks/Benefits: Yes, Patient, Nursing, Caregiver/Family  Patient/Caregiver Agreeable: Yes      Subjective   Pt seen upright in bed. Spouse and 2 daughters are present for family training.     General Visit Information:   Reason for Referral: f/u dysphagia tx, family training  Referred By: Dr Carlisle  Caregiver Feedback: spouse, and 2 daughters  Prior to Session Communication: Bedside nurse    Pain Assessment:   Pain Score: 0 - No pain      Objective   DYSPHAGIA GOALS initiated 3/29, anticipated end 4/19:  Pt will tolerate tsp trials of pureed textures with SLP only to 90% without overt s/s aspiration.  PROGRESS: RN cleared pt for p.o. trials with SLP. 3 ice chips and approx. 1-2 oz of applesauce via tsp after oral  care. 4/8 - GOAL MET  STATUS: Pt c/o pain during swallow d/t dobhoff. RN aware. Breathing treatment provided some relief. Pt was able to self-feed after instruction for smaller amounts. No overt s/s aspiration were noted. 4/8 - GOAL MET  2. Pt will utilize chin-tuck, triple effortful swallow with SLP only trials to 100%.              PROGRESS: 90% - 100% with cues, with SLP trials. 4/8 - GOAL MET  STATUS: Pt is affected by pain during swallow. Less facial grimacing today. Able to complete chin-tuck followed by 3 swallows after SLP provides cues to initiate and continue through trials. 4/8 - GOAL MET  Pt will increase swallow reflex via OPEs to 80% accuracy.  PROGRESS: 75% with reduced cues. 4/11: Not addressed d/t education for thickened liquids and how to utilize.  STATUS: CTAR x20. Pitch glides X15. Phonation with increased consistency 3-6 sec. . However, phonation decreases as task progresses. BOT elevation x15. Tongue Pops x15. Cues for breath support, pacing. Ongoing education to monitor breath support. Ami, x3. Supraglottic sequence x4. 4/11: see above. Reviewed handout of swallowing exercises for home. Pt verbalizes understanding.  Pt will improve swallow reflex via OMEs to 80% accuracy.              PROGRESS: 80% with min cues 4/8: GOAL MET  STATUS: Able to complete sets of x10 labial protrusion/retraction, lingual lateralization,  buccal protrusion/retraction. Reduced cues for pacing, full ROM. 4/8: GOAL MET  Ongoing assessment via MBSS when indicated to reassess swallow function.  PROGRESS: Discussed MBSS results with pt, spouse, and Mae JOSÉ RN. Notified Dr. Cruz via [employer] Secure Chat of MBSS results.  STATUS: 4/5 - GOAL MET  6.  Pt will tolerate pureed and moderately-thick/honey-thick textures to 90% without overt s/s aspiration.              PROGRESS: Established 4/5, anticipated end 4/19. 4/11: 90%  STATUS: No reported s/s aspiration. Provided education to spouse and daughters RE:  pureed/moderately-thick textures. Provided education for how to use SimplyThick EasyMix. Provided demonstration with 8oz cola, how to measure, stir, etc. Provided handouts for items not included with thickened textures (I.e. ice, ice-cream, Jell-O, sherbet, etc.) Spouse and daughters verbalized good understanding understanding. Spouse verbalized understanding that coffee should be thickened.  7. Pt will use safe swallow strategies to 90% accuracy with cues during meals and/or snacks.              PROGRESS: Established 4/5, anticipated end 4/19 4/11: 85% cues to remain upright.   STATUS: Reviewed handout for safe swallow strategies. Focused on upright position and 3-4 swallows/bite. Also reviewed importance of oral care. Spouse and daughters verbalized good understanding.    Therapeutic Swallow:  Therapeutic Swallow Intervention : Compensatory Strategies, PO Trials  Solid Diet Recommendations: Pureed/extremely thick  (IDDSI Level 4)  Liquid Diet Recommendations: Honey thick/liquidised/moderately thick (IDDS Level 3)  Swallow Comments: med's crushed in pureed    Inpatient Education:  Adult Inpatient Education  Individual(s) Educated: Patient, Spouse, Other (2 daughters)  Written Education : handouts as follows - dysphagia diet level, simplythick where to purchase, samples of easy thick, items not included for thickened textures (i.e. ice, ice-cream, sherbet, popsicles, etc.)  Verbal Education : pureed, moderately-thick (honey-thick textures), safe swallow strategies, MBSS results,  Risk and Benefits Discussed with Patient/Caregiver/Other: yes  Patient/Caregiver Demonstrated Understanding: yes  Plan of Care Discussed and Agreed Upon: yes  Patient Response to Education: Patient/Caregiver Verbalized Understanding of Information  Education Comment: spouse, 2 daughters verbalized understanding

## 2024-04-11 NOTE — PROGRESS NOTES
WEEKLY PROGRESS NOTE    Treatment Rx provided: See daily flowsheet        PRECAUTIONS  Precautions  Hearing/Visual Limitations: hearing diminished; corective lenses for reading  Medical Precautions: Fall precautions, Oxygen therapy device and L/min  Post-Surgical Precautions:  (2L O2 via NC)  Precautions Comment: Oxygen tubing.    ADL/ Functional Status:  Eating  Feeding Level of Assistance: Modified independent LE pants  Pants Level of Assistance: Distant supervision (To don underwear via figure 4 technique.)   Grooming  Grooming Level of Assistance: Modified independent LE socks  Sock Level of Assistance: Modified independent (Pt using figure four to doff/don socks efficiently)   Toileting Toileting Level of Assistance: Modified independent LE shoes  Shoe Level of Assistance: Modified independent   Bathing UE UE Bathing Level of Assistance: Close supervision LE adult brief  Adult Briefs Level of Assistance: Contact guard   Bathing LE LE Bathing Level of Assistance: Contact guard UE dressing  UE Dressing Level of Assistance: Modified independent   Toilet Transfer  Toilet Transfers: Independent Meal prep  Meal Prep Level of Assistance: Close supervision    Car Transfer Car Transfers: Supervision Kitchen   Kitchen Mobility Level of Assistance: Close supervision     Standardized Tests:  Box of blocks Box and Block: This date: RUE while seated: 41 blks/min. LUE while seated:42 blks/min. (Previous: RUE while seated: 42 blks/min. LUE while seated:49 blks/min.)   9 hole peg test 9 Hole Peg Test: This date: R UE 41.53, L UE 29.9 (Previous: RUE: 51 sec. LUE: 32 sec)    strength  Bits Jc cancellation  [place] making A/B Other Outcome Measures: RUE  strength: 56# LUE  strength: 50# ([place] cancellation: seated in w/c, use of R UE: 1 minute 56 seconds, 5 misses, 1 distraction)       Plan Of Care: Pt progressing towards goals and continues to focus on LB bathing/dressing, car transfer, and home management.   Family training completed and AE/DME recommended.  Plan to begin DC planning.

## 2024-04-11 NOTE — PROGRESS NOTES
Occupational Therapy Note and Family Training     04/11/24 [id number]   OT Last Visit   OT Received On 04/11/24   General   Reason for Referral Impaired mobility   Referred By Dr. Carlisle   Past Medical History Relevant to Rehab COPD, CAD s/p PCI 10/4/23, NSVT, HTN, HLD, rheumatoid arthritis, and hypothyroidism, CHF, Afib (not on AC), AAA, asthma, COPD, chronic respiratory failure 2/2 pulmonary sarcoidosis, Migraines, Hiatal Hernia, and Diverticulosis.   Missed Visit No   Family/Caregiver Present Yes   Caregiver Feedback spouse, 2 daughters present during family training   Prior to Session Communication Bedside nurse   Patient Position Received   (sitting at EOB)   Preferred Learning Style verbal;visual   General Comment Pt agreeable to therapy   Precautions   Hearing/Visual Limitations hearing diminished; corective lenses for reading   Medical Precautions Fall precautions;Oxygen therapy device and L/min  (2L NC)   Precautions Comment Oxygen tubing.   Pain Assessment   Pain Assessment 0-10   Pain Score 3   Pain Type Chronic pain   Clinical Progression Not changed   Cognition   Overall Cognitive Status WFL   Orientation Level Oriented X4   Coordination   Coordination Comment Weakness RUE/LE   RUE    RUE  WFL   LUE    LUE WFL   Feeding   Feeding Level of Assistance Modified independent   Feeding Where Assessed Wheelchair   Feeding Comments able to self feed, honey thick liquids   Grooming   Grooming Level of Assistance Modified independent   Grooming Where Assessed Standing sinkside   Grooming Comments wash/dry hands   UE Bathing   UE Bathing Comments declined with family present  (nursing, family and OT encourage pt; pt continued to decline)   LE Bathing   LE Bathing Comments declined with family present  (nursing, family, OT offered encouragement; however pt continued to decline)   UE Dressing   UE Dressing Comments already dressed; declined changing   LE Dressing   LE Dressing Yes   Shoe Level of Assistance Modified  independent   LE Dressing Where Assessed Edge of bed   LE Dressing Comments pt declined changing clothes   Toileting   Toileting Level of Assistance Modified independent   Where Assessed Bedside commode  (over toilet)   Toileting Comments able to manage pants and perform hygiene   Bed Mobility 1   Bed Mobility 1 Supine to sitting;Sitting to supine   Level of Assistance 1 Modified independent   Bed Mobility Comments 1 flat bed without bed rail   Bed Mobility 2   Bed Mobility  2 Sitting to supine   Level of Assistance 2 Modified independent   Bed Mobility Comments 2 flat bed without bedrail   Functional Mobility   Functional Mobility Performed Yes   Functional Mobility 1   Surface 1 Level tile   Device 1 Rollator   Assistance 1 Distant supervision   Comments 1 from pt room to OT bathroom with safe technique   Transfers   Transfer Yes   Transfer 1   Transfer From 1 Sit to   Transfer to 1 Stand   Technique 1 Sit to stand;Stand to sit   Transfer Level of Assistance 1 Independent   Trials/Comments 1 no device, pt able to manage O2 line   Modalities   Modalities Used No   Static Standing Balance   Static Standing-Balance Support No upper extremity supported   Static Standing-Level of Assistance Distant supervision   Dynamic Standing Balance   Dynamic Standing-Balance Support No upper extremity supported   Dynamic Standing-Balance Reaching for objects   Dynamic Standing-Comments distant supervision   Toilet Transfers   Toilet Transfer From Other (Comment)  (no device)   Toilet Transfer Type To and from   Toilet Transfer to Standard bedside commode  (over toilet)   Toilet Transfer Technique Ambulating   Toilet Transfers Independent   Toilet Transfers Comments able to manage O2 tubing   Tub Transfers   Tub Transfer From   (no adaptive device)   Tub Transfer Type To and from   Tub Transfer to Transfer tub bench   Tub Transfer Technique Stand pivot   Tub Transfers Supervision   Tub Transfers Comments Dry transfer TTB with use  of grab bar.  Pt demonstrated good technique.  (Recommend TTB for home, educated family on benefits.  Pt has access to TTB.)   Car Transfers   Car Transfer From Other (Comment)  (no assistive device)   Car Transfer Technique Stand pivot   Car Transfers Supervision   Car Transfers Comments simulated with use of TTB   Therapeutic Exercise   Therapeutic Exercise Performed Yes   Therapeutic Exercise Activity 1 PREs with BUE with 2# hand weights 15 reps x 1 set with good tolerance to improve muscle strength for ADLs/IADLs.   IP OT Assessment   OT Assessment Pt participated in family training; however declined bathing/dressing activities.  Provided family ed on AE/DME, pt demonstrated ADL transfers and functional mobility.  Provided education on safety awareness and fall prevention.  Family pleased with pts progress and prepared for her return home with spouse.   Prognosis Good   Barriers to Discharge None   Evaluation/Treatment Tolerance Patient tolerated treatment well   Medical Staff Made Aware Yes   End of Session Communication Bedside nurse   End of Session Patient Position Bed, 2 rail up   OT Assessment   OT Assessment Results Decreased endurance;Decreased ADL status;Decreased IADLs;Decreased upper extremity strength   Strengths Ability to acquire knowledge   Barriers to Participation Comorbidities   Education   Individual(s) Educated Patient   Education Provided Fall precautons;Other  (O2 cord management)   Home Program [facility];Strengthening;Handout issued   Equipment Other  (ed on rollator, TTB, BSC, grab bars shower/toilet, emergency response system; issued handouts to pts family)   Community Resources [facility]   Risk and Benefits Discussed with Patient/Caregiver/Other yes   Patient/Caregiver Demonstrated Understanding yes   Plan of Care Discussed and Agreed Upon yes   Patient Response to Education Patient/Caregiver Verbalized Understanding of Information   Education Comment Pt receptive and  progressing towards new learned techniques   Inpatient/Swing Bed or Outpatient   Inpatient/Swing Bed or Outpatient Inpatient   Inpatient Plan   Treatment Interventions ADL retraining;Functional transfer training;UE strengthening/ROM;Endurance training;Patient/family training;Equipment evaluation/education;Neuromuscular reeducation;Compensatory technique education   OT Frequency 5 times per week   OT Discharge Recommendations Low intensity level of continued care   Equipment Recommended upon Discharge Wheeled walker  (rollator, and BSC)   OT - OK to Discharge Yes   OT Recommended Transfer Status Independent  (no assistive device in pt room only)

## 2024-04-11 NOTE — NURSING NOTE
A/O x3. Reported pain 5//10 on pain scale. Administered Tylenol 650 mg with positive outcomes. Ambulated to the restroom with assistance of 2-wheel walker with steady gate. Swallowed all HS meds without difficulty. Bed in low position. Call light within reach.

## 2024-04-11 NOTE — PROGRESS NOTES
Occupational Therapy Note     04/11/24 [id number]   OT Last Visit   OT Received On 04/11/24   General   Reason for Referral Impaired mobility   Referred By Dr Carlisle   Past Medical History Relevant to Rehab COPD, CAD s/p PCI 10/4/23, NSVT, HTN, HLD, rheumatoid arthritis, and hypothyroidism, CHF, Afib (not on AC), AAA, asthma, COPD, chronic respiratory failure 2/2 pulmonary sarcoidosis, Migraines, Hiatal Hernia, and Diverticulosis.   Missed Visit No   Family/Caregiver Present No   Prior to Session Communication Bedside nurse   Patient Position Received Up in chair   Preferred Learning Style verbal;visual   General Comment pt agreeable to therapy   Precautions   Hearing/Visual Limitations hearing diminished; corective lenses for reading   Medical Precautions Fall precautions;Oxygen therapy device and L/min   Post-Surgical Precautions   (2L O2 via NC)   Precautions Comment Oxygen tubing.   Pain Assessment   Pain Assessment 0-10   Pain Score 4   Pain Type Chronic pain   Pain Location Neck   Pain Orientation Left   Pain Descriptors Aching   Pain Frequency Intermittent   Pain Onset Ongoing   Clinical Progression Not changed   Cognition   Overall Cognitive Status WFL   Arousal/Alertness Appropriate responses to stimuli   Orientation Level Oriented X4   Following Commands Follows one step commands without difficulty   Toileting   Toileting Level of Assistance Modified independent   Where Assessed Bedside commode  (over toilet)   Toileting Comments including pants management and hygiene   Static Standing Balance   Static Standing-Balance Support No upper extremity supported   Static Standing-Level of Assistance Distant supervision   Dynamic Standing Balance   Dynamic Standing-Balance Support No upper extremity supported   Dynamic Standing-Balance Reaching for objects   Dynamic Standing-Comments distant supervision   Toilet Transfers   Toilet Transfer From Other (Comment)  (no assistive device)   Toilet Transfer Type To and  from   Toilet Transfer to Standard bedside commode  (over toilet)   Toilet Transfer Technique Ambulating   Toilet Transfers Independent   Toilet Transfers Comments able to manage O2 tubing   Therapeutic Exercise   Therapeutic Exercise Performed Yes   Therapeutic Exercise Activity 1 BUE strengthening exercises with orange theraband for 15 reps x 1 set in all planes to improve muscle strength for ADLs with good tolerance.   IP OT Assessment   OT Assessment Pt progressing towards goals and has been made independent in room without a device.   Prognosis Good   Barriers to Discharge None   Evaluation/Treatment Tolerance Patient tolerated treatment well   Medical Staff Made Aware Yes   End of Session Communication Bedside nurse   End of Session Patient Position Bed, 2 rail up   OT Assessment   OT Assessment Results Decreased endurance;Decreased ADL status;Decreased IADLs;Decreased upper extremity strength   Strengths Ability to acquire knowledge   Barriers to Participation Comorbidities   Education   Individual(s) Educated Patient   Education Provided Fall precautons;Other   Home Program AROM;Strengthening;Handout issued   Risk and Benefits Discussed with Patient/Caregiver/Other yes   Patient/Caregiver Demonstrated Understanding yes   Plan of Care Discussed and Agreed Upon yes   Patient Response to Education Patient/Caregiver Verbalized Understanding of Information   Education Comment Pt receptive and progressing towards new learned techniques   Inpatient/Swing Bed or Outpatient   Inpatient/Swing Bed or Outpatient Inpatient   Inpatient Plan   Treatment Interventions ADL retraining;Functional transfer training;Endurance training;UE strengthening/ROM;Patient/family training;Equipment evaluation/education;Neuromuscular reeducation;Compensatory technique education   OT Frequency 5 times per week   OT Discharge Recommendations Low intensity level of continued care   Equipment Recommended upon Discharge Wheeled walker   OT - OK  to Discharge Yes   OT Recommended Transfer Status Independent

## 2024-04-11 NOTE — DISCHARGE SUMMARY
Discharge Diagnosis  CVA (cerebrovascular accident due to intracerebral hemorrhage) (CMS/[facility])    Issues Requiring Follow-Up  None    Test Results Pending At Discharge  Pending Labs       No current pending labs.            Hospital Course   74-year-old woman admitted with falls and found with left MCA stroke, received TNK and mechanical thrombectomy, TNK reversed due to subdural hematoma.  This was treated conservatively.  She is atrial fibrillation.  The plan is to follow-up with cardiology which is scheduled for April 30 at 2 PM and she will have a repeat CT of the head and then consideration for starting anticoagulation therapy.  Developed dysphagia, initially requiring an NG tube for tube feedings which also improved.  The patient was admitted to the inpatient rehabilitation unit and received intensive physical therapy, occupational therapy and rehabilitation nursing care.  The patient made strides toward functional independence and at the time of discharge was at a supervised to modified independent level with mobility, transfers and ADLs.  Home health care was arranged.  The patient will be following up with the providers as outlined in the discharge instructions.  Discharge medications were reconciled in detail and the patient and family were educated on appropriate medication use.  Prescriptions were provided either in hardcopy or electronic format.  The clinical condition was stable at the time of discharge.      Pertinent Physical Exam At Time of Discharge  Physical Exam  Vitals reviewed.   Constitutional:       Appearance: Normal appearance.   Cardiovascular:      Rate and Rhythm: Normal rate and regular rhythm.      Heart sounds: No murmur heard.  Pulmonary:      Breath sounds: Normal breath sounds. No wheezing, rhonchi or rales.   Musculoskeletal:      Right lower leg: No edema.      Left lower leg: No edema.      Home Medications     Medication List      CHANGE how you take these medications      albuterol 90 mcg/actuation inhaler; What changed: Another medication   with the same name was removed. Continue taking this medication, and   follow the directions you see here.     CONTINUE taking these medications     acetaminophen 325 mg tablet; Commonly known as: Tylenol; 2 tablets (650   mg) by nasogastric tube route every 6 hours if needed for mild pain (1 -   3).   aspirin 81 mg chewable tablet; 1 tablet (81 mg) by nasogastric tube   route once daily. Do not start before March 29, 2024.   cholecalciferol 50 mcg (2,000 unit) capsule; Commonly known as: Vitamin   D3; 1 capsule (50 mcg) by nasogastric tube route once daily.   estradiol 0.01 % (0.1 mg/gram) vaginal cream; Commonly known as: Estrace   furosemide 20 mg tablet; Commonly known as: Lasix; 1 tablet (20 mg) by   g-tube route once daily. Do not start before March 29, 2024.   levothyroxine 50 mcg tablet; Commonly known as: Synthroid, Levoxyl; 1   tablet (50 mcg) by nasogastric tube route once daily. Do not start before   March 29, 2024.   magnesium oxide 400 mg (241.3 mg magnesium) tablet; Commonly known as:   Mag-Ox; 1 tablet (400 mg) by nasogastric tube route once daily.   melatonin 5 mg tablet; 1 tablet (5 mg) by nasogastric tube route as   needed at bedtime for sleep.   metoprolol tartrate 25 mg tablet; Commonly known as: Lopressor; 1 tablet   (25 mg) by nasogastric tube route 3 times a day.   nitroglycerin 0.4 mg SL tablet; Commonly known as: Nitrostat   oxygen gas therapy; Commonly known as: O2; Inhale 1 each once every 24   hours.   Probiotic 10 billion cell capsule; Generic drug: Lactobacillus   acidophilus; 1 capsule by nasogastric tube route once daily.   Repatha Syringe 140 mg/mL injection; Generic drug: evolocumab   tiotropium 2.5 mcg/actuation inhaler; Commonly known as: Spiriva   Respimat     STOP taking these medications     arformoterol 15 mcg/2 mL nebulizer solution; Commonly known as: Brovana   budesonide 0.5 mg/2 mL nebulizer solution;  Commonly known as: Pulmicort   lidocaine 4 % patch   sennosides 8.6 mg tablet; Commonly known as: Senokot       Outpatient Follow-Up  Future Appointments   Date Time Provider Department Center   4/29/2024  1:00 PM INF 01 CONNEAUT CONSCCINF [facility]   5/9/2024 11:30 AM Juliet Coronel MD [id number] [facility]   6/3/2024  2:00 PM Lisette Samaniego DO [id number] [facility]       Daniel Carlisle MD

## 2024-04-11 NOTE — NURSING NOTE
Patient awake and alert. Completed family training this am. Remains on honey thickened liquids with pureed diet. Communicates needs effectively.

## 2024-04-11 NOTE — PROGRESS NOTES
"[facility] Comprehensive Rehabilitation  Physician Progress Note    Subjective   Trinity Hernandez is a 74 y.o. female admitted to inpatient rehabilitation unit.    Family training today and she is doing very well, I met with her family and they all feel that she is ready for discharge this weekend.    Objective   /55 (BP Location: Right arm, Patient Position: Lying)   Pulse 70   Temp 36.6 °C (97.9 °F) (Temporal)   Resp 17   Ht 1.7 m (5' 6.93\")   Wt 76.4 kg (168 lb 6.9 oz)   SpO2 99%   BMI 26.44 kg/m²      Physical Exam  Vitals reviewed.   Constitutional:       Appearance: Normal appearance.   Cardiovascular:      Rate and Rhythm: Normal rate and regular rhythm.      Heart sounds: No murmur heard.  Pulmonary:      Breath sounds: Normal breath sounds. No wheezing, rhonchi or rales.   Musculoskeletal:      Right lower leg: No edema.      Left lower leg: No edema.         Labs  BMP:    CBC:    Coagulation:       Assesment and Plan    Left MCA stroke - cont ASA  SDH - follow up CT in 4 weeks  AF off AC due to SDH  Dysphagia - cont ST efforts  Hypertension - cont metoprolol  Hypothyroidism - cont levothyroxine  Discharge planning    Daniel Carlisle MD    "

## 2024-04-11 NOTE — PROGRESS NOTES
Physical Therapy Weekly Note and Treatment       04/11/24 2747-7086   PT  Visit   PT Received On 04/11/24   Response to Previous Treatment Patient reporting fatigue but able to participate.   General   Reason for Referral Impaired mobility   Referred By Dr. Carlisle   Past Medical History Relevant to Rehab COPD, CAD s/p PCI 10/4/23, NSVT, HTN, HLD, rheumatoid arthritis, and hypothyroidism, CHF, Afib (not on AC), AAA, asthma, COPD, chronic respiratory failure 2/2 pulmonary sarcoidosis, Migraines, Hiatal Hernia, and Diverticulosis.   Family/Caregiver Present No   Caregiver Feedback spouse, 2 daughters   Patient Position Received Bed, 2 rail up   Preferred Learning Style verbal;visual   General Comment pt agreeable to therapy   Precautions   Hearing/Visual Limitations hearing diminished; corective lenses for reading   Medical Precautions Fall precautions;Oxygen therapy device and L/min  (2L O2 via NC)   Precautions Comment Oxygen tubing.   Pain Assessment   Pain Assessment 0-10   Pain Score 0 - No pain   Balance/Neuromuscular Re-Education   Balance/Neuromuscular Re-Education Activity Performed Yes   Balance/Neuromuscular Re-Education Activity 1 sweeping x 1 min, independent  (O2 assist)   Balance/Neuromuscular Re-Education Activity 2 object  to laundry basket, x 5, independent  (O2 follow)   Bed Mobility   Bed Mobility Yes   Bed Mobility 1   Bed Mobility 1 Supine to sitting;Sitting to supine;Rolling right;Rolling left   Level of Assistance 1 Modified independent   Bed Mobility Comments 1 HOB slightly elevated, uses bed rails   Ambulation/Gait Training   Ambulation/Gait Training Performed Yes   Ambulation/Gait Training 1   Surface 1 Level tile;Carpet   Device 1 Rollator   Gait Support Devices Gait belt  (O2 follow)   Assistance 1 Distant supervision   Quality of Gait 1 Forward flexed posture   Comments/Distance (ft) 1 100' turns included, steady pace   Ambulation/Gait Training 2   Surface 2 Level tile;Carpet  "  Device 2 No device   Gait Support Devices Gait belt   Assistance 2 Close supervision   Quality of Gait 2 Forward flexed posture   Comments/Distance (ft) 2 100', turns included pt fatiguing and used zhong rail intermittently x 2 for assist, O2 follow   Ambulation/Gait Training 3   Surface 3 Level tile   Device 3 No device   Assistance 3 Independent   Comments/Distance (ft) 3 10' x 6, pt managed O2 line from bathroom to bed 3 times; turns included   Transfers   Transfer Yes   Transfer 1   Transfer From 1 Bed to   Transfer to 1 Stand   Technique 1 Stand to sit;Sit to stand   Transfer Level of Assistance 1 Independent   Trials/Comments 1 no device, pt able to manage O2 line   Transfers 2   Transfer From 2 Bed to   Transfer to 2 Stand   Technique 2 Stand to sit;Sit to stand   Transfer Device 2 Walker   Transfer Level of Assistance 2 Set up   Trials/Comments 2 verbal cues to lock/unlock rollator   Stairs   Stairs Yes   Stairs   Rails 1 Bilateral   Curb Step 1 Yes   Device 1 Railing   Support Devices 1 Gait belt   Assistance 1 Modified independent   Comment/Number of Steps 1 6\" x 12, non reciprocal pattern, able to manage cord   Activity Tolerance   Endurance Tolerates 10 - 20 min exercise with multiple rests   Early Mobility/Exercise Safety Screen Proceed with mobilization - No exclusion criteria met   Rate of Perceived Exertion (RPE) 5/10 RPE after stairs   PT Assessment   PT Assessment Results Decreased strength;Pain;Decreased range of motion;Decreased endurance;Impaired balance;Decreased mobility;Decreased cognition;Impaired judgement;Decreased safety awareness   Rehab Prognosis Excellent   Evaluation/Treatment Tolerance Patient limited by fatigue   Strengths Ability to acquire knowledge;Attitude of self;Support of Caregivers   Barriers to Participation Comorbidities   Assessment Comment Pt provided good effort. Family training this morning. Pt demonstrated good understanding of HEP exercises, pt demonstrated " independent transfers and cord management within the room. Pt was able to ambulate with no device 100' with O2 follow at end of session, was fatiguing a but and used the zhong rail intermittently 2 times momentarily for assistance. Will assess TUG this afternoon, pt progressing toward completing all LTGs.    End of Session Patient Position Up in chair;Alarm off, caregiver present   Outpatient Education   Individual(s) Educated Patient;Child;Spouse   Education Provided Home Exercise Program;Home Safety  (Family Training)   Equipment   (Rollator, O2 management)   Risk and Benefits Discussed with Patient/Caregiver/Other yes   Patient/Caregiver Demonstrated Understanding yes   Plan of Care Discussed and Agreed Upon yes   Patient Response to Education Patient/Caregiver Verbalized Understanding of Information   Education Comment Family training provided this morning with spouse and 2 daughters present. Discussed pts rehab progression. Discussed pt mobility and concerns for home including O2 line management. Family and pt agreed on independent with no device in home and rollator in community; pt demonstrated safe ambulation and O2 management in room. Reviewed HEP exercises and provided green theraband.   PT Plan   Inpatient/Swing Bed or Outpatient Inpatient   PT Plan   Treatment/Interventions Transfer training;Gait training;Stair training;Neuromuscular re-education;Therapeutic activity;Home exercise program   PT Plan Skilled PT   Equipment Recommended upon Discharge Wheeled walker  (rollator)   PT Recommended Transfer Status Independent  (in room with no device)   PT - OK to Discharge Yes       Problem: PT Problem  Goal: STG AMBULATION Patient will amb 100 feet with rolling walker device including two turns on even surface with contact guard assist to facilitate safe mobility.   Outcome: Met  Goal: LTG - BED MOBILITY Patient will transfer supine to sit and sit to supine with independent assist to facilitate mobility.    Outcome: Met  Goal: LTG - TRANSFERS Patient will transfer bed to chair and chair to bed with independent assist to facilitate mobility.   Outcome: Met  Goal: LTG - AMBULATION Patient will amb 150 feet with rolling walker device including two turns on uneven surface with independent assist to facilitate safe mobility.   Outcome: Progressing  Goal: LTG - STAIRS Patient will negotiate 12 stairs with two rails and independent assist with no device to enter home.   Outcome: Met  Goal: LTG - TUG Patient will perform TUG with least restrictive assistive device in 25 seconds or less to promote mobility and reduce fall risk with dynamic standing tasks.   Outcome: Met

## 2024-04-11 NOTE — PROGRESS NOTES
Family training completed this date. Patient and family met with PT OT ST and spoke with RN regarding patient's level of care needs post discharge. LSW met with patient's family to identify any barriers to discharge. No barriers identified and patient will discharge 4.13.24. LSW is following to finalize arrangements including ordering DME and arranging home care services.

## 2024-04-12 PROCEDURE — 2500000005 HC RX 250 GENERAL PHARMACY W/O HCPCS: Performed by: INTERNAL MEDICINE

## 2024-04-12 PROCEDURE — 2500000001 HC RX 250 WO HCPCS SELF ADMINISTERED DRUGS (ALT 637 FOR MEDICARE OP): Performed by: INTERNAL MEDICINE

## 2024-04-12 PROCEDURE — 97530 THERAPEUTIC ACTIVITIES: CPT | Mod: GO

## 2024-04-12 PROCEDURE — 97530 THERAPEUTIC ACTIVITIES: CPT | Mod: GP,CQ

## 2024-04-12 PROCEDURE — 97112 NEUROMUSCULAR REEDUCATION: CPT | Mod: GP

## 2024-04-12 PROCEDURE — 97116 GAIT TRAINING THERAPY: CPT | Mod: GP

## 2024-04-12 PROCEDURE — 92526 ORAL FUNCTION THERAPY: CPT | Mod: GN

## 2024-04-12 PROCEDURE — 1180000001 HC REHAB PRIVATE ROOM DAILY

## 2024-04-12 PROCEDURE — 2500000002 HC RX 250 W HCPCS SELF ADMINISTERED DRUGS (ALT 637 FOR MEDICARE OP, ALT 636 FOR OP/ED): Performed by: INTERNAL MEDICINE

## 2024-04-12 PROCEDURE — 2500000002 HC RX 250 W HCPCS SELF ADMINISTERED DRUGS (ALT 637 FOR MEDICARE OP, ALT 636 FOR OP/ED): Mod: MUE | Performed by: INTERNAL MEDICINE

## 2024-04-12 PROCEDURE — 99232 SBSQ HOSP IP/OBS MODERATE 35: CPT | Performed by: INTERNAL MEDICINE

## 2024-04-12 PROCEDURE — 2500000004 HC RX 250 GENERAL PHARMACY W/ HCPCS (ALT 636 FOR OP/ED): Performed by: INTERNAL MEDICINE

## 2024-04-12 PROCEDURE — 97116 GAIT TRAINING THERAPY: CPT | Mod: GP,CQ

## 2024-04-12 PROCEDURE — 97535 SELF CARE MNGMENT TRAINING: CPT | Mod: GO

## 2024-04-12 PROCEDURE — 97110 THERAPEUTIC EXERCISES: CPT | Mod: GP

## 2024-04-12 RX ORDER — SIMETHICONE 80 MG
80 TABLET,CHEWABLE ORAL 4 TIMES DAILY PRN
Status: DISCONTINUED | OUTPATIENT
Start: 2024-04-12 | End: 2024-04-13 | Stop reason: HOSPADM

## 2024-04-12 RX ADMIN — FUROSEMIDE 20 MG: 20 TABLET ORAL at 08:16

## 2024-04-12 RX ADMIN — ACETAMINOPHEN 650 MG: 325 TABLET ORAL at 20:59

## 2024-04-12 RX ADMIN — ASPIRIN 81 MG CHEWABLE TABLET 81 MG: 81 TABLET CHEWABLE at 08:16

## 2024-04-12 RX ADMIN — TIOTROPIUM BROMIDE INHALATION SPRAY 2 PUFF: 3.12 SPRAY, METERED RESPIRATORY (INHALATION) at 08:17

## 2024-04-12 RX ADMIN — LIDOCAINE 1 PATCH: 4 PATCH TOPICAL at 08:18

## 2024-04-12 RX ADMIN — Medication 1 TABLET: at 08:16

## 2024-04-12 RX ADMIN — Medication 2 L/MIN: at 17:30

## 2024-04-12 RX ADMIN — LEVOTHYROXINE SODIUM 50 MCG: 50 TABLET ORAL at 06:10

## 2024-04-12 RX ADMIN — ACETAMINOPHEN 650 MG: 325 TABLET ORAL at 07:24

## 2024-04-12 RX ADMIN — Medication 5 MG: at 21:04

## 2024-04-12 RX ADMIN — ACETAMINOPHEN 650 MG: 325 TABLET ORAL at 03:28

## 2024-04-12 RX ADMIN — FORMOTEROL FUMARATE DIHYDRATE 20 MCG: 20 SOLUTION RESPIRATORY (INHALATION) at 09:00

## 2024-04-12 RX ADMIN — FORMOTEROL FUMARATE DIHYDRATE 20 MCG: 20 SOLUTION RESPIRATORY (INHALATION) at 20:52

## 2024-04-12 RX ADMIN — METOPROLOL TARTRATE 25 MG: 25 TABLET, FILM COATED ORAL at 20:59

## 2024-04-12 RX ADMIN — ALBUTEROL SULFATE 2.5 MG: 2.5 SOLUTION RESPIRATORY (INHALATION) at 16:28

## 2024-04-12 RX ADMIN — BUDESONIDE INHALATION 0.5 MG: 0.5 SUSPENSION RESPIRATORY (INHALATION) at 08:16

## 2024-04-12 RX ADMIN — ALBUTEROL SULFATE 2.5 MG: 2.5 SOLUTION RESPIRATORY (INHALATION) at 03:03

## 2024-04-12 RX ADMIN — ACETAMINOPHEN 650 MG: 325 TABLET ORAL at 12:55

## 2024-04-12 RX ADMIN — SIMETHICONE 80 MG: 80 TABLET, CHEWABLE ORAL at 21:53

## 2024-04-12 RX ADMIN — Medication 400 MG: at 08:16

## 2024-04-12 RX ADMIN — BUDESONIDE INHALATION 0.5 MG: 0.5 SUSPENSION RESPIRATORY (INHALATION) at 20:52

## 2024-04-12 RX ADMIN — METOPROLOL TARTRATE 25 MG: 25 TABLET, FILM COATED ORAL at 15:55

## 2024-04-12 RX ADMIN — METOPROLOL TARTRATE 25 MG: 25 TABLET, FILM COATED ORAL at 08:16

## 2024-04-12 ASSESSMENT — PAIN SCALES - GENERAL
PAINLEVEL_OUTOF10: 6
PAINLEVEL_OUTOF10: 5 - MODERATE PAIN
PAINLEVEL_OUTOF10: 4
PAINLEVEL_OUTOF10: 0 - NO PAIN
PAINLEVEL_OUTOF10: 5 - MODERATE PAIN
PAINLEVEL_OUTOF10: 4
PAINLEVEL_OUTOF10: 5 - MODERATE PAIN
PAINLEVEL_OUTOF10: 4

## 2024-04-12 ASSESSMENT — BRIEF INTERVIEW FOR MENTAL STATUS (BIMS)
ASKED TO RECALL BLUE: YES, NO CUE REQUIRED
ASKED TO RECALL BED: YES, NO CUE REQUIRED
WHAT DAY OF THE WEEK IS IT: CORRECT
WHAT YEAR IS IT: CORRECT
BIMS SUMMARY SCORE: 15
ASKED TO RECALL SOCK: YES, NO CUE REQUIRED
COGNITIVE PATTERN ASSESSMENT USED: BIMS
WHAT MONTH IS IT: ACCURATE WITHIN 5 DAYS
INITIAL REPETITION OF BED BLUE SOCK - FIRST ATTEMPT: 3

## 2024-04-12 ASSESSMENT — PAIN - FUNCTIONAL ASSESSMENT
PAIN_FUNCTIONAL_ASSESSMENT: 0-10
PAIN_FUNCTIONAL_ASSESSMENT: FLACC (FACE, LEGS, ACTIVITY, CRY, CONSOLABILITY)

## 2024-04-12 ASSESSMENT — PAIN DESCRIPTION - DESCRIPTORS
DESCRIPTORS: ACHING
DESCRIPTORS: ACHING

## 2024-04-12 ASSESSMENT — PAIN DESCRIPTION - LOCATION
LOCATION: HIP
LOCATION: BACK
LOCATION: HEAD
LOCATION: LEG

## 2024-04-12 ASSESSMENT — ACTIVITIES OF DAILY LIVING (ADL)
BATHING_LEVEL_OF_ASSISTANCE: SETUP
BATHING_WHERE_ASSESSED: SHOWER
BATHING_EQUIPMENT_NEEDED: LONG-HANDLED SPONGE;REMOVEABLE SHOWER HEAD
HOME_MANAGEMENT_TIME_ENTRY: 45

## 2024-04-12 ASSESSMENT — PATIENT HEALTH QUESTIONNAIRE - PHQ9
1. LITTLE INTEREST OR PLEASURE IN DOING THINGS: NOT AT ALL
SUM OF ALL RESPONSES TO PHQ9 QUESTIONS 1 & 2: 0
2. FEELING DOWN, DEPRESSED OR HOPELESS: NOT AT ALL

## 2024-04-12 ASSESSMENT — PAIN DESCRIPTION - ORIENTATION
ORIENTATION: RIGHT
ORIENTATION: RIGHT

## 2024-04-12 NOTE — NURSING NOTE
Patient awake and alert, sitting up on side of bed 90 degrees eating breakfast. O2 in place via NC at 2l/min. Patient remains independent in room for transferring to bathroom with no devices.

## 2024-04-12 NOTE — CARE PLAN
Problem: Bathing  Goal: LTG - Patient will utilize adaptive techniques to bathe body independently using ad aides as needed.  Outcome: Adequate for Discharge     Problem: Dressings Lower Extremities  Goal: LTG - Patient will dress lower body independently using ad aides as needed.  Outcome: Met     Problem: Dressing Upper Extremities  Goal: LTG - Patient will complete upper body dressing independently.  Outcome: Met     Problem: Grooming  Goal: LTG - Patient will complete daily grooming tasks independently.  Outcome: Met     Problem: Instrumental Activities of Daily Living  Goal: LTG - Patient will complete simple meal preparation activities with distant supervision.  Outcome: Met     Problem: Functional Mobility  Goal: Pt will increase right UE strength and GM/FM coordination to baseline functional status.  Outcome: Met     Problem: Toileting  Goal: LTG - Patient will complete daily toileting tasks independently.  Outcome: Met  Goal: STG - Patient will complete toileting tasks with supervision.  Outcome: Met     Problem: OT Transfers  Goal: LTG - Patient will transfer to car with distant supervision.  Outcome: Met  Goal: LTG - Patient will transfer to tub/shower with modified independence using DME.  Outcome: Met  Goal: STG - Patient will perform toilet transfer with distant supervision using DME as needed.  Outcome: Met  Goal: STG - Patient will perform tub/shower transfer with supervision using DME.  Outcome: Met

## 2024-04-12 NOTE — PROGRESS NOTES
Occupational Therapy Note     04/12/24 [id number]   OT Last Visit   OT Received On 04/12/24   General   Reason for Referral Impaired mobility   Referred By Dr Carlisle   Past Medical History Relevant to Rehab COPD, CAD s/p PCI 10/4/23, NSVT, HTN, HLD, rheumatoid arthritis, and hypothyroidism, CHF, Afib (not on AC), AAA, asthma, COPD, chronic respiratory failure 2/2 pulmonary sarcoidosis, Migraines, Hiatal Hernia, and Diverticulosis.   Missed Visit No   Family/Caregiver Present No   Prior to Session Communication Bedside nurse   Patient Position Received Bed, 2 rail up   Preferred Learning Style verbal;visual   General Comment Pt agreeable to therapy.   Precautions   Hearing/Visual Limitations hearing diminished; corective lenses for reading   Medical Precautions Fall precautions;Oxygen therapy device and L/min   Post-Surgical Precautions   (2L O2 NC)   Precautions Comment Oxygen tubing.   Pain Assessment   Pain Assessment 0-10   Pain Score 4   Pain Type Chronic pain   Pain Location Shoulder   Pain Descriptors Aching   Pain Frequency Intermittent   Pain Onset Ongoing   Clinical Progression Gradually improving   Patient's Stated Pain Goal No pain   Pain 2   Pain Score 2 4   Pain Type 2 Chronic pain   Pain Location 2 Hip   Pain Frequency 2 Intermittent   Clinical Progression 2 Not changed   Patient's Stated Pain Goal 2 No pain   Cognition   Overall Cognitive Status WFL   Arousal/Alertness Appropriate responses to stimuli   Orientation Level Oriented X4   Following Commands Follows all commands and directions without difficulty   Safety/Judgement X   Feeding   Feeding Level of Assistance Modified independent   Feeding Where Assessed Wheelchair   Feeding Comments to self feed breakfast   Grooming   Grooming Level of Assistance Modified independent   Grooming Where Assessed Standing sinkside   Grooming Comments brush teeth   UE Bathing   UE Bathing Adaptive Equipment Removeable shower head   UE Bathing Level of Assistance  Modified independent   UE Bathing Where Assessed Shower   UE Bathing Comments seated on shower chair   LE Bathing   LE Bathing Adaptive Equipment Long-handled sponge;Removeable shower head   LE Bathing Level of Assistance Setup   LE Bathing Where Assessed Shower   LE Bathing Comments standing at grab bar for posterior care   UE Dressing   UE Dressing Level of Assistance Modified independent   UE Dressing Where Assessed Wheelchair   UE Dressing Comments to horacio night gown   LE Dressing   LE Dressing Yes   Shoe Level of Assistance Modified independent  (slip on shoes without socks)   Adult Briefs Level of Assistance Independent  (pull ups)   LE Dressing Where Assessed Wheelchair   Toileting   Toileting Level of Assistance Modified independent   Where Assessed Bedside commode  (placed over toilet)   Toileting Comments including pants management and hygiene   Functional Standing Tolerance   Time 3-5 minute intervals   Activity ADL activities   Functional Standing Tolerance Comments with modified independence   Bed Mobility   Bed Mobility Yes   Bed Mobility 1   Bed Mobility 1 Supine to sitting;Sitting to supine;Rolling right;Rolling left   Level of Assistance 1 Modified independent;Independent   Bed Mobility Comments 1 from flat bed with no bed rail   Functional Mobility   Functional Mobility Performed Yes   Functional Mobility 1   Surface 1 Level tile   Device 1 No device   Functional Mobility Support Devices Gait belt   Assistance 1 Independent   Comments 1 in room; able to manage O2 tubing   Transfers   Transfer Yes   Static Sitting Balance   Static Sitting-Balance Support Feet supported   Static Sitting-Level of Assistance Independent   Static Sitting-Comment/Number of Minutes sitting EOB   Dynamic Sitting Balance   Dynamic Sitting-Balance Support Feet supported   Dynamic Sitting-Balance Reaching for objects   Dynamic Sitting-Comments unsupported sitting with independence   Static Standing Balance   Static  Standing-Balance Support No upper extremity supported   Static Standing-Level of Assistance Independent   Static Standing-Comment/Number of Minutes no device   Dynamic Standing Balance   Dynamic Standing-Balance Support No upper extremity supported   Dynamic Standing-Balance Reaching for objects   Dynamic Standing-Comments Independent   Toilet Transfers   Toilet Transfer From Other (Comment)  (no device)   Toilet Transfer Type To and from   Toilet Transfer to Standard bedside commode  (placed over toilet)   Toilet Transfer Technique Ambulating   Toilet Transfers Independent   Toilet Transfers Comments able to manage O2 tubing   Shower Transfers   Shower Transfer From Other (Comment)  (no device)   Shower Transfer Type To and from   Shower Transfer to Shower seat with back   Shower Transfer Technique Stand pivot   Shower Transfers Independent   Vision   Vision Comments pt complains of eye irritation/burning eyes   IP OT Assessment   OT Assessment Pt agreeable to shower this date and required set up only.  Pt demos modified independence with functional transfers and able to manage O2 tubing.  Pt aware of when to take appropriate RBs.  Pt has progressed with all ADL activities, transfers and functional mobility.  Pt prepared to return home with spouse on 4/13/2024.  [facility] issued and rollator to be delivered to pts home.  Daughter will provide pt a TTB.   Prognosis Good   Barriers to Discharge None   Evaluation/Treatment Tolerance Patient tolerated treatment well   Medical Staff Made Aware Yes   End of Session Communication Bedside nurse   End of Session Patient Position Up in chair   Education   Individual(s) Educated Patient   Education Provided Fall precautons   Risk and Benefits Discussed with Patient/Caregiver/Other yes   Patient/Caregiver Demonstrated Understanding yes   Plan of Care Discussed and Agreed Upon yes   Patient Response to Education Patient/Caregiver Verbalized Understanding of Information   Education  Comment Pt receptive and progressing towards new learned techniques   Inpatient/Swing Bed or Outpatient   Inpatient/Swing Bed or Outpatient Inpatient   Inpatient Plan   Treatment Interventions ADL retraining;Functional transfer training;Endurance training;Patient/family training;Equipment evaluation/education;Neuromuscular reeducation;Compensatory technique education   OT Frequency 5 times per week   OT Discharge Recommendations Low intensity level of continued care   Equipment Recommended upon Discharge Wheeled walker   OT - OK to Discharge Yes   OT Recommended Transfer Status Independent

## 2024-04-12 NOTE — CARE PLAN
Problem: PT Problem  Goal: LTG - AMBULATION Patient will amb 150 feet with rolling walker device including two turns on uneven surface with independent assist to facilitate safe mobility.   Outcome: Met

## 2024-04-12 NOTE — PROGRESS NOTES
Occupational Therapy  DISCHARGE STATUS  Discharge Date:  4/12/2024  Reason for Discharge:  Pt met all established goals.  Upper Extremity Status    Left Right   Dominance WFL WFL   Gross Motor WFL WFL   Fine Motor WFL WFL   Balance   Static Dynamic   Sitting good good   Standing good good     Visual / Perceptual  Visual Acuity WFL   Visual Spatial WFL   Hearing WFL   Proprioception WFL   Praxis WFL   Memory WFL   Attention WFL   Rt/Lt Neglect WFL     Discharge Functional Status  Eating  Feeding Level of Assistance: Modified independent LE pants  Pants Level of Assistance: Distant supervision (To don underwear via figure 4 technique.)   Grooming  Grooming Level of Assistance: Modified independent LE socks  Sock Level of Assistance: Modified independent (Pt using figure four to doff/don socks efficiently)   Toileting Toileting Level of Assistance: Modified independent LE shoes  Shoe Level of Assistance: Modified independent (slip on shoes without socks)   Bathing UE UE Bathing Level of Assistance: Modified independent LE adult brief  Adult Briefs Level of Assistance: Independent (pull ups)   Bathing LE LE Bathing Level of Assistance: Setup UE dressing  UE Dressing Level of Assistance: Modified independent   Toilet Transfer  Toilet Transfers: Independent Meal prep  Meal Prep Level of Assistance: Close supervision    Car Transfer Car Transfers: Supervision Kitchen   Kitchen Mobility Level of Assistance: Modified independent     Treatment Summary:  Pt made good progress with OT.  Family training completed and DME recommended.  Pt ed on UE strengthening HEP and issued handout.  Goal Attainment: all goals met   Remaining Goals/Needs:  none  Equipment Recommendations:  bedside commode, rollator, grab bar toilet/shower, tub transfer bench  Discharge Plan:  return home with spouse with [facility] services

## 2024-04-12 NOTE — PROGRESS NOTES
Occupational Therapy Note     04/12/24 [id number]   OT Last Visit   OT Received On 04/12/24   General   Reason for Referral Impaired mobility   Referred By Dr Carlisle   Past Medical History Relevant to Rehab COPD, CAD s/p PCI 10/4/23, NSVT, HTN, HLD, rheumatoid arthritis, and hypothyroidism, CHF, Afib (not on AC), AAA, asthma, COPD, chronic respiratory failure 2/2 pulmonary sarcoidosis, Migraines, Hiatal Hernia, and Diverticulosis.   Missed Visit No   Family/Caregiver Present No   Prior to Session Communication Bedside nurse   Patient Position Received Bed, 2 rail up   Preferred Learning Style verbal;visual   General Comment Pt reports no problem with indepenedent in room   Precautions   Hearing/Visual Limitations hearing diminished; corective lenses for reading   Medical Precautions Fall precautions;Oxygen therapy device and L/min   Precautions Comment Oxygen tubing.   Pain Assessment   Pain Assessment 0-10   Pain Score 4   Pain Type Chronic pain   Pain Location Shoulder   Pain Orientation Left   Pain Descriptors Aching   Pain Frequency Intermittent   Pain Onset Sudden   Clinical Progression Rapidly improving   Patient's Stated Pain Goal No pain   Cognition   Overall Cognitive Status WFL   Arousal/Alertness Appropriate responses to stimuli   Orientation Level Oriented X4   Following Commands Follows all commands and directions without difficulty   Safety/Judgement X   RUE    RUE  WFL   LUE    LUE WFL   Kitchen Mobility   Kitchen Mobility Level of Assistance Modified independent   Kitchen-Mobility Level Other (Comment)  (rollator)   Kitchen Activity Retrieve items;Transport items   Kitchen Mobility Comments safe use of rollator   Therapeutic Activity   Therapeutic Activity Performed Yes   Therapeutic Activity 1 balloon volley   Therapeutic Activity 2 card matching activity with 1# wrist weights   Strength   Strength Comments WFL BUEs   IP OT Assessment   OT Assessment Pt has progressed towards all ADL goals.  Pt  prepared to return home on 4/13 with spouse at an independent level.   Prognosis Good   Barriers to Discharge None   Evaluation/Treatment Tolerance Patient tolerated treatment well   Medical Staff Made Aware Yes   End of Session Communication Bedside nurse   End of Session Patient Position Up in chair   OT Assessment   OT Assessment Results Decreased endurance;Decreased ADL status;Decreased IADLs;Decreased upper extremity strength   Strengths Ability to acquire knowledge   Barriers to Participation Comorbidities   Inpatient/Swing Bed or Outpatient   Inpatient/Swing Bed or Outpatient Inpatient   Inpatient Plan   Treatment Interventions Functional transfer training;UE strengthening/ROM;Endurance training;Equipment evaluation/education   OT Frequency 5 times per week   OT Discharge Recommendations Low intensity level of continued care   Equipment Recommended upon Discharge Wheeled walker   OT - OK to Discharge Yes   OT Recommended Transfer Status Independent

## 2024-04-12 NOTE — PROGRESS NOTES
"[facility] Comprehensive Rehabilitation  Physician Progress Note    Subjective   Trinity Hernandez is a 74 y.o. female admitted to inpatient rehabilitation unit.    Doing well, no complaints - DC planning for tomorrow.    Objective   /52 (BP Location: Right arm, Patient Position: Lying)   Pulse 78   Temp 37.2 °C (99 °F) (Tympanic)   Resp 18   Ht 1.7 m (5' 6.93\")   Wt 76.4 kg (168 lb 6.9 oz)   SpO2 96%   BMI 26.44 kg/m²      Physical Exam  Vitals reviewed.   Constitutional:       Appearance: Normal appearance.   Cardiovascular:      Rate and Rhythm: Normal rate and regular rhythm.      Heart sounds: No murmur heard.  Pulmonary:      Breath sounds: Normal breath sounds. No wheezing, rhonchi or rales.   Musculoskeletal:      Right lower leg: No edema.      Left lower leg: No edema.         Labs  BMP:    CBC:    Coagulation:       Assesment and Plan    Left MCA stroke - cont ASA  SDH - follow up CT in 4 weeks  AF off AC due to SDH  Dysphagia - cont ST efforts  Hypertension - cont metoprolol  Hypothyroidism - cont levothyroxine  Discharge planning    Daniel Carlisle MD    "

## 2024-04-12 NOTE — PROGRESS NOTES
Physical Therapy       04/12/24 [id number]   PT  Visit   PT Received On 04/12/24   Response to Previous Treatment Patient reporting fatigue but able to participate.   General   Reason for Referral Impaired mobility   Referred By Dr Carlisle   Past Medical History Relevant to Rehab COPD, CAD s/p PCI 10/4/23, NSVT, HTN, HLD, rheumatoid arthritis, and hypothyroidism, CHF, Afib (not on AC), AAA, asthma, COPD, chronic respiratory failure 2/2 pulmonary sarcoidosis, Migraines, Hiatal Hernia, and Diverticulosis.   Patient Position Received Up in chair   Preferred Learning Style verbal;visual   General Comment Pt reports that her eyes are sore.   Precautions   Hearing/Visual Limitations hearing diminished; corective lenses for reading   Medical Precautions Fall precautions;Oxygen therapy device and L/min   Precautions Comment Oxygen tubing.   Pain Assessment   Pain Assessment 0-10   Pain Score 4   Pain Type Chronic pain   Pain Location Shoulder   Pain Orientation Left   Therapeutic Activity   Therapeutic Activity 1 sit/stands with 3# ball in both hands (no UE assist to stand), to OH press with 3# ball, supervision/set-up, 2x5 reps   Ambulation/Gait Training 1   Surface 1 Level tile   Device 1 Rollator   Gait Support Devices Gait belt   Assistance 1 Independent   Quality of Gait 1 Forward flexed posture   Comments/Distance (ft) 1 150 ft, turns, x1 seated rest break using rollator seat.   Transfer 1   Technique 1 Stand to sit;Sit to stand   Transfer Device 1 Walker   Transfer Level of Assistance 1 Independent   Transfers 2   Transfer From 2 Sit to;Stand to   Transfer to 2   (rollator)   Technique 2 Stand pivot   Transfer Level of Assistance 2 Independent   Activity Tolerance   Endurance Tolerates 10 - 20 min exercise with multiple rests   PT Assessment   PT Assessment Results Decreased strength;Pain;Decreased range of motion;Decreased endurance;Impaired balance;Decreased mobility;Decreased cognition;Impaired judgement;Decreased  safety awareness   Rehab Prognosis Excellent   Strengths Ability to acquire knowledge   Barriers to Participation Comorbidities   End of Session Patient Position Up in chair   PT Plan   Inpatient/Swing Bed or Outpatient Inpatient   PT Plan   Treatment/Interventions Bed mobility;Transfer training;Gait training;Stair training;Neuromuscular re-education;Therapeutic exercise;Therapeutic activity   PT Plan Skilled PT   Equipment Recommended upon Discharge Wheeled walker   PT Recommended Transfer Status Independent

## 2024-04-12 NOTE — PROGRESS NOTES
Speech-Language Pathology    SLP Adult IRF Dysphagia Treatment/Discharge Note     Patient Name: Trinity Hernandez  MRN: [id number]  Today's Date: 4/12/2024  Time Calculation  Start Time: 1220  Stop Time: 1250  Time Calculation (min): 30 min         Current Problem:   Ischemic stroke, right body involvement (left brain)     SLP Assessment:  SLP TX Intervention Outcome: Making Progress Towards Goals  SLP Assessment Results: Other (Comment) (dytsphagia)  Prognosis: Good  Treatment Provided: Yes   Treatment Tolerance: Patient limited by fatigue  Medical Staff Made Aware: Yes  Strengths: Family/Caregiver Suppport  Barriers: Comorbidities  Education Provided: Yes    Pt was seen for dysphagia tx. Excellent progress as listed below. Pt advanced from bridled dobhoff to tolerating a full p.o. diet. Diet level was advanced to pureed/moderately-thick textures as per 4/5 MBSS. Provided pt and family education for current diet level, examples of thickener, how to thicken liquids, aspiration precautions, and safe swallow strategies. During family training, spouse and daughters verbalized good understanding. They did not have further questions. Pt felt all questions were addressed. She benefits from cues for swallowing exercises. All goals were achieved as listed below.    Recommend continued ST intervention for dysphagia. Repeat MBSS prior to advancing diet textures.     Plan:  SLP TX Plan: Discharge from Speech Therapy  SLP Plan: No skilled SLP  No Skilled SLP: Other (Comment) (Pt will discharge home on next tx day)  SLP Discharge Recommendations: Continue skilled SLP services at the next level of care  Discussed POC: Patient, Nursing, Caregiver/family  Discussed Risks/Benefits: Yes, Patient, Nursing  Patient/Caregiver Agreeable: Yes      Subjective   Pt seen upright in bed.     General Visit Information:   Reason for Referral: f/u dysphagia tx, discharge note  Referred By: Dr Carlisle  Prior to Session Communication: Bedside  nurse    Pain Assessment:   Pain Score: 4  Pain Location: Generalized  Pain Orientation: Left      Objective   DYSPHAGIA GOALS initiated 3/29, anticipated end 4/19:  Pt will tolerate tsp trials of pureed textures with SLP only to 90% without overt s/s aspiration.  PROGRESS: RN cleared pt for p.o. trials with SLP. 3 ice chips and approx. 1-2 oz of applesauce via tsp after oral care. 4/8 - GOAL MET  STATUS: Pt c/o pain during swallow d/t dobhoff. RN aware. Breathing treatment provided some relief. Pt was able to self-feed after instruction for smaller amounts. No overt s/s aspiration were noted. 4/8 - GOAL MET  2. Pt will utilize chin-tuck, triple effortful swallow with SLP only trials to 100%.              PROGRESS: 90% - 100% with cues, with SLP trials. 4/8 - GOAL MET  STATUS: Pt is affected by pain during swallow. Less facial grimacing today. Able to complete chin-tuck followed by 3 swallows after SLP provides cues to initiate and continue through trials. 4/8 - GOAL MET  Pt will increase swallow reflex via OPEs to 80% accuracy.  PROGRESS: 80% with cues.   STATUS: Reviewed handout for swallowing exercises. Able to complete as follows with cues to initiate, pacing - supraglottic sequence x2, pitch glides x5, Ami x2, tongue pops x15, effortful swallow x5, BOT retraction x10. GOAL MET with cues.  Pt will improve swallow reflex via OMEs to 80% accuracy.              PROGRESS: 80% with min cues 4/8: GOAL MET  STATUS: Able to complete sets of x10 labial protrusion/retraction, lingual lateralization,  buccal protrusion/retraction. Reduced cues for pacing, full ROM. 4/8: GOAL MET  Ongoing assessment via MBSS when indicated to reassess swallow function.  PROGRESS: Discussed MBSS results with pt, spouse, and Mae JOSÉ RN. Notified Dr. Cruz via [employer] Secure Chat of MBSS results.  STATUS: 4/5 - GOAL MET  6.  Pt will tolerate pureed and moderately-thick/honey-thick textures to 90% without overt s/s aspiration.               PROGRESS: Established 4/5, anticipated end 4/19. 4/11: 90%  STATUS: No reported s/s aspiration. Provided education to spouse and daughters RE: pureed/moderately-thick textures. Provided education for how to use SimplyThick EasyMix. Provided demonstration with 8oz cola, how to measure, stir, etc. Provided handouts for items not included with thickened textures (I.e. ice, ice-cream, Jell-O, sherbet, etc.) Spouse and daughters verbalized good understanding understanding. Spouse verbalized understanding that coffee should be thickened. 4/12 - GOAL MET  7. Pt will use safe swallow strategies to 90% accuracy with cues during meals and/or snacks.              PROGRESS: Established 4/5, anticipated end 4/19 4/12: 90% cues to remain upright.   STATUS: Reviewed handout for safe swallow strategies. Focused on upright position and 3-4 swallows/bite. Also reviewed importance of oral care. Spouse and daughters verbalized good understanding. 4/12 - GOAL MET     Therapeutic Swallow:  Therapeutic Swallow Intervention : Compensatory Strategies, PO Trials, Pharyngeal Strengthening Techniques  Pharyngeal Strengthening Techniques: Effortful Swallow, Pitch Glides, Supraglottic Swallow  Solid Diet Recommendations: Pureed/extremely thick  (IDDSI Level 4)  Liquid Diet Recommendations: Honey thick/liquidised/moderately thick (IDDS Level 3)  Swallow Comments: med's crushed in pureed    Inpatient Education:  Adult Inpatient Education  Individual(s) Educated: Patient  Written Education : reviewed handouts for swallowing ex.  Verbal Education : pureed/moderately-thick textures  Risk and Benefits Discussed with Patient/Caregiver/Other: yes  Patient/Caregiver Demonstrated Understanding: yes  Plan of Care Discussed and Agreed Upon: yes  Patient Response to Education: Patient/Caregiver Verbalized Understanding of Information

## 2024-04-12 NOTE — PROGRESS NOTES
Patient has reached maximum potential with therapy and is medically clear for discharge. Discharge options/recommendations have been reviewed with patient and family. No appeal requested as per Medicare guidelines.      TRANSPORT DATE AND TIME:  11am via family on 4.13.24    EQUIPMENT NEEDS:  Rollator to be delivered to patient's home this AM by [facility]. Bedside commode to be issued by therapy.    HOME GOING SERVICES:  [facility] to follow for RN PT OT ST. Discharge summary provided by care transitions via Epic. Referral made to Meals [employer] Wheels and Life alert as per family request. Detailed info on discharge arrangements provided to patient and family directly by this worker.     IDT aware of discharge plan. Patient and family aware and in agreement with safe discharge plan for 4.13.24.

## 2024-04-12 NOTE — PROGRESS NOTES
Physical Therapy Discharge Summary        04/12/24 [id number]   PT  Visit   PT Received On 04/12/24   Response to Previous Treatment Patient reporting fatigue but able to participate.   General   Reason for Referral Impaired mobility   Referred By Dr Carlisle   General Comment Pt reports no problem with indepenedent in room   Precautions   Hearing/Visual Limitations hearing diminished; corective lenses for reading   Medical Precautions Fall precautions;Oxygen therapy device and L/min   Precautions Comment Oxygen tubing.   Pain Assessment   Pain Assessment 0-10   Pain Score 4   Pain Type Chronic pain   Pain Location Shoulder   Pain Orientation Right   Cognition   Overall Cognitive Status WFL   Orientation Level Oriented X4   Postural Control   Posture Comment Improved but mildly flexed at hips, without AD   General Observation   General Observation Chronic 02 needs 2 liters   Static Sitting Balance   Static Sitting-Balance Support No upper extremity supported;Feet supported   Static Sitting-Level of Assistance Independent   Dynamic Sitting Balance   Dynamic Sitting-Balance Support No upper extremity supported;Feet supported   Dynamic Sitting-Comments Independent   Static Standing Balance   Static Standing-Comment/Number of Minutes WBOS, NBOS No LOB Steady   Dynamic Standing Balance   Dynamic Standing-Comments SAWYER 52/56 Low Fall Risk   RUE    RUE  WFL   AROM RLE (degrees)   RLE AROM Comment WFL   Strength RLE   R Hip Flexion 4/5   R Knee Flexion 4/5   R Knee Extension 4/5   R Ankle Dorsiflexion 4+/5   R Ankle Plantar Flexion 4+/5   R Hip Extension 4/5   R Hip ABduction 4/5   R Hip ADduction 4/5   LLE    LLE  WFL   Strength LLE   L Hip Flexion 4/5   L Knee Flexion 5/5   L Knee Extension 5/5   L Ankle Dorsiflexion 5/5   L Ankle Plantar Flexion 5/5   L Hip Extension 4/5   L Hip ABduction 4/5   L Hip ADduction 5/5   Therapeutic Exercise   Therapeutic Exercise Performed Yes   Therapeutic Exercise Activity 1 Seated LAQ 2x15  2# BLE   Therapeutic Exercise Activity 2 Seated Hip Flexion 2x15 2# BLE   Therapeutic Exercise Activity 3 Seated Hip Abd green tband  2x15   Therapeutic Exercise Activity 4 seated Hip Add with ball 2x15   Therapeutic Exercise Activity 5 Seated Hamstring Curls with ther band 1x15   Balance/Neuromuscular Re-Education   Balance/Neuromuscular Re-Education Activity 1 Sidestepping ii bars with green tband x1.  Pt requesting sit due to fatigue.   Bed Mobility 1   Bed Mobility 1 Supine to sitting;Sitting to supine;Rolling right;Rolling left   Level of Assistance 1 Independent   Ambulation/Gait Training 1   Surface 1 Level tile;Uneven;Carpet   Assistance 1 Independent   Comments/Distance (ft) 1 150+ feet Pt can manage cord in room.   Transfer 1   Transfer From 1 Wheelchair to   Transfer to 1 Stand   Technique 1 Stand to sit;Sit to stand   Transfer Level of Assistance 1 Independent   Transfers 2   Transfer From 2 Sit to   Transfer to 2 Stand;Chair without arms;Bed;Toilet   Technique 2 Sit to stand;Stand to sit   Transfer Level of Assistance 2 Independent   Stairs   Rails 1 Bilateral   Device 1 Railing   Assistance 1 Modified independent   Comment/Number of Steps 1 1 flight 2 Rail reciprocally   Wheelchair Activities   Propulsion Yes   Propulsion Type 1 Manual   Level 1 Level tile   Method 1 Right upper extremity;Left upper extremity;Right lower extremity;Left lower extremity   Level of Assistance 1 Independent   Description/Details 1 150 feet with turns   Activity Tolerance   Rate of Perceived Exertion (RPE) RPE after 3 min 30 seconds walk 6/10 RPE   PT Assessment   PT Assessment Results Decreased strength;Pain;Decreased range of motion;Decreased endurance;Impaired balance;Decreased mobility;Decreased cognition;Impaired judgement;Decreased safety awareness   Rehab Prognosis Excellent   End of Session Patient Position Up in chair   Outpatient Education   Individual(s) Educated Patient;Child;Spouse   Education Provided Home  Exercise Program;Home Safety   Education Comment Discussed importance of OOB.   PT Plan   Inpatient/Swing Bed or Outpatient Inpatient   PT Plan   Treatment/Interventions Bed mobility;Transfer training;Gait training;Stair training;Balance training;Neuromuscular re-education;Endurance training;Wheelchair management;Therapeutic activity;Therapeutic exercise   PT Recommended Transfer Status Independent   PT - OK to Discharge Yes   OUTCOMES SAWYER 52/56 Low Fall Risk   Problem: PT Problem  Goal: LTG - AMBULATION Patient will amb 150 feet with rolling walker device including two turns on uneven surface with independent assist to facilitate safe mobility.   Outcome: Met

## 2024-04-13 VITALS
TEMPERATURE: 97.7 F | SYSTOLIC BLOOD PRESSURE: 120 MMHG | DIASTOLIC BLOOD PRESSURE: 66 MMHG | OXYGEN SATURATION: 100 % | HEIGHT: 67 IN | WEIGHT: 168.43 LBS | BODY MASS INDEX: 26.44 KG/M2 | HEART RATE: 79 BPM | RESPIRATION RATE: 16 BRPM

## 2024-04-13 PROCEDURE — 2500000001 HC RX 250 WO HCPCS SELF ADMINISTERED DRUGS (ALT 637 FOR MEDICARE OP): Performed by: INTERNAL MEDICINE

## 2024-04-13 PROCEDURE — 2500000005 HC RX 250 GENERAL PHARMACY W/O HCPCS: Performed by: INTERNAL MEDICINE

## 2024-04-13 PROCEDURE — 2500000004 HC RX 250 GENERAL PHARMACY W/ HCPCS (ALT 636 FOR OP/ED): Performed by: INTERNAL MEDICINE

## 2024-04-13 PROCEDURE — 2500000002 HC RX 250 W HCPCS SELF ADMINISTERED DRUGS (ALT 637 FOR MEDICARE OP, ALT 636 FOR OP/ED): Performed by: INTERNAL MEDICINE

## 2024-04-13 PROCEDURE — 2500000002 HC RX 250 W HCPCS SELF ADMINISTERED DRUGS (ALT 637 FOR MEDICARE OP, ALT 636 FOR OP/ED): Mod: MUE | Performed by: INTERNAL MEDICINE

## 2024-04-13 RX ADMIN — ACETAMINOPHEN 650 MG: 325 TABLET ORAL at 02:52

## 2024-04-13 RX ADMIN — TIOTROPIUM BROMIDE INHALATION SPRAY 2 PUFF: 3.12 SPRAY, METERED RESPIRATORY (INHALATION) at 09:01

## 2024-04-13 RX ADMIN — BUDESONIDE INHALATION 0.5 MG: 0.5 SUSPENSION RESPIRATORY (INHALATION) at 09:17

## 2024-04-13 RX ADMIN — LEVOTHYROXINE SODIUM 50 MCG: 50 TABLET ORAL at 05:51

## 2024-04-13 RX ADMIN — METOPROLOL TARTRATE 25 MG: 25 TABLET, FILM COATED ORAL at 09:01

## 2024-04-13 RX ADMIN — LIDOCAINE 1 PATCH: 4 PATCH TOPICAL at 09:01

## 2024-04-13 RX ADMIN — Medication 400 MG: at 09:01

## 2024-04-13 RX ADMIN — Medication 1 TABLET: at 09:01

## 2024-04-13 RX ADMIN — ASPIRIN 81 MG CHEWABLE TABLET 81 MG: 81 TABLET CHEWABLE at 09:01

## 2024-04-13 RX ADMIN — FUROSEMIDE 20 MG: 20 TABLET ORAL at 09:01

## 2024-04-13 RX ADMIN — FORMOTEROL FUMARATE DIHYDRATE 20 MCG: 20 SOLUTION RESPIRATORY (INHALATION) at 09:17

## 2024-04-13 RX ADMIN — ALBUTEROL SULFATE 2.5 MG: 2.5 SOLUTION RESPIRATORY (INHALATION) at 05:51

## 2024-04-13 ASSESSMENT — PAIN SCALES - GENERAL
PAINLEVEL_OUTOF10: 5 - MODERATE PAIN
PAINLEVEL_OUTOF10: 0 - NO PAIN
PAINLEVEL_OUTOF10: 0 - NO PAIN

## 2024-04-13 ASSESSMENT — PATIENT HEALTH QUESTIONNAIRE - PHQ9
1. LITTLE INTEREST OR PLEASURE IN DOING THINGS: SEVERAL DAYS
SUM OF ALL RESPONSES TO PHQ9 QUESTIONS 1 & 2: 2
10. IF YOU CHECKED OFF ANY PROBLEMS, HOW DIFFICULT HAVE THESE PROBLEMS MADE IT FOR YOU TO DO YOUR WORK, TAKE CARE OF THINGS AT HOME, OR GET ALONG WITH OTHER PEOPLE: NOT DIFFICULT AT ALL
2. FEELING DOWN, DEPRESSED OR HOPELESS: SEVERAL DAYS

## 2024-04-13 ASSESSMENT — BRIEF INTERVIEW FOR MENTAL STATUS (BIMS)
ASKED TO RECALL BLUE: YES, NO CUE REQUIRED
GENERAL FUNCTIONAL COGNITION RATING: INDEPENDENT
WHAT YEAR IS IT: CORRECT
INITIAL REPETITION OF BED BLUE SOCK - FIRST ATTEMPT: 3
WHAT DAY OF THE WEEK IS IT: CORRECT
WHAT MONTH IS IT: ACCURATE WITHIN 5 DAYS
ASKED TO RECALL BED: YES, NO CUE REQUIRED
BIMS SUMMARY SCORE: 15
GENERAL MEMORY AND RECALL ABILITY: CURRENT SEASON
COGNITIVE PATTERN ASSESSMENT USED: STAFF ASSESSMENT
ASKED TO RECALL SOCK: YES, NO CUE REQUIRED

## 2024-04-13 ASSESSMENT — PAIN - FUNCTIONAL ASSESSMENT
PAIN_FUNCTIONAL_ASSESSMENT: 0-10

## 2024-04-13 NOTE — NURSING NOTE
pt received discharge paperwork @ this time, reviewed and questions answered, pt has all DME @ home, discharge with caretenders [facility]

## 2024-04-13 NOTE — NURSING NOTE
After visit summary is flagging RN about discharge reconciliation not being completed, RN contacted Dr Carlisle @ this time, medications reviewed with pt and no prescriptions needed @ this time.

## 2024-04-13 NOTE — NURSING NOTE
Assumed care of pt @ 4707, report received, pt planned discharge for 11am, no acute needs right now, continuing to monitor.

## 2024-04-13 NOTE — NURSING NOTE
Pt left @ this time  to RN and this RN had not yet reviewed discharge paperwork with pt, RN contacted pts  who then contacted Miriam who picked up pt and they will be returning to review discharge paperwork.

## 2024-04-13 NOTE — NURSING NOTE
A/O x4. Reported pain as 5/10 on pain scale. Given Tylenol 650 mg with positive outcome. Swallowed medications without difficulty. Reported abdominal discomfort regarding the stomach gas. Dr. Carlisle ordered Simethicone 80 mg. Per patient, medication worked. Bed in low position. Call light within reach. Will cont. to monitor.

## 2024-04-13 NOTE — CARE PLAN
The patient's goals for the shift include have family trained    The clinical goals for the shift include maintain safety

## 2024-04-15 ENCOUNTER — PATIENT OUTREACH (OUTPATIENT)
Dept: CARE COORDINATION | Facility: CLINIC | Age: 74
End: 2024-04-15
Payer: MEDICARE

## 2024-04-15 NOTE — PROGRESS NOTES
Discharge Facility: [facility]   Discharge Diagnosis: CVA (cerebrovascular accident due to intracerebral hemorrhage) (CMS/[facility])   Admission Date: 3-28-24  Discharge Date: 4-13-24    PCP Appointment Date: Message routed to office for scheduling     Specialist Appointment Date:  5-9-24 Neuro  Hospital Encounter and Summary: Linked   See discharge assessment below for further details    Engagement  Call Start Time: 1005 (4/15/2024 10:12 AM)    Medications  Medications reviewed with patient/caregiver?: Yes (4/15/2024 10:12 AM)  Is the patient having any side effects they believe may be caused by any medication additions or changes?: No (4/15/2024 10:12 AM)  Does the patient have all medications ordered at discharge?: Yes (4/15/2024 10:12 AM)  Care Management Interventions: No intervention needed (4/15/2024 10:12 AM)  Prescription Comments: CHANGE how you take:  albuterol   STOP taking:  arformoterol 15 mcg/2 mL nebulizer solution (Brovana)   budesonide 0.5 mg/2 mL nebulizer solution (Pulmicort)   lidocaine 4 % patch   sennosides 8.6 mg tablet (Senokot) (4/15/2024 10:12 AM)  Is the patient taking all medications as directed (includes completed medication regime)?: Yes (4/15/2024 10:12 AM)  Care Management Interventions: Provided patient education (4/15/2024 10:12 AM)    Appointments  Does the patient have a primary care provider?: Yes (4/15/2024 10:12 AM)  Care Management Interventions: Advised patient to make appointment (4/15/2024 10:12 AM)    Self Management  What is the home health agency?: Unsure of [facility]. (4/15/2024 10:12 AM)  Has home health visited the patient within 72 hours of discharge?: Call prior to 72 hours (4/15/2024 10:12 AM)    Patient Teaching  Does the patient have access to their discharge instructions?: Yes (4/15/2024 10:12 AM)  Care Management Interventions: Reviewed instructions with patient (4/15/2024 10:12 AM)  What is the patient's perception of their health status since  discharge?: Improving (4/15/2024 10:12 AM)  Is the patient/caregiver able to teach back the hierarchy of who to call/visit for symptoms/problems? PCP, Specialist, Home Health nurse, Urgent Care, ED, 911: Yes (4/15/2024 10:12 AM)      Molly Stuart LPN

## 2024-04-23 NOTE — PROGRESS NOTES
Subjective   Patient ID: Trinity Hernandez is a 74 y.o. female who presents for Hospital Follow-up (Stroke; would like to discuss this; ).    HPI     Discharge Facility: [facility]   Discharge Diagnosis: CVA (cerebrovascular accident due to intracerebral hemorrhage) (CMS/[facility])   Admission Date: 3-28-24  Discharge Date: 4-13-24    Relevant hospital complications: None  Discharge antithrombotics: ASA 81 mg  Pending evaluation:    Repeat CT head in 4 weeks to evaluate for SDH and infarct resolution   Issues Requiring Follow-Up  -CT head: Perform in 4 weeks.  -Stroke neurology: Follow-up for left MCA infarct status post TNK (reversed with cryo as developed subdural hematoma) and mechanical thrombectomy with TICI 2B.  Follow-up CT head performed 4 weeks postdischarge for consideration of safety of Eliquis initiation for atrial fibrillation.  -Cardiology: Follow-up for atrial fibrillation, chronic diastolic heart failure, and NSVT. Scheduled at [facility] with SURI Garcia at 4/30 2pm.  Metoprolol 25 mg 3 times daily initiated during hospitalization.   -Primary care provider: Follow-up for hypertension. Hospitalization follow-up.  -Modified barium swallow study: Repeat in 2-4 weeks after discharge (between April 10 and 24, 2024)    She is eating and drinking okay. She denies coughing with eating and drinking.    She was in a nursing home, left there on 4/13 after PT/OT. Home health is involved now. She monitored with Home health nurse.   She took one of her bp medications, she will take another, TID for now. She is slightly elevated today.   No vision changes, due for eye appointment. She had eye pain prior to the stoke.     She would like some lorazepam for her anxiety r/t stroke   She also wanted to know if she should restart her Repatha.     Right foot pain: Going on for 1 year. Started limping off and on for the last year. No specific injury. Mild right foot swelling.      Syncope: No further  episodes     CAD, s/p stent: Following with Dr. Romero. Considering trying repatha.     Small bowel perforation, abdominal hernias, epiploic appendagitis: She has been recommended laparoscopic surgery but she is hoping to avoid surgery.      Anxiety: lorazepam prn is helpful. She is having a lot more anxiety lately because of her 's health. He has an aneurysm that is over 5cm and he is in renal failure and he recently had a severe GI bleed. She is taking hydroxyzine but that makes her sleepy so she can't take it during the day.      HTN, aortic aneurysm: Reasonable control on metoprolol, losartan, and lasix. Following with cardioloy. Recently had some discomfort and got a zio patch done. She is awaiting results. She is going to get a stress test soon.     HLD: Off meds, trying to watch her diet.   Started Repatha in March then had the stoke soon after.      COPD/sarcoidosis: On Brovana, spiriva, and budesonide. Using oxygen and prednisone prn, she has an albuterol inhaler. her breathing is a little bit bad today. She has been more short of breath. She is seeing Dr. Ford for pulm. She is going in for testing in April.      Hypothyroidism: On replacement, no concerns.     GERD: Off pantoprazole, she is doing much better since taking the enzymes.     All other systems have been reviewed and are negative for complaint     Review of Systems    Objective   /82 (BP Location: Right arm, Patient Position: Sitting, BP Cuff Size: Large adult)   Pulse 87   Wt 73.9 kg (163 lb)   BMI 25.58 kg/m²     Physical Exam  Constitutional:       Appearance: Normal appearance.   Cardiovascular:      Rate and Rhythm: Normal rate and regular rhythm.   Pulmonary:      Effort: Pulmonary effort is normal.      Breath sounds: Normal breath sounds.   Skin:     General: Skin is warm and dry.   Neurological:      Mental Status: She is alert and oriented to person, place, and time. Mental status is at baseline.      Cranial  Nerves: Cranial nerves 2-12 are intact. No cranial nerve deficit.      Motor: Weakness present. No tremor.      Gait: Gait abnormal.      Comments: R side strength 1+   L Side strength 2+    Psychiatric:         Mood and Affect: Mood normal.         Behavior: Behavior normal.         Assessment/Plan     #HFU  #CVA  Overall doing well   CT scan information provided to get done 4/27   Holding eliquis until CT scan done   Has follow up with cardiology on 4/30  Follow up with neuro on 5/9  Refilled lorazepam   Okay to start repatha again     #Foot pain:  Likely arthritis  Check xray     #Rheumatoid arthritis  monitor     #COPD exacerbation  #COPD  #Respiratory failure  On oxygen  On azithromycin three times per week  Completed prednisone  Breathing is improving slowly  Bronchiectasis vest was denied due to her diagnoses     #CAD  S/p stent   On plavix and ezetimibe  Has followup with cardio  She has midodrine for prn use  Consider starting repatha     #Small bowel perforation  #Ventral hernia  Following with GI  Currently planning to avoid surgery     #Anxiety:  CSA signed  Using ativan prn, refilled  Add lexapro     #Shingles:  she has valtrex rx at home  Hasn't needed it much     #Poor venous access  port in place     #HTN  Controlled on furosemide, and metoprolol  Stopped losartan due to hypotension  seeing cardiology (Nick)     #HLD  Stable, not on meds, monitoring     #COPD/Sarcoidosis  Following with pulm, stable, on inhalers, supplemental O2, nebulizers  testing with pulm in April     #Hypothyroidism  Controlled on replacement     HCM:  s/p COVID vaccine  Mammogram Sept

## 2024-04-24 ENCOUNTER — OFFICE VISIT (OUTPATIENT)
Dept: PRIMARY CARE | Facility: CLINIC | Age: 74
End: 2024-04-24
Payer: MEDICARE

## 2024-04-24 VITALS
WEIGHT: 163 LBS | DIASTOLIC BLOOD PRESSURE: 82 MMHG | SYSTOLIC BLOOD PRESSURE: 152 MMHG | BODY MASS INDEX: 25.58 KG/M2 | HEART RATE: 87 BPM

## 2024-04-24 DIAGNOSIS — I63.9 CEREBROVASCULAR ACCIDENT (CVA), UNSPECIFIED MECHANISM (MULTI): ICD-10-CM

## 2024-04-24 DIAGNOSIS — Z09 HOSPITAL DISCHARGE FOLLOW-UP: Primary | ICD-10-CM

## 2024-04-24 DIAGNOSIS — F41.9 ANXIETY: Primary | ICD-10-CM

## 2024-04-24 PROCEDURE — 3077F SYST BP >= 140 MM HG: CPT | Performed by: REGISTERED NURSE

## 2024-04-24 PROCEDURE — 1159F MED LIST DOCD IN RCRD: CPT | Performed by: REGISTERED NURSE

## 2024-04-24 PROCEDURE — 1111F DSCHRG MED/CURRENT MED MERGE: CPT | Performed by: REGISTERED NURSE

## 2024-04-24 PROCEDURE — 99495 TRANSJ CARE MGMT MOD F2F 14D: CPT | Performed by: REGISTERED NURSE

## 2024-04-24 PROCEDURE — 1160F RVW MEDS BY RX/DR IN RCRD: CPT | Performed by: REGISTERED NURSE

## 2024-04-24 PROCEDURE — 1124F ACP DISCUSS-NO DSCNMKR DOCD: CPT | Performed by: REGISTERED NURSE

## 2024-04-24 PROCEDURE — 1036F TOBACCO NON-USER: CPT | Performed by: REGISTERED NURSE

## 2024-04-24 PROCEDURE — 3079F DIAST BP 80-89 MM HG: CPT | Performed by: REGISTERED NURSE

## 2024-04-24 RX ORDER — LORAZEPAM 0.5 MG/1
0.5 TABLET ORAL 2 TIMES DAILY PRN
Qty: 30 TABLET | Refills: 0 | Status: SHIPPED | OUTPATIENT
Start: 2024-04-24 | End: 2024-05-24

## 2024-04-24 RX ORDER — AZITHROMYCIN 250 MG/1
TABLET, FILM COATED ORAL
COMMUNITY
Start: 2024-04-23 | End: 2024-05-09 | Stop reason: ALTCHOICE

## 2024-04-24 NOTE — PATIENT INSTRUCTIONS
Radiology:  [place]:  [phone]    [place]:  [phone] opt 2     Central Scheduling:  Radiology: [phone]  [place]: [phone]    [facility] [phone]    Open [phone], [place]    Sedated [phone], [place]      Follow up with neurology on May 9th, Dr. Chung

## 2024-04-26 ENCOUNTER — PATIENT OUTREACH (OUTPATIENT)
Dept: CARE COORDINATION | Facility: CLINIC | Age: 74
End: 2024-04-26
Payer: MEDICARE

## 2024-04-29 ENCOUNTER — HOSPITAL ENCOUNTER (OUTPATIENT)
Dept: RADIOLOGY | Facility: HOSPITAL | Age: 74
Discharge: HOME | End: 2024-04-29
Payer: MEDICARE

## 2024-04-29 ENCOUNTER — INFUSION (OUTPATIENT)
Dept: HEMATOLOGY/ONCOLOGY | Facility: HOSPITAL | Age: 74
End: 2024-04-29
Payer: MEDICARE

## 2024-04-29 ENCOUNTER — LAB REQUISITION (OUTPATIENT)
Dept: LAB | Facility: LAB | Age: 74
End: 2024-04-29
Payer: MEDICARE

## 2024-04-29 VITALS
RESPIRATION RATE: 18 BRPM | SYSTOLIC BLOOD PRESSURE: 136 MMHG | OXYGEN SATURATION: 96 % | HEART RATE: 89 BPM | DIASTOLIC BLOOD PRESSURE: 84 MMHG | TEMPERATURE: 98.1 F

## 2024-04-29 DIAGNOSIS — I63.9 CEREBROVASCULAR ACCIDENT (CVA), UNSPECIFIED MECHANISM (MULTI): ICD-10-CM

## 2024-04-29 DIAGNOSIS — I50.32 CHRONIC DIASTOLIC (CONGESTIVE) HEART FAILURE (MULTI): ICD-10-CM

## 2024-04-29 DIAGNOSIS — I87.8 POOR VENOUS ACCESS: ICD-10-CM

## 2024-04-29 LAB
CHOLEST SERPL-MCNC: 183 MG/DL (ref 0–199)
CHOLESTEROL/HDL RATIO: 4.8
HDLC SERPL-MCNC: 38.2 MG/DL
LDLC SERPL CALC-MCNC: 105 MG/DL
NON HDL CHOLESTEROL: 145 MG/DL (ref 0–149)
TRIGL SERPL-MCNC: 201 MG/DL (ref 0–149)
VLDL: 40 MG/DL (ref 0–40)

## 2024-04-29 PROCEDURE — 80061 LIPID PANEL: CPT

## 2024-04-29 PROCEDURE — 70450 CT HEAD/BRAIN W/O DYE: CPT

## 2024-04-29 PROCEDURE — 2500000004 HC RX 250 GENERAL PHARMACY W/ HCPCS (ALT 636 FOR OP/ED)

## 2024-04-29 PROCEDURE — 70450 CT HEAD/BRAIN W/O DYE: CPT | Performed by: RADIOLOGY

## 2024-04-29 PROCEDURE — 36591 DRAW BLOOD OFF VENOUS DEVICE: CPT

## 2024-04-29 RX ORDER — HEPARIN 100 UNIT/ML
500 SYRINGE INTRAVENOUS AS NEEDED
Status: DISCONTINUED | OUTPATIENT
Start: 2024-04-29 | End: 2024-04-29 | Stop reason: HOSPADM

## 2024-04-29 RX ORDER — HEPARIN 100 UNIT/ML
SYRINGE INTRAVENOUS
Status: COMPLETED
Start: 2024-04-29 | End: 2024-04-29

## 2024-04-29 RX ORDER — HEPARIN SODIUM,PORCINE/PF 10 UNIT/ML
50 SYRINGE (ML) INTRAVENOUS AS NEEDED
Status: CANCELLED | OUTPATIENT
Start: 2024-04-29

## 2024-04-29 RX ORDER — HEPARIN 100 UNIT/ML
500 SYRINGE INTRAVENOUS AS NEEDED
Status: CANCELLED | OUTPATIENT
Start: 2024-04-29

## 2024-04-29 RX ADMIN — HEPARIN 500 UNITS: 100 SYRINGE at 13:17

## 2024-04-29 ASSESSMENT — PAIN SCALES - GENERAL: PAINLEVEL: 2

## 2024-05-08 DIAGNOSIS — I50.9 ACUTE ON CHRONIC CONGESTIVE HEART FAILURE, UNSPECIFIED HEART FAILURE TYPE (MULTI): ICD-10-CM

## 2024-05-09 ENCOUNTER — TELEPHONE (OUTPATIENT)
Dept: PRIMARY CARE | Facility: CLINIC | Age: 74
End: 2024-05-09

## 2024-05-09 ENCOUNTER — TELEMEDICINE (OUTPATIENT)
Dept: NEUROLOGY | Facility: HOSPITAL | Age: 74
End: 2024-05-09
Payer: MEDICARE

## 2024-05-09 DIAGNOSIS — I63.412 CEREBROVASCULAR ACCIDENT (CVA) DUE TO EMBOLISM OF LEFT MIDDLE CEREBRAL ARTERY (MULTI): Primary | ICD-10-CM

## 2024-05-09 PROBLEM — I48.0 AF (PAROXYSMAL ATRIAL FIBRILLATION) (MULTI): Status: ACTIVE | Noted: 2023-07-13

## 2024-05-09 PROBLEM — Z97.8 ENDOTRACHEALLY INTUBATED: Status: RESOLVED | Noted: 2024-04-06 | Resolved: 2024-05-09

## 2024-05-09 PROBLEM — I50.9 CHF EXACERBATION (MULTI): Status: RESOLVED | Noted: 2021-05-01 | Resolved: 2024-05-09

## 2024-05-09 PROCEDURE — 1111F DSCHRG MED/CURRENT MED MERGE: CPT | Performed by: PSYCHIATRY & NEUROLOGY

## 2024-05-09 PROCEDURE — 99215 OFFICE O/P EST HI 40 MIN: CPT | Mod: GT,95 | Performed by: PSYCHIATRY & NEUROLOGY

## 2024-05-09 PROCEDURE — 99215 OFFICE O/P EST HI 40 MIN: CPT | Performed by: PSYCHIATRY & NEUROLOGY

## 2024-05-09 PROCEDURE — 1160F RVW MEDS BY RX/DR IN RCRD: CPT | Performed by: PSYCHIATRY & NEUROLOGY

## 2024-05-09 PROCEDURE — 1036F TOBACCO NON-USER: CPT | Performed by: PSYCHIATRY & NEUROLOGY

## 2024-05-09 PROCEDURE — 1159F MED LIST DOCD IN RCRD: CPT | Performed by: PSYCHIATRY & NEUROLOGY

## 2024-05-09 RX ORDER — CLOPIDOGREL BISULFATE 75 MG/1
75 TABLET ORAL DAILY
COMMUNITY

## 2024-05-09 NOTE — TELEPHONE ENCOUNTER
Pt is concerned about starting eliquis. Was told it could interact with a few of her other meds. She wants your opinion on it. Is holding off on it until she gets your opinion.

## 2024-05-09 NOTE — PATIENT INSTRUCTIONS
Stroke prevention:  Eat a healthy diet with plenty of fresh fruits and vegetables.  Avoid salt and fried foods.  Lose weight and exercise on a regular basis.  Do not smoke or use drugs.  Be sure to take all medications.  Call 911 for signs of stroke (numbness or weakness on one side, trouble seeing on one side, trouble speaking (either because your speech is slurred or you can't find the words), sudden double vision, spinning sensation or loss of coordination).    To find a stroke support group: https://www.stroke.org/en/stroke-support-group-finder  To learn about Virtual Stroke Educations sessions: contact Cecelia Keenan at: [email or web address]    I can be reached at phone: [phone] or email: [email or web address]

## 2024-05-09 NOTE — PROGRESS NOTES
Assessment/Plan:   Left middle cerebral artery infarction due to Atrial fibrillation.  Not on anticoaguation due to recent cardiac stent requiring Dual antiplatelets with aspirin and plavix and Subdural hematoma.  At this time would recommend starting eliquis, however, I am reluctant to have her on Eliquis and Dual antiplatelets with aspirin and plavix.  Spoke with her cardiologist, Dr Mariano who agrees with Eliquis and plavix alone without aspirin.      2.   Subdural hematoma in setting of trauma and TNK, now resolved.      Orders:  No orders of the defined types were placed in this encounter.    Unable to do video, visit is phone only.  HPI:   Trinity Hernandez is a 74 y.o.  female with vascular risk factors of atrial fibrillation who suffered a Left middle cerebral artery infarction.  She fell when she had her stroke striking the right side of her head against the wall.  In the ED she received IV TNK but follow up imaging showed a right Subdural hematoma and received cryoprecipitate.  She did receive thrombectomy with TICI 2b reperfusion.  She was discharged to acute rehab with a bridled dobhoff (since removed) and has since returned home.  She is receiving home therapy. During her admission she developed new onset Atrial fibrillation but given a relatively fresh cardiac stent (about one month old at the time of her admission) she was discharged on Dual antiplatelets with aspirin and plavix without anticoagulation.  Follow up CT head on 4/29/24 showed resolution of the small Subdural hematoma.     MRI: Left middle cerebral artery infarction, mostly subcortical.    MRA: left M1 occlusion  Echo: Ejection fraction: 50-55%, impaired relaxation            Left Atrium: normal            Patent foramen ovale: bubble study cannot be interpreted.  LDL: 50-55%  HbA1C: 5.3    Medications:   Current Outpatient Medications:     acetaminophen (Tylenol) 325 mg tablet, 2 tablets (650 mg) by nasogastric tube route every 6  hours if needed for mild pain (1 - 3)., Disp: 30 tablet, Rfl: 0    albuterol 90 mcg/actuation inhaler, Inhale 2 puffs every 6 hours if needed for shortness of breath., Disp: , Rfl:     aspirin 81 mg chewable tablet, 1 tablet (81 mg) by nasogastric tube route once daily. Do not start before March 29, 2024., Disp: , Rfl:     cholecalciferol (Vitamin D3) 50 mcg (2,000 unit) capsule, 1 capsule (50 mcg) by nasogastric tube route once daily., Disp: , Rfl:     clopidogrel (Plavix) 75 mg tablet, Take 1 tablet (75 mg) by mouth once daily., Disp: , Rfl:     estradiol (Estrace) 0.01 % (0.1 mg/gram) vaginal cream, Insert 0.5 Applicatorfuls (2 g) into the vagina once daily as needed., Disp: , Rfl:     furosemide (Lasix) 20 mg tablet, 1 tablet (20 mg) by g-tube route once daily. Do not start before March 29, 2024., Disp: , Rfl:     Lactobacillus acidophilus (Probiotic) 10 billion cell capsule, 1 capsule by nasogastric tube route once daily., Disp: , Rfl:     levothyroxine (Synthroid, Levoxyl) 50 mcg tablet, 1 tablet (50 mcg) by nasogastric tube route once daily. Do not start before March 29, 2024., Disp: , Rfl:     LORazepam (Ativan) 0.5 mg tablet, Take 1 tablet (0.5 mg) by mouth 2 times a day as needed for anxiety., Disp: 30 tablet, Rfl: 0    magnesium oxide (Mag-Ox) 400 mg (241.3 mg magnesium) tablet, 1 tablet (400 mg) by nasogastric tube route once daily., Disp: , Rfl:     melatonin 5 mg tablet, 1 tablet (5 mg) by nasogastric tube route as needed at bedtime for sleep., Disp: , Rfl:     metoprolol tartrate (Lopressor) 25 mg tablet, 1 tablet (25 mg) by nasogastric tube route 3 times a day., Disp: , Rfl:     nitroglycerin (Nitrostat) 0.4 mg SL tablet, Place 1 tablet (0.4 mg) under the tongue every 5 minutes if needed for chest pain., Disp: , Rfl:     oxygen (O2) gas therapy, Inhale 1 each once every 24 hours., Disp: , Rfl:     Repatha Syringe 140 mg/mL injection, Inject 1 mL (140 mg) under the skin every 14 (fourteen) days.,  Disp: , Rfl:     tiotropium (Spiriva Respimat) 2.5 mcg/actuation inhaler, Inhale 2 puffs once daily., Disp: , Rfl:      PMH:   Past Medical History:   Diagnosis Date    Abdominal bloating 05/04/2023    Age related cataract 02/19/2014    CAD (coronary artery disease)     CHF (congestive heart failure) (Multi)     Diverticulitis of small intestine without perforation or abscess without bleeding     Diverticulitis, intestine, small    Dizzy 11/20/2023    Essential (primary) hypertension 02/08/2017    HTN (hypertension), benign    Fall     Influenza B 05/08/2015    Formatting of this note might be different from the original. Completed Tamiflu Continue droplet precautions.    Nausea 11/16/2018    Personal history of other endocrine, nutritional and metabolic disease 02/08/2017    History of hypothyroidism    Personal history of other venous thrombosis and embolism     History of deep venous thrombosis        PSH:   Past Surgical History:   Procedure Laterality Date    ABDOMINAL ADHESION SURGERY  05/16/2017    Laparoscopic Lysis Of Intestinal Adhesions    CERVICAL FUSION  05/16/2017    Cervical Vertebral Fusion    CHOLECYSTECTOMY  05/16/2017    Cholecystectomy    COLECTOMY PARTIAL / TOTAL  05/16/2017    Partial Colectomy - Sigmoid    CORONARY ANGIOPLASTY WITH STENT PLACEMENT      CT ANGIO NECK  05/21/2020    CT NECK ANGIO W AND WO IV CONTRAST 5/21/2020 [facility]    CT HEAD ANGIO W AND WO IV CONTRAST  05/21/2020    CT HEAD ANGIO W AND WO IV CONTRAST 5/21/2020 [facility]    HERNIA REPAIR  05/16/2017    Hernia Repair    HYSTERECTOMY  10/03/2013    Hysterectomy    MR HEAD ANGIO WO IV CONTRAST  05/21/2020    MR HEAD ANGIO WO IV CONTRAST 5/21/2020 [facility]    MR NECK ANGIO WO IV CONTRAST  05/21/2020    MR NECK ANGIO WO IV CONTRAST 5/21/2020 [facility]    OTHER SURGICAL HISTORY  05/16/2017    Thoracoscopy Of Lungs And Pleural Space With Biopsy Of Lung Nodules    OTHER SURGICAL HISTORY   04/15/2019    Esophagogastroduodenoscopy        Allergies:   Allergies   Allergen Reactions    Hydrocodone-Acetaminophen Shortness of breath    Montelukast Unknown     Lungs felt as if there was a hole in them    Morphine Unknown     Body shakes    Nitrofurantoin Monohyd/M-Cryst Unknown    Sulfa (Sulfonamide Antibiotics) Nausea Only and Other    Atorvastatin Itching, Swelling and Unknown    Dicyclomine Hallucinations     Nightmares    Fluticasone Propion-Salmeterol Unknown    Levofloxacin Rash    Lisinopril Cough    Nitroimidazoles Nausea Only and Unknown    Omeprazole Diarrhea and Unknown    Salmeterol Unknown    Simvastatin Swelling and Unknown    Sucralfate Unknown    Umeclidinium Other     Urinary retention, blurred vision, constipation        FH:   Family History   Problem Relation Name Age of Onset    Breast cancer Mother      Pneumonia Father      Heart attack Father      Breast cancer Sister      Heart disease Sister      Diabetes Sister          SH:   Social History     Socioeconomic History    Marital status:      Spouse name: Not on file    Number of children: Not on file    Years of education: Not on file    Highest education level: Not on file   Occupational History    Not on file   Tobacco Use    Smoking status: Former     Types: Cigarettes     Passive exposure: Never    Smokeless tobacco: Never   Vaping Use    Vaping status: Never Used   Substance and Sexual Activity    Alcohol use: Not Currently    Drug use: Never    Sexual activity: Not Currently   Other Topics Concern    Not on file   Social History Narrative    Not on file     Social Determinants of Health     Financial Resource Strain: Patient Declined (3/30/2024)    Overall Financial Resource Strain (CARDIA)     Difficulty of Paying Living Expenses: Patient declined   Food Insecurity: No Food Insecurity (3/27/2024)    Hunger Vital Sign     Worried About Running Out of Food in the Last Year: Never true     Ran Out of Food in the Last Year:  Never true   Transportation Needs: No Transportation Needs (2024)    PRAPARE - Transportation     Lack of Transportation (Medical): No     Lack of Transportation (Non-Medical): No   Physical Activity: Inactive (2023)    Exercise Vital Sign     Days of Exercise per Week: 0 days     Minutes of Exercise per Session: 10 min   Stress: No Stress Concern Present (2024)    [employer] - Occupational Stress Questionnaire     Feeling of Stress : Only a little   Recent Concern: Stress - Stress Concern Present (3/18/2024)    [employer] - Occupational Stress Questionnaire     Feeling of Stress : Rather much   Social Connections: Moderately Isolated (2023)    Social Connection and Isolation Panel [NHANES]     Frequency of Communication with Friends and Family: More than three times a week     Frequency of Social Gatherings with Friends and Family: More than three times a week     Attends Orthodox Services: Never     Active Member of Clubs or Organizations: No     Attends Club or Organization Meetings: Never     Marital Status:    Intimate Partner Violence: Not At Risk (3/27/2024)    Humiliation, Afraid, Rape, and Kick questionnaire     Fear of Current or Ex-Partner: No     Emotionally Abused: No     Physically Abused: No     Sexually Abused: No   Housing Stability: Low Risk  (3/30/2024)    Housing Stability Vital Sign     Unable to Pay for Housing in the Last Year: No     Number of Places Lived in the Last Year: 1     Unstable Housing in the Last Year: No        Objective:  There were no vitals taken for this visit. Virtual visit    Neurologic Exam:  Exam limited by telephone visit  Alert, attentive with normal language.  No significant dysarthria.    Barthel Index:  Feeding: 10=independent  Dressing: 10=independent  Groomin=independent  Bathin=independent  Transfers: 15=independent  Mobility: 0=unable 50 yards  Stairs: 5=needs  help  Toileting: 10=independent  Bladder: 10=continent  Bowels: 10=continent    Total Score:     Modified Lima Score:  3=Moderate disability: requiring some help, but able to walk without assistance, could not live alone    NIHSS:  Level of Consciousness: 0=alert      LOC questions: 0=answers both questions  Dysarthria: 0=normal    Extensive review of notes in EMR, labs, tests, Interpretation of neuroimaging as documented in the HPI or Assessment and Plan.  Patient consented to the following:  This is a telehealth visit which has the benefit of avoiding travel and limiting exposure to Covid-19. Telehealth visits are not being recorded and personal health information is protected.  This visit will be billed to your insurance.  There are limitations to my ability to examine you and it is possible  that I may decide you need an in person appointment.  Is it ok to continue.

## 2024-05-13 NOTE — TELEPHONE ENCOUNTER
Her neurologist and cardiologist consulted together and recommended eliquis with stopping aspirin and plavix and I agree with that change. I especially trust her neurologist, Dr. Chung.

## 2024-05-15 ENCOUNTER — PATIENT OUTREACH (OUTPATIENT)
Dept: CARE COORDINATION | Facility: CLINIC | Age: 74
End: 2024-05-15
Payer: MEDICARE

## 2024-05-15 NOTE — PROGRESS NOTES
Successful outreach to patient regarding hospitalization as patient continues TCM program.   At time of outreach call the patient feels as if their condition has (improved) since initial visit with PCP or specialist.  Questions or concerns addressed at this time with patient.   Provided contact information to patient if any further non-emergent needs arise.   Molly Stuart LPN

## 2024-05-30 NOTE — PROGRESS NOTES
Trinity Hernandez is a 74 y.o. female who presents for No chief complaint on file.    Right foot pain: Going on for 1 year. Started limping off and on for the last year. No specific injury. Mild right foot swelling.      Syncope: No further episodes     CAD, s/p stent: Following with Dr. Romero. Considering trying repatha.     Small bowel perforation, abdominal hernias, epiploic appendagitis: She has been recommended laparoscopic surgery but she is hoping to avoid surgery.      Anxiety: lorazepam prn is helpful. She is having a lot more anxiety lately because of her 's health. He has an aneurysm that is over 5cm and he is in renal failure and he recently had a severe GI bleed. She is taking hydroxyzine but that makes her sleepy so she can't take it during the day.      HTN, aortic aneurysm: Reasonable control on metoprolol, losartan, and lasix. Following with cardioloy. Recently had some discomfort and got a zio patch done. She is awaiting results. She is going to get a stress test soon.     HLD: Off meds, trying to watch her diet.      COPD/sarcoidosis: On Brovana, spiriva, and budesonide. Using oxygen and prednisone prn, she has an albuterol inhaler. her breathing is a little bit bad today. She has been more short of breath. She is seeing Dr. Ford for pulm. She is going in for testing in April.      Hypothyroidism: On replacement, no concerns.     GERD: Off pantoprazole, she is doing much better since taking the enzymes.     All other systems have been reviewed and are negative for complaint     Objective   There were no vitals taken for this visit.    Gen: No acute distress, alert and oriented x3, pleasant   HEENT: moist mucous membranes, b/l external auditory canals are clear of debris, TMs within normal limits, no oropharyngeal lesions, eomi, perrla   Neck: thyroid within normal limits, no lymphadenopathy   CV: RRR, normal S1/S2, no murmur   Resp: Clear to auscultation bilaterally, no wheezes or  rhonchi appreciated  Abd: soft, nontender, non-distended, no guarding/rigidity, bowel sounds present  Extr: no edema, no calf tenderness  Derm: Skin is warm and dry, no rashes appreciated  Psych: mood is good, affect is congruent, good hygiene, normal speech and eye contact  Neuro: cranial nerves grossly intact, normal gait    Assessment/Plan     #Foot pain:  Likely arthritis  Check xray     #Rheumatoid arthritis  monitor     #COPD exacerbation  #COPD  #Respiratory failure  On oxygen  On azithromycin three times per week  Completed prednisone  Breathing is improving slowly  Bronchiectasis vest was denied due to her diagnoses     #CAD  S/p stent   On plavix and ezetimibe  Has followup with cardio  She has midodrine for prn use  Consider starting repatha     #Small bowel perforation  #Ventral hernia  Following with GI  Currently planning to avoid surgery     #Anxiety:  CSA signed  Using ativan prn, refilled  Add lexapro     #Shingles:  she has valtrex rx at home  Hasn't needed it much     #Poor venous access  port in place     #HTN  Controlled on furosemide, and metoprolol  Stopped losartan due to hypotension  seeing cardiology (Nick)     #HLD  Stable, not on meds, monitoring     #COPD/Sarcoidosis  Following with pulm, stable, on inhalers, supplemental O2, nebulizers  testing with pulm in April     #Hypothyroidism  Controlled on replacement     HCM:  s/p COVID vaccine  Mammogram Sept

## 2024-06-03 ENCOUNTER — APPOINTMENT (OUTPATIENT)
Dept: PRIMARY CARE | Facility: CLINIC | Age: 74
End: 2024-06-03
Payer: MEDICARE

## 2024-06-10 ENCOUNTER — APPOINTMENT (OUTPATIENT)
Dept: HEMATOLOGY/ONCOLOGY | Facility: HOSPITAL | Age: 74
End: 2024-06-10
Payer: MEDICARE

## 2024-06-11 ENCOUNTER — INFUSION (OUTPATIENT)
Dept: HEMATOLOGY/ONCOLOGY | Facility: HOSPITAL | Age: 74
End: 2024-06-11
Payer: MEDICARE

## 2024-06-11 VITALS
WEIGHT: 162.15 LBS | HEART RATE: 94 BPM | TEMPERATURE: 98.1 F | BODY MASS INDEX: 25.45 KG/M2 | OXYGEN SATURATION: 97 % | DIASTOLIC BLOOD PRESSURE: 78 MMHG | RESPIRATION RATE: 18 BRPM | SYSTOLIC BLOOD PRESSURE: 144 MMHG

## 2024-06-11 DIAGNOSIS — I87.8 POOR VENOUS ACCESS: ICD-10-CM

## 2024-06-11 PROCEDURE — 96523 IRRIG DRUG DELIVERY DEVICE: CPT

## 2024-06-11 PROCEDURE — 2500000004 HC RX 250 GENERAL PHARMACY W/ HCPCS (ALT 636 FOR OP/ED): Performed by: FAMILY MEDICINE

## 2024-06-11 RX ORDER — HEPARIN 100 UNIT/ML
500 SYRINGE INTRAVENOUS AS NEEDED
OUTPATIENT
Start: 2024-06-11

## 2024-06-11 RX ORDER — WARFARIN SODIUM 5 MG/1
5 TABLET ORAL DAILY
COMMUNITY

## 2024-06-11 RX ORDER — SODIUM CHLORIDE 9 MG/ML
10 INJECTION, SOLUTION INTRAMUSCULAR; INTRAVENOUS; SUBCUTANEOUS EVERY 8 HOURS SCHEDULED
Qty: 900 ML | Refills: 0 | Status: SHIPPED | OUTPATIENT
Start: 2024-06-11 | End: 2024-06-11

## 2024-06-11 RX ORDER — HEPARIN SODIUM,PORCINE/PF 10 UNIT/ML
50 SYRINGE (ML) INTRAVENOUS AS NEEDED
OUTPATIENT
Start: 2024-06-11

## 2024-06-11 RX ORDER — SODIUM CHLORIDE 9 MG/ML
10 INJECTION, SOLUTION INTRAMUSCULAR; INTRAVENOUS; SUBCUTANEOUS EVERY 8 HOURS SCHEDULED
Qty: 900 ML | Refills: 0 | Status: SHIPPED | OUTPATIENT
Start: 2024-06-11 | End: 2024-07-11

## 2024-06-11 RX ORDER — HEPARIN 100 UNIT/ML
500 SYRINGE INTRAVENOUS AS NEEDED
Status: DISCONTINUED | OUTPATIENT
Start: 2024-06-11 | End: 2024-06-11 | Stop reason: HOSPADM

## 2024-06-11 RX ADMIN — HEPARIN 500 UNITS: 100 SYRINGE at 11:18

## 2024-06-11 ASSESSMENT — PAIN SCALES - GENERAL: PAINLEVEL: 3

## 2024-06-11 NOTE — TELEPHONE ENCOUNTER
This script was sent to  [facility] for renewal. Please cancel/refuse this medication at your earliest convenience.      It is now a part of her [facility] treatment plan and we don't use saline.     I have pended a script for your review. Please refuse/cancel this script when available.

## 2024-06-11 NOTE — TELEPHONE ENCOUNTER
Please cancel  this order at your earliest convenience. The patient's saline is being managed at [facility] and the pharmacy is unable to fill this as a home order.

## 2024-06-11 NOTE — PROGRESS NOTES
"Subjective   Patient ID: Trinity Hernandez is a 74 y.o. female who presents for Follow-up (2 months; feeling \"rough\").    Her sister Miriam is in the hospital. She has small cell carcinoma and it is stage 4. Currently dealing with bleeding.    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     CAD, s/p stent: Following with Dr. Romero. Considering trying repatha.     Small bowel perforation, abdominal hernias, epiploic appendagitis: She has been recommended laparoscopic surgery but she is hoping to avoid surgery.      Anxiety: lorazepam prn is helpful. She is having a lot more anxiety lately because of her 's health. He has an aneurysm that is over 5cm and he is in renal failure and he recently had a severe GI bleed. She is taking hydroxyzine but that makes her sleepy so she can't take it during the day.      HTN, aortic aneurysm: Reasonable control on metoprolol, losartan, and lasix. Following with cardioloy. Recently had some discomfort and got a zio patch done. She is awaiting results. She is going to get a stress test soon.     HLD: On repatha.      COPD/sarcoidosis: On Brovana, spiriva, and budesonide. Using oxygen and prednisone prn, she has an albuterol inhaler. She is on 2L nasal cannula. She is seeing Dr. Ford for pulm.      Hypothyroidism: On replacement, no concerns.     GERD: Off pantoprazole, she is doing much better since taking the enzymes.     All other systems have been reviewed and are negative for complaint     Objective   /69 (BP Location: Left arm, Patient Position: Sitting, BP Cuff Size: Adult)   Pulse 88   Ht 1.702 m (5' 7\")   Wt 74.8 kg (165 lb)   SpO2 93%   BMI 25.84 kg/m²     Gen: No acute distress, alert and oriented x3, pleasant   HEENT: moist mucous membranes, b/l external auditory canals are clear of debris, TMs within normal limits, no oropharyngeal lesions, eomi, perrla   Neck: thyroid within normal limits, no " lymphadenopathy   CV: RRR, normal S1/S2, no murmur   Resp: Clear to auscultation bilaterally, no wheezes or rhonchi appreciated  Abd: soft, nontender, non-distended, no guarding/rigidity, bowel sounds present  Extr: no edema, no calf tenderness  Derm: Skin is warm and dry, no rashes appreciated  Psych: mood is good, affect is congruent, good hygiene, normal speech and eye contact  Neuro: cranial nerves grossly intact, normal gait    Assessment/Plan      #CVA:  She is on warfarin and plavix  Completed PT    #Rheumatoid arthritis  monitor     #COPD exacerbation  #COPD  #Respiratory failure  On oxygen  On azithromycin three times per week  Completed prednisone  Breathing is improving slowly  Bronchiectasis vest was denied due to her diagnoses     #CAD  S/p stent   On plavix and ezetimibe  Has followup with cardio  Back on repatha     #Small bowel perforation  #Ventral hernia  Following with GI  Currently planning to avoid surgery     #Anxiety:  CSA signed  Using ativan prn, refilled  She declined to try lexapro     #Shingles:  she has valtrex rx at home  Hasn't needed it much     #Poor venous access  port in place     #HTN  #Afib  Controlled on furosemide, and metoprolol  Stopped losartan due to hypotension  seeing cardiology (Nick)  She is on warfarin through [place]     #HLD  Stable, not on meds, monitoring     #COPD/Sarcoidosis  Following with pulm, stable, on inhalers, supplemental O2, nebulizers  testing with pulm in April     #Hypothyroidism  Controlled on replacement     HCM:  s/p COVID vaccine  Mammogram Sept

## 2024-06-21 ENCOUNTER — APPOINTMENT (OUTPATIENT)
Dept: PRIMARY CARE | Facility: CLINIC | Age: 74
End: 2024-06-21
Payer: MEDICARE

## 2024-06-21 VITALS
SYSTOLIC BLOOD PRESSURE: 138 MMHG | HEIGHT: 67 IN | DIASTOLIC BLOOD PRESSURE: 69 MMHG | BODY MASS INDEX: 25.9 KG/M2 | WEIGHT: 165 LBS | HEART RATE: 88 BPM | OXYGEN SATURATION: 93 %

## 2024-06-21 DIAGNOSIS — D64.9 ANEMIA, UNSPECIFIED TYPE: Primary | ICD-10-CM

## 2024-06-21 DIAGNOSIS — E03.9 HYPOTHYROIDISM, UNSPECIFIED TYPE: ICD-10-CM

## 2024-06-21 PROCEDURE — 3075F SYST BP GE 130 - 139MM HG: CPT | Performed by: FAMILY MEDICINE

## 2024-06-21 PROCEDURE — 3078F DIAST BP <80 MM HG: CPT | Performed by: FAMILY MEDICINE

## 2024-06-21 PROCEDURE — 99214 OFFICE O/P EST MOD 30 MIN: CPT | Performed by: FAMILY MEDICINE

## 2024-06-21 PROCEDURE — 1159F MED LIST DOCD IN RCRD: CPT | Performed by: FAMILY MEDICINE

## 2024-06-21 PROCEDURE — 1036F TOBACCO NON-USER: CPT | Performed by: FAMILY MEDICINE

## 2024-06-21 NOTE — PATIENT INSTRUCTIONS
Aly Sally [phone]  -All Medicaid  [facility] [phone]  - All ages  [facility] [phone]  [facility] [phone]  - Medicaid  [facility] [phone]  - All ages  [facility] [phone]  - Medicaid (No [place]), Peds 15 months - 8 years  [facility] [phone]  - [facility], Peds 3 - 13 years  [facility] [phone]  - All Medicaid, Peds 30 months+  [facility] [phone]  [facility] [phone]  - All Medicaid  [facility] [phone]  -All Medicaid  [facility] [phone]  -All Medicaid  [facility] [phone]  - [place] and [place]  [facility] [phone]  - [facility]  [facility] [phone]  - All Medicaid, Peds 2 years+  [facility] [phone]  - All Medicaid

## 2024-07-12 ENCOUNTER — PATIENT OUTREACH (OUTPATIENT)
Dept: CARE COORDINATION | Facility: CLINIC | Age: 74
End: 2024-07-12
Payer: MEDICARE

## 2024-07-12 NOTE — PROGRESS NOTES
Unable to reach patient for discharge follow up call.   LVM with call back number for patient to call if needed   If no voicemail available call attempts x 2 were made to contact the patient to assist with any questions or concerns patient may have.  Molly Stuart LPN

## 2024-07-17 DIAGNOSIS — E03.9 HYPOTHYROIDISM, UNSPECIFIED TYPE: ICD-10-CM

## 2024-07-17 RX ORDER — LEVOTHYROXINE SODIUM 50 UG/1
50 TABLET ORAL DAILY
Qty: 90 TABLET | Refills: 1 | Status: SHIPPED | OUTPATIENT
Start: 2024-07-17

## 2024-07-22 ENCOUNTER — INFUSION (OUTPATIENT)
Dept: HEMATOLOGY/ONCOLOGY | Facility: HOSPITAL | Age: 74
End: 2024-07-22
Payer: MEDICARE

## 2024-07-22 ENCOUNTER — LAB (OUTPATIENT)
Dept: LAB | Facility: LAB | Age: 74
End: 2024-07-22
Payer: MEDICARE

## 2024-07-22 VITALS
TEMPERATURE: 97.7 F | RESPIRATION RATE: 18 BRPM | SYSTOLIC BLOOD PRESSURE: 152 MMHG | HEART RATE: 80 BPM | DIASTOLIC BLOOD PRESSURE: 73 MMHG | OXYGEN SATURATION: 94 %

## 2024-07-22 DIAGNOSIS — I87.8 POOR VENOUS ACCESS: ICD-10-CM

## 2024-07-22 DIAGNOSIS — E03.9 HYPOTHYROIDISM, UNSPECIFIED TYPE: ICD-10-CM

## 2024-07-22 DIAGNOSIS — I48.0 PAROXYSMAL ATRIAL FIBRILLATION (MULTI): Primary | ICD-10-CM

## 2024-07-22 DIAGNOSIS — D64.9 ANEMIA, UNSPECIFIED TYPE: ICD-10-CM

## 2024-07-22 LAB
ALBUMIN SERPL BCP-MCNC: 4.1 G/DL (ref 3.4–5)
ALP SERPL-CCNC: 58 U/L (ref 33–136)
ALT SERPL W P-5'-P-CCNC: 10 U/L (ref 7–45)
ANION GAP SERPL CALC-SCNC: 10 MMOL/L (ref 10–20)
AST SERPL W P-5'-P-CCNC: 16 U/L (ref 9–39)
BASOPHILS # BLD AUTO: 0.08 X10*3/UL (ref 0–0.1)
BASOPHILS NFR BLD AUTO: 1.2 %
BILIRUB SERPL-MCNC: 0.3 MG/DL (ref 0–1.2)
BUN SERPL-MCNC: 14 MG/DL (ref 6–23)
CALCIUM SERPL-MCNC: 9.4 MG/DL (ref 8.6–10.3)
CHLORIDE SERPL-SCNC: 102 MMOL/L (ref 98–107)
CO2 SERPL-SCNC: 30 MMOL/L (ref 21–32)
CREAT SERPL-MCNC: 0.87 MG/DL (ref 0.5–1.05)
EGFRCR SERPLBLD CKD-EPI 2021: 70 ML/MIN/1.73M*2
EOSINOPHIL # BLD AUTO: 0.42 X10*3/UL (ref 0–0.4)
EOSINOPHIL NFR BLD AUTO: 6.4 %
ERYTHROCYTE [DISTWIDTH] IN BLOOD BY AUTOMATED COUNT: 14.3 % (ref 11.5–14.5)
GLUCOSE SERPL-MCNC: 81 MG/DL (ref 74–99)
HCT VFR BLD AUTO: 42 % (ref 36–46)
HGB BLD-MCNC: 12.9 G/DL (ref 12–16)
IMM GRANULOCYTES # BLD AUTO: 0.02 X10*3/UL (ref 0–0.5)
IMM GRANULOCYTES NFR BLD AUTO: 0.3 % (ref 0–0.9)
INR PPP: 2.1 (ref 0.9–1.1)
IRON SERPL-MCNC: 67 UG/DL (ref 35–150)
LYMPHOCYTES # BLD AUTO: 1.73 X10*3/UL (ref 0.8–3)
LYMPHOCYTES NFR BLD AUTO: 26.2 %
MCH RBC QN AUTO: 27.6 PG (ref 26–34)
MCHC RBC AUTO-ENTMCNC: 30.7 G/DL (ref 32–36)
MCV RBC AUTO: 90 FL (ref 80–100)
MONOCYTES # BLD AUTO: 0.59 X10*3/UL (ref 0.05–0.8)
MONOCYTES NFR BLD AUTO: 8.9 %
NEUTROPHILS # BLD AUTO: 3.76 X10*3/UL (ref 1.6–5.5)
NEUTROPHILS NFR BLD AUTO: 57 %
NRBC BLD-RTO: 0 /100 WBCS (ref 0–0)
PLATELET # BLD AUTO: 266 X10*3/UL (ref 150–450)
POTASSIUM SERPL-SCNC: 4.2 MMOL/L (ref 3.5–5.3)
PROT SERPL-MCNC: 7.2 G/DL (ref 6.4–8.2)
PROTHROMBIN TIME: 24.2 SECONDS (ref 9.8–12.8)
RBC # BLD AUTO: 4.68 X10*6/UL (ref 4–5.2)
SODIUM SERPL-SCNC: 138 MMOL/L (ref 136–145)
TSH SERPL-ACNC: 1.5 MIU/L (ref 0.44–3.98)
WBC # BLD AUTO: 6.6 X10*3/UL (ref 4.4–11.3)

## 2024-07-22 PROCEDURE — 85610 PROTHROMBIN TIME: CPT

## 2024-07-22 PROCEDURE — 84075 ASSAY ALKALINE PHOSPHATASE: CPT | Performed by: FAMILY MEDICINE

## 2024-07-22 PROCEDURE — 84443 ASSAY THYROID STIM HORMONE: CPT | Performed by: FAMILY MEDICINE

## 2024-07-22 PROCEDURE — 82607 VITAMIN B-12: CPT | Mod: CONLAB | Performed by: FAMILY MEDICINE

## 2024-07-22 PROCEDURE — 36591 DRAW BLOOD OFF VENOUS DEVICE: CPT

## 2024-07-22 PROCEDURE — 85025 COMPLETE CBC W/AUTO DIFF WBC: CPT | Performed by: FAMILY MEDICINE

## 2024-07-22 PROCEDURE — 83540 ASSAY OF IRON: CPT | Performed by: FAMILY MEDICINE

## 2024-07-22 PROCEDURE — 2500000004 HC RX 250 GENERAL PHARMACY W/ HCPCS (ALT 636 FOR OP/ED): Performed by: FAMILY MEDICINE

## 2024-07-22 RX ORDER — HEPARIN 100 UNIT/ML
500 SYRINGE INTRAVENOUS AS NEEDED
Status: DISCONTINUED | OUTPATIENT
Start: 2024-07-22 | End: 2024-07-22 | Stop reason: HOSPADM

## 2024-07-22 RX ORDER — HEPARIN SODIUM,PORCINE/PF 10 UNIT/ML
50 SYRINGE (ML) INTRAVENOUS AS NEEDED
OUTPATIENT
Start: 2024-07-22

## 2024-07-22 RX ORDER — HEPARIN 100 UNIT/ML
500 SYRINGE INTRAVENOUS AS NEEDED
OUTPATIENT
Start: 2024-07-22

## 2024-07-23 LAB — VIT B12 SERPL-MCNC: 609 PG/ML (ref 211–911)

## 2024-07-30 ENCOUNTER — TELEPHONE (OUTPATIENT)
Dept: PRIMARY CARE | Facility: CLINIC | Age: 74
End: 2024-07-30
Payer: MEDICARE

## 2024-07-30 DIAGNOSIS — M25.511 ACUTE PAIN OF RIGHT SHOULDER: ICD-10-CM

## 2024-07-30 NOTE — TELEPHONE ENCOUNTER
Trinity has been having really bad right shoulder pain since she fell while ago. Can we do an xray?

## 2024-07-31 ENCOUNTER — HOSPITAL ENCOUNTER (OUTPATIENT)
Dept: RADIOLOGY | Facility: HOSPITAL | Age: 74
Discharge: HOME | End: 2024-07-31
Payer: MEDICARE

## 2024-07-31 DIAGNOSIS — M25.511 ACUTE PAIN OF RIGHT SHOULDER: ICD-10-CM

## 2024-07-31 PROCEDURE — 73030 X-RAY EXAM OF SHOULDER: CPT | Mod: RIGHT SIDE | Performed by: RADIOLOGY

## 2024-07-31 PROCEDURE — 73030 X-RAY EXAM OF SHOULDER: CPT | Mod: RT

## 2024-08-06 DIAGNOSIS — F41.9 ANXIETY: ICD-10-CM

## 2024-08-06 RX ORDER — LORAZEPAM 0.5 MG/1
0.5 TABLET ORAL 2 TIMES DAILY PRN
Qty: 30 TABLET | Refills: 0 | Status: SHIPPED | OUTPATIENT
Start: 2024-08-06 | End: 2024-09-05

## 2024-08-22 ENCOUNTER — TELEPHONE (OUTPATIENT)
Dept: PRIMARY CARE | Facility: CLINIC | Age: 74
End: 2024-08-22
Payer: MEDICARE

## 2024-08-22 DIAGNOSIS — I10 PRIMARY HYPERTENSION: Primary | ICD-10-CM

## 2024-08-22 RX ORDER — MELOXICAM 15 MG/1
15 TABLET ORAL DAILY
Qty: 30 TABLET | Refills: 0 | Status: SHIPPED | OUTPATIENT
Start: 2024-08-22 | End: 2025-08-22

## 2024-08-22 NOTE — TELEPHONE ENCOUNTER
Pt called in asking if you could call her in something for the pain in her shoulder as it is getting worse. She states she does have bad arthritis in that shoulder. Uses [facility] in [place].

## 2024-08-26 ENCOUNTER — APPOINTMENT (OUTPATIENT)
Dept: NEUROLOGY | Facility: CLINIC | Age: 74
End: 2024-08-26
Payer: MEDICARE

## 2024-08-26 VITALS
HEART RATE: 94 BPM | HEIGHT: 67 IN | DIASTOLIC BLOOD PRESSURE: 69 MMHG | WEIGHT: 169.4 LBS | SYSTOLIC BLOOD PRESSURE: 122 MMHG | BODY MASS INDEX: 26.59 KG/M2 | RESPIRATION RATE: 18 BRPM

## 2024-08-26 DIAGNOSIS — I63.9 ARTERIAL ISCHEMIC STROKE (MULTI): Primary | ICD-10-CM

## 2024-08-26 PROBLEM — J18.9 HCAP (HEALTHCARE-ASSOCIATED PNEUMONIA): Status: RESOLVED | Noted: 2023-11-30 | Resolved: 2024-08-26

## 2024-08-26 PROBLEM — R19.8 PERFORATED VISCUS: Status: RESOLVED | Noted: 2023-05-04 | Resolved: 2024-08-26

## 2024-08-26 PROCEDURE — 3074F SYST BP LT 130 MM HG: CPT | Performed by: PSYCHIATRY & NEUROLOGY

## 2024-08-26 PROCEDURE — 1159F MED LIST DOCD IN RCRD: CPT | Performed by: PSYCHIATRY & NEUROLOGY

## 2024-08-26 PROCEDURE — 1036F TOBACCO NON-USER: CPT | Performed by: PSYCHIATRY & NEUROLOGY

## 2024-08-26 PROCEDURE — 99214 OFFICE O/P EST MOD 30 MIN: CPT | Performed by: PSYCHIATRY & NEUROLOGY

## 2024-08-26 PROCEDURE — 1160F RVW MEDS BY RX/DR IN RCRD: CPT | Performed by: PSYCHIATRY & NEUROLOGY

## 2024-08-26 PROCEDURE — 1125F AMNT PAIN NOTED PAIN PRSNT: CPT | Performed by: PSYCHIATRY & NEUROLOGY

## 2024-08-26 PROCEDURE — 3078F DIAST BP <80 MM HG: CPT | Performed by: PSYCHIATRY & NEUROLOGY

## 2024-08-26 ASSESSMENT — PAIN SCALES - GENERAL: PAINLEVEL: 6

## 2024-08-26 NOTE — PATIENT INSTRUCTIONS
Keep taking the warfarin until they tell you that you can stop it.  See your doctors about the watchman.  From my standpoint, you don't need to see me anymore, but I am here if you need me.    Stroke prevention:  Eat a healthy diet with plenty of fresh fruits and vegetables.  Avoid salt and fried foods.  Lose weight and exercise on a regular basis.  Do not smoke or use drugs.  Be sure to take all medications.  Call 911 for signs of stroke (numbness or weakness on one side, trouble seeing on one side, trouble speaking (either because your speech is slurred or you can't find the words), sudden double vision, spinning sensation or loss of coordination).    To find a stroke support group: https://www.stroke.org/en/stroke-support-group-finder  To learn about Virtual Stroke Educations sessions: contact Cecelia Keenan at: [email or web address]    I can be reached at phone: [phone] or email: [email or web address] or message me via [employer]

## 2024-08-26 NOTE — PROGRESS NOTES
Assessment/Plan:   Left middle cerebral artery infarction due to Atrial fibrillation.  Now on warfarin.  She recently was seen at [facility] and watchman is planned.  Advised to stay on warfarin until she is told she can stop it after the watchman is placed.    2.   Subdural hematoma in setting of trauma and TNK, now resolved.  Plan for watchman so she can be off warfarin long term.    Follow up as needed.    HPI:   Trinity Hernandez is a 74 y.o.  female with vascular risk factors of atrial fibrillation who suffered a Left middle cerebral artery infarction.  She fell when she had her stroke striking the right side of her head against the wall.  In the ED she received IV TNK but follow up imaging showed a right Subdural hematoma and received cryoprecipitate.  She did receive thrombectomy with TICI 2b reperfusion.  She was discharged to acute rehab with a bridled dobhoff (since removed) and returned home.  She received home therapy. During her admission she developed new onset Atrial fibrillation but given a relatively fresh cardiac stent (about one month old at the time of her admission) she was discharged on Dual antiplatelets with aspirin and plavix without anticoagulation and subsequently transitioned to warfarin and plavix (Eliquis was too expensive).  Follow up CT head on 4/29/24 showed resolution of the small Subdural hematoma.     MRI: Left middle cerebral artery infarction, mostly subcortical.    MRA: left M1 occlusion  Echo: Ejection fraction: 50-55%, impaired relaxation            Left Atrium: normal            Patent foramen ovale: bubble study cannot be interpreted.  LDL: 50-55%  HbA1C: 5.3    Since her last appointment 5/9/24 she has improved to her pre-stroke baseline.  She has fatigue that limits her activities but it is similar to how she felt before the stroke and is likely related to her Chronic obstructive pulmonary disease.  She recently was seen about possible watchman, which is being planned for  January.  She did mention that she thinks she will just stop the warfarin as she is worried about the bleeding side effects.    Medications:   Current Outpatient Medications:     acetaminophen (Tylenol) 325 mg tablet, 2 tablets (650 mg) by nasogastric tube route every 6 hours if needed for mild pain (1 - 3)., Disp: 30 tablet, Rfl: 0    albuterol 90 mcg/actuation inhaler, Inhale 2 puffs every 6 hours if needed for shortness of breath., Disp: , Rfl:     cholecalciferol (Vitamin D3) 50 mcg (2,000 unit) capsule, 1 capsule (50 mcg) by nasogastric tube route once daily., Disp: , Rfl:     clopidogrel (Plavix) 75 mg tablet, Take 1 tablet (75 mg) by mouth once daily., Disp: , Rfl:     estradiol (Estrace) 0.01 % (0.1 mg/gram) vaginal cream, Insert 0.5 Applicatorfuls (2 g) into the vagina once daily as needed., Disp: , Rfl:     furosemide (Lasix) 20 mg tablet, 1 tablet (20 mg) by g-tube route once daily. Do not start before March 29, 2024., Disp: , Rfl:     Lactobacillus acidophilus (Probiotic) 10 billion cell capsule, 1 capsule by nasogastric tube route once daily., Disp: , Rfl:     levothyroxine (Synthroid, Levoxyl) 50 mcg tablet, 1 tablet (50 mcg) by nasogastric tube route once daily., Disp: 90 tablet, Rfl: 1    LORazepam (Ativan) 0.5 mg tablet, Take 1 tablet (0.5 mg) by mouth 2 times a day as needed for anxiety., Disp: 30 tablet, Rfl: 0    magnesium oxide (Mag-Ox) 400 mg (241.3 mg magnesium) tablet, 1 tablet (400 mg) by nasogastric tube route once daily., Disp: , Rfl:     melatonin 5 mg tablet, 1 tablet (5 mg) by nasogastric tube route as needed at bedtime for sleep., Disp: , Rfl:     meloxicam (Mobic) 15 mg tablet, Take 1 tablet (15 mg) by mouth once daily., Disp: 30 tablet, Rfl: 0    metoprolol tartrate (Lopressor) 25 mg tablet, 1 tablet (25 mg) by nasogastric tube route 3 times a day., Disp: , Rfl:     nitroglycerin (Nitrostat) 0.4 mg SL tablet, Place 1 tablet (0.4 mg) under the tongue every 5 minutes if needed for  chest pain., Disp: , Rfl:     oxygen (O2) gas therapy, Inhale 1 each once every 24 hours., Disp: , Rfl:     Repatha Syringe 140 mg/mL injection, Inject 1 mL (140 mg) under the skin every 14 (fourteen) days., Disp: , Rfl:     tiotropium (Spiriva Respimat) 2.5 mcg/actuation inhaler, Inhale 2 puffs once daily., Disp: , Rfl:     warfarin (Coumadin) 5 mg tablet, Take 1 tablet (5 mg) by mouth once daily. Take as directed per After Visit Summary., Disp: , Rfl:      PMH:   Past Medical History:   Diagnosis Date    Abdominal bloating 05/04/2023    Age related cataract 02/19/2014    CAD (coronary artery disease)     CHF (congestive heart failure) (Multi)     Diverticulitis of small intestine without perforation or abscess without bleeding     Diverticulitis, intestine, small    Dizzy 11/20/2023    Essential (primary) hypertension 02/08/2017    HTN (hypertension), benign    Fall     HCAP (healthcare-associated pneumonia) 11/30/2023    Influenza B 05/08/2015    Formatting of this note might be different from the original. Completed Tamiflu Continue droplet precautions.    Nausea 11/16/2018    Perforated viscus 05/04/2023    Personal history of other endocrine, nutritional and metabolic disease 02/08/2017    History of hypothyroidism    Personal history of other venous thrombosis and embolism     History of deep venous thrombosis        PSH:   Past Surgical History:   Procedure Laterality Date    ABDOMINAL ADHESION SURGERY  05/16/2017    Laparoscopic Lysis Of Intestinal Adhesions    CERVICAL FUSION  05/16/2017    Cervical Vertebral Fusion    CHOLECYSTECTOMY  05/16/2017    Cholecystectomy    COLECTOMY PARTIAL / TOTAL  05/16/2017    Partial Colectomy - Sigmoid    CORONARY ANGIOPLASTY WITH STENT PLACEMENT      CT ANGIO NECK  05/21/2020    CT NECK ANGIO W AND WO IV CONTRAST 5/21/2020 [facility]    CT HEAD ANGIO W AND WO IV CONTRAST  05/21/2020    CT HEAD ANGIO W AND WO IV CONTRAST 5/21/2020 [facility]    HERNIA  REPAIR  05/16/2017    Hernia Repair    HYSTERECTOMY  10/03/2013    Hysterectomy    MR HEAD ANGIO WO IV CONTRAST  05/21/2020    MR HEAD ANGIO WO IV CONTRAST 5/21/2020 [facility]    MR NECK ANGIO WO IV CONTRAST  05/21/2020    MR NECK ANGIO WO IV CONTRAST 5/21/2020 [facility]    OTHER SURGICAL HISTORY  05/16/2017    Thoracoscopy Of Lungs And Pleural Space With Biopsy Of Lung Nodules    OTHER SURGICAL HISTORY  04/15/2019    Esophagogastroduodenoscopy        Allergies:   Allergies   Allergen Reactions    Hydrocodone-Acetaminophen Shortness of breath    Montelukast Unknown     Lungs felt as if there was a hole in them    Morphine Unknown     Body shakes    Nitrofurantoin Monohyd/M-Cryst Unknown    Sulfa (Sulfonamide Antibiotics) Nausea Only and Other    Atorvastatin Itching, Swelling and Unknown    Dicyclomine Hallucinations     Nightmares    Fluticasone Propion-Salmeterol Unknown    Levofloxacin Rash    Lisinopril Cough    Nitroimidazoles Nausea Only and Unknown    Omeprazole Diarrhea and Unknown    Salmeterol Unknown    Simvastatin Swelling and Unknown    Sucralfate Unknown    Umeclidinium Other     Urinary retention, blurred vision, constipation        FH:   Family History   Problem Relation Name Age of Onset    Breast cancer Mother      Pneumonia Father      Heart attack Father      Breast cancer Sister      Heart disease Sister      Diabetes Sister          SH:   Social History     Socioeconomic History    Marital status:      Spouse name: Not on file    Number of children: Not on file    Years of education: Not on file    Highest education level: Not on file   Occupational History    Not on file   Tobacco Use    Smoking status: Former     Types: Cigarettes     Passive exposure: Never    Smokeless tobacco: Never   Vaping Use    Vaping status: Never Used   Substance and Sexual Activity    Alcohol use: Not Currently    Drug use: Never    Sexual activity: Not Currently   Other Topics Concern     Not on file   Social History Narrative    Not on file     Social Determinants of Health     Financial Resource Strain: Patient Declined (3/30/2024)    Overall Financial Resource Strain (CARDIA)     Difficulty of Paying Living Expenses: Patient declined   Food Insecurity: No Food Insecurity (3/27/2024)    Hunger Vital Sign     Worried About Running Out of Food in the Last Year: Never true     Ran Out of Food in the Last Year: Never true   Transportation Needs: No Transportation Needs (4/13/2024)    PRAPARE - Transportation     Lack of Transportation (Medical): No     Lack of Transportation (Non-Medical): No   Physical Activity: Inactive (12/23/2023)    Exercise Vital Sign     Days of Exercise per Week: 0 days     Minutes of Exercise per Session: 10 min   Stress: No Stress Concern Present (7/22/2024)    [employer] [employer] - Occupational Stress Questionnaire     Feeling of Stress : Not at all   Social Connections: Moderately Isolated (12/23/2023)    Social Connection and Isolation Panel [NHANES]     Frequency of Communication with Friends and Family: More than three times a week     Frequency of Social Gatherings with Friends and Family: More than three times a week     Attends Gnosticism Services: Never     Active Member of Clubs or Organizations: No     Attends Club or Organization Meetings: Never     Marital Status:    Intimate Partner Violence: Not At Risk (3/27/2024)    Humiliation, Afraid, Rape, and Kick questionnaire     Fear of Current or Ex-Partner: No     Emotionally Abused: No     Physically Abused: No     Sexually Abused: No   Housing Stability: Low Risk  (3/30/2024)    Housing Stability Vital Sign     Unable to Pay for Housing in the Last Year: No     Number of Places Lived in the Last Year: 1     Unstable Housing in the Last Year: No        Objective:  Visit Vitals  /69 (BP Location: Right arm, Patient Position: Sitting, BP Cuff Size: Adult)   Pulse 94   Resp 18     Virtual visit    Neurologic Exam:  Exam limited by telephone visit  Appears tired with oxygen in place.   Alert attentive, normal language, orientation, and speech.    Reasonable fund of knowledge and memory, visual fields are full to confrontation.  Extraocular eye movements are intact without nystagmus.  V1 through V3 is intact to light touch.  Face is symmetric.  Motor strength is five out of five throughout normal bulk and tone.  No orbiting and equal fine finger movements.  Sensory exam is intact to light touch bilaterally.  No ataxia on finger-nose-finger or heel-knee-shin maneuver.  Casual gait is normal.     Barthel Index:  Feeding: 10=independent  Dressing: 10=independent  Groomin=independent  Bathin=independent  Transfers: 15=independent  Mobility: 15 independent  Stairs: 0= unable (more due to breathing than stroke)  Toileting: 10=independent  Bladder: 10=continent  Bowels: 10=continent    Total Score: 90    Modified Franklin Score:  3=Moderate disability: requiring some help, but able to walk without assistance, could not live alone    NIHSS:  Level of Consciousness: 0=alert      LOC questions: 0=answers both questions      LOC commands: 0=performs both  Best Gaze: 0=normal  Visual: 0=normal  Facial Palsy: 0=normal  Motor:      Motor Arm (Left): 0=normal      Motor Arm (Right): 0=normal      Motor Leg (Left): 0=left leg normal      Motor Leg (Right): 0=right leg normal  Limb Ataxia: 0=absent  Sensory: 0=normal  Best Language: 0=no aphasia  Dysarthria: 0=normal  Extinction and Inattention: 0=no abnormality  Total 0    Extensive review of notes in EMR, labs, tests, Interpretation of neuroimaging as documented in the HPI or Assessment and Plan.

## 2024-08-27 NOTE — PROGRESS NOTES
Subjective   Patient ID: Trinity Hernandez is a 74 y.o. female who presents for Medicare Annual Wellness Visit Subsequent (Unsure about flu vaccine for this year).    Her sister Miriam aust[place] end up passing from lung cancer.      Afib: She is on warfarin. She is going for Watchman on January 27th. Working on getting her surgery approved.     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      CAD, s/p stent: Following with Dr. Romero. Considering trying repatha.    Shoulder pain: bothering her more. Hasn't tried the meloxicam out much yet.      Small bowel perforation, abdominal hernias, epiploic appendagitis: She has been recommended laparoscopic surgery but she is hoping to avoid surgery. Currently having occasional bad days but not bothering her much lately.      Anxiety: lorazepam prn is helpful. She is having a lot more anxiety lately because of her 's health. He has an aneurysm that is over 5cm and he is in renal failure and he recently had a severe GI bleed. She is taking hydroxyzine but that makes her sleepy so she can't take it during the day.      HTN, aortic aneurysm: Reasonable control on metoprolol, losartan, and lasix. Following with cardioloy. Recently had some discomfort and got a zio patch done. She is awaiting results. She is going to get a stress test soon.     HLD: On repatha.      COPD/sarcoidosis: On Brovana, spiriva, and budesonide. Using oxygen and prednisone prn, she has an albuterol inhaler. She is on 2L nasal cannula. She is seeing Dr. Ford for pulm.      Hypothyroidism: On replacement, no concerns.     GERD: Off pantoprazole, she is doing much better since taking the enzymes.     All other systems have been reviewed and are negative for complaint     Objective   /76 (BP Location: Left arm, Patient Position: Sitting, BP Cuff Size: Adult)   Pulse 97   Wt 77.1 kg (170 lb)   BMI 26.63 kg/m²     Gen: No acute distress, alert and  oriented x3, pleasant   HEENT: moist mucous membranes, b/l external auditory canals are clear of debris, TMs within normal limits, no oropharyngeal lesions, eomi, perrla   Neck: thyroid within normal limits, no lymphadenopathy   CV: RRR, normal S1/S2, no murmur   Resp: Clear to auscultation bilaterally, no wheezes or rhonchi appreciated  Abd: soft, nontender, non-distended, no guarding/rigidity, bowel sounds present  Extr: no edema, no calf tenderness  Derm: Skin is warm and dry, no rashes appreciated  Psych: mood is good, affect is congruent, good hygiene, normal speech and eye contact  Neuro: cranial nerves grossly intact, normal gait    Assessment/Plan   #CVA:  She is on warfarin and plavix  INR monioring through Dr. Farfan's office/[place]  Completed PT    #Afib  On warfarin  Planning for watchman procedure in January     #Rheumatoid arthritis  monitor     #COPD exacerbation  #COPD  #Respiratory failure  On oxygen  On azithromycin three times per week  Completed prednisone  Breathing is improving slowly  Bronchiectasis vest was denied due to her diagnoses     #CAD  S/p stent   On plavix and ezetimibe  Has followup with cardio  Back on repatha     #Small bowel perforation  #Ventral hernia  Following with GI  Currently planning to avoid surgery     #Anxiety:  CSA signed Sept 2024  Ordering UDS  Doing reasonably well with lorazepam daily     #Poor venous access  port in place     #HTN  #Afib  Controlled on furosemide, and metoprolol  seeing cardiology ([place])  She is on warfarin through [place]     #HLD  Stable, not on meds, monitoring     #COPD/Sarcoidosis  Following with pulm, stable, on inhalers, supplemental O2, nebulizers  testing with pulm in April     #Hypothyroidism  Controlled on replacement     HCM:  s/p COVID vaccine  Mammogram Sept

## 2024-08-30 ENCOUNTER — APPOINTMENT (OUTPATIENT)
Dept: PRIMARY CARE | Facility: CLINIC | Age: 74
End: 2024-08-30
Payer: MEDICARE

## 2024-09-03 ENCOUNTER — INFUSION (OUTPATIENT)
Dept: HEMATOLOGY/ONCOLOGY | Facility: HOSPITAL | Age: 74
End: 2024-09-03
Payer: MEDICARE

## 2024-09-03 VITALS
SYSTOLIC BLOOD PRESSURE: 145 MMHG | HEART RATE: 80 BPM | DIASTOLIC BLOOD PRESSURE: 83 MMHG | TEMPERATURE: 97 F | RESPIRATION RATE: 18 BRPM | OXYGEN SATURATION: 94 %

## 2024-09-03 DIAGNOSIS — I87.8 POOR VENOUS ACCESS: ICD-10-CM

## 2024-09-03 PROCEDURE — 2500000004 HC RX 250 GENERAL PHARMACY W/ HCPCS (ALT 636 FOR OP/ED): Performed by: FAMILY MEDICINE

## 2024-09-03 PROCEDURE — 96523 IRRIG DRUG DELIVERY DEVICE: CPT

## 2024-09-03 RX ORDER — HEPARIN 100 UNIT/ML
500 SYRINGE INTRAVENOUS AS NEEDED
Status: DISCONTINUED | OUTPATIENT
Start: 2024-09-03 | End: 2024-09-03 | Stop reason: HOSPADM

## 2024-09-03 RX ORDER — HEPARIN SODIUM,PORCINE/PF 10 UNIT/ML
50 SYRINGE (ML) INTRAVENOUS AS NEEDED
OUTPATIENT
Start: 2024-09-03

## 2024-09-03 RX ORDER — SODIUM CHLORIDE 9 MG/ML
10 INJECTION, SOLUTION INTRAMUSCULAR; INTRAVENOUS; SUBCUTANEOUS EVERY 8 HOURS SCHEDULED
Qty: 900 ML | Refills: 0 | Status: SHIPPED | OUTPATIENT
Start: 2024-09-03 | End: 2024-10-03

## 2024-09-03 RX ORDER — HEPARIN 100 UNIT/ML
500 SYRINGE INTRAVENOUS AS NEEDED
OUTPATIENT
Start: 2024-09-03

## 2024-09-04 ENCOUNTER — APPOINTMENT (OUTPATIENT)
Dept: PRIMARY CARE | Facility: CLINIC | Age: 74
End: 2024-09-04
Payer: MEDICARE

## 2024-09-04 VITALS
DIASTOLIC BLOOD PRESSURE: 76 MMHG | SYSTOLIC BLOOD PRESSURE: 146 MMHG | HEART RATE: 97 BPM | WEIGHT: 170 LBS | BODY MASS INDEX: 26.63 KG/M2

## 2024-09-04 DIAGNOSIS — Z79.899 MEDICATION MANAGEMENT: Primary | ICD-10-CM

## 2024-09-04 DIAGNOSIS — Z12.31 ENCOUNTER FOR SCREENING MAMMOGRAM FOR MALIGNANT NEOPLASM OF BREAST: ICD-10-CM

## 2024-09-04 PROCEDURE — 1159F MED LIST DOCD IN RCRD: CPT | Performed by: FAMILY MEDICINE

## 2024-09-04 PROCEDURE — 1036F TOBACCO NON-USER: CPT | Performed by: FAMILY MEDICINE

## 2024-09-04 PROCEDURE — 1124F ACP DISCUSS-NO DSCNMKR DOCD: CPT | Performed by: FAMILY MEDICINE

## 2024-09-04 PROCEDURE — 1170F FXNL STATUS ASSESSED: CPT | Performed by: FAMILY MEDICINE

## 2024-09-04 PROCEDURE — 3078F DIAST BP <80 MM HG: CPT | Performed by: FAMILY MEDICINE

## 2024-09-04 PROCEDURE — 3077F SYST BP >= 140 MM HG: CPT | Performed by: FAMILY MEDICINE

## 2024-09-04 PROCEDURE — G0439 PPPS, SUBSEQ VISIT: HCPCS | Performed by: FAMILY MEDICINE

## 2024-09-04 ASSESSMENT — ACTIVITIES OF DAILY LIVING (ADL)
DOING_HOUSEWORK: INDEPENDENT
DRESSING: INDEPENDENT
BATHING: INDEPENDENT
MANAGING_FINANCES: INDEPENDENT
GROCERY_SHOPPING: INDEPENDENT
TAKING_MEDICATION: INDEPENDENT

## 2024-09-04 ASSESSMENT — PATIENT HEALTH QUESTIONNAIRE - PHQ9
10. IF YOU CHECKED OFF ANY PROBLEMS, HOW DIFFICULT HAVE THESE PROBLEMS MADE IT FOR YOU TO DO YOUR WORK, TAKE CARE OF THINGS AT HOME, OR GET ALONG WITH OTHER PEOPLE: SOMEWHAT DIFFICULT
2. FEELING DOWN, DEPRESSED OR HOPELESS: SEVERAL DAYS
1. LITTLE INTEREST OR PLEASURE IN DOING THINGS: SEVERAL DAYS
SUM OF ALL RESPONSES TO PHQ9 QUESTIONS 1 AND 2: 2

## 2024-09-08 DIAGNOSIS — I10 PRIMARY HYPERTENSION: ICD-10-CM

## 2024-09-09 RX ORDER — MELOXICAM 15 MG/1
15 TABLET ORAL DAILY
Qty: 30 TABLET | Refills: 0 | Status: SHIPPED | OUTPATIENT
Start: 2024-09-09

## 2024-09-17 DIAGNOSIS — F41.9 ANXIETY: ICD-10-CM

## 2024-09-20 DIAGNOSIS — G47.00 INSOMNIA, UNSPECIFIED TYPE: Primary | ICD-10-CM

## 2024-09-20 RX ORDER — LORAZEPAM 0.5 MG/1
0.5 TABLET ORAL 2 TIMES DAILY PRN
Qty: 30 TABLET | Refills: 0 | Status: SHIPPED | OUTPATIENT
Start: 2024-09-20 | End: 2024-10-20

## 2024-09-20 RX ORDER — TRAZODONE HYDROCHLORIDE 50 MG/1
50 TABLET ORAL NIGHTLY PRN
Qty: 30 TABLET | Refills: 1 | Status: SHIPPED | OUTPATIENT
Start: 2024-09-20 | End: 2025-09-20

## 2024-09-20 NOTE — TELEPHONE ENCOUNTER
Spoke with Patient, Advised her to go to the Lab and leave the sample for the UDS in order for her to get any refills on the Lorazapam.      Patient is asking if there is anything to help her sleep?  She is not sleeping very well.

## 2024-09-25 DIAGNOSIS — I10 PRIMARY HYPERTENSION: ICD-10-CM

## 2024-09-25 RX ORDER — MELOXICAM 15 MG/1
15 TABLET ORAL DAILY
Qty: 30 TABLET | Refills: 0 | Status: SHIPPED | OUTPATIENT
Start: 2024-09-25

## 2024-09-26 ENCOUNTER — APPOINTMENT (OUTPATIENT)
Dept: RADIOLOGY | Facility: HOSPITAL | Age: 74
End: 2024-09-26
Payer: MEDICARE

## 2024-09-30 ENCOUNTER — APPOINTMENT (OUTPATIENT)
Dept: RADIOLOGY | Facility: HOSPITAL | Age: 74
End: 2024-09-30
Payer: MEDICARE

## 2024-10-09 RX ORDER — HEPARIN SODIUM,PORCINE/PF 10 UNIT/ML
50 SYRINGE (ML) INTRAVENOUS AS NEEDED
Status: CANCELLED | OUTPATIENT
Start: 2024-10-09

## 2024-10-12 DIAGNOSIS — G47.00 INSOMNIA, UNSPECIFIED TYPE: ICD-10-CM

## 2024-10-14 ENCOUNTER — LAB (OUTPATIENT)
Dept: LAB | Facility: LAB | Age: 74
End: 2024-10-14
Payer: MEDICARE

## 2024-10-14 ENCOUNTER — HOSPITAL ENCOUNTER (OUTPATIENT)
Dept: RADIOLOGY | Facility: HOSPITAL | Age: 74
Discharge: HOME | End: 2024-10-14
Payer: MEDICARE

## 2024-10-14 VITALS — WEIGHT: 168 LBS | HEIGHT: 68 IN | BODY MASS INDEX: 25.46 KG/M2

## 2024-10-14 DIAGNOSIS — Z79.899 MEDICATION MANAGEMENT: ICD-10-CM

## 2024-10-14 DIAGNOSIS — Z12.31 ENCOUNTER FOR SCREENING MAMMOGRAM FOR MALIGNANT NEOPLASM OF BREAST: ICD-10-CM

## 2024-10-14 LAB
AMPHETAMINES UR QL SCN: NORMAL
BARBITURATES UR QL SCN: NORMAL
BENZODIAZ UR QL SCN: NORMAL
BZE UR QL SCN: NORMAL
CANNABINOIDS UR QL SCN: NORMAL
FENTANYL+NORFENTANYL UR QL SCN: NORMAL
METHADONE UR QL SCN: NORMAL
OPIATES UR QL SCN: NORMAL
OXYCODONE+OXYMORPHONE UR QL SCN: NORMAL
PCP UR QL SCN: NORMAL

## 2024-10-14 PROCEDURE — 80307 DRUG TEST PRSMV CHEM ANLYZR: CPT

## 2024-10-14 PROCEDURE — 77067 SCR MAMMO BI INCL CAD: CPT | Performed by: STUDENT IN AN ORGANIZED HEALTH CARE EDUCATION/TRAINING PROGRAM

## 2024-10-14 PROCEDURE — 77067 SCR MAMMO BI INCL CAD: CPT

## 2024-10-14 PROCEDURE — 77063 BREAST TOMOSYNTHESIS BI: CPT | Performed by: STUDENT IN AN ORGANIZED HEALTH CARE EDUCATION/TRAINING PROGRAM

## 2024-10-14 RX ORDER — TRAZODONE HYDROCHLORIDE 50 MG/1
50 TABLET ORAL NIGHTLY PRN
Qty: 90 TABLET | Refills: 1 | Status: SHIPPED | OUTPATIENT
Start: 2024-10-14 | End: 2025-10-14

## 2024-10-15 ENCOUNTER — APPOINTMENT (OUTPATIENT)
Dept: HEMATOLOGY/ONCOLOGY | Facility: HOSPITAL | Age: 74
End: 2024-10-15
Payer: MEDICARE

## 2024-10-18 ENCOUNTER — INFUSION (OUTPATIENT)
Dept: HEMATOLOGY/ONCOLOGY | Facility: HOSPITAL | Age: 74
End: 2024-10-18
Payer: MEDICARE

## 2024-10-18 VITALS
SYSTOLIC BLOOD PRESSURE: 146 MMHG | RESPIRATION RATE: 18 BRPM | TEMPERATURE: 98.4 F | DIASTOLIC BLOOD PRESSURE: 77 MMHG | OXYGEN SATURATION: 95 % | HEART RATE: 83 BPM

## 2024-10-18 DIAGNOSIS — I87.8 POOR VENOUS ACCESS: ICD-10-CM

## 2024-10-18 PROCEDURE — 96523 IRRIG DRUG DELIVERY DEVICE: CPT

## 2024-10-18 PROCEDURE — 2500000004 HC RX 250 GENERAL PHARMACY W/ HCPCS (ALT 636 FOR OP/ED): Performed by: FAMILY MEDICINE

## 2024-10-18 RX ORDER — HEPARIN SODIUM,PORCINE/PF 10 UNIT/ML
50 SYRINGE (ML) INTRAVENOUS AS NEEDED
OUTPATIENT
Start: 2024-10-18

## 2024-10-18 RX ORDER — HEPARIN 100 UNIT/ML
500 SYRINGE INTRAVENOUS AS NEEDED
OUTPATIENT
Start: 2024-10-18

## 2024-10-18 RX ORDER — HEPARIN 100 UNIT/ML
500 SYRINGE INTRAVENOUS AS NEEDED
Status: DISCONTINUED | OUTPATIENT
Start: 2024-10-18 | End: 2024-10-18 | Stop reason: HOSPADM

## 2024-10-18 ASSESSMENT — PAIN SCALES - GENERAL: PAINLEVEL_OUTOF10: 3

## 2024-10-28 NOTE — PROGRESS NOTES
Subjective   Patient ID: Trinity Hernandez is a 74 y.o. female who presents for Follow-up (2 months; has congestion in AM and PM, breathing is slightly worse, wants to do covid test).     Her  is on dialysis now.  had dialysis port placed last month and he ended up going into cardiac arrest but the resuscitated him and he is still going to dialysis and he ended up getting a head injury with a brain bleed. They are actually starting at home dialysis.     URI: Started about 5 days ago. Chest congestion. More short of breath, fatigued. No body aches. Cough productive of clear sputum. No ear pressure or muffled hearing. No sinus pressure. No sore throat. +Rhinorrhea. No vomiting or diarrhea. She has been taking azithromycin daily for the last 3 or 4 days. She was recommended to avoid prednisone by her cardiologist.     Afib: She is on warfarin. She is going for [facility] on January 27th. Working on getting her surgery approved.      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      CAD, s/p stent: Following with Dr. Romero. Considering trying repatha.     Shoulder pain: bothering her more. Hasn't tried the meloxicam out much yet.      Small bowel perforation, abdominal hernias, epiploic appendagitis: She has been recommended laparoscopic surgery but she is hoping to avoid surgery. Currently having occasional bad days but not bothering her much lately.      Anxiety: lorazepam prn is helpful. She is having a lot more anxiety lately because of her 's health. He has an aneurysm that is over 5cm and he is in renal failure and he recently had a severe GI bleed. She is taking hydroxyzine but that makes her sleepy so she can't take it during the day.      HTN, aortic aneurysm: Reasonable control on metoprolol, losartan, and lasix. Following with cardioloy. Recently had some discomfort and got a zio patch done. She is awaiting results. She is going to get a  "stress test soon.     HLD: On repatha.      COPD/sarcoidosis: On Brovana, spiriva, and budesonide. Using oxygen and prednisone prn, she has an albuterol inhaler. She is on 2L nasal cannula. She is seeing Dr. Ford for pulm.      Hypothyroidism: On replacement, no concerns.     GERD: Off pantoprazole, she is doing much better since taking the enzymes.     All other systems have been reviewed and are negative for complaint     Objective   /82 (BP Location: Left arm, Patient Position: Sitting, BP Cuff Size: Adult)   Pulse 101   Ht 1.727 m (5' 8\")   Wt 75.8 kg (167 lb)   BMI 25.39 kg/m²     Gen: No acute distress, alert and oriented x3, pleasant   HEENT: moist mucous membranes, b/l external auditory canals are clear of debris, TMs within normal limits, no oropharyngeal lesions, eomi, perrla   Neck: thyroid within normal limits, no lymphadenopathy   CV: RRR, normal S1/S2, no murmur   Resp: Clear to auscultation bilaterally, no wheezes or rhonchi appreciated  Abd: soft, nontender, non-distended, no guarding/rigidity, bowel sounds present  Extr: no edema, no calf tenderness  Derm: Skin is warm and dry, no rashes appreciated  Psych: mood is good, affect is congruent, good hygiene, normal speech and eye contact  Neuro: cranial nerves grossly intact, normal gait    Assessment/Plan   #COPD exacerbation:  Rx sent amoxicillin  Rx sent prednisone burst    #CVA:  She is on warfarin and plavix  INR monioring through Dr. Farfan's office/[place]  Completed PT     #Afib  On warfarin  Planning for watchman procedure in January     #Rheumatoid arthritis  monitor     #COPD exacerbation  #COPD  #Respiratory failure  On oxygen  On azithromycin three times per week  Breathing is improving slowly  Bronchiectasis vest was denied due to her diagnoses     #CAD  S/p stent   On plavix and ezetimibe  Has followup with cardio  Back on repatha     #Small bowel perforation  #Ventral hernia  Following with GI  Currently planning to avoid " surgery     #Anxiety:  CSA signed Sept 2024  Ordering UDS  Doing reasonably well with lorazepam daily     #Poor venous access  port in place     #HTN  #Afib  Controlled on furosemide, and metoprolol  seeing cardiology (Nick)  She is on warfarin through [place]     #HLD  Stable, not on meds, monitoring     #COPD/Sarcoidosis  Following with pulm, stable, on inhalers, supplemental O2, nebulizers  testing with pulm in April     #Hypothyroidism  Controlled on replacement     HCM:  s/p COVID vaccine  Mammogram Sept

## 2024-11-05 ENCOUNTER — APPOINTMENT (OUTPATIENT)
Dept: PRIMARY CARE | Facility: CLINIC | Age: 74
End: 2024-11-05
Payer: MEDICARE

## 2024-11-05 VITALS
BODY MASS INDEX: 25.31 KG/M2 | WEIGHT: 167 LBS | SYSTOLIC BLOOD PRESSURE: 135 MMHG | HEART RATE: 101 BPM | DIASTOLIC BLOOD PRESSURE: 82 MMHG | HEIGHT: 68 IN

## 2024-11-05 DIAGNOSIS — E03.9 HYPOTHYROIDISM, UNSPECIFIED TYPE: ICD-10-CM

## 2024-11-05 DIAGNOSIS — E78.5 HYPERLIPIDEMIA, UNSPECIFIED HYPERLIPIDEMIA TYPE: ICD-10-CM

## 2024-11-05 DIAGNOSIS — J06.9 UPPER RESPIRATORY TRACT INFECTION, UNSPECIFIED TYPE: ICD-10-CM

## 2024-11-05 DIAGNOSIS — C55 MALIGNANT NEOPLASM OF UTERUS, UNSPECIFIED SITE (MULTI): Primary | ICD-10-CM

## 2024-11-05 LAB — POC SARS-COV-2 AG BINAX: NORMAL

## 2024-11-05 PROCEDURE — 1159F MED LIST DOCD IN RCRD: CPT | Performed by: FAMILY MEDICINE

## 2024-11-05 PROCEDURE — 99214 OFFICE O/P EST MOD 30 MIN: CPT | Performed by: FAMILY MEDICINE

## 2024-11-05 PROCEDURE — 3079F DIAST BP 80-89 MM HG: CPT | Performed by: FAMILY MEDICINE

## 2024-11-05 PROCEDURE — 87811 SARS-COV-2 COVID19 W/OPTIC: CPT | Performed by: FAMILY MEDICINE

## 2024-11-05 PROCEDURE — 1036F TOBACCO NON-USER: CPT | Performed by: FAMILY MEDICINE

## 2024-11-05 PROCEDURE — 3075F SYST BP GE 130 - 139MM HG: CPT | Performed by: FAMILY MEDICINE

## 2024-11-05 RX ORDER — PREDNISONE 20 MG/1
40 TABLET ORAL DAILY
Qty: 10 TABLET | Refills: 0 | Status: SHIPPED | OUTPATIENT
Start: 2024-11-05 | End: 2024-11-10

## 2024-11-05 RX ORDER — AMOXICILLIN 500 MG/1
500 CAPSULE ORAL EVERY 12 HOURS SCHEDULED
Qty: 20 CAPSULE | Refills: 0 | Status: SHIPPED | OUTPATIENT
Start: 2024-11-05 | End: 2024-11-15

## 2024-11-08 ENCOUNTER — TELEPHONE (OUTPATIENT)
Dept: PRIMARY CARE | Facility: CLINIC | Age: 74
End: 2024-11-08
Payer: MEDICARE

## 2024-11-08 NOTE — TELEPHONE ENCOUNTER
Trinity was in the office on the 5th for a sick visit, she was given antibiotics and she stated her SX are not getting any better. I advised her to continue taking the antibiotic but she asked that I let you know she is not doing any better.

## 2024-11-22 ENCOUNTER — INFUSION (OUTPATIENT)
Dept: HEMATOLOGY/ONCOLOGY | Facility: HOSPITAL | Age: 74
End: 2024-11-22
Payer: MEDICARE

## 2024-11-22 VITALS
TEMPERATURE: 96.8 F | HEART RATE: 82 BPM | RESPIRATION RATE: 18 BRPM | DIASTOLIC BLOOD PRESSURE: 71 MMHG | OXYGEN SATURATION: 97 % | SYSTOLIC BLOOD PRESSURE: 118 MMHG

## 2024-11-22 DIAGNOSIS — I87.8 POOR VENOUS ACCESS: ICD-10-CM

## 2024-11-22 PROCEDURE — 96523 IRRIG DRUG DELIVERY DEVICE: CPT

## 2024-11-22 PROCEDURE — 2500000004 HC RX 250 GENERAL PHARMACY W/ HCPCS (ALT 636 FOR OP/ED): Performed by: FAMILY MEDICINE

## 2024-11-22 RX ORDER — HEPARIN 100 UNIT/ML
500 SYRINGE INTRAVENOUS AS NEEDED
Status: DISCONTINUED | OUTPATIENT
Start: 2024-11-22 | End: 2024-11-22 | Stop reason: HOSPADM

## 2024-11-22 RX ORDER — SODIUM CHLORIDE 9 MG/ML
10 INJECTION, SOLUTION INTRAMUSCULAR; INTRAVENOUS; SUBCUTANEOUS EVERY 8 HOURS SCHEDULED
Qty: 900 ML | Refills: 0 | Status: SHIPPED | OUTPATIENT
Start: 2024-11-22 | End: 2024-12-22

## 2024-11-22 RX ORDER — HEPARIN 100 UNIT/ML
500 SYRINGE INTRAVENOUS AS NEEDED
OUTPATIENT
Start: 2024-11-22

## 2024-11-22 RX ORDER — HEPARIN SODIUM,PORCINE/PF 10 UNIT/ML
50 SYRINGE (ML) INTRAVENOUS AS NEEDED
OUTPATIENT
Start: 2024-11-22

## 2024-11-22 ASSESSMENT — PAIN SCALES - GENERAL: PAINLEVEL_OUTOF10: 0-NO PAIN

## 2024-11-24 DIAGNOSIS — I10 PRIMARY HYPERTENSION: ICD-10-CM

## 2024-11-25 RX ORDER — MELOXICAM 15 MG/1
15 TABLET ORAL DAILY
Qty: 30 TABLET | Refills: 0 | Status: SHIPPED | OUTPATIENT
Start: 2024-11-25

## 2024-11-26 ENCOUNTER — APPOINTMENT (OUTPATIENT)
Dept: HEMATOLOGY/ONCOLOGY | Facility: HOSPITAL | Age: 74
End: 2024-11-26
Payer: MEDICARE

## 2024-12-02 ENCOUNTER — APPOINTMENT (OUTPATIENT)
Dept: PRIMARY CARE | Facility: CLINIC | Age: 74
End: 2024-12-02
Payer: MEDICARE

## 2024-12-04 DIAGNOSIS — I10 PRIMARY HYPERTENSION: ICD-10-CM

## 2024-12-04 DIAGNOSIS — E03.9 HYPOTHYROIDISM, UNSPECIFIED TYPE: ICD-10-CM

## 2024-12-04 RX ORDER — LEVOTHYROXINE SODIUM 50 UG/1
TABLET ORAL
Qty: 90 TABLET | Refills: 1 | Status: SHIPPED | OUTPATIENT
Start: 2024-12-04

## 2024-12-04 RX ORDER — MELOXICAM 15 MG/1
15 TABLET ORAL DAILY
Qty: 30 TABLET | Refills: 0 | Status: SHIPPED | OUTPATIENT
Start: 2024-12-04

## 2024-12-18 ENCOUNTER — TELEPHONE (OUTPATIENT)
Dept: HEMATOLOGY/ONCOLOGY | Facility: HOSPITAL | Age: 74
End: 2024-12-18
Payer: MEDICARE

## 2024-12-18 NOTE — TELEPHONE ENCOUNTER
Attempted to reach patient regarding her appointment for 1/3/2024. We have 3 patients scheduled at 1PM that day looking to see if patient is able to move her appointment to 12:30PM that day. Left message for patient to call back at her earliest convenience.

## 2025-01-03 ENCOUNTER — INFUSION (OUTPATIENT)
Dept: HEMATOLOGY/ONCOLOGY | Facility: HOSPITAL | Age: 75
End: 2025-01-03
Payer: MEDICARE

## 2025-01-03 ENCOUNTER — LAB REQUISITION (OUTPATIENT)
Dept: LAB | Facility: LAB | Age: 75
End: 2025-01-03
Payer: MEDICARE

## 2025-01-03 VITALS
DIASTOLIC BLOOD PRESSURE: 84 MMHG | SYSTOLIC BLOOD PRESSURE: 128 MMHG | RESPIRATION RATE: 20 BRPM | TEMPERATURE: 97.3 F | OXYGEN SATURATION: 93 % | HEART RATE: 83 BPM

## 2025-01-03 DIAGNOSIS — I48.0 PAROXYSMAL ATRIAL FIBRILLATION (MULTI): ICD-10-CM

## 2025-01-03 DIAGNOSIS — I87.8 POOR VENOUS ACCESS: ICD-10-CM

## 2025-01-03 LAB
ANION GAP SERPL CALC-SCNC: 12 MMOL/L (ref 10–20)
BUN SERPL-MCNC: 20 MG/DL (ref 6–23)
CALCIUM SERPL-MCNC: 9.6 MG/DL (ref 8.6–10.3)
CHLORIDE SERPL-SCNC: 99 MMOL/L (ref 98–107)
CO2 SERPL-SCNC: 31 MMOL/L (ref 21–32)
CREAT SERPL-MCNC: 0.87 MG/DL (ref 0.5–1.05)
EGFRCR SERPLBLD CKD-EPI 2021: 70 ML/MIN/1.73M*2
ERYTHROCYTE [DISTWIDTH] IN BLOOD BY AUTOMATED COUNT: 13.5 % (ref 11.5–14.5)
GLUCOSE SERPL-MCNC: 77 MG/DL (ref 74–99)
HCT VFR BLD AUTO: 44.8 % (ref 36–46)
HGB BLD-MCNC: 13.7 G/DL (ref 12–16)
INR PPP: 2.2 (ref 0.9–1.1)
MCH RBC QN AUTO: 28.5 PG (ref 26–34)
MCHC RBC AUTO-ENTMCNC: 30.6 G/DL (ref 32–36)
MCV RBC AUTO: 93 FL (ref 80–100)
NRBC BLD-RTO: 0 /100 WBCS (ref 0–0)
PLATELET # BLD AUTO: 284 X10*3/UL (ref 150–450)
POTASSIUM SERPL-SCNC: 4.2 MMOL/L (ref 3.5–5.3)
PROTHROMBIN TIME: 24.7 SECONDS (ref 9.8–12.8)
RBC # BLD AUTO: 4.8 X10*6/UL (ref 4–5.2)
SODIUM SERPL-SCNC: 138 MMOL/L (ref 136–145)
WBC # BLD AUTO: 7.7 X10*3/UL (ref 4.4–11.3)

## 2025-01-03 PROCEDURE — 36591 DRAW BLOOD OFF VENOUS DEVICE: CPT

## 2025-01-03 PROCEDURE — 85027 COMPLETE CBC AUTOMATED: CPT

## 2025-01-03 PROCEDURE — 85610 PROTHROMBIN TIME: CPT

## 2025-01-03 PROCEDURE — 80048 BASIC METABOLIC PNL TOTAL CA: CPT

## 2025-01-03 PROCEDURE — 2500000004 HC RX 250 GENERAL PHARMACY W/ HCPCS (ALT 636 FOR OP/ED): Performed by: FAMILY MEDICINE

## 2025-01-03 RX ORDER — HEPARIN 100 UNIT/ML
500 SYRINGE INTRAVENOUS AS NEEDED
Status: DISCONTINUED | OUTPATIENT
Start: 2025-01-03 | End: 2025-01-03 | Stop reason: HOSPADM

## 2025-01-03 RX ORDER — HEPARIN SODIUM,PORCINE/PF 10 UNIT/ML
50 SYRINGE (ML) INTRAVENOUS AS NEEDED
OUTPATIENT
Start: 2025-01-03

## 2025-01-03 RX ORDER — HEPARIN 100 UNIT/ML
500 SYRINGE INTRAVENOUS AS NEEDED
OUTPATIENT
Start: 2025-01-03

## 2025-01-03 RX ADMIN — HEPARIN 500 UNITS: 100 SYRINGE at 12:48

## 2025-01-03 ASSESSMENT — PAIN SCALES - GENERAL: PAINLEVEL_OUTOF10: 0-NO PAIN

## 2025-01-07 ENCOUNTER — APPOINTMENT (OUTPATIENT)
Dept: HEMATOLOGY/ONCOLOGY | Facility: HOSPITAL | Age: 75
End: 2025-01-07
Payer: MEDICARE

## 2025-01-08 DIAGNOSIS — G47.00 INSOMNIA, UNSPECIFIED TYPE: ICD-10-CM

## 2025-01-08 DIAGNOSIS — F41.9 ANXIETY: ICD-10-CM

## 2025-01-08 RX ORDER — TRAZODONE HYDROCHLORIDE 50 MG/1
50 TABLET ORAL NIGHTLY PRN
Qty: 90 TABLET | Refills: 1 | Status: SHIPPED | OUTPATIENT
Start: 2025-01-08 | End: 2026-01-08

## 2025-01-08 RX ORDER — LORAZEPAM 0.5 MG/1
0.5 TABLET ORAL 2 TIMES DAILY PRN
Qty: 30 TABLET | Refills: 0 | Status: SHIPPED | OUTPATIENT
Start: 2025-01-08 | End: 2025-02-07

## 2025-01-29 ENCOUNTER — PATIENT OUTREACH (OUTPATIENT)
Dept: PRIMARY CARE | Facility: CLINIC | Age: 75
End: 2025-01-29
Payer: MEDICARE

## 2025-01-29 NOTE — PROGRESS NOTES
Discharge Facility: [facility]   Discharge Diagnosis: Atrial fibrillation, unspecified type (HCC) [I48.91]   Admission Date: 1/27/25  Discharge Date: 1/28/25    PCP Appointment Date: 2/5/25 @ 1230  Specialist Appointment Date: Tuesday February 11, 2025 1:15 PM Office Visit with [facility]  Tuesday February 11, 2025 2:00 PM Office Visit with Michael Ford MD   Hospital Encounter and Summary Linked: No, linked in encounter.    Not in this section. It is linked to the encounter, due to an outside admission.      See discharge assessment below for further details    Engagement  Call Start Time: 0937 (Call completed with Trinity) (1/30/2025  9:37 AM)    Medications  Medications reviewed with patient/caregiver?: Yes (1/30/2025  9:37 AM)  Is the patient having any side effects they believe may be caused by any medication additions or changes?: No (1/30/2025  9:37 AM)  Does the patient have all medications ordered at discharge?: Yes (1/30/2025  9:37 AM)  Care Management Interventions: No intervention needed (1/30/2025  9:37 AM)  Prescription Comments: colchicine 0.6 mg tablet Take 1 tablet by mouth once daily for 21 days (1/30/2025  9:37 AM)  Is the patient taking all medications as directed (includes completed medication regime)?: Yes (1/30/2025  9:37 AM)  Medication Comments: Denies any medication questions/concners (1/30/2025  9:37 AM)    Appointments  Does the patient have a primary care provider?: Yes (1/30/2025  9:37 AM)  Care Management Interventions: Verified appointment date/time/provider (1/30/2025  9:37 AM)  Has the patient kept scheduled appointments due by today?: Yes (1/30/2025  9:37 AM)  Care Management Interventions: Advised patient to keep appointment (1/30/2025  9:37 AM)    Self Management  What is the home health agency?: n/a (1/30/2025  9:37 AM)  Has home health visited the patient within 72 hours of discharge?: Not applicable (1/30/2025  9:37 AM)  What Durable Medical Equipment (DME)  was ordered?: n/a (1/30/2025  9:37 AM)    Patient Teaching  Does the patient have access to their discharge instructions?: Yes (1/30/2025  9:37 AM)  Care Management Interventions: Reviewed instructions with patient (1/30/2025  9:37 AM)  Is the patient/caregiver able to teach back the hierarchy of who to call/visit for symptoms/problems? PCP, Specialist, Home Health nurse, Urgent Care, ED, 911: Yes (1/30/2025  9:37 AM)  Patient/Caregiver Education Comments: Patient reports she is doing well at home. She is more tired and weak than her normal. She denies any further quesitons/concerns at this time. (1/30/2025  9:37 AM)

## 2025-01-30 RX ORDER — COLCHICINE 0.6 MG/1
0.6 TABLET ORAL
COMMUNITY
Start: 2025-01-28 | End: 2025-02-18

## 2025-01-31 NOTE — PROGRESS NOTES
Subjective   Patient ID: Trinity Hernandez is a 74 y.o. female who presents for No chief complaint on file..    Her  is on dialysis now.  had dialysis port placed last month and he ended up going into cardiac arrest but the resuscitated him and he is still going to dialysis and he ended up getting a head injury with a brain bleed. They are actually starting at home dialysis.      URI: Started about 5 days ago. Chest congestion. More short of breath, fatigued. No body aches. Cough productive of clear sputum. No ear pressure or muffled hearing. No sinus pressure. No sore throat. +Rhinorrhea. No vomiting or diarrhea. She has been taking azithromycin daily for the last 3 or 4 days. She was recommended to avoid prednisone by her cardiologist.      Afib: She is on warfarin. She is going for [facility] on January 27th. Working on getting her surgery approved.      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      CAD, s/p stent: Following with Dr. Romero. Considering trying repatha.     Shoulder pain: bothering her more. Hasn't tried the meloxicam out much yet.      Small bowel perforation, abdominal hernias, epiploic appendagitis: She has been recommended laparoscopic surgery but she is hoping to avoid surgery. Currently having occasional bad days but not bothering her much lately.      Anxiety: lorazepam prn is helpful. She is having a lot more anxiety lately because of her 's health. He has an aneurysm that is over 5cm and he is in renal failure and he recently had a severe GI bleed. She is taking hydroxyzine but that makes her sleepy so she can't take it during the day.      HTN, aortic aneurysm: Reasonable control on metoprolol, losartan, and lasix. Following with cardioloy. Recently had some discomfort and got a zio patch done. She is awaiting results. She is going to get a stress test soon.     HLD: On repatha.      COPD/sarcoidosis: On Brovana,  spiriva, and budesonide. Using oxygen and prednisone prn, she has an albuterol inhaler. She is on 2L nasal cannula. She is seeing Dr. Ford for pulm.      Hypothyroidism: On replacement, no concerns.     GERD: Off pantoprazole, she is doing much better since taking the enzymes.     All other systems have been reviewed and are negative for complaint     Objective   There were no vitals taken for this visit.    Gen: No acute distress, alert and oriented x3, pleasant   HEENT: moist mucous membranes, b/l external auditory canals are clear of debris, TMs within normal limits, no oropharyngeal lesions, eomi, perrla   Neck: thyroid within normal limits, no lymphadenopathy   CV: RRR, normal S1/S2, no murmur   Resp: Clear to auscultation bilaterally, no wheezes or rhonchi appreciated  Abd: soft, nontender, non-distended, no guarding/rigidity, bowel sounds present  Extr: no edema, no calf tenderness  Derm: Skin is warm and dry, no rashes appreciated  Psych: mood is good, affect is congruent, good hygiene, normal speech and eye contact  Neuro: cranial nerves grossly intact, normal gait    Assessment/Plan   #COPD exacerbation:  Rx sent amoxicillin  Rx sent prednisone burst     #CVA:  She is on warfarin and plavix  INR monioring through Dr. Farfan's office/[place]  Completed PT     #Afib  On warfarin  Planning for watchman procedure in January     #Rheumatoid arthritis  monitor     #COPD exacerbation  #COPD  #Respiratory failure  On oxygen  On azithromycin three times per week  Breathing is improving slowly  Bronchiectasis vest was denied due to her diagnoses     #CAD  S/p stent   On plavix and ezetimibe  Has followup with cardio  Back on repatha     #Small bowel perforation  #Ventral hernia  Following with GI  Currently planning to avoid surgery     #Anxiety:  CSA signed Sept 2024  Ordering UDS  Doing reasonably well with lorazepam daily     #Poor venous access  port in place     #HTN  #Afib  Controlled on furosemide, and  metoprolol  seeing cardiology (Nick)  She is on warfarin through [place]     #HLD  Stable, not on meds, monitoring     #COPD/Sarcoidosis  Following with pulm, stable, on inhalers, supplemental O2, nebulizers  testing with pulm in April     #Hypothyroidism  Controlled on replacement     HCM:  s/p COVID vaccine  Mammogram Sept

## 2025-02-05 ENCOUNTER — APPOINTMENT (OUTPATIENT)
Dept: PRIMARY CARE | Facility: CLINIC | Age: 75
End: 2025-02-05
Payer: MEDICARE

## 2025-02-10 ENCOUNTER — APPOINTMENT (OUTPATIENT)
Dept: PRIMARY CARE | Facility: CLINIC | Age: 75
End: 2025-02-10
Payer: MEDICARE

## 2025-02-10 VITALS
HEART RATE: 97 BPM | BODY MASS INDEX: 25.91 KG/M2 | HEIGHT: 68 IN | DIASTOLIC BLOOD PRESSURE: 82 MMHG | SYSTOLIC BLOOD PRESSURE: 136 MMHG | WEIGHT: 171 LBS

## 2025-02-10 DIAGNOSIS — I48.91 ATRIAL FIBRILLATION, UNSPECIFIED TYPE (MULTI): ICD-10-CM

## 2025-02-10 PROCEDURE — 1159F MED LIST DOCD IN RCRD: CPT | Performed by: FAMILY MEDICINE

## 2025-02-10 PROCEDURE — 99495 TRANSJ CARE MGMT MOD F2F 14D: CPT | Performed by: FAMILY MEDICINE

## 2025-02-10 PROCEDURE — 3075F SYST BP GE 130 - 139MM HG: CPT | Performed by: FAMILY MEDICINE

## 2025-02-10 PROCEDURE — 3079F DIAST BP 80-89 MM HG: CPT | Performed by: FAMILY MEDICINE

## 2025-02-10 PROCEDURE — 1036F TOBACCO NON-USER: CPT | Performed by: FAMILY MEDICINE

## 2025-02-10 NOTE — PROGRESS NOTES
"Subjective   Patient ID: Trinity Hernandez is a 75 y.o. female who presents for Hospital Follow-up (Afib, had watchman procedure; on warfarin for 2 months; stopped jardiance and colchicine, vaginal discomfort; INR test tomorrow).    Trinity Hernandez is a 75 y.o. female presenting today for follow-up after being discharged from the hospital 14 days ago. The main problem requiring admission was Afib. The discharge summary and/or TransitionalCare Management documentation was reviewed. Medication reconciliation was performed as indicated via the \"[facility] as Reviewed\" timestamp.    Trinity Hernandez was contacted by Transitional Care Management services two days after her discharge. This encounter and supporting documentation was reviewed.     Afib with RVR: Completed watchman. Feeling ok overall. She was having urinary frequency and vaginal discomfort and she stopped the jardiance. Feeling ok otherwise. She was started on colchicine but she stopped that as well. Breathing has been ok.     Her  is on dialysis now.  had dialysis port placed last month and he ended up going into cardiac arrest but the resuscitated him and he is still going to dialysis and he ended up getting a head injury with a brain bleed. They are actually starting at home dialysis.Since her last visit he had a head injury and had to get surgery to remove the clots.      Afib: She is on warfarin. She is going for [facility] on January 27th. Working on getting her surgery approved.      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      CAD, s/p stent: Following with Dr. Romero. Considering trying repatha.     Shoulder pain: bothering her more. Hasn't tried the meloxicam out much yet.      Small bowel perforation, abdominal hernias, epiploic appendagitis: She has been recommended laparoscopic surgery but she is hoping to avoid surgery. Currently having occasional bad days but not bothering her much " "lately.      Anxiety: lorazepam prn is helpful. She is having a lot more anxiety lately because of her 's health. He has an aneurysm that is over 5cm and he is in renal failure and he recently had a severe GI bleed. She is taking hydroxyzine but that makes her sleepy so she can't take it during the day.      HTN, aortic aneurysm: Reasonable control on metoprolol, losartan, and lasix. Following with cardioloy. Recently had some discomfort and got a zio patch done. She is awaiting results. She is going to get a stress test soon.     HLD: On repatha.      COPD/sarcoidosis: On Brovana, spiriva, and budesonide. Using oxygen and prednisone prn, she has an albuterol inhaler. She is on 2L nasal cannula. She is seeing Dr. Ford for pulm.      Hypothyroidism: On replacement, no concerns.     GERD: Off pantoprazole, she is doing much better since taking the enzymes.     All other systems have been reviewed and are negative for complaint     Objective   /82 (BP Location: Left arm, Patient Position: Sitting, BP Cuff Size: Adult)   Pulse 97   Ht 1.727 m (5' 8\")   Wt 77.6 kg (171 lb)   BMI 26.00 kg/m²     Gen: No acute distress, alert and oriented x3, pleasant   HEENT: moist mucous membranes, b/l external auditory canals are clear of debris, TMs within normal limits, no oropharyngeal lesions, eomi, perrla   Neck: thyroid within normal limits, no lymphadenopathy   CV: RRR, normal S1/S2, no murmur   Resp: Clear to auscultation bilaterally, no wheezes or rhonchi appreciated  Abd: soft, nontender, non-distended, no guarding/rigidity, bowel sounds present  Extr: no edema, no calf tenderness  Derm: Skin is warm and dry, no rashes appreciated  Psych: mood is good, affect is congruent, good hygiene, normal speech and eye contact  Neuro: cranial nerves grossly intact, normal gait    Assessment/Plan      #CVA:  She is on warfarin and plavix  INR monioring through Dr. Farfan's office/[place]  Completed PT     #Afib  On " warfarin  Doing well since watchman procedure     #Rheumatoid arthritis  monitor     #COPD exacerbation  #COPD  #Respiratory failure  On oxygen  On azithromycin three times per week  Breathing is improving slowly  Bronchiectasis vest was denied due to her diagnoses     #CAD  S/p stent   On plavix and ezetimibe  Has followup with cardio  Back on repatha     #Small bowel perforation  #Ventral hernia  Following with GI  Currently planning to avoid surgery     #Anxiety:  CSA signed Sept 2024  Ordering UDS  Doing reasonably well with lorazepam daily     #Poor venous access  port in place     #HTN  #Afib  Controlled on furosemide, and metoprolol  seeing cardiology (Nick)  She is on warfarin through [place]     #HLD  Stable, not on meds, monitoring     #COPD/Sarcoidosis  Following with pulm, stable, on inhalers, supplemental O2, nebulizers  testing with pulm in April     #Hypothyroidism  Controlled on replacement     HCM:  s/p COVID vaccine  Mammogram Sept

## 2025-02-11 ENCOUNTER — PATIENT OUTREACH (OUTPATIENT)
Dept: PRIMARY CARE | Facility: CLINIC | Age: 75
End: 2025-02-11
Payer: MEDICARE

## 2025-02-11 NOTE — PROGRESS NOTES
Call regarding appt. with PCP on ( 2/10/25 ) after hospitalization.  At time of outreach call the patient feels as if their condition has improved since last visit.  Reviewed the PCP appointment with the pt and addressed any questions or concerns.    Pt. Denies questions/concerns at this time.

## 2025-02-14 ENCOUNTER — APPOINTMENT (OUTPATIENT)
Dept: HEMATOLOGY/ONCOLOGY | Facility: HOSPITAL | Age: 75
End: 2025-02-14
Payer: MEDICARE

## 2025-02-18 ENCOUNTER — APPOINTMENT (OUTPATIENT)
Dept: HEMATOLOGY/ONCOLOGY | Facility: HOSPITAL | Age: 75
End: 2025-02-18
Payer: MEDICARE

## 2025-02-28 ENCOUNTER — PATIENT OUTREACH (OUTPATIENT)
Dept: PRIMARY CARE | Facility: CLINIC | Age: 75
End: 2025-02-28
Payer: MEDICARE

## 2025-02-28 NOTE — PROGRESS NOTES
Successful outreach to patient regarding hospitalization as patient continues TCM program.   At time of outreach call the patient feels as if their condition has improved since initial visit with PCP or specialist.  Questions or concerns addressed at this time with patient.   Provided contact information to patient if any further non-emergent needs arise.     Patient states she is getting along well, just taking her time.   Pt. Denies questions/concerns at this time.

## 2025-03-11 ENCOUNTER — APPOINTMENT (OUTPATIENT)
Dept: HEMATOLOGY/ONCOLOGY | Facility: HOSPITAL | Age: 75
End: 2025-03-11
Payer: MEDICARE

## 2025-03-14 ENCOUNTER — APPOINTMENT (OUTPATIENT)
Dept: HEMATOLOGY/ONCOLOGY | Facility: HOSPITAL | Age: 75
End: 2025-03-14
Payer: MEDICARE

## 2025-03-25 ENCOUNTER — PATIENT OUTREACH (OUTPATIENT)
Dept: PRIMARY CARE | Facility: CLINIC | Age: 75
End: 2025-03-25
Payer: MEDICARE

## 2025-03-25 NOTE — PROGRESS NOTES
Discharge facility: [facility]  Discharge diagnosis:  COPD exacerbation     Please follow-up with PCP, pulmonology, cardiology.  Please follow-up at your Coumadin clinic on 4/1/2025 as warfarin dosing would need adjustment.       Admission date: 3-10-25  Discharge date: 3-24-25    PCP Appointment Date: 4/1/25  Specialist Appointment Date: 4/1/25 Coumadin clinic, 4/22/25 Hem onc, 6/30/25 cardiology, 8/11/25 pulmonology  Hospital Encounter and Summary: Linked    Hospital Encounter   Transitional Care Management Enrollment with Harleen Issa RN (03/25/2025)   See Discharge assessment below for further details    Wrap Up  Wrap Up Additional Comments: Spoke with patient. She continues to have shortness of breath and productive cough producing thick-tan colored sputum. She states she is using her nebulizer as prescribed as well as all other prescribed medications. She is trying to increase her fluids. She reports she is eating. Home Care nurse has been to her home with plans for physical therapy to come tomorrow. Encouraged to call pulmonology and cardiology to try to get follow up appts scheduled. She has no questions at this time. She is planning to go to coumadin clinic next week to have PT/INR done. (3/25/2025  2:38 PM)    Medications  Medications reviewed with patient/caregiver?: Yes (Reviewed new medications) (3/25/2025  2:38 PM)  Is the patient having any side effects they believe may be caused by any medication additions or changes?: No (3/25/2025  2:38 PM)  Does the patient have all medications ordered at discharge?: Yes (3/25/2025  2:38 PM)  Care Management Interventions: Provided patient education (3/25/2025  2:38 PM)  Prescription Comments: Prescriptions sent for dextromethorphan-guaiFENesin (MUCINEX DM)  mg per tablet,nystatin (MYCOSTATIN) 100,000 units/mL oral liquid ,predniSONE (DELTASONE) 20 mg tablet (3/25/2025  2:38 PM)  Is the patient taking all medications as directed (includes  completed medication regime)?: Yes (3/25/2025  2:38 PM)  Care Management Interventions: Provided patient education (3/25/2025  2:38 PM)  Medication Comments: Instructed new medications of nystatin (MYCOSTATIN) 100,000 units/mL oral liquid  Apply 5 mL to affected area four times daily for 7 days. Swish and swallow. ,  predniSONE (DELTASONE) 20 mg tablet  Take 2 tablets by mouth once daily for 2 days, THEN 1.5 tablets once daily for 2 days, THEN 1 tablet once daily for 2 days, THEN 0.5 tablets once daily for 1 day.dextromethorphan-guaiFENesin (MUCINEX DM)  mg per tablet Take 1 tablet by mouth two times a day as needed for cough for up to 7 days. changed medication of  warfarin (COUMADIN) 5 mg tablet   Indications: Paroxysmal atrial fibrillation (HCC) Take 1 tablet by mouth daily as directed. Take 1 tablet every day till you see warfarin clinic. They can adjust from there on (3/25/2025  2:38 PM)  Follow Up Tasks: -- (n/a) (3/25/2025  2:38 PM)    Appointments  Does the patient have a primary care provider?: Yes (3/25/2025  2:38 PM)  Care Management Interventions: Verified appointment date/time/provider (TCM scheduled 4-1-25) (3/25/2025  2:38 PM)  Has the patient kept scheduled appointments due by today?: Yes (3/25/2025  2:38 PM)  Care Management Interventions: Advised patient to keep appointment; Educated on importance of keeping appointment; Advised to schedule with specialist (Encouraged to call and schedule sooner appt with cardiology and pulmonology) (3/25/2025  2:38 PM)  Follow Up Tasks: -- (n/a) (3/25/2025  2:38 PM)    Self Management  What is the home health agency?: [facility] (3/25/2025  2:38 PM)  Has home health visited the patient within 72 hours of discharge?: Yes (3/25/2025  2:38 PM)  Home Health Interventions: -- (n/a) (3/25/2025  2:38 PM)  What Durable Medical Equipment (DME) was ordered?: n/a (3/25/2025  2:38 PM)  Has all Durable Medical Equipment (DME) been delivered?: -- (n/a)  (3/25/2025  2:38 PM)  DME Interventions: -- (n/a) (3/25/2025  2:38 PM)  Care Management Interventions: Notified PCP/provider (3/25/2025  2:38 PM)  Follow Up Tasks: -- (n/a) (3/25/2025  2:38 PM)    Patient Teaching  Does the patient have access to their discharge instructions?: Yes (3/25/2025  2:38 PM)  Care Management Interventions: Reviewed instructions with patient (3/25/2025  2:38 PM)  What is the patient's perception of their health status since discharge?: Improving (3/25/2025  2:38 PM)  Is the patient/caregiver able to teach back the hierarchy of who to call/visit for symptoms/problems? PCP, Specialist, Home Health nurse, Urgent Care, ED, 911: Yes (3/25/2025  2:38 PM)  Patient/Caregiver Education Comments: Instructed on hospital discharge instructions. Instructed on new and changed medications. Instructed if increased shortness of breath or chest pains to call 911. Provided my contact information and encouraged to call with any questions. (3/25/2025  2:38 PM)

## 2025-03-28 ENCOUNTER — APPOINTMENT (OUTPATIENT)
Dept: HEMATOLOGY/ONCOLOGY | Facility: HOSPITAL | Age: 75
End: 2025-03-28
Payer: MEDICARE

## 2025-03-31 NOTE — PROGRESS NOTES
"Subjective   Patient ID: Trinity Hernandez is a 75 y.o. female who presents for Hospital Follow-up (COPD exacerbation; not feeling any better).    Trinity Hernandez is a 75 y.o. female presenting today for follow-up after being discharged from the hospital 9 days ago. The main problem requiring admission was COPD exac. The discharge summary and/or TransitionalCare Management documentation was reviewed. Medication reconciliation was performed as indicated via the \"[facility] as Reviewed\" timestamp.    Trinity Hernandez was contacted by Transitional Care Management services two days after her discharge. This encounter and supporting documentation was reviewed.     COPD exacerbation: Sx began about 3 weeks ago. Not feeling well. Cough that has been intermittently productive. Shortness of breath hasn't been terrible. +Weakness. No body aches. No fevers or chills. No sinus pressure, ear pressure. Chronic rhinorrhea and sneezing are about at their baseline. She was givewn antibiotics and steroids which she has completed. She has had home health coming out to the house to help with strengthening and checking on her medication. She is using her nebulizer treatment regularly.     Afib with RVR: Completed watchman. Feeling ok overall. She was having urinary frequency and vaginal discomfort and she stopped the jardiance. Feeling ok otherwise. She was started on colchicine but she stopped that as well. Breathing has been ok.      Her  is on dialysis now.  had dialysis port placed last month and he ended up going into cardiac arrest but the resuscitated him and he is still going to dialysis and he ended up getting a head injury with a brain bleed. They are actually starting at home dialysis.Since her last visit he had a head injury and had to get surgery to remove the clots.      Afib: She is on warfarin. She is going for [facility] on January 27th. Working on getting her surgery approved.      Left MCA CVA: s/o TNK and " "mechanical thrombectomy, TNK reversed due to subdural hematoma.      CAD, s/p stent: Following with Dr. Romero. Considering trying repatha.     Shoulder pain: bothering her more. Hasn't tried the meloxicam out much yet.      Small bowel perforation, abdominal hernias, epiploic appendagitis: She has been recommended laparoscopic surgery but she is hoping to avoid surgery. Currently having occasional bad days but not bothering her much lately.      Anxiety: lorazepam prn is helpful. She is having a lot more anxiety lately because of her 's health. He has an aneurysm that is over 5cm and he is in renal failure and he recently had a severe GI bleed. She is taking hydroxyzine but that makes her sleepy so she can't take it during the day.      HTN, aortic aneurysm: Reasonable control on metoprolol, losartan, and lasix. Following with cardioloy. Recently had some discomfort and got a zio patch done. She is awaiting results. She is going to get a stress test soon.     HLD: On repatha.      COPD/sarcoidosis: On Brovana, spiriva, and budesonide. Using oxygen and prednisone prn, she has an albuterol inhaler. She is on 2L nasal cannula. She is seeing Dr. Ford for pulm.      Hypothyroidism: On replacement, no concerns.     GERD: Off pantoprazole, she is doing much better since taking the enzymes.     All other systems have been reviewed and are negative for complaint     Objective   /85 (BP Location: Left arm, Patient Position: Sitting, BP Cuff Size: Adult)   Pulse 91   Ht 1.727 m (5' 8\")   SpO2 90%   PF (!) 2 L/min   BMI 26.00 kg/m²     Gen: No acute distress, alert and oriented x3, pleasant   HEENT: moist mucous membranes, b/l external auditory canals are clear of debris, TMs within normal limits, no oropharyngeal lesions, eomi, perrla   Neck: thyroid within normal limits, no lymphadenopathy   CV: RRR, normal S1/S2, no murmur   Resp: Clear to auscultation bilaterally, transient upper airway sounds " appreciated  Abd: soft, nontender, non-distended, no guarding/rigidity, bowel sounds present  Extr: no edema, no calf tenderness  Derm: Skin is warm and dry, no rashes appreciated  Psych: mood is good, affect is congruent, good hygiene, normal speech and eye contact  Neuro: cranial nerves grossly intact, normal gait    No data recorded        Assessment/Plan   #COPD exacerbation  Sx worse since dicharge  Rx sent prednisone taper  Rx sent suprax    #CVA:  She is on warfarin and plavix  INR monioring through Dr. Farfan's office/[place]  Completed PT     #Afib  On warfarin  Doing well since watchman procedure     #Rheumatoid arthritis  monitor     #COPD exacerbation  #COPD  #Respiratory failure  On oxygen  On azithromycin three times per week  Breathing is improving slowly  Bronchiectasis vest was denied due to her diagnoses     #CAD  S/p stent   On plavix and ezetimibe  Has followup with cardio  Back on repatha     #Small bowel perforation  #Ventral hernia  Following with GI  Currently planning to avoid surgery     #Anxiety:  CSA signed Sept 2024  Ordering UDS  Doing reasonably well with lorazepam daily     #Poor venous access  port in place     #HTN  #Afib  Controlled on furosemide, and metoprolol  seeing cardiology ([place])  She is on warfarin through [place]     #HLD  Stable, not on meds, monitoring     #COPD/Sarcoidosis  Following with pulm, stable, on inhalers, supplemental O2, nebulizers  testing with pulm in April     #Hypothyroidism  Controlled on replacement     HCM:  s/p COVID vaccine  Mammogram Sept

## 2025-04-01 ENCOUNTER — APPOINTMENT (OUTPATIENT)
Dept: PRIMARY CARE | Facility: CLINIC | Age: 75
End: 2025-04-01
Payer: MEDICARE

## 2025-04-01 VITALS
BODY MASS INDEX: 26 KG/M2 | DIASTOLIC BLOOD PRESSURE: 85 MMHG | HEART RATE: 91 BPM | OXYGEN SATURATION: 90 % | SYSTOLIC BLOOD PRESSURE: 138 MMHG | HEIGHT: 68 IN

## 2025-04-01 DIAGNOSIS — C55 MALIGNANT NEOPLASM OF UTERUS, UNSPECIFIED SITE (MULTI): ICD-10-CM

## 2025-04-01 DIAGNOSIS — J44.1 COPD EXACERBATION (MULTI): ICD-10-CM

## 2025-04-01 DIAGNOSIS — S06.5XAA TRAUMATIC SUBDURAL HEMORRHAGE WITH LOSS OF CONSCIOUSNESS STATUS UNKNOWN, INITIAL ENCOUNTER (MULTI): ICD-10-CM

## 2025-04-01 DIAGNOSIS — F41.9 ANXIETY: ICD-10-CM

## 2025-04-01 DIAGNOSIS — I69.351 HEMIPLEGIA AND HEMIPARESIS FOLLOWING CEREBRAL INFARCTION AFFECTING RIGHT DOMINANT SIDE (MULTI): ICD-10-CM

## 2025-04-01 DIAGNOSIS — M06.9 RHEUMATOID ARTHRITIS, INVOLVING UNSPECIFIED SITE, UNSPECIFIED WHETHER RHEUMATOID FACTOR PRESENT (MULTI): Primary | ICD-10-CM

## 2025-04-01 DIAGNOSIS — I71.10 RUPTURED ANEURYSM OF THORACIC AORTA, UNSPECIFIED PART (MULTI): ICD-10-CM

## 2025-04-01 DIAGNOSIS — F29 UNSPECIFIED PSYCHOSIS NOT DUE TO A SUBSTANCE OR KNOWN PHYSIOLOGICAL CONDITION (MULTI): ICD-10-CM

## 2025-04-01 DIAGNOSIS — I50.33 ACUTE ON CHRONIC DIASTOLIC (CONGESTIVE) HEART FAILURE: ICD-10-CM

## 2025-04-01 PROCEDURE — 3075F SYST BP GE 130 - 139MM HG: CPT | Performed by: FAMILY MEDICINE

## 2025-04-01 PROCEDURE — 99495 TRANSJ CARE MGMT MOD F2F 14D: CPT | Performed by: FAMILY MEDICINE

## 2025-04-01 PROCEDURE — 3079F DIAST BP 80-89 MM HG: CPT | Performed by: FAMILY MEDICINE

## 2025-04-01 PROCEDURE — 1159F MED LIST DOCD IN RCRD: CPT | Performed by: FAMILY MEDICINE

## 2025-04-01 PROCEDURE — 1036F TOBACCO NON-USER: CPT | Performed by: FAMILY MEDICINE

## 2025-04-01 RX ORDER — CEFIXIME 400 MG/1
400 CAPSULE ORAL DAILY
Qty: 10 CAPSULE | Refills: 0 | Status: SHIPPED | OUTPATIENT
Start: 2025-04-01 | End: 2025-04-11

## 2025-04-01 RX ORDER — PREDNISONE 20 MG/1
TABLET ORAL
Qty: 12 TABLET | Refills: 0 | Status: SHIPPED | OUTPATIENT
Start: 2025-04-01

## 2025-04-01 RX ORDER — LORAZEPAM 0.5 MG/1
0.5 TABLET ORAL 2 TIMES DAILY PRN
Qty: 30 TABLET | Refills: 0 | Status: SHIPPED | OUTPATIENT
Start: 2025-04-01 | End: 2025-05-01

## 2025-04-07 ENCOUNTER — PATIENT OUTREACH (OUTPATIENT)
Dept: PRIMARY CARE | Facility: CLINIC | Age: 75
End: 2025-04-07
Payer: MEDICARE

## 2025-04-07 NOTE — PROGRESS NOTES
Call regarding appt. with PCP on 4/1/25 after hospitalization.  At time of outreach call the patient feels as if their condition has improved some. She states she is still congested and coughing up thick white phlegm . She reports using all inhalers as ordered. She is taking antibiotic that PCP gave her. She continues to have home care coming in.   Reviewed the PCP appointment with the pt and addressed any questions or concerns.

## 2025-04-17 NOTE — PROGRESS NOTES
"Subjective   Patient ID: Trinity Hernandez is a 75 y.o. female who presents for Follow-up (2 months, doesn't feel much better, \"can't shake this crap\").    Pneumonia: No improvement with recent round of abx. Still short of breath, weak, congested, with sputum production. Winded. No fever, vomiting, or diarrhea. She is not taking her prednisone as prescribed, she is taking one a day.       Afib with RVR: Completed watchman. Feeling ok overall. She was having urinary frequency and vaginal discomfort and she stopped the jardiance. Feeling ok otherwise. She was started on colchicine but she stopped that as well. Breathing has been ok.      Her  is on dialysis now.  had dialysis port placed last month and he ended up going into cardiac arrest but the resuscitated him and he is still going to dialysis and he ended up getting a head injury with a brain bleed. They are actually starting at home dialysis.Since her last visit he had a head injury and had to get surgery to remove the clots.      Afib: She is on warfarin. She is going for [facility] on January 27th. Working on getting her surgery approved.      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      CAD, s/p stent: Following with Dr. Romero. Considering trying repatha.     Shoulder pain: bothering her more. Hasn't tried the meloxicam out much yet.      Small bowel perforation, abdominal hernias, epiploic appendagitis: She has been recommended laparoscopic surgery but she is hoping to avoid surgery. Currently having occasional bad days but not bothering her much lately.      Anxiety: lorazepam prn is helpful. She is having a lot more anxiety lately because of her 's health. He has an aneurysm that is over 5cm and he is in renal failure and he recently had a severe GI bleed. She is taking hydroxyzine but that makes her sleepy so she can't take it during the day.      HTN, aortic aneurysm: Reasonable " "control on metoprolol, losartan, and lasix. Following with cardioloy. Recently had some discomfort and got a zio patch done. She is awaiting results. She is going to get a stress test soon.     HLD: On repatha.      COPD/sarcoidosis: On Brovana, spiriva, and budesonide. Using oxygen and prednisone prn, she has an albuterol inhaler. She is on 2L nasal cannula. She is seeing Dr. Ford for pulm.      Hypothyroidism: On replacement, no concerns.     GERD: Off pantoprazole, she is doing much better since taking the enzymes.     All other systems have been reviewed and are negative for complaint     Objective   /77 (BP Location: Left arm, Patient Position: Sitting, BP Cuff Size: Adult)   Pulse 85   Ht 1.727 m (5' 8\")   Wt 76.7 kg (169 lb)   BMI 25.70 kg/m²     Gen: No acute distress, alert and oriented x3, pleasant   HEENT: moist mucous membranes, b/l external auditory canals are clear of debris, TMs within normal limits, no oropharyngeal lesions, eomi, perrla   Neck: thyroid within normal limits, no lymphadenopathy   CV: RRR, normal S1/S2, no murmur   Resp: Clear to auscultation bilaterally, decreased breath sounds B/L, transient upper airway sounds appreciated B/L, wheezing appreciated B/L  Abd: soft, nontender, non-distended, no guarding/rigidity, bowel sounds present  Extr: no edema, no calf tenderness  Derm: Skin is warm and dry, no rashes appreciated  Psych: mood is good, affect is congruent, good hygiene, normal speech and eye contact  Neuro: cranial nerves grossly intact, normal gait    No data recorded        Assessment/Plan   #Pneumonia  No improvement with prednisone taper and suprax  Rx sent levaquin     #CVA:  She is on warfarin and plavix  INR monioring through Dr. Farfan's office/[place]  Completed PT     #Afib  On warfarin  Doing well since watchman procedure     #Rheumatoid arthritis  monitor     #COPD exacerbation  #COPD  #Respiratory failure  On oxygen  On azithromycin three times per " week  Breathing is improving slowly  Bronchiectasis vest was denied due to her diagnoses     #CAD  S/p stent   On plavix and ezetimibe  Has followup with cardio  Back on repatha     #Small bowel perforation  #Ventral hernia  Following with GI  Currently planning to avoid surgery     #Anxiety:  CSA signed Sept 2024  Ordering UDS  Doing reasonably well with lorazepam daily     #Poor venous access  port in place     #HTN  #Afib  Controlled on furosemide, and metoprolol  seeing cardiology ([place])  She is on warfarin through [place]     #HLD  Stable, not on meds, monitoring     #COPD/Sarcoidosis  Following with pulm, stable, on inhalers, supplemental O2, nebulizers  testing with pulm in April     #Hypothyroidism  Controlled on replacement     HCM:  s/p COVID vaccine  Mammogram Sept

## 2025-04-22 ENCOUNTER — INFUSION (OUTPATIENT)
Facility: HOSPITAL | Age: 75
End: 2025-04-22
Payer: MEDICARE

## 2025-04-22 ENCOUNTER — APPOINTMENT (OUTPATIENT)
Dept: PRIMARY CARE | Facility: CLINIC | Age: 75
End: 2025-04-22
Payer: MEDICARE

## 2025-04-22 VITALS
TEMPERATURE: 96.8 F | OXYGEN SATURATION: 95 % | HEART RATE: 80 BPM | DIASTOLIC BLOOD PRESSURE: 71 MMHG | SYSTOLIC BLOOD PRESSURE: 111 MMHG | RESPIRATION RATE: 18 BRPM

## 2025-04-22 VITALS
BODY MASS INDEX: 25.61 KG/M2 | WEIGHT: 169 LBS | HEIGHT: 68 IN | DIASTOLIC BLOOD PRESSURE: 77 MMHG | HEART RATE: 85 BPM | SYSTOLIC BLOOD PRESSURE: 136 MMHG

## 2025-04-22 DIAGNOSIS — J18.9 PNEUMONIA DUE TO INFECTIOUS ORGANISM, UNSPECIFIED LATERALITY, UNSPECIFIED PART OF LUNG: Primary | ICD-10-CM

## 2025-04-22 DIAGNOSIS — I87.8 POOR VENOUS ACCESS: ICD-10-CM

## 2025-04-22 PROCEDURE — 3075F SYST BP GE 130 - 139MM HG: CPT | Performed by: FAMILY MEDICINE

## 2025-04-22 PROCEDURE — 1159F MED LIST DOCD IN RCRD: CPT | Performed by: FAMILY MEDICINE

## 2025-04-22 PROCEDURE — 3078F DIAST BP <80 MM HG: CPT | Performed by: FAMILY MEDICINE

## 2025-04-22 PROCEDURE — 99214 OFFICE O/P EST MOD 30 MIN: CPT | Performed by: FAMILY MEDICINE

## 2025-04-22 PROCEDURE — 1036F TOBACCO NON-USER: CPT | Performed by: FAMILY MEDICINE

## 2025-04-22 PROCEDURE — 2500000004 HC RX 250 GENERAL PHARMACY W/ HCPCS (ALT 636 FOR OP/ED): Mod: JZ | Performed by: FAMILY MEDICINE

## 2025-04-22 PROCEDURE — 96523 IRRIG DRUG DELIVERY DEVICE: CPT

## 2025-04-22 RX ORDER — HEPARIN SODIUM,PORCINE/PF 10 UNIT/ML
50 SYRINGE (ML) INTRAVENOUS AS NEEDED
OUTPATIENT
Start: 2025-04-22

## 2025-04-22 RX ORDER — HEPARIN 100 UNIT/ML
500 SYRINGE INTRAVENOUS AS NEEDED
OUTPATIENT
Start: 2025-04-22

## 2025-04-22 RX ORDER — LEVOFLOXACIN 500 MG/1
500 TABLET, FILM COATED ORAL DAILY
Qty: 10 TABLET | Refills: 0 | Status: SHIPPED | OUTPATIENT
Start: 2025-04-22 | End: 2025-05-02

## 2025-04-22 RX ORDER — SODIUM CHLORIDE 9 MG/ML
10 INJECTION, SOLUTION INTRAMUSCULAR; INTRAVENOUS; SUBCUTANEOUS EVERY 8 HOURS SCHEDULED
Qty: 900 ML | Refills: 0 | Status: SHIPPED | OUTPATIENT
Start: 2025-04-22 | End: 2025-04-22

## 2025-04-22 RX ORDER — SODIUM CHLORIDE 9 MG/ML
10 INJECTION, SOLUTION INTRAMUSCULAR; INTRAVENOUS; SUBCUTANEOUS EVERY 8 HOURS SCHEDULED
Qty: 900 ML | Refills: 0 | Status: SHIPPED | OUTPATIENT
Start: 2025-04-22 | End: 2025-05-22

## 2025-04-22 RX ORDER — HEPARIN 100 UNIT/ML
500 SYRINGE INTRAVENOUS AS NEEDED
Status: DISCONTINUED | OUTPATIENT
Start: 2025-04-22 | End: 2025-04-22 | Stop reason: HOSPADM

## 2025-04-22 RX ADMIN — HEPARIN 500 UNITS: 100 SYRINGE at 11:44

## 2025-04-22 ASSESSMENT — PAIN SCALES - GENERAL: PAINLEVEL_OUTOF10: 4

## 2025-04-22 NOTE — PROGRESS NOTES
[facility]   Infusion Clinic Note   Date: 2025   Name: Trinity Hernandez  : 1950   MRN: [id number]         Reason for Visit: Vascular Access Problem         Today: Trinity Hernandez had no medications administered during this visit.       Ordered By: No ref. provider found       For a Diagnosis of: Poor venous access       At today's visit patient accompanied by: Self      Today's Vitals:   Vitals:    25 1141   BP: 111/71   Pulse: 80   Resp: 18   Temp: 36 °C (96.8 °F)   TempSrc: Temporal   SpO2: 95%   PainSc:   4   PainLoc: Generalized             Pre - Treatment Checklist:      - Previous reaction to current treatment: n/a      (Assess patient for the concerns below. Document provider notification as appropriate).  - Active or recent infection with/without current antibiotic use: n/a  - Recent or planned invasive dental work: n/a  - Recent or planned surgeries: n/a  - Recently received or plans to receive vaccinations: n/a  - Has treatment related toxicities: n/a  - Any chance may be pregnant:  n/a      Pain: 4   - Is the pain different from normal: n/a   - Is prescribing Doctor aware:  n/a      Labs: N/A      Fall Risk Screening:         Review Of Systems:  Review of Systems - Oncology      Infusion Readiness:  - Assessment Concerns Related to Infusion: No  - Provider notified: n/a      New Patient Education:    N/A (returning patient for continuation of therapy. Ongoing education provided as needed.)        Treatment Conditions & Drug Specific Questions:    NOT APPLICABLE        Weight Based Drug Calculations:    WEIGHT BASED DRUGS: NOT APPLICABLE / FLAT DOSE       Post Treatment: Patient tolerated treatment without issue and was discharged in no apparent distress.      Note Authored / Patient Cared for By: Kaylan Caballero RN

## 2025-04-30 ENCOUNTER — TELEPHONE (OUTPATIENT)
Dept: PRIMARY CARE | Facility: CLINIC | Age: 75
End: 2025-04-30
Payer: MEDICARE

## 2025-04-30 NOTE — TELEPHONE ENCOUNTER
Home health nurse called in to let you know she had a change in her vitals with change in position    Laying down 120/60  Sitting up 100/64    No other symptoms. Was advised to make sure to drink lots of water and take it slow. Nurse was just calling to let you know.

## 2025-05-01 ENCOUNTER — PATIENT OUTREACH (OUTPATIENT)
Dept: PRIMARY CARE | Facility: CLINIC | Age: 75
End: 2025-05-01
Payer: MEDICARE

## 2025-05-01 RX ORDER — AZITHROMYCIN 250 MG/1
1 TABLET, FILM COATED ORAL EVERY OTHER DAY
COMMUNITY
Start: 2025-02-27

## 2025-05-01 RX ORDER — BENZONATATE 100 MG/1
100 CAPSULE ORAL EVERY 8 HOURS PRN
COMMUNITY
Start: 2025-04-29 | End: 2025-05-29

## 2025-05-01 RX ORDER — GUAIFENESIN AND DEXTROMETHORPHAN HYDROBROMIDE 10; 100 MG/5ML; MG/5ML
10 SYRUP ORAL EVERY 6 HOURS PRN
COMMUNITY
Start: 2025-04-29 | End: 2025-05-06

## 2025-05-01 NOTE — PROGRESS NOTES
Discharge Facility: [facility]   Discharge Diagnosis: COPD exacerbation   Admission Date: 4/26/2025   Discharge Date: 4/29/2025     PCP Appointment Date: 5/5/2025  Specialist Appointment Date: TBD  Hospital Encounter and Summary Linked: Yes  Hospital Encounter    See discharge assessment below for further details  Wrap Up  Wrap Up Additional Comments: Successful outreach to the patient has been completed. The patient reports feeling well at home since discharge and denies experiencing any chest pain, shortness of breath. States she does still have a cough and is using her home oxygen at the baseline amount. We reviewed their new medications and any changes made. The patient confirmed that they have all the necessary supplies and resources at home, and they also have support from family and friends if needed. I emphasized the importance of follow-up appointments with both their primary care physician and specialists to assess treatment response. The patient is aware of my availability for non-emergency concerns and has been provided with my contact information. (5/1/2025  9:30 AM)    Engagement  Call Start Time: 0923 (5/1/2025  9:30 AM)    Medications  Medications reviewed with patient/caregiver?: Yes (new meds/changes discussed with patient) (5/1/2025  9:30 AM)  Is the patient having any side effects they believe may be caused by any medication additions or changes?: No (5/1/2025  9:30 AM)  Does the patient have all medications ordered at discharge?: Yes (5/1/2025  9:30 AM)  Care Management Interventions: No intervention needed (5/1/2025  9:30 AM)  Prescription Comments: see med list (benzonatate; robitussin; prednisone; azithromycin) (5/1/2025  9:30 AM)  Is the patient taking all medications as directed (includes completed medication regime)?: Yes (5/1/2025  9:30 AM)  Care Management Interventions: Provided patient education (5/1/2025  9:30 AM)    Appointments  Does the patient have a  primary care provider?: Yes (5/1/2025  9:30 AM)  Care Management Interventions: Verified appointment date/time/provider (5/1/2025  9:30 AM)  Has the patient kept scheduled appointments due by today?: Yes (5/1/2025  9:30 AM)  Care Management Interventions: Advised patient to keep appointment (5/1/2025  9:30 AM)    Self Management  What is the home health agency?: [facility] (5/1/2025  9:30 AM)  What Durable Medical Equipment (DME) was ordered?: Home oxygen 2.5LNC (5/1/2025  9:30 AM)  Has all Durable Medical Equipment (DME) been delivered?: Yes (5/1/2025  9:30 AM)    Patient Teaching  Does the patient have access to their discharge instructions?: Yes (5/1/2025  9:30 AM)  Care Management Interventions: Reviewed instructions with patient (5/1/2025  9:30 AM)  What is the patient's perception of their health status since discharge?: Improving (5/1/2025  9:30 AM)  Is the patient/caregiver able to teach back the hierarchy of who to call/visit for symptoms/problems? PCP, Specialist, Home Health nurse, Urgent Care, ED, 911: Yes (5/1/2025  9:30 AM)

## 2025-05-05 ENCOUNTER — APPOINTMENT (OUTPATIENT)
Dept: PRIMARY CARE | Facility: CLINIC | Age: 75
End: 2025-05-05
Payer: MEDICARE

## 2025-05-05 VITALS
OXYGEN SATURATION: 97 % | WEIGHT: 172 LBS | HEIGHT: 68 IN | SYSTOLIC BLOOD PRESSURE: 133 MMHG | DIASTOLIC BLOOD PRESSURE: 71 MMHG | BODY MASS INDEX: 26.07 KG/M2 | HEART RATE: 86 BPM

## 2025-05-05 DIAGNOSIS — F51.01 PRIMARY INSOMNIA: ICD-10-CM

## 2025-05-05 DIAGNOSIS — J44.9 COPD, SEVERE (MULTI): Primary | ICD-10-CM

## 2025-05-05 PROCEDURE — 3075F SYST BP GE 130 - 139MM HG: CPT | Performed by: FAMILY MEDICINE

## 2025-05-05 PROCEDURE — 1036F TOBACCO NON-USER: CPT | Performed by: FAMILY MEDICINE

## 2025-05-05 PROCEDURE — 1159F MED LIST DOCD IN RCRD: CPT | Performed by: FAMILY MEDICINE

## 2025-05-05 PROCEDURE — 3078F DIAST BP <80 MM HG: CPT | Performed by: FAMILY MEDICINE

## 2025-05-05 PROCEDURE — 99496 TRANSJ CARE MGMT HIGH F2F 7D: CPT | Performed by: FAMILY MEDICINE

## 2025-05-05 RX ORDER — PREDNISONE 20 MG/1
60 TABLET ORAL DAILY
Qty: 9 TABLET | Refills: 0 | Status: SHIPPED | OUTPATIENT
Start: 2025-05-05 | End: 2025-05-08

## 2025-05-05 RX ORDER — DOXYCYCLINE 100 MG/1
100 CAPSULE ORAL 2 TIMES DAILY
Qty: 20 CAPSULE | Refills: 0 | Status: SHIPPED | OUTPATIENT
Start: 2025-05-05 | End: 2025-05-15

## 2025-05-05 RX ORDER — TRAZODONE HYDROCHLORIDE 100 MG/1
100 TABLET ORAL NIGHTLY PRN
Qty: 30 TABLET | Refills: 0 | Status: SHIPPED | OUTPATIENT
Start: 2025-05-05 | End: 2026-05-05

## 2025-05-05 NOTE — PROGRESS NOTES
"Subjective   Patient ID: Trinity Hernandez is a 75 y.o. female who presents for Hospital Follow-up (COPD exacerbation).    Trinity Hernandez is a 75 y.o. female presenting today for follow-up after being discharged from the hospital 7 days ago. The main problem requiring admission was COPD exacerbation. The discharge summary and/or TransitionalCare Management documentation was reviewed. Medication reconciliation was performed as indicated via the \"[facility] as Reviewed\" timestamp.    Trinity Hernandez was contacted by Transitional Care Management services two days after her discharge. This encounter and supporting documentation was reviewed.     Pneumonia: She didn't get any better since leaving her last visit and she went to the hospital. She was admitted for a few days at [facility] She got frequent breathing treatments, antibiotics (levaquin), and IV steroids. Breathing is a bit better. Not sleeping well. She has a couple days of steroids/antibiotics left. She is using the breathing treatments at the house.      is in the hospital for fluid around his heart, bleeding, anemia. Heart is not beating normally.      Afib with RVR: Completed watchman. Feeling ok overall. She was having urinary frequency and vaginal discomfort and she stopped the jardiance. Feeling ok otherwise. She was started on colchicine but she stopped that as well. Breathing has been ok.      Her  is on dialysis now.  had dialysis port placed last month and he ended up going into cardiac arrest but the resuscitated him and he is still going to dialysis and he ended up getting a head injury with a brain bleed. They are actually starting at home dialysis.Since her last visit he had a head injury and had to get surgery to remove the clots.      Afib: She is on warfarin. She is going for [facility] on January 27th. Working on getting her surgery approved.      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      CAD, s/p stent: Following with Dr. Romero. Considering trying repatha.     Shoulder pain: bothering her more. Hasn't tried the meloxicam out much yet.      Small bowel perforation, abdominal hernias, epiploic appendagitis: She has been recommended laparoscopic surgery but she is hoping to avoid surgery. Currently having occasional bad days but not bothering her much lately.      Anxiety: lorazepam prn is helpful. She is having a lot more anxiety lately because of her 's health. He has an aneurysm that is over 5cm and he is in renal failure and he recently had a severe GI bleed. She is taking hydroxyzine but that makes her sleepy so she can't take it during the day.      HTN, aortic aneurysm: Reasonable control on metoprolol, losartan, and lasix. Following with cardioloy. Recently had some discomfort and got a zio patch done. She is awaiting results. She is going to get a stress test soon.     HLD: On repatha.      COPD/sarcoidosis: On Brovana, spiriva, and budesonide. Using oxygen and prednisone prn, she has an albuterol inhaler. She is on 2L nasal cannula. She is seeing Dr. Ford for pulm.      Hypothyroidism: On replacement, no concerns.     GERD: Off pantoprazole, she is doing much better since taking the enzymes.     All other systems have been reviewed and are negative for complaint        Objective   There were no vitals taken for this visit.    Gen: No acute distress, alert and oriented x3, pleasant   HEENT: moist mucous membranes, b/l external auditory canals are clear of debris, TMs within normal limits, no oropharyngeal lesions, eomi, perrla   Neck: thyroid within normal limits, no lymphadenopathy   CV: RRR, normal S1/S2, no murmur   Resp: Clear to auscultation bilaterally, diffuse wheezes appreciated bilaterally  Abd: soft, nontender, non-distended, no guarding/rigidity, bowel sounds present  Extr: no edema, no calf tenderness  Derm: Skin is warm and dry, no rashes  appreciated  Psych: mood is good, affect is congruent, good hygiene, normal speech and eye contact  Neuro: cranial nerves grossly intact, normal gait    No data recorded        Assessment/Plan   #Pneumonia  No improvement with prednisone taper and suprax  Rx sent doxycycline with prednisone burst     #CVA:  She is on warfarin and plavix  INR monioring through Dr. Farfan's office/[place]  Completed PT     #Afib  On warfarin  Doing well since watchman procedure     #Rheumatoid arthritis  monitor     #COPD exacerbation  #COPD  #Respiratory failure  On oxygen  On azithromycin three times per week  Breathing is improving slowly  Bronchiectasis vest was denied due to her diagnoses     #CAD  S/p stent   On plavix and ezetimibe  Has followup with cardio  Back on repatha     #Small bowel perforation  #Ventral hernia  Following with GI  Currently planning to avoid surgery     #Anxiety:  CSA signed Sept 2024  Ordering UDS  Doing reasonably well with lorazepam daily     #Poor venous access  port in place     #HTN  #Afib  Controlled on furosemide, and metoprolol  seeing cardiology ([place])  She is on warfarin through [place]     #HLD  Stable, not on meds, monitoring     #COPD/Sarcoidosis  Following with pulm, stable, on inhalers, supplemental O2, nebulizers  testing with pulm in April     #Hypothyroidism  Controlled on replacement     HCM:  s/p COVID vaccine  Mammogram Sept

## 2025-05-08 ENCOUNTER — PATIENT OUTREACH (OUTPATIENT)
Dept: PRIMARY CARE | Facility: CLINIC | Age: 75
End: 2025-05-08
Payer: MEDICARE

## 2025-05-08 NOTE — PROGRESS NOTES
Confirmation of at least 2 patient identifiers.    Completed telephonic follow-up with patient after recent visit with Dr. Samaniego    Spoke to patient during outreach call.    Patient reports feeling: Improved    Patient has questions or concerns about medications: No    Have all prescribed medications been filled? Yes    Patient has necessary resources to manage their care? Yes    Patient has questions or concerns? No  Verbalized understanding of upcoming appt with PCP on 5/9.   Next care management follow-up approximately within one month.  Care  information provided to patient.

## 2025-05-09 ENCOUNTER — TELEMEDICINE (OUTPATIENT)
Dept: PRIMARY CARE | Facility: CLINIC | Age: 75
End: 2025-05-09
Payer: MEDICARE

## 2025-05-09 DIAGNOSIS — K57.92 DIVERTICULITIS: Primary | ICD-10-CM

## 2025-05-09 PROCEDURE — 99212 OFFICE O/P EST SF 10 MIN: CPT | Performed by: FAMILY MEDICINE

## 2025-05-09 RX ORDER — CIPROFLOXACIN 500 MG/1
500 TABLET ORAL 2 TIMES DAILY
Qty: 14 TABLET | Refills: 0 | Status: SHIPPED | OUTPATIENT
Start: 2025-05-09 | End: 2025-05-16

## 2025-05-09 RX ORDER — METRONIDAZOLE 500 MG/1
500 TABLET ORAL 2 TIMES DAILY
Qty: 14 TABLET | Refills: 0 | Status: SHIPPED | OUTPATIENT
Start: 2025-05-09 | End: 2025-05-16

## 2025-05-09 NOTE — PROGRESS NOTES
Subjective   Patient ID: Trinity Hernandez is a 75 y.o. female who presents for Audio Telehealth Appointment.    An audio telecommunication system, which permits real time communications between the patient and provider, was utilized to provide this telehealth service.   Verbal consent was requested and obtained from Trinity Hernandez on this date, 5/9/2025, for a telehealth visit and the patient's location was confirmed at the time of the visit.     While technically available, the patient was unable or unwilling to consent to connect via audio/video telehealth technology; therefore, I performed this visit using a real-time audio only connection     She started on doxycycline but her mucous is thicker now. She is now dealing with left lower quadrant pain. She is frustrated that she is not getting any better with multiple rounds of antibiotics. No fevers or chills. She is worried about her mucous turning brown. Shortness of breath is about the same. Sometimes she can go a couple of hours without antibiotics.      Afib with RVR: Completed watchman. Feeling ok overall. She was having urinary frequency and vaginal discomfort and she stopped the jardiance. Feeling ok otherwise. She was started on colchicine but she stopped that as well. Breathing has been ok.      Her  is on dialysis now.  had dialysis port placed last month and he ended up going into cardiac arrest but the resuscitated him and he is still going to dialysis and he ended up getting a head injury with a brain bleed. They are actually starting at home dialysis.Since her last visit he had a head injury and had to get surgery to remove the clots.      Afib: She is on warfarin. She is going for Watchman on January 27th. Working on getting her surgery approved.      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      CAD, s/p stent: Following with Dr. Romero. Considering trying repatha.     Shoulder  pain: bothering her more. Hasn't tried the meloxicam out much yet.      Small bowel perforation, abdominal hernias, epiploic appendagitis: She has been recommended laparoscopic surgery but she is hoping to avoid surgery. Currently having occasional bad days but not bothering her much lately.      Anxiety: lorazepam prn is helpful. She is having a lot more anxiety lately because of her 's health. He has an aneurysm that is over 5cm and he is in renal failure and he recently had a severe GI bleed. She is taking hydroxyzine but that makes her sleepy so she can't take it during the day.      HTN, aortic aneurysm: Reasonable control on metoprolol, losartan, and lasix. Following with cardioloy. Recently had some discomfort and got a zio patch done. She is awaiting results. She is going to get a stress test soon.     HLD: On repatha.      COPD/sarcoidosis: On Brovana, spiriva, and budesonide. Using oxygen and prednisone prn, she has an albuterol inhaler. She is on 2L nasal cannula. She is seeing Dr. Ford for pulm.      Hypothyroidism: On replacement, no concerns.     GERD: Off pantoprazole, she is doing much better since taking the enzymes.     All other systems have been reviewed and are negative for complaint      Assessment/Plan     #Pneumonia  No improvement with prednisone taper and suprax  Rx sent doxycycline with prednisone burst    #LLQ pain  Likely re-occurrence of diverticulitis  Rx sent cipro and flagyl     #CVA:  She is on warfarin and plavix  INR monioring through Dr. Farfan's office/[place]  Completed PT     #Afib  On warfarin  Doing well since watchman procedure     #Rheumatoid arthritis  monitor     #COPD exacerbation  #COPD  #Respiratory failure  On oxygen  On azithromycin three times per week  Breathing is improving slowly  Bronchiectasis vest was denied due to her diagnoses     #CAD  S/p stent   On plavix and ezetimibe  Has followup with cardio  Back on repatha     #Small bowel  perforation  #Ventral hernia  Following with GI  Currently planning to avoid surgery     #Anxiety:  CSA signed Sept 2024  Ordering UDS  Doing reasonably well with lorazepam daily     #Poor venous access  port in place     #HTN  #Afib  Controlled on furosemide, and metoprolol  seeing cardiology (Nick)  She is on warfarin through [place]     #HLD  Stable, not on meds, monitoring     #COPD/Sarcoidosis  Following with pulm, stable, on inhalers, supplemental O2, nebulizers  testing with pulm in April     #Hypothyroidism  Controlled on replacement     HCM:  s/p COVID vaccine  Mammogram Sept      I spent 12 minutes in the professional and overall care of this patient.    I have communicated my name and active licensure. Telephone visit completed with realtime audio connection. Informed consent was obtained from the patient. Patient was made aware that my evaluation and diagnosis are limited due to the fact that we are not in the same room during the interview and that this is a virtual encounter that took place via telephone call. Patient verbalized understanding.          22-Feb-2021 12:15

## 2025-05-13 ENCOUNTER — TELEPHONE (OUTPATIENT)
Dept: PRIMARY CARE | Facility: CLINIC | Age: 75
End: 2025-05-13
Payer: MEDICARE

## 2025-05-13 NOTE — PROGRESS NOTES
"Subjective   Patient ID: Trinity Hernandez is a 75 y.o. female who presents for No chief complaint on file..    HPI    Trinity Hernandez is a 75 y.o. female presenting today for follow-up after being discharged from the hospital {Numbers; 1-14:12874} days ago. The main problem requiring admission was ***. The discharge summary and/or TransitionalCare Management documentation was reviewed. Medication reconciliation was performed as indicated via the \"[facility] as Reviewed\" timestamp.    Trinity Hernandez was contacted by Transitional Care Management services two days after her discharge. This encounter and supporting documentation was reviewed.     Pneumonia: She didn't get any better since leaving her last visit and she went to the hospital. She was admitted for a few days at [facility]. She got frequent breathing treatments, antibiotics (levaquin), and IV steroids. Breathing is a bit better. Not sleeping well. She has a couple days of steroids/antibiotics left. She is using the breathing treatments at the house.       is in the hospital for fluid around his heart, bleeding, anemia. Heart is not beating normally.      Afib with RVR: Completed watchman. Feeling ok overall. She was having urinary frequency and vaginal discomfort and she stopped the jardiance. Feeling ok otherwise. She was started on colchicine but she stopped that as well. Breathing has been ok.      Her  is on dialysis now.  had dialysis port placed last month and he ended up going into cardiac arrest but the resuscitated him and he is still going to dialysis and he ended up getting a head injury with a brain bleed. They are actually starting at home dialysis.Since her last visit he had a head injury and had to get surgery to remove the clots.      Afib: She is on warfarin. She is going for Watchman on January 27th. Working on getting her surgery approved.      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      CAD, s/p stent: Following with Dr. Romero. Considering trying repatha.     Shoulder pain: bothering her more. Hasn't tried the meloxicam out much yet.      Small bowel perforation, abdominal hernias, epiploic appendagitis: She has been recommended laparoscopic surgery but she is hoping to avoid surgery. Currently having occasional bad days but not bothering her much lately.      Anxiety: lorazepam prn is helpful. She is having a lot more anxiety lately because of her 's health. He has an aneurysm that is over 5cm and he is in renal failure and he recently had a severe GI bleed. She is taking hydroxyzine but that makes her sleepy so she can't take it during the day.      HTN, aortic aneurysm: Reasonable control on metoprolol, losartan, and lasix. Following with cardioloy. Recently had some discomfort and got a zio patch done. She is awaiting results. She is going to get a stress test soon.     HLD: On repatha.      COPD/sarcoidosis: On Brovana, spiriva, and budesonide. Using oxygen and prednisone prn, she has an albuterol inhaler. She is on 2L nasal cannula. She is seeing Dr. Ford for pulm.      Hypothyroidism: On replacement, no concerns.     GERD: Off pantoprazole, she is doing much better since taking the enzymes.    Review of systems completed and unremarkable other than what is documented in HPI.    Objective   There were no vitals taken for this visit.    No data recorded    Physical Exam    Gen: No acute distress, alert and oriented x3, pleasant   HEENT: moist mucous membranes, b/l external auditory canals are clear of debris, TMs within normal limits, no oropharyngeal lesions, eomi, perrla   Neck: thyroid within normal limits, no lymphadenopathy   CV: RRR, normal S1/S2, no murmur   Resp: Clear to auscultation bilaterally, no wheezes or rhonchi appreciated  Abd: soft, nontender, non-distended, no guarding/rigidity, bowel sounds present  Extr: no edema, no calf tenderness  Derm: Skin  is warm and dry, no rashes appreciated  Psych: mood is good, affect is congruent, good hygiene, normal speech and eye contact  Neuro: cranial nerves grossly intact, normal gait      Assessment/Plan   {Assess/PlanSmarinks:12202}    #Pneumonia  No improvement with prednisone taper and suprax  Rx sent doxycycline with prednisone burst     #CVA:  She is on warfarin and plavix  INR monioring through Dr. Farfan's office/[place]  Completed PT     #Afib  On warfarin  Doing well since watchman procedure     #Rheumatoid arthritis  monitor     #COPD exacerbation  #COPD  #Respiratory failure  On oxygen  On azithromycin three times per week  Breathing is improving slowly  Bronchiectasis vest was denied due to her diagnoses     #CAD  S/p stent   On plavix and ezetimibe  Has followup with cardio  Back on repatha     #Small bowel perforation  #Ventral hernia  Following with GI  Currently planning to avoid surgery     #Anxiety:  CSA signed Sept 2024  Ordering UDS  Doing reasonably well with lorazepam daily     #Poor venous access  port in place     #HTN  #Afib  Controlled on furosemide, and metoprolol  seeing cardiology ([place])  She is on warfarin through [place]     #HLD  Stable, not on meds, monitoring     #COPD/Sarcoidosis  Following with pulm, stable, on inhalers, supplemental O2, nebulizers  testing with pulm in April     #Hypothyroidism  Controlled on replacement     HCM:  s/p COVID vaccine  Mammogram 10/2024

## 2025-05-13 NOTE — TELEPHONE ENCOUNTER
Trinity's daughter called and stated that Trinity is currently in the hospital at [facility]. They are wanting to discharge her today but she feels this is the wrong move. She stated that Trinity is unable to move without screaming out in pain. Some tests were ran between today and yesterday and an EKG were done earlier today.  Please advise.

## 2025-05-13 NOTE — TELEPHONE ENCOUNTER
Unfortunately there is nothing I can do, she needs to discuss her concerns with the physicians/ALDAIR at [facility] and they can figure out the best treatment plan for her. I cannot call [facility] and dictate how they care for patients.

## 2025-05-14 ENCOUNTER — PATIENT OUTREACH (OUTPATIENT)
Dept: PRIMARY CARE | Facility: CLINIC | Age: 75
End: 2025-05-14
Payer: MEDICARE

## 2025-05-14 RX ORDER — TRAMADOL HYDROCHLORIDE 50 MG/1
50 TABLET ORAL 2 TIMES DAILY PRN
COMMUNITY
Start: 2025-05-13 | End: 2025-05-20

## 2025-05-14 RX ORDER — GUAIFENESIN/DEXTROMETHORPHAN 100-10MG/5
5 SYRUP ORAL EVERY 4 HOURS PRN
COMMUNITY

## 2025-05-14 NOTE — PROGRESS NOTES
Discharge Facility: [facility]  Discharge Diagnosis: Bacterial pneumonia   Admission Date: 5/11/25  Discharge Date: 5/13/25    PCP Appointment Date: 5/19/25  Specialist Appointment Date: Pt. To schedule with Pulmonology.    6/3/25- [facility]   Hospital Encounter and Summary Linked:   Hospital Encounter with Sven Ng MD; Ludwig Holder MD; Bertin Eli MD   See discharge assessment below for further details    Wrap Up  Wrap Up Additional Comments: Pt. Reports to feeling better today. She is using PRN tramadol for pain with effect. She denies SOB, N/v, chills, fever. [facility] services to resume for the pt. She is home alone during this transition, but her daughter is involved with her care. Denies further questions/concerns at this time. This callers contact information for non-emergent questions/concerns. (5/14/2025 11:35 AM)    Engagement  Call Start Time: -- (Call completed with Trinity) (5/14/2025 11:35 AM)    Medications  Medications reviewed with patient/caregiver?: Yes (5/14/2025 11:35 AM)  Is the patient having any side effects they believe may be caused by any medication additions or changes?: No (5/14/2025 11:35 AM)  Does the patient have all medications ordered at discharge?: Yes (5/14/2025 11:35 AM)  Care Management Interventions: No intervention needed (5/14/2025 11:35 AM)  Prescription Comments: Pause Warfarin unti warfarin clinic, Stop Levaquin, prednisone, start robitussin DM , Tramadol 50mg bid x7 days (5/14/2025 11:35 AM)  Is the patient taking all medications as directed (includes completed medication regime)?: Yes (5/14/2025 11:35 AM)  Care Management Interventions: Provided patient education (5/14/2025 11:35 AM)  Medication Comments: Denies any medication questions/concerns (5/14/2025 11:35 AM)    Appointments  Does the patient have a primary care provider?: Yes (5/14/2025 11:35 AM)  Care Management Interventions: Verified appointment date/time/provider; Educated patient on importance of making  appointment (5/14/2025 11:35 AM)  Has the patient kept scheduled appointments due by today?: Yes (5/14/2025 11:35 AM)  Care Management Interventions: Advised patient to keep appointment; Educated on importance of keeping appointment; Advised to schedule with specialist (5/14/2025 11:35 AM)    Self Management  What is the home health agency?: [facility] (5/14/2025 11:35 AM)  Has home health visited the patient within 72 hours of discharge?: Call prior to 72 hours (5/14/2025 11:35 AM)  What Durable Medical Equipment (DME) was ordered?: n/a pt. continues on 2L home oyxgen (5/14/2025 11:35 AM)    Patient Teaching  Does the patient have access to their discharge instructions?: Yes (5/14/2025 11:35 AM)  Care Management Interventions: Reviewed instructions with patient (5/14/2025 11:35 AM)  What is the patient's perception of their health status since discharge?: Improving (5/14/2025 11:35 AM)  Is the patient/caregiver able to teach back the hierarchy of who to call/visit for symptoms/problems? PCP, Specialist, Home Health nurse, Urgent Care, ED, 911: Yes (5/14/2025 11:35 AM)  Patient/Caregiver Education Comments: Pt. reprots her  remains in the hospital (5/14/2025 11:35 AM)

## 2025-05-19 ENCOUNTER — APPOINTMENT (OUTPATIENT)
Dept: PRIMARY CARE | Facility: CLINIC | Age: 75
End: 2025-05-19
Payer: MEDICARE

## 2025-05-19 VITALS
BODY MASS INDEX: 25.91 KG/M2 | HEART RATE: 108 BPM | DIASTOLIC BLOOD PRESSURE: 75 MMHG | OXYGEN SATURATION: 96 % | SYSTOLIC BLOOD PRESSURE: 131 MMHG | WEIGHT: 170.4 LBS

## 2025-05-19 DIAGNOSIS — E87.5 HYPERKALEMIA: ICD-10-CM

## 2025-05-19 DIAGNOSIS — J96.11 CHRONIC RESPIRATORY FAILURE WITH HYPOXIA, ON HOME O2 THERAPY: ICD-10-CM

## 2025-05-19 DIAGNOSIS — Z09 HOSPITAL DISCHARGE FOLLOW-UP: Primary | ICD-10-CM

## 2025-05-19 DIAGNOSIS — Z99.81 CHRONIC RESPIRATORY FAILURE WITH HYPOXIA, ON HOME O2 THERAPY: ICD-10-CM

## 2025-05-19 PROCEDURE — 1036F TOBACCO NON-USER: CPT | Performed by: REGISTERED NURSE

## 2025-05-19 PROCEDURE — 3075F SYST BP GE 130 - 139MM HG: CPT | Performed by: REGISTERED NURSE

## 2025-05-19 PROCEDURE — 99496 TRANSJ CARE MGMT HIGH F2F 7D: CPT | Performed by: REGISTERED NURSE

## 2025-05-19 PROCEDURE — 1159F MED LIST DOCD IN RCRD: CPT | Performed by: REGISTERED NURSE

## 2025-05-19 PROCEDURE — 3078F DIAST BP <80 MM HG: CPT | Performed by: REGISTERED NURSE

## 2025-05-19 PROCEDURE — 1160F RVW MEDS BY RX/DR IN RCRD: CPT | Performed by: REGISTERED NURSE

## 2025-05-20 DIAGNOSIS — E03.9 HYPOTHYROIDISM, UNSPECIFIED TYPE: ICD-10-CM

## 2025-05-20 DIAGNOSIS — F51.01 PRIMARY INSOMNIA: ICD-10-CM

## 2025-05-20 RX ORDER — LEVOTHYROXINE SODIUM 50 UG/1
TABLET ORAL
Qty: 90 TABLET | Refills: 1 | Status: SHIPPED | OUTPATIENT
Start: 2025-05-20

## 2025-05-20 RX ORDER — TRAZODONE HYDROCHLORIDE 100 MG/1
100 TABLET ORAL NIGHTLY PRN
Qty: 30 TABLET | Refills: 0 | Status: SHIPPED | OUTPATIENT
Start: 2025-05-20 | End: 2026-05-20

## 2025-05-21 ENCOUNTER — PATIENT OUTREACH (OUTPATIENT)
Dept: PRIMARY CARE | Facility: CLINIC | Age: 75
End: 2025-05-21
Payer: MEDICARE

## 2025-05-21 NOTE — PROGRESS NOTES
Unable to reach patient for follow up call after recent hospitalization.   Left voicemail with call back number for patient to call if needed  to assist with any questions or concerns patient may have.  Office Visit with CHANA Acuna (05/19/2025)

## 2025-06-02 ENCOUNTER — INFUSION (OUTPATIENT)
Facility: HOSPITAL | Age: 75
End: 2025-06-02
Payer: MEDICARE

## 2025-06-02 VITALS
HEART RATE: 108 BPM | SYSTOLIC BLOOD PRESSURE: 131 MMHG | OXYGEN SATURATION: 96 % | DIASTOLIC BLOOD PRESSURE: 73 MMHG | RESPIRATION RATE: 20 BRPM | TEMPERATURE: 98.6 F

## 2025-06-02 DIAGNOSIS — I87.8 POOR VENOUS ACCESS: ICD-10-CM

## 2025-06-02 LAB
ALBUMIN SERPL BCP-MCNC: 4.3 G/DL (ref 3.4–5)
ALP SERPL-CCNC: 58 U/L (ref 33–136)
ALT SERPL W P-5'-P-CCNC: 16 U/L (ref 7–45)
ANION GAP SERPL CALC-SCNC: 12 MMOL/L (ref 10–20)
AST SERPL W P-5'-P-CCNC: 20 U/L (ref 9–39)
BASOPHILS # BLD AUTO: 0.08 X10*3/UL (ref 0–0.1)
BASOPHILS NFR BLD AUTO: 0.6 %
BILIRUB SERPL-MCNC: 0.5 MG/DL (ref 0–1.2)
BUN SERPL-MCNC: 16 MG/DL (ref 6–23)
CALCIUM SERPL-MCNC: 9.4 MG/DL (ref 8.6–10.3)
CHLORIDE SERPL-SCNC: 101 MMOL/L (ref 98–107)
CO2 SERPL-SCNC: 29 MMOL/L (ref 21–32)
CREAT SERPL-MCNC: 0.77 MG/DL (ref 0.5–1.05)
EGFRCR SERPLBLD CKD-EPI 2021: 80 ML/MIN/1.73M*2
EOSINOPHIL # BLD AUTO: 0.17 X10*3/UL (ref 0–0.4)
EOSINOPHIL NFR BLD AUTO: 1.2 %
ERYTHROCYTE [DISTWIDTH] IN BLOOD BY AUTOMATED COUNT: 17.1 % (ref 11.5–14.5)
GLUCOSE SERPL-MCNC: 82 MG/DL (ref 74–99)
HCT VFR BLD AUTO: 38.4 % (ref 36–46)
HGB BLD-MCNC: 12 G/DL (ref 12–16)
IMM GRANULOCYTES # BLD AUTO: 0.12 X10*3/UL (ref 0–0.5)
IMM GRANULOCYTES NFR BLD AUTO: 0.9 % (ref 0–0.9)
LYMPHOCYTES # BLD AUTO: 2.82 X10*3/UL (ref 0.8–3)
LYMPHOCYTES NFR BLD AUTO: 20 %
MCH RBC QN AUTO: 29.7 PG (ref 26–34)
MCHC RBC AUTO-ENTMCNC: 31.3 G/DL (ref 32–36)
MCV RBC AUTO: 95 FL (ref 80–100)
MONOCYTES # BLD AUTO: 1.18 X10*3/UL (ref 0.05–0.8)
MONOCYTES NFR BLD AUTO: 8.4 %
NEUTROPHILS # BLD AUTO: 9.74 X10*3/UL (ref 1.6–5.5)
NEUTROPHILS NFR BLD AUTO: 68.9 %
NRBC BLD-RTO: 0 /100 WBCS (ref 0–0)
PLATELET # BLD AUTO: 324 X10*3/UL (ref 150–450)
POTASSIUM SERPL-SCNC: 4.2 MMOL/L (ref 3.5–5.3)
PROT SERPL-MCNC: 7.2 G/DL (ref 6.4–8.2)
RBC # BLD AUTO: 4.04 X10*6/UL (ref 4–5.2)
SODIUM SERPL-SCNC: 138 MMOL/L (ref 136–145)
WBC # BLD AUTO: 14.1 X10*3/UL (ref 4.4–11.3)

## 2025-06-02 PROCEDURE — 36591 DRAW BLOOD OFF VENOUS DEVICE: CPT

## 2025-06-02 PROCEDURE — 84075 ASSAY ALKALINE PHOSPHATASE: CPT | Performed by: REGISTERED NURSE

## 2025-06-02 PROCEDURE — 85025 COMPLETE CBC W/AUTO DIFF WBC: CPT | Performed by: REGISTERED NURSE

## 2025-06-02 PROCEDURE — 2500000004 HC RX 250 GENERAL PHARMACY W/ HCPCS (ALT 636 FOR OP/ED)

## 2025-06-02 RX ORDER — HEPARIN 100 UNIT/ML
SYRINGE INTRAVENOUS
Status: COMPLETED
Start: 2025-06-02 | End: 2025-06-02

## 2025-06-02 RX ORDER — HEPARIN SODIUM,PORCINE/PF 10 UNIT/ML
50 SYRINGE (ML) INTRAVENOUS AS NEEDED
OUTPATIENT
Start: 2025-06-02

## 2025-06-02 RX ORDER — HEPARIN 100 UNIT/ML
500 SYRINGE INTRAVENOUS AS NEEDED
OUTPATIENT
Start: 2025-06-02

## 2025-06-02 RX ORDER — HEPARIN 100 UNIT/ML
500 SYRINGE INTRAVENOUS AS NEEDED
Status: DISCONTINUED | OUTPATIENT
Start: 2025-06-02 | End: 2025-06-02 | Stop reason: HOSPADM

## 2025-06-02 RX ADMIN — HEPARIN 500 UNITS: 100 SYRINGE at 14:53

## 2025-06-02 ASSESSMENT — PAIN SCALES - GENERAL: PAINLEVEL_OUTOF10: 0-NO PAIN

## 2025-06-02 NOTE — PROGRESS NOTES
[facility]   Infusion Clinic Note   Date: 2025   Name: Trinity Hernandez  : 1950   MRN: [id number]         Reason for Visit: Vascular Access Problem (Port Draw)         Today: Trinity Hernandez had no medications administered during this visit.       Ordered By: No ref. provider found       For a Diagnosis of: No diagnosis found.       At today's visit patient accompanied by: Self      Today's Vitals:   There were no vitals filed for this visit.          Pre - Treatment Checklist:      - Previous reaction to current treatment: no      (Assess patient for the concerns below. Document provider notification as appropriate).  - Active or recent infection with/without current antibiotic use: n/a  - Recent or planned invasive dental work: n/a  - Recent or planned surgeries: n/a  - Recently received or plans to receive vaccinations: n/a  - Has treatment related toxicities: no  - Any chance may be pregnant:  no      Pain: 0   - Is the pain different from normal: n/a   - Is prescribing Doctor aware:  n/a      Labs: Labs drawn and sent per order      Fall Risk Screening: Vijaya Fall Risk  History of Falling, Immediate or Within 3 Months: No  Secondary Diagnosis: Yes  Ambulatory Aid: Walks without aid/bedrest/nurse assist  Intravenous Therapy/Heparin Lock: No  Gait/Transferring: Normal/bedrest/immobile  Mental Status: Oriented to own ability  Lilly Fall Risk Score: 15       Review Of Systems:  Review of Systems - Oncology      Infusion Readiness:  - Assessment Concerns Related to Infusion: No  - Provider notified: n/a      New Patient Education:    N/A (returning patient for continuation of therapy. Ongoing education provided as needed.)        Treatment Conditions & Drug Specific Questions:    NOT APPLICABLE        Weight Based Drug Calculations:    WEIGHT BASED DRUGS: NOT APPLICABLE / FLAT DOSE       Post Treatment: Patient tolerated treatment without issue and was discharged in no  apparent distress.      Note Authored / Patient Cared for By: Foreign Flores RN

## 2025-06-03 ENCOUNTER — APPOINTMENT (OUTPATIENT)
Facility: HOSPITAL | Age: 75
End: 2025-06-03
Payer: MEDICARE

## 2025-06-09 ENCOUNTER — APPOINTMENT (OUTPATIENT)
Dept: PRIMARY CARE | Facility: CLINIC | Age: 75
End: 2025-06-09
Payer: MEDICARE

## 2025-06-09 VITALS
SYSTOLIC BLOOD PRESSURE: 131 MMHG | HEART RATE: 94 BPM | WEIGHT: 170 LBS | BODY MASS INDEX: 29.02 KG/M2 | DIASTOLIC BLOOD PRESSURE: 80 MMHG | HEIGHT: 64 IN

## 2025-06-09 DIAGNOSIS — B37.89 CANDIDA RASH OF GROIN: Primary | ICD-10-CM

## 2025-06-09 DIAGNOSIS — F41.9 ANXIETY: ICD-10-CM

## 2025-06-09 PROCEDURE — 99214 OFFICE O/P EST MOD 30 MIN: CPT | Performed by: FAMILY MEDICINE

## 2025-06-09 PROCEDURE — 3079F DIAST BP 80-89 MM HG: CPT | Performed by: FAMILY MEDICINE

## 2025-06-09 PROCEDURE — 3075F SYST BP GE 130 - 139MM HG: CPT | Performed by: FAMILY MEDICINE

## 2025-06-09 PROCEDURE — 1159F MED LIST DOCD IN RCRD: CPT | Performed by: FAMILY MEDICINE

## 2025-06-09 RX ORDER — LORAZEPAM 0.5 MG/1
0.5 TABLET ORAL 2 TIMES DAILY PRN
Qty: 30 TABLET | Refills: 0 | Status: SHIPPED | OUTPATIENT
Start: 2025-06-09 | End: 2025-07-09

## 2025-06-09 RX ORDER — NYSTATIN 100000 U/G
CREAM TOPICAL 2 TIMES DAILY
Qty: 90 G | Refills: 0 | Status: SHIPPED | OUTPATIENT
Start: 2025-06-09 | End: 2025-06-16

## 2025-06-09 NOTE — PATIENT INSTRUCTIONS
Lab Work:  Schedule an appointment for labs at [email or web address] or call [phone] 24 hours a day, 7 days a week.   You can also download the [facility] lab scheduling nuvia on your phone.     Radiology [phone]

## 2025-06-09 NOTE — PROGRESS NOTES
Subjective   Patient ID: Trinity Hernandez is a 75 y.o. female who presents for Follow-up (2 weeks;  passed away last week,  was Friday; breathing is okay).    Her  passed away since her last visit. She is doing ok, just went through the  on Friday.    Pneumonia: Breathing is better currently.      Afib with RVR: Completed watchman. Feeling ok overall. She was having urinary frequency and vaginal discomfort and she stopped the jardiance. Feeling ok otherwise. She was started on colchicine but she stopped that as well. Breathing has been ok.      Her  is on dialysis now.  had dialysis port placed last month and he ended up going into cardiac arrest but the resuscitated him and he is still going to dialysis and he ended up getting a head injury with a brain bleed. They are actually starting at home dialysis.Since her last visit he had a head injury and had to get surgery to remove the clots.      Afib: She is on warfarin. She is going for Watchman on . Working on getting her surgery approved.      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      CAD, s/p stent: Following with Dr. Romero. Considering trying repatha.     Shoulder pain: bothering her more. Hasn't tried the meloxicam out much yet.      Small bowel perforation, abdominal hernias, epiploic appendagitis: She has been recommended laparoscopic surgery but she is hoping to avoid surgery. Currently having occasional bad days but not bothering her much lately.      Anxiety: lorazepam prn is helpful. She is having a lot more anxiety lately because of her 's health. He has an aneurysm that is over 5cm and he is in renal failure and he recently had a severe GI bleed. She is taking hydroxyzine but that makes her sleepy so she can't take it during the day.      HTN, aortic aneurysm: Reasonable control on metoprolol, losartan, and lasix. Following with  "cardioloy. Recently had some discomfort and got a zio patch done. She is awaiting results. She is going to get a stress test soon.     HLD: On repatha.      COPD/sarcoidosis: On Brovana, spiriva, and budesonide. Using oxygen and prednisone prn, she has an albuterol inhaler. She is on 2L nasal cannula. She is seeing Dr. Ford for pulm.      Hypothyroidism: On replacement, no concerns.     GERD: Off pantoprazole, she is doing much better since taking the enzymes.     Review of systems completed and unremarkable other than what is documented in HPI.    Objective   /80 (BP Location: Right arm, Patient Position: Sitting, BP Cuff Size: Adult)   Pulse 94   Ht 1.626 m (5' 4\")   Wt 77.1 kg (170 lb)   BMI 29.18 kg/m²     Gen: No acute distress, alert and oriented x3, pleasant   HEENT: moist mucous membranes, b/l external auditory canals are clear of debris, TMs within normal limits, no oropharyngeal lesions, eomi, perrla   Neck: thyroid within normal limits, no lymphadenopathy   CV: RRR, normal S1/S2, no murmur   Resp: Clear to auscultation bilaterally, no wheezes or rhonchi appreciated  Abd: soft, nontender, non-distended, no guarding/rigidity, bowel sounds present  Extr: no edema, no calf tenderness  Derm: Skin is warm and dry, severe B/L yeast rash noted under both breasts  Psych: mood is good, affect is congruent, good hygiene, normal speech and eye contact  Neuro: cranial nerves grossly intact, normal gait      Assessment/Plan     #Candida rash  Rx sent nystatin     #CVA:  She is on warfarin and plavix  INR monioring through Dr. Farfan's office/[place]  Completed PT     #Afib  On warfarin  Doing well since watchman procedure     #Rheumatoid arthritis  monitor     #COPD exacerbation  #COPD  #Respiratory failure  On oxygen  On azithromycin three times per week  Breathing is improving slowly  Bronchiectasis vest was denied due to her diagnoses     #CAD  S/p stent   On plavix and ezetimibe  Has followup with " cardio  Back on repatha     #Small bowel perforation  #Ventral hernia  Following with GI  Currently planning to avoid surgery     #Anxiety:  CSA signed Sept 2024  Ordering UDS  Doing reasonably well with lorazepam daily     #Poor venous access  port in place     #HTN  #Afib  Controlled on furosemide, and metoprolol  seeing cardiology ([facility])  She is on warfarin through [place]     #HLD  Stable, not on meds, monitoring     #COPD/Sarcoidosis  Following with pulm, stable, on inhalers, supplemental O2, nebulizers  testing with pulm in April     #Hypothyroidism  Controlled on replacement     HCM:  s/p COVID vaccine  Mammogram 10/2024

## 2025-06-11 DIAGNOSIS — F51.01 PRIMARY INSOMNIA: ICD-10-CM

## 2025-06-11 RX ORDER — TRAZODONE HYDROCHLORIDE 100 MG/1
100 TABLET ORAL NIGHTLY PRN
Qty: 30 TABLET | Refills: 0 | Status: SHIPPED | OUTPATIENT
Start: 2025-06-11 | End: 2026-06-11

## 2025-06-16 ENCOUNTER — PATIENT OUTREACH (OUTPATIENT)
Dept: PRIMARY CARE | Facility: CLINIC | Age: 75
End: 2025-06-16
Payer: MEDICARE

## 2025-06-24 DIAGNOSIS — F51.01 PRIMARY INSOMNIA: ICD-10-CM

## 2025-06-24 RX ORDER — TRAZODONE HYDROCHLORIDE 100 MG/1
100 TABLET ORAL NIGHTLY PRN
Qty: 90 TABLET | Refills: 1 | Status: SHIPPED | OUTPATIENT
Start: 2025-06-24 | End: 2026-06-24

## 2025-07-08 NOTE — PROGRESS NOTES
"Subjective   Patient ID: Trinity Hernandez is a 75 y.o. female who presents for Follow-up (1 month; breathing is about the same).    Feeling lonely since her  passed away. Her friend Miriam moved in with her and is helping her take care of her finances, cleaning, laundry, and food.      Afib with RVR: Completed watchman. Feeling ok overall. No longer on coumadin!      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      CAD, s/p stent: Following with Dr. Romero. Considering trying repatha.     Shoulder pain: bothering her more. Hasn't tried the meloxicam out much yet.      Small bowel perforation, abdominal hernias, epiploic appendagitis: She has been recommended laparoscopic surgery but she is hoping to avoid surgery. Currently having occasional bad days but not bothering her much lately.      Anxiety: lorazepam prn is helpful. She is having a lot more anxiety lately because of her 's health. He has an aneurysm that is over 5cm and he is in renal failure and he recently had a severe GI bleed. She is taking hydroxyzine but that makes her sleepy so she can't take it during the day.      HTN, aortic aneurysm: Reasonable control on metoprolol, losartan, and lasix. Following with cardioloy. Recently had some discomfort and got a zio patch done. She is awaiting results. She is going to get a stress test soon.     HLD: On repatha.      COPD/sarcoidosis: On Brovana, spiriva, and budesonide. Using oxygen and prednisone prn, she has an albuterol inhaler. She is on 2L nasal cannula. She is seeing Dr. Ford for pulm.      Hypothyroidism: On replacement, no concerns.     GERD: Off pantoprazole, she is doing much better since taking the enzymes.     Review of systems completed and unremarkable other than what is documented in HPI.    Objective   /79 (BP Location: Left arm, Patient Position: Sitting, BP Cuff Size: Adult)   Pulse 94   Ht 1.626 m (5' 4\")   Wt 78 kg " (172 lb)   BMI 29.52 kg/m²     Gen: No acute distress, alert and oriented x3, pleasant   HEENT: moist mucous membranes, b/l external auditory canals are clear of debris, TMs within normal limits, no oropharyngeal lesions, eomi, perrla   Neck: thyroid within normal limits, no lymphadenopathy   CV: RRR, normal S1/S2, no murmur   Resp: Clear to auscultation bilaterally, no wheezes or rhonchi appreciated  Abd: soft, nontender, non-distended, no guarding/rigidity, bowel sounds present  Extr: no edema, no calf tenderness  Derm: Skin is warm and dry, no rashes appreciated  Psych: mood is good, affect is congruent, good hygiene, normal speech and eye contact  Neuro: cranial nerves grossly intact, normal gait      Assessment/Plan      #CVA:  She is on warfarin and plavix  INR monitoring through Dr. Farfan's office/[facility]  Completed PT     #Afib  On warfarin  Doing well since watchman procedure     #Rheumatoid arthritis  monitor     #COPD exacerbation  #COPD  #Respiratory failure  On oxygen  On azithromycin three times per week  Breathing is improving slowly  Bronchiectasis vest was denied due to her diagnoses     #CAD  S/p stent   On plavix and ezetimibe  Has followup with cardio  Back on repatha     #Small bowel perforation  #Ventral hernia  Following with GI  Currently planning to avoid surgery     #Anxiety:  CSA signed Sept 2024  Ordering UDS  Doing reasonably well with lorazepam daily     #Poor venous access  port in place     #HTN  #Afib  Controlled on furosemide, and metoprolol  seeing cardiology ([facility])  She is on warfarin through [facility]     #HLD  Stable, not on meds, monitoring     #COPD/Sarcoidosis  Following with pulm, stable, on inhalers, supplemental O2, nebulizers  testing with pulm in April     #Hypothyroidism  Controlled on replacement     HCM:  s/p COVID vaccine  Mammogram 10/2024

## 2025-07-09 ENCOUNTER — APPOINTMENT (OUTPATIENT)
Dept: PRIMARY CARE | Facility: CLINIC | Age: 75
End: 2025-07-09
Payer: MEDICARE

## 2025-07-09 VITALS
DIASTOLIC BLOOD PRESSURE: 79 MMHG | HEART RATE: 94 BPM | BODY MASS INDEX: 29.37 KG/M2 | WEIGHT: 172 LBS | SYSTOLIC BLOOD PRESSURE: 135 MMHG | HEIGHT: 64 IN

## 2025-07-09 DIAGNOSIS — F41.9 ANXIETY: ICD-10-CM

## 2025-07-09 PROCEDURE — 3075F SYST BP GE 130 - 139MM HG: CPT | Performed by: FAMILY MEDICINE

## 2025-07-09 PROCEDURE — 99214 OFFICE O/P EST MOD 30 MIN: CPT | Performed by: FAMILY MEDICINE

## 2025-07-09 PROCEDURE — 3078F DIAST BP <80 MM HG: CPT | Performed by: FAMILY MEDICINE

## 2025-07-09 PROCEDURE — 1036F TOBACCO NON-USER: CPT | Performed by: FAMILY MEDICINE

## 2025-07-09 RX ORDER — LORAZEPAM 0.5 MG/1
0.5 TABLET ORAL 2 TIMES DAILY PRN
Qty: 30 TABLET | Refills: 0 | Status: SHIPPED | OUTPATIENT
Start: 2025-07-09 | End: 2025-08-08

## 2025-07-15 ENCOUNTER — INFUSION (OUTPATIENT)
Facility: HOSPITAL | Age: 75
End: 2025-07-15
Payer: MEDICARE

## 2025-07-15 VITALS
HEART RATE: 87 BPM | DIASTOLIC BLOOD PRESSURE: 76 MMHG | SYSTOLIC BLOOD PRESSURE: 110 MMHG | OXYGEN SATURATION: 96 % | RESPIRATION RATE: 17 BRPM | TEMPERATURE: 96.8 F

## 2025-07-15 DIAGNOSIS — I87.8 POOR VENOUS ACCESS: ICD-10-CM

## 2025-07-15 PROCEDURE — 2500000004 HC RX 250 GENERAL PHARMACY W/ HCPCS (ALT 636 FOR OP/ED)

## 2025-07-15 PROCEDURE — 96523 IRRIG DRUG DELIVERY DEVICE: CPT

## 2025-07-15 RX ORDER — HEPARIN 100 UNIT/ML
500 SYRINGE INTRAVENOUS AS NEEDED
OUTPATIENT
Start: 2025-07-15

## 2025-07-15 RX ORDER — HEPARIN 100 UNIT/ML
500 SYRINGE INTRAVENOUS AS NEEDED
Status: DISCONTINUED | OUTPATIENT
Start: 2025-07-15 | End: 2025-07-15 | Stop reason: HOSPADM

## 2025-07-15 RX ORDER — HEPARIN 100 UNIT/ML
SYRINGE INTRAVENOUS
Status: COMPLETED
Start: 2025-07-15 | End: 2025-07-15

## 2025-07-15 RX ORDER — HEPARIN SODIUM,PORCINE/PF 10 UNIT/ML
50 SYRINGE (ML) INTRAVENOUS AS NEEDED
OUTPATIENT
Start: 2025-07-15

## 2025-07-15 RX ADMIN — HEPARIN 500 UNITS: 100 SYRINGE at 10:31

## 2025-07-15 ASSESSMENT — COLUMBIA-SUICIDE SEVERITY RATING SCALE - C-SSRS
6. HAVE YOU EVER DONE ANYTHING, STARTED TO DO ANYTHING, OR PREPARED TO DO ANYTHING TO END YOUR LIFE?: NO
2. HAVE YOU ACTUALLY HAD ANY THOUGHTS OF KILLING YOURSELF?: NO
1. IN THE PAST MONTH, HAVE YOU WISHED YOU WERE DEAD OR WISHED YOU COULD GO TO SLEEP AND NOT WAKE UP?: NO

## 2025-07-15 ASSESSMENT — PATIENT HEALTH QUESTIONNAIRE - PHQ9
1. LITTLE INTEREST OR PLEASURE IN DOING THINGS: NOT AT ALL
SUM OF ALL RESPONSES TO PHQ9 QUESTIONS 1 AND 2: 0
2. FEELING DOWN, DEPRESSED OR HOPELESS: NOT AT ALL

## 2025-07-15 ASSESSMENT — LIFESTYLE VARIABLES
HOW OFTEN DO YOU HAVE A DRINK CONTAINING ALCOHOL: NEVER
HOW MANY STANDARD DRINKS CONTAINING ALCOHOL DO YOU HAVE ON A TYPICAL DAY: PATIENT DOES NOT DRINK
HOW OFTEN DO YOU HAVE SIX OR MORE DRINKS ON ONE OCCASION: NEVER
AUDIT-C TOTAL SCORE: 0
SKIP TO QUESTIONS 9-10: 1

## 2025-07-15 ASSESSMENT — ENCOUNTER SYMPTOMS
DEPRESSION: 1
LEG SWELLING: 1
FATIGUE: 1
SHORTNESS OF BREATH: 1

## 2025-07-15 ASSESSMENT — PAIN SCALES - GENERAL: PAINLEVEL_OUTOF10: 2

## 2025-07-15 NOTE — PROGRESS NOTES
"[facility]   Infusion Clinic Note   Date: July 15, 2025   Name: Trinity Hernandez  : 1950   MRN: [id number]         Reason for Visit: Routine Port Flush          Today: Trinity Hernandez had no medications administered during this visit.       Ordered By: No ref. provider found       For a Diagnosis of: Poor venous access       At today's visit patient accompanied by: Self      Today's Vitals:   Vitals:    07/15/25 1032   BP: 110/76   Pulse: 87   Resp: 17   Temp: 36 °C (96.8 °F)   TempSrc: Temporal   SpO2: 96%  Comment: on O2@2L N/C   PainSc:   2   PainLoc: Foot  Comment: right             Pre - Treatment Checklist:      - Previous reaction to current treatment: no      (Assess patient for the concerns below. Document provider notification as appropriate).  - Active or recent infection with/without current antibiotic use: n/a  - Recent or planned invasive dental work: n/a  - Recent or planned surgeries: n/a  - Recently received or plans to receive vaccinations: n/a  - Has treatment related toxicities: no  - Any chance may be pregnant:  no      Pain: 2   - Is the pain different from normal: no   - Is prescribing Doctor aware:  yes      Labs: N/A      Fall Risk Screening: Lilly Fall Risk  History of Falling, Immediate or Within 3 Months: No  Secondary Diagnosis: Yes  Ambulatory Aid: Walks without aid/bedrest/nurse assist  Intravenous Therapy/Heparin Lock: No  Gait/Transferring: Normal/bedrest/immobile  Mental Status: Oriented to own ability  Lilly Fall Risk Score: 15            Review Of Systems:  Review of Systems   Constitutional:  Positive for fatigue.   Respiratory:  Positive for shortness of breath.         GRAY-wears N/C oxygen ATC   Cardiovascular:  Positive for leg swelling.        Unilateral right foot edema intermitted-+1-2 currently   Psychiatric/Behavioral:  Positive for depression.         Pt tearful today-states lonely since death of  2025. Pt has Miriam \"like my " "daughter\" who lives with her and helps pt.    All other systems reviewed and are negative.        Infusion Readiness:  - Assessment Concerns Related to Infusion: No  - Provider notified: n/a      New Patient Education:    N/A (returning patient for continuation of therapy. Ongoing education provided as needed.)        Treatment Conditions & Drug Specific Questions:    NOT APPLICABLE        Weight Based Drug Calculations:    WEIGHT BASED DRUGS: NOT APPLICABLE / FLAT DOSE       Post Treatment: Routine port flush only-Patient tolerated treatment without issue and was discharged in no apparent distress.      Note Authored / Patient Cared for By: Keren Walsh RN        "

## 2025-07-17 ENCOUNTER — APPOINTMENT (OUTPATIENT)
Facility: HOSPITAL | Age: 75
End: 2025-07-17
Payer: MEDICARE

## 2025-08-28 ENCOUNTER — INFUSION (OUTPATIENT)
Facility: HOSPITAL | Age: 75
End: 2025-08-28
Payer: MEDICARE

## 2025-08-28 VITALS
OXYGEN SATURATION: 95 % | SYSTOLIC BLOOD PRESSURE: 133 MMHG | TEMPERATURE: 96.8 F | DIASTOLIC BLOOD PRESSURE: 72 MMHG | HEART RATE: 95 BPM | RESPIRATION RATE: 18 BRPM

## 2025-08-28 DIAGNOSIS — I87.8 POOR VENOUS ACCESS: ICD-10-CM

## 2025-08-28 PROCEDURE — 2500000004 HC RX 250 GENERAL PHARMACY W/ HCPCS (ALT 636 FOR OP/ED)

## 2025-08-28 PROCEDURE — 96523 IRRIG DRUG DELIVERY DEVICE: CPT

## 2025-08-28 RX ORDER — HEPARIN SODIUM,PORCINE/PF 10 UNIT/ML
50 SYRINGE (ML) INTRAVENOUS AS NEEDED
OUTPATIENT
Start: 2025-08-28

## 2025-08-28 RX ORDER — HEPARIN 100 UNIT/ML
500 SYRINGE INTRAVENOUS AS NEEDED
OUTPATIENT
Start: 2025-08-28

## 2025-08-28 RX ORDER — HEPARIN 100 UNIT/ML
500 SYRINGE INTRAVENOUS AS NEEDED
Status: DISCONTINUED | OUTPATIENT
Start: 2025-08-28 | End: 2025-08-28 | Stop reason: HOSPADM

## 2025-08-28 RX ORDER — SODIUM CHLORIDE 9 MG/ML
10 INJECTION, SOLUTION INTRAMUSCULAR; INTRAVENOUS; SUBCUTANEOUS EVERY 8 HOURS SCHEDULED
Qty: 900 ML | Refills: 0 | Status: SHIPPED | OUTPATIENT
Start: 2025-08-28 | End: 2025-09-27

## 2025-08-28 RX ORDER — HEPARIN 100 UNIT/ML
SYRINGE INTRAVENOUS
Status: COMPLETED
Start: 2025-08-28 | End: 2025-08-28

## 2025-08-28 RX ORDER — SODIUM CHLORIDE 9 MG/ML
10 INJECTION, SOLUTION INTRAMUSCULAR; INTRAVENOUS; SUBCUTANEOUS EVERY 8 HOURS SCHEDULED
Qty: 900 ML | Refills: 0 | Status: SHIPPED | OUTPATIENT
Start: 2025-08-28 | End: 2025-08-28

## 2025-08-28 RX ADMIN — HEPARIN 500 UNITS: 100 SYRINGE at 09:03

## 2025-08-28 ASSESSMENT — ENCOUNTER SYMPTOMS
SHORTNESS OF BREATH: 1
EXTREMITY WEAKNESS: 1
COUGH: 1

## 2025-08-28 ASSESSMENT — PAIN SCALES - GENERAL: PAINLEVEL_OUTOF10: 2

## 2025-09-09 ENCOUNTER — APPOINTMENT (OUTPATIENT)
Dept: PRIMARY CARE | Facility: CLINIC | Age: 75
End: 2025-09-09
Payer: MEDICARE